# Patient Record
Sex: FEMALE | Race: WHITE | NOT HISPANIC OR LATINO | Employment: OTHER | URBAN - METROPOLITAN AREA
[De-identification: names, ages, dates, MRNs, and addresses within clinical notes are randomized per-mention and may not be internally consistent; named-entity substitution may affect disease eponyms.]

---

## 2017-01-03 RX ORDER — DICLOFENAC SODIUM 75 MG/1
TABLET, DELAYED RELEASE ORAL
Qty: 60 TABLET | Refills: 2 | Status: SHIPPED | OUTPATIENT
Start: 2017-01-03 | End: 2017-04-01 | Stop reason: SDUPTHER

## 2017-01-23 ENCOUNTER — LAB (OUTPATIENT)
Dept: LAB | Facility: HOSPITAL | Age: 77
End: 2017-01-23

## 2017-01-23 ENCOUNTER — OFFICE VISIT (OUTPATIENT)
Dept: ONCOLOGY | Facility: CLINIC | Age: 77
End: 2017-01-23

## 2017-01-23 VITALS
HEART RATE: 81 BPM | HEIGHT: 62 IN | RESPIRATION RATE: 16 BRPM | BODY MASS INDEX: 27.05 KG/M2 | SYSTOLIC BLOOD PRESSURE: 120 MMHG | OXYGEN SATURATION: 94 % | WEIGHT: 147 LBS | TEMPERATURE: 98.2 F | DIASTOLIC BLOOD PRESSURE: 68 MMHG

## 2017-01-23 DIAGNOSIS — D64.9 ANEMIA, UNSPECIFIED TYPE: Primary | ICD-10-CM

## 2017-01-23 DIAGNOSIS — D70.9 NEUTROPENIA, UNSPECIFIED TYPE (HCC): ICD-10-CM

## 2017-01-23 DIAGNOSIS — D69.6 THROMBOCYTOPENIA (HCC): Primary | ICD-10-CM

## 2017-01-23 LAB
BASOPHILS # BLD AUTO: 0.02 10*3/MM3 (ref 0–0.1)
BASOPHILS NFR BLD AUTO: 0.5 % (ref 0–1.1)
DEPRECATED RDW RBC AUTO: 40.2 FL (ref 37–49)
EOSINOPHIL # BLD AUTO: 0.09 10*3/MM3 (ref 0–0.36)
EOSINOPHIL NFR BLD AUTO: 2.1 % (ref 1–5)
ERYTHROCYTE [DISTWIDTH] IN BLOOD BY AUTOMATED COUNT: 12.4 % (ref 11.7–14.5)
HCT VFR BLD AUTO: 34.5 % (ref 34–45)
HGB BLD-MCNC: 12.1 G/DL (ref 11.5–14.9)
IMM GRANULOCYTES # BLD: 0.06 10*3/MM3 (ref 0–0.03)
IMM GRANULOCYTES NFR BLD: 1.4 % (ref 0–0.5)
LYMPHOCYTES # BLD AUTO: 1.72 10*3/MM3 (ref 1–3.5)
LYMPHOCYTES NFR BLD AUTO: 39.4 % (ref 20–49)
MCH RBC QN AUTO: 31.5 PG (ref 27–33)
MCHC RBC AUTO-ENTMCNC: 35.1 G/DL (ref 32–35)
MCV RBC AUTO: 89.8 FL (ref 83–97)
MONOCYTES # BLD AUTO: 0.9 10*3/MM3 (ref 0.25–0.8)
MONOCYTES NFR BLD AUTO: 20.6 % (ref 4–12)
NEUTROPHILS # BLD AUTO: 1.58 10*3/MM3 (ref 1.5–7)
NEUTROPHILS NFR BLD AUTO: 36 % (ref 39–75)
NRBC BLD MANUAL-RTO: 0 /100 WBC (ref 0–0)
PLATELET # BLD AUTO: 78 10*3/MM3 (ref 150–375)
PLATELETS.RETICULATED NFR BLD AUTO: 13.7 % (ref 1.1–6.1)
PMV BLD AUTO: 12.3 FL (ref 8.9–12.1)
RBC # BLD AUTO: 3.84 10*6/MM3 (ref 3.9–5)
VIT B12 BLD-MCNC: 388 PG/ML (ref 250–999)
WBC NRBC COR # BLD: 4.37 10*3/MM3 (ref 4–10)

## 2017-01-23 PROCEDURE — 82607 VITAMIN B-12: CPT | Performed by: INTERNAL MEDICINE

## 2017-01-23 PROCEDURE — 99213 OFFICE O/P EST LOW 20 MIN: CPT | Performed by: INTERNAL MEDICINE

## 2017-01-23 PROCEDURE — 85025 COMPLETE CBC W/AUTO DIFF WBC: CPT

## 2017-01-23 PROCEDURE — 85055 RETICULATED PLATELET ASSAY: CPT | Performed by: INTERNAL MEDICINE

## 2017-01-23 PROCEDURE — 36415 COLL VENOUS BLD VENIPUNCTURE: CPT

## 2017-01-23 RX ORDER — PROPRANOLOL HYDROCHLORIDE 10 MG/1
TABLET ORAL
Qty: 90 TABLET | Refills: 1 | Status: SHIPPED | OUTPATIENT
Start: 2017-01-23 | End: 2017-03-20 | Stop reason: SDUPTHER

## 2017-01-23 NOTE — PROGRESS NOTES
REASONS FOR FOLLOWUP:   1. Mild anemia.   2. Mild to moderate thrombocytopenia.       HISTORY OF PRESENT ILLNESS: The patient is a 76-year-old  female returning today for 12-month followup. The patient reports feeling well, good energy level, ECOG 0.  She denies easy bleeding bruising. Denies any pain, sweating, fever, etc. No adenopathy.       Laboratory results today reported borderline hemoglobin of 12.1, and worsening platelets 78,000 but WBC of 4370, including mild neutropenia with neutrophils of 1580 and lymphocytes 1720, monocytes 900.  Patient reports no easy bleeding, bruising.       PAST MEDICAL HISTORY:   1. Migraine headache.   2. Gastroesophageal disease/gastric ulcers.   3. Chronic lower extremity edema using hydrochlorothiazide.   4. Hyperlipidemia.   5. Hypertension ?.   6. Leukocytopenia.   7. Thrombocytopenia.   8. 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   9. Small bowel obstruction due to hernia with surgical repair in September 2011.   10. Hysterectomy and bladder repair.   11. Cholecystectomy in 1990.       OB/GYN HISTORY: G3, P2, with 1 miscarriage. Menarche at age 12 and age of menopause 40.       HEMATOLOGIC/ONCOLOGIC HISTORY: History from previous dates can be found in the separate document.       Repeat laboratory studies on 09/09/2015 showed normalization of WBC at 5580, including neutrophils 2800. Hemoglobin is 12 g and platelets 92,000.       Laboratory results 01/11/2016 reported borderline hemoglobin of 12 and WBC of 5030, including neutrophils of 1860 and lymphocytes 2050, monocytes 920. Her platelets improved slightly at 113,000. Patient reports no easy bleeding, bruising.     MEDICATIONS: The current medication list was reviewed with the patient and updated in the EMR this date per the medical assistant. Medication dosages and frequencies were confirmed to be accurate.  "      ALLERGIES: CODEINE PLUS MORPHINE.       SOCIAL HISTORY: The patient is . Finish ed a college education. 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 History of cigarette smoking 1/2 pack a day for about 20 years and quit in . She also previously had heavy alcohol consumption but has been dry for many years. No r isk for HIV and no drug addiction.       FAMILY HISTORY: Father had hypertension and heart disease and  of a heart attack at age 58. Mother had heart disease and kidney disease,  at age 89. A brother has a history of MRSA and passed away at age 62. T he patient has a daughter who had breast cancer at age of 45, currently in her middle 50s.       REVIEW OF SYSTEMS:   PAIN: See VITAL SIGNS below.   GENERAL: No change in appetite or weight, no fevers, chills or sweats.   SKIN: No rashes or nonhealing lesions.   HEME/LYMPH: See Hematology History.   EYES: No vision changes or diplopia.   ENT: No tinnitus, hearing loss, gum bleeding, epistaxis, hoarseness or dysphagia.   RESPIRATORY: No cough, shortness of breath, hemoptysis or wheezing.   CVS: No chest pain, palpitations, orthopnea, dyspnea on exertion or PND.   GI: No abdominal pain, nausea, vomiting, constipation, diarrhea, melena or hematochezia.   : No dysuria or hematuria. No abnormal vaginal discharge or bleeding.   MUSCULOSKELETAL: No bone pain or joint stiffness.   NEUROLOGICAL: No dizziness, global weakness, loss of consciousness or seizures.   PSYCHIATRIC: No increased nervousness, mood changes or depression.       VITAL SIGNS:   Vitals:    17 1147   BP: 120/68   Pulse: 81   Resp: 16   Temp: 98.2 °F (36.8 °C)   TempSrc: Oral   SpO2: 94%   Weight: 147 lb (66.7 kg)   Height: 62\" (157.5 cm)   PainSc: 0-No pain   ECO       PHYSICAL EXAMINATION:   GENERAL: Well-developed, well-nourished  female. Patient is not in acute distress.   SKIN: Warm, dry without rashes, purpura or petechiae.   HEAD: " Normocephalic.   EYES: Pupils equal, round and reactive to light. EOMs intact. Conjunctivae normal.   EARS: Hearing intact.   NOSE: . No excoriations or nasal discharge.   MOUTH: Tongue is well-papillated; no stomatitis or ulcers. Lips normal.   THROAT: Oropharynx without lesions or exudates.   NECK: Supple with good range of motion; no thyromegaly or masses.   LYMPHATICS: No cervical, supraclavicular, axillary or inguinal adenopathy.   CHEST: Lungs clear to auscultation.   CARDIAC: Regular rate and rhythm without murmurs, rubs or gallops.   ABDOMEN: Soft, nontender with no organomegaly or masses. Bowel sounds present.   EXTREMITIES: No clubbing, cyanosis or edema.   NEUROLOGICAL: No focal neurological deficits.           LABORATORY DATA:     Lab Results   Component Value Date    WBC 4.37 01/23/2017    HGB 12.1 01/23/2017    HCT 34.5 01/23/2017    MCV 89.8 01/23/2017    PLT 78 (L) 01/23/2017   IPF 13.7%  Lab Results   Component Value Date    NEUTROABS 1.58 01/23/2017       ASSESSMENT:   1. Thrombocytopenia, moderate but asymptomatic.  Patient has no easy bleeding or bruising.  We'll check her vitamin B12 level.  We will continue followup in 6 months for short interval visit.  She was instructed to call if she started having bleeding problem.  2. Mild neutropenia.  Asymptomatic.  Patient has history of leukocytopenia, to simply fluctuation of her leukocytes/neutrophil.  On 09/23/2016, she had a low neutrophil counts at 1220 and a today's slightly better.  We'll continue to follow up.  3. Borderline normal hemoglobin.       PLAN: Check vitamin B12 level.  She will return in 6 months with CBC and IPF, and  MD visit.           SCARLET UGARTE M.D.           cc: ANGÉLICA AWAD M.D.         ADDENDUM:   Lab Results   Component Value Date    [id number] 01/23/2017     Vitamin B12 level came back at the low normal range, I called patient and left a message for her, advise her to buy over-the-counter vitamin B12 take at  1000 MCG daily.       SCARLET UGARTE MD.   01/24/17     Dragon disclaimer:  Much of this encounter note is an electronic transcription/translation of spoken language to printed text. The electronic translation of spoken language may permit erroneous, or at times, nonsensical words or phrases to be inadvertently transcribed; Although I have reviewed the note for such errors, some may still exist.

## 2017-01-24 LAB
IGA SERPL-MCNC: 196 MG/DL (ref 64–422)
IGG SERPL-MCNC: 643 MG/DL (ref 700–1600)
IGM SERPL-MCNC: 159 MG/DL (ref 26–217)
PROT PATTERN SERPL IFE-IMP: ABNORMAL

## 2017-02-01 RX ORDER — SIMVASTATIN 80 MG
TABLET ORAL
Qty: 90 TABLET | Refills: 0 | Status: SHIPPED | OUTPATIENT
Start: 2017-02-01 | End: 2017-05-05 | Stop reason: SDUPTHER

## 2017-03-01 RX ORDER — PANTOPRAZOLE SODIUM 40 MG/1
TABLET, DELAYED RELEASE ORAL
Qty: 90 TABLET | Refills: 1 | Status: ON HOLD | OUTPATIENT
Start: 2017-03-01 | End: 2017-05-22 | Stop reason: SDUPTHER

## 2017-03-20 RX ORDER — PROPRANOLOL HYDROCHLORIDE 10 MG/1
TABLET ORAL
Qty: 90 TABLET | Refills: 0 | Status: SHIPPED | OUTPATIENT
Start: 2017-03-20 | End: 2017-04-16 | Stop reason: SDUPTHER

## 2017-03-22 DIAGNOSIS — E78.5 HYPERLIPIDEMIA, UNSPECIFIED: Primary | ICD-10-CM

## 2017-03-22 DIAGNOSIS — I10 ESSENTIAL HYPERTENSION: ICD-10-CM

## 2017-03-27 LAB
ALBUMIN SERPL-MCNC: 4.1 G/DL (ref 3.5–5.2)
ALBUMIN/GLOB SERPL: 1.7 G/DL
ALP SERPL-CCNC: 41 U/L (ref 39–117)
ALT SERPL-CCNC: 25 U/L (ref 1–33)
AST SERPL-CCNC: 25 U/L (ref 1–32)
BASOPHILS # BLD AUTO: 0.01 10*3/MM3 (ref 0–0.2)
BASOPHILS NFR BLD AUTO: 0.3 % (ref 0–1.5)
BILIRUB SERPL-MCNC: 0.4 MG/DL (ref 0.1–1.2)
BUN SERPL-MCNC: 13 MG/DL (ref 8–23)
BUN/CREAT SERPL: 15.9 (ref 7–25)
CALCIUM SERPL-MCNC: 9.6 MG/DL (ref 8.6–10.5)
CHLORIDE SERPL-SCNC: 102 MMOL/L (ref 98–107)
CHOLEST SERPL-MCNC: 150 MG/DL (ref 0–200)
CO2 SERPL-SCNC: 28.5 MMOL/L (ref 22–29)
CREAT SERPL-MCNC: 0.82 MG/DL (ref 0.57–1)
DIFFERENTIAL COMMENT: NORMAL
EOSINOPHIL # BLD AUTO: 0.1 10*3/MM3 (ref 0–0.7)
EOSINOPHIL NFR BLD AUTO: 2.5 % (ref 0.3–6.2)
ERYTHROCYTE [DISTWIDTH] IN BLOOD BY AUTOMATED COUNT: 13.6 % (ref 11.7–13)
GLOBULIN SER CALC-MCNC: 2.4 GM/DL
GLUCOSE SERPL-MCNC: 108 MG/DL (ref 65–99)
HCT VFR BLD AUTO: 36.4 % (ref 35.6–45.5)
HDLC SERPL-MCNC: 45 MG/DL (ref 40–60)
HGB BLD-MCNC: 11.6 G/DL (ref 11.9–15.5)
IMM GRANULOCYTES # BLD: 0.03 10*3/MM3 (ref 0–0.03)
IMM GRANULOCYTES NFR BLD: 0.8 % (ref 0–0.5)
LDLC SERPL CALC-MCNC: 84 MG/DL (ref 0–100)
LDLC/HDLC SERPL: 1.86 {RATIO}
LYMPHOCYTES # BLD AUTO: 1.62 10*3/MM3 (ref 0.9–4.8)
LYMPHOCYTES NFR BLD AUTO: 40.9 % (ref 19.6–45.3)
MCH RBC QN AUTO: 31 PG (ref 26.9–32)
MCHC RBC AUTO-ENTMCNC: 31.9 G/DL (ref 32.4–36.3)
MCV RBC AUTO: 97.3 FL (ref 80.5–98.2)
MONOCYTES # BLD AUTO: 0.88 10*3/MM3 (ref 0.2–1.2)
MONOCYTES NFR BLD AUTO: 22.2 % (ref 5–12)
NEUTROPHILS # BLD AUTO: 1.32 10*3/MM3 (ref 1.9–8.1)
NEUTROPHILS NFR BLD AUTO: 33.3 % (ref 42.7–76)
PLATELET # BLD AUTO: 80 10*3/MM3 (ref 140–500)
PLATELET BLD QL SMEAR: NORMAL
POTASSIUM SERPL-SCNC: 4.1 MMOL/L (ref 3.5–5.2)
PROT SERPL-MCNC: 6.5 G/DL (ref 6–8.5)
RBC # BLD AUTO: 3.74 10*6/MM3 (ref 3.9–5.2)
RBC MORPH BLD: NORMAL
SODIUM SERPL-SCNC: 143 MMOL/L (ref 136–145)
T4 FREE SERPL-MCNC: 1.42 NG/DL (ref 0.93–1.7)
TRIGL SERPL-MCNC: 107 MG/DL (ref 0–150)
TSH SERPL DL<=0.005 MIU/L-ACNC: 4.54 MIU/ML (ref 0.27–4.2)
VLDLC SERPL CALC-MCNC: 21.4 MG/DL (ref 5–40)
WBC # BLD AUTO: 3.96 10*3/MM3 (ref 4.5–10.7)

## 2017-03-31 ENCOUNTER — OFFICE VISIT (OUTPATIENT)
Dept: FAMILY MEDICINE CLINIC | Facility: CLINIC | Age: 77
End: 2017-03-31

## 2017-03-31 VITALS
HEART RATE: 68 BPM | WEIGHT: 151 LBS | SYSTOLIC BLOOD PRESSURE: 124 MMHG | BODY MASS INDEX: 27.79 KG/M2 | RESPIRATION RATE: 16 BRPM | DIASTOLIC BLOOD PRESSURE: 74 MMHG | HEIGHT: 62 IN | TEMPERATURE: 98 F | OXYGEN SATURATION: 99 %

## 2017-03-31 DIAGNOSIS — I10 ESSENTIAL HYPERTENSION: Primary | ICD-10-CM

## 2017-03-31 DIAGNOSIS — E78.5 HYPERLIPIDEMIA, UNSPECIFIED HYPERLIPIDEMIA TYPE: ICD-10-CM

## 2017-03-31 DIAGNOSIS — Z85.3 HX OF BREAST CANCER: ICD-10-CM

## 2017-03-31 PROCEDURE — 99214 OFFICE O/P EST MOD 30 MIN: CPT | Performed by: INTERNAL MEDICINE

## 2017-03-31 RX ORDER — LANOLIN ALCOHOL/MO/W.PET/CERES
1000 CREAM (GRAM) TOPICAL DAILY
COMMUNITY

## 2017-03-31 NOTE — PATIENT INSTRUCTIONS
Blood pressure and lipids are excellent.  Fasting blood sugar is mildly elevated at 108.  Thyroid stimulant hormone is slightly elevated at 4.54.  Hemoglobin is 11.6.  White cell count is 3.96.  Platelet count is 80,000.  Reviewed and discussed your last colonoscopy and EGD reports.  We'll schedule a mammogram.  Continue current diet and exercise and medicines and follow-up in 6 months with labs.

## 2017-03-31 NOTE — PROGRESS NOTES
Subjective   Roxana Brizuela is a 76 y.o. female. Patient is here today for   Chief Complaint   Patient presents with   • Follow-up     labs           Vitals:    03/31/17 0946   BP: 124/74   Pulse: 68   Resp: 16   Temp: 98 °F (36.7 °C)   SpO2: 99%       The following portions of the patient's history were reviewed and updated as appropriate: allergies, current medications, past family history, past medical history, past social history, past surgical history and problem list.    Past Medical History:   Diagnosis Date   • Acute bronchitis    • Anemia    • Chronic pain    • Conjunctivitis    • Cough    • Ductal carcinoma in situ (DCIS) of left breast    • Edema     Chronic lower extremity edema   • GERD (gastroesophageal reflux disease)    • H/O Bowel obstruction 2013   • Hernia    • History of infectious mononucleosis    • History of migraine headaches    • Hyperlipidemia    • Hypertension    • Impacted cerumen of left ear    • Leukocytopenia    • Leukopenia    • Meningioma    • Peptic ulceration    • Perioral dermatitis    • SBO (small bowel obstruction)     due to hernia with surgical repair in 09/2011   • SOB (shortness of breath) on exertion    • Thrombocytopenia    • Vertigo       Allergies   Allergen Reactions   • Codeine    • Morphine       Social History     Social History   • Marital status:      Spouse name: Mike   • Number of children: 2   • Years of education: College     Occupational History   • Retired      Investigator [employer]     Social History Main Topics   • Smoking status: Former Smoker   • Smokeless tobacco: Not on file      Comment: caffeine use   • Alcohol use No      Comment: stopped   • Drug use: Not on file   • Sexual activity: Not on file     Other Topics Concern   • Not on file     Social History Narrative        Current Outpatient Prescriptions:   •  vitamin B-12 (CYANOCOBALAMIN) 1000 MCG tablet, Take 1,000 mcg by mouth Daily., Disp: , Rfl:   •  Ascorbic Acid (VITAMIN  C PO), Take 1,000 mg by mouth Daily., Disp: , Rfl:   •  Calcium Carbonate-Vitamin D (CALTRATE 600+D PO), Take 600 mg by mouth 2 (Two) Times a Day., Disp: , Rfl:   •  diclofenac (VOLTAREN) 75 MG EC tablet, TAKE ONE TABLET BY MOUTH TWICE DAILY WITH FOOD, Disp: 60 tablet, Rfl: 2  •  Glucos-Chond-Sterol-Fish Oil (GLUCOSAMINE CHONDROITIN PLUS) capsule, Take  by mouth., Disp: , Rfl:   •  hydrochlorothiazide (HYDRODIURIL) 25 MG tablet, Take 1 tablet by mouth Daily., Disp: 90 tablet, Rfl: 1  •  melatonin 5 MG tablet tablet, Take 5 mg by mouth., Disp: , Rfl:   •  Omega-3 Fatty Acids (FISH OIL) 1200 MG capsule capsule, Take 1,000 mg by mouth., Disp: , Rfl:   •  pantoprazole (PROTONIX) 40 MG EC tablet, TAKE ONE TABLET BY MOUTH ONCE DAILY, Disp: 90 tablet, Rfl: 1  •  propranolol (INDERAL) 10 MG tablet, TAKE ONE TABLET BY MOUTH THREE TIMES DAILY, Disp: 90 tablet, Rfl: 0  •  simvastatin (ZOCOR) 80 MG tablet, TAKE ONE TABLET BY MOUTH AT BEDTIME, Disp: 90 tablet, Rfl: 0  •  SUMAtriptan (IMITREX) 100 MG tablet, USE AS DIRECTED, Disp: 90 tablet, Rfl: 0     Objective   History of Present Illness Roxana is here today for a blood pressure and lab follow-up.  She has hypertension, hyperlipidemia, hiatal hernia and gastroesophageal reflux, and migraine headaches.  She also has pancytopenia and sees hematology.  She continues to eat healthy and exercises on a regular basis.  Walks.  She generally feels well.  She does monitor her blood pressure at home and reports stable readings.    Review of Systems   Constitutional: Negative for activity change and unexpected weight change.   Respiratory: Negative.    Cardiovascular: Negative for chest pain and leg swelling.        Blood pressures are less than 120/80 at home.   Gastrointestinal:        Colonoscopy 2009, EGD 2011   Genitourinary: Negative.    Neurological: Negative for light-headedness.   Psychiatric/Behavioral: Negative.        Physical Exam   Constitutional: She appears  well-developed and well-nourished.   Neck: Neck supple. No thyromegaly present.   Cardiovascular: Normal rate, regular rhythm and normal heart sounds.    142/50   Pulmonary/Chest: Effort normal and breath sounds normal.   Lymphadenopathy:     She has no cervical adenopathy.   Neurological: She is alert.   Psychiatric: She has a normal mood and affect.   Vitals reviewed.      ASSESSMENT     Problem List Items Addressed This Visit        Cardiovascular and Mediastinum    Hyperlipidemia    Hypertension - Primary      Other Visit Diagnoses     Hx of breast cancer        Relevant Orders    Mammo Diagnostic Bilateral With CAD          PLAN  Patient Instructions   Blood pressure and lipids are excellent.  Fasting blood sugar is mildly elevated at 108.  Thyroid stimulant hormone is slightly elevated at 4.54.  Hemoglobin is 11.6.  White cell count is 3.96.  Platelet count is 80,000.  Reviewed and discussed your last colonoscopy and EGD reports.  We'll schedule a mammogram.  Continue current diet and exercise and medicines and follow-up in 6 months with labs.      Return in about 6 months (around 9/30/2017) for labsBMP,A1C.

## 2017-04-03 RX ORDER — DICLOFENAC SODIUM 75 MG/1
TABLET, DELAYED RELEASE ORAL
Qty: 60 TABLET | Refills: 0 | Status: SHIPPED | OUTPATIENT
Start: 2017-04-03 | End: 2017-05-08 | Stop reason: SDUPTHER

## 2017-04-06 DIAGNOSIS — Z12.31 ENCOUNTER FOR SCREENING MAMMOGRAM FOR HIGH-RISK PATIENT: Primary | ICD-10-CM

## 2017-04-06 DIAGNOSIS — Z85.3 HISTORY OF BREAST CANCER: ICD-10-CM

## 2017-04-12 ENCOUNTER — HOSPITAL ENCOUNTER (OUTPATIENT)
Dept: MAMMOGRAPHY | Facility: HOSPITAL | Age: 77
Discharge: HOME | End: 2017-04-12
Admitting: INTERNAL MEDICINE

## 2017-04-12 DIAGNOSIS — Z12.31 ENCOUNTER FOR SCREENING MAMMOGRAM FOR HIGH-RISK PATIENT: ICD-10-CM

## 2017-04-12 DIAGNOSIS — Z85.3 HISTORY OF BREAST CANCER: ICD-10-CM

## 2017-04-12 PROCEDURE — G0202 SCR MAMMO BI INCL CAD: HCPCS

## 2017-04-19 ENCOUNTER — TELEPHONE (OUTPATIENT)
Dept: FAMILY MEDICINE CLINIC | Facility: CLINIC | Age: 77
End: 2017-04-19

## 2017-04-19 DIAGNOSIS — R92.8 ABNORMAL MAMMOGRAM OF RIGHT BREAST: Primary | ICD-10-CM

## 2017-04-19 NOTE — TELEPHONE ENCOUNTER
Called patient, notified her that Dr. Martin will put the order in for the US.  She understood.     ----- Message from Lexie Cuello MA sent at 4/19/2017  9:15 AM EDT -----  Contact: PATIENT  PT HAD MAMMO DONE AT [facility] RECENTLY AND THEY SENT HER A LETTER STATING SHE NEEDS AN ULTRASOUND. SHE IS CALLING TO FIND OUT ABOUT THIS AND SEE IF ONE CAN BE SET UP? PLEASE CALL PATIENT [phone]. THANK YOU.

## 2017-04-20 RX ORDER — PROPRANOLOL HYDROCHLORIDE 10 MG/1
TABLET ORAL
Qty: 90 TABLET | Refills: 7 | Status: ON HOLD | OUTPATIENT
Start: 2017-04-20 | End: 2017-05-22 | Stop reason: SDUPTHER

## 2017-04-26 ENCOUNTER — HOSPITAL ENCOUNTER (OUTPATIENT)
Dept: ULTRASOUND IMAGING | Facility: HOSPITAL | Age: 77
Discharge: HOME | End: 2017-04-26
Admitting: INTERNAL MEDICINE

## 2017-04-26 PROCEDURE — 76642 ULTRASOUND BREAST LIMITED: CPT

## 2017-04-27 DIAGNOSIS — N63.0 BREAST MASS: Primary | ICD-10-CM

## 2017-05-03 ENCOUNTER — HOSPITAL ENCOUNTER (OUTPATIENT)
Dept: ULTRASOUND IMAGING | Facility: HOSPITAL | Age: 77
Discharge: HOME | End: 2017-05-03
Admitting: INTERNAL MEDICINE

## 2017-05-03 VITALS
DIASTOLIC BLOOD PRESSURE: 70 MMHG | HEIGHT: 63 IN | TEMPERATURE: 99.2 F | SYSTOLIC BLOOD PRESSURE: 117 MMHG | WEIGHT: 145 LBS | RESPIRATION RATE: 16 BRPM | BODY MASS INDEX: 25.69 KG/M2 | OXYGEN SATURATION: 99 % | HEART RATE: 73 BPM

## 2017-05-03 PROCEDURE — 88342 IMHCHEM/IMCYTCHM 1ST ANTB: CPT | Performed by: INTERNAL MEDICINE

## 2017-05-03 PROCEDURE — 88305 TISSUE EXAM BY PATHOLOGIST: CPT | Performed by: INTERNAL MEDICINE

## 2017-05-03 RX ORDER — LIDOCAINE HYDROCHLORIDE 10 MG/ML
20 INJECTION, SOLUTION INFILTRATION; PERINEURAL ONCE
Status: COMPLETED | OUTPATIENT
Start: 2017-05-03 | End: 2017-05-03

## 2017-05-03 RX ADMIN — LIDOCAINE HYDROCHLORIDE 18 ML: 10 INJECTION, SOLUTION INFILTRATION; PERINEURAL at 15:05

## 2017-05-08 DIAGNOSIS — C50.911 MALIGNANT NEOPLASM OF RIGHT FEMALE BREAST, UNSPECIFIED SITE OF BREAST: Primary | ICD-10-CM

## 2017-05-08 RX ORDER — SIMVASTATIN 80 MG
TABLET ORAL
Qty: 90 TABLET | Refills: 1 | Status: ON HOLD | OUTPATIENT
Start: 2017-05-08 | End: 2017-05-22 | Stop reason: SDUPTHER

## 2017-05-08 RX ORDER — DICLOFENAC SODIUM 75 MG/1
TABLET, DELAYED RELEASE ORAL
Qty: 60 TABLET | Refills: 1 | Status: ON HOLD | OUTPATIENT
Start: 2017-05-08 | End: 2017-05-22 | Stop reason: SDUPTHER

## 2017-05-11 LAB
CYTO UR: NORMAL
LAB AP CASE REPORT: NORMAL
Lab: NORMAL
Lab: NORMAL
PATH REPORT.ADDENDUM SPEC: NORMAL
PATH REPORT.FINAL DX SPEC: NORMAL
PATH REPORT.GROSS SPEC: NORMAL

## 2017-05-16 ENCOUNTER — LAB (OUTPATIENT)
Dept: LAB | Facility: HOSPITAL | Age: 77
End: 2017-05-16

## 2017-05-16 ENCOUNTER — CLINICAL SUPPORT (OUTPATIENT)
Dept: ONCOLOGY | Facility: CLINIC | Age: 77
End: 2017-05-16

## 2017-05-16 ENCOUNTER — CONSULT (OUTPATIENT)
Dept: ONCOLOGY | Facility: CLINIC | Age: 77
End: 2017-05-16

## 2017-05-16 VITALS
TEMPERATURE: 98.6 F | BODY MASS INDEX: 27.97 KG/M2 | HEIGHT: 62 IN | HEART RATE: 72 BPM | DIASTOLIC BLOOD PRESSURE: 68 MMHG | WEIGHT: 152 LBS | SYSTOLIC BLOOD PRESSURE: 164 MMHG | OXYGEN SATURATION: 99 % | RESPIRATION RATE: 16 BRPM

## 2017-05-16 DIAGNOSIS — C50.919 MALIGNANT NEOPLASM OF FEMALE BREAST, UNSPECIFIED LATERALITY, UNSPECIFIED SITE OF BREAST: Primary | ICD-10-CM

## 2017-05-16 DIAGNOSIS — D69.6 THROMBOCYTOPENIA (HCC): ICD-10-CM

## 2017-05-16 DIAGNOSIS — Z51.11 ENCOUNTER FOR ANTINEOPLASTIC CHEMOTHERAPY: ICD-10-CM

## 2017-05-16 DIAGNOSIS — D70.9 NEUTROPENIA, UNSPECIFIED TYPE (HCC): ICD-10-CM

## 2017-05-16 DIAGNOSIS — C50.911 MALIGNANT NEOPLASM OF RIGHT FEMALE BREAST, UNSPECIFIED SITE OF BREAST: Primary | ICD-10-CM

## 2017-05-16 LAB
BASOPHILS # BLD AUTO: 0.02 10*3/MM3 (ref 0–0.1)
BASOPHILS NFR BLD AUTO: 0.3 % (ref 0–1.1)
DEPRECATED RDW RBC AUTO: 43.1 FL (ref 37–49)
EOSINOPHIL # BLD AUTO: 0.05 10*3/MM3 (ref 0–0.36)
EOSINOPHIL NFR BLD AUTO: 0.9 % (ref 1–5)
ERYTHROCYTE [DISTWIDTH] IN BLOOD BY AUTOMATED COUNT: 12.5 % (ref 11.7–14.5)
HCT VFR BLD AUTO: 35.6 % (ref 34–45)
HGB BLD-MCNC: 12.1 G/DL (ref 11.5–14.9)
IMM GRANULOCYTES # BLD: 0.18 10*3/MM3 (ref 0–0.03)
IMM GRANULOCYTES NFR BLD: 3.1 % (ref 0–0.5)
LYMPHOCYTES # BLD AUTO: 1.8 10*3/MM3 (ref 1–3.5)
LYMPHOCYTES NFR BLD AUTO: 31.3 % (ref 20–49)
MCH RBC QN AUTO: 31.8 PG (ref 27–33)
MCHC RBC AUTO-ENTMCNC: 34 G/DL (ref 32–35)
MCV RBC AUTO: 93.4 FL (ref 83–97)
MONOCYTES # BLD AUTO: 1.19 10*3/MM3 (ref 0.25–0.8)
MONOCYTES NFR BLD AUTO: 20.7 % (ref 4–12)
NEUTROPHILS # BLD AUTO: 2.51 10*3/MM3 (ref 1.5–7)
NEUTROPHILS NFR BLD AUTO: 43.7 % (ref 39–75)
NRBC BLD MANUAL-RTO: 0 /100 WBC (ref 0–0)
PLATELET # BLD AUTO: 98 10*3/MM3 (ref 150–375)
PMV BLD AUTO: 11.4 FL (ref 8.9–12.1)
RBC # BLD AUTO: 3.81 10*6/MM3 (ref 3.9–5)
WBC NRBC COR # BLD: 5.75 10*3/MM3 (ref 4–10)

## 2017-05-16 PROCEDURE — 99215 OFFICE O/P EST HI 40 MIN: CPT | Performed by: INTERNAL MEDICINE

## 2017-05-16 PROCEDURE — 36416 COLLJ CAPILLARY BLOOD SPEC: CPT

## 2017-05-16 PROCEDURE — 85025 COMPLETE CBC W/AUTO DIFF WBC: CPT

## 2017-05-16 RX ORDER — TRAZODONE HYDROCHLORIDE 50 MG/1
50 TABLET ORAL NIGHTLY
Qty: 50 TABLET | Refills: 5 | Status: SHIPPED | OUTPATIENT
Start: 2017-05-16 | End: 2017-05-18

## 2017-05-16 RX ORDER — ONDANSETRON HYDROCHLORIDE 8 MG/1
8 TABLET, FILM COATED ORAL EVERY 8 HOURS PRN
Qty: 30 TABLET | Refills: 5 | Status: SHIPPED | OUTPATIENT
Start: 2017-05-16 | End: 2017-08-09

## 2017-05-16 RX ORDER — PROCHLORPERAZINE MALEATE 10 MG
10 TABLET ORAL EVERY 6 HOURS PRN
Qty: 60 TABLET | Refills: 5 | Status: SHIPPED | OUTPATIENT
Start: 2017-05-16 | End: 2017-08-09

## 2017-05-16 RX ORDER — DEXAMETHASONE 4 MG/1
4 TABLET ORAL 2 TIMES DAILY WITH MEALS
Qty: 40 TABLET | Refills: 1 | Status: SHIPPED | OUTPATIENT
Start: 2017-05-16 | End: 2017-11-02

## 2017-05-18 ENCOUNTER — PREP FOR SURGERY (OUTPATIENT)
Dept: SURGERY | Facility: CLINIC | Age: 77
End: 2017-05-18

## 2017-05-18 ENCOUNTER — HOSPITAL ENCOUNTER (OUTPATIENT)
Dept: CARDIOLOGY | Facility: HOSPITAL | Age: 77
Discharge: HOME | End: 2017-05-18
Attending: INTERNAL MEDICINE | Admitting: INTERNAL MEDICINE

## 2017-05-18 ENCOUNTER — APPOINTMENT (OUTPATIENT)
Dept: LAB | Facility: HOSPITAL | Age: 77
End: 2017-05-18

## 2017-05-18 ENCOUNTER — OFFICE VISIT (OUTPATIENT)
Dept: ONCOLOGY | Facility: CLINIC | Age: 77
End: 2017-05-18

## 2017-05-18 ENCOUNTER — HOSPITAL ENCOUNTER (OUTPATIENT)
Dept: PET IMAGING | Facility: HOSPITAL | Age: 77
Discharge: HOME | End: 2017-05-18
Attending: INTERNAL MEDICINE

## 2017-05-18 VITALS
SYSTOLIC BLOOD PRESSURE: 122 MMHG | HEIGHT: 62 IN | OXYGEN SATURATION: 98 % | DIASTOLIC BLOOD PRESSURE: 82 MMHG | HEART RATE: 68 BPM | BODY MASS INDEX: 27.97 KG/M2 | WEIGHT: 152 LBS

## 2017-05-18 VITALS — BODY MASS INDEX: 27.79 KG/M2 | WEIGHT: 152 LBS

## 2017-05-18 DIAGNOSIS — C50.911 MALIGNANT NEOPLASM OF RIGHT FEMALE BREAST, UNSPECIFIED SITE OF BREAST: Primary | ICD-10-CM

## 2017-05-18 DIAGNOSIS — C50.911 MALIGNANT NEOPLASM OF RIGHT FEMALE BREAST, UNSPECIFIED SITE OF BREAST: ICD-10-CM

## 2017-05-18 DIAGNOSIS — C50.111 MALIGNANT NEOPLASM OF CENTRAL PORTION OF RIGHT FEMALE BREAST (HCC): ICD-10-CM

## 2017-05-18 DIAGNOSIS — Z51.11 ENCOUNTER FOR ANTINEOPLASTIC CHEMOTHERAPY: ICD-10-CM

## 2017-05-18 DIAGNOSIS — G47.00 INSOMNIA, UNSPECIFIED TYPE: ICD-10-CM

## 2017-05-18 LAB
ASCENDING AORTA: 2.3 CM
BH CV ECHO MEAS - ACS: 1.7 CM
BH CV ECHO MEAS - AO MAX PG (FULL): 2.6 MMHG
BH CV ECHO MEAS - AO MAX PG: 6.8 MMHG
BH CV ECHO MEAS - AO MEAN PG (FULL): 1.9 MMHG
BH CV ECHO MEAS - AO MEAN PG: 4.1 MMHG
BH CV ECHO MEAS - AO ROOT AREA (BSA CORRECTED): 1.7
BH CV ECHO MEAS - AO ROOT AREA: 6.4 CM^2
BH CV ECHO MEAS - AO ROOT DIAM: 2.9 CM
BH CV ECHO MEAS - AO V2 MAX: 129.9 CM/SEC
BH CV ECHO MEAS - AO V2 MEAN: 98 CM/SEC
BH CV ECHO MEAS - AO V2 VTI: 28.3 CM
BH CV ECHO MEAS - AVA(I,A): 1.5 CM^2
BH CV ECHO MEAS - AVA(I,D): 1.5 CM^2
BH CV ECHO MEAS - AVA(V,A): 1.6 CM^2
BH CV ECHO MEAS - AVA(V,D): 1.6 CM^2
BH CV ECHO MEAS - BSA(HAYCOCK): 1.8 M^2
BH CV ECHO MEAS - BSA: 1.7 M^2
BH CV ECHO MEAS - BZI_BMI: 27.8 KILOGRAMS/M^2
BH CV ECHO MEAS - BZI_METRIC_HEIGHT: 157.5 CM
BH CV ECHO MEAS - BZI_METRIC_WEIGHT: 68.9 KG
BH CV ECHO MEAS - CONTRAST EF (2CH): 66.7 ML/M^2
BH CV ECHO MEAS - CONTRAST EF 4CH: 68.5 ML/M^2
BH CV ECHO MEAS - EDV(MOD-SP2): 72 ML
BH CV ECHO MEAS - EDV(MOD-SP4): 73 ML
BH CV ECHO MEAS - EDV(TEICH): 80.4 ML
BH CV ECHO MEAS - EF(CUBED): 77.4 %
BH CV ECHO MEAS - EF(MOD-SP2): 66.7 %
BH CV ECHO MEAS - EF(MOD-SP4): 68.5 %
BH CV ECHO MEAS - EF(TEICH): 69.9 %
BH CV ECHO MEAS - ESV(MOD-SP2): 24 ML
BH CV ECHO MEAS - ESV(MOD-SP4): 23 ML
BH CV ECHO MEAS - ESV(TEICH): 24.2 ML
BH CV ECHO MEAS - FS: 39.1 %
BH CV ECHO MEAS - IVS/LVPW: 0.89
BH CV ECHO MEAS - IVSD: 0.74 CM
BH CV ECHO MEAS - LAT PEAK E' VEL: 8 CM/SEC
BH CV ECHO MEAS - LV DIASTOLIC VOL/BSA (35-75): 42.9 ML/M^2
BH CV ECHO MEAS - LV MASS(C)D: 100.1 GRAMS
BH CV ECHO MEAS - LV MASS(C)DI: 58.9 GRAMS/M^2
BH CV ECHO MEAS - LV MAX PG: 4.1 MMHG
BH CV ECHO MEAS - LV MEAN PG: 2.2 MMHG
BH CV ECHO MEAS - LV SYSTOLIC VOL/BSA (12-30): 13.5 ML/M^2
BH CV ECHO MEAS - LV V1 MAX: 101.4 CM/SEC
BH CV ECHO MEAS - LV V1 MEAN: 69.6 CM/SEC
BH CV ECHO MEAS - LV V1 VTI: 21.1 CM
BH CV ECHO MEAS - LVIDD: 4.2 CM
BH CV ECHO MEAS - LVIDS: 2.6 CM
BH CV ECHO MEAS - LVLD AP2: 6.9 CM
BH CV ECHO MEAS - LVLD AP4: 6.9 CM
BH CV ECHO MEAS - LVLS AP2: 5.4 CM
BH CV ECHO MEAS - LVLS AP4: 5.1 CM
BH CV ECHO MEAS - LVOT AREA (M): 2 CM^2
BH CV ECHO MEAS - LVOT AREA: 2 CM^2
BH CV ECHO MEAS - LVOT DIAM: 1.6 CM
BH CV ECHO MEAS - LVPWD: 0.83 CM
BH CV ECHO MEAS - MED PEAK E' VEL: 8 CM/SEC
BH CV ECHO MEAS - MV A DUR: 0.12 SEC
BH CV ECHO MEAS - MV A MAX VEL: 93.6 CM/SEC
BH CV ECHO MEAS - MV DEC SLOPE: 213.2 CM/SEC^2
BH CV ECHO MEAS - MV DEC TIME: 0.3 SEC
BH CV ECHO MEAS - MV E MAX VEL: 61.6 CM/SEC
BH CV ECHO MEAS - MV E/A: 0.66
BH CV ECHO MEAS - MV MAX PG: 4 MMHG
BH CV ECHO MEAS - MV MEAN PG: 2.1 MMHG
BH CV ECHO MEAS - MV P1/2T MAX VEL: 63.1 CM/SEC
BH CV ECHO MEAS - MV P1/2T: 86.6 MSEC
BH CV ECHO MEAS - MV V2 MAX: 99.9 CM/SEC
BH CV ECHO MEAS - MV V2 MEAN: 69.4 CM/SEC
BH CV ECHO MEAS - MV V2 VTI: 26.4 CM
BH CV ECHO MEAS - MVA P1/2T LCG: 3.5 CM^2
BH CV ECHO MEAS - MVA(P1/2T): 2.5 CM^2
BH CV ECHO MEAS - MVA(VTI): 1.6 CM^2
BH CV ECHO MEAS - PA ACC TIME: 0.13 SEC
BH CV ECHO MEAS - PA MAX PG (FULL): 1.8 MMHG
BH CV ECHO MEAS - PA MAX PG: 4.6 MMHG
BH CV ECHO MEAS - PA PR(ACCEL): 18.8 MMHG
BH CV ECHO MEAS - PA V2 MAX: 106.7 CM/SEC
BH CV ECHO MEAS - PULM A REVS DUR: 0.1 SEC
BH CV ECHO MEAS - PULM A REVS VEL: 28.1 CM/SEC
BH CV ECHO MEAS - PULM DIAS VEL: 39.3 CM/SEC
BH CV ECHO MEAS - PULM S/D: 1.4
BH CV ECHO MEAS - PULM SYS VEL: 53.4 CM/SEC
BH CV ECHO MEAS - PVA(V,A): 2.3 CM^2
BH CV ECHO MEAS - PVA(V,D): 2.3 CM^2
BH CV ECHO MEAS - QP/QS: 1.2
BH CV ECHO MEAS - RAP SYSTOLE: 3 MMHG
BH CV ECHO MEAS - RV MAX PG: 2.8 MMHG
BH CV ECHO MEAS - RV MEAN PG: 1.5 MMHG
BH CV ECHO MEAS - RV V1 MAX: 82.9 CM/SEC
BH CV ECHO MEAS - RV V1 MEAN: 57.7 CM/SEC
BH CV ECHO MEAS - RV V1 VTI: 17.9 CM
BH CV ECHO MEAS - RVOT AREA: 2.9 CM^2
BH CV ECHO MEAS - RVOT DIAM: 1.9 CM
BH CV ECHO MEAS - SI(AO): 107.1 ML/M^2
BH CV ECHO MEAS - SI(CUBED): 34.7 ML/M^2
BH CV ECHO MEAS - SI(LVOT): 25.3 ML/M^2
BH CV ECHO MEAS - SI(MOD-SP2): 28.2 ML/M^2
BH CV ECHO MEAS - SI(MOD-SP4): 29.4 ML/M^2
BH CV ECHO MEAS - SI(TEICH): 33 ML/M^2
BH CV ECHO MEAS - SUP REN AO DIAM: 1.8 CM
BH CV ECHO MEAS - SV(AO): 182.2 ML
BH CV ECHO MEAS - SV(CUBED): 59.1 ML
BH CV ECHO MEAS - SV(LVOT): 43.1 ML
BH CV ECHO MEAS - SV(MOD-SP2): 48 ML
BH CV ECHO MEAS - SV(MOD-SP4): 50 ML
BH CV ECHO MEAS - SV(RVOT): 52 ML
BH CV ECHO MEAS - SV(TEICH): 56.2 ML
BH CV XLRA - RV BASE: 2.8 CM
BH CV XLRA - TDI S': 15 CM/SEC
CREAT BLDA-MCNC: 0.8 MG/DL (ref 0.6–1.3)
E/E' RATIO: 7.5
LEFT ATRIUM VOLUME INDEX: 24 ML/M2
LV EF 2D ECHO EST: 69 %
SINUS: 2.8 CM
STJ: 2.1 CM

## 2017-05-18 PROCEDURE — C8929 TTE W OR WO FOL WCON,DOPPLER: HCPCS

## 2017-05-18 PROCEDURE — 25010000002 PERFLUTREN (DEFINITY) 8.476 MG IN SODIUM CHLORIDE 10 ML INJECTION: Performed by: INTERNAL MEDICINE

## 2017-05-18 PROCEDURE — 0 IOPAMIDOL 61 % SOLUTION: Performed by: INTERNAL MEDICINE

## 2017-05-18 PROCEDURE — 0399T HC MYOCARDL STRAIN IMAG QUAN ASSMT PER SESS: CPT

## 2017-05-18 PROCEDURE — 93306 TTE W/DOPPLER COMPLETE: CPT | Performed by: INTERNAL MEDICINE

## 2017-05-18 PROCEDURE — 99215 OFFICE O/P EST HI 40 MIN: CPT | Performed by: NURSE PRACTITIONER

## 2017-05-18 PROCEDURE — 0399T ADULT TRANSTHORACIC ECHO COMPLETE WITH CONTRAST: CPT | Performed by: INTERNAL MEDICINE

## 2017-05-18 PROCEDURE — 82565 ASSAY OF CREATININE: CPT

## 2017-05-18 PROCEDURE — 71260 CT THORAX DX C+: CPT

## 2017-05-18 PROCEDURE — 74177 CT ABD & PELVIS W/CONTRAST: CPT

## 2017-05-18 PROCEDURE — 0 DIATRIZOATE MEGLUMINE & SODIUM PER 1 ML: Performed by: INTERNAL MEDICINE

## 2017-05-18 RX ORDER — CEFAZOLIN SODIUM 2 G/100ML
2 INJECTION, SOLUTION INTRAVENOUS ONCE
Status: CANCELLED | OUTPATIENT
Start: 2017-05-18 | End: 2017-05-18

## 2017-05-18 RX ORDER — ZOLPIDEM TARTRATE 5 MG/1
5 TABLET ORAL NIGHTLY PRN
Qty: 30 TABLET | Refills: 0 | Status: SHIPPED | OUTPATIENT
Start: 2017-05-18 | End: 2017-06-14 | Stop reason: SDUPTHER

## 2017-05-18 RX ADMIN — DIATRIZOATE MEGLUMINE AND DIATRIZOATE SODIUM 30 ML: 660; 100 LIQUID ORAL; RECTAL at 09:55

## 2017-05-18 RX ADMIN — PERFLUTREN 1.5 ML: 6.52 INJECTION, SUSPENSION INTRAVENOUS at 12:14

## 2017-05-18 RX ADMIN — IOPAMIDOL 85 ML: 612 INJECTION, SOLUTION INTRAVENOUS at 11:20

## 2017-05-21 ENCOUNTER — HOSPITAL ENCOUNTER (OUTPATIENT)
Dept: MRI IMAGING | Facility: HOSPITAL | Age: 77
Discharge: HOME | End: 2017-05-21
Attending: INTERNAL MEDICINE | Admitting: INTERNAL MEDICINE

## 2017-05-21 DIAGNOSIS — C50.911 MALIGNANT NEOPLASM OF RIGHT FEMALE BREAST, UNSPECIFIED SITE OF BREAST: ICD-10-CM

## 2017-05-21 PROCEDURE — C8908 MRI W/O FOL W/CONT, BREAST,: HCPCS

## 2017-05-21 PROCEDURE — A9577 INJ MULTIHANCE: HCPCS | Performed by: INTERNAL MEDICINE

## 2017-05-21 PROCEDURE — 0159T HC MRI BREAST COMPUTER ANALYSIS: CPT

## 2017-05-21 PROCEDURE — 0 GADOBENATE DIMEGLUMINE 529 MG/ML SOLUTION: Performed by: INTERNAL MEDICINE

## 2017-05-21 RX ADMIN — GADOBENATE DIMEGLUMINE 15 ML: 529 INJECTION, SOLUTION INTRAVENOUS at 13:03

## 2017-05-22 ENCOUNTER — ANESTHESIA (OUTPATIENT)
Dept: PERIOP | Facility: HOSPITAL | Age: 77
End: 2017-05-22

## 2017-05-22 ENCOUNTER — APPOINTMENT (OUTPATIENT)
Dept: GENERAL RADIOLOGY | Facility: HOSPITAL | Age: 77
End: 2017-05-22

## 2017-05-22 ENCOUNTER — HOSPITAL ENCOUNTER (OUTPATIENT)
Facility: HOSPITAL | Age: 77
Setting detail: HOSPITAL OUTPATIENT SURGERY
Discharge: HOME | End: 2017-05-22
Attending: SURGERY | Admitting: SURGERY

## 2017-05-22 ENCOUNTER — ANESTHESIA EVENT (OUTPATIENT)
Dept: PERIOP | Facility: HOSPITAL | Age: 77
End: 2017-05-22

## 2017-05-22 ENCOUNTER — DOCUMENTATION (OUTPATIENT)
Dept: ONCOLOGY | Facility: CLINIC | Age: 77
End: 2017-05-22

## 2017-05-22 VITALS
OXYGEN SATURATION: 100 % | WEIGHT: 146.5 LBS | DIASTOLIC BLOOD PRESSURE: 54 MMHG | SYSTOLIC BLOOD PRESSURE: 124 MMHG | HEIGHT: 62 IN | HEART RATE: 65 BPM | TEMPERATURE: 97.6 F | RESPIRATION RATE: 16 BRPM | BODY MASS INDEX: 26.96 KG/M2

## 2017-05-22 DIAGNOSIS — C50.911 MALIGNANT NEOPLASM OF RIGHT FEMALE BREAST, UNSPECIFIED SITE OF BREAST: ICD-10-CM

## 2017-05-22 PROCEDURE — 77001 FLUOROGUIDE FOR VEIN DEVICE: CPT | Performed by: SURGERY

## 2017-05-22 PROCEDURE — 36561 INSERT TUNNELED CV CATH: CPT | Performed by: SURGERY

## 2017-05-22 PROCEDURE — 25010000002 FENTANYL CITRATE (PF) 100 MCG/2ML SOLUTION: Performed by: NURSE ANESTHETIST, CERTIFIED REGISTERED

## 2017-05-22 PROCEDURE — 25010000002 MIDAZOLAM PER 1 MG: Performed by: NURSE ANESTHETIST, CERTIFIED REGISTERED

## 2017-05-22 PROCEDURE — 25010000003 CEFAZOLIN IN DEXTROSE 2-4 GM/100ML-% SOLUTION: Performed by: SURGERY

## 2017-05-22 PROCEDURE — 25010000002 MIDAZOLAM PER 1 MG

## 2017-05-22 PROCEDURE — C1788 PORT, INDWELLING, IMP: HCPCS | Performed by: SURGERY

## 2017-05-22 PROCEDURE — 25010000002 PROPOFOL 10 MG/ML EMULSION: Performed by: NURSE ANESTHETIST, CERTIFIED REGISTERED

## 2017-05-22 PROCEDURE — 25010000002 HEPARIN (PORCINE) PER 1000 UNITS: Performed by: SURGERY

## 2017-05-22 PROCEDURE — 77001 FLUOROGUIDE FOR VEIN DEVICE: CPT

## 2017-05-22 DEVICE — POWERPORT M.R.I. IMPLANTABLE PORT WITH ATTACHABLE 8F CHRONOFLEX OPEN-ENDED SINGLE-LUMEN VENOUS CATHETER INTERMEDIATE KIT (WITH SUTURE PLUGS)
Type: IMPLANTABLE DEVICE | Status: FUNCTIONAL
Brand: POWERPORT M.R.I., CHRONOFLEX

## 2017-05-22 RX ORDER — SUMATRIPTAN 50 MG/1
50 TABLET, FILM COATED ORAL
COMMUNITY
End: 2019-07-05 | Stop reason: SDUPTHER

## 2017-05-22 RX ORDER — MIDAZOLAM HYDROCHLORIDE 1 MG/ML
INJECTION INTRAMUSCULAR; INTRAVENOUS
Status: COMPLETED
Start: 2017-05-22 | End: 2017-05-22

## 2017-05-22 RX ORDER — FAMOTIDINE 10 MG/ML
INJECTION, SOLUTION INTRAVENOUS
Status: COMPLETED
Start: 2017-05-22 | End: 2017-05-22

## 2017-05-22 RX ORDER — PROPRANOLOL HYDROCHLORIDE 10 MG/1
10 TABLET ORAL ONCE
Status: COMPLETED | OUTPATIENT
Start: 2017-05-22 | End: 2017-05-22

## 2017-05-22 RX ORDER — SODIUM CHLORIDE 0.9 % (FLUSH) 0.9 %
1-10 SYRINGE (ML) INJECTION AS NEEDED
Status: DISCONTINUED | OUTPATIENT
Start: 2017-05-22 | End: 2017-05-22 | Stop reason: HOSPADM

## 2017-05-22 RX ORDER — FAMOTIDINE 10 MG/ML
20 INJECTION, SOLUTION INTRAVENOUS ONCE
Status: COMPLETED | OUTPATIENT
Start: 2017-05-22 | End: 2017-05-22

## 2017-05-22 RX ORDER — SIMVASTATIN 80 MG
80 TABLET ORAL NIGHTLY
COMMUNITY
End: 2017-11-20 | Stop reason: SDUPTHER

## 2017-05-22 RX ORDER — FENTANYL CITRATE 50 UG/ML
50 INJECTION, SOLUTION INTRAMUSCULAR; INTRAVENOUS
Status: DISCONTINUED | OUTPATIENT
Start: 2017-05-22 | End: 2017-05-22 | Stop reason: HOSPADM

## 2017-05-22 RX ORDER — FENTANYL CITRATE 50 UG/ML
25 INJECTION, SOLUTION INTRAMUSCULAR; INTRAVENOUS
Status: DISCONTINUED | OUTPATIENT
Start: 2017-05-22 | End: 2017-05-22 | Stop reason: HOSPADM

## 2017-05-22 RX ORDER — MIDAZOLAM HYDROCHLORIDE 1 MG/ML
1 INJECTION INTRAMUSCULAR; INTRAVENOUS
Status: DISCONTINUED | OUTPATIENT
Start: 2017-05-22 | End: 2017-05-22 | Stop reason: HOSPADM

## 2017-05-22 RX ORDER — MIDAZOLAM HYDROCHLORIDE 1 MG/ML
2 INJECTION INTRAMUSCULAR; INTRAVENOUS
Status: DISCONTINUED | OUTPATIENT
Start: 2017-05-22 | End: 2017-05-22 | Stop reason: HOSPADM

## 2017-05-22 RX ORDER — PANTOPRAZOLE SODIUM 40 MG/1
40 TABLET, DELAYED RELEASE ORAL DAILY
COMMUNITY
End: 2017-08-31 | Stop reason: SDUPTHER

## 2017-05-22 RX ORDER — CEFAZOLIN SODIUM 2 G/100ML
2 INJECTION, SOLUTION INTRAVENOUS ONCE
Status: COMPLETED | OUTPATIENT
Start: 2017-05-22 | End: 2017-05-22

## 2017-05-22 RX ORDER — IPRATROPIUM BROMIDE AND ALBUTEROL SULFATE 2.5; .5 MG/3ML; MG/3ML
3 SOLUTION RESPIRATORY (INHALATION) ONCE AS NEEDED
Status: DISCONTINUED | OUTPATIENT
Start: 2017-05-22 | End: 2017-05-22 | Stop reason: HOSPADM

## 2017-05-22 RX ORDER — HYDROCODONE BITARTRATE AND ACETAMINOPHEN 5; 325 MG/1; MG/1
TABLET ORAL
Qty: 40 TABLET | Refills: 0 | Status: SHIPPED | OUTPATIENT
Start: 2017-05-22 | End: 2017-11-02

## 2017-05-22 RX ORDER — PROPOFOL 10 MG/ML
VIAL (ML) INTRAVENOUS CONTINUOUS PRN
Status: DISCONTINUED | OUTPATIENT
Start: 2017-05-22 | End: 2017-05-22 | Stop reason: SURG

## 2017-05-22 RX ORDER — SODIUM CHLORIDE, SODIUM LACTATE, POTASSIUM CHLORIDE, CALCIUM CHLORIDE 600; 310; 30; 20 MG/100ML; MG/100ML; MG/100ML; MG/100ML
9 INJECTION, SOLUTION INTRAVENOUS CONTINUOUS
Status: DISCONTINUED | OUTPATIENT
Start: 2017-05-22 | End: 2017-05-22 | Stop reason: HOSPADM

## 2017-05-22 RX ORDER — BUPIVACAINE HYDROCHLORIDE AND EPINEPHRINE 5; 5 MG/ML; UG/ML
INJECTION, SOLUTION PERINEURAL AS NEEDED
Status: DISCONTINUED | OUTPATIENT
Start: 2017-05-22 | End: 2017-05-22 | Stop reason: HOSPADM

## 2017-05-22 RX ORDER — MIDAZOLAM HYDROCHLORIDE 1 MG/ML
INJECTION INTRAMUSCULAR; INTRAVENOUS AS NEEDED
Status: DISCONTINUED | OUTPATIENT
Start: 2017-05-22 | End: 2017-05-22 | Stop reason: SURG

## 2017-05-22 RX ORDER — PROPRANOLOL HYDROCHLORIDE 10 MG/1
10 TABLET ORAL 3 TIMES DAILY
COMMUNITY
End: 2017-12-19 | Stop reason: SDUPTHER

## 2017-05-22 RX ORDER — FENTANYL CITRATE 50 UG/ML
INJECTION, SOLUTION INTRAMUSCULAR; INTRAVENOUS AS NEEDED
Status: DISCONTINUED | OUTPATIENT
Start: 2017-05-22 | End: 2017-05-22 | Stop reason: SURG

## 2017-05-22 RX ADMIN — FENTANYL CITRATE 50 MCG: 50 INJECTION INTRAMUSCULAR; INTRAVENOUS at 12:17

## 2017-05-22 RX ADMIN — MIDAZOLAM HYDROCHLORIDE 1 MG: 1 INJECTION, SOLUTION INTRAMUSCULAR; INTRAVENOUS at 12:03

## 2017-05-22 RX ADMIN — PROPOFOL 100 MCG/KG/MIN: 10 INJECTION, EMULSION INTRAVENOUS at 12:03

## 2017-05-22 RX ADMIN — CEFAZOLIN SODIUM 2 G: 2 INJECTION, SOLUTION INTRAVENOUS at 12:05

## 2017-05-22 RX ADMIN — FAMOTIDINE 20 MG: 10 INJECTION, SOLUTION INTRAVENOUS at 11:52

## 2017-05-22 RX ADMIN — MIDAZOLAM HYDROCHLORIDE 1 MG: 1 INJECTION, SOLUTION INTRAMUSCULAR; INTRAVENOUS at 12:17

## 2017-05-22 RX ADMIN — FENTANYL CITRATE 50 MCG: 50 INJECTION INTRAMUSCULAR; INTRAVENOUS at 12:03

## 2017-05-22 RX ADMIN — SODIUM CHLORIDE, POTASSIUM CHLORIDE, SODIUM LACTATE AND CALCIUM CHLORIDE 9 ML/HR: 600; 310; 30; 20 INJECTION, SOLUTION INTRAVENOUS at 11:52

## 2017-05-22 RX ADMIN — PROPRANOLOL HYDROCHLORIDE 10 MG: 10 TABLET ORAL at 14:17

## 2017-05-22 RX ADMIN — MIDAZOLAM HYDROCHLORIDE 2 MG: 1 INJECTION INTRAMUSCULAR; INTRAVENOUS at 11:52

## 2017-05-22 RX ADMIN — MIDAZOLAM 2 MG: 1 INJECTION INTRAMUSCULAR; INTRAVENOUS at 11:52

## 2017-05-23 ENCOUNTER — INFUSION (OUTPATIENT)
Dept: ONCOLOGY | Facility: HOSPITAL | Age: 77
End: 2017-05-23

## 2017-05-23 ENCOUNTER — OFFICE VISIT (OUTPATIENT)
Dept: ONCOLOGY | Facility: CLINIC | Age: 77
End: 2017-05-23

## 2017-05-23 VITALS
DIASTOLIC BLOOD PRESSURE: 70 MMHG | HEIGHT: 62 IN | SYSTOLIC BLOOD PRESSURE: 122 MMHG | WEIGHT: 149.4 LBS | RESPIRATION RATE: 16 BRPM | TEMPERATURE: 98.8 F | BODY MASS INDEX: 27.49 KG/M2 | HEART RATE: 72 BPM

## 2017-05-23 VITALS — SYSTOLIC BLOOD PRESSURE: 113 MMHG | HEART RATE: 96 BPM | DIASTOLIC BLOOD PRESSURE: 71 MMHG

## 2017-05-23 DIAGNOSIS — G47.01 INSOMNIA DUE TO MEDICAL CONDITION: ICD-10-CM

## 2017-05-23 DIAGNOSIS — C50.811 MALIGNANT NEOPLASM OF OVERLAPPING SITES OF RIGHT FEMALE BREAST (HCC): ICD-10-CM

## 2017-05-23 DIAGNOSIS — D69.6 THROMBOCYTOPENIA (HCC): ICD-10-CM

## 2017-05-23 DIAGNOSIS — D72.818 OTHER DECREASED WHITE BLOOD CELL (WBC) COUNT: ICD-10-CM

## 2017-05-23 DIAGNOSIS — C50.111 MALIGNANT NEOPLASM OF CENTRAL PORTION OF RIGHT FEMALE BREAST (HCC): ICD-10-CM

## 2017-05-23 DIAGNOSIS — D64.9 ANEMIA, UNSPECIFIED TYPE: Primary | ICD-10-CM

## 2017-05-23 DIAGNOSIS — C50.111 MALIGNANT NEOPLASM OF CENTRAL PORTION OF RIGHT FEMALE BREAST (HCC): Primary | ICD-10-CM

## 2017-05-23 DIAGNOSIS — D50.8 OTHER IRON DEFICIENCY ANEMIA: Primary | ICD-10-CM

## 2017-05-23 PROBLEM — C50.919 MALIGNANT NEOPLASM OF FEMALE BREAST: Status: RESOLVED | Noted: 2017-05-16 | Resolved: 2017-05-23

## 2017-05-23 LAB
ALBUMIN SERPL-MCNC: 4.1 G/DL (ref 3.5–5.2)
ALBUMIN/GLOB SERPL: 1.6 G/DL (ref 1.1–2.4)
ALP SERPL-CCNC: 49 U/L (ref 38–116)
ALT SERPL W P-5'-P-CCNC: 23 U/L (ref 0–33)
ANION GAP SERPL CALCULATED.3IONS-SCNC: 13.8 MMOL/L
AST SERPL-CCNC: 23 U/L (ref 0–32)
BASOPHILS # BLD AUTO: 0 10*3/MM3 (ref 0–0.1)
BASOPHILS NFR BLD AUTO: 0 % (ref 0–1.1)
BILIRUB SERPL-MCNC: 0.6 MG/DL (ref 0.1–1.2)
BUN BLD-MCNC: 15 MG/DL (ref 6–20)
BUN/CREAT SERPL: 16.9 (ref 7.3–30)
CALCIUM SPEC-SCNC: 9 MG/DL (ref 8.5–10.2)
CHLORIDE SERPL-SCNC: 103 MMOL/L (ref 98–107)
CO2 SERPL-SCNC: 25.2 MMOL/L (ref 22–29)
CREAT BLD-MCNC: 0.89 MG/DL (ref 0.6–1.1)
DEPRECATED RDW RBC AUTO: 43.9 FL (ref 37–49)
EOSINOPHIL # BLD AUTO: 0.08 10*3/MM3 (ref 0–0.36)
EOSINOPHIL NFR BLD AUTO: 1.5 % (ref 1–5)
ERYTHROCYTE [DISTWIDTH] IN BLOOD BY AUTOMATED COUNT: 12.5 % (ref 11.7–14.5)
FERRITIN SERPL-MCNC: 100.2 NG/ML
FOLATE SERPL-MCNC: 15.05 NG/ML (ref 4.78–24.2)
GFR SERPL CREATININE-BSD FRML MDRD: 62 ML/MIN/1.73
GLOBULIN UR ELPH-MCNC: 2.5 GM/DL (ref 1.8–3.5)
GLUCOSE BLD-MCNC: 94 MG/DL (ref 74–124)
HCT VFR BLD AUTO: 34.8 % (ref 34–45)
HGB BLD-MCNC: 11.4 G/DL (ref 11.5–14.9)
HOLD SPECIMEN: NORMAL
IMM GRANULOCYTES # BLD: 0.07 10*3/MM3 (ref 0–0.03)
IMM GRANULOCYTES NFR BLD: 1.3 % (ref 0–0.5)
IRON 24H UR-MRATE: 75 MCG/DL (ref 37–145)
IRON SATN MFR SERPL: 22 % (ref 14–48)
LYMPHOCYTES # BLD AUTO: 1.19 10*3/MM3 (ref 1–3.5)
LYMPHOCYTES NFR BLD AUTO: 22.1 % (ref 20–49)
MCH RBC QN AUTO: 31.5 PG (ref 27–33)
MCHC RBC AUTO-ENTMCNC: 32.8 G/DL (ref 32–35)
MCV RBC AUTO: 96.1 FL (ref 83–97)
MONOCYTES # BLD AUTO: 1.23 10*3/MM3 (ref 0.25–0.8)
MONOCYTES NFR BLD AUTO: 22.8 % (ref 4–12)
NEUTROPHILS # BLD AUTO: 2.82 10*3/MM3 (ref 1.5–7)
NEUTROPHILS NFR BLD AUTO: 52.3 % (ref 39–75)
NRBC BLD MANUAL-RTO: 0 /100 WBC (ref 0–0)
PLATELET # BLD AUTO: 92 10*3/MM3 (ref 150–375)
PMV BLD AUTO: 11.1 FL (ref 8.9–12.1)
POTASSIUM BLD-SCNC: 4.4 MMOL/L (ref 3.5–4.7)
PROT SERPL-MCNC: 6.6 G/DL (ref 6.3–8)
RBC # BLD AUTO: 3.62 10*6/MM3 (ref 3.9–5)
SODIUM BLD-SCNC: 142 MMOL/L (ref 134–145)
TIBC SERPL-MCNC: 349 MCG/DL (ref 249–505)
TRANSFERRIN SERPL-MCNC: 249 MG/DL (ref 200–360)
VIT B12 BLD-MCNC: 1687 PG/ML (ref 250–999)
WBC NRBC COR # BLD: 5.39 10*3/MM3 (ref 4–10)

## 2017-05-23 PROCEDURE — 85025 COMPLETE CBC W/AUTO DIFF WBC: CPT

## 2017-05-23 PROCEDURE — 25010000002 HYDROCORTISONE SODIUM SUCCINATE 100 MG RECONSTITUTED SOLUTION: Performed by: INTERNAL MEDICINE

## 2017-05-23 PROCEDURE — 25010000002 PROMETHAZINE PER 50 MG: Performed by: NURSE PRACTITIONER

## 2017-05-23 PROCEDURE — 25010000002 PERTUZUMAB 420 MG/14ML SOLUTION 420 MG VIAL: Performed by: INTERNAL MEDICINE

## 2017-05-23 PROCEDURE — 96375 TX/PRO/DX INJ NEW DRUG ADDON: CPT | Performed by: INTERNAL MEDICINE

## 2017-05-23 PROCEDURE — 25010000002 TRASTUZUMAB PER 10 MG: Performed by: INTERNAL MEDICINE

## 2017-05-23 PROCEDURE — 83540 ASSAY OF IRON: CPT | Performed by: INTERNAL MEDICINE

## 2017-05-23 PROCEDURE — 25010000002 PALONOSETRON PER 25 MCG: Performed by: INTERNAL MEDICINE

## 2017-05-23 PROCEDURE — 82728 ASSAY OF FERRITIN: CPT | Performed by: INTERNAL MEDICINE

## 2017-05-23 PROCEDURE — 96413 CHEMO IV INFUSION 1 HR: CPT | Performed by: INTERNAL MEDICINE

## 2017-05-23 PROCEDURE — 25010000002 DEXAMETHASONE PER 1 MG: Performed by: INTERNAL MEDICINE

## 2017-05-23 PROCEDURE — 25010000002 DIPHENHYDRAMINE PER 50 MG: Performed by: INTERNAL MEDICINE

## 2017-05-23 PROCEDURE — 99215 OFFICE O/P EST HI 40 MIN: CPT | Performed by: INTERNAL MEDICINE

## 2017-05-23 PROCEDURE — 36415 COLL VENOUS BLD VENIPUNCTURE: CPT

## 2017-05-23 PROCEDURE — 82607 VITAMIN B-12: CPT | Performed by: INTERNAL MEDICINE

## 2017-05-23 PROCEDURE — 80053 COMPREHEN METABOLIC PANEL: CPT

## 2017-05-23 PROCEDURE — 25010000002 MEPERIDINE PER 100 MG: Performed by: INTERNAL MEDICINE

## 2017-05-23 PROCEDURE — 25010000002 PACLITAXEL PER 30 MG: Performed by: INTERNAL MEDICINE

## 2017-05-23 PROCEDURE — 25010000002 HYDROCORTISONE SODIUM SUCCINATE 100 MG RECONSTITUTED SOLUTION: Performed by: NURSE PRACTITIONER

## 2017-05-23 PROCEDURE — 82746 ASSAY OF FOLIC ACID SERUM: CPT | Performed by: INTERNAL MEDICINE

## 2017-05-23 PROCEDURE — 96417 CHEMO IV INFUS EACH ADDL SEQ: CPT | Performed by: INTERNAL MEDICINE

## 2017-05-23 PROCEDURE — 25010000002 MEPERIDINE PER 100 MG: Performed by: NURSE PRACTITIONER

## 2017-05-23 PROCEDURE — 84466 ASSAY OF TRANSFERRIN: CPT | Performed by: INTERNAL MEDICINE

## 2017-05-23 PROCEDURE — 25010000002 LORAZEPAM PER 2 MG: Performed by: INTERNAL MEDICINE

## 2017-05-23 RX ORDER — SODIUM CHLORIDE 9 MG/ML
250 INJECTION, SOLUTION INTRAVENOUS ONCE
Status: CANCELLED | OUTPATIENT
Start: 2017-05-31

## 2017-05-23 RX ORDER — FAMOTIDINE 10 MG/ML
20 INJECTION, SOLUTION INTRAVENOUS ONCE
Status: CANCELLED | OUTPATIENT
Start: 2017-06-07

## 2017-05-23 RX ORDER — FAMOTIDINE 10 MG/ML
20 INJECTION, SOLUTION INTRAVENOUS ONCE
Status: CANCELLED | OUTPATIENT
Start: 2017-05-23

## 2017-05-23 RX ORDER — SODIUM CHLORIDE 9 MG/ML
250 INJECTION, SOLUTION INTRAVENOUS ONCE
Status: CANCELLED | OUTPATIENT
Start: 2017-05-23

## 2017-05-23 RX ORDER — LORAZEPAM 2 MG/ML
0.5 INJECTION INTRAMUSCULAR ONCE
Status: COMPLETED | OUTPATIENT
Start: 2017-05-23 | End: 2017-05-23

## 2017-05-23 RX ORDER — MEPERIDINE HYDROCHLORIDE 25 MG/ML
25 INJECTION INTRAMUSCULAR; INTRAVENOUS; SUBCUTANEOUS ONCE
Status: COMPLETED | OUTPATIENT
Start: 2017-05-23 | End: 2017-05-23

## 2017-05-23 RX ORDER — PALONOSETRON 0.05 MG/ML
0.25 INJECTION, SOLUTION INTRAVENOUS ONCE
Status: CANCELLED | OUTPATIENT
Start: 2017-05-23

## 2017-05-23 RX ORDER — SODIUM CHLORIDE 9 MG/ML
250 INJECTION, SOLUTION INTRAVENOUS ONCE
Status: COMPLETED | OUTPATIENT
Start: 2017-05-23 | End: 2017-05-23

## 2017-05-23 RX ORDER — FAMOTIDINE 10 MG/ML
20 INJECTION, SOLUTION INTRAVENOUS ONCE
Status: CANCELLED | OUTPATIENT
Start: 2017-05-31

## 2017-05-23 RX ORDER — PALONOSETRON 0.05 MG/ML
0.25 INJECTION, SOLUTION INTRAVENOUS ONCE
Status: COMPLETED | OUTPATIENT
Start: 2017-05-23 | End: 2017-05-23

## 2017-05-23 RX ORDER — FAMOTIDINE 10 MG/ML
20 INJECTION, SOLUTION INTRAVENOUS ONCE
Status: COMPLETED | OUTPATIENT
Start: 2017-05-23 | End: 2017-05-23

## 2017-05-23 RX ORDER — GRANISETRON HYDROCHLORIDE 1 MG/ML
1 INJECTION INTRAVENOUS ONCE
Status: DISCONTINUED | OUTPATIENT
Start: 2017-05-23 | End: 2017-11-02

## 2017-05-23 RX ORDER — MEPERIDINE HYDROCHLORIDE 50 MG/ML
25 INJECTION INTRAMUSCULAR; INTRAVENOUS; SUBCUTANEOUS ONCE
Status: COMPLETED | OUTPATIENT
Start: 2017-05-23 | End: 2017-05-23

## 2017-05-23 RX ORDER — PALONOSETRON 0.05 MG/ML
0.25 INJECTION, SOLUTION INTRAVENOUS ONCE
Status: CANCELLED | OUTPATIENT
Start: 2017-06-07

## 2017-05-23 RX ORDER — SODIUM CHLORIDE 9 MG/ML
250 INJECTION, SOLUTION INTRAVENOUS ONCE
Status: CANCELLED | OUTPATIENT
Start: 2017-06-07

## 2017-05-23 RX ORDER — PALONOSETRON 0.05 MG/ML
0.25 INJECTION, SOLUTION INTRAVENOUS ONCE
Status: CANCELLED | OUTPATIENT
Start: 2017-05-31

## 2017-05-23 RX ADMIN — FAMOTIDINE 20 MG: 10 INJECTION, SOLUTION INTRAVENOUS at 10:04

## 2017-05-23 RX ADMIN — MEPERIDINE HYDROCHLORIDE 25 MG: 50 INJECTION, SOLUTION INTRAMUSCULAR; INTRAVENOUS; SUBCUTANEOUS at 13:45

## 2017-05-23 RX ADMIN — HYDROCORTISONE SODIUM SUCCINATE 50 MG: 100 INJECTION, POWDER, FOR SOLUTION INTRAMUSCULAR; INTRAVENOUS at 13:44

## 2017-05-23 RX ADMIN — SODIUM CHLORIDE 250 ML: 900 INJECTION, SOLUTION INTRAVENOUS at 10:00

## 2017-05-23 RX ADMIN — PACLITAXEL 135 MG: 6 INJECTION, SOLUTION, CONCENTRATE INTRAVENOUS at 15:58

## 2017-05-23 RX ADMIN — DIPHENHYDRAMINE HYDROCHLORIDE 50 MG: 50 INJECTION, SOLUTION INTRAMUSCULAR; INTRAVENOUS at 10:07

## 2017-05-23 RX ADMIN — DEXAMETHASONE SODIUM PHOSPHATE 12 MG: 10 INJECTION INTRAMUSCULAR; INTRAVENOUS at 15:07

## 2017-05-23 RX ADMIN — MEPERIDINE HYDROCHLORIDE 25 MG: 25 INJECTION, SOLUTION INTRAMUSCULAR; INTRAVENOUS; SUBCUTANEOUS at 13:56

## 2017-05-23 RX ADMIN — LORAZEPAM 0.5 MG: 2 INJECTION INTRAMUSCULAR; INTRAVENOUS at 11:04

## 2017-05-23 RX ADMIN — Medication 280 MG: at 12:52

## 2017-05-23 RX ADMIN — PROMETHAZINE HYDROCHLORIDE 12.5 MG: 25 INJECTION INTRAMUSCULAR; INTRAVENOUS at 13:53

## 2017-05-23 RX ADMIN — PALONOSETRON HYDROCHLORIDE 0.25 MG: 0.25 INJECTION INTRAVENOUS at 10:06

## 2017-05-23 RX ADMIN — PERTUZUMAB 840 MG: 30 INJECTION, SOLUTION, CONCENTRATE INTRAVENOUS at 10:49

## 2017-05-23 RX ADMIN — HYDROCORTISONE SODIUM SUCCINATE 50 MG: 100 INJECTION, POWDER, FOR SOLUTION INTRAMUSCULAR; INTRAVENOUS at 13:48

## 2017-05-25 ENCOUNTER — OFFICE VISIT (OUTPATIENT)
Dept: ONCOLOGY | Facility: CLINIC | Age: 77
End: 2017-05-25

## 2017-05-25 ENCOUNTER — TELEPHONE (OUTPATIENT)
Dept: ONCOLOGY | Facility: HOSPITAL | Age: 77
End: 2017-05-25

## 2017-05-25 ENCOUNTER — TELEPHONE (OUTPATIENT)
Dept: ONCOLOGY | Facility: CLINIC | Age: 77
End: 2017-05-25

## 2017-05-25 ENCOUNTER — INFUSION (OUTPATIENT)
Dept: ONCOLOGY | Facility: HOSPITAL | Age: 77
End: 2017-05-25

## 2017-05-25 VITALS
TEMPERATURE: 98 F | HEART RATE: 73 BPM | SYSTOLIC BLOOD PRESSURE: 132 MMHG | WEIGHT: 151.4 LBS | DIASTOLIC BLOOD PRESSURE: 80 MMHG | BODY MASS INDEX: 27.68 KG/M2

## 2017-05-25 DIAGNOSIS — Z86.79 HISTORY OF CARDIAC ARRHYTHMIA: ICD-10-CM

## 2017-05-25 DIAGNOSIS — E86.0 DEHYDRATION: ICD-10-CM

## 2017-05-25 DIAGNOSIS — E87.6 HYPOKALEMIA: ICD-10-CM

## 2017-05-25 DIAGNOSIS — C50.919 MALIGNANT NEOPLASM OF FEMALE BREAST, UNSPECIFIED LATERALITY, UNSPECIFIED SITE OF BREAST: Primary | ICD-10-CM

## 2017-05-25 DIAGNOSIS — C50.111 MALIGNANT NEOPLASM OF CENTRAL PORTION OF RIGHT FEMALE BREAST (HCC): ICD-10-CM

## 2017-05-25 DIAGNOSIS — T78.40XD HYPERSENSITIVITY REACTION, SUBSEQUENT ENCOUNTER: ICD-10-CM

## 2017-05-25 DIAGNOSIS — D69.6 THROMBOCYTOPENIA (HCC): Primary | ICD-10-CM

## 2017-05-25 DIAGNOSIS — K59.03 DRUG-INDUCED CONSTIPATION: ICD-10-CM

## 2017-05-25 PROBLEM — T78.40XA HYPERSENSITIVITY REACTION: Status: ACTIVE | Noted: 2017-05-25

## 2017-05-25 LAB
ALBUMIN SERPL-MCNC: 4.1 G/DL (ref 3.5–5.2)
ALBUMIN/GLOB SERPL: 1.6 G/DL (ref 1.1–2.4)
ALP SERPL-CCNC: 43 U/L (ref 38–116)
ALT SERPL W P-5'-P-CCNC: 48 U/L (ref 0–33)
ANION GAP SERPL CALCULATED.3IONS-SCNC: 15.4 MMOL/L
AST SERPL-CCNC: 35 U/L (ref 0–32)
BASOPHILS # BLD AUTO: 0 10*3/MM3 (ref 0–0.1)
BASOPHILS NFR BLD AUTO: 0 % (ref 0–1.1)
BILIRUB SERPL-MCNC: 0.8 MG/DL (ref 0.1–1.2)
BILIRUB UR QL STRIP: NEGATIVE
BUN BLD-MCNC: 21 MG/DL (ref 6–20)
BUN/CREAT SERPL: 29.6 (ref 7.3–30)
CALCIUM SPEC-SCNC: 8.7 MG/DL (ref 8.5–10.2)
CHLORIDE SERPL-SCNC: 101 MMOL/L (ref 98–107)
CLARITY UR: CLEAR
CO2 SERPL-SCNC: 24.6 MMOL/L (ref 22–29)
COLOR UR: YELLOW
CREAT BLD-MCNC: 0.71 MG/DL (ref 0.6–1.1)
DEPRECATED RDW RBC AUTO: 43.6 FL (ref 37–49)
EOSINOPHIL # BLD AUTO: 0.01 10*3/MM3 (ref 0–0.36)
EOSINOPHIL NFR BLD AUTO: 0.1 % (ref 1–5)
ERYTHROCYTE [DISTWIDTH] IN BLOOD BY AUTOMATED COUNT: 12.6 % (ref 11.7–14.5)
GFR SERPL CREATININE-BSD FRML MDRD: 80 ML/MIN/1.73
GLOBULIN UR ELPH-MCNC: 2.5 GM/DL (ref 1.8–3.5)
GLUCOSE BLD-MCNC: 100 MG/DL (ref 74–124)
GLUCOSE UR STRIP-MCNC: NEGATIVE MG/DL
HCT VFR BLD AUTO: 33.3 % (ref 34–45)
HGB BLD-MCNC: 11 G/DL (ref 11.5–14.9)
HGB UR QL STRIP.AUTO: NEGATIVE
IMM GRANULOCYTES # BLD: 0.14 10*3/MM3 (ref 0–0.03)
IMM GRANULOCYTES NFR BLD: 1.3 % (ref 0–0.5)
KETONES UR QL STRIP: NEGATIVE
LEUKOCYTE ESTERASE UR QL STRIP.AUTO: NEGATIVE
LYMPHOCYTES # BLD AUTO: 0.37 10*3/MM3 (ref 1–3.5)
LYMPHOCYTES NFR BLD AUTO: 3.4 % (ref 20–49)
MCH RBC QN AUTO: 31.1 PG (ref 27–33)
MCHC RBC AUTO-ENTMCNC: 33 G/DL (ref 32–35)
MCV RBC AUTO: 94.1 FL (ref 83–97)
MONOCYTES # BLD AUTO: 1.33 10*3/MM3 (ref 0.25–0.8)
MONOCYTES NFR BLD AUTO: 12.1 % (ref 4–12)
NEUTROPHILS # BLD AUTO: 9.15 10*3/MM3 (ref 1.5–7)
NEUTROPHILS NFR BLD AUTO: 83.1 % (ref 39–75)
NITRITE UR QL STRIP: NEGATIVE
NRBC BLD MANUAL-RTO: 0 /100 WBC (ref 0–0)
PH UR STRIP.AUTO: 5.5 [PH] (ref 4.5–8)
PLATELET # BLD AUTO: 62 10*3/MM3 (ref 150–375)
PMV BLD AUTO: 11.1 FL (ref 8.9–12.1)
POTASSIUM BLD-SCNC: 3.4 MMOL/L (ref 3.5–4.7)
PROT SERPL-MCNC: 6.6 G/DL (ref 6.3–8)
PROT UR QL STRIP: NEGATIVE
RBC # BLD AUTO: 3.54 10*6/MM3 (ref 3.9–5)
SODIUM BLD-SCNC: 141 MMOL/L (ref 134–145)
SP GR UR STRIP: <=1.005 (ref 1–1.03)
UROBILINOGEN UR QL STRIP: NORMAL
WBC NRBC COR # BLD: 11 10*3/MM3 (ref 4–10)

## 2017-05-25 PROCEDURE — 36415 COLL VENOUS BLD VENIPUNCTURE: CPT

## 2017-05-25 PROCEDURE — 25010000002 DEXAMETHASONE: Performed by: NURSE PRACTITIONER

## 2017-05-25 PROCEDURE — 96375 TX/PRO/DX INJ NEW DRUG ADDON: CPT | Performed by: NURSE PRACTITIONER

## 2017-05-25 PROCEDURE — 81003 URINALYSIS AUTO W/O SCOPE: CPT

## 2017-05-25 PROCEDURE — 96361 HYDRATE IV INFUSION ADD-ON: CPT | Performed by: NURSE PRACTITIONER

## 2017-05-25 PROCEDURE — 96365 THER/PROPH/DIAG IV INF INIT: CPT | Performed by: NURSE PRACTITIONER

## 2017-05-25 PROCEDURE — 80053 COMPREHEN METABOLIC PANEL: CPT

## 2017-05-25 PROCEDURE — 25010000003 POTASSIUM CHLORIDE 10 MEQ/100ML SOLUTION: Performed by: NURSE PRACTITIONER

## 2017-05-25 PROCEDURE — 85025 COMPLETE CBC W/AUTO DIFF WBC: CPT

## 2017-05-25 PROCEDURE — 99215 OFFICE O/P EST HI 40 MIN: CPT | Performed by: NURSE PRACTITIONER

## 2017-05-25 RX ORDER — PROPRANOLOL HYDROCHLORIDE 10 MG/1
10 TABLET ORAL ONCE
Status: CANCELLED
Start: 2017-05-25 | End: 2017-05-25

## 2017-05-25 RX ORDER — POTASSIUM CHLORIDE 7.45 MG/ML
10 INJECTION INTRAVENOUS ONCE
Status: COMPLETED | OUTPATIENT
Start: 2017-05-25 | End: 2017-05-25

## 2017-05-25 RX ORDER — LACTULOSE 10 G/15ML
20 SOLUTION ORAL 2 TIMES DAILY PRN
Qty: 236 ML | Refills: 1 | Status: SHIPPED | OUTPATIENT
Start: 2017-05-25 | End: 2017-11-02

## 2017-05-25 RX ORDER — PROPRANOLOL HYDROCHLORIDE 10 MG/1
10 TABLET ORAL ONCE
Status: COMPLETED | OUTPATIENT
Start: 2017-05-25 | End: 2017-05-25

## 2017-05-25 RX ORDER — SODIUM CHLORIDE 9 MG/ML
500 INJECTION, SOLUTION INTRAVENOUS CONTINUOUS
Status: DISCONTINUED | OUTPATIENT
Start: 2017-05-25 | End: 2017-05-25 | Stop reason: HOSPADM

## 2017-05-25 RX ORDER — LACTULOSE 10 G/15ML
20 SOLUTION ORAL 3 TIMES DAILY
Status: DISCONTINUED | OUTPATIENT
Start: 2017-05-25 | End: 2017-05-25

## 2017-05-25 RX ORDER — SODIUM CHLORIDE 9 MG/ML
500 INJECTION, SOLUTION INTRAVENOUS CONTINUOUS
Status: CANCELLED
Start: 2017-05-25

## 2017-05-25 RX ADMIN — PROPRANOLOL HYDROCHLORIDE 10 MG: 10 TABLET ORAL at 13:34

## 2017-05-25 RX ADMIN — DEXAMETHASONE SODIUM PHOSPHATE 12 MG: 4 INJECTION, SOLUTION INTRAMUSCULAR; INTRAVENOUS at 12:00

## 2017-05-25 RX ADMIN — SODIUM CHLORIDE 500 ML: 900 INJECTION, SOLUTION INTRAVENOUS at 13:02

## 2017-05-25 RX ADMIN — POTASSIUM CHLORIDE 10 MEQ: 7.46 INJECTION, SOLUTION INTRAVENOUS at 13:04

## 2017-05-25 RX ADMIN — SODIUM CHLORIDE 500 ML: 900 INJECTION, SOLUTION INTRAVENOUS at 11:15

## 2017-05-31 ENCOUNTER — INFUSION (OUTPATIENT)
Dept: ONCOLOGY | Facility: HOSPITAL | Age: 77
End: 2017-05-31

## 2017-05-31 ENCOUNTER — OFFICE VISIT (OUTPATIENT)
Dept: ONCOLOGY | Facility: CLINIC | Age: 77
End: 2017-05-31

## 2017-05-31 VITALS — HEART RATE: 80 BPM | SYSTOLIC BLOOD PRESSURE: 168 MMHG | DIASTOLIC BLOOD PRESSURE: 79 MMHG

## 2017-05-31 VITALS
HEART RATE: 72 BPM | BODY MASS INDEX: 27.49 KG/M2 | RESPIRATION RATE: 14 BRPM | OXYGEN SATURATION: 99 % | HEIGHT: 62 IN | SYSTOLIC BLOOD PRESSURE: 132 MMHG | WEIGHT: 149.4 LBS | DIASTOLIC BLOOD PRESSURE: 78 MMHG | TEMPERATURE: 98.2 F

## 2017-05-31 DIAGNOSIS — E86.0 DEHYDRATION: ICD-10-CM

## 2017-05-31 DIAGNOSIS — C50.111 MALIGNANT NEOPLASM OF CENTRAL PORTION OF RIGHT FEMALE BREAST (HCC): ICD-10-CM

## 2017-05-31 DIAGNOSIS — Z86.79 HISTORY OF CARDIAC ARRHYTHMIA: ICD-10-CM

## 2017-05-31 DIAGNOSIS — C50.811 MALIGNANT NEOPLASM OF OVERLAPPING SITES OF RIGHT FEMALE BREAST (HCC): Primary | ICD-10-CM

## 2017-05-31 DIAGNOSIS — C50.111 MALIGNANT NEOPLASM OF CENTRAL PORTION OF RIGHT FEMALE BREAST (HCC): Primary | ICD-10-CM

## 2017-05-31 DIAGNOSIS — T78.40XD HYPERSENSITIVITY REACTION, SUBSEQUENT ENCOUNTER: ICD-10-CM

## 2017-05-31 DIAGNOSIS — K59.03 DRUG-INDUCED CONSTIPATION: ICD-10-CM

## 2017-05-31 DIAGNOSIS — E87.6 HYPOKALEMIA: ICD-10-CM

## 2017-05-31 DIAGNOSIS — T78.40XD HYPERSENSITIVITY REACTION, SUBSEQUENT ENCOUNTER: Primary | ICD-10-CM

## 2017-05-31 DIAGNOSIS — D69.6 THROMBOCYTOPENIA (HCC): ICD-10-CM

## 2017-05-31 LAB
ALBUMIN SERPL-MCNC: 3.9 G/DL (ref 3.5–5.2)
ALBUMIN/GLOB SERPL: 1.6 G/DL (ref 1.1–2.4)
ALP SERPL-CCNC: 44 U/L (ref 38–116)
ALT SERPL W P-5'-P-CCNC: 26 U/L (ref 0–33)
ANION GAP SERPL CALCULATED.3IONS-SCNC: 11.1 MMOL/L
AST SERPL-CCNC: 16 U/L (ref 0–32)
BASOPHILS # BLD AUTO: 0.01 10*3/MM3 (ref 0–0.1)
BASOPHILS NFR BLD AUTO: 0.3 % (ref 0–1.1)
BILIRUB SERPL-MCNC: 0.5 MG/DL (ref 0.1–1.2)
BUN BLD-MCNC: 18 MG/DL (ref 6–20)
BUN/CREAT SERPL: 20.7 (ref 7.3–30)
CALCIUM SPEC-SCNC: 9 MG/DL (ref 8.5–10.2)
CHLORIDE SERPL-SCNC: 101 MMOL/L (ref 98–107)
CO2 SERPL-SCNC: 25.9 MMOL/L (ref 22–29)
CREAT BLD-MCNC: 0.87 MG/DL (ref 0.6–1.1)
DEPRECATED RDW RBC AUTO: 43.5 FL (ref 37–49)
EOSINOPHIL # BLD AUTO: 0.02 10*3/MM3 (ref 0–0.36)
EOSINOPHIL NFR BLD AUTO: 0.5 % (ref 1–5)
ERYTHROCYTE [DISTWIDTH] IN BLOOD BY AUTOMATED COUNT: 12.4 % (ref 11.7–14.5)
GFR SERPL CREATININE-BSD FRML MDRD: 63 ML/MIN/1.73
GLOBULIN UR ELPH-MCNC: 2.4 GM/DL (ref 1.8–3.5)
GLUCOSE BLD-MCNC: 97 MG/DL (ref 74–124)
HCT VFR BLD AUTO: 32.4 % (ref 34–45)
HGB BLD-MCNC: 10.7 G/DL (ref 11.5–14.9)
HOLD SPECIMEN: NORMAL
IMM GRANULOCYTES # BLD: 0.08 10*3/MM3 (ref 0–0.03)
IMM GRANULOCYTES NFR BLD: 2.2 % (ref 0–0.5)
LYMPHOCYTES # BLD AUTO: 1.07 10*3/MM3 (ref 1–3.5)
LYMPHOCYTES NFR BLD AUTO: 29.4 % (ref 20–49)
MCH RBC QN AUTO: 31.8 PG (ref 27–33)
MCHC RBC AUTO-ENTMCNC: 33 G/DL (ref 32–35)
MCV RBC AUTO: 96.1 FL (ref 83–97)
MONOCYTES # BLD AUTO: 0.25 10*3/MM3 (ref 0.25–0.8)
MONOCYTES NFR BLD AUTO: 6.9 % (ref 4–12)
NEUTROPHILS # BLD AUTO: 2.21 10*3/MM3 (ref 1.5–7)
NEUTROPHILS NFR BLD AUTO: 60.7 % (ref 39–75)
NRBC BLD MANUAL-RTO: 0 /100 WBC (ref 0–0)
PLATELET # BLD AUTO: 103 10*3/MM3 (ref 150–375)
PMV BLD AUTO: 10.5 FL (ref 8.9–12.1)
POTASSIUM BLD-SCNC: 4.6 MMOL/L (ref 3.5–4.7)
PROT SERPL-MCNC: 6.3 G/DL (ref 6.3–8)
RBC # BLD AUTO: 3.37 10*6/MM3 (ref 3.9–5)
SODIUM BLD-SCNC: 138 MMOL/L (ref 134–145)
WBC NRBC COR # BLD: 3.64 10*3/MM3 (ref 4–10)

## 2017-05-31 PROCEDURE — 25010000002 TRASTUZUMAB PER 10 MG: Performed by: NURSE PRACTITIONER

## 2017-05-31 PROCEDURE — 96375 TX/PRO/DX INJ NEW DRUG ADDON: CPT | Performed by: NURSE PRACTITIONER

## 2017-05-31 PROCEDURE — 99212-NC PR NO CHARGE CBC OFFICE OUTPATIENT VISIT 10 MINUTES: Performed by: NURSE PRACTITIONER

## 2017-05-31 PROCEDURE — 96413 CHEMO IV INFUSION 1 HR: CPT | Performed by: NURSE PRACTITIONER

## 2017-05-31 PROCEDURE — 25010000002 HYDROCORTISONE SODIUM SUCCINATE 100 MG RECONSTITUTED SOLUTION: Performed by: NURSE PRACTITIONER

## 2017-05-31 PROCEDURE — 85025 COMPLETE CBC W/AUTO DIFF WBC: CPT | Performed by: INTERNAL MEDICINE

## 2017-05-31 PROCEDURE — 25010000002 PALONOSETRON PER 25 MCG: Performed by: NURSE PRACTITIONER

## 2017-05-31 PROCEDURE — 80053 COMPREHEN METABOLIC PANEL: CPT | Performed by: INTERNAL MEDICINE

## 2017-05-31 PROCEDURE — 25010000002 DEXAMETHASONE PER 1 MG: Performed by: NURSE PRACTITIONER

## 2017-05-31 PROCEDURE — 25010000002 PACLITAXEL PER 30 MG: Performed by: NURSE PRACTITIONER

## 2017-05-31 PROCEDURE — 96417 CHEMO IV INFUS EACH ADDL SEQ: CPT | Performed by: NURSE PRACTITIONER

## 2017-05-31 RX ORDER — PALONOSETRON 0.05 MG/ML
0.25 INJECTION, SOLUTION INTRAVENOUS ONCE
Status: COMPLETED | OUTPATIENT
Start: 2017-05-31 | End: 2017-05-31

## 2017-05-31 RX ORDER — HYDROXYZINE PAMOATE 25 MG/1
50 CAPSULE ORAL ONCE
Status: COMPLETED | OUTPATIENT
Start: 2017-05-31 | End: 2017-05-31

## 2017-05-31 RX ORDER — SODIUM CHLORIDE 9 MG/ML
250 INJECTION, SOLUTION INTRAVENOUS ONCE
Status: COMPLETED | OUTPATIENT
Start: 2017-05-31 | End: 2017-05-31

## 2017-05-31 RX ORDER — HYDROXYZINE PAMOATE 25 MG/1
50 CAPSULE ORAL ONCE
Status: CANCELLED | OUTPATIENT
Start: 2017-05-31

## 2017-05-31 RX ORDER — FAMOTIDINE 10 MG/ML
20 INJECTION, SOLUTION INTRAVENOUS ONCE
Status: COMPLETED | OUTPATIENT
Start: 2017-05-31 | End: 2017-05-31

## 2017-05-31 RX ADMIN — PALONOSETRON HYDROCHLORIDE 0.25 MG: 0.25 INJECTION INTRAVENOUS at 10:14

## 2017-05-31 RX ADMIN — SODIUM CHLORIDE 250 ML: 900 INJECTION, SOLUTION INTRAVENOUS at 10:14

## 2017-05-31 RX ADMIN — DEXAMETHASONE SODIUM PHOSPHATE 12 MG: 10 INJECTION INTRAMUSCULAR; INTRAVENOUS at 10:14

## 2017-05-31 RX ADMIN — FAMOTIDINE 20 MG: 10 INJECTION, SOLUTION INTRAVENOUS at 10:15

## 2017-05-31 RX ADMIN — HYDROCORTISONE SODIUM SUCCINATE 100 MG: 100 INJECTION, POWDER, FOR SOLUTION INTRAMUSCULAR; INTRAVENOUS at 10:14

## 2017-05-31 RX ADMIN — HYDROXYZINE PAMOATE 50 MG: 25 CAPSULE ORAL at 10:14

## 2017-05-31 RX ADMIN — PACLITAXEL 135 MG: 6 INJECTION, SOLUTION INTRAVENOUS at 12:38

## 2017-05-31 RX ADMIN — Medication 140 MG: at 11:04

## 2017-06-07 ENCOUNTER — OFFICE VISIT (OUTPATIENT)
Dept: ONCOLOGY | Facility: CLINIC | Age: 77
End: 2017-06-07

## 2017-06-07 ENCOUNTER — INFUSION (OUTPATIENT)
Dept: ONCOLOGY | Facility: HOSPITAL | Age: 77
End: 2017-06-07

## 2017-06-07 VITALS
TEMPERATURE: 97.9 F | HEIGHT: 62 IN | DIASTOLIC BLOOD PRESSURE: 62 MMHG | HEART RATE: 74 BPM | WEIGHT: 147.4 LBS | OXYGEN SATURATION: 98 % | SYSTOLIC BLOOD PRESSURE: 116 MMHG | BODY MASS INDEX: 27.12 KG/M2 | RESPIRATION RATE: 16 BRPM

## 2017-06-07 VITALS — SYSTOLIC BLOOD PRESSURE: 127 MMHG | DIASTOLIC BLOOD PRESSURE: 68 MMHG | HEART RATE: 76 BPM

## 2017-06-07 DIAGNOSIS — D64.9 ANEMIA, UNSPECIFIED TYPE: Primary | ICD-10-CM

## 2017-06-07 DIAGNOSIS — T78.40XD HYPERSENSITIVITY REACTION, SUBSEQUENT ENCOUNTER: Primary | ICD-10-CM

## 2017-06-07 DIAGNOSIS — T78.40XD HYPERSENSITIVITY REACTION, SUBSEQUENT ENCOUNTER: ICD-10-CM

## 2017-06-07 DIAGNOSIS — Z86.79 HISTORY OF CARDIAC ARRHYTHMIA: ICD-10-CM

## 2017-06-07 DIAGNOSIS — E86.0 DEHYDRATION: ICD-10-CM

## 2017-06-07 DIAGNOSIS — C50.111 MALIGNANT NEOPLASM OF CENTRAL PORTION OF RIGHT FEMALE BREAST (HCC): ICD-10-CM

## 2017-06-07 DIAGNOSIS — C50.811 MALIGNANT NEOPLASM OF OVERLAPPING SITES OF RIGHT FEMALE BREAST (HCC): ICD-10-CM

## 2017-06-07 DIAGNOSIS — D69.6 THROMBOCYTOPENIA (HCC): ICD-10-CM

## 2017-06-07 DIAGNOSIS — E87.6 HYPOKALEMIA: ICD-10-CM

## 2017-06-07 DIAGNOSIS — D72.818 OTHER DECREASED WHITE BLOOD CELL (WBC) COUNT: ICD-10-CM

## 2017-06-07 LAB
ALBUMIN SERPL-MCNC: 3.8 G/DL (ref 3.5–5.2)
ALBUMIN/GLOB SERPL: 1.6 G/DL (ref 1.1–2.4)
ALP SERPL-CCNC: 47 U/L (ref 38–116)
ALT SERPL W P-5'-P-CCNC: 26 U/L (ref 0–33)
ANION GAP SERPL CALCULATED.3IONS-SCNC: 13.7 MMOL/L
AST SERPL-CCNC: 17 U/L (ref 0–32)
BASOPHILS # BLD AUTO: 0.01 10*3/MM3 (ref 0–0.1)
BASOPHILS NFR BLD AUTO: 0.3 % (ref 0–1.1)
BILIRUB SERPL-MCNC: 0.5 MG/DL (ref 0.1–1.2)
BUN BLD-MCNC: 16 MG/DL (ref 6–20)
BUN/CREAT SERPL: 18.6 (ref 7.3–30)
CALCIUM SPEC-SCNC: 9.3 MG/DL (ref 8.5–10.2)
CHLORIDE SERPL-SCNC: 99 MMOL/L (ref 98–107)
CO2 SERPL-SCNC: 24.3 MMOL/L (ref 22–29)
CREAT BLD-MCNC: 0.86 MG/DL (ref 0.6–1.1)
DEPRECATED RDW RBC AUTO: 43.1 FL (ref 37–49)
EOSINOPHIL # BLD AUTO: 0.02 10*3/MM3 (ref 0–0.36)
EOSINOPHIL NFR BLD AUTO: 0.5 % (ref 1–5)
ERYTHROCYTE [DISTWIDTH] IN BLOOD BY AUTOMATED COUNT: 12.5 % (ref 11.7–14.5)
GFR SERPL CREATININE-BSD FRML MDRD: 64 ML/MIN/1.73
GLOBULIN UR ELPH-MCNC: 2.4 GM/DL (ref 1.8–3.5)
GLUCOSE BLD-MCNC: 99 MG/DL (ref 74–124)
HCT VFR BLD AUTO: 32.5 % (ref 34–45)
HGB BLD-MCNC: 10.6 G/DL (ref 11.5–14.9)
HOLD SPECIMEN: NORMAL
IMM GRANULOCYTES # BLD: 0.15 10*3/MM3 (ref 0–0.03)
IMM GRANULOCYTES NFR BLD: 3.9 % (ref 0–0.5)
LYMPHOCYTES # BLD AUTO: 1.17 10*3/MM3 (ref 1–3.5)
LYMPHOCYTES NFR BLD AUTO: 30.5 % (ref 20–49)
MCH RBC QN AUTO: 30.7 PG (ref 27–33)
MCHC RBC AUTO-ENTMCNC: 32.6 G/DL (ref 32–35)
MCV RBC AUTO: 94.2 FL (ref 83–97)
MONOCYTES # BLD AUTO: 0.56 10*3/MM3 (ref 0.25–0.8)
MONOCYTES NFR BLD AUTO: 14.6 % (ref 4–12)
NEUTROPHILS # BLD AUTO: 1.93 10*3/MM3 (ref 1.5–7)
NEUTROPHILS NFR BLD AUTO: 50.2 % (ref 39–75)
NRBC BLD MANUAL-RTO: 0 /100 WBC (ref 0–0)
PLATELET # BLD AUTO: 129 10*3/MM3 (ref 150–375)
PMV BLD AUTO: 10.3 FL (ref 8.9–12.1)
POTASSIUM BLD-SCNC: 4.3 MMOL/L (ref 3.5–4.7)
PROT SERPL-MCNC: 6.2 G/DL (ref 6.3–8)
RBC # BLD AUTO: 3.45 10*6/MM3 (ref 3.9–5)
SODIUM BLD-SCNC: 137 MMOL/L (ref 134–145)
WBC NRBC COR # BLD: 3.84 10*3/MM3 (ref 4–10)

## 2017-06-07 PROCEDURE — 25010000002 PALONOSETRON PER 25 MCG: Performed by: INTERNAL MEDICINE

## 2017-06-07 PROCEDURE — 25010000002 TRASTUZUMAB PER 10 MG: Performed by: INTERNAL MEDICINE

## 2017-06-07 PROCEDURE — 85025 COMPLETE CBC W/AUTO DIFF WBC: CPT | Performed by: NURSE PRACTITIONER

## 2017-06-07 PROCEDURE — 96417 CHEMO IV INFUS EACH ADDL SEQ: CPT | Performed by: NURSE PRACTITIONER

## 2017-06-07 PROCEDURE — 25010000002 PACLITAXEL PER 30 MG: Performed by: INTERNAL MEDICINE

## 2017-06-07 PROCEDURE — 96413 CHEMO IV INFUSION 1 HR: CPT | Performed by: NURSE PRACTITIONER

## 2017-06-07 PROCEDURE — 80053 COMPREHEN METABOLIC PANEL: CPT | Performed by: NURSE PRACTITIONER

## 2017-06-07 PROCEDURE — 96375 TX/PRO/DX INJ NEW DRUG ADDON: CPT | Performed by: NURSE PRACTITIONER

## 2017-06-07 PROCEDURE — 25010000002 HYDROCORTISONE SODIUM SUCCINATE 100 MG RECONSTITUTED SOLUTION: Performed by: NURSE PRACTITIONER

## 2017-06-07 PROCEDURE — 25010000002 DEXAMETHASONE SODIUM PHOSPHATE 10 MG/ML SOLUTION 1 ML VIAL: Performed by: INTERNAL MEDICINE

## 2017-06-07 PROCEDURE — 99213 OFFICE O/P EST LOW 20 MIN: CPT | Performed by: NURSE PRACTITIONER

## 2017-06-07 RX ORDER — SODIUM CHLORIDE 9 MG/ML
250 INJECTION, SOLUTION INTRAVENOUS ONCE
Status: COMPLETED | OUTPATIENT
Start: 2017-06-07 | End: 2017-06-07

## 2017-06-07 RX ORDER — FAMOTIDINE 10 MG/ML
20 INJECTION, SOLUTION INTRAVENOUS ONCE
Status: COMPLETED | OUTPATIENT
Start: 2017-06-07 | End: 2017-06-07

## 2017-06-07 RX ORDER — PALONOSETRON 0.05 MG/ML
0.25 INJECTION, SOLUTION INTRAVENOUS ONCE
Status: COMPLETED | OUTPATIENT
Start: 2017-06-07 | End: 2017-06-07

## 2017-06-07 RX ORDER — HYDROXYZINE PAMOATE 25 MG/1
50 CAPSULE ORAL ONCE
Status: CANCELLED
Start: 2017-06-07 | End: 2017-06-07

## 2017-06-07 RX ORDER — HYDROXYZINE PAMOATE 25 MG/1
50 CAPSULE ORAL ONCE
Status: COMPLETED | OUTPATIENT
Start: 2017-06-07 | End: 2017-06-07

## 2017-06-07 RX ADMIN — DEXAMETHASONE SODIUM PHOSPHATE 12 MG: 10 INJECTION, SOLUTION INTRAMUSCULAR; INTRAVENOUS at 09:59

## 2017-06-07 RX ADMIN — HYDROCORTISONE SODIUM SUCCINATE 100 MG: 100 INJECTION, POWDER, FOR SOLUTION INTRAMUSCULAR; INTRAVENOUS at 09:50

## 2017-06-07 RX ADMIN — PALONOSETRON HYDROCHLORIDE 0.25 MG: 0.25 INJECTION INTRAVENOUS at 09:40

## 2017-06-07 RX ADMIN — Medication 140 MG: at 10:37

## 2017-06-07 RX ADMIN — HYDROXYZINE PAMOATE 50 MG: 25 CAPSULE ORAL at 09:57

## 2017-06-07 RX ADMIN — FAMOTIDINE 20 MG: 10 INJECTION, SOLUTION INTRAVENOUS at 09:45

## 2017-06-07 RX ADMIN — SODIUM CHLORIDE 250 ML: 900 INJECTION, SOLUTION INTRAVENOUS at 09:35

## 2017-06-07 RX ADMIN — PACLITAXEL 135 MG: 6 INJECTION, SOLUTION, CONCENTRATE INTRAVENOUS at 11:49

## 2017-06-07 NOTE — PROGRESS NOTES
REASON FOR FOLLOWUP:   1. Newly diagnosed large right breast cancer.  Core needle biopsy reported invasive mammary carcinoma with focal lobular feature, grade 2.  ER negative, less than 1%; MI positive, moderate in staining, 5% to 10%. HER2 IHC 2+, FISH study positive 2.1.   2.  CT scan for chest abdomen and pelvis and breast MRI examination reported right axillary lymph node suspicious for metastatic disease.  No remote metastatic lesion.  Patient has stage IIIa (T3 N2 M0) disease.   3.  Patient will start neoadjuvant chemotherapy with Taxol weekly plus Herceptin weekly for total 12 weeks, and Perjata every 3 weeks during chemotherapy.  Herceptin will be converted to every 3 weeks after chemotherapy finished.    4.  Chronic moderate thrombocytopenia, mild anemia, and intermittent mild neutropenia etiologies are very not clear.  5.  Reaction to Herceptin C1D1. Here on day 3 feeling poorly with shakes, nausea, and constipation.        HISTORY OF PRESENT ILLNESS:    The patient is a pleasant 76 y.o. with the above-mentioned history, who presents today in anticipation of cycle 1 day 15 of Taxol and Herceptin.  She did have an infusion reaction to Herceptin with cycle 1 day 1.  She tolerated her second infusion much better with the addition of hydrocortisone in Vistaril.  Unfortunately, the patient does continue to struggle with abdominal cramping and loose bowel movements.  She denies diarrhea, stating she has soft bowel movements 3-4 times per day.  She denies abdominal pain, stating cramping is intermittent and typically associated with bowel movements.  Patient does have a history of bowel obstruction, and is quite pleased her bowels are continuing to move.     Thankfully, she is maintaining her weight, despite poor appetite and taste changes.  She is maintaining hydration adequately.  She denies any issues with urination.  She denies any fevers or chills.  She reports occasional nausea, though no vomiting.  She has  not been utilizing her antiemetics in fear they won't result in constipation.  She is noted to be more weak and fatigued following chemotherapy.  Typically, she walks approximately 5 miles per day.  However, since the initiation of chemotherapy, she has not been able to maintain her usual exercise regimen due to fatigue.    Past Medical History:   Diagnosis Date   • Acute bronchitis    • Anemia    • Breast cancer     Left   • Breast cancer 2017    Right   • Chronic pain    • Conjunctivitis    • Cough    • Ductal carcinoma in situ (DCIS) of left breast    • Edema     Chronic lower extremity edema   • GERD (gastroesophageal reflux disease)    • H/O Bowel obstruction    • H/O jaundice    • Hernia    • History of infectious mononucleosis    • History of migraine headaches    • Hyperlipidemia    • Hypertension    • Impacted cerumen of left ear    • Leukocytopenia    • Leukopenia    • Meningioma    • Peptic ulceration    • Perioral dermatitis    • SBO (small bowel obstruction)     due to hernia with surgical repair in 2011   • SOB (shortness of breath) on exertion    • Thrombocytopenia    • Vertigo    Normal echocardiogram on 2017, LVEF 68%.    Past Surgical History:   Procedure Laterality Date   • APPENDECTOMY     • BLADDER SURGERY      Bladder repair   • BREAST BIOPSY Left    • BREAST BIOPSY  2017   • BREAST SURGERY     • CHOLECYSTECTOMY     • GALLBLADDER SURGERY     • HERNIA REPAIR     • HYSTERECTOMY     • MASTECTOMY Left     and sentinel lymph node biospy at [facility]   • IL INSJ TUNNELED CVC W/O SUBQ PORT/ AGE 5 YR/> Left 2017    Procedure: INSERTION VENOUS ACCESS DEVICE;  Surgeon: Christian Melo MD;  Location: [facility];  Service: General   • REDUCTION MAMMAPLASTY Right     to match Lt. TRAM flap   • TONSILLECTOMY     Lico catheter placement on 2017 by Dr. Melo.     OB/GYN history: Menarche age 16, menopause at 1985. , 1 miscarriage. No  birth control pill use. She did have post menopause hormonal supplementation.      HEMATOLOGIC/ONCOLOGIC HISTORY: The patient is a 76 y.o. year old female whom we are consulted for a newly self discovered right breast mass, in April 2017. Patient had ultrasound-guided biopsy on 5/3/2017 and confirmed to be invasive mammary carcinoma with focal lobular features, grade 2 Elen score 6/9. She is here for initial evaluation for management.      Patient is a 76-year-old  female who was seen here previously for chronic mild-to-moderate thrombocytopenia and mild anemia. Recently the patient reports she started having firmness of the right breast that started sometime in April or maybe even in March. She noticed firmness spread from about around the 12 o'clock position, gradually towards the upper lateral side and lower part of the right breast associated mild pain. The patient thought it was related to fibrocystic changes. However, the symptom was getting worse. Patient also reported dented nipple after she started having pain in the right breast. She denies discharge from the nipple. She called her primary care physician, Dr. Martin, who ordered a mammogram study. This was done on 04/12/2017 with architectural distortion of the right breast centrally at 12 o'clock position and had scattered fibroglandular densities throughout the right breast.      The patient subsequently had right breast ultrasound examination on 04/26/2017. Discovered a large right breast mass measuring 5.8 x 3.5 x 3.9 cm. Patient subsequently had ultrasound-guided right breast biopsy on 05/03/2017. Pathology evaluation reported invasive mammary carcinoma with focal lobular feature. [place] score 6/9, overall grade 2. Sample was further sent to the [facility] for test. ER negative, less than 1%; SD positive, moderate in staining, 5% to 10%. HER2 IHC 2+, but FISH study positive 2.1.     She denies weight loss, she  actually eats very well. No nausea vomiting. Patient does complain of insomnia, unable to sleep.      This patient has history of left breast DCIS back in 2007, had mastectomy at the [facility]. No hormonal therapy afterwards according to patient. This patient also had a small meningioma, followed by Dr. Smith in [facility], with most recent MRI of the brain in March 2017, with 18 mm meningioma, and followed on an annual basis.     Her neutrophil count on 5/16/17 is normal at 2500, however, records showed starting from 05/2015 and in 09/2016, 01/2017 and 03/2017, all 4 laboratory studies showed mild neutropenia with ANC fluctuating between 1200 and 1600. Etiology is not clear.    Normal echocardiogram on 5/18/2017, LVEF 68%.      CT scan for chest abdomen and pelvis on 5/22/2017 and breast MRI examination on 5/22/2017 reported small right axillary lymph node suspicious for metastatic disease.  No remote metastatic lesion.  Patient has stage IIIa (T3 N2 M0) disease.      Patient will start neoadjuvant chemotherapy with Taxol weekly plus Herceptin weekly for total 12 weeks, and Perjeta every 3 weeks (3-week cycle) during chemotherapy.  Herceptin will be converted to every 3 weeks after chemotherapy finished.  Cycle 1 day 1 5/23/2017.     MEDICATIONS: The current medication list was reviewed with the patient and updated in the EMR this date per the Medical Assistant. Medication dosages and frequencies were confirmed to be accurate.       ALLERGIES:    Allergies   Allergen Reactions   • Codeine Nausea And Vomiting   • Morphine Nausea And Vomiting     SOCIAL HISTORY:   Social History   Substance Use Topics   • Smoking status: Former Smoker     Packs/day: 2.00     Years: 30.00     Types: Cigarettes   • Smokeless tobacco: None      Comment: caffeine use   • Alcohol use No      Comment: stopped, heavy in past     FAMILY HISTORY:  Family History   Problem Relation Age of Onset   • Heart  "disease Mother    • Aortic aneurysm Mother      abdominal   • Coronary artery disease Mother    • Hypertension Mother    • Heart disease Father    • Heart attack Father    • Hypertension Father    • Coronary artery disease Brother    • Hypertension Brother    • Heart disease Brother    • Breast cancer Daughter 45     I have reviewed the patient's medical history in detail and updated the computerized patient record.    REVIEW OF SYSTEMS:  GENERAL: See history of present illness. No change in appetite or weight;   No fevers, chills, sweats.   SKIN: No nonhealing lesions. No rashes.   HEME/LYMPH: No easy bruising, bleeding. No swollen nodes.   EYES: No vision changes or diplopia.   ENT: No tinnitus, hearing loss, gum bleeding, epistaxis, hoarseness or dysphagia.   RESPIRATORY: No cough, Has exertional dyspnea, No hemoptysis or wheezing.   CVS: No chest pain, palpitations, orthopnea, dyspnea on exertion or PND.   GI: See HPI.   : No lower tract obstructive symptoms, dysuria or hematuria.   MUSCULOSKELETAL: Chronic or joint pain, arthritis.  Has mild intermittent ankle swelling.   NEUROLOGICAL:No global weakness, loss of consciousness or seizures.   PSYCHIATRIC: Somewhat anxious today; no mood changes or depression.      Objective:   Vitals:    06/07/17 0854   BP: 116/62   Pulse: 74   Resp: 16   Temp: 97.9 °F (36.6 °C)   SpO2: 98%   Weight: 147 lb 6.4 oz (66.9 kg)   Height: 62.01\" (157.5 cm)   PainSc: 5  Comment: Lower gastral intestinal pain   ECOG 0      PHYSICAL EXAM:   GENERAL: Well-developed, well-nourished female, in no acute distress.   SKIN: Warm, dry without rashes, purpura or petechiae.  Left upper chest Lico catheter in place, no evidence of infection.   EYES: Pupils equal, round and reactive to light. EOMs intact. Conjunctivae normal.  EARS: Hearing intact.  NOSE:  No excoriations or nasal discharge.  MOUTH: Tongue is well-papillated; no stomatitis or ulcers. Lips normal.  THROAT: Oropharynx without " lesions or exudates.  LYMPHATICS: No cervical, supraclavicular, axillary adenopathy.  CHEST: Lungs clear to auscultation. Good airflow.   Breast: (not examined today); previously noted: right breast has a large mass, measures about 7 cm ×7 cm by physical examination.  She does note resolution of right nipple retraction   CARDIAC: Regular rate and rhythm without murmurs. Normal S1,S2.  ABDOMEN: Slightly distended, normal active bowel sounds,  no hepatosplenomegaly or masses.  EXTREMITIES: No clubbing, cyanosis or edema.  NEUROLOGICAL: Cranial Nerves II-XII grossly intact. No focal neurological deficits.  PSYCHIATRIC: Normal affect and mood.      RECENT LABS:  Lab Results   Component Value Date    WBC 3.84 (L) 06/07/2017    HGB 10.6 (L) 06/07/2017    HCT 32.5 (L) 06/07/2017    MCV 94.2 06/07/2017     (L) 06/07/2017     Lab Results   Component Value Date    NEUTROABS 1.93 06/07/2017     Lab Results   Component Value Date    GLUCOSE 99 06/07/2017    BUN 16 06/07/2017    CREATININE 0.86 06/07/2017    EGFRIFNONA 64 06/07/2017    EGFRIFAFRI 82 03/27/2017    BCR 18.6 06/07/2017    K 4.3 06/07/2017    CO2 24.3 06/07/2017    CALCIUM 9.3 06/07/2017    PROTENTOTREF 6.5 03/27/2017    ALBUMIN 3.80 06/07/2017    LABIL2 1.6 06/07/2017    AST 17 06/07/2017    ALT 26 06/07/2017       Assessment/Plan   1. Newly diagnosed large right breast cancer, locally advanced, stage IIIa (T3 N1/ 2 M0).  ER negative, less than 1%; MI positive, moderate in staining, 5% to 10%. HER2 IHC 2+, FISH study positive 2.1.  Physical examination showed a large mass, 7 cm x 7 cm;  This mass feels very firm and occupies the majority of the right breast. There was no palpable lymphadenopathy in the right axilla or the supraclavicular area despite CT scan and breast MRI reported a right axillary lymph node;  there is also possible internal mammary chain lymph node.     Decision made to proceed with Taxol, Herceptin, and Perjeta on 5/23/17.  Planning to  monitor counts closely. If her platelets cannot sustain above 60,000, we would decrease or drop the Taxol and the continue monoclonal antibodies.       We will proceed today with cycle 1 day 15 Taxol, Herceptin alone.     2. Chronic mild-to-moderate thrombocytopenia. Her platelets today are 129,000. Continue to monitor counts closely.     3.  Mild anemia with a hemoglobin 10.6.  Anemia labs checked on May 23, 2017 show no evidence of iron, folate, B12 deficiency.      4.  History of mild leukocytopenia/neutropenia.  She continues with a modest degree of leukopenia with a total white blood cell count of 3.84, and a normal ANC of 1.93. No evidence of infectious symptoms. We will continue to monitor closely.      5. Insomnia.  Improved with Ambien 5 mg nightly.    6. Infusion reaction to Herceptin 5/23/17;  with residual side effects (?).  The patient had fairly significant reaction to Herceptin requiring multiple hypersensitivity medications.  2 days later, she developed visible shakiness, nausea, and constipation with cramping.  She was treated with supportive medications and thankfully all symptoms have resolved.   Improved tolerance with premedication of Solu-Cortef and Vistaril. (Benadryl changed to Vistaril due to restless leg syndrome)    7.  Previous constipation.  It was recommended for the patient utilize lactulose and Senokot.  She is having several bowel movements per day, with occasional abdominal cramping.  She does fear obstruction, as she has required colectomy in the past for bowel obstruction.  We have discussed Imodium if loose stools persists, however, at this time, the patient feels this is manageable with diet.       PLAN:   1. Proceed with C1 D15  Taxol and Herceptin. (continuing premedications from C1D8)  2.  Continue supportive care including bowel regimen, and antiemetics as needed.  3.  MD visit with Dr. Elliott in 1 weeks for cycle #2 day 1 treatment.    4.  Continue Ambien 5 mg daily at  bedtime for insomnia.     The patient was instructed to call if she begins to experience abdominal pain or change in her bowel habits.    Kallie Sotelo, RUDY  06/07/2017

## 2017-06-07 NOTE — PROGRESS NOTES
1117  Pt c/o feeling cold/shaking/chills.  Herceptin stopped  139/73-76  1118  Denies SOA  Chest tightness  Kallie NP at chairside  1121  Chills stopped  Pt stating her legs still shaking  138/75-76  1125  Pt states she fels better no shaking/chills noted  1130 herceptin restarted at 350cc/hr  No c/o noted  1135  herceptin absorbed  132/74-74  No c/o noted

## 2017-06-13 DIAGNOSIS — E87.6 HYPOKALEMIA: ICD-10-CM

## 2017-06-13 DIAGNOSIS — Z86.79 HISTORY OF CARDIAC ARRHYTHMIA: ICD-10-CM

## 2017-06-13 DIAGNOSIS — T78.40XD HYPERSENSITIVITY REACTION, SUBSEQUENT ENCOUNTER: ICD-10-CM

## 2017-06-13 DIAGNOSIS — C50.111 MALIGNANT NEOPLASM OF CENTRAL PORTION OF RIGHT FEMALE BREAST (HCC): ICD-10-CM

## 2017-06-13 DIAGNOSIS — E86.0 DEHYDRATION: ICD-10-CM

## 2017-06-14 ENCOUNTER — OFFICE VISIT (OUTPATIENT)
Dept: ONCOLOGY | Facility: CLINIC | Age: 77
End: 2017-06-14

## 2017-06-14 ENCOUNTER — INFUSION (OUTPATIENT)
Dept: ONCOLOGY | Facility: HOSPITAL | Age: 77
End: 2017-06-14

## 2017-06-14 VITALS — DIASTOLIC BLOOD PRESSURE: 80 MMHG | HEART RATE: 80 BPM | SYSTOLIC BLOOD PRESSURE: 143 MMHG

## 2017-06-14 VITALS
TEMPERATURE: 98.1 F | WEIGHT: 147 LBS | HEART RATE: 70 BPM | BODY MASS INDEX: 27.05 KG/M2 | OXYGEN SATURATION: 94 % | RESPIRATION RATE: 18 BRPM | SYSTOLIC BLOOD PRESSURE: 128 MMHG | DIASTOLIC BLOOD PRESSURE: 56 MMHG | HEIGHT: 62 IN

## 2017-06-14 DIAGNOSIS — E86.0 DEHYDRATION: ICD-10-CM

## 2017-06-14 DIAGNOSIS — I10 ESSENTIAL HYPERTENSION: ICD-10-CM

## 2017-06-14 DIAGNOSIS — D72.818 OTHER DECREASED WHITE BLOOD CELL (WBC) COUNT: ICD-10-CM

## 2017-06-14 DIAGNOSIS — E87.6 HYPOKALEMIA: ICD-10-CM

## 2017-06-14 DIAGNOSIS — T78.40XD HYPERSENSITIVITY REACTION, SUBSEQUENT ENCOUNTER: ICD-10-CM

## 2017-06-14 DIAGNOSIS — C50.111 MALIGNANT NEOPLASM OF CENTRAL PORTION OF RIGHT FEMALE BREAST (HCC): ICD-10-CM

## 2017-06-14 DIAGNOSIS — Z86.79 HISTORY OF CARDIAC ARRHYTHMIA: ICD-10-CM

## 2017-06-14 DIAGNOSIS — D64.9 ANEMIA, UNSPECIFIED TYPE: Primary | ICD-10-CM

## 2017-06-14 DIAGNOSIS — D69.6 THROMBOCYTOPENIA (HCC): ICD-10-CM

## 2017-06-14 DIAGNOSIS — T78.40XD HYPERSENSITIVITY REACTION, SUBSEQUENT ENCOUNTER: Primary | ICD-10-CM

## 2017-06-14 LAB
ALBUMIN SERPL-MCNC: 3.7 G/DL (ref 3.5–5.2)
ALBUMIN/GLOB SERPL: 1.6 G/DL (ref 1.1–2.4)
ALP SERPL-CCNC: 48 U/L (ref 38–116)
ALT SERPL W P-5'-P-CCNC: 22 U/L (ref 0–33)
ANION GAP SERPL CALCULATED.3IONS-SCNC: 13.8 MMOL/L
AST SERPL-CCNC: 17 U/L (ref 0–32)
BASOPHILS # BLD AUTO: 0.02 10*3/MM3 (ref 0–0.1)
BASOPHILS NFR BLD AUTO: 0.3 % (ref 0–1.1)
BILIRUB SERPL-MCNC: 0.3 MG/DL (ref 0.1–1.2)
BILIRUB UR QL STRIP: NEGATIVE
BUN BLD-MCNC: 15 MG/DL (ref 6–20)
BUN/CREAT SERPL: 18.8 (ref 7.3–30)
CALCIUM SPEC-SCNC: 8.6 MG/DL (ref 8.5–10.2)
CHLORIDE SERPL-SCNC: 103 MMOL/L (ref 98–107)
CLARITY UR: CLEAR
CO2 SERPL-SCNC: 23.2 MMOL/L (ref 22–29)
COLOR UR: YELLOW
CREAT BLD-MCNC: 0.8 MG/DL (ref 0.6–1.1)
DEPRECATED RDW RBC AUTO: 42.3 FL (ref 37–49)
EOSINOPHIL # BLD AUTO: 0.03 10*3/MM3 (ref 0–0.36)
EOSINOPHIL NFR BLD AUTO: 0.5 % (ref 1–5)
ERYTHROCYTE [DISTWIDTH] IN BLOOD BY AUTOMATED COUNT: 12.7 % (ref 11.7–14.5)
GFR SERPL CREATININE-BSD FRML MDRD: 70 ML/MIN/1.73
GLOBULIN UR ELPH-MCNC: 2.3 GM/DL (ref 1.8–3.5)
GLUCOSE BLD-MCNC: 96 MG/DL (ref 74–124)
GLUCOSE UR STRIP-MCNC: NEGATIVE MG/DL
HCT VFR BLD AUTO: 30.4 % (ref 34–45)
HGB BLD-MCNC: 10.1 G/DL (ref 11.5–14.9)
HGB UR QL STRIP.AUTO: NEGATIVE
HOLD SPECIMEN: NORMAL
IMM GRANULOCYTES # BLD: 0.37 10*3/MM3 (ref 0–0.03)
IMM GRANULOCYTES NFR BLD: 5.9 % (ref 0–0.5)
KETONES UR QL STRIP: NEGATIVE
LEUKOCYTE ESTERASE UR QL STRIP.AUTO: ABNORMAL
LYMPHOCYTES # BLD AUTO: 1.23 10*3/MM3 (ref 1–3.5)
LYMPHOCYTES NFR BLD AUTO: 19.5 % (ref 20–49)
MCH RBC QN AUTO: 31 PG (ref 27–33)
MCHC RBC AUTO-ENTMCNC: 33.2 G/DL (ref 32–35)
MCV RBC AUTO: 93.3 FL (ref 83–97)
MONOCYTES # BLD AUTO: 0.56 10*3/MM3 (ref 0.25–0.8)
MONOCYTES NFR BLD AUTO: 8.9 % (ref 4–12)
NEUTROPHILS # BLD AUTO: 4.1 10*3/MM3 (ref 1.5–7)
NEUTROPHILS NFR BLD AUTO: 64.9 % (ref 39–75)
NITRITE UR QL STRIP: NEGATIVE
NRBC BLD MANUAL-RTO: 0 /100 WBC (ref 0–0)
PH UR STRIP.AUTO: <=5 [PH] (ref 4.5–8)
PLATELET # BLD AUTO: 127 10*3/MM3 (ref 150–375)
PMV BLD AUTO: 10 FL (ref 8.9–12.1)
POTASSIUM BLD-SCNC: 3.7 MMOL/L (ref 3.5–4.7)
PROT SERPL-MCNC: 6 G/DL (ref 6.3–8)
PROT UR QL STRIP: NEGATIVE
RBC # BLD AUTO: 3.26 10*6/MM3 (ref 3.9–5)
SODIUM BLD-SCNC: 140 MMOL/L (ref 134–145)
SP GR UR STRIP: 1.02 (ref 1–1.03)
UROBILINOGEN UR QL STRIP: ABNORMAL
WBC NRBC COR # BLD: 6.31 10*3/MM3 (ref 4–10)

## 2017-06-14 PROCEDURE — 25010000002 PERTUZUMAB 420 MG/14ML SOLUTION 420 MG VIAL: Performed by: INTERNAL MEDICINE

## 2017-06-14 PROCEDURE — 96413 CHEMO IV INFUSION 1 HR: CPT | Performed by: INTERNAL MEDICINE

## 2017-06-14 PROCEDURE — 96375 TX/PRO/DX INJ NEW DRUG ADDON: CPT | Performed by: INTERNAL MEDICINE

## 2017-06-14 PROCEDURE — 81003 URINALYSIS AUTO W/O SCOPE: CPT | Performed by: INTERNAL MEDICINE

## 2017-06-14 PROCEDURE — 25010000002 PACLITAXEL PER 30 MG: Performed by: INTERNAL MEDICINE

## 2017-06-14 PROCEDURE — 25010000002 DEXAMETHASONE PER 1 MG: Performed by: INTERNAL MEDICINE

## 2017-06-14 PROCEDURE — 85025 COMPLETE CBC W/AUTO DIFF WBC: CPT

## 2017-06-14 PROCEDURE — 25010000002 PALONOSETRON PER 25 MCG: Performed by: INTERNAL MEDICINE

## 2017-06-14 PROCEDURE — 99215 OFFICE O/P EST HI 40 MIN: CPT | Performed by: INTERNAL MEDICINE

## 2017-06-14 PROCEDURE — 25010000002 HYDROCORTISONE SODIUM SUCCINATE 100 MG RECONSTITUTED SOLUTION: Performed by: INTERNAL MEDICINE

## 2017-06-14 PROCEDURE — 80053 COMPREHEN METABOLIC PANEL: CPT

## 2017-06-14 PROCEDURE — 96417 CHEMO IV INFUS EACH ADDL SEQ: CPT | Performed by: INTERNAL MEDICINE

## 2017-06-14 PROCEDURE — 25010000002 TRASTUZUMAB PER 10 MG: Performed by: INTERNAL MEDICINE

## 2017-06-14 RX ORDER — ZOLPIDEM TARTRATE 5 MG/1
10 TABLET ORAL NIGHTLY PRN
Qty: 30 TABLET | Refills: 0 | Status: SHIPPED | OUTPATIENT
Start: 2017-06-14 | End: 2017-06-28 | Stop reason: SDUPTHER

## 2017-06-14 RX ORDER — SODIUM CHLORIDE 9 MG/ML
250 INJECTION, SOLUTION INTRAVENOUS ONCE
Status: CANCELLED | OUTPATIENT
Start: 2017-06-14

## 2017-06-14 RX ORDER — FAMOTIDINE 10 MG/ML
20 INJECTION, SOLUTION INTRAVENOUS ONCE
Status: CANCELLED | OUTPATIENT
Start: 2017-06-21

## 2017-06-14 RX ORDER — PALONOSETRON 0.05 MG/ML
0.25 INJECTION, SOLUTION INTRAVENOUS ONCE
Status: CANCELLED | OUTPATIENT
Start: 2017-06-14

## 2017-06-14 RX ORDER — HYDROXYZINE PAMOATE 25 MG/1
50 CAPSULE ORAL ONCE
Status: CANCELLED
Start: 2017-06-21 | End: 2017-06-21

## 2017-06-14 RX ORDER — PALONOSETRON 0.05 MG/ML
0.25 INJECTION, SOLUTION INTRAVENOUS ONCE
Status: CANCELLED | OUTPATIENT
Start: 2017-06-28

## 2017-06-14 RX ORDER — LANOLIN ALCOHOL/MO/W.PET/CERES
50 CREAM (GRAM) TOPICAL DAILY
Qty: 30 TABLET | Refills: 2 | Status: SHIPPED | OUTPATIENT
Start: 2017-06-14 | End: 2017-09-17 | Stop reason: SDUPTHER

## 2017-06-14 RX ORDER — SODIUM CHLORIDE 9 MG/ML
250 INJECTION, SOLUTION INTRAVENOUS ONCE
Status: COMPLETED | OUTPATIENT
Start: 2017-06-14 | End: 2017-06-14

## 2017-06-14 RX ORDER — SODIUM CHLORIDE 9 MG/ML
250 INJECTION, SOLUTION INTRAVENOUS ONCE
Status: CANCELLED | OUTPATIENT
Start: 2017-06-28

## 2017-06-14 RX ORDER — HYDROXYZINE PAMOATE 25 MG/1
50 CAPSULE ORAL ONCE
Status: CANCELLED
Start: 2017-06-28 | End: 2017-06-28

## 2017-06-14 RX ORDER — HYDROXYZINE PAMOATE 25 MG/1
50 CAPSULE ORAL ONCE
Status: COMPLETED | OUTPATIENT
Start: 2017-06-14 | End: 2017-06-14

## 2017-06-14 RX ORDER — HYDROXYZINE PAMOATE 25 MG/1
50 CAPSULE ORAL ONCE
Status: CANCELLED
Start: 2017-06-14 | End: 2017-06-14

## 2017-06-14 RX ORDER — PALONOSETRON 0.05 MG/ML
0.25 INJECTION, SOLUTION INTRAVENOUS ONCE
Status: COMPLETED | OUTPATIENT
Start: 2017-06-14 | End: 2017-06-14

## 2017-06-14 RX ORDER — FAMOTIDINE 10 MG/ML
20 INJECTION, SOLUTION INTRAVENOUS ONCE
Status: COMPLETED | OUTPATIENT
Start: 2017-06-14 | End: 2017-06-14

## 2017-06-14 RX ORDER — FAMOTIDINE 10 MG/ML
20 INJECTION, SOLUTION INTRAVENOUS ONCE
Status: CANCELLED | OUTPATIENT
Start: 2017-06-28

## 2017-06-14 RX ORDER — FAMOTIDINE 10 MG/ML
20 INJECTION, SOLUTION INTRAVENOUS ONCE
Status: CANCELLED | OUTPATIENT
Start: 2017-06-14

## 2017-06-14 RX ORDER — SODIUM CHLORIDE 9 MG/ML
250 INJECTION, SOLUTION INTRAVENOUS ONCE
Status: CANCELLED | OUTPATIENT
Start: 2017-06-21

## 2017-06-14 RX ORDER — PALONOSETRON 0.05 MG/ML
0.25 INJECTION, SOLUTION INTRAVENOUS ONCE
Status: CANCELLED | OUTPATIENT
Start: 2017-06-21

## 2017-06-14 RX ORDER — HYDROXYZINE HYDROCHLORIDE 25 MG/1
25 TABLET, FILM COATED ORAL EVERY 8 HOURS PRN
Qty: 15 TABLET | Refills: 1 | Status: SHIPPED | OUTPATIENT
Start: 2017-06-14 | End: 2017-09-08

## 2017-06-14 RX ADMIN — PACLITAXEL 135 MG: 6 INJECTION, SOLUTION INTRAVENOUS at 14:07

## 2017-06-14 RX ADMIN — FAMOTIDINE 20 MG: 10 INJECTION, SOLUTION INTRAVENOUS at 11:03

## 2017-06-14 RX ADMIN — Medication 140 MG: at 13:04

## 2017-06-14 RX ADMIN — PERTUZUMAB 420 MG: 30 INJECTION, SOLUTION, CONCENTRATE INTRAVENOUS at 11:29

## 2017-06-14 RX ADMIN — PALONOSETRON HYDROCHLORIDE 0.25 MG: 0.25 INJECTION INTRAVENOUS at 11:02

## 2017-06-14 RX ADMIN — DEXAMETHASONE SODIUM PHOSPHATE 12 MG: 10 INJECTION INTRAMUSCULAR; INTRAVENOUS at 11:08

## 2017-06-14 RX ADMIN — HYDROXYZINE PAMOATE 50 MG: 25 CAPSULE ORAL at 11:02

## 2017-06-14 RX ADMIN — SODIUM CHLORIDE 250 ML: 900 INJECTION, SOLUTION INTRAVENOUS at 10:30

## 2017-06-14 RX ADMIN — HYDROCORTISONE SODIUM SUCCINATE 100 MG: 100 INJECTION, POWDER, FOR SOLUTION INTRAMUSCULAR; INTRAVENOUS at 11:05

## 2017-06-14 NOTE — PROGRESS NOTES
REASON FOR FOLLOWUP:   1. Newly diagnosed large right breast cancer.  Core needle biopsy reported invasive mammary carcinoma with focal lobular feature, grade 2.  ER negative, less than 1%; MA positive, moderate in staining, 5% to 10%. HER2 IHC 2+, FISH study positive 2.1.   2.  CT scan for chest abdomen and pelvis and breast MRI examination reported right axillary lymph node suspicious for metastatic disease.  No remote metastatic lesion.  Patient has stage IIIa (T3 N2 M0) disease.   3.  Patient will start neoadjuvant chemotherapy with Taxol weekly plus Herceptin weekly for total 12 weeks, and Perjata every 3 weeks during chemotherapy.  Herceptin will be converted to every 3 weeks after chemotherapy finished.    4.  Chronic moderate thrombocytopenia, mild anemia, and intermittent mild neutropenia etiologies are very not clear.  5.  Reaction to Herceptin C1D1. Here on day 3 feeling poorly with shakes, nausea, and constipation.        HISTORY OF PRESENT ILLNESS:    The patient is a pleasant 76 y.o. with the above-mentioned history, who presents today in anticipation of cycle 1 day 15 of Taxol and Herceptin.  She did have an infusion reaction to Herceptin with cycle 1 day 1.  She tolerated her second infusion much better with the addition of hydrocortisone in Vistaril.  She had constipation but with use of stool softeners that resolved.  She said that she had one episode of restless leg on the right side today which was they're just for a minute.  She doesn't have she has some mild tingling and numbness in her lower feet but not in her hands.  She has not had falls.  She can button her shirts.  She is taking B12 by mouth.  She is receiving neoadjuvant chemotherapy with weekly Taxol Herceptin and every 3 week Perjeta.  She is tolerating it reasonably well.  She does have difficulty with sleep and Ambien 5 mg has not been helping.  She does not have any fever, no nausea or vomiting.    Past Medical History:   Diagnosis  Date   • Acute bronchitis    • Anemia    • Breast cancer 2007    Left   • Breast cancer 2017    Right   • Chronic pain    • Conjunctivitis    • Cough    • Ductal carcinoma in situ (DCIS) of left breast    • Edema     Chronic lower extremity edema   • GERD (gastroesophageal reflux disease)    • H/O Bowel obstruction    • H/O jaundice    • Hernia    • History of infectious mononucleosis    • History of migraine headaches    • Hyperlipidemia    • Hypertension    • Impacted cerumen of left ear    • Leukocytopenia    • Leukopenia    • Meningioma    • Peptic ulceration    • Perioral dermatitis    • SBO (small bowel obstruction)     due to hernia with surgical repair in 2011   • SOB (shortness of breath) on exertion    • Thrombocytopenia    • Vertigo    Normal echocardiogram on 2017, LVEF 68%.    Past Surgical History:   Procedure Laterality Date   • APPENDECTOMY     • BLADDER SURGERY      Bladder repair   • BREAST BIOPSY Left    • BREAST BIOPSY  2017   • BREAST SURGERY     • CHOLECYSTECTOMY     • GALLBLADDER SURGERY     • HERNIA REPAIR     • HYSTERECTOMY     • MASTECTOMY Left     and sentinel lymph node biospy at [facility]   • [place] INSJ TUNNELED CVC W/O SUBQ PORT/ AGE 5 YR/> Left 2017    Procedure: INSERTION VENOUS ACCESS DEVICE;  Surgeon: Christian Melo MD;  Location: [facility];  Service: General   • REDUCTION MAMMAPLASTY Right     to match Lt. TRAM flap   • TONSILLECTOMY     Lico catheter placement on 2017 by Dr. Melo.     OB/GYN history: Menarche age 16, menopause at 1985. , 1 miscarriage. No birth control pill use. She did have post menopause hormonal supplementation.      HEMATOLOGIC/ONCOLOGIC HISTORY: The patient is a 76 y.o. year old female whom we are consulted for a newly self discovered right breast mass, in 2017. Patient had ultrasound-guided biopsy on 5/3/2017 and confirmed to be invasive mammary carcinoma with focal lobular  features, grade 2 Gunnison score 6/9. She is here for initial evaluation for management.      Patient is a 76-year-old  female who was seen here previously for chronic mild-to-moderate thrombocytopenia and mild anemia. Recently the patient reports she started having firmness of the right breast that started sometime in April or maybe even in March. She noticed firmness spread from about around the 12 o'clock position, gradually towards the upper lateral side and lower part of the right breast associated mild pain. The patient thought it was related to fibrocystic changes. However, the symptom was getting worse. Patient also reported dented nipple after she started having pain in the right breast. She denies discharge from the nipple. She called her primary care physician, Dr. Martin, who ordered a mammogram study. This was done on 04/12/2017 with architectural distortion of the right breast centrally at 12 o'clock position and had scattered fibroglandular densities throughout the right breast.      The patient subsequently had right breast ultrasound examination on 04/26/2017. Discovered a large right breast mass measuring 5.8 x 3.5 x 3.9 cm. Patient subsequently had ultrasound-guided right breast biopsy on 05/03/2017. Pathology evaluation reported invasive mammary carcinoma with focal lobular feature. Elen score 6/9, overall grade 2. Sample was further sent to the [facility] for test. ER negative, less than 1%; ID positive, moderate in staining, 5% to 10%. HER2 IHC 2+, but FISH study positive 2.1.     She denies weight loss, she actually eats very well. No nausea vomiting. Patient does complain of insomnia, unable to sleep.      This patient has history of left breast DCIS back in 2007, had mastectomy at the [facility]. No hormonal therapy afterwards according to patient. This patient also had a small meningioma, followed by Dr. Smith in [facility]  [place], with most recent MRI of the brain in March 2017, with 18 mm meningioma, and followed on an annual basis.     Her neutrophil count on 5/16/17 is normal at 2500, however, records showed starting from 05/2015 and in 09/2016, 01/2017 and 03/2017, all 4 laboratory studies showed mild neutropenia with ANC fluctuating between 1200 and 1600. Etiology is not clear.    Normal echocardiogram on 5/18/2017, LVEF 68%.      CT scan for chest abdomen and pelvis on 5/22/2017 and breast MRI examination on 5/22/2017 reported small right axillary lymph node suspicious for metastatic disease.  No remote metastatic lesion.  Patient has stage IIIa (T3 N2 M0) disease.      Patient will start neoadjuvant chemotherapy with Taxol weekly plus Herceptin weekly for total 12 weeks, and Perjeta every 3 weeks (3-week cycle) during chemotherapy.  Herceptin will be converted to every 3 weeks after chemotherapy finished.  Cycle 1 day 1 5/23/2017.     MEDICATIONS: The current medication list was reviewed with the patient and updated in the EMR this date per the Medical Assistant. Medication dosages and frequencies were confirmed to be accurate.       ALLERGIES:    Allergies   Allergen Reactions   • Codeine Nausea And Vomiting   • Morphine Nausea And Vomiting     SOCIAL HISTORY:   Social History   Substance Use Topics   • Smoking status: Former Smoker     Packs/day: 2.00     Years: 30.00     Types: Cigarettes   • Smokeless tobacco: None      Comment: caffeine use   • Alcohol use No      Comment: stopped, heavy in past     FAMILY HISTORY:  Family History   Problem Relation Age of Onset   • Heart disease Mother    • Aortic aneurysm Mother      abdominal   • Coronary artery disease Mother    • Hypertension Mother    • Heart disease Father    • Heart attack Father    • Hypertension Father    • Coronary artery disease Brother    • Hypertension Brother    • Heart disease Brother    • Breast cancer Daughter 45     I have reviewed the patient's  "medical history in detail and updated the computerized patient record.    REVIEW OF SYSTEMS:  GENERAL: See history of present illness. No change in appetite or weight;   No fevers, chills, sweats.   SKIN: No nonhealing lesions. No rashes.   HEME/LYMPH: No easy bruising, bleeding. No swollen nodes.   EYES: No vision changes or diplopia.   ENT: No tinnitus, hearing loss, gum bleeding, epistaxis, hoarseness or dysphagia.   RESPIRATORY: No cough, Has exertional dyspnea, No hemoptysis or wheezing.   CVS: No chest pain, palpitations, orthopnea, dyspnea on exertion or PND.   GI: See HPI.   : No lower tract obstructive symptoms, dysuria or hematuria.   MUSCULOSKELETAL: Chronic or joint pain, arthritis.  Has mild intermittent ankle swelling.   NEUROLOGICAL:No global weakness, loss of consciousness or seizures.   PSYCHIATRIC: Somewhat anxious today; no mood changes or depression.      Objective:   Vitals:    06/14/17 0902   BP: 128/56   Pulse: 70   Resp: 18   Temp: 98.1 °F (36.7 °C)   TempSrc: Oral   SpO2: 94%   Weight: 147 lb (66.7 kg)   Height: 62.01\" (157.5 cm)   PainSc: 0-No pain  Comment: Possible UTI   ECOG 0      PHYSICAL EXAM:   GENERAL: Well-developed, well-nourished female, in no acute distress.   SKIN: Warm, dry without rashes, purpura or petechiae.  Left upper chest Lico catheter in place, no evidence of infection.   EYES: Pupils equal, round and reactive to light. EOMs intact. Conjunctivae normal.  EARS: Hearing intact.  NOSE:  No excoriations or nasal discharge.  MOUTH: Tongue is well-papillated; no stomatitis or ulcers. Lips normal.  THROAT: Oropharynx without lesions or exudates.  LYMPHATICS: No cervical, supraclavicular, axillary adenopathy.  CHEST: Lungs clear to auscultation. Good airflow.   Breast: Right breast: There is significant decrease in the size of the right breast mass.  Previously it was quoted to be 7×7cm.  Today from the superior to inferior dimension it measures 5 cm.  Difficult to " appreciate from medial to lateral position any mass.  The nipple retraction is resolved.  There is no right axillary adenopathy and there is no right supraclavicular adenopathy.  Left breast is status post mastectomy with breast reconstruction on the left.      CARDIAC: Regular rate and rhythm without murmurs. Normal S1,S2.  ABDOMEN: Slightly distended, normal active bowel sounds,  no hepatosplenomegaly or masses.  EXTREMITIES: No clubbing, cyanosis or edema.  NEUROLOGICAL: Cranial Nerves II-XII grossly intact. No focal neurological deficits.  PSYCHIATRIC: Normal affect and mood.      RECENT LABS:  Lab Results   Component Value Date    WBC 6.31 06/14/2017    HGB 10.1 (L) 06/14/2017    HCT 30.4 (L) 06/14/2017    MCV 93.3 06/14/2017     (L) 06/14/2017     Lab Results   Component Value Date    NEUTROABS 4.10 06/14/2017     Lab Results   Component Value Date    GLUCOSE 96 06/14/2017    BUN 15 06/14/2017    CREATININE 0.80 06/14/2017    EGFRIFNONA 70 06/14/2017    EGFRIFAFRI 82 03/27/2017    BCR 18.8 06/14/2017    K 3.7 06/14/2017    CO2 23.2 06/14/2017    CALCIUM 8.6 06/14/2017    PROTENTOTREF 6.5 03/27/2017    ALBUMIN 3.70 06/14/2017    LABIL2 1.6 06/14/2017    AST 17 06/14/2017    ALT 22 06/14/2017       Assessment/Plan   1. Newly diagnosed large right breast cancer, locally advanced, stage IIIa (T3 N1/ 2 M0).  ER negative, less than 1%; UT positive, moderate in staining, 5% to 10%. HER2 IHC 2+, FISH study positive 2.1.  Physical examination showed a large mass, 7 cm x 7 cm initially which is decreased to 5 cm today.  Difficult  to appreciate right axillary lymph node. Patient is here for her fourth treatment of Taxol/Herceptin and second dose of Perjeta.  Clinically patient has had significant response to treatment.  2. Chronic mild-to-moderate thrombocytopenia. Her platelets today are 129,000. Continue to monitor counts closely.     3.  Mild anemia with a hemoglobin 10.6.  Anemia labs checked on May 23, 2017  show no evidence of iron, folate, B12 deficiency.      4.  History of mild leukocytopenia/neutropenia.  She continues with a modest degree of leukopenia with a total white blood cell count of 3.84, and a normal ANC of 1.93. No evidence of infectious symptoms. We will continue to monitor closely.      5. Insomnia.  5 mg of Ambien is not helping.    6.  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    7. Infusion reaction to Herceptin 5/23/17;  with residual side effects (?).  The patient had fairly significant reaction to Herceptin requiring multiple hypersensitivity medications.  2 days later, she developed visible shakiness, nausea, and constipation with cramping.  She was treated with supportive medications and thankfully all symptoms have resolved.   Improved tolerance with premedication of Solu-Cortef and Vistaril. (Benadryl changed to Vistaril due to restless leg syndrome)    8.  Previous constipation.  It was recommended for the patient utilize lactulose and Senokot.  Currently she takes when necessary Senokot.    9.  Strong family history of breast cancer with daughter having breast cancer at age 40 and patient having had DCIS 10 years ago in the left rest requiring mastectomy.  We will refer to genetics counseling.     PLAN:   1. Proceed with C2 D1  Taxol and Herceptin.  Treat with Perjeta today  2.  Continue supportive care including bowel regimen, and antiemetics as needed and soluCortef and Vistaril prior to Herceptin  .    3.  Change Ambien to 10 mg daily at bedtime for insomnia.     4.  Start pyridoxine  and 50 mg daily for neuropathy    5.  Refer to genetics counseling as soon as possible    Constance Elliott MD  06/07/2017      CC: Dr Melo

## 2017-06-21 ENCOUNTER — INFUSION (OUTPATIENT)
Dept: ONCOLOGY | Facility: HOSPITAL | Age: 77
End: 2017-06-21

## 2017-06-21 VITALS
HEART RATE: 92 BPM | TEMPERATURE: 98.7 F | DIASTOLIC BLOOD PRESSURE: 61 MMHG | BODY MASS INDEX: 27.13 KG/M2 | WEIGHT: 148.4 LBS | SYSTOLIC BLOOD PRESSURE: 127 MMHG

## 2017-06-21 DIAGNOSIS — C50.111 MALIGNANT NEOPLASM OF CENTRAL PORTION OF RIGHT FEMALE BREAST (HCC): ICD-10-CM

## 2017-06-21 DIAGNOSIS — T78.40XD HYPERSENSITIVITY REACTION, SUBSEQUENT ENCOUNTER: ICD-10-CM

## 2017-06-21 DIAGNOSIS — E87.6 HYPOKALEMIA: ICD-10-CM

## 2017-06-21 DIAGNOSIS — T78.40XD HYPERSENSITIVITY REACTION, SUBSEQUENT ENCOUNTER: Primary | ICD-10-CM

## 2017-06-21 DIAGNOSIS — Z86.79 HISTORY OF CARDIAC ARRHYTHMIA: ICD-10-CM

## 2017-06-21 DIAGNOSIS — D64.9 ANEMIA, UNSPECIFIED TYPE: Primary | ICD-10-CM

## 2017-06-21 DIAGNOSIS — E86.0 DEHYDRATION: ICD-10-CM

## 2017-06-21 DIAGNOSIS — D72.818 OTHER DECREASED WHITE BLOOD CELL (WBC) COUNT: ICD-10-CM

## 2017-06-21 DIAGNOSIS — D69.6 THROMBOCYTOPENIA (HCC): ICD-10-CM

## 2017-06-21 DIAGNOSIS — E78.5 HYPERLIPIDEMIA, UNSPECIFIED HYPERLIPIDEMIA TYPE: ICD-10-CM

## 2017-06-21 DIAGNOSIS — C50.811 MALIGNANT NEOPLASM OF OVERLAPPING SITES OF RIGHT FEMALE BREAST (HCC): ICD-10-CM

## 2017-06-21 LAB
ALBUMIN SERPL-MCNC: 3.5 G/DL (ref 3.5–5.2)
ALBUMIN/GLOB SERPL: 1.3 G/DL (ref 1.1–2.4)
ALP SERPL-CCNC: 46 U/L (ref 38–116)
ALT SERPL W P-5'-P-CCNC: 22 U/L (ref 0–33)
ANION GAP SERPL CALCULATED.3IONS-SCNC: 12.6 MMOL/L
AST SERPL-CCNC: 20 U/L (ref 0–32)
BASOPHILS # BLD AUTO: 0.01 10*3/MM3 (ref 0–0.1)
BASOPHILS NFR BLD AUTO: 0.2 % (ref 0–1.1)
BILIRUB SERPL-MCNC: 0.5 MG/DL (ref 0.1–1.2)
BUN BLD-MCNC: 12 MG/DL (ref 6–20)
BUN/CREAT SERPL: 14.8 (ref 7.3–30)
CALCIUM SPEC-SCNC: 9 MG/DL (ref 8.5–10.2)
CHLORIDE SERPL-SCNC: 98 MMOL/L (ref 98–107)
CO2 SERPL-SCNC: 26.4 MMOL/L (ref 22–29)
CREAT BLD-MCNC: 0.81 MG/DL (ref 0.6–1.1)
DEPRECATED RDW RBC AUTO: 44.4 FL (ref 37–49)
EOSINOPHIL # BLD AUTO: 0.02 10*3/MM3 (ref 0–0.36)
EOSINOPHIL NFR BLD AUTO: 0.5 % (ref 1–5)
ERYTHROCYTE [DISTWIDTH] IN BLOOD BY AUTOMATED COUNT: 13.2 % (ref 11.7–14.5)
GFR SERPL CREATININE-BSD FRML MDRD: 69 ML/MIN/1.73
GLOBULIN UR ELPH-MCNC: 2.6 GM/DL (ref 1.8–3.5)
GLUCOSE BLD-MCNC: 108 MG/DL (ref 74–124)
HCT VFR BLD AUTO: 29.2 % (ref 34–45)
HGB BLD-MCNC: 9.3 G/DL (ref 11.5–14.9)
HOLD SPECIMEN: NORMAL
IMM GRANULOCYTES # BLD: 0.18 10*3/MM3 (ref 0–0.03)
IMM GRANULOCYTES NFR BLD: 4.2 % (ref 0–0.5)
LYMPHOCYTES # BLD AUTO: 0.95 10*3/MM3 (ref 1–3.5)
LYMPHOCYTES NFR BLD AUTO: 22.2 % (ref 20–49)
MCH RBC QN AUTO: 30.4 PG (ref 27–33)
MCHC RBC AUTO-ENTMCNC: 31.8 G/DL (ref 32–35)
MCV RBC AUTO: 95.4 FL (ref 83–97)
MONOCYTES # BLD AUTO: 0.65 10*3/MM3 (ref 0.25–0.8)
MONOCYTES NFR BLD AUTO: 15.2 % (ref 4–12)
NEUTROPHILS # BLD AUTO: 2.47 10*3/MM3 (ref 1.5–7)
NEUTROPHILS NFR BLD AUTO: 57.7 % (ref 39–75)
NRBC BLD MANUAL-RTO: 0 /100 WBC (ref 0–0)
PLATELET # BLD AUTO: 115 10*3/MM3 (ref 150–375)
PMV BLD AUTO: 10.2 FL (ref 8.9–12.1)
POTASSIUM BLD-SCNC: 4.1 MMOL/L (ref 3.5–4.7)
PROT SERPL-MCNC: 6.1 G/DL (ref 6.3–8)
RBC # BLD AUTO: 3.06 10*6/MM3 (ref 3.9–5)
SODIUM BLD-SCNC: 137 MMOL/L (ref 134–145)
WBC NRBC COR # BLD: 4.28 10*3/MM3 (ref 4–10)

## 2017-06-21 PROCEDURE — 85025 COMPLETE CBC W/AUTO DIFF WBC: CPT

## 2017-06-21 PROCEDURE — 25010000002 TRASTUZUMAB PER 10 MG: Performed by: INTERNAL MEDICINE

## 2017-06-21 PROCEDURE — 96417 CHEMO IV INFUS EACH ADDL SEQ: CPT | Performed by: INTERNAL MEDICINE

## 2017-06-21 PROCEDURE — 25010000002 PALONOSETRON PER 25 MCG: Performed by: INTERNAL MEDICINE

## 2017-06-21 PROCEDURE — 25010000002 DEXAMETHASONE PER 1 MG: Performed by: INTERNAL MEDICINE

## 2017-06-21 PROCEDURE — 96413 CHEMO IV INFUSION 1 HR: CPT | Performed by: INTERNAL MEDICINE

## 2017-06-21 PROCEDURE — 80053 COMPREHEN METABOLIC PANEL: CPT

## 2017-06-21 PROCEDURE — 25010000002 PACLITAXEL PER 30 MG: Performed by: INTERNAL MEDICINE

## 2017-06-21 PROCEDURE — 25010000002 HYDROCORTISONE SODIUM SUCCINATE 100 MG RECONSTITUTED SOLUTION: Performed by: INTERNAL MEDICINE

## 2017-06-21 PROCEDURE — 96375 TX/PRO/DX INJ NEW DRUG ADDON: CPT | Performed by: INTERNAL MEDICINE

## 2017-06-21 RX ORDER — PALONOSETRON 0.05 MG/ML
0.25 INJECTION, SOLUTION INTRAVENOUS ONCE
Status: COMPLETED | OUTPATIENT
Start: 2017-06-21 | End: 2017-06-21

## 2017-06-21 RX ORDER — HYDROXYZINE PAMOATE 25 MG/1
50 CAPSULE ORAL ONCE
Status: COMPLETED | OUTPATIENT
Start: 2017-06-21 | End: 2017-06-21

## 2017-06-21 RX ORDER — SODIUM CHLORIDE 9 MG/ML
250 INJECTION, SOLUTION INTRAVENOUS ONCE
Status: COMPLETED | OUTPATIENT
Start: 2017-06-21 | End: 2017-06-21

## 2017-06-21 RX ORDER — FAMOTIDINE 10 MG/ML
20 INJECTION, SOLUTION INTRAVENOUS ONCE
Status: COMPLETED | OUTPATIENT
Start: 2017-06-21 | End: 2017-06-21

## 2017-06-21 RX ADMIN — DEXAMETHASONE SODIUM PHOSPHATE 12 MG: 10 INJECTION INTRAMUSCULAR; INTRAVENOUS at 09:47

## 2017-06-21 RX ADMIN — PALONOSETRON HYDROCHLORIDE 0.25 MG: 0.25 INJECTION INTRAVENOUS at 09:40

## 2017-06-21 RX ADMIN — HYDROXYZINE PAMOATE 50 MG: 25 CAPSULE ORAL at 09:43

## 2017-06-21 RX ADMIN — SODIUM CHLORIDE 250 ML: 900 INJECTION, SOLUTION INTRAVENOUS at 09:30

## 2017-06-21 RX ADMIN — Medication 140 MG: at 10:56

## 2017-06-21 RX ADMIN — FAMOTIDINE 20 MG: 10 INJECTION, SOLUTION INTRAVENOUS at 09:36

## 2017-06-21 RX ADMIN — HYDROCORTISONE SODIUM SUCCINATE 100 MG: 100 INJECTION, POWDER, FOR SOLUTION INTRAMUSCULAR; INTRAVENOUS at 09:45

## 2017-06-21 RX ADMIN — PACLITAXEL 135 MG: 6 INJECTION, SOLUTION INTRAVENOUS at 12:00

## 2017-06-26 ENCOUNTER — OFFICE VISIT (OUTPATIENT)
Dept: FAMILY MEDICINE CLINIC | Facility: CLINIC | Age: 77
End: 2017-06-26

## 2017-06-26 VITALS
HEART RATE: 83 BPM | WEIGHT: 147 LBS | SYSTOLIC BLOOD PRESSURE: 135 MMHG | RESPIRATION RATE: 16 BRPM | TEMPERATURE: 97.1 F | OXYGEN SATURATION: 94 % | DIASTOLIC BLOOD PRESSURE: 78 MMHG | BODY MASS INDEX: 27.05 KG/M2 | HEIGHT: 62 IN

## 2017-06-26 DIAGNOSIS — N30.90 CYSTITIS: ICD-10-CM

## 2017-06-26 DIAGNOSIS — R30.0 DYSURIA: Primary | ICD-10-CM

## 2017-06-26 LAB
BILIRUB BLD-MCNC: NEGATIVE MG/DL
CLARITY, POC: CLEAR
COLOR UR: YELLOW
GLUCOSE UR STRIP-MCNC: NEGATIVE MG/DL
KETONES UR QL: NEGATIVE
LEUKOCYTE EST, POC: NEGATIVE
NITRITE UR-MCNC: NEGATIVE MG/ML
PH UR: 5.5 [PH] (ref 5–8)
PROT UR STRIP-MCNC: NEGATIVE MG/DL
RBC # UR STRIP: NEGATIVE /UL
SP GR UR: 1.01 (ref 1–1.03)
UROBILINOGEN UR QL: NORMAL

## 2017-06-26 PROCEDURE — 81003 URINALYSIS AUTO W/O SCOPE: CPT | Performed by: NURSE PRACTITIONER

## 2017-06-26 PROCEDURE — 99213 OFFICE O/P EST LOW 20 MIN: CPT | Performed by: NURSE PRACTITIONER

## 2017-06-26 RX ORDER — PHENAZOPYRIDINE HYDROCHLORIDE 200 MG/1
200 TABLET, FILM COATED ORAL 3 TIMES DAILY PRN
Qty: 15 TABLET | Refills: 0 | Status: SHIPPED | OUTPATIENT
Start: 2017-06-26 | End: 2017-07-07 | Stop reason: SINTOL

## 2017-06-26 RX ORDER — SULFAMETHOXAZOLE AND TRIMETHOPRIM 800; 160 MG/1; MG/1
1 TABLET ORAL 2 TIMES DAILY
Qty: 14 TABLET | Refills: 0 | Status: SHIPPED | OUTPATIENT
Start: 2017-06-26 | End: 2017-06-28 | Stop reason: SINTOL

## 2017-06-27 ENCOUNTER — CLINICAL SUPPORT (OUTPATIENT)
Dept: OTHER | Facility: HOSPITAL | Age: 77
End: 2017-06-27

## 2017-06-27 DIAGNOSIS — Z85.3 PERSONAL HISTORY OF MALIGNANT NEOPLASM OF BREAST: ICD-10-CM

## 2017-06-27 DIAGNOSIS — C50.919 MALIGNANT NEOPLASM OF OTHER SPECIFIED SITES OF FEMALE BREAST: Primary | ICD-10-CM

## 2017-06-27 PROCEDURE — G0463 HOSPITAL OUTPT CLINIC VISIT: HCPCS

## 2017-06-27 NOTE — PROGRESS NOTES
Pt. Here for genetic testing. She will be seen at [facility] tomorrow and will have labs drawn. We will send 2 lavender tubes to be drawn at [facility] and then send to [facility] lab.

## 2017-06-28 ENCOUNTER — INFUSION (OUTPATIENT)
Dept: ONCOLOGY | Facility: HOSPITAL | Age: 77
End: 2017-06-28

## 2017-06-28 ENCOUNTER — OFFICE VISIT (OUTPATIENT)
Dept: ONCOLOGY | Facility: CLINIC | Age: 77
End: 2017-06-28

## 2017-06-28 VITALS
BODY MASS INDEX: 26.61 KG/M2 | SYSTOLIC BLOOD PRESSURE: 130 MMHG | HEIGHT: 62 IN | TEMPERATURE: 98.2 F | WEIGHT: 144.6 LBS | HEART RATE: 80 BPM | DIASTOLIC BLOOD PRESSURE: 70 MMHG | RESPIRATION RATE: 16 BRPM

## 2017-06-28 DIAGNOSIS — R30.0 DYSURIA: Primary | ICD-10-CM

## 2017-06-28 DIAGNOSIS — T45.1X5A PERIPHERAL NEUROPATHY DUE TO CHEMOTHERAPY (HCC): ICD-10-CM

## 2017-06-28 DIAGNOSIS — I10 ESSENTIAL HYPERTENSION: ICD-10-CM

## 2017-06-28 DIAGNOSIS — Z86.79 HISTORY OF CARDIAC ARRHYTHMIA: ICD-10-CM

## 2017-06-28 DIAGNOSIS — C50.111 MALIGNANT NEOPLASM OF CENTRAL PORTION OF RIGHT FEMALE BREAST (HCC): ICD-10-CM

## 2017-06-28 DIAGNOSIS — E87.6 HYPOKALEMIA: ICD-10-CM

## 2017-06-28 DIAGNOSIS — D69.6 THROMBOCYTOPENIA (HCC): ICD-10-CM

## 2017-06-28 DIAGNOSIS — T78.40XD HYPERSENSITIVITY REACTION, SUBSEQUENT ENCOUNTER: ICD-10-CM

## 2017-06-28 DIAGNOSIS — D72.818 OTHER DECREASED WHITE BLOOD CELL (WBC) COUNT: ICD-10-CM

## 2017-06-28 DIAGNOSIS — T78.40XD HYPERSENSITIVITY REACTION, SUBSEQUENT ENCOUNTER: Primary | ICD-10-CM

## 2017-06-28 DIAGNOSIS — E86.0 DEHYDRATION: ICD-10-CM

## 2017-06-28 DIAGNOSIS — F19.982 DRUG INDUCED INSOMNIA (HCC): ICD-10-CM

## 2017-06-28 DIAGNOSIS — C50.811 MALIGNANT NEOPLASM OF OVERLAPPING SITES OF RIGHT FEMALE BREAST (HCC): ICD-10-CM

## 2017-06-28 DIAGNOSIS — D64.9 ANEMIA, UNSPECIFIED TYPE: Primary | ICD-10-CM

## 2017-06-28 DIAGNOSIS — R11.0 CHEMOTHERAPY-INDUCED NAUSEA: ICD-10-CM

## 2017-06-28 DIAGNOSIS — G62.0 PERIPHERAL NEUROPATHY DUE TO CHEMOTHERAPY (HCC): ICD-10-CM

## 2017-06-28 DIAGNOSIS — T45.1X5A CHEMOTHERAPY-INDUCED NAUSEA: ICD-10-CM

## 2017-06-28 LAB
ALBUMIN SERPL-MCNC: 4 G/DL (ref 3.5–5.2)
ALBUMIN/GLOB SERPL: 1.5 G/DL (ref 1.1–2.4)
ALP SERPL-CCNC: 50 U/L (ref 38–116)
ALT SERPL W P-5'-P-CCNC: 23 U/L (ref 0–33)
ANION GAP SERPL CALCULATED.3IONS-SCNC: 15.7 MMOL/L
AST SERPL-CCNC: 23 U/L (ref 0–32)
BASOPHILS # BLD AUTO: 0.02 10*3/MM3 (ref 0–0.1)
BASOPHILS NFR BLD AUTO: 0.4 % (ref 0–1.1)
BILIRUB SERPL-MCNC: 0.5 MG/DL (ref 0.1–1.2)
BUN BLD-MCNC: 12 MG/DL (ref 6–20)
BUN/CREAT SERPL: 11.5 (ref 7.3–30)
CALCIUM SPEC-SCNC: 9.2 MG/DL (ref 8.5–10.2)
CHLORIDE SERPL-SCNC: 98 MMOL/L (ref 98–107)
CO2 SERPL-SCNC: 23.3 MMOL/L (ref 22–29)
CREAT BLD-MCNC: 1.04 MG/DL (ref 0.6–1.1)
DEPRECATED RDW RBC AUTO: 46.2 FL (ref 37–49)
EOSINOPHIL # BLD AUTO: 0.01 10*3/MM3 (ref 0–0.36)
EOSINOPHIL NFR BLD AUTO: 0.2 % (ref 1–5)
ERYTHROCYTE [DISTWIDTH] IN BLOOD BY AUTOMATED COUNT: 14.2 % (ref 11.7–14.5)
GFR SERPL CREATININE-BSD FRML MDRD: 52 ML/MIN/1.73
GLOBULIN UR ELPH-MCNC: 2.7 GM/DL (ref 1.8–3.5)
GLUCOSE BLD-MCNC: 103 MG/DL (ref 74–124)
HCT VFR BLD AUTO: 29.3 % (ref 34–45)
HGB BLD-MCNC: 9.5 G/DL (ref 11.5–14.9)
HOLD SPECIMEN: NORMAL
IMM GRANULOCYTES # BLD: 0.33 10*3/MM3 (ref 0–0.03)
IMM GRANULOCYTES NFR BLD: 7.2 % (ref 0–0.5)
LYMPHOCYTES # BLD AUTO: 0.99 10*3/MM3 (ref 1–3.5)
LYMPHOCYTES NFR BLD AUTO: 21.5 % (ref 20–49)
MCH RBC QN AUTO: 30.8 PG (ref 27–33)
MCHC RBC AUTO-ENTMCNC: 32.4 G/DL (ref 32–35)
MCV RBC AUTO: 95.1 FL (ref 83–97)
MONOCYTES # BLD AUTO: 0.48 10*3/MM3 (ref 0.25–0.8)
MONOCYTES NFR BLD AUTO: 10.4 % (ref 4–12)
NEUTROPHILS # BLD AUTO: 2.77 10*3/MM3 (ref 1.5–7)
NEUTROPHILS NFR BLD AUTO: 60.3 % (ref 39–75)
NRBC BLD MANUAL-RTO: 0.4 /100 WBC (ref 0–0)
PLATELET # BLD AUTO: 138 10*3/MM3 (ref 150–375)
PMV BLD AUTO: 10.5 FL (ref 8.9–12.1)
POTASSIUM BLD-SCNC: 3.6 MMOL/L (ref 3.5–4.7)
PROT SERPL-MCNC: 6.7 G/DL (ref 6.3–8)
RBC # BLD AUTO: 3.08 10*6/MM3 (ref 3.9–5)
SODIUM BLD-SCNC: 137 MMOL/L (ref 134–145)
WBC NRBC COR # BLD: 4.6 10*3/MM3 (ref 4–10)

## 2017-06-28 PROCEDURE — 25010000002 DEXAMETHASONE PER 1 MG: Performed by: INTERNAL MEDICINE

## 2017-06-28 PROCEDURE — 25010000002 HYDROCORTISONE SODIUM SUCCINATE 100 MG RECONSTITUTED SOLUTION: Performed by: INTERNAL MEDICINE

## 2017-06-28 PROCEDURE — 99215 OFFICE O/P EST HI 40 MIN: CPT | Performed by: INTERNAL MEDICINE

## 2017-06-28 PROCEDURE — 25010000002 TRASTUZUMAB PER 10 MG: Performed by: INTERNAL MEDICINE

## 2017-06-28 PROCEDURE — 25010000002 PACLITAXEL PER 30 MG: Performed by: INTERNAL MEDICINE

## 2017-06-28 PROCEDURE — 80053 COMPREHEN METABOLIC PANEL: CPT

## 2017-06-28 PROCEDURE — 85025 COMPLETE CBC W/AUTO DIFF WBC: CPT

## 2017-06-28 PROCEDURE — 25010000002 PALONOSETRON PER 25 MCG: Performed by: INTERNAL MEDICINE

## 2017-06-28 PROCEDURE — 96375 TX/PRO/DX INJ NEW DRUG ADDON: CPT | Performed by: INTERNAL MEDICINE

## 2017-06-28 PROCEDURE — 96413 CHEMO IV INFUSION 1 HR: CPT | Performed by: INTERNAL MEDICINE

## 2017-06-28 PROCEDURE — 96417 CHEMO IV INFUS EACH ADDL SEQ: CPT | Performed by: INTERNAL MEDICINE

## 2017-06-28 RX ORDER — FAMOTIDINE 10 MG/ML
20 INJECTION, SOLUTION INTRAVENOUS ONCE
Status: CANCELLED | OUTPATIENT
Start: 2017-07-12

## 2017-06-28 RX ORDER — FAMOTIDINE 10 MG/ML
20 INJECTION, SOLUTION INTRAVENOUS ONCE
Status: CANCELLED | OUTPATIENT
Start: 2017-07-05

## 2017-06-28 RX ORDER — AMOXICILLIN AND CLAVULANATE POTASSIUM 500; 125 MG/1; MG/1
1 TABLET, FILM COATED ORAL 2 TIMES DAILY
Qty: 10 TABLET | Refills: 0 | Status: SHIPPED | OUTPATIENT
Start: 2017-06-28 | End: 2017-07-07

## 2017-06-28 RX ORDER — PALONOSETRON 0.05 MG/ML
0.25 INJECTION, SOLUTION INTRAVENOUS ONCE
Status: COMPLETED | OUTPATIENT
Start: 2017-06-28 | End: 2017-06-28

## 2017-06-28 RX ORDER — HYDROXYZINE PAMOATE 25 MG/1
50 CAPSULE ORAL ONCE
Status: CANCELLED
Start: 2017-07-05 | End: 2017-07-05

## 2017-06-28 RX ORDER — PALONOSETRON 0.05 MG/ML
0.25 INJECTION, SOLUTION INTRAVENOUS ONCE
Status: CANCELLED | OUTPATIENT
Start: 2017-07-05

## 2017-06-28 RX ORDER — HYDROXYZINE PAMOATE 25 MG/1
50 CAPSULE ORAL ONCE
Status: CANCELLED
Start: 2017-07-19 | End: 2017-07-19

## 2017-06-28 RX ORDER — SODIUM CHLORIDE 9 MG/ML
250 INJECTION, SOLUTION INTRAVENOUS ONCE
Status: CANCELLED | OUTPATIENT
Start: 2017-07-19

## 2017-06-28 RX ORDER — PALONOSETRON 0.05 MG/ML
0.25 INJECTION, SOLUTION INTRAVENOUS ONCE
Status: CANCELLED | OUTPATIENT
Start: 2017-07-19

## 2017-06-28 RX ORDER — FAMOTIDINE 10 MG/ML
20 INJECTION, SOLUTION INTRAVENOUS ONCE
Status: COMPLETED | OUTPATIENT
Start: 2017-06-28 | End: 2017-06-28

## 2017-06-28 RX ORDER — HYDROXYZINE PAMOATE 25 MG/1
50 CAPSULE ORAL ONCE
Status: COMPLETED | OUTPATIENT
Start: 2017-06-28 | End: 2017-06-28

## 2017-06-28 RX ORDER — SODIUM CHLORIDE 9 MG/ML
250 INJECTION, SOLUTION INTRAVENOUS ONCE
Status: CANCELLED | OUTPATIENT
Start: 2017-07-12

## 2017-06-28 RX ORDER — PALONOSETRON 0.05 MG/ML
0.25 INJECTION, SOLUTION INTRAVENOUS ONCE
Status: CANCELLED | OUTPATIENT
Start: 2017-07-12

## 2017-06-28 RX ORDER — FAMOTIDINE 10 MG/ML
20 INJECTION, SOLUTION INTRAVENOUS ONCE
Status: CANCELLED | OUTPATIENT
Start: 2017-07-19

## 2017-06-28 RX ORDER — SODIUM CHLORIDE 9 MG/ML
250 INJECTION, SOLUTION INTRAVENOUS ONCE
Status: CANCELLED | OUTPATIENT
Start: 2017-07-05

## 2017-06-28 RX ORDER — SODIUM CHLORIDE 9 MG/ML
250 INJECTION, SOLUTION INTRAVENOUS ONCE
Status: COMPLETED | OUTPATIENT
Start: 2017-06-28 | End: 2017-06-28

## 2017-06-28 RX ORDER — ZOLPIDEM TARTRATE 10 MG/1
10 TABLET ORAL NIGHTLY PRN
Qty: 30 TABLET | Refills: 3 | Status: SHIPPED | OUTPATIENT
Start: 2017-06-28 | End: 2017-10-24 | Stop reason: SDUPTHER

## 2017-06-28 RX ORDER — HYDROXYZINE PAMOATE 25 MG/1
50 CAPSULE ORAL ONCE
Status: CANCELLED
Start: 2017-07-12 | End: 2017-07-12

## 2017-06-28 RX ADMIN — HYDROCORTISONE SODIUM SUCCINATE 100 MG: 100 INJECTION, POWDER, FOR SOLUTION INTRAMUSCULAR; INTRAVENOUS at 11:29

## 2017-06-28 RX ADMIN — SODIUM CHLORIDE 250 ML: 900 INJECTION, SOLUTION INTRAVENOUS at 11:10

## 2017-06-28 RX ADMIN — FAMOTIDINE 20 MG: 10 INJECTION, SOLUTION INTRAVENOUS at 11:31

## 2017-06-28 RX ADMIN — PACLITAXEL 135 MG: 6 INJECTION, SOLUTION INTRAVENOUS at 14:03

## 2017-06-28 RX ADMIN — TRASTUZUMAB 140 MG: 150 INJECTION, POWDER, LYOPHILIZED, FOR SOLUTION INTRAVENOUS at 12:52

## 2017-06-28 RX ADMIN — HYDROXYZINE PAMOATE 50 MG: 25 CAPSULE ORAL at 11:28

## 2017-06-28 RX ADMIN — PALONOSETRON HYDROCHLORIDE 0.25 MG: 0.25 INJECTION INTRAVENOUS at 11:29

## 2017-06-28 RX ADMIN — DEXAMETHASONE SODIUM PHOSPHATE 12 MG: 10 INJECTION INTRAMUSCULAR; INTRAVENOUS at 11:33

## 2017-06-28 NOTE — PROGRESS NOTES
1205  Pt stated that she was shivering and shaking but did not feel cold.  Pt was physically shaking mildly.  Pt had completed her premeds and was during a 30 minute wait with normal saline infusing of which she had 10 minutes remaining.  Pt had not received any chemo and explained this to pt and pt stated that she thought the chemo was going.  Temp 98.3, /84  HR 91.  Reviewed information with Kallie GRANT and orders received to wait an additional 15 -30 minutes and reassess pt.     Placed warm blanket on pt and pt also had friend with her.  This did help pt a little.    1252  Pt was not shivering or shaking and pt stated that she felt better.  Pt does have history of anxiety and thought it may be anxiety.  Herceptin started and pt tolerating medication with no problems at this time.      Pt completed treatment and tolerated well with no further problems noted.

## 2017-06-28 NOTE — PROGRESS NOTES
REASON FOR FOLLOWUP:   1. Newly diagnosed large right breast cancer.  Core needle biopsy reported invasive mammary carcinoma with focal lobular feature, grade 2.  ER negative, less than 1%; UT positive, moderate in staining, 5% to 10%. HER2 IHC 2+, FISH study positive 2.1.   2.  CT scan for chest abdomen and pelvis and breast MRI examination reported right axillary lymph node suspicious for metastatic disease.  No remote metastatic lesion.  Patient has stage IIIa (T3 N2 M0) disease.   3.  Patient was started neoadjuvant chemotherapy on 5/23/2017 with Taxol weekly plus Herceptin weekly for total 12 weeks, and Perjata every 3 weeks during chemotherapy.  Herceptin will be converted to every 3 weeks after chemotherapy finished.    4.  Chronic moderate thrombocytopenia, mild anemia, and intermittent mild neutropenia etiologies are not clear.  5.  Reaction to Herceptin C1D1.  Subsequently tolerated therapy with hydrocortisone as premedication.     HISTORY OF PRESENT ILLNESS:    The patient is a pleasant 76 y.o. with the above-mentioned history, who presents today in anticipation of cycle 2 day 15 of Taxol and Herceptin.  Patient reports that she has nausea, but no vomiting after chemotherapy.  She's been using Compazine and Zofran with efficacy.  Patient complains of fatigue after chemotherapy, usually starting from day #3 through the next several days.  He denies oral mucositis.  No diarrhea no constipation no chest pain no dyspnea.  No lower extremity edema.    Patient reported she started having dysuria started a couple of days ago.  She went to primary care provider 2 days ago, and was started on Bactrim.  She reported worsening nausea after starting Bactrim.  She continues to have some dysuria today.  She denies fever sweating chills.     She continues to have some mild tingling and numbness in her lower feet but not in her hands.  She has not had falls.  She can button her shirts.  She is taking B12 by mouth.   Otherwise, she is tolerating chemotherapy reasonably well.  She does have difficulty with sleep and Ambien 10 mg daily at bedtime seems help much better compared to 5 mg dose.  She request refill of her Ambien.        Past Medical History:   Diagnosis Date   • Acute bronchitis    • Anemia    • Breast cancer     Left   • Breast cancer 2017    Right   • Chronic pain    • Conjunctivitis    • Cough    • Ductal carcinoma in situ (DCIS) of left breast    • Edema     Chronic lower extremity edema   • GERD (gastroesophageal reflux disease)    • H/O Bowel obstruction    • H/O jaundice    • Hernia    • History of infectious mononucleosis    • History of migraine headaches    • Hyperlipidemia    • Hypertension    • Impacted cerumen of left ear    • Leukocytopenia    • Leukopenia    • Meningioma    • Peptic ulceration    • Perioral dermatitis    • SBO (small bowel obstruction)     due to hernia with surgical repair in 2011   • SOB (shortness of breath) on exertion    • Thrombocytopenia    • Vertigo    Normal echocardiogram on 2017, LVEF 68%.    Past Surgical History:   Procedure Laterality Date   • APPENDECTOMY     • BLADDER SURGERY      Bladder repair   • BREAST BIOPSY Left    • BREAST BIOPSY  2017   • BREAST SURGERY     • CHOLECYSTECTOMY     • GALLBLADDER SURGERY     • HERNIA REPAIR     • HYSTERECTOMY     • MASTECTOMY Left     and sentinel lymph node biospy at [facility]   • [place] INSJ TUNNELED CVC W/O SUBQ PORT/ AGE 5 YR/> Left 2017    Procedure: INSERTION VENOUS ACCESS DEVICE;  Surgeon: Christian Melo MD;  Location: [facility];  Service: General   • REDUCTION MAMMAPLASTY Right     to match Lt. TRAM flap   • TONSILLECTOMY     Lico catheter placement on 2017 by Dr. Melo.     OB/GYN history: Menarche age 16, menopause at . , 1 miscarriage. No birth control pill use. She did have post menopause hormonal supplementation.      HEMATOLOGIC/ONCOLOGIC  HISTORY: The patient is a 76 y.o. year old female whom we are consulted for a newly self discovered right breast mass, in April 2017. Patient had ultrasound-guided biopsy on 5/3/2017 and confirmed to be invasive mammary carcinoma with focal lobular features, grade 2 Elen score 6/9. She is here for initial evaluation for management.      Patient is a 76-year-old  female who was seen here previously for chronic mild-to-moderate thrombocytopenia and mild anemia. Recently the patient reports she started having firmness of the right breast that started sometime in April or maybe even in March. She noticed firmness spread from about around the 12 o'clock position, gradually towards the upper lateral side and lower part of the right breast associated mild pain. The patient thought it was related to fibrocystic changes. However, the symptom was getting worse. Patient also reported dented nipple after she started having pain in the right breast. She denies discharge from the nipple. She called her primary care physician, Dr. Martin, who ordered a mammogram study. This was done on 04/12/2017 with architectural distortion of the right breast centrally at 12 o'clock position and had scattered fibroglandular densities throughout the right breast.      The patient subsequently had right breast ultrasound examination on 04/26/2017. Discovered a large right breast mass measuring 5.8 x 3.5 x 3.9 cm. Patient subsequently had ultrasound-guided right breast biopsy on 05/03/2017. Pathology evaluation reported invasive mammary carcinoma with focal lobular feature. Elen score 6/9, overall grade 2. Sample was further sent to the [facility] for test. ER negative, less than 1%; VT positive, moderate in staining, 5% to 10%. HER2 IHC 2+, but FISH study positive 2.1.     She denies weight loss, she actually eats very well. No nausea vomiting. Patient does complain of insomnia, unable to sleep.      This  patient has history of left breast DCIS back in 2007, had mastectomy at the [facility]. No hormonal therapy afterwards according to patient. This patient also had a small meningioma, followed by Dr. Smith in [facility], with most recent MRI of the brain in March 2017, with 18 mm meningioma, and followed on an annual basis.     Her neutrophil count on 5/16/17 is normal at 2500, however, records showed starting from 05/2015 and in 09/2016, 01/2017 and 03/2017, all 4 laboratory studies showed mild neutropenia with ANC fluctuating between 1200 and 1600. Etiology is not clear.    Normal echocardiogram on 5/18/2017, LVEF 68%.      CT scan for chest abdomen and pelvis on 5/22/2017 and breast MRI examination on 5/22/2017 reported small right axillary lymph node suspicious for metastatic disease.  No remote metastatic lesion.  Patient has stage IIIa (T3 N2 M0) disease.      Patient will start neoadjuvant chemotherapy with Taxol weekly plus Herceptin weekly for total 12 weeks, and Perjeta every 3 weeks (3-week cycle) during chemotherapy.  Herceptin will be converted to every 3 weeks after chemotherapy finished.  Cycle 1 day 1 5/23/2017.     MEDICATIONS: The current medication list was reviewed with the patient and updated in the EMR this date per the Medical Assistant. Medication dosages and frequencies were confirmed to be accurate.       ALLERGIES:    Allergies   Allergen Reactions   • Codeine Nausea And Vomiting   • Morphine Nausea And Vomiting     SOCIAL HISTORY:   Social History   Substance Use Topics   • Smoking status: Former Smoker     Packs/day: 2.00     Years: 30.00     Types: Cigarettes   • Smokeless tobacco: Former User      Comment: caffeine use   • Alcohol use No      Comment: stopped, heavy in past     FAMILY HISTORY:  Family History   Problem Relation Age of Onset   • Heart disease Mother    • Aortic aneurysm Mother      abdominal   • Coronary artery disease Mother    •  "Hypertension Mother    • Heart disease Father    • Heart attack Father    • Hypertension Father    • Coronary artery disease Brother    • Hypertension Brother    • Heart disease Brother    • Breast cancer Daughter 45     I have reviewed the patient's medical history in detail and updated the computerized patient record.    REVIEW OF SYSTEMS:  GENERAL: See history of present illness. No change in appetite or weight;   No fevers, chills, sweats.   SKIN: No nonhealing lesions. No rashes.   HEME/LYMPH: No easy bruising, bleeding. No swollen nodes.   EYES: No vision changes or diplopia.   ENT: No tinnitus, hearing loss, gum bleeding, epistaxis, hoarseness or dysphagia.   RESPIRATORY: No cough, Has exertional dyspnea, No hemoptysis or wheezing.   CVS: No chest pain, palpitations, orthopnea, dyspnea on exertion or PND.   GI: See HPI.   : No lower tract obstructive symptoms, dysuria or hematuria.   MUSCULOSKELETAL: Chronic or joint pain, arthritis.  Has mild intermittent ankle swelling.   NEUROLOGICAL:No global weakness, loss of consciousness or seizures.   PSYCHIATRIC: Somewhat anxious today; no mood changes or depression.      Objective:   Vitals:    06/28/17 1005   BP: 130/70   Pulse: 80   Resp: 16   Temp: 98.2 °F (36.8 °C)   Weight: 144 lb 9.6 oz (65.6 kg)   Height: 62.01\" (157.5 cm)   PainSc: 7  Comment: pain all over   ECOG 0      PHYSICAL EXAM:   GENERAL: Well-developed, well-nourished female, in no acute distress.   SKIN: Warm, dry without rashes, purpura or petechiae.  Left upper chest Lico catheter in place, no evidence of infection.   EYES: Pupils equal, round and reactive to light. EOMs intact. Conjunctivae normal.  EARS: Hearing intact.  NOSE:  No excoriations or nasal discharge.  MOUTH: Tongue is well-papillated; no stomatitis or ulcers. Lips normal.  THROAT: Oropharynx without lesions or exudates.  LYMPHATICS: No cervical, supraclavicular, axillary adenopathy.  CHEST: Lungs clear to auscultation. Good " airflow.   Breast: Right breast: There is significant decrease in the size of the right breast mass.  Previously it was quoted to be 7×7cm.  Today from the superior to inferior dimension it measures less than 5 cm.  Difficult to appreciate from medial to lateral position  The nipple retraction is resolved.  There is no right axillary and supraclavicular adenopathy.  Left breast is status post mastectomy with reconstruction.  CARDIAC: Regular rate and rhythm without murmurs. Normal S1,S2.  ABDOMEN: Slightly distended, normal active bowel sounds,  no hepatosplenomegaly or masses.  EXTREMITIES: No clubbing, cyanosis or edema.  NEUROLOGICAL: Cranial Nerves II-XII grossly intact. No focal neurological deficits.  PSYCHIATRIC: Normal affect and mood.      RECENT LABS:  Lab Results   Component Value Date    WBC 4.60 06/28/2017    HGB 9.5 (L) 06/28/2017    HCT 29.3 (L) 06/28/2017    MCV 95.1 06/28/2017     (L) 06/28/2017     Lab Results   Component Value Date    NEUTROABS 2.77 06/28/2017     Lab Results   Component Value Date    GLUCOSE 103 06/28/2017    BUN 12 06/28/2017    CREATININE 1.04 06/28/2017    EGFRIFNONA 52 (L) 06/28/2017    EGFRIFAFRI 82 03/27/2017    BCR 11.5 06/28/2017    K 3.6 06/28/2017    CO2 23.3 06/28/2017    CALCIUM 9.2 06/28/2017    PROTENTOTREF 6.5 03/27/2017    ALBUMIN 4.00 06/28/2017    LABIL2 1.5 06/28/2017    AST 23 06/28/2017    ALT 23 06/28/2017       Assessment/Plan   1. Large right breast cancer, locally advanced, stage IIIa (T3 N1/ 2 M0).  ER negative, less than 1%; VA positive, moderate in staining, 5% to 10%. HER2 IHC 2+, FISH study positive 2.1.  Physical examination showed a large mass, 7 cm x 7 cm initially which has decreased to less than 5 cm today.  Patient is here for her cycle 2 day 15 treatment of Taxol/Herceptin.  She tolerated therapy relatively well.  Clinically patient has had significant response to treatment.      2. Chronic mild-to-moderate thrombocytopenia. Her  platelets has been relatively stable with some fluctuation.  Continue to monitor counts closely.     3.  Mild anemia, pre-existing, however worsened secondary to chemotherapy.  She is asymptomatic.  We'll continue for monitoring.   Anemia labs checked on May 23, 2017 show no evidence of iron, folate, B12 deficiency.      4.  History of mild leukocytopenia/neutropenia.  She has no neutropenia now.  Her counts seems to improved.  We'll continue to monitor.       5. Insomnia.  Her sleeping is better on 10 mg of Ambien.  She requested refilliing medication.  I did give her a new prescription.      6.  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    7.  Strong family history of breast cancer with daughter having breast cancer at age 40 and patient having had DCIS 10 years ago in the left rest requiring mastectomy.  She was referred to genetics counseling.     8.  Dysuria.  Her urinalysis 2 days ago was negative for bacteria or WBC.  She reports started Bactrim 2 days ago, however she has worsening nausea associated with that.  Discussed with patient, I will switch her to Augmentin 500 mg twice a day for 5 days.    PLAN:   1. Proceed with C2 D15  Taxol and Herceptin.    2.  Return in 1 week, with M.D./NP evaluation and lab works, prior to cycle #3 day 1 chemotherapy with Taxol Herceptin and Perjeta.   3.  Start Augmentin twice a day for 5 days, for emperic treatment of urinary tract infection.  I e- scribed to her pharmacy.  Discontinue Bactrim.  4.  Continue supportive care including bowel regimen, and antiemetics as needed and soluCortef and Vistaril prior to Herceptin  5.  Continue Ambien 10 mg daily at bedtime for insomnia.  I provided her new prescription today.   6.  Continue pyridoxine  and 50 mg daily for neuropathy      Jamal Patel MD PhD  06/28/2017      CC: Dr Christian Melo M.D.

## 2017-06-29 PROBLEM — T45.1X5A PERIPHERAL NEUROPATHY DUE TO CHEMOTHERAPY: Status: ACTIVE | Noted: 2017-06-29

## 2017-06-29 PROBLEM — R11.0 CHEMOTHERAPY-INDUCED NAUSEA: Status: ACTIVE | Noted: 2017-06-29

## 2017-06-29 PROBLEM — G62.0 PERIPHERAL NEUROPATHY DUE TO CHEMOTHERAPY: Status: ACTIVE | Noted: 2017-06-29

## 2017-06-29 PROBLEM — T45.1X5A CHEMOTHERAPY-INDUCED NAUSEA: Status: ACTIVE | Noted: 2017-06-29

## 2017-06-29 PROBLEM — F19.982 DRUG INDUCED INSOMNIA: Status: ACTIVE | Noted: 2017-05-23

## 2017-06-29 LAB
BACTERIA UR CULT: NORMAL
BACTERIA UR CULT: NORMAL

## 2017-06-30 ENCOUNTER — TELEPHONE (OUTPATIENT)
Dept: FAMILY MEDICINE CLINIC | Facility: CLINIC | Age: 77
End: 2017-06-30

## 2017-06-30 NOTE — TELEPHONE ENCOUNTER
SPOKE TO PT AND PASSED THE MESSAGE ONTO PT PER JOSE, PT STATES WENT TO CHEMO , BECAUSE COULDN'T TOLERATE ANTIBIOTIC WE GAVE HER AMOXICILLIN. PT UNDERSTAND.

## 2017-06-30 NOTE — TELEPHONE ENCOUNTER
Please call her and let her know that there was no significant amount of growth in her urine  She can finish the antibiotic and take azo  If she is worse or doesn't feel better please have her follow up with her PCP

## 2017-07-05 ENCOUNTER — INFUSION (OUTPATIENT)
Dept: ONCOLOGY | Facility: HOSPITAL | Age: 77
End: 2017-07-05

## 2017-07-05 ENCOUNTER — OFFICE VISIT (OUTPATIENT)
Dept: ONCOLOGY | Facility: CLINIC | Age: 77
End: 2017-07-05

## 2017-07-05 VITALS
TEMPERATURE: 98.4 F | SYSTOLIC BLOOD PRESSURE: 120 MMHG | BODY MASS INDEX: 26.68 KG/M2 | WEIGHT: 145 LBS | RESPIRATION RATE: 12 BRPM | HEIGHT: 62 IN | DIASTOLIC BLOOD PRESSURE: 69 MMHG | HEART RATE: 81 BPM | OXYGEN SATURATION: 97 %

## 2017-07-05 VITALS — SYSTOLIC BLOOD PRESSURE: 130 MMHG | DIASTOLIC BLOOD PRESSURE: 75 MMHG

## 2017-07-05 DIAGNOSIS — T45.1X5A ANEMIA DUE TO CHEMOTHERAPY: Primary | ICD-10-CM

## 2017-07-05 DIAGNOSIS — T78.40XD HYPERSENSITIVITY REACTION, SUBSEQUENT ENCOUNTER: Primary | ICD-10-CM

## 2017-07-05 DIAGNOSIS — Z86.79 HISTORY OF CARDIAC ARRHYTHMIA: ICD-10-CM

## 2017-07-05 DIAGNOSIS — C50.111 MALIGNANT NEOPLASM OF CENTRAL PORTION OF RIGHT FEMALE BREAST (HCC): ICD-10-CM

## 2017-07-05 DIAGNOSIS — E86.0 DEHYDRATION: ICD-10-CM

## 2017-07-05 DIAGNOSIS — E87.6 HYPOKALEMIA: ICD-10-CM

## 2017-07-05 DIAGNOSIS — D64.81 ANEMIA DUE TO CHEMOTHERAPY: Primary | ICD-10-CM

## 2017-07-05 DIAGNOSIS — T78.40XD HYPERSENSITIVITY REACTION, SUBSEQUENT ENCOUNTER: ICD-10-CM

## 2017-07-05 LAB
ALBUMIN SERPL-MCNC: 3.8 G/DL (ref 3.5–5.2)
ALBUMIN/GLOB SERPL: 1.5 G/DL
ALP SERPL-CCNC: 49 U/L (ref 39–117)
ALT SERPL W P-5'-P-CCNC: 22 U/L (ref 1–33)
ANION GAP SERPL CALCULATED.3IONS-SCNC: 11.4 MMOL/L
AST SERPL-CCNC: 20 U/L (ref 1–32)
BILIRUB SERPL-MCNC: 0.4 MG/DL (ref 0.1–1.2)
BUN BLD-MCNC: 15 MG/DL (ref 8–23)
BUN/CREAT SERPL: 16.9 (ref 7–25)
CALCIUM SPEC-SCNC: 9.3 MG/DL (ref 8.6–10.5)
CHLORIDE SERPL-SCNC: 99 MMOL/L (ref 98–107)
CO2 SERPL-SCNC: 25.6 MMOL/L (ref 22–29)
CREAT BLD-MCNC: 0.89 MG/DL (ref 0.57–1)
DEPRECATED RDW RBC AUTO: 48 FL (ref 37–54)
EOSINOPHIL # BLD MANUAL: 0.12 10*3/MM3 (ref 0–0.7)
EOSINOPHIL NFR BLD MANUAL: 2 % (ref 0–7)
ERYTHROCYTE [DISTWIDTH] IN BLOOD BY AUTOMATED COUNT: 15 % (ref 11.7–13)
GFR SERPL CREATININE-BSD FRML MDRD: 62 ML/MIN/1.73
GLOBULIN UR ELPH-MCNC: 2.6 GM/DL
GLUCOSE BLD-MCNC: 108 MG/DL (ref 65–99)
HCT VFR BLD AUTO: 24.1 % (ref 35.6–45.5)
HGB BLD-MCNC: 8 G/DL (ref 11.9–15.5)
LYMPHOCYTES # BLD MANUAL: 1.64 10*3/MM3 (ref 0.8–7)
LYMPHOCYTES NFR BLD MANUAL: 11 % (ref 0–12)
LYMPHOCYTES NFR BLD MANUAL: 27 % (ref 24–44)
MCH RBC QN AUTO: 31 PG (ref 26.9–32)
MCHC RBC AUTO-ENTMCNC: 33.2 G/DL (ref 32.4–36.3)
MCV RBC AUTO: 93.4 FL (ref 80.5–98.2)
MONOCYTES # BLD AUTO: 0.67 10*3/MM3 (ref 0–1)
NEUTROPHILS # BLD AUTO: 3.64 10*3/MM3 (ref 1.9–8.1)
NEUTROPHILS NFR BLD MANUAL: 60 % (ref 42.7–76)
PLAT MORPH BLD: NORMAL
PLATELET # BLD AUTO: 118 10*3/MM3 (ref 140–500)
PMV BLD AUTO: 10.6 FL (ref 6–12)
POTASSIUM BLD-SCNC: 3.4 MMOL/L (ref 3.5–5.2)
PROT SERPL-MCNC: 6.4 G/DL (ref 6–8.5)
RBC # BLD AUTO: 2.58 10*6/MM3 (ref 3.9–5.2)
RBC MORPH BLD: NORMAL
SCAN SLIDE: NORMAL
SODIUM BLD-SCNC: 136 MMOL/L (ref 136–145)
WBC MORPH BLD: NORMAL
WBC NRBC COR # BLD: 6.07 10*3/MM3 (ref 4.5–10.7)

## 2017-07-05 PROCEDURE — 25010000002 DEXAMETHASONE PER 1 MG: Performed by: INTERNAL MEDICINE

## 2017-07-05 PROCEDURE — 85025 COMPLETE CBC W/AUTO DIFF WBC: CPT | Performed by: INTERNAL MEDICINE

## 2017-07-05 PROCEDURE — 25010000002 PALONOSETRON PER 25 MCG: Performed by: INTERNAL MEDICINE

## 2017-07-05 PROCEDURE — 25010000002 HYDROCORTISONE SODIUM SUCCINATE 100 MG RECONSTITUTED SOLUTION: Performed by: INTERNAL MEDICINE

## 2017-07-05 PROCEDURE — 25010000002 TRASTUZUMAB PER 10 MG: Performed by: INTERNAL MEDICINE

## 2017-07-05 PROCEDURE — 36415 COLL VENOUS BLD VENIPUNCTURE: CPT | Performed by: INTERNAL MEDICINE

## 2017-07-05 PROCEDURE — 99214 OFFICE O/P EST MOD 30 MIN: CPT | Performed by: NURSE PRACTITIONER

## 2017-07-05 PROCEDURE — 80053 COMPREHEN METABOLIC PANEL: CPT | Performed by: INTERNAL MEDICINE

## 2017-07-05 PROCEDURE — 96375 TX/PRO/DX INJ NEW DRUG ADDON: CPT | Performed by: NURSE PRACTITIONER

## 2017-07-05 PROCEDURE — 96413 CHEMO IV INFUSION 1 HR: CPT | Performed by: NURSE PRACTITIONER

## 2017-07-05 PROCEDURE — 85007 BL SMEAR W/DIFF WBC COUNT: CPT | Performed by: INTERNAL MEDICINE

## 2017-07-05 PROCEDURE — 25010000002 PERTUZUMAB 420 MG/14ML SOLUTION 420 MG VIAL: Performed by: INTERNAL MEDICINE

## 2017-07-05 PROCEDURE — 25010000002 PACLITAXEL PER 30 MG: Performed by: INTERNAL MEDICINE

## 2017-07-05 PROCEDURE — 96417 CHEMO IV INFUS EACH ADDL SEQ: CPT | Performed by: NURSE PRACTITIONER

## 2017-07-05 RX ORDER — ACETAMINOPHEN 325 MG/1
650 TABLET ORAL ONCE
Status: CANCELLED | OUTPATIENT
Start: 2017-07-05 | End: 2017-07-05

## 2017-07-05 RX ORDER — HYDROXYZINE PAMOATE 25 MG/1
50 CAPSULE ORAL ONCE
Status: COMPLETED | OUTPATIENT
Start: 2017-07-05 | End: 2017-07-05

## 2017-07-05 RX ORDER — SODIUM CHLORIDE 9 MG/ML
250 INJECTION, SOLUTION INTRAVENOUS AS NEEDED
Status: CANCELLED | OUTPATIENT
Start: 2017-07-05

## 2017-07-05 RX ORDER — PALONOSETRON 0.05 MG/ML
0.25 INJECTION, SOLUTION INTRAVENOUS ONCE
Status: COMPLETED | OUTPATIENT
Start: 2017-07-05 | End: 2017-07-05

## 2017-07-05 RX ORDER — SODIUM CHLORIDE 9 MG/ML
250 INJECTION, SOLUTION INTRAVENOUS ONCE
Status: DISCONTINUED | OUTPATIENT
Start: 2017-07-05 | End: 2017-07-05 | Stop reason: HOSPADM

## 2017-07-05 RX ORDER — DIPHENHYDRAMINE HCL 25 MG
25 CAPSULE ORAL ONCE
Status: CANCELLED | OUTPATIENT
Start: 2017-07-05 | End: 2017-07-05

## 2017-07-05 RX ORDER — FAMOTIDINE 10 MG/ML
20 INJECTION, SOLUTION INTRAVENOUS ONCE
Status: COMPLETED | OUTPATIENT
Start: 2017-07-05 | End: 2017-07-05

## 2017-07-05 RX ORDER — LOPERAMIDE HYDROCHLORIDE 2 MG/1
4 CAPSULE ORAL ONCE
Status: COMPLETED | OUTPATIENT
Start: 2017-07-05 | End: 2017-07-05

## 2017-07-05 RX ADMIN — PACLITAXEL 135 MG: 6 INJECTION, SOLUTION INTRAVENOUS at 13:13

## 2017-07-05 RX ADMIN — PALONOSETRON HYDROCHLORIDE 0.25 MG: 0.25 INJECTION INTRAVENOUS at 10:50

## 2017-07-05 RX ADMIN — PERTUZUMAB 420 MG: 30 INJECTION, SOLUTION, CONCENTRATE INTRAVENOUS at 11:38

## 2017-07-05 RX ADMIN — HYDROXYZINE PAMOATE 50 MG: 25 CAPSULE ORAL at 10:49

## 2017-07-05 RX ADMIN — DEXAMETHASONE SODIUM PHOSPHATE 12 MG: 10 INJECTION INTRAMUSCULAR; INTRAVENOUS at 10:55

## 2017-07-05 RX ADMIN — HYDROCORTISONE SODIUM SUCCINATE 100 MG: 100 INJECTION, POWDER, FOR SOLUTION INTRAMUSCULAR; INTRAVENOUS at 10:54

## 2017-07-05 RX ADMIN — TRASTUZUMAB 140 MG: 150 INJECTION, POWDER, LYOPHILIZED, FOR SOLUTION INTRAVENOUS at 12:41

## 2017-07-05 RX ADMIN — FAMOTIDINE 20 MG: 10 INJECTION, SOLUTION INTRAVENOUS at 10:51

## 2017-07-05 RX ADMIN — LOPERAMIDE HYDROCHLORIDE 4 MG: 2 CAPSULE ORAL at 13:28

## 2017-07-05 NOTE — PROGRESS NOTES
REASON FOR FOLLOWUP:   1. Newly diagnosed large right breast cancer.  Core needle biopsy reported invasive mammary carcinoma with focal lobular feature, grade 2.  ER negative, less than 1%; NE positive, moderate in staining, 5% to 10%. HER2 IHC 2+, FISH study positive 2.1.   2.  CT scan for chest abdomen and pelvis and breast MRI examination reported right axillary lymph node suspicious for metastatic disease.  No remote metastatic lesion.  Patient has stage IIIa (T3 N2 M0) disease.   3.  Patient was started neoadjuvant chemotherapy on 5/23/2017 with Taxol weekly plus Herceptin weekly for total 12 weeks, and Perjata every 3 weeks during chemotherapy.  Herceptin will be converted to every 3 weeks after chemotherapy finished.    4.  Chronic moderate thrombocytopenia, mild anemia, and intermittent mild neutropenia etiologies are not clear.  5.  Reaction to Herceptin C1D1.  Subsequently tolerated therapy with hydrocortisone as premedication.     HISTORY OF PRESENT ILLNESS:    The patient is a pleasant 76 y.o. with the above-mentioned history, who presents today in anticipation of cycle 3 day 1  Of Taxol, Perjeta, Herceptin. She reports that she has nausea, but no vomiting after chemotherapy.  She's been using Compazine which provides her with relief but she notices no benefit with Zofran.  She continues to experience fatigue after chemotherapy, usually starting from day #3 through the next several days.  She denies oral mucositis.  No diarrhea no constipation no chest pain no dyspnea.  No lower extremity edema.    Her hgb is just 8 today. She denies heart palpitations or chest pain. She does note dyspnea with exertion. She denies bleeding.      Mild neuropathy of hands and feet is stable today.She is not dropping items.      She does have difficulty with sleep and Ambien 10 mg daily at bedtime seems help much better compared to 5 mg dose.  She does not need refills today.    Past Medical History:   Diagnosis Date   •  Acute bronchitis    • Anemia    • Breast cancer 2007    Left   • Breast cancer 2017    Right   • Chronic pain    • Conjunctivitis    • Cough    • Ductal carcinoma in situ (DCIS) of left breast    • Edema     Chronic lower extremity edema   • GERD (gastroesophageal reflux disease)    • H/O Bowel obstruction    • H/O jaundice    • Hernia    • History of infectious mononucleosis    • History of migraine headaches    • Hyperlipidemia    • Hypertension    • Impacted cerumen of left ear    • Leukocytopenia    • Leukopenia    • Meningioma    • Peptic ulceration    • Perioral dermatitis    • SBO (small bowel obstruction)     due to hernia with surgical repair in 2011   • SOB (shortness of breath) on exertion    • Thrombocytopenia    • Vertigo    Normal echocardiogram on 2017, LVEF 68%.    Past Surgical History:   Procedure Laterality Date   • APPENDECTOMY     • BLADDER SURGERY      Bladder repair   • BREAST BIOPSY Left    • BREAST BIOPSY  2017   • BREAST SURGERY     • CHOLECYSTECTOMY     • GALLBLADDER SURGERY     • HERNIA REPAIR     • HYSTERECTOMY     • MASTECTOMY Left     and sentinel lymph node biospy at [facility]   • MI INSJ TUNNELED CVC W/O SUBQ PORT/ AGE 5 YR/> Left 2017    Procedure: INSERTION VENOUS ACCESS DEVICE;  Surgeon: Christian Melo MD;  Location: [facility];  Service: General   • REDUCTION MAMMAPLASTY Right     to match Lt. TRAM flap   • TONSILLECTOMY     Lico catheter placement on 2017 by Dr. Melo.     OB/GYN history: Menarche age 16, menopause at 1985. , 1 miscarriage. No birth control pill use. She did have post menopause hormonal supplementation.      HEMATOLOGIC/ONCOLOGIC HISTORY: The patient is a 76 y.o. year old female whom we are consulted for a newly self discovered right breast mass, in 2017. Patient had ultrasound-guided biopsy on 5/3/2017 and confirmed to be invasive mammary carcinoma with focal lobular features, grade  2 North score 6/9. She is here for initial evaluation for management.      Patient is a 76-year-old  female who was seen here previously for chronic mild-to-moderate thrombocytopenia and mild anemia. Recently the patient reports she started having firmness of the right breast that started sometime in April or maybe even in March. She noticed firmness spread from about around the 12 o'clock position, gradually towards the upper lateral side and lower part of the right breast associated mild pain. The patient thought it was related to fibrocystic changes. However, the symptom was getting worse. Patient also reported dented nipple after she started having pain in the right breast. She denies discharge from the nipple. She called her primary care physician, Dr. Martin, who ordered a mammogram study. This was done on 04/12/2017 with architectural distortion of the right breast centrally at 12 o'clock position and had scattered fibroglandular densities throughout the right breast.      The patient subsequently had right breast ultrasound examination on 04/26/2017. Discovered a large right breast mass measuring 5.8 x 3.5 x 3.9 cm. Patient subsequently had ultrasound-guided right breast biopsy on 05/03/2017. Pathology evaluation reported invasive mammary carcinoma with focal lobular feature. North score 6/9, overall grade 2. Sample was further sent to the [facility] for test. ER negative, less than 1%; VA positive, moderate in staining, 5% to 10%. HER2 IHC 2+, but FISH study positive 2.1.     She denies weight loss, she actually eats very well. No nausea vomiting. Patient does complain of insomnia, unable to sleep.      This patient has history of left breast DCIS back in 2007, had mastectomy at the [facility]. No hormonal therapy afterwards according to patient. This patient also had a small meningioma, followed by Dr. Smith in [facility], with  most recent MRI of the brain in March 2017, with 18 mm meningioma, and followed on an annual basis.     Her neutrophil count on 5/16/17 is normal at 2500, however, records showed starting from 05/2015 and in 09/2016, 01/2017 and 03/2017, all 4 laboratory studies showed mild neutropenia with ANC fluctuating between 1200 and 1600. Etiology is not clear.    Normal echocardiogram on 5/18/2017, LVEF 68%.      CT scan for chest abdomen and pelvis on 5/22/2017 and breast MRI examination on 5/22/2017 reported small right axillary lymph node suspicious for metastatic disease.  No remote metastatic lesion.  Patient has stage IIIa (T3 N2 M0) disease.      Patient will start neoadjuvant chemotherapy with Taxol weekly plus Herceptin weekly for total 12 weeks, and Perjeta every 3 weeks (3-week cycle) during chemotherapy.  Herceptin will be converted to every 3 weeks after chemotherapy finished.  Cycle 1 day 1 5/23/2017.     MEDICATIONS: The current medication list was reviewed with the patient and updated in the EMR this date per the Medical Assistant. Medication dosages and frequencies were confirmed to be accurate.       ALLERGIES:    Allergies   Allergen Reactions   • Codeine Nausea And Vomiting   • Morphine Nausea And Vomiting     SOCIAL HISTORY:   Social History   Substance Use Topics   • Smoking status: Former Smoker     Packs/day: 2.00     Years: 30.00     Types: Cigarettes   • Smokeless tobacco: Former User      Comment: caffeine use   • Alcohol use No      Comment: stopped, heavy in past     FAMILY HISTORY:  Family History   Problem Relation Age of Onset   • Heart disease Mother    • Aortic aneurysm Mother      abdominal   • Coronary artery disease Mother    • Hypertension Mother    • Heart disease Father    • Heart attack Father    • Hypertension Father    • Coronary artery disease Brother    • Hypertension Brother    • Heart disease Brother    • Breast cancer Daughter 45     I have reviewed the patient's medical  "history in detail and updated the computerized patient record.    REVIEW OF SYSTEMS:  GENERAL: See history of present illness. No change in appetite or weight;   No fevers, chills, sweats.   SKIN: No nonhealing lesions. No rashes.   HEME/LYMPH: See HPI.   EYES: No vision changes or diplopia.   ENT: No tinnitus, hearing loss, gum bleeding, epistaxis, hoarseness or dysphagia.   RESPIRATORY: No cough, Has exertional dyspnea, No hemoptysis or wheezing.   CVS: No chest pain, palpitations, orthopnea, dyspnea on exertion or PND.   GI: See HPI.   : No lower tract obstructive symptoms, dysuria or hematuria.   MUSCULOSKELETAL: Chronic or joint pain, arthritis.  Has mild intermittent ankle swelling.   NEUROLOGICAL: See HPI  PSYCHIATRIC: no mood changes or depression.      Objective:   Vitals:    07/05/17 0940   BP: 120/69   Pulse: 81   Resp: 12   Temp: 98.4 °F (36.9 °C)   TempSrc: Oral   SpO2: 97%   Weight: 145 lb (65.8 kg)   Height: 62.01\" (157.5 cm)   PainSc:   3   PainLoc: Generalized   ECOG 0      PHYSICAL EXAM:   GENERAL: Well-developed, well-nourished female, in no acute distress.  She is accompanied by a friend today.  SKIN: Warm, dry without rashes, purpura or petechiae.  Left upper chest Lico catheter in place, no evidence of infection.   EYES: Pupils equal, round and reactive to light. EOMs intact. Conjunctivae normal.  EARS: Hearing intact.  NOSE:  No excoriations or nasal discharge.  MOUTH: Tongue is well-papillated; no stomatitis or ulcers. Lips normal.  THROAT: Oropharynx without lesions or exudates.  LYMPHATICS: No cervical, supraclavicular, axillary adenopathy.  CHEST: Lungs clear to auscultation. Good airflow.   BREAT: Tumor approximately 4cm superior to inferior. Borders difficult to appreciate medial to lateral.   CARDIAC: Regular rate and rhythm without murmurs. Normal S1,S2.  ABDOMEN: Slightly distended, normal active bowel sounds,  no hepatosplenomegaly or masses.  EXTREMITIES: No clubbing, cyanosis or " edema.  NEUROLOGICAL: Cranial Nerves II-XII grossly intact. No focal neurological deficits.  PSYCHIATRIC: Normal affect and mood.      RECENT LABS:  Lab Results   Component Value Date    WBC 6.07 07/05/2017    HGB 8.0 (L) 07/05/2017    HCT 24.1 (L) 07/05/2017    MCV 93.4 07/05/2017     (L) 07/05/2017     Lab Results   Component Value Date    NEUTROABS 3.64 07/05/2017     Lab Results   Component Value Date    GLUCOSE 108 (H) 07/05/2017    BUN 15 07/05/2017    CREATININE 0.89 07/05/2017    EGFRIFNONA 62 07/05/2017    EGFRIFAFRI 82 03/27/2017    BCR 16.9 07/05/2017    K 3.4 (L) 07/05/2017    CO2 25.6 07/05/2017    CALCIUM 9.3 07/05/2017    PROTENTOTREF 6.5 03/27/2017    ALBUMIN 3.80 07/05/2017    LABIL2 1.5 07/05/2017    AST 20 07/05/2017    ALT 22 07/05/2017       Assessment/Plan   1. Large right breast cancer, locally advanced, stage IIIa (T3 N1/ 2 M0).  ER negative, less than 1%; WA positive, moderate in staining, 5% to 10%. HER2 IHC 2+, FISH study positive 2.1.  Physical examination showed a large mass, 7 cm x 7 cm initially which has decreased to approximately less than 4 cm today.  Patient is here for her cycle 3 day 1 treatment of Taxol/Herceptin/Perjeta.  She tolerated therapy relatively well.  Clinically patient has had significant response to treatment.      2. Chronic mild-to-moderate thrombocytopenia. Her platelets has been relatively stable with some fluctuation.  Continue to monitor counts closely. Count today is [id number].    3.  Anemia related to therapy. Hgb today is 8.     4.  History of mild leukocytopenia/neutropenia.  She has no neutropenia now.  Her counts seems to improved.  We'll continue to monitor.       5. Insomnia.  Her sleeping is better on 10 mg of Ambien.      6.  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 Stable    7.  Strong family history of breast cancer with daughter having breast cancer at age 40 and patient having had DCIS 10 years ago in the left rest  requiring mastectomy.  She was referred to genetics counseling.    8. Chemotherapy induced nausea controlled with Compazine.      PLAN:   1. Proceed with C3 D1  Taxol Perjeta Herceptin today    2.  Return in 1 week to see me in anticipation of her Taxol Herceptin.  3.  She has been scheduled for a blood transfusion of 2 units PRBC's on 7/7/2017.   4.  Continue supportive care including bowel regimen, and antiemetics as needed and soluCortef and Vistaril prior to Herceptin  5.  Continue Ambien 10 mg daily at bedtime for insomnia.  6.  Continue pyridoxine  and 50 mg daily for neuropathy  7. She will call the office with new or worsening symptoms.     We addressed more than expected chemotherapy related side effects as outlined above.       Claudia Maldonado, APRN  06/28/2017      CC: Dr Christian Melo M.D.

## 2017-07-07 ENCOUNTER — INFUSION (OUTPATIENT)
Dept: ONCOLOGY | Facility: HOSPITAL | Age: 77
End: 2017-07-07

## 2017-07-07 ENCOUNTER — TELEPHONE (OUTPATIENT)
Dept: ONCOLOGY | Facility: CLINIC | Age: 77
End: 2017-07-07

## 2017-07-07 VITALS
DIASTOLIC BLOOD PRESSURE: 77 MMHG | RESPIRATION RATE: 18 BRPM | OXYGEN SATURATION: 95 % | HEART RATE: 79 BPM | TEMPERATURE: 98 F | SYSTOLIC BLOOD PRESSURE: 152 MMHG

## 2017-07-07 DIAGNOSIS — D64.81 ANEMIA DUE TO CHEMOTHERAPY: ICD-10-CM

## 2017-07-07 DIAGNOSIS — T45.1X5A ANEMIA DUE TO CHEMOTHERAPY: ICD-10-CM

## 2017-07-07 LAB
ABO GROUP BLD: NORMAL
BLD GP AB SCN SERPL QL: NEGATIVE
RH BLD: POSITIVE

## 2017-07-07 PROCEDURE — 25010000002 HEPARIN FLUSH (PORCINE) 100 UNIT/ML SOLUTION: Performed by: INTERNAL MEDICINE

## 2017-07-07 PROCEDURE — 86850 RBC ANTIBODY SCREEN: CPT

## 2017-07-07 PROCEDURE — 96523 IRRIG DRUG DELIVERY DEVICE: CPT

## 2017-07-07 PROCEDURE — P9016 RBC LEUKOCYTES REDUCED: HCPCS

## 2017-07-07 PROCEDURE — 86900 BLOOD TYPING SEROLOGIC ABO: CPT

## 2017-07-07 PROCEDURE — 63710000001 DIPHENHYDRAMINE PER 50 MG: Performed by: NURSE PRACTITIONER

## 2017-07-07 PROCEDURE — 86901 BLOOD TYPING SEROLOGIC RH(D): CPT

## 2017-07-07 PROCEDURE — 36430 TRANSFUSION BLD/BLD COMPNT: CPT

## 2017-07-07 PROCEDURE — 86920 COMPATIBILITY TEST SPIN: CPT

## 2017-07-07 PROCEDURE — 36415 COLL VENOUS BLD VENIPUNCTURE: CPT

## 2017-07-07 RX ORDER — DIPHENHYDRAMINE HCL 25 MG
25 CAPSULE ORAL ONCE
Status: COMPLETED | OUTPATIENT
Start: 2017-07-07 | End: 2017-07-07

## 2017-07-07 RX ORDER — ACETAMINOPHEN 325 MG/1
650 TABLET ORAL ONCE
Status: COMPLETED | OUTPATIENT
Start: 2017-07-07 | End: 2017-07-07

## 2017-07-07 RX ORDER — SODIUM CHLORIDE 9 MG/ML
250 INJECTION, SOLUTION INTRAVENOUS AS NEEDED
Status: DISCONTINUED | OUTPATIENT
Start: 2017-07-07 | End: 2017-07-07 | Stop reason: HOSPADM

## 2017-07-07 RX ORDER — LOPERAMIDE HYDROCHLORIDE 2 MG/1
2 CAPSULE ORAL ONCE
Status: COMPLETED | OUTPATIENT
Start: 2017-07-07 | End: 2017-07-07

## 2017-07-07 RX ADMIN — DIPHENHYDRAMINE HYDROCHLORIDE 25 MG: 25 CAPSULE ORAL at 11:17

## 2017-07-07 RX ADMIN — Medication 500 UNITS: at 16:48

## 2017-07-07 RX ADMIN — LOPERAMIDE HYDROCHLORIDE 2 MG: 2 CAPSULE ORAL at 17:14

## 2017-07-07 RX ADMIN — ACETAMINOPHEN 650 MG: 325 TABLET ORAL at 11:17

## 2017-07-08 LAB
ABO + RH BLD: NORMAL
ABO + RH BLD: NORMAL
BH BB BLOOD EXPIRATION DATE: NORMAL
BH BB BLOOD EXPIRATION DATE: NORMAL
BH BB BLOOD TYPE BARCODE: 5100
BH BB BLOOD TYPE BARCODE: 5100
BH BB DISPENSE STATUS: NORMAL
BH BB DISPENSE STATUS: NORMAL
BH BB PRODUCT CODE: NORMAL
BH BB PRODUCT CODE: NORMAL
BH BB UNIT NUMBER: NORMAL
BH BB UNIT NUMBER: NORMAL
CROSSMATCH INTERPRETATION: NORMAL
CROSSMATCH INTERPRETATION: NORMAL
UNIT  ABO: NORMAL
UNIT  ABO: NORMAL
UNIT  RH: NORMAL
UNIT  RH: NORMAL

## 2017-07-12 ENCOUNTER — INFUSION (OUTPATIENT)
Dept: ONCOLOGY | Facility: HOSPITAL | Age: 77
End: 2017-07-12

## 2017-07-12 ENCOUNTER — OFFICE VISIT (OUTPATIENT)
Dept: ONCOLOGY | Facility: CLINIC | Age: 77
End: 2017-07-12

## 2017-07-12 VITALS
HEART RATE: 82 BPM | HEIGHT: 62 IN | WEIGHT: 141.2 LBS | BODY MASS INDEX: 25.98 KG/M2 | SYSTOLIC BLOOD PRESSURE: 146 MMHG | DIASTOLIC BLOOD PRESSURE: 74 MMHG | RESPIRATION RATE: 16 BRPM | OXYGEN SATURATION: 98 % | TEMPERATURE: 98.4 F

## 2017-07-12 DIAGNOSIS — Z86.79 HISTORY OF CARDIAC ARRHYTHMIA: ICD-10-CM

## 2017-07-12 DIAGNOSIS — C50.111 MALIGNANT NEOPLASM OF CENTRAL PORTION OF RIGHT FEMALE BREAST (HCC): ICD-10-CM

## 2017-07-12 DIAGNOSIS — D69.6 THROMBOCYTOPENIA (HCC): ICD-10-CM

## 2017-07-12 DIAGNOSIS — D64.9 ANEMIA, UNSPECIFIED TYPE: ICD-10-CM

## 2017-07-12 DIAGNOSIS — E87.6 HYPOKALEMIA: ICD-10-CM

## 2017-07-12 DIAGNOSIS — E86.0 DEHYDRATION: ICD-10-CM

## 2017-07-12 DIAGNOSIS — R11.0 CHEMOTHERAPY-INDUCED NAUSEA: ICD-10-CM

## 2017-07-12 DIAGNOSIS — T78.40XD HYPERSENSITIVITY REACTION, SUBSEQUENT ENCOUNTER: ICD-10-CM

## 2017-07-12 DIAGNOSIS — T45.1X5A CHEMOTHERAPY-INDUCED NAUSEA: ICD-10-CM

## 2017-07-12 DIAGNOSIS — C50.811 MALIGNANT NEOPLASM OF OVERLAPPING SITES OF RIGHT FEMALE BREAST (HCC): Primary | ICD-10-CM

## 2017-07-12 DIAGNOSIS — T78.40XD HYPERSENSITIVITY REACTION, SUBSEQUENT ENCOUNTER: Primary | ICD-10-CM

## 2017-07-12 LAB
ALBUMIN SERPL-MCNC: 4.2 G/DL (ref 3.5–5.2)
ALBUMIN/GLOB SERPL: 1.6 G/DL
ALP SERPL-CCNC: 50 U/L (ref 39–117)
ALT SERPL W P-5'-P-CCNC: 28 U/L (ref 1–33)
ANION GAP SERPL CALCULATED.3IONS-SCNC: 12.4 MMOL/L
AST SERPL-CCNC: 25 U/L (ref 1–32)
BASOPHILS # BLD AUTO: 0.01 10*3/MM3 (ref 0–0.2)
BASOPHILS NFR BLD AUTO: 0.2 % (ref 0–1.5)
BILIRUB SERPL-MCNC: 0.6 MG/DL (ref 0.1–1.2)
BUN BLD-MCNC: 17 MG/DL (ref 8–23)
BUN/CREAT SERPL: 21.8 (ref 7–25)
CALCIUM SPEC-SCNC: 9.5 MG/DL (ref 8.6–10.5)
CHLORIDE SERPL-SCNC: 100 MMOL/L (ref 98–107)
CO2 SERPL-SCNC: 26.6 MMOL/L (ref 22–29)
CREAT BLD-MCNC: 0.78 MG/DL (ref 0.57–1)
DEPRECATED RDW RBC AUTO: 46.5 FL (ref 37–54)
EOSINOPHIL # BLD AUTO: 0 10*3/MM3 (ref 0–0.7)
EOSINOPHIL NFR BLD AUTO: 0 % (ref 0.3–6.2)
ERYTHROCYTE [DISTWIDTH] IN BLOOD BY AUTOMATED COUNT: 14.6 % (ref 11.7–13)
GFR SERPL CREATININE-BSD FRML MDRD: 72 ML/MIN/1.73
GLOBULIN UR ELPH-MCNC: 2.6 GM/DL
GLUCOSE BLD-MCNC: 113 MG/DL (ref 65–99)
HCT VFR BLD AUTO: 36.4 % (ref 35.6–45.5)
HGB BLD-MCNC: 12.4 G/DL (ref 11.9–15.5)
IMM GRANULOCYTES # BLD: 0.16 10*3/MM3 (ref 0–0.03)
IMM GRANULOCYTES NFR BLD: 2.6 % (ref 0–0.5)
LYMPHOCYTES # BLD AUTO: 1.71 10*3/MM3 (ref 0.9–4.8)
LYMPHOCYTES NFR BLD AUTO: 27.5 % (ref 19.6–45.3)
MCH RBC QN AUTO: 31.2 PG (ref 26.9–32)
MCHC RBC AUTO-ENTMCNC: 34.1 G/DL (ref 32.4–36.3)
MCV RBC AUTO: 91.7 FL (ref 80.5–98.2)
MONOCYTES # BLD AUTO: 0.86 10*3/MM3 (ref 0.2–1.2)
MONOCYTES NFR BLD AUTO: 13.8 % (ref 5–12)
NEUTROPHILS # BLD AUTO: 3.47 10*3/MM3 (ref 1.9–8.1)
NEUTROPHILS NFR BLD AUTO: 55.9 % (ref 42.7–76)
NRBC BLD MANUAL-RTO: 0 /100 WBC (ref 0–0)
PLATELET # BLD AUTO: 138 10*3/MM3 (ref 140–500)
PMV BLD AUTO: 10.4 FL (ref 6–12)
POTASSIUM BLD-SCNC: 3.1 MMOL/L (ref 3.5–5.2)
PROT SERPL-MCNC: 6.8 G/DL (ref 6–8.5)
RBC # BLD AUTO: 3.97 10*6/MM3 (ref 3.9–5.2)
SODIUM BLD-SCNC: 139 MMOL/L (ref 136–145)
WBC NRBC COR # BLD: 6.21 10*3/MM3 (ref 4.5–10.7)

## 2017-07-12 PROCEDURE — 96413 CHEMO IV INFUSION 1 HR: CPT | Performed by: NURSE PRACTITIONER

## 2017-07-12 PROCEDURE — 25010000002 PALONOSETRON PER 25 MCG: Performed by: INTERNAL MEDICINE

## 2017-07-12 PROCEDURE — 36415 COLL VENOUS BLD VENIPUNCTURE: CPT

## 2017-07-12 PROCEDURE — 99214 OFFICE O/P EST MOD 30 MIN: CPT | Performed by: NURSE PRACTITIONER

## 2017-07-12 PROCEDURE — 25010000002 HYDROCORTISONE SODIUM SUCCINATE 100 MG RECONSTITUTED SOLUTION: Performed by: INTERNAL MEDICINE

## 2017-07-12 PROCEDURE — 25010000002 DEXAMETHASONE SODIUM PHOSPHATE 10 MG/ML SOLUTION 1 ML VIAL: Performed by: INTERNAL MEDICINE

## 2017-07-12 PROCEDURE — 96415 CHEMO IV INFUSION ADDL HR: CPT | Performed by: NURSE PRACTITIONER

## 2017-07-12 PROCEDURE — 96375 TX/PRO/DX INJ NEW DRUG ADDON: CPT | Performed by: NURSE PRACTITIONER

## 2017-07-12 PROCEDURE — 85025 COMPLETE CBC W/AUTO DIFF WBC: CPT | Performed by: INTERNAL MEDICINE

## 2017-07-12 PROCEDURE — 80053 COMPREHEN METABOLIC PANEL: CPT | Performed by: INTERNAL MEDICINE

## 2017-07-12 PROCEDURE — 96417 CHEMO IV INFUS EACH ADDL SEQ: CPT | Performed by: NURSE PRACTITIONER

## 2017-07-12 PROCEDURE — 25010000002 TRASTUZUMAB PER 10 MG: Performed by: INTERNAL MEDICINE

## 2017-07-12 PROCEDURE — 25010000002 PACLITAXEL PER 30 MG: Performed by: INTERNAL MEDICINE

## 2017-07-12 RX ORDER — SODIUM CHLORIDE 9 MG/ML
250 INJECTION, SOLUTION INTRAVENOUS ONCE
Status: COMPLETED | OUTPATIENT
Start: 2017-07-12 | End: 2017-07-12

## 2017-07-12 RX ORDER — PALONOSETRON 0.05 MG/ML
0.25 INJECTION, SOLUTION INTRAVENOUS ONCE
Status: COMPLETED | OUTPATIENT
Start: 2017-07-12 | End: 2017-07-12

## 2017-07-12 RX ORDER — FAMOTIDINE 10 MG/ML
20 INJECTION, SOLUTION INTRAVENOUS ONCE
Status: COMPLETED | OUTPATIENT
Start: 2017-07-12 | End: 2017-07-12

## 2017-07-12 RX ORDER — HYDROXYZINE PAMOATE 25 MG/1
50 CAPSULE ORAL ONCE
Status: COMPLETED | OUTPATIENT
Start: 2017-07-12 | End: 2017-07-12

## 2017-07-12 RX ADMIN — DEXAMETHASONE SODIUM PHOSPHATE 12 MG: 10 INJECTION, SOLUTION INTRAMUSCULAR; INTRAVENOUS at 09:44

## 2017-07-12 RX ADMIN — PACLITAXEL 135 MG: 6 INJECTION, SOLUTION INTRAVENOUS at 11:35

## 2017-07-12 RX ADMIN — HYDROCORTISONE SODIUM SUCCINATE 100 MG: 100 INJECTION, POWDER, FOR SOLUTION INTRAMUSCULAR; INTRAVENOUS at 09:40

## 2017-07-12 RX ADMIN — FAMOTIDINE 20 MG: 10 INJECTION, SOLUTION INTRAVENOUS at 09:43

## 2017-07-12 RX ADMIN — SODIUM CHLORIDE 250 ML: 900 INJECTION, SOLUTION INTRAVENOUS at 09:35

## 2017-07-12 RX ADMIN — TRASTUZUMAB 140 MG: 150 INJECTION, POWDER, LYOPHILIZED, FOR SOLUTION INTRAVENOUS at 10:04

## 2017-07-12 RX ADMIN — PALONOSETRON HYDROCHLORIDE 0.25 MG: 0.25 INJECTION INTRAVENOUS at 09:41

## 2017-07-12 RX ADMIN — HYDROXYZINE PAMOATE 50 MG: 25 CAPSULE ORAL at 09:39

## 2017-07-12 NOTE — PROGRESS NOTES
REASON FOR FOLLOWUP:   1. Newly diagnosed large right breast cancer.  Core needle biopsy reported invasive mammary carcinoma with focal lobular feature, grade 2.  ER negative, less than 1%; AZ positive, moderate in staining, 5% to 10%. HER2 IHC 2+, FISH study positive 2.1.   2.  CT scan for chest abdomen and pelvis and breast MRI examination reported right axillary lymph node suspicious for metastatic disease.  No remote metastatic lesion.  Patient has stage IIIa (T3 N2 M0) disease.   3.  Patient was started neoadjuvant chemotherapy on 2017 with Taxol weekly plus Herceptin weekly for total 12 weeks, and Perjata every 3 weeks during chemotherapy.  Herceptin will be converted to every 3 weeks after chemotherapy finished.    4.  Chronic moderate thrombocytopenia, mild anemia, and intermittent mild neutropenia etiologies are not clear.  5.  Reaction to Herceptin C1D1.  Subsequently tolerated therapy with hydrocortisone as premedication.     HISTORY OF PRESENT ILLNESS:    The patient is a pleasant 76 y.o. with the above-mentioned history, who presents today in anticipation of cycle 3 day 8 Taxol  Herceptin. She reports that she has nausea, but no vomiting after chemotherapy.  She reports that her  passed away this morning. She feels that she is still in a bit of shock but very much wants to proceed with treatment. Her daughter is with her and a lot of the planning has been made for his . She states that she has a strong support group of family and [employer].    She continues to experience nausea that is well relieved with Compazine. She denies oral mucositis.  No diarrhea no constipation no chest pain no dyspnea.  No lower extremity edema.    She did received 2 units of PRBC's on   2017 which is reflected by a hg today of 12.4.     Mild neuropathy of hands and feet is stable today.     She does have difficulty with sleep and Ambien 10 mg daily at bedtime seems help much better compared to 5 mg dose.   She does not need refills today.        Past Medical History:   Diagnosis Date   • Acute bronchitis    • Anemia    • Breast cancer     Left   • Breast cancer 2017    Right   • Chronic pain    • Conjunctivitis    • Cough    • Ductal carcinoma in situ (DCIS) of left breast    • Edema     Chronic lower extremity edema   • GERD (gastroesophageal reflux disease)    • H/O Bowel obstruction    • H/O jaundice    • Hernia    • History of infectious mononucleosis    • History of migraine headaches    • Hyperlipidemia    • Hypertension    • Impacted cerumen of left ear    • Leukocytopenia    • Leukopenia    • Meningioma    • Peptic ulceration    • Perioral dermatitis    • SBO (small bowel obstruction)     due to hernia with surgical repair in 2011   • SOB (shortness of breath) on exertion    • Thrombocytopenia    • Vertigo    Normal echocardiogram on 2017, LVEF 68%.    Past Surgical History:   Procedure Laterality Date   • APPENDECTOMY     • BLADDER SURGERY      Bladder repair   • BREAST BIOPSY Left    • BREAST BIOPSY  2017   • BREAST SURGERY     • CHOLECYSTECTOMY     • GALLBLADDER SURGERY     • HERNIA REPAIR     • HYSTERECTOMY     • MASTECTOMY Left     and sentinel [facility]osp[facility]   • UT INSJ TUNNELED CVC W/O SUBQ PORT/ AGE 5 YR/> Left 2017    Procedure: INSERTION VENOUS ACCESS DEVICE;  Surgeon: Christian Melo MD;  Location: [facility];  Service: General   • REDUCTION MAMMAPLASTY Right     to match Lt. TRAM flap   • TONSILLECTOMY     Lico catheter placement on 2017 by Dr. Melo.     OB/GYN history: Menarche age 16, menopause at . , 1 miscarriage. No birth control pill use. She did have post menopause hormonal supplementation.      HEMATOLOGIC/ONCOLOGIC HISTORY: The patient is a 76 y.o. year old female whom we are consulted for a newly self discovered right breast mass, in 2017. Patient had ultrasound-guided biopsy on  5/3/2017 and confirmed to be invasive mammary carcinoma with focal lobular features, grade 2 Elen score 6/9. She is here for initial evaluation for management.      Patient is a 76-year-old  female who was seen here previously for chronic mild-to-moderate thrombocytopenia and mild anemia. Recently the patient reports she started having firmness of the right breast that started sometime in April or maybe even in March. She noticed firmness spread from about around the 12 o'clock position, gradually towards the upper lateral side and lower part of the right breast associated mild pain. The patient thought it was related to fibrocystic changes. However, the symptom was getting worse. Patient also reported dented nipple after she started having pain in the right breast. She denies discharge from the nipple. She called her primary care physician, Dr. Martin, who ordered a mammogram study. This was done on 04/12/2017 with architectural distortion of the right breast centrally at 12 o'clock position and had scattered fibroglandular densities throughout the right breast.      The patient subsequently had right breast ultrasound examination on 04/26/2017. Discovered a large right breast mass measuring 5.8 x 3.5 x 3.9 cm. Patient subsequently had ultrasound-guided right breast biopsy on 05/03/2017. Pathology evaluation reported invasive mammary carcinoma with focal lobular feature. [place] score 6/9, overall grade 2. Sample was further sent to the [facility] for test. ER negative, less than 1%; CO positive, moderate in staining, 5% to 10%. HER2 IHC 2+, but FISH study positive 2.1.     She denies weight loss, she actually eats very well. No nausea vomiting. Patient does complain of insomnia, unable to sleep.      This patient has history of left breast DCIS back in 2007, had mastectomy at the [facility]. No hormonal therapy afterwards according to patient. This  patient also had a small meningioma, followed by Dr. Smith in [facility], with most recent MRI of the brain in March 2017, with 18 mm meningioma, and followed on an annual basis.     Her neutrophil count on 5/16/17 is normal at 2500, however, records showed starting from 05/2015 and in 09/2016, 01/2017 and 03/2017, all 4 laboratory studies showed mild neutropenia with ANC fluctuating between 1200 and 1600. Etiology is not clear.    Normal echocardiogram on 5/18/2017, LVEF 68%.      CT scan for chest abdomen and pelvis on 5/22/2017 and breast MRI examination on 5/22/2017 reported small right axillary lymph node suspicious for metastatic disease.  No remote metastatic lesion.  Patient has stage IIIa (T3 N2 M0) disease.      Patient will start neoadjuvant chemotherapy with Taxol weekly plus Herceptin weekly for total 12 weeks, and Perjeta every 3 weeks (3-week cycle) during chemotherapy.  Herceptin will be converted to every 3 weeks after chemotherapy finished.  Cycle 1 day 1 5/23/2017.     MEDICATIONS: The current medication list was reviewed with the patient and updated in the EMR this date per the Medical Assistant. Medication dosages and frequencies were confirmed to be accurate.       ALLERGIES:    Allergies   Allergen Reactions   • Codeine Nausea And Vomiting   • Morphine Nausea And Vomiting     SOCIAL HISTORY:   Social History   Substance Use Topics   • Smoking status: Former Smoker     Packs/day: 2.00     Years: 30.00     Types: Cigarettes   • Smokeless tobacco: Former User      Comment: caffeine use   • Alcohol use No      Comment: stopped, heavy in past     FAMILY HISTORY:  Family History   Problem Relation Age of Onset   • Heart disease Mother    • Aortic aneurysm Mother      abdominal   • Coronary artery disease Mother    • Hypertension Mother    • Heart disease Father    • Heart attack Father    • Hypertension Father    • Coronary artery disease Brother    • Hypertension Brother    • Heart  "disease Brother    • Breast cancer Daughter 45     I have reviewed the patient's medical history in detail and updated the computerized patient record.    REVIEW OF SYSTEMS:  GENERAL: See history of present illness. No change in appetite or weight;   No fevers, chills, sweats.   SKIN: No nonhealing lesions. No rashes.   HEME/LYMPH: See HPI.   EYES: No vision changes or diplopia.   ENT: No tinnitus, hearing loss, gum bleeding, epistaxis, hoarseness or dysphagia.   RESPIRATORY: No cough, Has exertional dyspnea, No hemoptysis or wheezing.   CVS: No chest pain, palpitations, orthopnea, dyspnea on exertion or PND.   GI: See HPI.   : No lower tract obstructive symptoms, dysuria or hematuria.   MUSCULOSKELETAL: Chronic or joint pain, arthritis.  Has mild intermittent ankle swelling.   NEUROLOGICAL: See HPI  PSYCHIATRIC: See HPI     Objective:   Vitals:    07/12/17 0914   BP: 146/74   Pulse: 82   Resp: 16   Temp: 98.4 °F (36.9 °C)   TempSrc: Oral   SpO2: 98%   Weight: 141 lb 3.2 oz (64 kg)   Height: 62.01\" (157.5 cm)   PainSc: 0-No pain   ECOG 0      PHYSICAL EXAM:   GENERAL: Well-developed, well-nourished female, in no acute distress.   SKIN: Warm, dry without rashes, purpura or petechiae.  Left upper chest Lico catheter in place, no evidence of infection.   EYES: Pupils equal, round and reactive to light. EOMs intact. Conjunctivae normal.  EARS: Hearing intact.  NOSE:  No excoriations or nasal discharge.  MOUTH: Tongue is well-papillated; no stomatitis or ulcers. Lips normal.  THROAT: Oropharynx without lesions or exudates.  LYMPHATICS: No cervical, supraclavicular, axillary adenopathy.  CHEST: Lungs clear to auscultation. Good airflow.   BREAT: Tumor approximately 4cm superior to inferior. Borders difficult to appreciate medial to lateral.   CARDIAC: Regular rate and rhythm without murmurs. Normal S1,S2.  ABDOMEN: Slightly distended, normal active bowel sounds,  no hepatosplenomegaly or masses.  EXTREMITIES: No " clubbing, cyanosis or edema.  NEUROLOGICAL: Cranial Nerves II-XII grossly intact. No focal neurological deficits.  PSYCHIATRIC: She is slightly tearful.      RECENT LABS:  Lab Results   Component Value Date    WBC 6.21 07/12/2017    HGB 12.4 07/12/2017    HCT 36.4 07/12/2017    MCV 91.7 07/12/2017     (L) 07/12/2017     Lab Results   Component Value Date    NEUTROABS 3.47 07/12/2017     Lab Results   Component Value Date    GLUCOSE 113 (H) 07/12/2017    BUN 17 07/12/2017    CREATININE 0.78 07/12/2017    EGFRIFNONA 72 07/12/2017    EGFRIFAFRI 82 03/27/2017    BCR 21.8 07/12/2017    K 3.1 (L) 07/12/2017    CO2 26.6 07/12/2017    CALCIUM 9.5 07/12/2017    PROTENTOTREF 6.5 03/27/2017    ALBUMIN 4.20 07/12/2017    LABIL2 1.6 07/12/2017    AST 25 07/12/2017    ALT 28 07/12/2017       Assessment/Plan   1. Large right breast cancer, locally advanced, stage IIIa (T3 N1/ 2 M0).  ER negative, less than 1%; IL positive, moderate in staining, 5% to 10%. HER2 IHC 2+, FISH study positive 2.1.  Physical examination showed a large mass, 7 cm x 7 cm initially which has decreased to approximately less than 4 cm today.  Patient is here for her cycle 3 day 8 treatment of Taxol/Herceptin.   She tolerates therapy relatively well.  Clinically patient has had significant response to treatment.      2. Chronic mild-to-moderate thrombocytopenia. Her platelets has been relatively stable with some fluctuation.  Continue to monitor counts closely. Count today is [id number].    3.  Anemia related to therapy requiring 2 unit PRBC's on 7/8/2017. Hgb much improved today at 12.4.     4.  History of mild leukocytopenia/neutropenia.  She has no neutropenia now.  Her counts seems to improved.  We'll continue to monitor.       5. Insomnia.  Her sleeping is better on 10 mg of Ambien.      6.  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 Stable    7.  Strong family history of breast cancer with daughter having breast cancer at age  40 and patient having had DCIS 10 years ago in the left rest requiring mastectomy.  She was referred to genetics counseling.    8. Chemotherapy induced nausea controlled with Compazine.    9. Anxiety/distress over 's passing this morning. She very much wants treatment today.       PLAN:   1. Proceed with C3 D8  Taxol  Herceptin today .   2. Return as already scheduled next week for Taxol Herceptin, the on 7/26/2017 to see Dr. Coburn in anticpation of cycle #4,   3.  Continue supportive care including bowel regimen, and antiemetics as needed and soluCortef and Vistaril prior to Herceptin  4.  Continue Ambien 10 mg daily at bedtime for insomnia.  5.  Continue pyridoxine  and 50 mg daily for neuropathy  6. I have of course offered my condolences and asked that she call with any new or worsening symptoms.     We addressed more than expected chemotherapy related side effects as outlined above.       Claudia Maldonado, APRN  06/28/2017      CC: Dr Christian Melo M.D.

## 2017-07-13 RX ORDER — DICLOFENAC SODIUM 75 MG/1
TABLET, DELAYED RELEASE ORAL
Qty: 60 TABLET | Refills: 2 | Status: SHIPPED | OUTPATIENT
Start: 2017-07-13 | End: 2017-10-19 | Stop reason: SDUPTHER

## 2017-07-19 ENCOUNTER — INFUSION (OUTPATIENT)
Dept: ONCOLOGY | Facility: HOSPITAL | Age: 77
End: 2017-07-19

## 2017-07-19 VITALS
TEMPERATURE: 98.4 F | SYSTOLIC BLOOD PRESSURE: 121 MMHG | HEART RATE: 77 BPM | WEIGHT: 142 LBS | DIASTOLIC BLOOD PRESSURE: 67 MMHG | OXYGEN SATURATION: 98 % | BODY MASS INDEX: 25.96 KG/M2

## 2017-07-19 DIAGNOSIS — C50.911 MALIGNANT NEOPLASM OF RIGHT FEMALE BREAST, UNSPECIFIED SITE OF BREAST: Primary | ICD-10-CM

## 2017-07-19 DIAGNOSIS — E87.6 HYPOKALEMIA: ICD-10-CM

## 2017-07-19 DIAGNOSIS — C50.111 MALIGNANT NEOPLASM OF CENTRAL PORTION OF RIGHT FEMALE BREAST (HCC): ICD-10-CM

## 2017-07-19 DIAGNOSIS — T78.40XD HYPERSENSITIVITY REACTION, SUBSEQUENT ENCOUNTER: Primary | ICD-10-CM

## 2017-07-19 DIAGNOSIS — E86.0 DEHYDRATION: ICD-10-CM

## 2017-07-19 DIAGNOSIS — I10 ESSENTIAL HYPERTENSION: ICD-10-CM

## 2017-07-19 DIAGNOSIS — Z86.79 HISTORY OF CARDIAC ARRHYTHMIA: ICD-10-CM

## 2017-07-19 DIAGNOSIS — T78.40XD HYPERSENSITIVITY REACTION, SUBSEQUENT ENCOUNTER: ICD-10-CM

## 2017-07-19 DIAGNOSIS — I13.0 HYPERTENSIVE HEART AND CHRONIC KIDNEY DISEASE WITH HEART FAILURE AND STAGE 1 THROUGH STAGE 4 CHRONIC KIDNEY DISEASE, OR UNSPECIFIED CHRONIC KIDNEY DISEASE (CODE): ICD-10-CM

## 2017-07-19 LAB
ALBUMIN SERPL-MCNC: 3.7 G/DL (ref 3.5–5.2)
ALBUMIN/GLOB SERPL: 1.6 G/DL
ALP SERPL-CCNC: 47 U/L (ref 39–117)
ALT SERPL W P-5'-P-CCNC: 22 U/L (ref 1–33)
ANION GAP SERPL CALCULATED.3IONS-SCNC: 11.3 MMOL/L
AST SERPL-CCNC: 20 U/L (ref 1–32)
BASOPHILS # BLD AUTO: 0.01 10*3/MM3 (ref 0–0.2)
BASOPHILS NFR BLD AUTO: 0.2 % (ref 0–1.5)
BILIRUB SERPL-MCNC: 0.4 MG/DL (ref 0.1–1.2)
BUN BLD-MCNC: 14 MG/DL (ref 8–23)
BUN/CREAT SERPL: 19.2 (ref 7–25)
CALCIUM SPEC-SCNC: 8.6 MG/DL (ref 8.6–10.5)
CHLORIDE SERPL-SCNC: 103 MMOL/L (ref 98–107)
CO2 SERPL-SCNC: 25.7 MMOL/L (ref 22–29)
CREAT BLD-MCNC: 0.73 MG/DL (ref 0.57–1)
DEPRECATED RDW RBC AUTO: 48.1 FL (ref 37–54)
EOSINOPHIL # BLD AUTO: 0 10*3/MM3 (ref 0–0.7)
EOSINOPHIL NFR BLD AUTO: 0 % (ref 0.3–6.2)
ERYTHROCYTE [DISTWIDTH] IN BLOOD BY AUTOMATED COUNT: 14.6 % (ref 11.7–13)
GFR SERPL CREATININE-BSD FRML MDRD: 78 ML/MIN/1.73
GLOBULIN UR ELPH-MCNC: 2.3 GM/DL
GLUCOSE BLD-MCNC: 96 MG/DL (ref 65–99)
HCT VFR BLD AUTO: 31.4 % (ref 35.6–45.5)
HGB BLD-MCNC: 10.7 G/DL (ref 11.9–15.5)
IMM GRANULOCYTES # BLD: 0.07 10*3/MM3 (ref 0–0.03)
IMM GRANULOCYTES NFR BLD: 1.6 % (ref 0–0.5)
LYMPHOCYTES # BLD AUTO: 1.22 10*3/MM3 (ref 0.9–4.8)
LYMPHOCYTES NFR BLD AUTO: 27.7 % (ref 19.6–45.3)
MCH RBC QN AUTO: 31.3 PG (ref 26.9–32)
MCHC RBC AUTO-ENTMCNC: 34.1 G/DL (ref 32.4–36.3)
MCV RBC AUTO: 91.8 FL (ref 80.5–98.2)
MONOCYTES # BLD AUTO: 0.6 10*3/MM3 (ref 0.2–1.2)
MONOCYTES NFR BLD AUTO: 13.6 % (ref 5–12)
NEUTROPHILS # BLD AUTO: 2.5 10*3/MM3 (ref 1.9–8.1)
NEUTROPHILS NFR BLD AUTO: 56.9 % (ref 42.7–76)
NRBC BLD MANUAL-RTO: 0 /100 WBC (ref 0–0)
PLATELET # BLD AUTO: 97 10*3/MM3 (ref 140–500)
PMV BLD AUTO: 11.4 FL (ref 6–12)
POTASSIUM BLD-SCNC: 3 MMOL/L (ref 3.5–5.2)
PROT SERPL-MCNC: 6 G/DL (ref 6–8.5)
RBC # BLD AUTO: 3.42 10*6/MM3 (ref 3.9–5.2)
SODIUM BLD-SCNC: 140 MMOL/L (ref 136–145)
WBC NRBC COR # BLD: 4.4 10*3/MM3 (ref 4.5–10.7)

## 2017-07-19 PROCEDURE — 96413 CHEMO IV INFUSION 1 HR: CPT | Performed by: INTERNAL MEDICINE

## 2017-07-19 PROCEDURE — 25010000002 HYDROCORTISONE SODIUM SUCCINATE 100 MG RECONSTITUTED SOLUTION: Performed by: INTERNAL MEDICINE

## 2017-07-19 PROCEDURE — 25010000002 DEXAMETHASONE SODIUM PHOSPHATE 10 MG/ML SOLUTION 1 ML VIAL: Performed by: INTERNAL MEDICINE

## 2017-07-19 PROCEDURE — 96417 CHEMO IV INFUS EACH ADDL SEQ: CPT | Performed by: INTERNAL MEDICINE

## 2017-07-19 PROCEDURE — 36415 COLL VENOUS BLD VENIPUNCTURE: CPT | Performed by: INTERNAL MEDICINE

## 2017-07-19 PROCEDURE — 25010000002 TRASTUZUMAB PER 10 MG: Performed by: INTERNAL MEDICINE

## 2017-07-19 PROCEDURE — 96415 CHEMO IV INFUSION ADDL HR: CPT | Performed by: INTERNAL MEDICINE

## 2017-07-19 PROCEDURE — 25010000002 PALONOSETRON PER 25 MCG: Performed by: INTERNAL MEDICINE

## 2017-07-19 PROCEDURE — 96375 TX/PRO/DX INJ NEW DRUG ADDON: CPT | Performed by: INTERNAL MEDICINE

## 2017-07-19 PROCEDURE — 80053 COMPREHEN METABOLIC PANEL: CPT | Performed by: INTERNAL MEDICINE

## 2017-07-19 PROCEDURE — 25010000002 PACLITAXEL PER 30 MG: Performed by: INTERNAL MEDICINE

## 2017-07-19 PROCEDURE — 85025 COMPLETE CBC W/AUTO DIFF WBC: CPT | Performed by: INTERNAL MEDICINE

## 2017-07-19 RX ORDER — FAMOTIDINE 10 MG/ML
20 INJECTION, SOLUTION INTRAVENOUS ONCE
Status: COMPLETED | OUTPATIENT
Start: 2017-07-19 | End: 2017-07-19

## 2017-07-19 RX ORDER — HYDROXYZINE PAMOATE 25 MG/1
50 CAPSULE ORAL ONCE
Status: COMPLETED | OUTPATIENT
Start: 2017-07-19 | End: 2017-07-19

## 2017-07-19 RX ORDER — PALONOSETRON 0.05 MG/ML
0.25 INJECTION, SOLUTION INTRAVENOUS ONCE
Status: COMPLETED | OUTPATIENT
Start: 2017-07-19 | End: 2017-07-19

## 2017-07-19 RX ORDER — SODIUM CHLORIDE 9 MG/ML
250 INJECTION, SOLUTION INTRAVENOUS ONCE
Status: COMPLETED | OUTPATIENT
Start: 2017-07-19 | End: 2017-07-19

## 2017-07-19 RX ADMIN — FAMOTIDINE 20 MG: 10 INJECTION, SOLUTION INTRAVENOUS at 10:19

## 2017-07-19 RX ADMIN — PACLITAXEL 135 MG: 6 INJECTION, SOLUTION INTRAVENOUS at 12:10

## 2017-07-19 RX ADMIN — TRASTUZUMAB 140 MG: 150 INJECTION, POWDER, LYOPHILIZED, FOR SOLUTION INTRAVENOUS at 10:38

## 2017-07-19 RX ADMIN — DEXAMETHASONE SODIUM PHOSPHATE 12 MG: 10 INJECTION, SOLUTION INTRAMUSCULAR; INTRAVENOUS at 10:20

## 2017-07-19 RX ADMIN — SODIUM CHLORIDE 250 ML: 900 INJECTION, SOLUTION INTRAVENOUS at 10:17

## 2017-07-19 RX ADMIN — HYDROCORTISONE SODIUM SUCCINATE 100 MG: 100 INJECTION, POWDER, FOR SOLUTION INTRAMUSCULAR; INTRAVENOUS at 10:36

## 2017-07-19 RX ADMIN — PALONOSETRON HYDROCHLORIDE 0.25 MG: 0.25 INJECTION INTRAVENOUS at 10:17

## 2017-07-19 RX ADMIN — HYDROXYZINE PAMOATE 50 MG: 25 CAPSULE ORAL at 10:18

## 2017-07-21 ENCOUNTER — TELEPHONE (OUTPATIENT)
Dept: ONCOLOGY | Facility: CLINIC | Age: 77
End: 2017-07-21

## 2017-07-21 NOTE — TELEPHONE ENCOUNTER
Patient calling with concerns of seeing blood on the tissue after having a BM.  She was worried about having a GI bleed.  The blood was bright red and she did notice some in the toilet bowl.  She is not having dizziness or lightheadedness.  Only had one episode.  She states she does have hemorrhoids.  She is going to continue to keep an eye on it.  If she continues to have issues or becomes symptomatic she is going to call or go to ER.      Platelet count on Wednesday was 97k. Discussed with patient that she should avoid straining.  Understands when to call.

## 2017-07-21 NOTE — TELEPHONE ENCOUNTER
Patient called again about bleeding with her BM's.  Patient feels like she has lost at least a half cup of blood.  Instructed patient to proceed to ER.  Patient v/u.

## 2017-07-24 ENCOUNTER — HOSPITAL ENCOUNTER (OUTPATIENT)
Dept: CARDIOLOGY | Facility: HOSPITAL | Age: 77
Discharge: HOME | End: 2017-07-24
Attending: INTERNAL MEDICINE | Admitting: INTERNAL MEDICINE

## 2017-07-24 ENCOUNTER — TELEPHONE (OUTPATIENT)
Dept: CARDIOLOGY | Facility: CLINIC | Age: 77
End: 2017-07-24

## 2017-07-24 VITALS — OXYGEN SATURATION: 98 % | HEIGHT: 62 IN | BODY MASS INDEX: 26.13 KG/M2 | WEIGHT: 142 LBS | HEART RATE: 78 BPM

## 2017-07-24 DIAGNOSIS — I10 ESSENTIAL HYPERTENSION: ICD-10-CM

## 2017-07-24 DIAGNOSIS — I13.0 HYPERTENSIVE HEART AND CHRONIC KIDNEY DISEASE WITH HEART FAILURE AND STAGE 1 THROUGH STAGE 4 CHRONIC KIDNEY DISEASE, OR UNSPECIFIED CHRONIC KIDNEY DISEASE (CODE): ICD-10-CM

## 2017-07-24 DIAGNOSIS — C50.911 MALIGNANT NEOPLASM OF RIGHT FEMALE BREAST, UNSPECIFIED SITE OF BREAST: ICD-10-CM

## 2017-07-24 LAB
ASCENDING AORTA: 3.3 CM
BH CV ECHO MEAS - ACS: 1.7 CM
BH CV ECHO MEAS - AO MAX PG (FULL): 2.7 MMHG
BH CV ECHO MEAS - AO MAX PG: 5.6 MMHG
BH CV ECHO MEAS - AO MEAN PG (FULL): 0.71 MMHG
BH CV ECHO MEAS - AO MEAN PG: 2.4 MMHG
BH CV ECHO MEAS - AO ROOT AREA (BSA CORRECTED): 1.8
BH CV ECHO MEAS - AO ROOT AREA: 6.7 CM^2
BH CV ECHO MEAS - AO ROOT DIAM: 2.9 CM
BH CV ECHO MEAS - AO V2 MAX: 118.1 CM/SEC
BH CV ECHO MEAS - AO V2 MEAN: 68.3 CM/SEC
BH CV ECHO MEAS - AO V2 VTI: 21.4 CM
BH CV ECHO MEAS - ASC AORTA: 3.3 CM
BH CV ECHO MEAS - AVA(I,A): 2.1 CM^2
BH CV ECHO MEAS - AVA(I,D): 2.1 CM^2
BH CV ECHO MEAS - AVA(V,A): 2 CM^2
BH CV ECHO MEAS - AVA(V,D): 2 CM^2
BH CV ECHO MEAS - BSA(HAYCOCK): 1.7 M^2
BH CV ECHO MEAS - BSA: 1.7 M^2
BH CV ECHO MEAS - BZI_BMI: 26 KILOGRAMS/M^2
BH CV ECHO MEAS - BZI_METRIC_HEIGHT: 157.5 CM
BH CV ECHO MEAS - BZI_METRIC_WEIGHT: 64.4 KG
BH CV ECHO MEAS - CONTRAST EF (2CH): 65.3 ML/M^2
BH CV ECHO MEAS - CONTRAST EF 4CH: 66.7 ML/M^2
BH CV ECHO MEAS - EDV(MOD-SP2): 49 ML
BH CV ECHO MEAS - EDV(MOD-SP4): 54 ML
BH CV ECHO MEAS - EDV(TEICH): 69.7 ML
BH CV ECHO MEAS - EF(CUBED): 79.3 %
BH CV ECHO MEAS - EF(MOD-SP2): 65.3 %
BH CV ECHO MEAS - EF(MOD-SP4): 66.7 %
BH CV ECHO MEAS - EF(TEICH): 72.2 %
BH CV ECHO MEAS - ESV(MOD-SP2): 17 ML
BH CV ECHO MEAS - ESV(MOD-SP4): 18 ML
BH CV ECHO MEAS - ESV(TEICH): 19.4 ML
BH CV ECHO MEAS - FS: 40.8 %
BH CV ECHO MEAS - IVS/LVPW: 1.3
BH CV ECHO MEAS - IVSD: 1 CM
BH CV ECHO MEAS - LAT PEAK E' VEL: 10 CM/SEC
BH CV ECHO MEAS - LV DIASTOLIC VOL/BSA (35-75): 32.7 ML/M^2
BH CV ECHO MEAS - LV MASS(C)D: 106.3 GRAMS
BH CV ECHO MEAS - LV MASS(C)DI: 64.3 GRAMS/M^2
BH CV ECHO MEAS - LV MAX PG: 2.8 MMHG
BH CV ECHO MEAS - LV MEAN PG: 1.7 MMHG
BH CV ECHO MEAS - LV SYSTOLIC VOL/BSA (12-30): 10.9 ML/M^2
BH CV ECHO MEAS - LV V1 MAX: 84.4 CM/SEC
BH CV ECHO MEAS - LV V1 MEAN: 60.9 CM/SEC
BH CV ECHO MEAS - LV V1 VTI: 16.1 CM
BH CV ECHO MEAS - LVIDD: 4 CM
BH CV ECHO MEAS - LVIDS: 2.4 CM
BH CV ECHO MEAS - LVLD AP2: 6.1 CM
BH CV ECHO MEAS - LVLD AP4: 6.5 CM
BH CV ECHO MEAS - LVLS AP2: 6 CM
BH CV ECHO MEAS - LVLS AP4: 6 CM
BH CV ECHO MEAS - LVOT AREA (M): 2.8 CM^2
BH CV ECHO MEAS - LVOT AREA: 2.8 CM^2
BH CV ECHO MEAS - LVOT DIAM: 1.9 CM
BH CV ECHO MEAS - LVPWD: 0.77 CM
BH CV ECHO MEAS - MED PEAK E' VEL: 9 CM/SEC
BH CV ECHO MEAS - MV A DUR: 0.11 SEC
BH CV ECHO MEAS - MV A MAX VEL: 95.1 CM/SEC
BH CV ECHO MEAS - MV DEC SLOPE: 302.2 CM/SEC^2
BH CV ECHO MEAS - MV DEC TIME: 0.18 SEC
BH CV ECHO MEAS - MV E MAX VEL: 58.7 CM/SEC
BH CV ECHO MEAS - MV E/A: 0.62
BH CV ECHO MEAS - MV MAX PG: 4 MMHG
BH CV ECHO MEAS - MV MEAN PG: 1.6 MMHG
BH CV ECHO MEAS - MV P1/2T MAX VEL: 66.5 CM/SEC
BH CV ECHO MEAS - MV P1/2T: 64.4 MSEC
BH CV ECHO MEAS - MV V2 MAX: 100.4 CM/SEC
BH CV ECHO MEAS - MV V2 MEAN: 58.4 CM/SEC
BH CV ECHO MEAS - MV V2 VTI: 20.4 CM
BH CV ECHO MEAS - MVA P1/2T LCG: 3.3 CM^2
BH CV ECHO MEAS - MVA(P1/2T): 3.4 CM^2
BH CV ECHO MEAS - MVA(VTI): 2.2 CM^2
BH CV ECHO MEAS - PA MAX PG (FULL): 1.9 MMHG
BH CV ECHO MEAS - PA MAX PG: 3.4 MMHG
BH CV ECHO MEAS - PA V2 MAX: 92.3 CM/SEC
BH CV ECHO MEAS - PULM A REVS DUR: 0.11 SEC
BH CV ECHO MEAS - PULM A REVS VEL: 53.4 CM/SEC
BH CV ECHO MEAS - PULM DIAS VEL: 38.3 CM/SEC
BH CV ECHO MEAS - PULM S/D: 1.3
BH CV ECHO MEAS - PULM SYS VEL: 48 CM/SEC
BH CV ECHO MEAS - PVA(V,A): 1.4 CM^2
BH CV ECHO MEAS - PVA(V,D): 1.4 CM^2
BH CV ECHO MEAS - QP/QS: 0.59
BH CV ECHO MEAS - RAP SYSTOLE: 3 MMHG
BH CV ECHO MEAS - RV MAX PG: 1.5 MMHG
BH CV ECHO MEAS - RV MEAN PG: 1 MMHG
BH CV ECHO MEAS - RV V1 MAX: 61.6 CM/SEC
BH CV ECHO MEAS - RV V1 MEAN: 48.2 CM/SEC
BH CV ECHO MEAS - RV V1 VTI: 12.2 CM
BH CV ECHO MEAS - RVOT AREA: 2.2 CM^2
BH CV ECHO MEAS - RVOT DIAM: 1.7 CM
BH CV ECHO MEAS - SI(AO): 86.5 ML/M^2
BH CV ECHO MEAS - SI(CUBED): 30.5 ML/M^2
BH CV ECHO MEAS - SI(LVOT): 27.1 ML/M^2
BH CV ECHO MEAS - SI(MOD-SP2): 19.4 ML/M^2
BH CV ECHO MEAS - SI(MOD-SP4): 21.8 ML/M^2
BH CV ECHO MEAS - SI(TEICH): 30.5 ML/M^2
BH CV ECHO MEAS - SUP REN AO DIAM: 1.2 CM
BH CV ECHO MEAS - SV(AO): 142.9 ML
BH CV ECHO MEAS - SV(CUBED): 50.5 ML
BH CV ECHO MEAS - SV(LVOT): 44.7 ML
BH CV ECHO MEAS - SV(MOD-SP2): 32 ML
BH CV ECHO MEAS - SV(MOD-SP4): 36 ML
BH CV ECHO MEAS - SV(RVOT): 26.4 ML
BH CV ECHO MEAS - SV(TEICH): 50.3 ML
BH CV ECHO MEAS - TAPSE (>1.6): 2.3 CM2
BH CV XLRA - RV BASE: 2.1 CM
BH CV XLRA - TDI S': 10 CM/SEC
E/E' RATIO: 6
LEFT ATRIUM VOLUME INDEX: 7 ML/M2
LV EF 2D ECHO EST: 67 %
SINUS: 3.3 CM
STJ: 2.8 CM

## 2017-07-24 PROCEDURE — 0399T ADULT TRANSTHORACIC ECHO COMPLETE WITH CONTRAST: CPT | Performed by: INTERNAL MEDICINE

## 2017-07-24 PROCEDURE — C8929 TTE W OR WO FOL WCON,DOPPLER: HCPCS

## 2017-07-24 PROCEDURE — 25010000002 PERFLUTREN (DEFINITY) 8.476 MG IN SODIUM CHLORIDE 10 ML INJECTION: Performed by: INTERNAL MEDICINE

## 2017-07-24 PROCEDURE — 93306 TTE W/DOPPLER COMPLETE: CPT | Performed by: INTERNAL MEDICINE

## 2017-07-24 PROCEDURE — 0399T HC MYOCARDL STRAIN IMAG QUAN ASSMT PER SESS: CPT

## 2017-07-24 RX ADMIN — PERFLUTREN 1.5 ML: 6.52 INJECTION, SUSPENSION INTRAVENOUS at 13:26

## 2017-07-25 DIAGNOSIS — C50.111 MALIGNANT NEOPLASM OF CENTRAL PORTION OF RIGHT FEMALE BREAST (HCC): ICD-10-CM

## 2017-07-25 DIAGNOSIS — T78.40XD HYPERSENSITIVITY REACTION, SUBSEQUENT ENCOUNTER: ICD-10-CM

## 2017-07-25 DIAGNOSIS — Z86.79 HISTORY OF CARDIAC ARRHYTHMIA: ICD-10-CM

## 2017-07-26 ENCOUNTER — INFUSION (OUTPATIENT)
Dept: ONCOLOGY | Facility: HOSPITAL | Age: 77
End: 2017-07-26

## 2017-07-26 ENCOUNTER — OFFICE VISIT (OUTPATIENT)
Dept: ONCOLOGY | Facility: CLINIC | Age: 77
End: 2017-07-26

## 2017-07-26 VITALS
BODY MASS INDEX: 25.58 KG/M2 | TEMPERATURE: 99.2 F | HEART RATE: 79 BPM | OXYGEN SATURATION: 100 % | SYSTOLIC BLOOD PRESSURE: 138 MMHG | HEIGHT: 62 IN | DIASTOLIC BLOOD PRESSURE: 77 MMHG | RESPIRATION RATE: 16 BRPM | WEIGHT: 139 LBS

## 2017-07-26 DIAGNOSIS — Z86.79 HISTORY OF CARDIAC ARRHYTHMIA: ICD-10-CM

## 2017-07-26 DIAGNOSIS — C50.111 MALIGNANT NEOPLASM OF CENTRAL PORTION OF RIGHT FEMALE BREAST (HCC): ICD-10-CM

## 2017-07-26 DIAGNOSIS — T78.40XD HYPERSENSITIVITY REACTION, SUBSEQUENT ENCOUNTER: Primary | ICD-10-CM

## 2017-07-26 DIAGNOSIS — E86.0 DEHYDRATION: ICD-10-CM

## 2017-07-26 DIAGNOSIS — T78.40XD HYPERSENSITIVITY REACTION, SUBSEQUENT ENCOUNTER: ICD-10-CM

## 2017-07-26 DIAGNOSIS — E87.6 HYPOKALEMIA: ICD-10-CM

## 2017-07-26 LAB
ALBUMIN SERPL-MCNC: 4.1 G/DL (ref 3.5–5.2)
ALBUMIN/GLOB SERPL: 1.9 G/DL
ALP SERPL-CCNC: 47 U/L (ref 39–117)
ALT SERPL W P-5'-P-CCNC: 29 U/L (ref 1–33)
ANION GAP SERPL CALCULATED.3IONS-SCNC: 13.8 MMOL/L
AST SERPL-CCNC: 24 U/L (ref 1–32)
BASOPHILS # BLD AUTO: 0.02 10*3/MM3 (ref 0–0.2)
BASOPHILS NFR BLD AUTO: 0.5 % (ref 0–1.5)
BILIRUB SERPL-MCNC: 0.6 MG/DL (ref 0.1–1.2)
BUN BLD-MCNC: 14 MG/DL (ref 8–23)
BUN/CREAT SERPL: 15.2 (ref 7–25)
CALCIUM SPEC-SCNC: 9.5 MG/DL (ref 8.6–10.5)
CHLORIDE SERPL-SCNC: 102 MMOL/L (ref 98–107)
CO2 SERPL-SCNC: 26.2 MMOL/L (ref 22–29)
CREAT BLD-MCNC: 0.92 MG/DL (ref 0.57–1)
DEPRECATED RDW RBC AUTO: 48.5 FL (ref 37–54)
EOSINOPHIL # BLD AUTO: 0.01 10*3/MM3 (ref 0–0.7)
EOSINOPHIL NFR BLD AUTO: 0.3 % (ref 0.3–6.2)
ERYTHROCYTE [DISTWIDTH] IN BLOOD BY AUTOMATED COUNT: 14.8 % (ref 11.7–13)
GFR SERPL CREATININE-BSD FRML MDRD: 59 ML/MIN/1.73
GLOBULIN UR ELPH-MCNC: 2.2 GM/DL
GLUCOSE BLD-MCNC: 104 MG/DL (ref 65–99)
HCT VFR BLD AUTO: 31.7 % (ref 35.6–45.5)
HGB BLD-MCNC: 10.8 G/DL (ref 11.9–15.5)
IMM GRANULOCYTES # BLD: 0.1 10*3/MM3 (ref 0–0.03)
IMM GRANULOCYTES NFR BLD: 2.5 % (ref 0–0.5)
LYMPHOCYTES # BLD AUTO: 1.19 10*3/MM3 (ref 0.9–4.8)
LYMPHOCYTES NFR BLD AUTO: 29.8 % (ref 19.6–45.3)
MCH RBC QN AUTO: 31.4 PG (ref 26.9–32)
MCHC RBC AUTO-ENTMCNC: 34.1 G/DL (ref 32.4–36.3)
MCV RBC AUTO: 92.2 FL (ref 80.5–98.2)
MONOCYTES # BLD AUTO: 0.48 10*3/MM3 (ref 0.2–1.2)
MONOCYTES NFR BLD AUTO: 12 % (ref 5–12)
NEUTROPHILS # BLD AUTO: 2.2 10*3/MM3 (ref 1.9–8.1)
NEUTROPHILS NFR BLD AUTO: 54.9 % (ref 42.7–76)
NRBC BLD MANUAL-RTO: 0 /100 WBC (ref 0–0)
PLATELET # BLD AUTO: 123 10*3/MM3 (ref 140–500)
PMV BLD AUTO: 11.3 FL (ref 6–12)
POTASSIUM BLD-SCNC: 3.4 MMOL/L (ref 3.5–5.2)
PROT SERPL-MCNC: 6.3 G/DL (ref 6–8.5)
RBC # BLD AUTO: 3.44 10*6/MM3 (ref 3.9–5.2)
SODIUM BLD-SCNC: 142 MMOL/L (ref 136–145)
WBC NRBC COR # BLD: 4 10*3/MM3 (ref 4.5–10.7)

## 2017-07-26 PROCEDURE — 96375 TX/PRO/DX INJ NEW DRUG ADDON: CPT | Performed by: INTERNAL MEDICINE

## 2017-07-26 PROCEDURE — 85025 COMPLETE CBC W/AUTO DIFF WBC: CPT | Performed by: INTERNAL MEDICINE

## 2017-07-26 PROCEDURE — 80053 COMPREHEN METABOLIC PANEL: CPT | Performed by: INTERNAL MEDICINE

## 2017-07-26 PROCEDURE — 96413 CHEMO IV INFUSION 1 HR: CPT | Performed by: INTERNAL MEDICINE

## 2017-07-26 PROCEDURE — 99215 OFFICE O/P EST HI 40 MIN: CPT | Performed by: INTERNAL MEDICINE

## 2017-07-26 PROCEDURE — 25010000002 PALONOSETRON PER 25 MCG: Performed by: INTERNAL MEDICINE

## 2017-07-26 PROCEDURE — 25010000002 DEXAMETHASONE SODIUM PHOSPHATE 10 MG/ML SOLUTION 1 ML VIAL: Performed by: INTERNAL MEDICINE

## 2017-07-26 PROCEDURE — 96417 CHEMO IV INFUS EACH ADDL SEQ: CPT | Performed by: INTERNAL MEDICINE

## 2017-07-26 PROCEDURE — 25010000002 HYDROCORTISONE SODIUM SUCCINATE 100 MG RECONSTITUTED SOLUTION: Performed by: INTERNAL MEDICINE

## 2017-07-26 PROCEDURE — 25010000002 TRASTUZUMAB PER 10 MG: Performed by: INTERNAL MEDICINE

## 2017-07-26 PROCEDURE — 36415 COLL VENOUS BLD VENIPUNCTURE: CPT | Performed by: INTERNAL MEDICINE

## 2017-07-26 PROCEDURE — 25010000002 PERTUZUMAB 420 MG/14ML SOLUTION 420 MG VIAL: Performed by: INTERNAL MEDICINE

## 2017-07-26 PROCEDURE — 25010000002 PACLITAXEL PER 30 MG: Performed by: INTERNAL MEDICINE

## 2017-07-26 RX ORDER — PALONOSETRON 0.05 MG/ML
0.25 INJECTION, SOLUTION INTRAVENOUS ONCE
Status: CANCELLED | OUTPATIENT
Start: 2017-07-26

## 2017-07-26 RX ORDER — SODIUM CHLORIDE 9 MG/ML
250 INJECTION, SOLUTION INTRAVENOUS ONCE
Status: COMPLETED | OUTPATIENT
Start: 2017-07-26 | End: 2017-07-26

## 2017-07-26 RX ORDER — HYDROXYZINE PAMOATE 25 MG/1
50 CAPSULE ORAL ONCE
Status: COMPLETED | OUTPATIENT
Start: 2017-07-26 | End: 2017-07-26

## 2017-07-26 RX ORDER — SODIUM CHLORIDE 9 MG/ML
250 INJECTION, SOLUTION INTRAVENOUS ONCE
Status: CANCELLED | OUTPATIENT
Start: 2017-08-02

## 2017-07-26 RX ORDER — FAMOTIDINE 10 MG/ML
20 INJECTION, SOLUTION INTRAVENOUS ONCE
Status: COMPLETED | OUTPATIENT
Start: 2017-07-26 | End: 2017-07-26

## 2017-07-26 RX ORDER — HYDROXYZINE PAMOATE 25 MG/1
50 CAPSULE ORAL ONCE
Status: CANCELLED
Start: 2017-08-02 | End: 2017-08-02

## 2017-07-26 RX ORDER — FAMOTIDINE 10 MG/ML
20 INJECTION, SOLUTION INTRAVENOUS ONCE
Status: CANCELLED | OUTPATIENT
Start: 2017-08-09

## 2017-07-26 RX ORDER — PALONOSETRON 0.05 MG/ML
0.25 INJECTION, SOLUTION INTRAVENOUS ONCE
Status: COMPLETED | OUTPATIENT
Start: 2017-07-26 | End: 2017-07-26

## 2017-07-26 RX ORDER — SODIUM CHLORIDE 9 MG/ML
250 INJECTION, SOLUTION INTRAVENOUS ONCE
Status: CANCELLED | OUTPATIENT
Start: 2017-08-09

## 2017-07-26 RX ORDER — PALONOSETRON 0.05 MG/ML
0.25 INJECTION, SOLUTION INTRAVENOUS ONCE
Status: CANCELLED | OUTPATIENT
Start: 2017-08-09

## 2017-07-26 RX ORDER — PALONOSETRON 0.05 MG/ML
0.25 INJECTION, SOLUTION INTRAVENOUS ONCE
Status: CANCELLED | OUTPATIENT
Start: 2017-08-02

## 2017-07-26 RX ORDER — SODIUM CHLORIDE 9 MG/ML
250 INJECTION, SOLUTION INTRAVENOUS ONCE
Status: CANCELLED | OUTPATIENT
Start: 2017-07-26

## 2017-07-26 RX ORDER — FAMOTIDINE 10 MG/ML
20 INJECTION, SOLUTION INTRAVENOUS ONCE
Status: CANCELLED | OUTPATIENT
Start: 2017-08-02

## 2017-07-26 RX ORDER — HYDROXYZINE PAMOATE 25 MG/1
50 CAPSULE ORAL ONCE
Status: CANCELLED
Start: 2017-08-09 | End: 2017-08-09

## 2017-07-26 RX ORDER — FAMOTIDINE 10 MG/ML
20 INJECTION, SOLUTION INTRAVENOUS ONCE
Status: CANCELLED | OUTPATIENT
Start: 2017-07-26

## 2017-07-26 RX ORDER — HYDROXYZINE PAMOATE 25 MG/1
50 CAPSULE ORAL ONCE
Status: CANCELLED
Start: 2017-07-26 | End: 2017-07-26

## 2017-07-26 RX ADMIN — TRASTUZUMAB 140 MG: 150 INJECTION, POWDER, LYOPHILIZED, FOR SOLUTION INTRAVENOUS at 11:23

## 2017-07-26 RX ADMIN — HYDROXYZINE PAMOATE 50 MG: 25 CAPSULE ORAL at 09:58

## 2017-07-26 RX ADMIN — PALONOSETRON HYDROCHLORIDE 0.25 MG: 0.25 INJECTION INTRAVENOUS at 10:00

## 2017-07-26 RX ADMIN — DEXAMETHASONE SODIUM PHOSPHATE 12 MG: 10 INJECTION, SOLUTION INTRAMUSCULAR; INTRAVENOUS at 10:02

## 2017-07-26 RX ADMIN — HYDROCORTISONE SODIUM SUCCINATE 100 MG: 100 INJECTION, POWDER, FOR SOLUTION INTRAMUSCULAR; INTRAVENOUS at 10:00

## 2017-07-26 RX ADMIN — PACLITAXEL 135 MG: 6 INJECTION, SOLUTION INTRAVENOUS at 11:56

## 2017-07-26 RX ADMIN — SODIUM CHLORIDE 250 ML: 900 INJECTION, SOLUTION INTRAVENOUS at 09:50

## 2017-07-26 RX ADMIN — FAMOTIDINE 20 MG: 10 INJECTION, SOLUTION INTRAVENOUS at 09:58

## 2017-07-26 RX ADMIN — PERTUZUMAB 420 MG: 30 INJECTION, SOLUTION, CONCENTRATE INTRAVENOUS at 10:19

## 2017-07-26 NOTE — PROGRESS NOTES
REASON FOR FOLLOWUP:   1. Newly diagnosed large right breast cancer.  Core needle biopsy reported invasive mammary carcinoma with focal lobular feature, grade 2.  ER negative, less than 1%; KS positive, moderate in staining, 5% to 10%. HER2 IHC 2+, FISH study positive 2.1.   2.  CT scan for chest abdomen and pelvis and breast MRI examination reported right axillary lymph node suspicious for metastatic disease.  No remote metastatic lesion.  Patient has stage IIIa (T3 N2 M0) disease.   3.  Patient was started neoadjuvant chemotherapy on 5/23/2017 with Taxol weekly plus Herceptin weekly for total 12 weeks, and Perjata every 3 weeks during chemotherapy.  Herceptin will be converted to every 3 weeks after chemotherapy finished.    4.  Chronic moderate thrombocytopenia, mild anemia, and intermittent mild neutropenia etiologies are not clear.  5.  Reaction to Herceptin C1D1.  Subsequently tolerated therapy with hydrocortisone as premedication.  6.  Potential cardiac strain developing, neuropathic symptoms persist, follow-up MRI and surgical assessment will be needed post initial chemotherapeutic phase     HISTORY OF PRESENT ILLNESS:    The patient is a pleasant 76 y.o. with the above-mentioned history, who presents today in anticipation of cycle 4 of Herceptin, Perjeta and Taxol.  This is the first visit with this physician involving this patient's case.  We have reviewed her status today with her slowly developing neuropathic symptoms in her lower extremities but also involving her fingers recognized significantly and she plays the piano and keyboard.  This is becoming harder to do but she is still able to do so.  She also notices neuropathy in her feet but is able to walk and function in daily activities.  Additional to this is her continued grieving at the death of her  though she, fortunately, has an excellent support system.  She continues to experience nausea that is well relieved with Compazine. She denies  oral mucositis.  No diarrhea no constipation no chest pain no dyspnea.  No lower extremity edema.    She did received 2 units of PRBC's on   7/8/2017 and remains hematologically stable.   She does have difficulty with sleep and Ambien 10 mg daily at bedtime seems help much better compared to 5 mg dose.  She does not need refills today.  In reviewing her case July 26 she is entering the fourth cycle of her treatment and recent echocardiogram is reviewed with she and her close friend revealing EF of 66.7% though with global strain of -16%?  Suspicious for myopathic process.  Normal ventricular cavity size and wall thickness as well as contractility.  We have also discussed that she is responding to therapy and will need surgical assessment which may not as yet have been performed.  She has seen Dr. Melo for port placement and will ask him to review her likely just after she has completed his fourth cycle of therapy.  It is also apparent that she'll need a cardiac assessment as we continue subsequent Herceptin therapy perioperatively.    Past Medical History:   Diagnosis Date   • Acute bronchitis    • Anemia    • Breast cancer 2007    Left   • Breast cancer 2017    Right   • Chronic pain    • Conjunctivitis    • Cough    • Ductal carcinoma in situ (DCIS) of left breast    • Edema     Chronic lower extremity edema   • GERD (gastroesophageal reflux disease)    • H/O Bowel obstruction 2013   • H/O jaundice    • Hernia    • History of infectious mononucleosis    • History of migraine headaches    • Hyperlipidemia    • Hypertension    • Impacted cerumen of left ear    • Leukocytopenia    • Leukopenia    • Meningioma    • Peptic ulceration    • Perioral dermatitis    • SBO (small bowel obstruction)     due to hernia with surgical repair in 09/2011   • SOB (shortness of breath) on exertion    • Thrombocytopenia    • Vertigo    Normal echocardiogram on 5/18/2017, LVEF 68%.    Past Surgical History:   Procedure Laterality  Date   • APPENDECTOMY     • BLADDER SURGERY      Bladder repair   • BREAST BIOPSY Left 2007   • BREAST BIOPSY  2017   • BREAST SURGERY     • CHOLECYSTECTOMY     • GALLBLADDER SURGERY     • HERNIA REPAIR     • HYSTERECTOMY     • MASTECTOMY Left     and sentinel lymph node biospy at [facility]   • RI INSJ TUNNELED CVC W/O SUBQ PORT/ AGE 5 YR/> Left 2017    Procedure: INSERTION VENOUS ACCESS DEVICE;  Surgeon: Christian Melo MD;  Location: [facility];  Service: General   • REDUCTION MAMMAPLASTY Right     to match Lt. TRAM flap   • TONSILLECTOMY     Lico catheter placement on 2017 by Dr. Melo.     OB/GYN history: Menarche age 16, menopause at . , 1 miscarriage. No birth control pill use. She did have post menopause hormonal supplementation.      HEMATOLOGIC/ONCOLOGIC HISTORY: The patient is a 76 y.o. year old female whom we are consulted for a newly self discovered right breast mass, in 2017. Patient had ultrasound-guided biopsy on 5/3/2017 and confirmed to be invasive mammary carcinoma with focal lobular features, grade 2 Elen score 6/9. She is here for initial evaluation for management.      Patient is a 76-year-old  female who was seen here previously for chronic mild-to-moderate thrombocytopenia and mild anemia. Recently the patient reports she started having firmness of the right breast that started sometime in April or maybe even in March. She noticed firmness spread from about around the 12 o'clock position, gradually towards the upper lateral side and lower part of the right breast associated mild pain. The patient thought it was related to fibrocystic changes. However, the symptom was getting worse. Patient also reported dented nipple after she started having pain in the right breast. She denies discharge from the nipple. She called her primary care physician, Dr. Martin, who ordered a mammogram study. This was done on 2017  with architectural distortion of the right breast centrally at 12 o'clock position and had scattered fibroglandular densities throughout the right breast.      The patient subsequently had right breast ultrasound examination on 04/26/2017. Discovered a large right breast mass measuring 5.8 x 3.5 x 3.9 cm. Patient subsequently had ultrasound-guided right breast biopsy on 05/03/2017. Pathology evaluation reported invasive mammary carcinoma with focal lobular feature. Palm score 6/9, overall grade 2. Sample was further sent to the [facility] for test. ER negative, less than 1%; LA positive, moderate in staining, 5% to 10%. HER2 IHC 2+, but FISH study positive 2.1.     She denies weight loss, she actually eats very well. No nausea vomiting. Patient does complain of insomnia, unable to sleep.      This patient has history of left breast DCIS back in 2007, had mastectomy at the [facility]. No hormonal therapy afterwards according to patient. This patient also had a small meningioma, followed by Dr. Smith in [facility], with most recent MRI of the brain in March 2017, with 18 mm meningioma, and followed on an annual basis.     Her neutrophil count on 5/16/17 is normal at 2500, however, records showed starting from 05/2015 and in 09/2016, 01/2017 and 03/2017, all 4 laboratory studies showed mild neutropenia with ANC fluctuating between 1200 and 1600. Etiology is not clear.    Normal echocardiogram on 5/18/2017, LVEF 68%.      CT scan for chest abdomen and pelvis on 5/22/2017 and breast MRI examination on 5/22/2017 reported small right axillary lymph node suspicious for metastatic disease.  No remote metastatic lesion.  Patient has stage IIIa (T3 N2 M0) disease.      Patient will start neoadjuvant chemotherapy with Taxol weekly plus Herceptin weekly for total 12 weeks, and Perjeta every 3 weeks (3-week cycle) during chemotherapy.  Herceptin will be converted to  every 3 weeks after chemotherapy finished.  Cycle 1 day 1 5/23/2017.     MEDICATIONS: The current medication list was reviewed with the patient and updated in the EMR this date per the Medical Assistant. Medication dosages and frequencies were confirmed to be accurate.       ALLERGIES:    Allergies   Allergen Reactions   • Codeine Nausea And Vomiting   • Morphine Nausea And Vomiting     SOCIAL HISTORY:   Social History   Substance Use Topics   • Smoking status: Former Smoker     Packs/day: 2.00     Years: 30.00     Types: Cigarettes   • Smokeless tobacco: Former User      Comment: caffeine use   • Alcohol use No      Comment: stopped, heavy in past     FAMILY HISTORY:  Family History   Problem Relation Age of Onset   • Heart disease Mother    • Aortic aneurysm Mother      abdominal   • Coronary artery disease Mother    • Hypertension Mother    • Heart disease Father    • Heart attack Father    • Hypertension Father    • Coronary artery disease Brother    • Hypertension Brother    • Heart disease Brother    • Breast cancer Daughter 45     I have reviewed the patient's medical history in detail and updated the computerized patient record.    REVIEW OF SYSTEMS:  GENERAL: See history of present illness. No change in appetite or weight;   No fevers, chills, sweats.   SKIN: No nonhealing lesions. No rashes.   HEME/LYMPH: See HPI.   EYES: No vision changes or diplopia.   ENT: No tinnitus, hearing loss, gum bleeding, epistaxis, hoarseness or dysphagia.   RESPIRATORY: No cough, Has exertional dyspnea, No hemoptysis or wheezing.   CVS: No chest pain, palpitations, orthopnea, dyspnea on exertion or PND.   GI: See HPI.   : No lower tract obstructive symptoms, dysuria or hematuria.   MUSCULOSKELETAL: Chronic or joint pain, arthritis.  Has mild intermittent ankle swelling.   NEUROLOGICAL: See HPI  PSYCHIATRIC: See HPI     Objective:   There were no vitals filed for this visit.ECOG 0      PHYSICAL EXAM:   GENERAL:  Well-developed, well-nourished female, in no acute distress.   SKIN: Warm, dry without rashes, purpura or petechiae.  Left upper chest Lico catheter in place, no evidence of infection.   EYES: Pupils equal, round and reactive to light. EOMs intact. Conjunctivae normal.  EARS: Hearing intact.  NOSE:  No excoriations or nasal discharge.  MOUTH: Tongue is well-papillated; no stomatitis or ulcers. Lips normal.  THROAT: Oropharynx without lesions or exudates.  LYMPHATICS: No cervical, supraclavicular, axillary adenopathy.  CHEST: Lungs clear to auscultation. Good airflow.   BREAST: Tumor approximately 4cm superior to inferior. Borders difficult to appreciate medial to lateral.   CARDIAC: Regular rate and rhythm without murmurs. Normal S1,S2.  ABDOMEN: Slightly distended, normal active bowel sounds,  no hepatosplenomegaly or masses.  EXTREMITIES: No clubbing, cyanosis or edema.  NEUROLOGICAL: Cranial Nerves II-XII grossly intact. No focal neurological deficits.  PSYCHIATRIC: She is slightly tearful.      RECENT LABS:  Lab Results   Component Value Date    WBC 4.40 (L) 07/19/2017    HGB 10.7 (L) 07/19/2017    HCT 31.4 (L) 07/19/2017    MCV 91.8 07/19/2017    PLT 97 (L) 07/19/2017     Lab Results   Component Value Date    NEUTROABS 2.50 07/19/2017     Lab Results   Component Value Date    GLUCOSE 96 07/19/2017    BUN 14 07/19/2017    CREATININE 0.73 07/19/2017    EGFRIFNONA 78 07/19/2017    EGFRIFAFRI 82 03/27/2017    BCR 19.2 07/19/2017    K 3.0 (L) 07/19/2017    CO2 25.7 07/19/2017    CALCIUM 8.6 07/19/2017    PROTENTOTREF 6.5 03/27/2017    ALBUMIN 3.70 07/19/2017    LABIL2 1.6 07/19/2017    AST 20 07/19/2017    ALT 22 07/19/2017     Acquired echocardiogram 2D complete with contrast   July 24, 2017     Interpretation Summary   · Left ventricular systolic function is normal. Calculated EF = 66.7%. Estimated EF was in agreement with the calculated EF. Estimated EF = 67%. Global strain = -16%. Strain data was reviewed by  physician. The global longitudinal RV strain is slightly diminished and decreased from prior study where it was -21. This is suspicious for early myopathic process, possibly related to chemotherapy.  · Normal left ventricular cavity size and wall thickness noted. All left ventricular wall segments contract normally. Left ventricular diastolic dysfunction is noted (grade I) consistent with impaired relaxation.       Assessment/Plan   1. Large right breast cancer, locally advanced, stage IIIa (T3 N1/ 2 M0).  ER negative, less than 1%; GA positive, moderate in staining, 5% to 10%. HER2 IHC 2+, FISH study positive 2.1.  Physical examination showed a large mass, 7 cm x 7 cm initially which has decreased to approximately less than 4 cm ..  Patient is here for her cycle 4 of Herceptin,Perjeta and Taxol.   She tolerates therapy relatively well though one is concerned about her degree of neuropathy is slowly worsening and, additionally, the cardiac findings on recent echocardiogram.  As result we discussed trying to complete therapy but assessing her each week particularly with these last 3 Taxol treatments and holding Taxol if her neuropathy worsens in any significant way.  As concerns her echocardiogram results she'll need preoperative clearance as well as a decision about the continued use of anti-HER-2 therapy.  A request to her cardiologist for consultation will be made for approximately 3 weeks.    2. Chronic mild-to-moderate thrombocytopenia. Her platelets has been relatively stable with some fluctuation.  Continue to monitor counts closely. Count today is 123,000    3.  Anemia related to therapy requiring 2 unit PRBC's on 7/8/2017. Hgb is stable today 10.8 g percent    Plan:  1.  Continue with chemotherapy-cycle #4 today of Herceptin,Perjeta and Taxol  2.  Patient be seen next week and the following week by JUANY or M.D. as she completes this for cycle 2 review for progressive neuropathic symptoms and possibly  discontinue Taxol if necessary.  3.  Consultation to cardiology will be requested in 3 weeks  4.  As she is seen next week or the following week pending her status surgical consultation will be requested per Dr. Melo.  Prior to that request an MRI of the breast will need to be scheduled.     4.  History of mild leukocytopenia/neutropenia.  She has no neutropenia now.  Her counts seems to improved.  We'll continue to monitor.       5. Insomnia.  Her sleeping is better on 10 mg of Ambien.      6.  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  Strong family history of breast cancer with daughter having breast cancer at age 40 and patient having had DCIS 10 years ago in the left rest requiring mastectomy.  She was referred to genetics counseling. at present it appears that she would not qualify for insurance coverage?  She is scheduled seizures and creatinine is 1    8. Chemotherapy induced nausea controlled with Compazine.    9. Anxiety/distress over 's passing this morning. She very much wants treatment today.

## 2017-08-02 ENCOUNTER — INFUSION (OUTPATIENT)
Dept: ONCOLOGY | Facility: HOSPITAL | Age: 77
End: 2017-08-02

## 2017-08-02 ENCOUNTER — OFFICE VISIT (OUTPATIENT)
Dept: ONCOLOGY | Facility: CLINIC | Age: 77
End: 2017-08-02

## 2017-08-02 VITALS
DIASTOLIC BLOOD PRESSURE: 80 MMHG | RESPIRATION RATE: 18 BRPM | HEIGHT: 62 IN | OXYGEN SATURATION: 100 % | WEIGHT: 139.6 LBS | SYSTOLIC BLOOD PRESSURE: 160 MMHG | HEART RATE: 76 BPM | TEMPERATURE: 98 F | BODY MASS INDEX: 25.69 KG/M2

## 2017-08-02 DIAGNOSIS — E87.6 HYPOKALEMIA: ICD-10-CM

## 2017-08-02 DIAGNOSIS — D72.818 OTHER DECREASED WHITE BLOOD CELL (WBC) COUNT: ICD-10-CM

## 2017-08-02 DIAGNOSIS — T78.40XD HYPERSENSITIVITY REACTION, SUBSEQUENT ENCOUNTER: ICD-10-CM

## 2017-08-02 DIAGNOSIS — G62.0 PERIPHERAL NEUROPATHY DUE TO CHEMOTHERAPY (HCC): Primary | ICD-10-CM

## 2017-08-02 DIAGNOSIS — Z86.79 HISTORY OF CARDIAC ARRHYTHMIA: ICD-10-CM

## 2017-08-02 DIAGNOSIS — C50.111 MALIGNANT NEOPLASM OF CENTRAL PORTION OF RIGHT FEMALE BREAST (HCC): ICD-10-CM

## 2017-08-02 DIAGNOSIS — E86.0 DEHYDRATION: ICD-10-CM

## 2017-08-02 DIAGNOSIS — T45.1X5A CHEMOTHERAPY-INDUCED NAUSEA: ICD-10-CM

## 2017-08-02 DIAGNOSIS — T78.40XD HYPERSENSITIVITY REACTION, SUBSEQUENT ENCOUNTER: Primary | ICD-10-CM

## 2017-08-02 DIAGNOSIS — D64.9 ANEMIA, UNSPECIFIED TYPE: Primary | ICD-10-CM

## 2017-08-02 DIAGNOSIS — T45.1X5A PERIPHERAL NEUROPATHY DUE TO CHEMOTHERAPY (HCC): Primary | ICD-10-CM

## 2017-08-02 DIAGNOSIS — R11.0 CHEMOTHERAPY-INDUCED NAUSEA: ICD-10-CM

## 2017-08-02 LAB
ALBUMIN SERPL-MCNC: 4 G/DL (ref 3.5–5.2)
ALBUMIN/GLOB SERPL: 1.5 G/DL (ref 1.1–2.4)
ALP SERPL-CCNC: 52 U/L (ref 38–116)
ALT SERPL W P-5'-P-CCNC: 23 U/L (ref 0–33)
ANION GAP SERPL CALCULATED.3IONS-SCNC: 15.4 MMOL/L
AST SERPL-CCNC: 24 U/L (ref 0–32)
BASOPHILS # BLD AUTO: 0.02 10*3/MM3 (ref 0–0.1)
BASOPHILS NFR BLD AUTO: 0.4 % (ref 0–1.1)
BILIRUB SERPL-MCNC: 0.5 MG/DL (ref 0.1–1.2)
BUN BLD-MCNC: 13 MG/DL (ref 6–20)
BUN/CREAT SERPL: 14.9 (ref 7.3–30)
CALCIUM SPEC-SCNC: 9.8 MG/DL (ref 8.5–10.2)
CHLORIDE SERPL-SCNC: 94 MMOL/L (ref 98–107)
CO2 SERPL-SCNC: 27.6 MMOL/L (ref 22–29)
CREAT BLD-MCNC: 0.87 MG/DL (ref 0.6–1.1)
DEPRECATED RDW RBC AUTO: 49.6 FL (ref 37–49)
EOSINOPHIL # BLD AUTO: 0.02 10*3/MM3 (ref 0–0.36)
EOSINOPHIL NFR BLD AUTO: 0.4 % (ref 1–5)
ERYTHROCYTE [DISTWIDTH] IN BLOOD BY AUTOMATED COUNT: 14.8 % (ref 11.7–14.5)
GFR SERPL CREATININE-BSD FRML MDRD: 63 ML/MIN/1.73
GLOBULIN UR ELPH-MCNC: 2.7 GM/DL (ref 1.8–3.5)
GLUCOSE BLD-MCNC: 134 MG/DL (ref 74–124)
HCT VFR BLD AUTO: 32.8 % (ref 34–45)
HGB BLD-MCNC: 11 G/DL (ref 11.5–14.9)
HOLD SPECIMEN: NORMAL
IMM GRANULOCYTES # BLD: 0.4 10*3/MM3 (ref 0–0.03)
IMM GRANULOCYTES NFR BLD: 8.5 % (ref 0–0.5)
LYMPHOCYTES # BLD AUTO: 1.44 10*3/MM3 (ref 1–3.5)
LYMPHOCYTES NFR BLD AUTO: 30.8 % (ref 20–49)
MCH RBC QN AUTO: 31.1 PG (ref 27–33)
MCHC RBC AUTO-ENTMCNC: 33.5 G/DL (ref 32–35)
MCV RBC AUTO: 92.7 FL (ref 83–97)
MONOCYTES # BLD AUTO: 0.51 10*3/MM3 (ref 0.25–0.8)
MONOCYTES NFR BLD AUTO: 10.9 % (ref 4–12)
NEUTROPHILS # BLD AUTO: 2.29 10*3/MM3 (ref 1.5–7)
NEUTROPHILS NFR BLD AUTO: 49 % (ref 39–75)
NRBC BLD MANUAL-RTO: 0 /100 WBC (ref 0–0)
PLATELET # BLD AUTO: 130 10*3/MM3 (ref 150–375)
PMV BLD AUTO: 10.4 FL (ref 8.9–12.1)
POTASSIUM BLD-SCNC: 3.2 MMOL/L (ref 3.5–4.7)
PROT SERPL-MCNC: 6.7 G/DL (ref 6.3–8)
RBC # BLD AUTO: 3.54 10*6/MM3 (ref 3.9–5)
SODIUM BLD-SCNC: 137 MMOL/L (ref 134–145)
WBC NRBC COR # BLD: 4.68 10*3/MM3 (ref 4–10)

## 2017-08-02 PROCEDURE — 25010000002 DEXAMETHASONE PER 1 MG: Performed by: INTERNAL MEDICINE

## 2017-08-02 PROCEDURE — 99215 OFFICE O/P EST HI 40 MIN: CPT | Performed by: NURSE PRACTITIONER

## 2017-08-02 PROCEDURE — 96413 CHEMO IV INFUSION 1 HR: CPT | Performed by: NURSE PRACTITIONER

## 2017-08-02 PROCEDURE — 25010000002 TRASTUZUMAB PER 10 MG: Performed by: INTERNAL MEDICINE

## 2017-08-02 PROCEDURE — 25010000002 PALONOSETRON PER 25 MCG: Performed by: INTERNAL MEDICINE

## 2017-08-02 PROCEDURE — 85025 COMPLETE CBC W/AUTO DIFF WBC: CPT

## 2017-08-02 PROCEDURE — 25010000002 HYDROCORTISONE SODIUM SUCCINATE 100 MG RECONSTITUTED SOLUTION: Performed by: INTERNAL MEDICINE

## 2017-08-02 PROCEDURE — 80053 COMPREHEN METABOLIC PANEL: CPT

## 2017-08-02 PROCEDURE — 96375 TX/PRO/DX INJ NEW DRUG ADDON: CPT | Performed by: NURSE PRACTITIONER

## 2017-08-02 PROCEDURE — 96361 HYDRATE IV INFUSION ADD-ON: CPT | Performed by: NURSE PRACTITIONER

## 2017-08-02 RX ORDER — HYDROXYZINE PAMOATE 25 MG/1
50 CAPSULE ORAL ONCE
Status: COMPLETED | OUTPATIENT
Start: 2017-08-02 | End: 2017-08-02

## 2017-08-02 RX ORDER — PALONOSETRON 0.05 MG/ML
0.25 INJECTION, SOLUTION INTRAVENOUS ONCE
Status: COMPLETED | OUTPATIENT
Start: 2017-08-02 | End: 2017-08-02

## 2017-08-02 RX ORDER — FAMOTIDINE 10 MG/ML
20 INJECTION, SOLUTION INTRAVENOUS ONCE
Status: COMPLETED | OUTPATIENT
Start: 2017-08-02 | End: 2017-08-02

## 2017-08-02 RX ORDER — SODIUM CHLORIDE 9 MG/ML
1000 INJECTION, SOLUTION INTRAVENOUS ONCE
Status: COMPLETED | OUTPATIENT
Start: 2017-08-02 | End: 2017-08-02

## 2017-08-02 RX ORDER — SODIUM CHLORIDE 9 MG/ML
1000 INJECTION, SOLUTION INTRAVENOUS ONCE
Status: CANCELLED
Start: 2017-08-02 | End: 2017-08-02

## 2017-08-02 RX ORDER — SODIUM CHLORIDE 9 MG/ML
250 INJECTION, SOLUTION INTRAVENOUS ONCE
Status: COMPLETED | OUTPATIENT
Start: 2017-08-02 | End: 2017-08-02

## 2017-08-02 RX ORDER — HYDROXYZINE PAMOATE 25 MG/1
50 CAPSULE ORAL ONCE
Status: DISCONTINUED | OUTPATIENT
Start: 2017-08-02 | End: 2017-08-02 | Stop reason: HOSPADM

## 2017-08-02 RX ADMIN — FAMOTIDINE 20 MG: 10 INJECTION, SOLUTION INTRAVENOUS at 16:05

## 2017-08-02 RX ADMIN — SODIUM CHLORIDE 1500 ML: 900 INJECTION, SOLUTION INTRAVENOUS at 14:23

## 2017-08-02 RX ADMIN — PALONOSETRON HYDROCHLORIDE 0.25 MG: 0.25 INJECTION INTRAVENOUS at 14:55

## 2017-08-02 RX ADMIN — DEXAMETHASONE SODIUM PHOSPHATE 12 MG: 4 INJECTION, SOLUTION INTRA-ARTICULAR; INTRALESIONAL; INTRAMUSCULAR; INTRAVENOUS; SOFT TISSUE at 16:05

## 2017-08-02 RX ADMIN — HYDROCORTISONE SODIUM SUCCINATE 100 MG: 100 INJECTION, POWDER, FOR SOLUTION INTRAMUSCULAR; INTRAVENOUS at 16:04

## 2017-08-02 RX ADMIN — HYDROXYZINE PAMOATE 50 MG: 25 CAPSULE ORAL at 14:58

## 2017-08-02 RX ADMIN — SODIUM CHLORIDE 250 ML: 900 INJECTION, SOLUTION INTRAVENOUS at 14:23

## 2017-08-02 RX ADMIN — TRASTUZUMAB 140 MG: 150 INJECTION, POWDER, LYOPHILIZED, FOR SOLUTION INTRAVENOUS at 16:23

## 2017-08-02 NOTE — PROGRESS NOTES
REASON FOR FOLLOWUP:   1. Newly diagnosed large right breast cancer.  Core needle biopsy reported invasive mammary carcinoma with focal lobular feature, grade 2.  ER negative, less than 1%; HI positive, moderate in staining, 5% to 10%. HER2 IHC 2+, FISH study positive 2.1.   2.  CT scan for chest abdomen and pelvis and breast MRI examination reported right axillary lymph node suspicious for metastatic disease.  No remote metastatic lesion.  Patient has stage IIIa (T3 N2 M0) disease.   3.  Patient was started neoadjuvant chemotherapy on 5/23/2017 with Taxol weekly plus Herceptin weekly for total 12 weeks, and Perjata every 3 weeks during chemotherapy.  Herceptin will be converted to every 3 weeks after chemotherapy finished.    4.  Chronic moderate thrombocytopenia, mild anemia, and intermittent mild neutropenia etiologies are not clear.  5.  Reaction to Herceptin C1D1.  Subsequently tolerated therapy with hydrocortisone as premedication.  6.  Potential cardiac strain developing, neuropathic symptoms persist, follow-up MRI and surgical assessment will be needed post initial chemotherapeutic phase     HISTORY OF PRESENT ILLNESS:    The patient is a pleasant 76 y.o. with the above-mentioned history, who presents today in anticipation of cycle 4 of Herceptin, Perjeta and Taxol.  This is the first visit with this physician involving this patient's case.  We have reviewed her status today with her slowly developing neuropathic symptoms in her lower extremities but also involving her fingers recognized significantly and she plays the piano and keyboard.  This is becoming harder to do but she is still able to do so.  She also notices neuropathy in her feet but is able to walk and function in daily activities.  Additional to this is her continued grieving at the death of her  though she, fortunately, has an excellent support system.  She continues to experience nausea that is well relieved with Compazine. She denies  "oral mucositis.  No diarrhea no constipation no chest pain no dyspnea.  No lower extremity edema.    She did received 2 units of PRBC's on   7/8/2017 and remains hematologically stable.   She does have difficulty with sleep and Ambien 10 mg daily at bedtime seems help much better compared to 5 mg dose.  She does not need refills today.  In reviewing her case July 26 she is entering the fourth cycle of her treatment and recent echocardiogram is reviewed with she and her close friend revealing EF of 66.7% though with global strain of -16%?  Suspicious for myopathic process.  Normal ventricular cavity size and wall thickness as well as contractility.  We have also discussed that she is responding to therapy and will need surgical assessment which may not as yet have been performed.  She has seen Dr. Melo for port placement and will ask him to review her likely just after she has completed his fourth cycle of therapy.  It is also apparent that she'll need a cardiac assessment as we continue subsequent Herceptin therapy perioperatively.    The patient returns today in anticipation of cycle #4, day 8 of Herceptin and Taxol.  She comes to the office extremely weak with progressive neuropathy of her upper and lower bilateral extremities.  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  She  now reports difficulty with ambulation and is concerned that she may fall due to lower extremity weakness and numbness.  She denies any headaches or visual disturbances.  Of note, patient does report precipitation of \"shaking\" since this morning.  She is extremely anxious at the worsening neuropathy and I feel that this is likely related to that, as she does not exhibit any other neurological deficits  Aside from this, she denies any shortness of breath or chest pain.  She denies any infectious symptoms including fevers or chills.  She does note \"extreme nausea\" this morning without vomiting.  She has been " alternating Compazine and ondansetron with little effect.  She has been attempting to maintain adequate hydration and nutrition, though admits that recently her appetite has been substantially diminished.  She does have intermittent constipation and diarrhea, though this morning she had 2 episodes of diarrhea with resolution now.  She was able to eat a cheese sandwich with a mocha frappachino for lunch and that did not induce nausea.     Most recent echocardiogram revealed a 16% decline in ejection fraction.  Patient is essentially asymptomatic of this.  She will follow-up with Dr. Ojeda of cardiology on August 14, 2017 for further evaluation.    She has no other concerns today.    Past Medical History:   Diagnosis Date   • Acute bronchitis    • Anemia    • Breast cancer 2007    Left   • Breast cancer 2017    Right   • Chronic pain    • Conjunctivitis    • Cough    • Ductal carcinoma in situ (DCIS) of left breast    • Edema     Chronic lower extremity edema   • GERD (gastroesophageal reflux disease)    • H/O Bowel obstruction 2013   • H/O jaundice    • Hernia    • History of infectious mononucleosis 1960   • History of migraine headaches    • Hyperlipidemia    • Hypertension    • Impacted cerumen of left ear    • Leukocytopenia    • Leukopenia    • Meningioma    • Peptic ulceration    • Perioral dermatitis    • SBO (small bowel obstruction)     due to hernia with surgical repair in 09/2011   • SOB (shortness of breath) on exertion    • Thrombocytopenia    • Vertigo    Normal echocardiogram on 5/18/2017, LVEF 68%.    Past Surgical History:   Procedure Laterality Date   • APPENDECTOMY  1960   • BLADDER SURGERY  1980    Bladder repair   • BREAST BIOPSY Left 2007   • BREAST BIOPSY  05/03/2017   • BREAST SURGERY     • CHOLECYSTECTOMY  1990   • GALLBLADDER SURGERY     • HERNIA REPAIR  2011    H/O bowel obstruction   • HYSTERECTOMY  1980   • MASTECTOMY Left 2007    and sentinel lymph node biospy at [facility]   • ND  INSJ TUNNELED CVC W/O SUBQ PORT/ AGE 5 YR/> Left 2017    Procedure: INSERTION VENOUS ACCESS DEVICE;  Surgeon: Christian Melo MD;  Location: [facility] OR;  Service: General   • REDUCTION MAMMAPLASTY Right     to match Lt. TRAM flap   • TONSILLECTOMY     Lico catheter placement on 2017 by Dr. Melo.     OB/GYN history: Menarche age 16, menopause at 1985. , 1 miscarriage. No birth control pill use. She did have post menopause hormonal supplementation.      HEMATOLOGIC/ONCOLOGIC HISTORY: The patient is a 76 y.o. year old female whom we are consulted for a newly self discovered right breast mass, in 2017. Patient had ultrasound-guided biopsy on 5/3/2017 and confirmed to be invasive mammary carcinoma with focal lobular features, grade 2 Elen score 6/9. She is here for initial evaluation for management.      Patient is a 76-year-old  female who was seen here previously for chronic mild-to-moderate thrombocytopenia and mild anemia. Recently the patient reports she started having firmness of the right breast that started sometime in April or maybe even in March. She noticed firmness spread from about around the 12 o'clock position, gradually towards the upper lateral side and lower part of the right breast associated mild pain. The patient thought it was related to fibrocystic changes. However, the symptom was getting worse. Patient also reported dented nipple after she started having pain in the right breast. She denies discharge from the nipple. She called her primary care physician, Dr. Martin, who ordered a mammogram study. This was done on 2017 with architectural distortion of the right breast centrally at 12 o'clock position and had scattered fibroglandular densities throughout the right breast.      The patient subsequently had right breast ultrasound examination on 2017. Discovered a large right breast mass measuring 5.8 x 3.5 x 3.9 cm. Patient subsequently  had ultrasound-guided right breast biopsy on 05/03/2017. Pathology evaluation reported invasive mammary carcinoma with focal lobular feature. Elen score 6/9, overall grade 2. Sample was further sent to the [facility] for test. ER negative, less than 1%; MA positive, moderate in staining, 5% to 10%. HER2 IHC 2+, but FISH study positive 2.1.     She denies weight loss, she actually eats very well. No nausea vomiting. Patient does complain of insomnia, unable to sleep.      This patient has history of left breast DCIS back in 2007, had mastectomy at the [facility]. No hormonal therapy afterwards according to patient. This patient also had a small meningioma, followed by Dr. Smith in [facility], with most recent MRI of the brain in March 2017, with 18 mm meningioma, and followed on an annual basis.     Her neutrophil count on 5/16/17 is normal at 2500, however, records showed starting from 05/2015 and in 09/2016, 01/2017 and 03/2017, all 4 laboratory studies showed mild neutropenia with ANC fluctuating between 1200 and 1600. Etiology is not clear.    Normal echocardiogram on 5/18/2017, LVEF 68%.      CT scan for chest abdomen and pelvis on 5/22/2017 and breast MRI examination on 5/22/2017 reported small right axillary lymph node suspicious for metastatic disease.  No remote metastatic lesion.  Patient has stage IIIa (T3 N2 M0) disease.      Patient will start neoadjuvant chemotherapy with Taxol weekly plus Herceptin weekly for total 12 weeks, and Perjeta every 3 weeks (3-week cycle) during chemotherapy.  Herceptin will be converted to every 3 weeks after chemotherapy finished.  Cycle 1 day 1 5/23/2017.     MEDICATIONS: The current medication list was reviewed with the patient and updated in the EMR this date per the Medical Assistant. Medication dosages and frequencies were confirmed to be accurate.       ALLERGIES:    Allergies   Allergen Reactions   •  "Codeine Nausea And Vomiting   • Morphine Nausea And Vomiting     SOCIAL HISTORY:   Social History   Substance Use Topics   • Smoking status: Former Smoker     Packs/day: 2.00     Years: 30.00     Types: Cigarettes   • Smokeless tobacco: Former User      Comment: caffeine use   • Alcohol use No      Comment: stopped, heavy in past     FAMILY HISTORY:  Family History   Problem Relation Age of Onset   • Heart disease Mother    • Aortic aneurysm Mother      abdominal   • Coronary artery disease Mother    • Hypertension Mother    • Heart disease Father    • Heart attack Father    • Hypertension Father    • Coronary artery disease Brother    • Hypertension Brother    • Heart disease Brother    • Breast cancer Daughter 45     I have reviewed the patient's medical history in detail and updated the computerized patient record.    REVIEW OF SYSTEMS:  GENERAL: See history of present illness. Decrease in appetite, generalized weakness   No fevers, chills, sweats.   SKIN: No nonhealing lesions. No rashes.   HEME/LYMPH: See HPI.   EYES: No vision changes or diplopia.   ENT: No tinnitus, hearing loss, gum bleeding, epistaxis, hoarseness or dysphagia.   RESPIRATORY: No cough, Has exertional dyspnea, No hemoptysis or wheezing.   CVS: No chest pain, palpitations, orthopnea, dyspnea on exertion or PND, See HPI   GI: See HPI.   : No lower tract obstructive symptoms, dysuria or hematuria.   MUSCULOSKELETAL: Chronic or joint pain, arthritis.  Has mild intermittent ankle swelling.   NEUROLOGICAL: See HPI  PSYCHIATRIC: Anxious      Objective:   Vitals:    08/02/17 1306   BP: 160/80   Pulse: 76   Resp: 18   Temp: 98 °F (36.7 °C)   SpO2: 100%   Weight: 139 lb 9.6 oz (63.3 kg)   Height: 62.01\" (157.5 cm)   PainSc: 0-No pain   ECOG 0      PHYSICAL EXAM:   GENERAL: Well-developed, well-nourished female, in no acute distress.   SKIN: Warm, dry without rashes, purpura or petechiae.  Left upper chest Lico catheter in place, no evidence of " infection.   EYES: Pupils equal, round and reactive to light. EOMs intact. Conjunctivae normal.  EARS: Hearing intact.  NOSE:  No excoriations or nasal discharge.  MOUTH: Tongue is well-papillated; no stomatitis or ulcers. Lips normal.  THROAT: Oropharynx without lesions or exudates.  LYMPHATICS: No cervical, supraclavicular, axillary adenopathy.  CHEST: Lungs clear to auscultation. Good airflow.   BREAST: Not examined today    CARDIAC: Regular rate and rhythm without murmurs. Normal S1,S2.  ABDOMEN: Slightly distended, normal active bowel sounds,  no hepatosplenomegaly or masses. Generalized, mild tenderness with palpation   EXTREMITIES: No clubbing, cyanosis or edema.  Chronic bruising noted on bilateral lower extremities  NEUROLOGICAL: Cranial Nerves II-XII grossly intact. No focal neurological deficits.  PSYCHIATRIC: She seems anxious       RECENT LABS:  Lab Results   Component Value Date    WBC 4.68 08/02/2017    HGB 11.0 (L) 08/02/2017    HCT 32.8 (L) 08/02/2017    MCV 92.7 08/02/2017     (L) 08/02/2017     Lab Results   Component Value Date    NEUTROABS 2.29 08/02/2017     Lab Results   Component Value Date    GLUCOSE 134 (H) 08/02/2017    BUN 13 08/02/2017    CREATININE 0.87 08/02/2017    EGFRIFNONA 63 08/02/2017    EGFRIFAFRI 82 03/27/2017    BCR 14.9 08/02/2017    K 3.2 (L) 08/02/2017    CO2 27.6 08/02/2017    CALCIUM 9.8 08/02/2017    PROTENTOTREF 6.5 03/27/2017    ALBUMIN 4.00 08/02/2017    LABIL2 1.5 08/02/2017    AST 24 08/02/2017    ALT 23 08/02/2017     Acquired echocardiogram 2D complete with contrast   July 24, 2017     Interpretation Summary   · Left ventricular systolic function is normal. Calculated EF = 66.7%. Estimated EF was in agreement with the calculated EF. Estimated EF = 67%. Global strain = -16%. Strain data was reviewed by physician. The global longitudinal RV strain is slightly diminished and decreased from prior study where it was -21. This is suspicious for early myopathic  process, possibly related to chemotherapy.  · Normal left ventricular cavity size and wall thickness noted. All left ventricular wall segments contract normally. Left ventricular diastolic dysfunction is noted (grade I) consistent with impaired relaxation.       Assessment/Plan   1. Large right breast cancer, locally advanced, stage IIIa (T3 N1/ 2 M0).  ER negative, less than 1%; CO positive, moderate in staining, 5% to 10%. HER2 IHC 2+, FISH study positive 2.1.  Physical examination showed a large mass, 7 cm x 7 cm initially which has decreased to approximately less than 4 cm.  Patient is here for her cycle 4/day 8  of Taxol and Herceptin alone.  She reports substantial weakness and progressive neuropathy impairing her ability to perform daily activities.  She has also struggled with several days of nausea, despite alternation of Compazine and Zofran.  Upon discussion with Dr. Coburn, we will discontinue all further Taxol treatments and patient will receive Herceptin alone today with the addition of anti-emetics and IV hydration support.  We'll bring her back on Friday for lab and RN review with possible addition of IV hydration and antiemetics if she requires it.  Patient will return in one week for Herceptin alone (with discontinuation of Taxol)  day 15 with M.D. visit with prior Brandon.     In regards to her worsening cardiac function, patient will be evaluated by Dr. Ojeda of cardiology and August 14, 2017. Once she has been evaluated by cardiology, we will be able to make a decision regarding the continued use of anti-HER-2 therapy.  I instructed her to call our office with shortness of breath, chest pain, or any other concerning symptoms prior to her next office visit.      2. Chronic mild-to-moderate thrombocytopenia. Her platelets has been relatively stable with some fluctuation.  Continue to monitor counts closely. Count today is 130,000    3.  Anemia related to therapy requiring 2 unit PRBC's on  7/8/2017.  Hemoglobin has improved to 11.0 today.    4.  Progressive neuropathy and weakness.  Per Dr. Coburn, due to worsening neuropathy we will discontinue all subsequent Taxol treatments.  We will continue to monitor patient closely and provide her with IV hydration in the office today and Friday if she is still feeling weak.  I've encouraged the patient to call our office or seek emergency attention if bilateral lower extremity weakness worsens.    Plan:  1.  Proceed with cycle #4, day 8 of Herceptin alone.  We will discontinue all subsequent Taxol treatment secondary to neuropathy.    2.  She'll be seen in one week by , anticipation of cycle #4, day 15 Herceptin alone    3.  Cardiology consult with  on August 14, 2017    4.  As she is seen next week or the following week pending her status surgical consultation will be requested per Dr. Melo.  Prior to that request an MRI of the breast will need to be scheduled.     4.  History of mild leukocytopenia/neutropenia.  No evidence of neutropenia today.  We will continue to monitor this closely     5. Insomnia.  She has been sleeping adequately with 10 mg of Ambien nightly.    6.  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    7.  Strong family history of breast cancer with daughter having breast cancer at age 40 and patient having had DCIS 10 years ago in the left rest requiring mastectomy.  She was referred to genetics counseling. at present it appears that she would not qualify for insurance coverage?    8. Chemotherapy induced nausea: Of last several days, patient has struggled with nausea despite alternation of Compazine and ondansetron.  We will provide her with IV Aloxi today in addition to her Herceptin treatment for relief    9. Anxiety/distress over 's passing: Patient does appear to be extremely anxious today as she feels that the she has  declining and has little help, since the passing of her .  She does have assistance with transportation to and from her chemotherapy appointments.  We may consider a referral to a  for additional help in the future.

## 2017-08-02 NOTE — PROGRESS NOTES
Per verbal order Melyssa Romero NP patient will no longer receive Taxol starting with Cycle 4 Day 8. Patient will continue to receive Herceptin and Perjecta

## 2017-08-03 ENCOUNTER — DOCUMENTATION (OUTPATIENT)
Dept: ONCOLOGY | Facility: CLINIC | Age: 77
End: 2017-08-03

## 2017-08-03 RX ORDER — SODIUM CHLORIDE 9 MG/ML
1000 INJECTION, SOLUTION INTRAVENOUS CONTINUOUS
Status: CANCELLED
Start: 2017-08-04

## 2017-08-03 NOTE — PROGRESS NOTES
Patient was added to Friday's schedule for Normal Saline 1 liter and antinausea medication by PRANAY Ragsdale. Added the Normal Saline to treatment plan unfortunately NP did not state in her dictation which antinausea medication patient was to receive.

## 2017-08-04 ENCOUNTER — OFFICE VISIT (OUTPATIENT)
Dept: ONCOLOGY | Facility: CLINIC | Age: 77
End: 2017-08-04

## 2017-08-04 ENCOUNTER — INFUSION (OUTPATIENT)
Dept: ONCOLOGY | Facility: HOSPITAL | Age: 77
End: 2017-08-04

## 2017-08-04 VITALS
WEIGHT: 139.2 LBS | DIASTOLIC BLOOD PRESSURE: 80 MMHG | TEMPERATURE: 98.5 F | BODY MASS INDEX: 25.45 KG/M2 | HEART RATE: 93 BPM | SYSTOLIC BLOOD PRESSURE: 174 MMHG

## 2017-08-04 DIAGNOSIS — C50.111 MALIGNANT NEOPLASM OF CENTRAL PORTION OF RIGHT FEMALE BREAST (HCC): ICD-10-CM

## 2017-08-04 DIAGNOSIS — E86.0 DEHYDRATION: Primary | ICD-10-CM

## 2017-08-04 DIAGNOSIS — G25.9 EXTRAPYRAMIDAL SYNDROME: Primary | ICD-10-CM

## 2017-08-04 PROCEDURE — 96361 HYDRATE IV INFUSION ADD-ON: CPT | Performed by: NURSE PRACTITIONER

## 2017-08-04 PROCEDURE — 99214 OFFICE O/P EST MOD 30 MIN: CPT | Performed by: NURSE PRACTITIONER

## 2017-08-04 PROCEDURE — 25010000002 GRANISETRON PER 100 MCG: Performed by: NURSE PRACTITIONER

## 2017-08-04 PROCEDURE — 96374 THER/PROPH/DIAG INJ IV PUSH: CPT | Performed by: NURSE PRACTITIONER

## 2017-08-04 RX ORDER — HYDROXYZINE PAMOATE 25 MG/1
25 CAPSULE ORAL 2 TIMES DAILY
Qty: 60 CAPSULE | Refills: 0 | Status: SHIPPED | OUTPATIENT
Start: 2017-08-04 | End: 2017-08-31 | Stop reason: SDUPTHER

## 2017-08-04 RX ORDER — DIPHENHYDRAMINE HYDROCHLORIDE 50 MG/ML
12.5 INJECTION INTRAMUSCULAR; INTRAVENOUS EVERY 6 HOURS PRN
Status: DISCONTINUED | OUTPATIENT
Start: 2017-08-04 | End: 2017-08-04

## 2017-08-04 RX ORDER — HYDROXYZINE PAMOATE 25 MG/1
50 CAPSULE ORAL ONCE
Status: COMPLETED | OUTPATIENT
Start: 2017-08-04 | End: 2017-08-04

## 2017-08-04 RX ORDER — SODIUM CHLORIDE 9 MG/ML
1000 INJECTION, SOLUTION INTRAVENOUS CONTINUOUS
Status: DISCONTINUED | OUTPATIENT
Start: 2017-08-04 | End: 2017-08-07 | Stop reason: HOSPADM

## 2017-08-04 RX ORDER — GRANISETRON HYDROCHLORIDE 1 MG/ML
1 INJECTION INTRAVENOUS ONCE
Status: COMPLETED | OUTPATIENT
Start: 2017-08-04 | End: 2017-08-04

## 2017-08-04 RX ADMIN — HYDROXYZINE PAMOATE 50 MG: 25 CAPSULE ORAL at 16:29

## 2017-08-04 RX ADMIN — GRANISETRON HYDROCHLORIDE 1 MG: 1 INJECTION INTRAVENOUS at 14:23

## 2017-08-04 RX ADMIN — SODIUM CHLORIDE 1000 ML: 900 INJECTION, SOLUTION INTRAVENOUS at 14:23

## 2017-08-04 NOTE — PROGRESS NOTES
Pt here for IVF. Per Melyssa's note, pt will need anti-emetics. Nothing was ordered. Spoke with Kallie PIRES. Kytril 1mg IVP ordered.  Pt states she is feeling a little better but still has the shakes.     IVF completed. /80. Pt is visibly shaking at this time. Only c/o is the shakes and dizziness. Claudia NP at chairside.    Claudia and Dr. Coburn reviewed pt. Order for vistaril 50mg once PO. Order placed.

## 2017-08-04 NOTE — PROGRESS NOTES
"1645 pt reports \"feeling better, symptoms improved\"  Claudia at chairside talking with pt.  Claudia escribed hydroxyzine to pt's pharmacy  "

## 2017-08-06 NOTE — PROGRESS NOTES
REASON FOR FOLLOWUP:   1. Newly diagnosed large right breast cancer.  Core needle biopsy reported invasive mammary carcinoma with focal lobular feature, grade 2.  ER negative, less than 1%; WI positive, moderate in staining, 5% to 10%. HER2 IHC 2+, FISH study positive 2.1.   2.  CT scan for chest abdomen and pelvis and breast MRI examination reported right axillary lymph node suspicious for metastatic disease.  No remote metastatic lesion.  Patient has stage IIIa (T3 N2 M0) disease.   3.  Patient was started neoadjuvant chemotherapy on 5/23/2017 with Taxol weekly plus Herceptin weekly for total 12 weeks, and Perjata every 3 weeks during chemotherapy.  Herceptin will be converted to every 3 weeks after chemotherapy finished.    4.  Chronic moderate thrombocytopenia, mild anemia, and intermittent mild neutropenia etiologies are not clear.  5.  Reaction to Herceptin C1D1.  Subsequently tolerated therapy with hydrocortisone as premedication.  6.  Potential cardiac strain developing, neuropathic symptoms persist, follow-up MRI and surgical assessment will be needed post initial chemotherapeutic phase     HISTORY OF PRESENT ILLNESS:    The patient is a pleasant 76 y.o. with the above-mentioned history,here today for scheduled antiemetics and fluids. She has struggled with recent treatments particularly with  nausea and neuropathy. Therefore, Dr. Coburn omitted Taxol with her treatment on 8/2/201. SHe received just Herceptin this day with Aloxi and Decadron as premeds. She has been taking Zofran and Compazine on a schedule of every 8 and 6 hours respectively. She cites increased agitation and tremors starting Tuesday and progressing. She received Kytril today and feels her tremors have increased.  She denies numbness or tingling, headaches, weakness. She denies speech disturbance, swelling. She does note intermittent jerking of the legs.    Her  passed away approximately 2 weeks ago and she reports significant  anxiety.    Past Medical History:   Diagnosis Date   • Acute bronchitis    • Anemia    • Breast cancer     Left   • Breast cancer 2017    Right   • Chronic pain    • Conjunctivitis    • Cough    • Ductal carcinoma in situ (DCIS) of left breast    • Edema     Chronic lower extremity edema   • GERD (gastroesophageal reflux disease)    • H/O Bowel obstruction    • H/O jaundice    • Hernia    • History of infectious mononucleosis    • History of migraine headaches    • Hyperlipidemia    • Hypertension    • Impacted cerumen of left ear    • Leukocytopenia    • Leukopenia    • Meningioma    • Peptic ulceration    • Perioral dermatitis    • SBO (small bowel obstruction)     due to hernia with surgical repair in 2011   • SOB (shortness of breath) on exertion    • Thrombocytopenia    • Vertigo    Normal echocardiogram on 2017, LVEF 68%.    Past Surgical History:   Procedure Laterality Date   • APPENDECTOMY     • BLADDER SURGERY      Bladder repair   • BREAST BIOPSY Left    • BREAST BIOPSY  2017   • BREAST SURGERY     • CHOLECYSTECTOMY     • GALLBLADDER SURGERY     • HERNIA REPAIR      H/O bowel obstruction   • HYSTERECTOMY     • MASTECTOMY Left     and sentinel lymph node biospy at [facility]   • AL INSJ TUNNELED CVC W/O SUBQ PORT/ AGE 5 YR/> Left 2017    Procedure: INSERTION VENOUS ACCESS DEVICE;  Surgeon: Christian Melo MD;  Location: [facility];  Service: General   • REDUCTION MAMMAPLASTY Right     to match Lt. TRAM flap   • TONSILLECTOMY     Lico catheter placement on 2017 by Dr. Melo.     OB/GYN history: Menarche age 16, menopause at . , 1 miscarriage. No birth control pill use. She did have post menopause hormonal supplementation.      HEMATOLOGIC/ONCOLOGIC HISTORY: The patient is a 76 y.o. year old female whom we are consulted for a newly self discovered right breast mass, in 2017. Patient had ultrasound-guided biopsy on  5/3/2017 and confirmed to be invasive mammary carcinoma with focal lobular features, grade 2 Elen score 6/9. She is here for initial evaluation for management.      Patient is a 76-year-old  female who was seen here previously for chronic mild-to-moderate thrombocytopenia and mild anemia. Recently the patient reports she started having firmness of the right breast that started sometime in April or maybe even in March. She noticed firmness spread from about around the 12 o'clock position, gradually towards the upper lateral side and lower part of the right breast associated mild pain. The patient thought it was related to fibrocystic changes. However, the symptom was getting worse. Patient also reported dented nipple after she started having pain in the right breast. She denies discharge from the nipple. She called her primary care physician, Dr. Martin, who ordered a mammogram study. This was done on 04/12/2017 with architectural distortion of the right breast centrally at 12 o'clock position and had scattered fibroglandular densities throughout the right breast.      The patient subsequently had right breast ultrasound examination on 04/26/2017. Discovered a large right breast mass measuring 5.8 x 3.5 x 3.9 cm. Patient subsequently had ultrasound-guided right breast biopsy on 05/03/2017. Pathology evaluation reported invasive mammary carcinoma with focal lobular feature. Hull score 6/9, overall grade 2. Sample was further sent to the [facility] for test. ER negative, less than 1%; MS positive, moderate in staining, 5% to 10%. HER2 IHC 2+, but FISH study positive 2.1.     She denies weight loss, she actually eats very well. No nausea vomiting. Patient does complain of insomnia, unable to sleep.      This patient has history of left breast DCIS back in 2007, had mastectomy at the [facility]. No hormonal therapy afterwards according to patient. This  patient also had a small meningioma, followed by Dr. Smith in [facility], with most recent MRI of the brain in March 2017, with 18 mm meningioma, and followed on an annual basis.     Her neutrophil count on 5/16/17 is normal at 2500, however, records showed starting from 05/2015 and in 09/2016, 01/2017 and 03/2017, all 4 laboratory studies showed mild neutropenia with ANC fluctuating between 1200 and 1600. Etiology is not clear.    Normal echocardiogram on 5/18/2017, LVEF 68%.      CT scan for chest abdomen and pelvis on 5/22/2017 and breast MRI examination on 5/22/2017 reported small right axillary lymph node suspicious for metastatic disease.  No remote metastatic lesion.  Patient has stage IIIa (T3 N2 M0) disease.      Patient will start neoadjuvant chemotherapy with Taxol weekly plus Herceptin weekly for total 12 weeks, and Perjeta every 3 weeks (3-week cycle) during chemotherapy.  Herceptin will be converted to every 3 weeks after chemotherapy finished.  Cycle 1 day 1 5/23/2017.     MEDICATIONS: The current medication list was reviewed with the patient and updated in the EMR this date per the Medical Assistant. Medication dosages and frequencies were confirmed to be accurate.       ALLERGIES:    Allergies   Allergen Reactions   • Codeine Nausea And Vomiting   • Morphine Nausea And Vomiting     SOCIAL HISTORY:   Social History   Substance Use Topics   • Smoking status: Former Smoker     Packs/day: 2.00     Years: 30.00     Types: Cigarettes   • Smokeless tobacco: Former User      Comment: caffeine use   • Alcohol use No      Comment: stopped, heavy in past     FAMILY HISTORY:  Family History   Problem Relation Age of Onset   • Heart disease Mother    • Aortic aneurysm Mother      abdominal   • Coronary artery disease Mother    • Hypertension Mother    • Heart disease Father    • Heart attack Father    • Hypertension Father    • Coronary artery disease Brother    • Hypertension Brother    • Heart  disease Brother    • Breast cancer Daughter 45     I have reviewed the patient's medical history in detail and updated the computerized patient record.    REVIEW OF SYSTEMS:  GENERAL: See history of present illness. No change in appetite or weight;   No fevers, chills, sweats.   SKIN: No nonhealing lesions. No rashes.   HEME/LYMPH: See HPI.   EYES: No vision changes or diplopia.   ENT: No tinnitus, hearing loss, gum bleeding, epistaxis, hoarseness or dysphagia.   RESPIRATORY: No cough, Has exertional dyspnea, No hemoptysis or wheezing.   CVS: No chest pain, palpitations, orthopnea, dyspnea on exertion or PND.   GI: See HPI.   : No lower tract obstructive symptoms, dysuria or hematuria.   MUSCULOSKELETAL: Chronic or joint pain, arthritis.  Has mild intermittent ankle swelling.   NEUROLOGICAL: See HPI  PSYCHIATRIC: See HPI     Objective:   There were no vitals filed for this visit.ECOG 0      PHYSICAL EXAM:   GENERAL: Well-developed, well-nourished female, seen in the infusion area accompanied by her friend.  SKIN: Warm, dry without rashes, purpura or petechiae.  Left upper chest Lico catheter in place, no evidence of infection.   EYES: Pupils equal, round and reactive to light. EOMs intact. Conjunctivae normal.  EARS: Hearing intact.  NOSE:  No excoriations or nasal discharge.  MOUTH: Tongue is well-papillated; no stomatitis or ulcers. Lips normal.  THROAT: Oropharynx without lesions or exudates.  LYMPHATICS: No cervical, supraclavicular, axillary adenopathy.  CHEST: Lungs clear to auscultation. Good airflow.   CARDIAC: Regular rate and rhythm without murmurs. Normal S1,S2.  ABDOMEN: Slightly distended, normal active bowel sounds,  no hepatosplenomegaly or masses.  EXTREMITIES: No clubbing, cyanosis or edema.  NEUROLOGICAL Tremor noted with movement and rest of the upper and lower extremities.   PSYCHIATRIC: She is slightly tearful and anxious.       RECENT LABS:  Lab Results   Component Value Date    WBC 4.68  08/02/2017    HGB 11.0 (L) 08/02/2017    HCT 32.8 (L) 08/02/2017    MCV 92.7 08/02/2017     (L) 08/02/2017     Lab Results   Component Value Date    NEUTROABS 2.29 08/02/2017     Lab Results   Component Value Date    GLUCOSE 134 (H) 08/02/2017    BUN 13 08/02/2017    CREATININE 0.87 08/02/2017    EGFRIFNONA 63 08/02/2017    EGFRIFAFRI 82 03/27/2017    BCR 14.9 08/02/2017    K 3.2 (L) 08/02/2017    CO2 27.6 08/02/2017    CALCIUM 9.8 08/02/2017    PROTENTOTREF 6.5 03/27/2017    ALBUMIN 4.00 08/02/2017    LABIL2 1.5 08/02/2017    AST 24 08/02/2017    ALT 23 08/02/2017     Acquired echocardiogram 2D complete with contrast   July 24, 2017     Interpretation Summary   · Left ventricular systolic function is normal. Calculated EF = 66.7%. Estimated EF was in agreement with the calculated EF. Estimated EF = 67%. Global strain = -16%. Strain data was reviewed by physician. The global longitudinal RV strain is slightly diminished and decreased from prior study where it was -21. This is suspicious for early myopathic process, possibly related to chemotherapy.  · Normal left ventricular cavity size and wall thickness noted. All left ventricular wall segments contract normally. Left ventricular diastolic dysfunction is noted (grade I) consistent with impaired relaxation.       Assessment/Plan   1. Large right breast cancer, locally advanced, stage IIIa (T3 N1/ 2 M0).  ER negative, less than 1%; MO positive, moderate in staining, 5% to 10%. HER2 IHC 2+, FISH study positive 2.1.  Physical examination showed a large mass, 7 cm x 7 cm initially which has decreased to approximately less than 4 cm ..  Patient received Herceptin on 8/2/2017/ Taxol held due to worsening neuropathy.    2. Nausea related to therapy. She received Aloxi on 8/2/2017 and Kytril today. She has been taking Kytril and Zofran on a schedule at home.    3. Extrapyramidal symptoms related to antiemetics.     4. Anxiety related to 's passing and side  effect from therapy.    Plan:  1.  I have reviewed her medication regimen and symptoms with Dr Coburn.We attribute her symptoms to her antiemetic medication. We have provided Mrs. Brizuela with 50mg of Vistaril today. Her symptoms quickly resolved and I have prescribed 25mg for her to take at home twice a day. I have also called in a prescriptions of Ativan 0.5mg to be taken TID as needed. I am hopeful that the omission of Taxol will require less need for antiemetics. I have instructed her to discontinue Compazine, and Zofran     2.  I have instructed her to call the office if her symptoms return or worsen. She is currently scheduled to return on 8/9/2017 to see Dr. Patel in anticipation of her next treatment. We will certainly see her in the interim should she require evaluation.

## 2017-08-07 ENCOUNTER — TELEPHONE (OUTPATIENT)
Dept: ONCOLOGY | Facility: HOSPITAL | Age: 77
End: 2017-08-07

## 2017-08-07 NOTE — TELEPHONE ENCOUNTER
Patient calling because she still has some shaking/anxiety occasionally. She said the vistaril and Ativan have helped some. She is taking the ativan twice a day. Per Claudia's note ok for patient to take 3 times a day. Reviewed with patient and she will try that. Informed patient if she feels to drowsy then she should cut back. Patient says so far she has not felt that way. Pt will see MD in 2 days and we can reevaluate then. Pt v/u

## 2017-08-09 ENCOUNTER — OFFICE VISIT (OUTPATIENT)
Dept: ONCOLOGY | Facility: CLINIC | Age: 77
End: 2017-08-09

## 2017-08-09 ENCOUNTER — INFUSION (OUTPATIENT)
Dept: ONCOLOGY | Facility: HOSPITAL | Age: 77
End: 2017-08-09

## 2017-08-09 VITALS
DIASTOLIC BLOOD PRESSURE: 78 MMHG | WEIGHT: 138.4 LBS | TEMPERATURE: 97.8 F | OXYGEN SATURATION: 99 % | BODY MASS INDEX: 25.47 KG/M2 | RESPIRATION RATE: 16 BRPM | HEART RATE: 81 BPM | SYSTOLIC BLOOD PRESSURE: 150 MMHG | HEIGHT: 62 IN

## 2017-08-09 DIAGNOSIS — E87.6 HYPOKALEMIA: ICD-10-CM

## 2017-08-09 DIAGNOSIS — T45.1X5A CHEMOTHERAPY-INDUCED NAUSEA: ICD-10-CM

## 2017-08-09 DIAGNOSIS — E86.0 DEHYDRATION: ICD-10-CM

## 2017-08-09 DIAGNOSIS — Z86.79 HISTORY OF CARDIAC ARRHYTHMIA: ICD-10-CM

## 2017-08-09 DIAGNOSIS — N39.0 URINARY TRACT INFECTION, SITE UNSPECIFIED: ICD-10-CM

## 2017-08-09 DIAGNOSIS — R11.0 CHEMOTHERAPY-INDUCED NAUSEA: ICD-10-CM

## 2017-08-09 DIAGNOSIS — R30.0 DYSURIA: ICD-10-CM

## 2017-08-09 DIAGNOSIS — N30.01 ACUTE CYSTITIS WITH HEMATURIA: ICD-10-CM

## 2017-08-09 DIAGNOSIS — D64.81 ANEMIA ASSOCIATED WITH CHEMOTHERAPY: ICD-10-CM

## 2017-08-09 DIAGNOSIS — T45.1X5A PERIPHERAL NEUROPATHY DUE TO CHEMOTHERAPY (HCC): ICD-10-CM

## 2017-08-09 DIAGNOSIS — T78.40XD HYPERSENSITIVITY REACTION, SUBSEQUENT ENCOUNTER: ICD-10-CM

## 2017-08-09 DIAGNOSIS — D64.9 ANEMIA, UNSPECIFIED TYPE: Primary | ICD-10-CM

## 2017-08-09 DIAGNOSIS — C50.111 MALIGNANT NEOPLASM OF CENTRAL PORTION OF RIGHT FEMALE BREAST (HCC): ICD-10-CM

## 2017-08-09 DIAGNOSIS — T45.1X5A ANEMIA ASSOCIATED WITH CHEMOTHERAPY: ICD-10-CM

## 2017-08-09 DIAGNOSIS — C50.611 MALIGNANT NEOPLASM OF AXILLARY TAIL OF RIGHT FEMALE BREAST (HCC): Primary | ICD-10-CM

## 2017-08-09 DIAGNOSIS — T78.40XD HYPERSENSITIVITY REACTION, SUBSEQUENT ENCOUNTER: Primary | ICD-10-CM

## 2017-08-09 DIAGNOSIS — G62.0 PERIPHERAL NEUROPATHY DUE TO CHEMOTHERAPY (HCC): ICD-10-CM

## 2017-08-09 LAB
ALBUMIN SERPL-MCNC: 4 G/DL (ref 3.5–5.2)
ALBUMIN/GLOB SERPL: 1.5 G/DL (ref 1.1–2.4)
ALP SERPL-CCNC: 51 U/L (ref 38–116)
ALT SERPL W P-5'-P-CCNC: 19 U/L (ref 0–33)
ANION GAP SERPL CALCULATED.3IONS-SCNC: 13.6 MMOL/L
AST SERPL-CCNC: 20 U/L (ref 0–32)
BACTERIA UR QL AUTO: ABNORMAL /HPF
BASOPHILS # BLD AUTO: 0.01 10*3/MM3 (ref 0–0.1)
BASOPHILS NFR BLD AUTO: 0.1 % (ref 0–1.1)
BILIRUB SERPL-MCNC: 0.7 MG/DL (ref 0.1–1.2)
BILIRUB UR QL STRIP: NEGATIVE
BUN BLD-MCNC: 15 MG/DL (ref 6–20)
BUN/CREAT SERPL: 14.7 (ref 7.3–30)
CALCIUM SPEC-SCNC: 9.5 MG/DL (ref 8.5–10.2)
CHLORIDE SERPL-SCNC: 94 MMOL/L (ref 98–107)
CLARITY UR: ABNORMAL
CO2 SERPL-SCNC: 28.4 MMOL/L (ref 22–29)
COLOR UR: YELLOW
CREAT BLD-MCNC: 1.02 MG/DL (ref 0.6–1.1)
DEPRECATED RDW RBC AUTO: 53.2 FL (ref 37–49)
EOSINOPHIL # BLD AUTO: 0.02 10*3/MM3 (ref 0–0.36)
EOSINOPHIL NFR BLD AUTO: 0.2 % (ref 1–5)
ERYTHROCYTE [DISTWIDTH] IN BLOOD BY AUTOMATED COUNT: 15.8 % (ref 11.7–14.5)
GFR SERPL CREATININE-BSD FRML MDRD: 53 ML/MIN/1.73
GLOBULIN UR ELPH-MCNC: 2.6 GM/DL (ref 1.8–3.5)
GLUCOSE BLD-MCNC: 126 MG/DL (ref 74–124)
GLUCOSE UR STRIP-MCNC: NEGATIVE MG/DL
HCT VFR BLD AUTO: 32.5 % (ref 34–45)
HGB BLD-MCNC: 10.8 G/DL (ref 11.5–14.9)
HGB UR QL STRIP.AUTO: ABNORMAL
HOLD SPECIMEN: NORMAL
IMM GRANULOCYTES # BLD: 0.18 10*3/MM3 (ref 0–0.03)
IMM GRANULOCYTES NFR BLD: 2 % (ref 0–0.5)
KETONES UR QL STRIP: NEGATIVE
LEUKOCYTE ESTERASE UR QL STRIP.AUTO: ABNORMAL
LYMPHOCYTES # BLD AUTO: 1.79 10*3/MM3 (ref 1–3.5)
LYMPHOCYTES NFR BLD AUTO: 20 % (ref 20–49)
MCH RBC QN AUTO: 31.1 PG (ref 27–33)
MCHC RBC AUTO-ENTMCNC: 33.2 G/DL (ref 32–35)
MCV RBC AUTO: 93.7 FL (ref 83–97)
MONOCYTES # BLD AUTO: 2.6 10*3/MM3 (ref 0.25–0.8)
MONOCYTES NFR BLD AUTO: 29.1 % (ref 4–12)
NEUTROPHILS # BLD AUTO: 4.33 10*3/MM3 (ref 1.5–7)
NEUTROPHILS NFR BLD AUTO: 48.6 % (ref 39–75)
NITRITE UR QL STRIP: NEGATIVE
NRBC BLD MANUAL-RTO: 0 /100 WBC (ref 0–0)
PH UR STRIP.AUTO: 7 [PH] (ref 4.5–8)
PLATELET # BLD AUTO: 111 10*3/MM3 (ref 150–375)
PMV BLD AUTO: 9.8 FL (ref 8.9–12.1)
POTASSIUM BLD-SCNC: 3.3 MMOL/L (ref 3.5–4.7)
PROT SERPL-MCNC: 6.6 G/DL (ref 6.3–8)
PROT UR QL STRIP: ABNORMAL
RBC # BLD AUTO: 3.47 10*6/MM3 (ref 3.9–5)
RBC # UR: ABNORMAL /HPF
REF LAB TEST METHOD: ABNORMAL
SODIUM BLD-SCNC: 136 MMOL/L (ref 134–145)
SP GR UR STRIP: 1.01 (ref 1–1.03)
SQUAMOUS #/AREA URNS HPF: ABNORMAL /HPF
UROBILINOGEN UR QL STRIP: ABNORMAL
WBC NRBC COR # BLD: 8.93 10*3/MM3 (ref 4–10)
WBC UR QL AUTO: ABNORMAL /HPF

## 2017-08-09 PROCEDURE — 96361 HYDRATE IV INFUSION ADD-ON: CPT | Performed by: INTERNAL MEDICINE

## 2017-08-09 PROCEDURE — 81003 URINALYSIS AUTO W/O SCOPE: CPT | Performed by: INTERNAL MEDICINE

## 2017-08-09 PROCEDURE — 99215 OFFICE O/P EST HI 40 MIN: CPT | Performed by: INTERNAL MEDICINE

## 2017-08-09 PROCEDURE — 81001 URINALYSIS AUTO W/SCOPE: CPT | Performed by: INTERNAL MEDICINE

## 2017-08-09 PROCEDURE — 25010000002 PALONOSETRON PER 25 MCG: Performed by: INTERNAL MEDICINE

## 2017-08-09 PROCEDURE — 25010000002 DEXAMETHASONE SODIUM PHOSPHATE 10 MG/ML SOLUTION 1 ML VIAL: Performed by: INTERNAL MEDICINE

## 2017-08-09 PROCEDURE — 96375 TX/PRO/DX INJ NEW DRUG ADDON: CPT | Performed by: INTERNAL MEDICINE

## 2017-08-09 PROCEDURE — 85025 COMPLETE CBC W/AUTO DIFF WBC: CPT

## 2017-08-09 PROCEDURE — 87186 SC STD MICRODIL/AGAR DIL: CPT | Performed by: INTERNAL MEDICINE

## 2017-08-09 PROCEDURE — 96413 CHEMO IV INFUSION 1 HR: CPT | Performed by: INTERNAL MEDICINE

## 2017-08-09 PROCEDURE — 87086 URINE CULTURE/COLONY COUNT: CPT | Performed by: INTERNAL MEDICINE

## 2017-08-09 PROCEDURE — 80053 COMPREHEN METABOLIC PANEL: CPT

## 2017-08-09 PROCEDURE — 25010000002 TRASTUZUMAB PER 10 MG: Performed by: INTERNAL MEDICINE

## 2017-08-09 RX ORDER — FAMOTIDINE 10 MG/ML
20 INJECTION, SOLUTION INTRAVENOUS ONCE
Status: COMPLETED | OUTPATIENT
Start: 2017-08-09 | End: 2017-08-09

## 2017-08-09 RX ORDER — SODIUM CHLORIDE 9 MG/ML
500 INJECTION, SOLUTION INTRAVENOUS ONCE
Status: COMPLETED | OUTPATIENT
Start: 2017-08-09 | End: 2017-08-09

## 2017-08-09 RX ORDER — SODIUM CHLORIDE 9 MG/ML
250 INJECTION, SOLUTION INTRAVENOUS CONTINUOUS
Status: CANCELLED
Start: 2017-08-11

## 2017-08-09 RX ORDER — SODIUM CHLORIDE 9 MG/ML
250 INJECTION, SOLUTION INTRAVENOUS ONCE
Status: DISCONTINUED | OUTPATIENT
Start: 2017-08-09 | End: 2017-08-09 | Stop reason: HOSPADM

## 2017-08-09 RX ORDER — AMOXICILLIN AND CLAVULANATE POTASSIUM 500; 125 MG/1; MG/1
1 TABLET, FILM COATED ORAL 2 TIMES DAILY
Qty: 10 TABLET | Refills: 0 | Status: SHIPPED | OUTPATIENT
Start: 2017-08-09 | End: 2017-09-08

## 2017-08-09 RX ORDER — SODIUM CHLORIDE 9 MG/ML
1000 INJECTION, SOLUTION INTRAVENOUS CONTINUOUS
Status: CANCELLED
Start: 2017-08-11

## 2017-08-09 RX ORDER — PALONOSETRON 0.05 MG/ML
0.25 INJECTION, SOLUTION INTRAVENOUS ONCE
Status: CANCELLED
Start: 2017-08-11 | End: 2017-08-11

## 2017-08-09 RX ORDER — GABAPENTIN 300 MG/1
300 CAPSULE ORAL 3 TIMES DAILY
Qty: 30 CAPSULE | Refills: 11 | Status: SHIPPED | OUTPATIENT
Start: 2017-08-09 | End: 2017-08-21 | Stop reason: SDUPTHER

## 2017-08-09 RX ORDER — PALONOSETRON 0.05 MG/ML
0.25 INJECTION, SOLUTION INTRAVENOUS ONCE
Status: COMPLETED | OUTPATIENT
Start: 2017-08-09 | End: 2017-08-09

## 2017-08-09 RX ORDER — HYDROXYZINE PAMOATE 25 MG/1
50 CAPSULE ORAL ONCE
Status: COMPLETED | OUTPATIENT
Start: 2017-08-09 | End: 2017-08-09

## 2017-08-09 RX ADMIN — PALONOSETRON HYDROCHLORIDE 0.25 MG: 0.25 INJECTION INTRAVENOUS at 14:34

## 2017-08-09 RX ADMIN — DEXAMETHASONE SODIUM PHOSPHATE 12 MG: 10 INJECTION, SOLUTION INTRAMUSCULAR; INTRAVENOUS at 14:35

## 2017-08-09 RX ADMIN — SODIUM CHLORIDE 500 ML: 900 INJECTION, SOLUTION INTRAVENOUS at 14:00

## 2017-08-09 RX ADMIN — FAMOTIDINE 20 MG: 10 INJECTION, SOLUTION INTRAVENOUS at 14:30

## 2017-08-09 RX ADMIN — TRASTUZUMAB 140 MG: 150 INJECTION, POWDER, LYOPHILIZED, FOR SOLUTION INTRAVENOUS at 15:07

## 2017-08-09 RX ADMIN — HYDROXYZINE PAMOATE 50 MG: 25 CAPSULE ORAL at 14:28

## 2017-08-09 NOTE — PROGRESS NOTES
REASON FOR FOLLOWUP:   1. Newly diagnosed large right breast cancer.  Core needle biopsy reported invasive mammary carcinoma with focal lobular feature, grade 2.  ER negative, less than 1%; ND positive, moderate in staining, 5% to 10%. HER2 IHC 2+, FISH study positive 2.1.   2.  CT scan for chest abdomen and pelvis and breast MRI examination reported right axillary lymph node suspicious for metastatic disease.  No remote metastatic lesion.  Patient has stage IIIa (T3 N2 M0) disease.   3.  Patient was started neoadjuvant chemotherapy on 5/23/2017 with Taxol weekly plus Herceptin weekly for total 12 weeks, and Perjata every 3 weeks during chemotherapy.  Herceptin will be converted to every 3 weeks after chemotherapy finished.    4.  Chronic moderate thrombocytopenia, mild anemia, and intermittent mild neutropenia etiologies are not clear.  5.  Reaction to Herceptin C1D1.  Subsequently tolerated therapy with hydrocortisone as premedication.  6.  Potential cardiac strain developing, neuropathic symptoms persist, follow-up MRI and surgical assessment will be needed post initial chemotherapeutic phase.   7.  Peripheral neuropathy secondary to chemotherapy Taxol was discontinued after week #10 dose.      HISTORY OF PRESENT ILLNESS:    The patient is a pleasant 76 y.o. with the above-mentioned history,here today for reevaluation.  This patient developed very significant peripheral neuropathy, her Taxol was discontinued after cycle #4 day 1 treatment.  She did receive 4 cycles of Perjata.  She also continued on Herceptin weekly, due for cycle 4 day 15 treatment today.  She reports no dyspnea no palpitations no chest pain.  Echocardiogram study on 7/24/2017 reported LVEF 67%.      Patient reports dysuria with urgency of urination, denies hematuria.  She denies fever sweating chills.    Her nausea also has subsided.  Patient lives by herself.  She is newly ,  her  passed away recently due to plasma cell leukemia  and she reports significant anxiety.    Past Medical History:   Diagnosis Date   • Acute bronchitis    • Anemia    • Breast cancer     Left   • Breast cancer 2017    Right   • Chronic pain    • Conjunctivitis    • Cough    • Ductal carcinoma in situ (DCIS) of left breast    • Edema     Chronic lower extremity edema   • GERD (gastroesophageal reflux disease)    • H/O Bowel obstruction    • H/O jaundice    • Hernia    • History of infectious mononucleosis    • History of migraine headaches    • Hyperlipidemia    • Hypertension    • Impacted cerumen of left ear    • Leukocytopenia    • Leukopenia    • Meningioma    • Peptic ulceration    • Perioral dermatitis    • SBO (small bowel obstruction)     due to hernia with surgical repair in 2011   • SOB (shortness of breath) on exertion    • Thrombocytopenia    • Vertigo    Normal echocardiogram on 2017, LVEF 68%.    Past Surgical History:   Procedure Laterality Date   • APPENDECTOMY     • BLADDER SURGERY      Bladder repair   • BREAST BIOPSY Left    • BREAST BIOPSY  2017   • BREAST SURGERY     • CHOLECYSTECTOMY     • GALLBLADDER SURGERY     • HERNIA REPAIR      H/O bowel obstruction   • HYSTERECTOMY     • MASTECTOMY Left     and sentinel lymph node biospy at [place]   • MA INSJ TUNNELED CVC W/O SUBQ PORT/ AGE 5 YR/> Left 2017    Procedure: INSERTION VENOUS ACCESS DEVICE;  Surgeon: Christian Melo MD;  Location: [facility];  Service: General   • REDUCTION MAMMAPLASTY Right     to match Lt. TRAM flap   • TONSILLECTOMY     Lico catheter placement on 2017 by Dr. Melo.     OB/GYN history: Menarche age 16, menopause at . , 1 miscarriage. No birth control pill use. She did have post menopause hormonal supplementation.      HEMATOLOGIC/ONCOLOGIC HISTORY: The patient is a 76 y.o. year old female whom we are consulted for a newly self discovered right breast mass, in 2017. Patient had  ultrasound-guided biopsy on 5/3/2017 and confirmed to be invasive mammary carcinoma with focal lobular features, grade 2 Elen score 6/9. She is here for initial evaluation for management.      Patient is a 76-year-old  female who was seen here previously for chronic mild-to-moderate thrombocytopenia and mild anemia. Recently the patient reports she started having firmness of the right breast that started sometime in April or maybe even in March. She noticed firmness spread from about around the 12 o'clock position, gradually towards the upper lateral side and lower part of the right breast associated mild pain. The patient thought it was related to fibrocystic changes. However, the symptom was getting worse. Patient also reported dented nipple after she started having pain in the right breast. She denies discharge from the nipple. She called her primary care physician, Dr. Martin, who ordered a mammogram study. This was done on 04/12/2017 with architectural distortion of the right breast centrally at 12 o'clock position and had scattered fibroglandular densities throughout the right breast.      The patient subsequently had right breast ultrasound examination on 04/26/2017. Discovered a large right breast mass measuring 5.8 x 3.5 x 3.9 cm. Patient subsequently had ultrasound-guided right breast biopsy on 05/03/2017. Pathology evaluation reported invasive mammary carcinoma with focal lobular feature. Pollok score 6/9, overall grade 2. Sample was further sent to the [facility] for test. ER negative, less than 1%; NJ positive, moderate in staining, 5% to 10%. HER2 IHC 2+, but FISH study positive 2.1.     She denies weight loss, she actually eats very well. No nausea vomiting. Patient does complain of insomnia, unable to sleep.      This patient has history of left breast DCIS back in 2007, had mastectomy at the [facility]. No hormonal therapy afterwards  according to patient. This patient also had a small meningioma, followed by Dr. Smith in [facility], with most recent MRI of the brain in March 2017, with 18 mm meningioma, and followed on an annual basis.     Her neutrophil count on 5/16/17 is normal at 2500, however, records showed starting from 05/2015 and in 09/2016, 01/2017 and 03/2017, all 4 laboratory studies showed mild neutropenia with ANC fluctuating between 1200 and 1600. Etiology is not clear.    Normal echocardiogram on 5/18/2017, LVEF 68%.      CT scan for chest abdomen and pelvis on 5/22/2017 and breast MRI examination on 5/22/2017 reported small right axillary lymph node suspicious for metastatic disease.  No remote metastatic lesion.  Patient has stage IIIa (T3 N2 M0) disease.      Patient will start neoadjuvant chemotherapy with Taxol weekly plus Herceptin weekly for total 12 weeks, and Perjeta every 3 weeks (3-week cycle) during chemotherapy.  Herceptin will be converted to every 3 weeks after chemotherapy finished.  Cycle 1 day 1 on 5/23/2017.      Patient received a total 10 weekly Taxol treatment, the remaining 2 was discontinued due to severe peripheral neuropathy from chemotherapy.  Perjata was given 4 cycles.  Herceptin will be continued.      MEDICATIONS: The current medication list was reviewed with the patient and updated in the EMR this date per the Medical Assistant. Medication dosages and frequencies were confirmed to be accurate.       ALLERGIES:    Allergies   Allergen Reactions   • Codeine Nausea And Vomiting   • Morphine Nausea And Vomiting     SOCIAL HISTORY:   Social History   Substance Use Topics   • Smoking status: Former Smoker     Packs/day: 2.00     Years: 30.00     Types: Cigarettes   • Smokeless tobacco: Former User      Comment: caffeine use   • Alcohol use No      Comment: stopped, heavy in past     FAMILY HISTORY:  Family History   Problem Relation Age of Onset   • Heart disease Mother    • Aortic aneurysm  "Mother      abdominal   • Coronary artery disease Mother    • Hypertension Mother    • Heart disease Father    • Heart attack Father      acute   • Hypertension Father    • Coronary artery disease Brother    • Hypertension Brother    • Heart disease Brother    • Breast cancer Daughter 45     I have reviewed the patient's medical history in detail and updated the computerized patient record.    REVIEW OF SYSTEMS:  GENERAL: See history of present illness. No change in appetite or weight;   No fevers, chills, sweats.   SKIN: No nonhealing lesions. No rashes.   HEME/LYMPH: See HPI.   EYES: No vision changes or diplopia.   ENT: No tinnitus, hearing loss, gum bleeding, epistaxis, hoarseness or dysphagia.   RESPIRATORY: No cough, Has exertional dyspnea, No hemoptysis or wheezing.   CVS: No chest pain, palpitations, orthopnea, dyspnea on exertion or PND.   GI: Nausea has much improved.  No vomiting.  No diarrhea no constipation.  No melena hematochezia.  : No lower tract obstructive symptoms, dysuria or hematuria.   MUSCULOSKELETAL: Chronic or joint pain, arthritis.  Has mild intermittent ankle swelling.   NEUROLOGICAL: See HPI  PSYCHIATRIC: See HPI     Objective:   Vitals:    08/09/17 1302   BP: 150/78   Pulse: 81   Resp: 16   Temp: 97.8 °F (36.6 °C)   TempSrc: Oral   SpO2: 99%   Weight: 138 lb 6.4 oz (62.8 kg)   Height: 62.01\" (157.5 cm)   PainSc: 0-No pain   ECOG 0      PHYSICAL EXAM:   GENERAL: Well-developed, well-nourished female, Not in acute distress.  SKIN: Warm, dry without rashes, purpura or petechiae.  Left upper chest Lico catheter in place, no evidence of infection.   EYES: Pupils equal, round and reactive to light. EOMs intact. Conjunctivae normal.  EARS: Hearing intact.  NOSE:  No nasal discharge.  MOUTH: Tongue is well-papillated; no stomatitis or ulcers. Lips normal.  THROAT: Oropharynx without lesions or exudates.  LYMPHATICS: No cervical, supraclavicular, axillary adenopathy.  CHEST: Lungs clear to " auscultation. Good airflow.   BREAST: The right breast diffuse residual firmness at the 12 o'clock position, edge of the mass is not clear.  CARDIAC: Regular rate and rhythm without murmurs. Normal S1,S2.  ABDOMEN: Slightly distended, normal active bowel sounds,  no hepatosplenomegaly or masses.  EXTREMITIES: No clubbing, cyanosis or edema.  NEUROLOGICAL Tremor noted with movement and rest of the upper and lower extremities.   PSYCHIATRIC: She is slightly tearful and anxious.       RECENT LABS:  Lab Results   Component Value Date    WBC 4.68 08/02/2017    HGB 11.0 (L) 08/02/2017    HCT 32.8 (L) 08/02/2017    MCV 92.7 08/02/2017     (L) 08/02/2017     Lab Results   Component Value Date    NEUTROABS 2.29 08/02/2017     Lab Results   Component Value Date    GLUCOSE 134 (H) 08/02/2017    BUN 13 08/02/2017    CREATININE 0.87 08/02/2017    EGFRIFNONA 63 08/02/2017    EGFRIFAFRI 82 03/27/2017    BCR 14.9 08/02/2017    K 3.2 (L) 08/02/2017    CO2 27.6 08/02/2017    CALCIUM 9.8 08/02/2017    PROTENTOTREF 6.5 03/27/2017    ALBUMIN 4.00 08/02/2017    LABIL2 1.5 08/02/2017    AST 24 08/02/2017    ALT 23 08/02/2017     Acquired echocardiogram 2D complete with contrast   July 24, 2017     Interpretation Summary   · Left ventricular systolic function is normal. Calculated EF = 66.7%. Estimated EF was in agreement with the calculated EF. Estimated EF = 67%. Global strain = -16%. Strain data was reviewed by physician. The global longitudinal RV strain is slightly diminished and decreased from prior study where it was -21. This is suspicious for early myopathic process, possibly related to chemotherapy.  · Normal left ventricular cavity size and wall thickness noted. All left ventricular wall segments contract normally. Left ventricular diastolic dysfunction is noted (grade I) consistent with impaired relaxation.       Assessment/Plan   1. Large right breast cancer, locally advanced, stage IIIa (T3 N1/ 2 M0).  ER negative,  less than 1%; ME positive, moderate in staining, 5% to 10%. HER2 IHC 2+, FISH study positive 2.1.  Physical examination showed a large mass, 7 cm x 7 cm initially which has decreased to approximately less than 4 cm ..  Patient received Herceptin on 8/2/2017/ Taxol was discontinued (10 weekly doses) due to worsening neuropathy.  She finished her scheduled Perjata 4 cycles.  She is due for week #12 Herceptin.  Study from next week, Herceptin will be completed to every 3 week dosing to finish total 1 year treatment.     I recommended to have a repeated breast MRI examination.  By physical examination this right breast lesion seems much improved.  We'll have a reevaluation to see if she needs more on to have surgery.  Due to week positive for ME, she might benefit from endocrine therapy.  This will be discussed when she returns, depending on the time of her surgery.      2. Nausea related to therapy. She received Aloxi and Kytril before. Now has improved nausea. She has been taking Kytril and Zofran on a schedule at home.    3. Anxiety related to 's passing and side effect from therapy.     4.  Dysuria without fever.  We'll obtain urinalysis for evaluation and the reported positive for numerous WBC.  I started patient on Augmentin twice a day for 5 days.  I just per urine culture and drug sensitivity results.    Plan:  1.  Proceed ahead with cycle #4 day 15 Herceptin treatment today.  We'll give her extra 500 mg IV saline.  We'll repeat this coming Friday with 1 L normal saline and iv. antiemetics.  2.  Breast MRI examination in one to 2 weeks.    3.  Patient returning one week, to start large dose Herceptin repeat every 3 weeks for 39 weeks.   4.  I e-scribed prescription of Augmentin 500 mg BID.    5.  add one liter NS this Friday, add Aloxi.   [facility] is fine.    6.  Start next week, with Q 3 wk Herceptin.  see MD 4 wks     SCARLET UGARTE M.D., Ph.D.    8/9/2017

## 2017-08-10 ENCOUNTER — TELEPHONE (OUTPATIENT)
Dept: ONCOLOGY | Facility: HOSPITAL | Age: 77
End: 2017-08-10

## 2017-08-10 NOTE — TELEPHONE ENCOUNTER
Pt calling bc Dr Patel escribed two rx yestday and states the gabapentin was not rec'd by pharmacy.  I called this in for patient to her pharmacy as it can no longer be escribed. I notified pt that this was called into her pharmacy.

## 2017-08-11 ENCOUNTER — TELEPHONE (OUTPATIENT)
Dept: ONCOLOGY | Facility: HOSPITAL | Age: 77
End: 2017-08-11

## 2017-08-11 ENCOUNTER — INFUSION (OUTPATIENT)
Dept: ONCOLOGY | Facility: HOSPITAL | Age: 77
End: 2017-08-11

## 2017-08-11 ENCOUNTER — APPOINTMENT (OUTPATIENT)
Dept: ONCOLOGY | Facility: HOSPITAL | Age: 77
End: 2017-08-11

## 2017-08-11 VITALS
SYSTOLIC BLOOD PRESSURE: 148 MMHG | TEMPERATURE: 99.5 F | BODY MASS INDEX: 25.67 KG/M2 | WEIGHT: 140.4 LBS | HEART RATE: 80 BPM | DIASTOLIC BLOOD PRESSURE: 79 MMHG

## 2017-08-11 DIAGNOSIS — R11.0 NAUSEA: Primary | ICD-10-CM

## 2017-08-11 DIAGNOSIS — E86.0 DEHYDRATION: ICD-10-CM

## 2017-08-11 DIAGNOSIS — E87.6 HYPOKALEMIA: ICD-10-CM

## 2017-08-11 DIAGNOSIS — C50.111 MALIGNANT NEOPLASM OF CENTRAL PORTION OF RIGHT FEMALE BREAST (HCC): ICD-10-CM

## 2017-08-11 LAB
ALBUMIN SERPL-MCNC: 4 G/DL (ref 3.5–5.2)
ALBUMIN/GLOB SERPL: 1.5 G/DL
ALP SERPL-CCNC: 50 U/L (ref 39–117)
ALT SERPL W P-5'-P-CCNC: 23 U/L (ref 1–33)
ANION GAP SERPL CALCULATED.3IONS-SCNC: 14.4 MMOL/L
AST SERPL-CCNC: 25 U/L (ref 1–32)
BACTERIA SPEC AEROBE CULT: ABNORMAL
BILIRUB SERPL-MCNC: 0.5 MG/DL (ref 0.1–1.2)
BUN BLD-MCNC: 16 MG/DL (ref 8–23)
BUN/CREAT SERPL: 21.1 (ref 7–25)
CALCIUM SPEC-SCNC: 9.2 MG/DL (ref 8.6–10.5)
CHLORIDE SERPL-SCNC: 101 MMOL/L (ref 98–107)
CO2 SERPL-SCNC: 25.6 MMOL/L (ref 22–29)
CREAT BLD-MCNC: 0.76 MG/DL (ref 0.57–1)
DEPRECATED RDW RBC AUTO: 55.4 FL (ref 37–54)
ERYTHROCYTE [DISTWIDTH] IN BLOOD BY AUTOMATED COUNT: 16.3 % (ref 11.7–13)
GFR SERPL CREATININE-BSD FRML MDRD: 74 ML/MIN/1.73
GLOBULIN UR ELPH-MCNC: 2.7 GM/DL
GLUCOSE BLD-MCNC: 95 MG/DL (ref 65–99)
HCT VFR BLD AUTO: 31.1 % (ref 35.6–45.5)
HGB BLD-MCNC: 10.5 G/DL (ref 11.9–15.5)
LYMPHOCYTES # BLD MANUAL: 3.41 10*3/MM3 (ref 0.8–7)
LYMPHOCYTES NFR BLD MANUAL: 36 % (ref 24–44)
LYMPHOCYTES NFR BLD MANUAL: 9 % (ref 0–12)
MCH RBC QN AUTO: 31.4 PG (ref 26.9–32)
MCHC RBC AUTO-ENTMCNC: 33.8 G/DL (ref 32.4–36.3)
MCV RBC AUTO: 93.1 FL (ref 80.5–98.2)
MONOCYTES # BLD AUTO: 0.85 10*3/MM3 (ref 0–1)
NEUTROPHILS # BLD AUTO: 5.2 10*3/MM3 (ref 1.9–8.1)
NEUTROPHILS NFR BLD MANUAL: 55 % (ref 42.7–76)
PLATELET # BLD AUTO: 102 10*3/MM3 (ref 140–500)
PMV BLD AUTO: 11.4 FL (ref 6–12)
POTASSIUM BLD-SCNC: 3.2 MMOL/L (ref 3.5–5.2)
PROT SERPL-MCNC: 6.7 G/DL (ref 6–8.5)
RBC # BLD AUTO: 3.34 10*6/MM3 (ref 3.9–5.2)
SCAN SLIDE: NORMAL
SODIUM BLD-SCNC: 141 MMOL/L (ref 136–145)
WBC NRBC COR # BLD: 9.46 10*3/MM3 (ref 4.5–10.7)

## 2017-08-11 PROCEDURE — 96374 THER/PROPH/DIAG INJ IV PUSH: CPT | Performed by: INTERNAL MEDICINE

## 2017-08-11 PROCEDURE — 96361 HYDRATE IV INFUSION ADD-ON: CPT | Performed by: INTERNAL MEDICINE

## 2017-08-11 PROCEDURE — 80053 COMPREHEN METABOLIC PANEL: CPT | Performed by: INTERNAL MEDICINE

## 2017-08-11 PROCEDURE — 25010000002 PALONOSETRON PER 25 MCG: Performed by: INTERNAL MEDICINE

## 2017-08-11 PROCEDURE — 85025 COMPLETE CBC W/AUTO DIFF WBC: CPT | Performed by: INTERNAL MEDICINE

## 2017-08-11 PROCEDURE — 85007 BL SMEAR W/DIFF WBC COUNT: CPT | Performed by: INTERNAL MEDICINE

## 2017-08-11 RX ORDER — SODIUM CHLORIDE 9 MG/ML
1000 INJECTION, SOLUTION INTRAVENOUS CONTINUOUS
Status: DISCONTINUED | OUTPATIENT
Start: 2017-08-11 | End: 2017-08-11 | Stop reason: HOSPADM

## 2017-08-11 RX ORDER — PALONOSETRON 0.05 MG/ML
0.25 INJECTION, SOLUTION INTRAVENOUS ONCE
Status: COMPLETED | OUTPATIENT
Start: 2017-08-11 | End: 2017-08-11

## 2017-08-11 RX ORDER — SODIUM CHLORIDE 9 MG/ML
250 INJECTION, SOLUTION INTRAVENOUS CONTINUOUS
Status: DISCONTINUED | OUTPATIENT
Start: 2017-08-11 | End: 2017-08-11 | Stop reason: HOSPADM

## 2017-08-11 RX ADMIN — PALONOSETRON HYDROCHLORIDE 0.25 MG: 0.25 INJECTION INTRAVENOUS at 13:56

## 2017-08-11 RX ADMIN — SODIUM CHLORIDE 1000 ML: 900 INJECTION, SOLUTION INTRAVENOUS at 13:55

## 2017-08-11 NOTE — TELEPHONE ENCOUNTER
Received pt's urine culture results. See below    ----- Message from Jamal Patel MD PhD sent at 8/11/2017  9:25 AM EDT -----  Thanks!  I started her on Augmentin when I saw her.    Dr. Patel    ----- Message -----     From: Harini Arenas RN     Sent: 8/11/2017   9:21 AM       To: Jamal Patel MD PhD    Hey Dr. Patel, received pt's urine culture results. I didn't know if you already prescribed her antibiotics. Can you let me know if she is already being treated or can you review the results and let me know what to put her on?     Thanks  Harini

## 2017-08-14 ENCOUNTER — OFFICE VISIT (OUTPATIENT)
Dept: CARDIOLOGY | Facility: CLINIC | Age: 77
End: 2017-08-14

## 2017-08-14 VITALS
HEART RATE: 73 BPM | SYSTOLIC BLOOD PRESSURE: 150 MMHG | WEIGHT: 141 LBS | BODY MASS INDEX: 24.98 KG/M2 | HEIGHT: 63 IN | DIASTOLIC BLOOD PRESSURE: 80 MMHG

## 2017-08-14 DIAGNOSIS — C50.511 MALIGNANT NEOPLASM OF LOWER-OUTER QUADRANT OF RIGHT FEMALE BREAST (HCC): Primary | ICD-10-CM

## 2017-08-14 DIAGNOSIS — I10 ESSENTIAL HYPERTENSION: Primary | ICD-10-CM

## 2017-08-14 PROBLEM — T45.1X5A ANEMIA ASSOCIATED WITH CHEMOTHERAPY: Status: ACTIVE | Noted: 2017-08-14

## 2017-08-14 PROBLEM — D64.81 ANEMIA ASSOCIATED WITH CHEMOTHERAPY: Status: ACTIVE | Noted: 2017-08-14

## 2017-08-14 PROCEDURE — 93000 ELECTROCARDIOGRAM COMPLETE: CPT | Performed by: INTERNAL MEDICINE

## 2017-08-14 PROCEDURE — 99213 OFFICE O/P EST LOW 20 MIN: CPT | Performed by: INTERNAL MEDICINE

## 2017-08-14 RX ORDER — LORAZEPAM 0.5 MG/1
0.5 TABLET ORAL EVERY 8 HOURS PRN
COMMUNITY
End: 2017-08-22 | Stop reason: SDUPTHER

## 2017-08-14 RX ORDER — SODIUM CHLORIDE 9 MG/ML
250 INJECTION, SOLUTION INTRAVENOUS ONCE
Status: CANCELLED | OUTPATIENT
Start: 2017-08-18

## 2017-08-15 ENCOUNTER — DOCUMENTATION (OUTPATIENT)
Dept: ONCOLOGY | Facility: CLINIC | Age: 77
End: 2017-08-15

## 2017-08-15 NOTE — PROGRESS NOTES
Per verbal order Dr Patel patient has been on Hercpetin since 5/23/17 and does not need premedications.

## 2017-08-17 ENCOUNTER — HOSPITAL ENCOUNTER (OUTPATIENT)
Dept: MRI IMAGING | Facility: HOSPITAL | Age: 77
Discharge: HOME | End: 2017-08-17
Attending: INTERNAL MEDICINE | Admitting: INTERNAL MEDICINE

## 2017-08-17 DIAGNOSIS — C50.611 MALIGNANT NEOPLASM OF AXILLARY TAIL OF RIGHT FEMALE BREAST (HCC): ICD-10-CM

## 2017-08-17 LAB — CREAT BLDA-MCNC: 0.7 MG/DL (ref 0.6–1.3)

## 2017-08-17 PROCEDURE — 0159T HC MRI BREAST COMPUTER ANALYSIS: CPT

## 2017-08-17 PROCEDURE — C8908 MRI W/O FOL W/CONT, BREAST,: HCPCS

## 2017-08-17 PROCEDURE — A9577 INJ MULTIHANCE: HCPCS | Performed by: INTERNAL MEDICINE

## 2017-08-17 PROCEDURE — 82565 ASSAY OF CREATININE: CPT

## 2017-08-17 PROCEDURE — 0 GADOBENATE DIMEGLUMINE 529 MG/ML SOLUTION: Performed by: INTERNAL MEDICINE

## 2017-08-17 RX ADMIN — GADOBENATE DIMEGLUMINE 10 ML: 529 INJECTION, SOLUTION INTRAVENOUS at 13:00

## 2017-08-17 NOTE — PROGRESS NOTES
Subjective:     Encounter Date:08/14/2017      Patient ID: Roxana Brizuela is a 76 y.o. female.    Chief Complaint:  Hypertension   This is a chronic problem. The current episode started more than 1 year ago. The problem is controlled. There is no history of angina.       76-year-old female who presents today for reevaluation.  Patient recently started chemotherapy for breast cancer.  With that she's been fatigued.  Occasional lightheaded.  All in all she is doing well from a cardiovascular standpoint she is going to initiate Herceptin.    Review of Systems   Gastrointestinal: Positive for diarrhea.   Psychiatric/Behavioral: The patient is nervous/anxious.    All other systems reviewed and are negative.        ECG 12 Lead  Date/Time: 8/14/2017 12:49 PM  Performed by: CHASE MEHTA  Authorized by: CHASE MEHTA   Comparison: compared with previous ECG from 11/28/2016  Similar to previous ECG  Rhythm: sinus rhythm  Other findings: PRWP  Clinical impression: non-specific ECG               Objective:     Physical Exam   Constitutional: She is oriented to person, place, and time. She appears well-developed.   HENT:   Head: Normocephalic.   Eyes: Conjunctivae are normal.   Neck: Normal range of motion.   Cardiovascular: Normal rate, regular rhythm and normal heart sounds.    Pulmonary/Chest: Breath sounds normal.   Abdominal: Soft. Bowel sounds are normal.   Musculoskeletal: Normal range of motion. She exhibits no edema.   Neurological: She is alert and oriented to person, place, and time.   Skin: Skin is warm and dry.   Psychiatric: She has a normal mood and affect. Her behavior is normal.   Vitals reviewed.      Lab Review:       Assessment:         No diagnosis found.       Plan:       1.  Hypertension.  Blood pressure is doing relatively good.  She is not feeling good secondary to her chemotherapy.  2.  Breast cancer my understanding is she really receive Herceptin.  If she does I told her to get her  echocardiograms done through my office per protocols so that we can follow her ejection fraction.  3.  We'll see back in 3-6 months sooner if issues.

## 2017-08-18 ENCOUNTER — INFUSION (OUTPATIENT)
Dept: ONCOLOGY | Facility: HOSPITAL | Age: 77
End: 2017-08-18

## 2017-08-18 ENCOUNTER — APPOINTMENT (OUTPATIENT)
Dept: ONCOLOGY | Facility: HOSPITAL | Age: 77
End: 2017-08-18

## 2017-08-18 VITALS
OXYGEN SATURATION: 98 % | WEIGHT: 139 LBS | DIASTOLIC BLOOD PRESSURE: 72 MMHG | BODY MASS INDEX: 24.62 KG/M2 | TEMPERATURE: 98.4 F | HEART RATE: 92 BPM | SYSTOLIC BLOOD PRESSURE: 148 MMHG

## 2017-08-18 DIAGNOSIS — C50.511 MALIGNANT NEOPLASM OF LOWER-OUTER QUADRANT OF RIGHT FEMALE BREAST (HCC): Primary | ICD-10-CM

## 2017-08-18 LAB
ALBUMIN SERPL-MCNC: 4 G/DL (ref 3.5–5.2)
ALBUMIN/GLOB SERPL: 1.7 G/DL
ALP SERPL-CCNC: 48 U/L (ref 39–117)
ALT SERPL W P-5'-P-CCNC: 28 U/L (ref 1–33)
ANION GAP SERPL CALCULATED.3IONS-SCNC: 13.2 MMOL/L
AST SERPL-CCNC: 25 U/L (ref 1–32)
BASOPHILS # BLD AUTO: 0.01 10*3/MM3 (ref 0–0.2)
BASOPHILS NFR BLD AUTO: 0.1 % (ref 0–1.5)
BILIRUB SERPL-MCNC: 0.6 MG/DL (ref 0.1–1.2)
BUN BLD-MCNC: 14 MG/DL (ref 8–23)
BUN/CREAT SERPL: 16.5 (ref 7–25)
CALCIUM SPEC-SCNC: 9.1 MG/DL (ref 8.6–10.5)
CHLORIDE SERPL-SCNC: 102 MMOL/L (ref 98–107)
CO2 SERPL-SCNC: 24.8 MMOL/L (ref 22–29)
CREAT BLD-MCNC: 0.85 MG/DL (ref 0.57–1)
DEPRECATED RDW RBC AUTO: 58.9 FL (ref 37–54)
EOSINOPHIL # BLD AUTO: 0.02 10*3/MM3 (ref 0–0.7)
EOSINOPHIL NFR BLD AUTO: 0.3 % (ref 0.3–6.2)
ERYTHROCYTE [DISTWIDTH] IN BLOOD BY AUTOMATED COUNT: 16.9 % (ref 11.7–13)
GFR SERPL CREATININE-BSD FRML MDRD: 65 ML/MIN/1.73
GLOBULIN UR ELPH-MCNC: 2.4 GM/DL
GLUCOSE BLD-MCNC: 126 MG/DL (ref 65–99)
HCT VFR BLD AUTO: 29.3 % (ref 35.6–45.5)
HGB BLD-MCNC: 9.8 G/DL (ref 11.9–15.5)
IMM GRANULOCYTES # BLD: 0.21 10*3/MM3 (ref 0–0.03)
IMM GRANULOCYTES NFR BLD: 2.9 % (ref 0–0.5)
LYMPHOCYTES # BLD AUTO: 1.41 10*3/MM3 (ref 0.9–4.8)
LYMPHOCYTES NFR BLD AUTO: 19.6 % (ref 19.6–45.3)
MCH RBC QN AUTO: 32 PG (ref 26.9–32)
MCHC RBC AUTO-ENTMCNC: 33.4 G/DL (ref 32.4–36.3)
MCV RBC AUTO: 95.8 FL (ref 80.5–98.2)
MONOCYTES # BLD AUTO: 1.6 10*3/MM3 (ref 0.2–1.2)
MONOCYTES NFR BLD AUTO: 22.2 % (ref 5–12)
NEUTROPHILS # BLD AUTO: 3.96 10*3/MM3 (ref 1.9–8.1)
NEUTROPHILS NFR BLD AUTO: 54.9 % (ref 42.7–76)
NRBC BLD MANUAL-RTO: 0 /100 WBC (ref 0–0)
PLAT MORPH BLD: NORMAL
PLATELET # BLD AUTO: 81 10*3/MM3 (ref 140–500)
PLATELET # BLD AUTO: 85 10*3/MM3 (ref 140–500)
PLATELETS.RETICULATED NFR BLD AUTO: 10.3 % (ref 0.9–6.5)
PMV BLD AUTO: 11 FL (ref 6–12)
POTASSIUM BLD-SCNC: 3.7 MMOL/L (ref 3.5–5.2)
PROT SERPL-MCNC: 6.4 G/DL (ref 6–8.5)
RBC # BLD AUTO: 3.06 10*6/MM3 (ref 3.9–5.2)
RBC MORPH BLD: NORMAL
SODIUM BLD-SCNC: 140 MMOL/L (ref 136–145)
WBC MORPH BLD: NORMAL
WBC NRBC COR # BLD: 7.21 10*3/MM3 (ref 4.5–10.7)

## 2017-08-18 PROCEDURE — 96413 CHEMO IV INFUSION 1 HR: CPT | Performed by: NURSE PRACTITIONER

## 2017-08-18 PROCEDURE — 80053 COMPREHEN METABOLIC PANEL: CPT | Performed by: INTERNAL MEDICINE

## 2017-08-18 PROCEDURE — 25010000002 TRASTUZUMAB PER 10 MG: Performed by: NURSE PRACTITIONER

## 2017-08-18 PROCEDURE — 96375 TX/PRO/DX INJ NEW DRUG ADDON: CPT | Performed by: NURSE PRACTITIONER

## 2017-08-18 PROCEDURE — 85055 RETICULATED PLATELET ASSAY: CPT | Performed by: INTERNAL MEDICINE

## 2017-08-18 PROCEDURE — 25010000002 DEXAMETHASONE SODIUM PHOSPHATE 10 MG/ML SOLUTION 1 ML VIAL: Performed by: NURSE PRACTITIONER

## 2017-08-18 PROCEDURE — 36415 COLL VENOUS BLD VENIPUNCTURE: CPT | Performed by: NURSE PRACTITIONER

## 2017-08-18 PROCEDURE — 85007 BL SMEAR W/DIFF WBC COUNT: CPT | Performed by: INTERNAL MEDICINE

## 2017-08-18 PROCEDURE — 85025 COMPLETE CBC W/AUTO DIFF WBC: CPT | Performed by: INTERNAL MEDICINE

## 2017-08-18 RX ORDER — SODIUM CHLORIDE 9 MG/ML
250 INJECTION, SOLUTION INTRAVENOUS ONCE
Status: COMPLETED | OUTPATIENT
Start: 2017-08-18 | End: 2017-08-18

## 2017-08-18 RX ORDER — FAMOTIDINE 10 MG/ML
20 INJECTION, SOLUTION INTRAVENOUS ONCE
Status: COMPLETED | OUTPATIENT
Start: 2017-08-18 | End: 2017-08-18

## 2017-08-18 RX ORDER — HYDROXYZINE PAMOATE 25 MG/1
50 CAPSULE ORAL ONCE
Status: COMPLETED | OUTPATIENT
Start: 2017-08-18 | End: 2017-08-18

## 2017-08-18 RX ORDER — GABAPENTIN 300 MG/1
CAPSULE ORAL
Qty: 30 CAPSULE | Refills: 0 | OUTPATIENT
Start: 2017-08-18

## 2017-08-18 RX ADMIN — DEXAMETHASONE SODIUM PHOSPHATE 12 MG: 10 INJECTION, SOLUTION INTRAMUSCULAR; INTRAVENOUS at 13:02

## 2017-08-18 RX ADMIN — TRASTUZUMAB 380 MG: 150 INJECTION, POWDER, LYOPHILIZED, FOR SOLUTION INTRAVENOUS at 13:23

## 2017-08-18 RX ADMIN — HYDROXYZINE PAMOATE 50 MG: 25 CAPSULE ORAL at 13:02

## 2017-08-18 RX ADMIN — FAMOTIDINE 20 MG: 10 INJECTION, SOLUTION INTRAVENOUS at 13:02

## 2017-08-18 RX ADMIN — SODIUM CHLORIDE 250 ML: 900 INJECTION, SOLUTION INTRAVENOUS at 12:50

## 2017-08-18 NOTE — PROGRESS NOTES
I called and spoke with Dr. Patel today regarding the patient's platelet count of 81,000.  While this certainly could be related to previous Taxol, it has been 3 weeks since the last dose of Taxol, making this a delayed platelet eulalio.  We will proceed with Herceptin today, beginning every 3 week dosing.  Patient will return in 1 week for CBC with nurse review to evaluate platelet count.   Additionally, no premedications are a part of the patient's care plan.  She has had several reactions to Herceptin in the past.  Therefore, we will proceed with Pepcid, Vistaril and dexamethasone today.  The patient is scheduled to follow-up with Dr. Patel in 3 weeks for next Herceptin.  At that time, he can determine future premedications.

## 2017-08-21 RX ORDER — GABAPENTIN 300 MG/1
CAPSULE ORAL
Qty: 30 CAPSULE | Refills: 0 | OUTPATIENT
Start: 2017-08-21

## 2017-08-21 RX ORDER — GABAPENTIN 300 MG/1
300 CAPSULE ORAL 3 TIMES DAILY
Qty: 90 CAPSULE | Refills: 1 | Status: SHIPPED | OUTPATIENT
Start: 2017-08-21 | End: 2017-09-08 | Stop reason: SDUPTHER

## 2017-08-22 ENCOUNTER — APPOINTMENT (OUTPATIENT)
Dept: OTHER | Facility: HOSPITAL | Age: 77
End: 2017-08-22

## 2017-08-22 ENCOUNTER — INFUSION (OUTPATIENT)
Dept: ONCOLOGY | Facility: HOSPITAL | Age: 77
End: 2017-08-22

## 2017-08-22 ENCOUNTER — TELEPHONE (OUTPATIENT)
Dept: GENERAL RADIOLOGY | Facility: HOSPITAL | Age: 77
End: 2017-08-22

## 2017-08-22 ENCOUNTER — TELEPHONE (OUTPATIENT)
Dept: ONCOLOGY | Facility: HOSPITAL | Age: 77
End: 2017-08-22

## 2017-08-22 VITALS
HEART RATE: 96 BPM | DIASTOLIC BLOOD PRESSURE: 80 MMHG | SYSTOLIC BLOOD PRESSURE: 153 MMHG | BODY MASS INDEX: 24.02 KG/M2 | TEMPERATURE: 98.6 F | WEIGHT: 135.6 LBS

## 2017-08-22 DIAGNOSIS — C50.511 MALIGNANT NEOPLASM OF LOWER-OUTER QUADRANT OF RIGHT FEMALE BREAST (HCC): Primary | ICD-10-CM

## 2017-08-22 DIAGNOSIS — R11.0 CHEMOTHERAPY-INDUCED NAUSEA: ICD-10-CM

## 2017-08-22 DIAGNOSIS — T45.1X5A CHEMOTHERAPY-INDUCED NAUSEA: ICD-10-CM

## 2017-08-22 DIAGNOSIS — C50.111 MALIGNANT NEOPLASM OF CENTRAL PORTION OF RIGHT FEMALE BREAST (HCC): Primary | ICD-10-CM

## 2017-08-22 DIAGNOSIS — E86.0 DEHYDRATION: ICD-10-CM

## 2017-08-22 DIAGNOSIS — E87.6 HYPOKALEMIA: ICD-10-CM

## 2017-08-22 LAB
ALBUMIN SERPL-MCNC: 4.1 G/DL (ref 3.5–5.2)
ALBUMIN/GLOB SERPL: 1.7 G/DL
ALP SERPL-CCNC: 47 U/L (ref 39–117)
ALT SERPL W P-5'-P-CCNC: 31 U/L (ref 1–33)
ANION GAP SERPL CALCULATED.3IONS-SCNC: 13.9 MMOL/L
AST SERPL-CCNC: 26 U/L (ref 1–32)
BASOPHILS # BLD AUTO: 0.01 10*3/MM3 (ref 0–0.2)
BASOPHILS NFR BLD AUTO: 0.2 % (ref 0–1.5)
BILIRUB SERPL-MCNC: 0.7 MG/DL (ref 0.1–1.2)
BUN BLD-MCNC: 9 MG/DL (ref 8–23)
BUN/CREAT SERPL: 12.9 (ref 7–25)
CALCIUM SPEC-SCNC: 9.2 MG/DL (ref 8.6–10.5)
CHLORIDE SERPL-SCNC: 100 MMOL/L (ref 98–107)
CO2 SERPL-SCNC: 26.1 MMOL/L (ref 22–29)
CREAT BLD-MCNC: 0.7 MG/DL (ref 0.57–1)
DEPRECATED RDW RBC AUTO: 57.9 FL (ref 37–54)
EOSINOPHIL # BLD AUTO: 0.03 10*3/MM3 (ref 0–0.7)
EOSINOPHIL NFR BLD AUTO: 0.5 % (ref 0.3–6.2)
ERYTHROCYTE [DISTWIDTH] IN BLOOD BY AUTOMATED COUNT: 16.7 % (ref 11.7–13)
GFR SERPL CREATININE-BSD FRML MDRD: 81 ML/MIN/1.73
GLOBULIN UR ELPH-MCNC: 2.4 GM/DL
GLUCOSE BLD-MCNC: 117 MG/DL (ref 65–99)
HCT VFR BLD AUTO: 30.8 % (ref 35.6–45.5)
HGB BLD-MCNC: 10.3 G/DL (ref 11.9–15.5)
IMM GRANULOCYTES # BLD: 0.11 10*3/MM3 (ref 0–0.03)
IMM GRANULOCYTES NFR BLD: 2 % (ref 0–0.5)
LYMPHOCYTES # BLD AUTO: 1.6 10*3/MM3 (ref 0.9–4.8)
LYMPHOCYTES NFR BLD AUTO: 29.3 % (ref 19.6–45.3)
MAGNESIUM SERPL-MCNC: 1.8 MG/DL (ref 1.6–2.4)
MCH RBC QN AUTO: 31.9 PG (ref 26.9–32)
MCHC RBC AUTO-ENTMCNC: 33.4 G/DL (ref 32.4–36.3)
MCV RBC AUTO: 95.4 FL (ref 80.5–98.2)
MONOCYTES # BLD AUTO: 1 10*3/MM3 (ref 0.2–1.2)
MONOCYTES NFR BLD AUTO: 18.3 % (ref 5–12)
NEUTROPHILS # BLD AUTO: 2.71 10*3/MM3 (ref 1.9–8.1)
NEUTROPHILS NFR BLD AUTO: 49.7 % (ref 42.7–76)
NRBC BLD MANUAL-RTO: 0 /100 WBC (ref 0–0)
PLATELET # BLD AUTO: 93 10*3/MM3 (ref 140–500)
PMV BLD AUTO: 11 FL (ref 6–12)
POTASSIUM BLD-SCNC: 3.1 MMOL/L (ref 3.5–5.2)
PROT SERPL-MCNC: 6.5 G/DL (ref 6–8.5)
RBC # BLD AUTO: 3.23 10*6/MM3 (ref 3.9–5.2)
SODIUM BLD-SCNC: 140 MMOL/L (ref 136–145)
WBC NRBC COR # BLD: 5.46 10*3/MM3 (ref 4.5–10.7)

## 2017-08-22 PROCEDURE — 25010000002 PALONOSETRON PER 25 MCG: Performed by: NURSE PRACTITIONER

## 2017-08-22 PROCEDURE — 36415 COLL VENOUS BLD VENIPUNCTURE: CPT | Performed by: INTERNAL MEDICINE

## 2017-08-22 PROCEDURE — 85025 COMPLETE CBC W/AUTO DIFF WBC: CPT | Performed by: INTERNAL MEDICINE

## 2017-08-22 PROCEDURE — 83735 ASSAY OF MAGNESIUM: CPT | Performed by: INTERNAL MEDICINE

## 2017-08-22 PROCEDURE — 96374 THER/PROPH/DIAG INJ IV PUSH: CPT | Performed by: NURSE PRACTITIONER

## 2017-08-22 PROCEDURE — 96361 HYDRATE IV INFUSION ADD-ON: CPT | Performed by: NURSE PRACTITIONER

## 2017-08-22 PROCEDURE — 80053 COMPREHEN METABOLIC PANEL: CPT | Performed by: INTERNAL MEDICINE

## 2017-08-22 RX ORDER — SODIUM CHLORIDE 9 MG/ML
500 INJECTION, SOLUTION INTRAVENOUS ONCE
Status: COMPLETED | OUTPATIENT
Start: 2017-08-22 | End: 2017-08-22

## 2017-08-22 RX ORDER — POTASSIUM CHLORIDE 20 MEQ/1
20 TABLET, EXTENDED RELEASE ORAL ONCE
Status: COMPLETED | OUTPATIENT
Start: 2017-08-22 | End: 2017-08-22

## 2017-08-22 RX ORDER — PROMETHAZINE HYDROCHLORIDE 25 MG/1
25 TABLET ORAL EVERY 6 HOURS PRN
Qty: 30 TABLET | Refills: 2 | Status: SHIPPED | OUTPATIENT
Start: 2017-08-22 | End: 2017-11-02 | Stop reason: SDUPTHER

## 2017-08-22 RX ORDER — PALONOSETRON 0.05 MG/ML
0.25 INJECTION, SOLUTION INTRAVENOUS ONCE
Status: CANCELLED | OUTPATIENT
Start: 2017-08-22

## 2017-08-22 RX ORDER — PALONOSETRON 0.05 MG/ML
0.25 INJECTION, SOLUTION INTRAVENOUS ONCE
Status: COMPLETED | OUTPATIENT
Start: 2017-08-22 | End: 2017-08-22

## 2017-08-22 RX ORDER — POTASSIUM CHLORIDE 20 MEQ/1
20 TABLET, EXTENDED RELEASE ORAL DAILY
Qty: 30 TABLET | Refills: 0 | Status: SHIPPED | OUTPATIENT
Start: 2017-08-22 | End: 2017-09-17 | Stop reason: SDUPTHER

## 2017-08-22 RX ADMIN — PALONOSETRON HYDROCHLORIDE 0.25 MG: 0.25 INJECTION INTRAVENOUS at 14:31

## 2017-08-22 RX ADMIN — SODIUM CHLORIDE 500 ML: 900 INJECTION, SOLUTION INTRAVENOUS at 14:37

## 2017-08-22 RX ADMIN — SODIUM CHLORIDE 500 ML: 900 INJECTION, SOLUTION INTRAVENOUS at 13:27

## 2017-08-22 RX ADMIN — POTASSIUM CHLORIDE 20 MEQ: 1500 TABLET, EXTENDED RELEASE ORAL at 14:48

## 2017-08-22 NOTE — PROGRESS NOTES
PT C/O NAUSEA UPON ARRIVAL. PER JL NP OK TO GIVE ALOXI W/ INFUSION TODAY.    PT'S STAT CMP RESULTS R/W JENNIFER NP. NEW ORDERS REC'D FOR KCL 20MEQ PO NOW, LATER TODAY AGAIN AT HOME AND THEN 20MEQ PO DAILY. ALSO OK TO CALL IN RX FOR PHENERGAN (PT REQUESTED). BOTH RX'S SENT TO PT'S PHARMACY.     ALSO REC'D ORDERS PER JENNIFER NP TO ADD MAG LEVEL TO EXISTING LABS. CALLED DOWN TO LAB AND S/W PRASHANT AND HAD THAT ADDED ON.     ALSO PER JL NP OK TO CANCEL PT'S APPT FOR THIS FRI AND MOVE TO NEXT FRI. APPT DESK MADE AWARE.

## 2017-08-22 NOTE — TELEPHONE ENCOUNTER
Pt calling and states that she had diarrhea for about 2 days and it seems better now that she has taken anti-diarrhea medications. She states that now she is very nauseous and really weak. She states she is drinking some water but not enough. Spoke with RUDY Rondon and per her order we can bring her in do a CBC,CMP and 1L of NS. She could possibly see pt in the back if needed. Attempted to call pt and inform her of this. No answer. L/M regarding this. Message sent to scheduling desk to set up appt.

## 2017-08-22 NOTE — TELEPHONE ENCOUNTER
Pt calling back. Informed her that we could give her fluids this afternoon. Also, told her that the scheduling desk should be calling to set those appts up. She V/U.

## 2017-08-22 NOTE — TELEPHONE ENCOUNTER
----- Message from Cindy Villavicencio RN sent at 8/22/2017  8:43 AM EDT -----  Please add pt on this afternoon to chemo for 1L NS and CBC, CMP.

## 2017-08-23 RX ORDER — LORAZEPAM 0.5 MG/1
TABLET ORAL
Qty: 60 TABLET | Refills: 0 | OUTPATIENT
Start: 2017-08-23 | End: 2017-08-25 | Stop reason: SDUPTHER

## 2017-08-25 ENCOUNTER — APPOINTMENT (OUTPATIENT)
Dept: OTHER | Facility: HOSPITAL | Age: 77
End: 2017-08-25

## 2017-08-25 ENCOUNTER — TELEPHONE (OUTPATIENT)
Dept: ONCOLOGY | Facility: HOSPITAL | Age: 77
End: 2017-08-25

## 2017-08-25 ENCOUNTER — APPOINTMENT (OUTPATIENT)
Dept: ONCOLOGY | Facility: CLINIC | Age: 77
End: 2017-08-25

## 2017-08-25 RX ORDER — LORAZEPAM 0.5 MG/1
0.5 TABLET ORAL EVERY 8 HOURS PRN
Qty: 90 TABLET | Refills: 0 | OUTPATIENT
Start: 2017-08-25 | End: 2017-09-22 | Stop reason: SDUPTHER

## 2017-08-25 NOTE — TELEPHONE ENCOUNTER
Pt calling for new refill of ativan. Previous refill was for twice a day but per last  NP note it can be for TID. Pt is taking TID. Called to patient's pharmacy.

## 2017-08-31 ENCOUNTER — OFFICE VISIT (OUTPATIENT)
Dept: SURGERY | Facility: CLINIC | Age: 77
End: 2017-08-31

## 2017-08-31 VITALS — WEIGHT: 135 LBS | HEART RATE: 69 BPM | BODY MASS INDEX: 23.92 KG/M2 | OXYGEN SATURATION: 99 % | HEIGHT: 63 IN

## 2017-08-31 DIAGNOSIS — C50.111 MALIGNANT NEOPLASM OF CENTRAL PORTION OF RIGHT FEMALE BREAST (HCC): Primary | ICD-10-CM

## 2017-08-31 PROCEDURE — 99215 OFFICE O/P EST HI 40 MIN: CPT | Performed by: SURGERY

## 2017-08-31 RX ORDER — HYDROXYZINE PAMOATE 25 MG/1
CAPSULE ORAL
Qty: 60 CAPSULE | Refills: 0 | Status: SHIPPED | OUTPATIENT
Start: 2017-08-31 | End: 2017-09-29 | Stop reason: SDUPTHER

## 2017-08-31 RX ORDER — PANTOPRAZOLE SODIUM 40 MG/1
TABLET, DELAYED RELEASE ORAL
Qty: 90 TABLET | Refills: 0 | Status: SHIPPED | OUTPATIENT
Start: 2017-08-31 | End: 2017-11-02 | Stop reason: SDUPTHER

## 2017-09-01 ENCOUNTER — APPOINTMENT (OUTPATIENT)
Dept: ONCOLOGY | Facility: CLINIC | Age: 77
End: 2017-09-01

## 2017-09-01 ENCOUNTER — APPOINTMENT (OUTPATIENT)
Dept: OTHER | Facility: HOSPITAL | Age: 77
End: 2017-09-01

## 2017-09-02 NOTE — PROGRESS NOTES
SUMMARY (A/P):    76-year-old lady who has completed neoadjuvant on treatment for locally advanced right breast cancer with complete clinical response by MRI findings.  She understands the rationale for mastectomy and has scheduled accordingly.  I will discuss with Dr. Patel whether there is any perceived benefit to node dissection as she was initially staged with node positive disease by radiographic criteria.  I will also discussed with him timing of surgery relative to her recently completed neoadjuvant on treatment.  I did discuss with her immediate versus delayed reconstruction and given the clinical scenario here I think delayed reconstruction would be a far wiser option in terms of minimizing morbility and allowing ongoing treatment based on final pathologic staging.  She understands and wishes to proceed accordingly.  She does understand that there is a slightly higher risk of complications with surgery based on her age and immunosuppression related to chemotherapy.    Addendum: spoke with Dr. Patel and agrees that appropriate lymph node approach would be level I and II dissection.      CC:  Breast cancer    HPI:  76-year-old lady who was diagnosed with large right breast invasive carcinoma grade 2 ER negative, less than 1% NJ positive, HER-2 positive, in May of this year.  She was clinically staged with stage IIIa disease (T3, N2, M0).  The initial workup was prompted by findings of large right breast mass on self exam.  This was associated with skin retraction in the periareolar region.  No nipple discharge, no palpable adenopathy on self-exam.  Metastatic workup was only significant for a small right axillary lymph nodes suspicious for metastatic disease.  Neoadjuvant treatment was started in May.    PHYSICAL EXAM:   Constitutional: Well-developed well-nourished, no acute distress   Heart rate 69   Weight (pounds) 135   BMI 23.9   Height (inches) 63  Eyes: Conjunctiva normal, sclera nonicteric  ENMT:  Hearing grossly normal, oral mucosa moist  Neck: Supple, no palpable mass, normal thyroid, trachea midline  Respiratory: Clear to auscultation, normal inspiratory effort  Cardiovascular: Regular rate, no murmur, no carotid bruit, no peripheral edema, no jugular venous distention  Gastrointestinal: Soft, nontender, no palpable mass, no hepatosplenomegaly, negative for hernia, bowel sounds normal  Breast: There is palpable induration throughout the right breast centrally but no discrete mass, no skin retraction, no nipple discharge.  Left chest wall with well-healed TRAM flap with no suspicious palpable or visible findings.  Lymphatics (palpable nodes):  cervical-negative, axillary-negative  Skin:  Warm, dry, no rash on visualized skin surfaces  Musculoskeletal: Symmetric strength, normal gait  Psychiatric: Alert and oriented ×3, normal affect     ALLERGIES: reviewed, in Epic    MEDICATIONS: reviewed, in Epic    PMH:    DCIS 2007  Hypertension  Hyperlipidemia  Meningioma  Gastroesophageal reflux disease    PSH:    Left mastectomy with TRAM reconstruction 2007  Left subclavian 8 [employer] 5/22/2017  Appendectomy  Cholecystectomy  Hysterectomy  Right inguinal hernia    FAMILY HISTORY:    Daughter with breast cancer    SOCIAL HISTORY:   Quit tobacco use 30 years ago  Occasional alcohol use    ROS:  No chest pain or shortness of air.  There was no unanticipated weight loss, hemoptysis, nausea/vomiting.  All other systems reviewed and negative other than presenting complaints.    RADIOLOGY/ENDOSCOPY:    -Bilateral breast MRI 8/17/2017 interval resolution of all abnormal enhancement from the right breast.  Consistent with complete response to neoadjuvant chemotherapy.  No suspicious findings for malignancy in the left TRAM flap.  Reviewed images, concur  -Bilateral breast MRI 5/21/2017 marked diffuse abnormal enhancement throughout the right breast measuring up to 7 cm in greatest dimension.  Findings consistent with  right axillary adenopathy.  Reviewed and images, concur  -CT chest abdomen pelvis 5/18/2017 single enlarged right axillary lymph node and tiny node along the right internal mammary chain which are suspicious for alley metastasis.  No other convincing evidence of metastatic disease.     LABS:    -Right breast core biopsy 5/3/2017 invasive mammary carcinoma with focal lobular features, overall grade 2.  HER-2 positive, ER negative, NV basically negative  -CMP 8/22/2017 normal except glucose 117, potassium 3.1  -CBC 8/22/2017 WBC 5.46, hemoglobin 10.3, platelets 93    CHING FRANCOIS M.D.

## 2017-09-06 DIAGNOSIS — C50.511 MALIGNANT NEOPLASM OF LOWER-OUTER QUADRANT OF RIGHT FEMALE BREAST (HCC): ICD-10-CM

## 2017-09-06 DIAGNOSIS — C50.111 MALIGNANT NEOPLASM OF CENTRAL PORTION OF RIGHT FEMALE BREAST (HCC): ICD-10-CM

## 2017-09-08 ENCOUNTER — INFUSION (OUTPATIENT)
Dept: ONCOLOGY | Facility: HOSPITAL | Age: 77
End: 2017-09-08

## 2017-09-08 ENCOUNTER — APPOINTMENT (OUTPATIENT)
Dept: ONCOLOGY | Facility: HOSPITAL | Age: 77
End: 2017-09-08

## 2017-09-08 ENCOUNTER — OFFICE VISIT (OUTPATIENT)
Dept: ONCOLOGY | Facility: CLINIC | Age: 77
End: 2017-09-08

## 2017-09-08 VITALS
DIASTOLIC BLOOD PRESSURE: 85 MMHG | WEIGHT: 139.7 LBS | RESPIRATION RATE: 14 BRPM | BODY MASS INDEX: 25.71 KG/M2 | OXYGEN SATURATION: 96 % | HEIGHT: 62 IN | TEMPERATURE: 98 F | HEART RATE: 80 BPM | SYSTOLIC BLOOD PRESSURE: 144 MMHG

## 2017-09-08 DIAGNOSIS — G62.0 PERIPHERAL NEUROPATHY DUE TO CHEMOTHERAPY (HCC): ICD-10-CM

## 2017-09-08 DIAGNOSIS — C50.111 MALIGNANT NEOPLASM OF CENTRAL PORTION OF RIGHT FEMALE BREAST (HCC): ICD-10-CM

## 2017-09-08 DIAGNOSIS — T45.1X5A ANEMIA DUE TO ANTINEOPLASTIC CHEMOTHERAPY: ICD-10-CM

## 2017-09-08 DIAGNOSIS — C79.89 SECONDARY MALIGNANT NEOPLASM OF OTHER SPECIFIED SITES (HCC): ICD-10-CM

## 2017-09-08 DIAGNOSIS — T45.1X5A PERIPHERAL NEUROPATHY DUE TO CHEMOTHERAPY (HCC): ICD-10-CM

## 2017-09-08 DIAGNOSIS — I10 ESSENTIAL HYPERTENSION: ICD-10-CM

## 2017-09-08 DIAGNOSIS — D64.81 ANEMIA DUE TO ANTINEOPLASTIC CHEMOTHERAPY: ICD-10-CM

## 2017-09-08 DIAGNOSIS — C50.511 MALIGNANT NEOPLASM OF LOWER-OUTER QUADRANT OF RIGHT FEMALE BREAST (HCC): Primary | ICD-10-CM

## 2017-09-08 DIAGNOSIS — D69.6 THROMBOCYTOPENIA (HCC): ICD-10-CM

## 2017-09-08 DIAGNOSIS — C50.211 MALIGNANT NEOPLASM OF UPPER-INNER QUADRANT OF RIGHT FEMALE BREAST (HCC): Primary | ICD-10-CM

## 2017-09-08 DIAGNOSIS — R11.0 NAUSEA: ICD-10-CM

## 2017-09-08 LAB
BASOPHILS # BLD AUTO: 0.01 10*3/MM3 (ref 0–0.2)
BASOPHILS NFR BLD AUTO: 0.2 % (ref 0–1.5)
DEPRECATED RDW RBC AUTO: 52.9 FL (ref 37–54)
EOSINOPHIL # BLD AUTO: 0.09 10*3/MM3 (ref 0–0.7)
EOSINOPHIL NFR BLD AUTO: 1.9 % (ref 0.3–6.2)
ERYTHROCYTE [DISTWIDTH] IN BLOOD BY AUTOMATED COUNT: 14.8 % (ref 11.7–13)
HCT VFR BLD AUTO: 30.2 % (ref 35.6–45.5)
HGB BLD-MCNC: 10 G/DL (ref 11.9–15.5)
IMM GRANULOCYTES # BLD: 0.05 10*3/MM3 (ref 0–0.03)
IMM GRANULOCYTES NFR BLD: 1 % (ref 0–0.5)
LYMPHOCYTES # BLD AUTO: 1.64 10*3/MM3 (ref 0.9–4.8)
LYMPHOCYTES NFR BLD AUTO: 34 % (ref 19.6–45.3)
MCH RBC QN AUTO: 32.2 PG (ref 26.9–32)
MCHC RBC AUTO-ENTMCNC: 33.1 G/DL (ref 32.4–36.3)
MCV RBC AUTO: 97.1 FL (ref 80.5–98.2)
MONOCYTES # BLD AUTO: 0.83 10*3/MM3 (ref 0.2–1.2)
MONOCYTES NFR BLD AUTO: 17.2 % (ref 5–12)
NEUTROPHILS # BLD AUTO: 2.2 10*3/MM3 (ref 1.9–8.1)
NEUTROPHILS NFR BLD AUTO: 45.7 % (ref 42.7–76)
NRBC BLD MANUAL-RTO: 0 /100 WBC (ref 0–0)
PLATELET # BLD AUTO: 89 10*3/MM3 (ref 140–500)
PMV BLD AUTO: 11 FL (ref 6–12)
RBC # BLD AUTO: 3.11 10*6/MM3 (ref 3.9–5.2)
WBC NRBC COR # BLD: 4.82 10*3/MM3 (ref 4.5–10.7)

## 2017-09-08 PROCEDURE — 96413 CHEMO IV INFUSION 1 HR: CPT | Performed by: INTERNAL MEDICINE

## 2017-09-08 PROCEDURE — 96375 TX/PRO/DX INJ NEW DRUG ADDON: CPT | Performed by: INTERNAL MEDICINE

## 2017-09-08 PROCEDURE — 99215 OFFICE O/P EST HI 40 MIN: CPT | Performed by: INTERNAL MEDICINE

## 2017-09-08 PROCEDURE — 25010000002 DEXAMETHASONE SODIUM PHOSPHATE 10 MG/ML SOLUTION 1 ML VIAL: Performed by: INTERNAL MEDICINE

## 2017-09-08 PROCEDURE — 25010000002 TRASTUZUMAB PER 10 MG: Performed by: INTERNAL MEDICINE

## 2017-09-08 PROCEDURE — 85025 COMPLETE CBC W/AUTO DIFF WBC: CPT | Performed by: INTERNAL MEDICINE

## 2017-09-08 RX ORDER — FAMOTIDINE 10 MG/ML
20 INJECTION, SOLUTION INTRAVENOUS ONCE
Status: COMPLETED | OUTPATIENT
Start: 2017-09-08 | End: 2017-09-08

## 2017-09-08 RX ORDER — GABAPENTIN 300 MG/1
600 CAPSULE ORAL 3 TIMES DAILY
Qty: 180 CAPSULE | Refills: 5 | Status: SHIPPED | OUTPATIENT
Start: 2017-09-08 | End: 2017-11-02 | Stop reason: SDUPTHER

## 2017-09-08 RX ORDER — SODIUM CHLORIDE 9 MG/ML
250 INJECTION, SOLUTION INTRAVENOUS ONCE
Status: COMPLETED | OUTPATIENT
Start: 2017-09-08 | End: 2017-09-08

## 2017-09-08 RX ORDER — SODIUM CHLORIDE 9 MG/ML
250 INJECTION, SOLUTION INTRAVENOUS ONCE
Status: CANCELLED | OUTPATIENT
Start: 2017-09-08

## 2017-09-08 RX ORDER — ONDANSETRON 8 MG/1
8 TABLET, ORALLY DISINTEGRATING ORAL EVERY 8 HOURS PRN
Qty: 30 TABLET | Refills: 5 | Status: SHIPPED | OUTPATIENT
Start: 2017-09-08 | End: 2017-11-02

## 2017-09-08 RX ORDER — HYDROXYZINE PAMOATE 25 MG/1
50 CAPSULE ORAL ONCE
Status: COMPLETED | OUTPATIENT
Start: 2017-09-08 | End: 2017-09-08

## 2017-09-08 RX ADMIN — HYDROXYZINE PAMOATE 50 MG: 25 CAPSULE ORAL at 14:36

## 2017-09-08 RX ADMIN — SODIUM CHLORIDE 250 ML: 900 INJECTION, SOLUTION INTRAVENOUS at 14:36

## 2017-09-08 RX ADMIN — TRASTUZUMAB 380 MG: 150 INJECTION, POWDER, LYOPHILIZED, FOR SOLUTION INTRAVENOUS at 15:08

## 2017-09-08 RX ADMIN — DEXAMETHASONE SODIUM PHOSPHATE 12 MG: 10 INJECTION, SOLUTION INTRAMUSCULAR; INTRAVENOUS at 14:36

## 2017-09-08 RX ADMIN — FAMOTIDINE 20 MG: 10 INJECTION, SOLUTION INTRAVENOUS at 14:36

## 2017-09-08 NOTE — PROGRESS NOTES
REASON FOR FOLLOWUP:   1. Newly diagnosed large right breast cancer.  Core needle biopsy reported invasive mammary carcinoma with focal lobular feature, grade 2.  ER negative, less than 1%; WI positive, moderate in staining, 5% to 10%. HER2 IHC 2+, FISH study positive 2.1.   2.  CT scan for chest abdomen and pelvis and breast MRI examination reported right axillary lymph nodes suspicious for metastatic disease.  No remote metastatic lesion.  Patient has stage IIIa (T3 N2 M0) disease.   3.  Patient was started neoadjuvant chemotherapy on 5/23/2017 with Taxol weekly plus Herceptin weekly for total 12 weeks, and Perjata every 3 weeks during chemotherapy.  Herceptin will be converted to every 3 weeks after chemotherapy finished.    4.  Chronic moderate thrombocytopenia, mild anemia, and intermittent mild neutropenia etiologies are not clear.  5.  Reaction to Herceptin C1D1.  Subsequently tolerated therapy with hydrocortisone as premedication.  6.  Peripheral neuropathy secondary to chemotherapy Taxol was discontinued after week #10 dose.  Patient was started on Neurontin 300 mg 3 times a day for peripheral neuropathy.   7.  Post neoadjuvant chemotherapy MRI examination on 8/17/2017 reported excellent response with resolution of right breast malignancy and right axillary lymphadenopathy.   8.  Herceptin was converted to every 3 weeks starting in mid August 2017.      HISTORY OF PRESENT ILLNESS:    The patient is a pleasant 76 y.o. with the above-mentioned history, here today for reevaluation.  This patient reports her peripheral neuropathy is still fairly significant, however it did improve after starting Neurontin.  She reports no dyspnea no palpitations no chest pain.  Echocardiogram study on 7/24/2017 reported LVEF 67%.      She continues to have some nausea, especially in the morning for which she takes Compazine or Zofran with relief.  She usually does not have more symptoms afterwards.        Patient was recently  seen by Dr. Melo for discussion of surgical intervention.  I also spoke to Dr. Melo earlier this week for mastectomy and right axillary lymph node dissection because positive lymph nodes prior to initiation of her neoadjuvant chemotherapy.  Patient has not a questions, she is concerned about the uncertainty of the surgery.  Explained to patient that its necessary to decrease the risk for local disease recurrence.  This patient likely will need some rehabilitation after the surgery, in terms of her mobility involving the left shoulder.  She lives by herself.  She will also need a referral to lymphedema clinic afterwards.        Past Medical History:   Diagnosis Date   • Acute bronchitis    • Anemia    • Breast cancer 2007, 2017    LEFT BREAST CA 2007, RIGHT BREAST CA 2017   • Breast cancer 2017    Right   • Chronic pain    • Colitis, acute 09/10/2011    Hx of Acute colitis. ER records   • Conjunctivitis    • Cough    • Ductal carcinoma in situ (DCIS) of left breast    • Edema     Chronic lower extremity edema   • GERD (gastroesophageal reflux disease) 09/13/2011    Dr. Paul Negron   • H/O Bowel obstruction 2013   • H/O jaundice    • Hernia    • History of infectious mononucleosis 1960   • History of migraine headaches    • Hx of colonic polyps 06/11/2009    Dr. Giles   • Hx of dehydration 09/13/2011    Related to Refractory nausea and vomiting   • Hx of hypercholesterolemia 09/13/2011    Dr. Paul Saba   • Hyperlipidemia    • Hypertension    • Impacted cerumen of left ear    • Leukocytopenia    • Leukopenia    • Meningioma    • Peptic ulceration    • Perioral dermatitis    • SBO (small bowel obstruction)     due to hernia with surgical repair in 09/2011   • SOB (shortness of breath) on exertion    • Thrombocytopenia    • Vertigo    Normal echocardiogram on 5/18/2017, LVEF 68%.    Past Surgical History:   Procedure Laterality Date   • APPENDECTOMY  1960   • BLADDER REPAIR  1980   • BREAST BIOPSY Left     • BREAST BIOPSY Right 2017    PATH: INVASIVE MAMMARY CARCINOMA   • CHOLECYSTECTOMY     • COLONOSCOPY N/A 2009    Hemorrhoids, otherwise normal, repeat in 5 years-Dr. Noemí Purcell   • HERNIA REPAIR Right 2011    Repair of incarcerated femoral hernia-Dr. Alfred Mcdowell   • HYSTERECTOMY     • MASTECTOMY Left     and sentinel lymph node biospy at [facility]   • [place] INSJ TUNNELED CVC W/O SUBQ PORT/ AGE 5 YR/> Left 2017    Procedure: INSERTION VENOUS ACCESS DEVICE;  Surgeon: Christian Melo MD;  Location: [facility] OR;  Service: General   • REDUCTION MAMMAPLASTY Right     to match Lt. TRAM flap   • TONSILLECTOMY     • UPPER GASTROINTESTINAL ENDOSCOPY N/A 2011    LA grade A esophagitis with no bleeding, large hiatus hernia (50cm), gastric ulcer-Dr.Laszlo Lezama   Lico catheter placement on 2017 by Dr. Melo.     OB/GYN history: Menarche age 16, menopause at 1985. , 1 miscarriage. No birth control pill use. She did have post menopause hormonal supplementation.      HEMATOLOGIC/ONCOLOGIC HISTORY: The patient is a 76 y.o. year old female whom we are consulted for a newly self discovered right breast mass, in 2017. Patient had ultrasound-guided biopsy on 5/3/2017 and confirmed to be invasive mammary carcinoma with focal lobular features, grade 2 Elen score 6/9. She is here for initial evaluation for management.      Patient is a 76-year-old  female who was seen here previously for chronic mild-to-moderate thrombocytopenia and mild anemia. Recently the patient reports she started having firmness of the right breast that started sometime in April or maybe even in March. She noticed firmness spread from about around the 12 o'clock position, gradually towards the upper lateral side and lower part of the right breast associated mild pain. The patient thought it was related to fibrocystic changes. However, the symptom was getting worse.  Patient also reported dented nipple after she started having pain in the right breast. She denies discharge from the nipple. She called her primary care physician, Dr. Martin, who ordered a mammogram study. This was done on 04/12/2017 with architectural distortion of the right breast centrally at 12 o'clock position and had scattered fibroglandular densities throughout the right breast.      The patient subsequently had right breast ultrasound examination on 04/26/2017. Discovered a large right breast mass measuring 5.8 x 3.5 x 3.9 cm. Patient subsequently had ultrasound-guided right breast biopsy on 05/03/2017. Pathology evaluation reported invasive mammary carcinoma with focal lobular feature. Elen score 6/9, overall grade 2. Sample was further sent to the [facility] for test. ER negative, less than 1%; NV positive, moderate in staining, 5% to 10%. HER2 IHC 2+, but FISH study positive 2.1.     She denies weight loss, she actually eats very well. No nausea vomiting. Patient does complain of insomnia, unable to sleep.      This patient has history of left breast DCIS back in 2007, had mastectomy at the [facility]. No hormonal therapy afterwards according to patient. This patient also had a small meningioma, followed by Dr. Smith in [facility], with most recent MRI of the brain in March 2017, with 18 mm meningioma, and followed on an annual basis.     Her neutrophil count on 5/16/17 is normal at 2500, however, records showed starting from 05/2015 and in 09/2016, 01/2017 and 03/2017, all 4 laboratory studies showed mild neutropenia with ANC fluctuating between 1200 and 1600. Etiology is not clear.    Normal echocardiogram on 5/18/2017, LVEF 68%.      CT scan for chest abdomen and pelvis on 5/22/2017 and breast MRI examination on 5/22/2017 reported small right axillary lymph node suspicious for metastatic disease.  No remote metastatic lesion.  Patient  has stage IIIa (T3 N2 M0) disease.      Patient will start neoadjuvant chemotherapy with Taxol weekly plus Herceptin weekly for total 12 weeks, and Perjeta every 3 weeks (3-week cycle) during chemotherapy.  Herceptin will be converted to every 3 weeks after chemotherapy finished.  Cycle 1 day 1 on 5/23/2017.      Patient received a total 10 weekly Taxol treatment, the remaining 2 was discontinued due to severe peripheral neuropathy from chemotherapy.  Perjata was given 4 cycles.  Herceptin will be continued.      MEDICATIONS: The current medication list was reviewed with the patient and updated in the EMR this date per the Medical Assistant. Medication dosages and frequencies were confirmed to be accurate.       ALLERGIES:    Allergies   Allergen Reactions   • Codeine Nausea And Vomiting   • Morphine Nausea And Vomiting     SOCIAL HISTORY:   Social History   Substance Use Topics   • Smoking status: Former Smoker     Packs/day: 2.00     Years: 30.00     Types: Cigarettes   • Smokeless tobacco: Former User      Comment: caffeine use   • Alcohol use No      Comment: stopped, heavy in past     FAMILY HISTORY:  Family History   Problem Relation Age of Onset   • Heart disease Mother    • Aortic aneurysm Mother      abdominal   • Coronary artery disease Mother    • Hypertension Mother    • Heart disease Father    • Heart attack Father      acute   • Hypertension Father    • Coronary artery disease Brother    • Hypertension Brother    • Heart disease Brother    • Breast cancer Daughter 45     I have reviewed the patient's medical history in detail and updated the computerized patient record.    REVIEW OF SYSTEMS:  GENERAL: See history of present illness. No change in appetite or weight;   No fevers, chills, sweats.   SKIN: No nonhealing lesions. No rashes.   HEME/LYMPH: See HPI.   EYES: No vision changes or diplopia.   ENT: No tinnitus, hearing loss, gum bleeding, epistaxis, hoarseness or dysphagia.   RESPIRATORY: No cough,  "Has exertional dyspnea, No hemoptysis or wheezing.   CVS: No chest pain, palpitations, orthopnea, dyspnea on exertion or PND.   GI: Persistent nausea,  No vomiting.  No diarrhea no constipation.  No melena hematochezia.  : No lower tract obstructive symptoms, dysuria or hematuria.   MUSCULOSKELETAL: Chronic or joint pain, arthritis.  Has mild intermittent ankle swelling.   NEUROLOGICAL: See HPI  PSYCHIATRIC: Patient has anxiety.  No depression.  No abnormal behavior.     Objective:   Vitals:    09/08/17 1333   BP: 144/85   Pulse: 80   Resp: 14   Temp: 98 °F (36.7 °C)   TempSrc: Oral   SpO2: 96%   Weight: 139 lb 11.2 oz (63.4 kg)   Height: 62.01\" (157.5 cm)   PainSc: 0-No pain   ECOG 0      PHYSICAL EXAM:   GENERAL: Well-developed, well-nourished female, Not in acute distress.    SKIN: Warm, dry without rashes, purpura or petechiae.  Left upper chest Lico catheter in place, no evidence of infection.   EYES:  EOMs intact. Conjunctivae normal.  EARS: Hearing intact.  NOSE:  No nasal discharge.  MOUTH: Tongue is well-papillated; no stomatitis or ulcers. Lips normal.  THROAT: Oropharynx without lesions or exudates.  LYMPHATICS: No cervical, supraclavicular, axillary adenopathy.  CHEST: Lungs clear to auscultation. Good airflow.   BREAST: not examined today.   CARDIAC: Regular rate and rhythm without murmurs. Normal S1,S2.  ABDOMEN: Slightly distended, normal active bowel sounds,  no hepatosplenomegaly or masses.  EXTREMITIES: No clubbing, cyanosis or edema.  NEUROLOGICAL : AAOx3.   PSYCHIATRIC: She is slightly tearful and anxious.       RECENT LABS:  Lab Results   Component Value Date    WBC 4.82 09/08/2017    HGB 10.0 (L) 09/08/2017    HCT 30.2 (L) 09/08/2017    MCV 97.1 09/08/2017    PLT 89 (L) 09/08/2017     Lab Results   Component Value Date    NEUTROABS 2.20 09/08/2017     Lab Results   Component Value Date    GLUCOSE 117 (H) 08/22/2017    BUN 9 08/22/2017    CREATININE 0.70 08/22/2017    EGFRIFNONA 81 08/22/2017 "    EGFRIFAFRI 82 03/27/2017    BCR 12.9 08/22/2017    K 3.1 (L) 08/22/2017    CO2 26.1 08/22/2017    CALCIUM 9.2 08/22/2017    PROTENTOTREF 6.5 03/27/2017    ALBUMIN 4.10 08/22/2017    LABIL2 1.7 08/22/2017    AST 26 08/22/2017    ALT 31 08/22/2017     Acquired echocardiogram 2D complete with contrast   July 24, 2017     Interpretation Summary   · Left ventricular systolic function is normal. Calculated EF = 66.7%. Estimated EF was in agreement with the calculated EF. Estimated EF = 67%. Global strain = -16%. Strain data was reviewed by physician. The global longitudinal RV strain is slightly diminished and decreased from prior study where it was -21. This is suspicious for early myopathic process, possibly related to chemotherapy.  · Normal left ventricular cavity size and wall thickness noted. All left ventricular wall segments contract normally. Left ventricular diastolic dysfunction is noted (grade I) consistent with impaired relaxation.       Assessment/Plan   1. Large right breast cancer, locally advanced, stage IIIa (T3 N1/ 2 M0).  ER negative, less than 1%; AR positive, moderate in staining, 5% to 10%. HER2 IHC 2+, FISH study positive 2.1.     Patient finished neoadjuvant chemotherapy, with weekly Taxol plus per junk Plus Herceptin, Taxol was discontinued after week #10 due to significant peripheral neuropathy.  Nevertheless postchemotherapy MRI examination showed completed response by imaging standards, including the right axillary lymph nodes.     At this point we'll continue Herceptin every 3 weeks.    Patient was seen by Dr. Melo recently for evaluation of surgical resection.  I also spoke to Dr. Melo earlier this week, I recommended mastectomy plus right axillary lymph node dissection, to decrease risk for disease recurrence.  This patient likely will also need radiation therapy to the axillary area.  However patient is very anxious because of the uncertainties, and possible side effects, and  immobility from the surgery impacting her life quality.  Discussed that with the patient today and I explained to her the necessity of the surgical procedure.  She voiced understanding.     Patient reports she is not physically ready for mastectomy because of the weakness from her neoadjuvant chemotherapy and a peripheral neuropathy.  She prefers to wait until a longer period.      Due to week positive for MS, she might benefit from endocrine therapy.  This will be discussed after her surgery.      2. Nausea related to therapy.  She continues to have some nausea especially the morning time.  He has been taking Zofran and Compazine.  I renewed her Zofran prescription today.  She may also need Compazine and Zofran is not effective enough.    3.  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  She was started on gabapentin 300 mg 3 times a day, with some relief, however still complains significant problem.  Discussed with patient, I recommended to double her Neurontin to 600 mg 3 times a day.  I provided new protrusion today.  If she cannot tolerate, we can back off the Neurontin dose.  I explained to her and she voiced understanding.     4. 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     5.  Mild anemia and moderate thrombocytopenia.  This is chronic.  Partially related to her chemotherapy, however this patient has history of chronic low anemia in the chemotherapy knee.  Laboratory study in May 2017 reported no evidence of iron deficiency, nor folic acid or vitamin B12.     6.  Abnormal echocardiogram study in July 2017.  Patient is not symptomatic, has no lower extremity edema or dyspnea.  I will request repeated echocardiogram study to be done in late October for reassessment, which would be 3 months from prior examination.      Plan:  1.  Proceed ahead with Herceptin treatment today and repeat every 3 weeks.   2.  Arrange echocardiogram study to be done in late October 2017.  3.  Patient will follow-up with Dr. Melo  to arrange mastectomy and right axillary node dissection.   4.  Increase Neurontin to 600 mg 3 times a day.  I provided new perception today.  5.  Continue Zofran as needed for nausea.  I therefore new perception today with 5 more refills.   6.  Patient will return in 3 weeks for M.D. Visit.       I spent more than 50 minutes today, including over 35 minutes in direct face-to-face contact and counseling to patient and her friend..    SCARLET UGARTE M.D., Ph.D.    9/8/2017        CC: Christian Melo M.D.

## 2017-09-11 ENCOUNTER — PREP FOR SURGERY (OUTPATIENT)
Dept: OTHER | Facility: HOSPITAL | Age: 77
End: 2017-09-11

## 2017-09-11 DIAGNOSIS — C50.911 RIGHT BREAST CANCER WITH T3 TUMOR, >5 CM IN GREATEST DIMENSION (HCC): Primary | ICD-10-CM

## 2017-09-11 RX ORDER — CEFAZOLIN SODIUM 2 G/100ML
2 INJECTION, SOLUTION INTRAVENOUS ONCE
Status: CANCELLED | OUTPATIENT
Start: 2017-09-11 | End: 2017-09-11

## 2017-09-12 ENCOUNTER — TRANSCRIBE ORDERS (OUTPATIENT)
Dept: SURGERY | Facility: CLINIC | Age: 77
End: 2017-09-12

## 2017-09-12 ENCOUNTER — CLINICAL SUPPORT (OUTPATIENT)
Dept: OTHER | Facility: HOSPITAL | Age: 77
End: 2017-09-12

## 2017-09-12 ENCOUNTER — TELEPHONE (OUTPATIENT)
Dept: SURGERY | Facility: CLINIC | Age: 77
End: 2017-09-12

## 2017-09-12 DIAGNOSIS — Z85.3 PERSONAL HISTORY OF MALIGNANT NEOPLASM OF BREAST: ICD-10-CM

## 2017-09-12 DIAGNOSIS — C50.919 MALIGNANT NEOPLASM OF OTHER SPECIFIED SITES OF FEMALE BREAST: Primary | ICD-10-CM

## 2017-09-12 DIAGNOSIS — C50.011 MALIGNANT NEOPLASM INVOLVING BOTH NIPPLE AND AREOLA OF RIGHT BREAST IN FEMALE (HCC): Primary | ICD-10-CM

## 2017-09-12 PROCEDURE — G0463 HOSPITAL OUTPT CLINIC VISIT: HCPCS

## 2017-09-12 NOTE — TELEPHONE ENCOUNTER
Patient would like you to call her regarding surgery. Very concerned about what is to be scheduled now and what you all discussed.    [phone]

## 2017-09-12 NOTE — TELEPHONE ENCOUNTER
Spoke with her at length. Please make her an appointment with [facility] for locally advanced breast cancer

## 2017-09-13 ENCOUNTER — TELEPHONE (OUTPATIENT)
Dept: ONCOLOGY | Facility: CLINIC | Age: 77
End: 2017-09-13

## 2017-09-13 NOTE — TELEPHONE ENCOUNTER
Phone calls from both pt and daughter Irene De Anda.  They are both worried about the pt's up-coming surgery and concerns about medical opinions about the surgery. The patient lives alone. Irene is in [place].  They are worried that the patient is certain to develop lymphedema and then not be able to continue to live independently, especially if all the lymph nodes are removed,  which they understand we are recommending. I called Roxana back and told her I had discussed her concerns with our Practice Administrator, Krystal Berry RN.  We will call her tomorrow or Friday.  She should feel free to call us.

## 2017-09-17 DIAGNOSIS — E87.6 HYPOKALEMIA: ICD-10-CM

## 2017-09-18 RX ORDER — POTASSIUM CHLORIDE 1500 MG/1
TABLET, EXTENDED RELEASE ORAL
Qty: 30 TABLET | Refills: 0 | Status: SHIPPED | OUTPATIENT
Start: 2017-09-18 | End: 2017-10-15 | Stop reason: SDUPTHER

## 2017-09-18 RX ORDER — LANOLIN ALCOHOL/MO/W.PET/CERES
CREAM (GRAM) TOPICAL
Qty: 30 TABLET | Refills: 2 | Status: SHIPPED | OUTPATIENT
Start: 2017-09-18 | End: 2017-11-02 | Stop reason: SDUPTHER

## 2017-09-22 ENCOUNTER — CONSULT (OUTPATIENT)
Dept: RADIATION ONCOLOGY | Facility: HOSPITAL | Age: 77
End: 2017-09-22

## 2017-09-22 ENCOUNTER — APPOINTMENT (OUTPATIENT)
Dept: RADIATION ONCOLOGY | Facility: HOSPITAL | Age: 77
End: 2017-09-22

## 2017-09-22 VITALS
WEIGHT: 139 LBS | HEART RATE: 76 BPM | OXYGEN SATURATION: 98 % | RESPIRATION RATE: 16 BRPM | DIASTOLIC BLOOD PRESSURE: 69 MMHG | TEMPERATURE: 98 F | BODY MASS INDEX: 25.42 KG/M2 | SYSTOLIC BLOOD PRESSURE: 142 MMHG

## 2017-09-22 DIAGNOSIS — C50.211 MALIGNANT NEOPLASM OF UPPER-INNER QUADRANT OF RIGHT FEMALE BREAST (HCC): Primary | ICD-10-CM

## 2017-09-22 PROCEDURE — G0463 HOSPITAL OUTPT CLINIC VISIT: HCPCS | Performed by: RADIOLOGY

## 2017-09-22 PROCEDURE — 99205 OFFICE O/P NEW HI 60 MIN: CPT | Performed by: RADIOLOGY

## 2017-09-22 RX ORDER — LORAZEPAM 0.5 MG/1
TABLET ORAL
Qty: 90 TABLET | Refills: 0 | OUTPATIENT
Start: 2017-09-22 | End: 2017-10-21 | Stop reason: SDUPTHER

## 2017-09-22 NOTE — PROGRESS NOTES
Subjective     Christian Melo MD     Diagnosis Plan   1. Malignant neoplasm of upper-inner quadrant of right female breast       CC: [id number] right breast cancer, ER negative KS 5-10% and Her 2 positive                           Dear Dr. Melo:    I had the pleasure of seeing Roxana Brizuela  [facility].   The patient is a 76 y.o. year old female with She has a remote history of DCIS of the left breast in 2004 and underwent mastectomy with tram flap reconstruction.  She had her yearly mammogram on 4/12/17 which showed an area of architectural distortion in the right breast 12 oclock position.  She then had a right breast ultrasound on 4/26/17 which showed a 5.8 x 3.5 x 3.9cm irregular hypoechoic masslike region at the 12 oclock position.  She had an ultrasound guided biopsy on 5/3/17 which was positive for invasive mammary carcinoma with focal lobular features, grade 2, ER negative, KS 5-10% and Her 2 positive.  She met with Dr. Patel on 5/16/17 and on physical exam was noted to have a 7cm mass in the right breast. She had a breast MRI on 5/21/17 which showed a 4.6cm x 6.5 x 7.2cm area of masslike enhancement in the right breast 12 o'clock position with nipple retraction and right axillary adenopathy.  She had a CT chest abdomen and pelvis on 5/18/17 which showed a 3mm node along the right internal mammary chain and one enlarged right axillary node but no evidence of distant metastatic disease.  She received neoadjuvant chemotherapy consisting of Herceptin, Perjeta and Taxol which she completed in late August 2017.  She did not receive the last 2 weekly Taxol doses due to worsening neuropathy.    She is now on neurontin 600mg tid.  She had a repeat breast MRI on 8/17/17 which showed resolution of all abnormal enhancement in the right breast and no evidence of axillary adenopathy.  She met with Dr. Melo on 8/31/17 and her case was discussed at the multidisciplinary breast conference  this morning.  She has not had surgery yet and is referred today to discuss possible adjuvant radiation therapy.  She is fairly adamant about not wanting an axillary dissection but is agreeable to a mastectomy with sentinel node biopsy.      with menarche age 16 and menopause age.  She has never taken hormone replacement therapy.          Review of Systems   Constitutional: Positive for fatigue.   HENT: Negative.    Eyes: Negative.    Respiratory: Negative.    Cardiovascular: Negative.    Gastrointestinal: Positive for nausea.   Endocrine: Negative.    Genitourinary: Negative.    Musculoskeletal: Negative.    Skin: Negative.    Neurological: Negative.    Hematological: Negative.    Psychiatric/Behavioral: Negative.          Past Medical History:   Diagnosis Date   • Acute bronchitis    • Anemia    • Breast cancer , 2017    LEFT BREAST CA , RIGHT BREAST CA 2017   • Breast cancer 2017    Right   • Chronic pain    • Colitis, acute 09/10/2011    Hx of Acute colitis. ER records   • Conjunctivitis    • Cough    • Ductal carcinoma in situ (DCIS) of left breast    • Edema     Chronic lower extremity edema   • GERD (gastroesophageal reflux disease) 2011    Dr. Paul Negron   • H/O Bowel obstruction    • H/O jaundice    • Hernia    • History of infectious mononucleosis    • History of migraine headaches    • Hx of colonic polyps 2009    Dr. Giles   • Hx of dehydration 2011    Related to Refractory nausea and vomiting   • Hx of hypercholesterolemia 2011    Dr. Paul Saba   • Hyperlipidemia    • Hypertension    • Impacted cerumen of left ear    • Leukocytopenia    • Leukopenia    • Meningioma    • Peptic ulceration    • Perioral dermatitis    • SBO (small bowel obstruction)     due to hernia with surgical repair in 2011   • SOB (shortness of breath) on exertion    • Thrombocytopenia    • Vertigo          Past Surgical History:   Procedure Laterality Date   • APPENDECTOMY   1960   • BLADDER REPAIR  1980   • BREAST BIOPSY Left 2007   • BREAST BIOPSY Right 05/03/2017    PATH: INVASIVE MAMMARY CARCINOMA   • CHOLECYSTECTOMY  1990   • COLONOSCOPY N/A 06/11/2009    Hemorrhoids, otherwise normal, repeat in 5 years-Dr. Noemí Purcell   • HERNIA REPAIR Right 09/13/2011    Repair of incarcerated femoral hernia-Dr. Alfred Mcdowell   • HYSTERECTOMY  1980   • MASTECTOMY Left 2007    and sentinel lymph node biospy at [facility]   • [place] INSJ TUNNELED CVC W/O SUBQ PORT/ AGE 5 YR/> Left 5/22/2017    Procedure: INSERTION VENOUS ACCESS DEVICE;  Surgeon: Christian Mleo MD;  Location: [facility] OR;  Service: General   • REDUCTION MAMMAPLASTY Right     to match Lt. TRAM flap   • TONSILLECTOMY     • UPPER GASTROINTESTINAL ENDOSCOPY N/A 09/12/2011    LA grade A esophagitis with no bleeding, large hiatus hernia (50cm), gastric ulcer-Dr.Laszlo Lezama         Social History     Social History   • Marital status:      Spouse name: Mike   • Number of children: 2   • Years of education: College     Occupational History   •  Retired     Investigator [employer]     Social History Main Topics   • Smoking status: Former Smoker     Packs/day: 2.00     Years: 30.00     Types: Cigarettes   • Smokeless tobacco: Former User      Comment: caffeine use   • Alcohol use No      Comment: stopped, heavy in past   • Drug use: No   • Sexual activity: Defer     Other Topics Concern   • Not on file     Social History Narrative         Family History   Problem Relation Age of Onset   • Heart disease Mother    • Aortic aneurysm Mother      abdominal   • Coronary artery disease Mother    • Hypertension Mother    • Heart disease Father    • Heart attack Father      acute   • Hypertension Father    • Coronary artery disease Brother    • Hypertension Brother    • Heart disease Brother    • Breast cancer Daughter 45          Objective    Physical Exam   Constitutional: She is oriented to person, place, and time.  She appears well-developed and well-nourished.   HENT:   Head: Atraumatic.   Eyes: EOM are normal.   Neck: Neck supple.   Pulmonary/Chest:       Abdominal: Soft.   Musculoskeletal: Normal range of motion.   Neurological: She is alert and oriented to person, place, and time.   Skin: Skin is warm and dry.   Psychiatric: She has a normal mood and affect. Her behavior is normal.         Current Outpatient Prescriptions on File Prior to Visit   Medication Sig Dispense Refill   • Ascorbic Acid (VITAMIN C PO) Take 1,000 mg by mouth Daily.     • Calcium Carbonate-Vitamin D (CALTRATE 600+D PO) Take 600 mg by mouth 2 (Two) Times a Day.     • dexamethasone (DECADRON) 4 MG tablet Take 1 tablet by mouth 2 (Two) Times a Day With Meals. Twice a day as needed after chemotherapy for nausea vomiting 40 tablet 1   • diclofenac (VOLTAREN) 75 MG EC tablet TAKE ONE TABLET BY MOUTH TWICE DAILY WITH FOOD 60 tablet 2   • gabapentin (NEURONTIN) 300 MG capsule Take 2 capsules by mouth 3 (Three) Times a Day. 180 capsule 5   • Glucos-Chond-Sterol-Fish Oil (GLUCOSAMINE CHONDROITIN PLUS) capsule Take 1 tablet by mouth 2 (Two) Times a Day.     • hydrochlorothiazide (HYDRODIURIL) 25 MG tablet Take 1 tablet by mouth Daily. 90 tablet 1   • HYDROcodone-acetaminophen (NORCO) 5-325 MG per tablet 1-2 by mouth every four hours as needed for pain 40 tablet 0   • hydrOXYzine (VISTARIL) 25 MG capsule TAKE ONE CAPSULE BY MOUTH TWICE DAILY 60 capsule 0   • KLOR-CON 20 MEQ CR tablet TAKE ONE TABLET BY MOUTH ONCE DAILY 30 tablet 0   • Lactobacillus (PROBIOTIC ACIDOPHILUS PO) Take 1 capsule by mouth Daily.     • lactulose (CHRONULAC) 10 GM/15ML solution Take 30 mL by mouth 2 (Two) Times a Day As Needed (constipation). 236 mL 1   • ondansetron ODT (ZOFRAN-ODT) 8 MG disintegrating tablet Take 1 tablet by mouth Every 8 (Eight) Hours As Needed for Nausea or Vomiting for up to 60 doses. 30 tablet 5   • pantoprazole (PROTONIX) 40 MG EC tablet TAKE ONE TABLET BY MOUTH  ONCE DAILY 90 tablet 0   • promethazine (PHENERGAN) 25 MG tablet Take 1 tablet by mouth Every 6 (Six) Hours As Needed for Nausea or Vomiting. 30 tablet 2   • propranolol (INDERAL) 10 MG tablet Take 10 mg by mouth 3 (Three) Times a Day.     • simvastatin (ZOCOR) 80 MG tablet Take 80 mg by mouth Every Night.     • SUMAtriptan (IMITREX) 50 MG tablet Take 50 mg by mouth Every 2 (Two) Hours As Needed for migraine. Take one tablet at onset of headache. May repeat dose one time in 2 hours if headache not relieved.     • vitamin B-12 (CYANOCOBALAMIN) 1000 MCG tablet Take 1,000 mcg by mouth Daily.     • vitamin B-6 (PYRIDOXINE) 50 MG tablet TAKE ONE TABLET BY MOUTH ONCE DAILY 30 tablet 2   • zolpidem (AMBIEN) 10 MG tablet Take 1 tablet by mouth At Night As Needed for Sleep. 30 tablet 3   • [DISCONTINUED] LORazepam (ATIVAN) 0.5 MG tablet Take 1 tablet by mouth Every 8 (Eight) Hours As Needed for Anxiety. 90 tablet 0     Current Facility-Administered Medications on File Prior to Visit   Medication Dose Route Frequency Provider Last Rate Last Dose   • granisetron (KYTRIL) injection 1 mg  1 mg Intravenous Once RUDY Sanders           ALLERGIES:    Allergies   Allergen Reactions   • Codeine Nausea And Vomiting   • Morphine Nausea And Vomiting       LMP  (Approximate) Comment: 38     Current Status 9/8/2017   ECOG score 0         Assessment/Plan   76 year old female with [id number] right breast cancer, ERneg, LA pos Her 2 pos.  She has now completed her neoadjuvant chemotherapy with a radiologic complete response on her most recent breast MRI.   I explained to her that regardless of the pathology results and response to chemo, she will still need post mastectomy radiation to the right chest wall and regional nodes to reduce the risk of local regional recurrence.  She is requesting to proceed with mastectomy and sentinel node biopsy rather than full axillary dissection.  I explained that current recommendations and NCCN  guidelines recommend full axillary dissection in her case but I think it is reasonable to do a sentinel node biopsy given her many concerns about lymphedema and what appears to be a complete response to chemo.  However, I did explain that if the sentinel node is positive on frozen section I would recommend proceeding with axillary dissection and she voiced understanding.  I discussed the most common side effects of post mastectomy radiation including skin erythema and fatigue.  I explained that she would likely have 33 treatments to the right chest wall and regional nodes.  She voiced understanding and was given an opportunity to ask many questions which were all answered to her satisfaction.  I gave her my card and asked her to call for an appointment approximately 3 weeks after surgery.     I spent greater than 60 minutes in face-to-face time with the patient and 45 minutes of that time was spent in counseling and coordination of care, including review of imaging and pathology; indications, goals, logistics, alternatives and risks - both common and rare - for my recommendations as well as surveillance and potential outcomes.         Thank you very much for allowing me to participate in the care of this very pleasant patient.    Sincerely,      Maria Del Carmen Rayo MD

## 2017-09-25 DIAGNOSIS — C50.511 MALIGNANT NEOPLASM OF LOWER-OUTER QUADRANT OF RIGHT FEMALE BREAST (HCC): ICD-10-CM

## 2017-09-25 DIAGNOSIS — E78.5 HYPERLIPIDEMIA, UNSPECIFIED HYPERLIPIDEMIA TYPE: ICD-10-CM

## 2017-09-25 DIAGNOSIS — R73.01 ELEVATED FASTING GLUCOSE: Primary | ICD-10-CM

## 2017-09-25 DIAGNOSIS — C50.111 MALIGNANT NEOPLASM OF CENTRAL PORTION OF RIGHT FEMALE BREAST (HCC): ICD-10-CM

## 2017-09-25 PROBLEM — Z45.2 ENCOUNTER FOR FITTING AND ADJUSTMENT OF VASCULAR CATHETER: Status: ACTIVE | Noted: 2017-09-25

## 2017-09-25 RX ORDER — SODIUM CHLORIDE 0.9 % (FLUSH) 0.9 %
10 SYRINGE (ML) INJECTION AS NEEDED
Status: CANCELLED | OUTPATIENT
Start: 2017-10-06

## 2017-09-25 RX ORDER — LORAZEPAM 0.5 MG/1
TABLET ORAL
Qty: 90 TABLET | Refills: 0 | OUTPATIENT
Start: 2017-09-25

## 2017-09-27 LAB
BUN SERPL-MCNC: 12 MG/DL (ref 8–23)
BUN/CREAT SERPL: 16.7 (ref 7–25)
CALCIUM SERPL-MCNC: 9.6 MG/DL (ref 8.6–10.5)
CHLORIDE SERPL-SCNC: 103 MMOL/L (ref 98–107)
CO2 SERPL-SCNC: 28.1 MMOL/L (ref 22–29)
CREAT SERPL-MCNC: 0.72 MG/DL (ref 0.57–1)
GLUCOSE SERPL-MCNC: 86 MG/DL (ref 65–99)
HBA1C MFR BLD: 5 % (ref 4.8–5.6)
POTASSIUM SERPL-SCNC: 4.5 MMOL/L (ref 3.5–5.2)
SODIUM SERPL-SCNC: 142 MMOL/L (ref 136–145)

## 2017-09-28 ENCOUNTER — PREP FOR SURGERY (OUTPATIENT)
Dept: OTHER | Facility: HOSPITAL | Age: 77
End: 2017-09-28

## 2017-09-28 DIAGNOSIS — C50.911: Primary | ICD-10-CM

## 2017-09-28 RX ORDER — LIDOCAINE AND PRILOCAINE 25; 25 MG/G; MG/G
CREAM TOPICAL ONCE
Status: CANCELLED | OUTPATIENT
Start: 2017-09-28 | End: 2017-09-28

## 2017-09-28 RX ORDER — ACETAMINOPHEN 325 MG/1
650 TABLET ORAL ONCE
Status: CANCELLED | OUTPATIENT
Start: 2017-09-28 | End: 2017-09-28

## 2017-09-28 RX ORDER — CELECOXIB 200 MG/1
200 CAPSULE ORAL ONCE
Status: CANCELLED | OUTPATIENT
Start: 2017-09-28 | End: 2017-09-28

## 2017-09-28 RX ORDER — CEFAZOLIN SODIUM 2 G/100ML
2 INJECTION, SOLUTION INTRAVENOUS ONCE
Status: CANCELLED | OUTPATIENT
Start: 2017-09-28 | End: 2017-09-28

## 2017-09-28 RX ORDER — CEFAZOLIN SODIUM IN 0.9 % NACL 3 G/100 ML
3 INTRAVENOUS SOLUTION, PIGGYBACK (ML) INTRAVENOUS ONCE
Status: CANCELLED | OUTPATIENT
Start: 2017-09-28 | End: 2017-09-28

## 2017-09-28 RX ORDER — SODIUM CHLORIDE 0.9 % (FLUSH) 0.9 %
1-10 SYRINGE (ML) INJECTION AS NEEDED
Status: CANCELLED | OUTPATIENT
Start: 2017-09-28

## 2017-09-29 ENCOUNTER — TELEPHONE (OUTPATIENT)
Dept: SURGERY | Facility: CLINIC | Age: 77
End: 2017-09-29

## 2017-09-29 ENCOUNTER — OFFICE VISIT (OUTPATIENT)
Dept: ONCOLOGY | Facility: CLINIC | Age: 77
End: 2017-09-29

## 2017-09-29 ENCOUNTER — INFUSION (OUTPATIENT)
Dept: ONCOLOGY | Facility: HOSPITAL | Age: 77
End: 2017-09-29

## 2017-09-29 ENCOUNTER — APPOINTMENT (OUTPATIENT)
Dept: ONCOLOGY | Facility: CLINIC | Age: 77
End: 2017-09-29

## 2017-09-29 VITALS
OXYGEN SATURATION: 99 % | SYSTOLIC BLOOD PRESSURE: 158 MMHG | HEART RATE: 73 BPM | TEMPERATURE: 97.4 F | BODY MASS INDEX: 26.19 KG/M2 | DIASTOLIC BLOOD PRESSURE: 82 MMHG | WEIGHT: 142.3 LBS | HEIGHT: 62 IN | RESPIRATION RATE: 16 BRPM

## 2017-09-29 DIAGNOSIS — G62.0 PERIPHERAL NEUROPATHY DUE TO CHEMOTHERAPY (HCC): Primary | ICD-10-CM

## 2017-09-29 DIAGNOSIS — C50.111 MALIGNANT NEOPLASM OF CENTRAL PORTION OF RIGHT FEMALE BREAST (HCC): ICD-10-CM

## 2017-09-29 DIAGNOSIS — C50.211 MALIGNANT NEOPLASM OF UPPER-INNER QUADRANT OF RIGHT FEMALE BREAST (HCC): ICD-10-CM

## 2017-09-29 DIAGNOSIS — T45.1X5A PERIPHERAL NEUROPATHY DUE TO CHEMOTHERAPY (HCC): Primary | ICD-10-CM

## 2017-09-29 DIAGNOSIS — C50.511 MALIGNANT NEOPLASM OF LOWER-OUTER QUADRANT OF RIGHT FEMALE BREAST (HCC): Primary | ICD-10-CM

## 2017-09-29 LAB
BASOPHILS # BLD AUTO: 0.01 10*3/MM3 (ref 0–0.2)
BASOPHILS NFR BLD AUTO: 0.3 % (ref 0–1.5)
DEPRECATED RDW RBC AUTO: 48.5 FL (ref 37–54)
EOSINOPHIL # BLD AUTO: 0.01 10*3/MM3 (ref 0–0.7)
EOSINOPHIL NFR BLD AUTO: 0.3 % (ref 0.3–6.2)
ERYTHROCYTE [DISTWIDTH] IN BLOOD BY AUTOMATED COUNT: 13.5 % (ref 11.7–13)
HCT VFR BLD AUTO: 31.8 % (ref 35.6–45.5)
HGB BLD-MCNC: 10.4 G/DL (ref 11.9–15.5)
IMM GRANULOCYTES # BLD: 0.07 10*3/MM3 (ref 0–0.03)
IMM GRANULOCYTES NFR BLD: 1.9 % (ref 0–0.5)
LYMPHOCYTES # BLD AUTO: 1.28 10*3/MM3 (ref 0.9–4.8)
LYMPHOCYTES NFR BLD AUTO: 34 % (ref 19.6–45.3)
MCH RBC QN AUTO: 32 PG (ref 26.9–32)
MCHC RBC AUTO-ENTMCNC: 32.7 G/DL (ref 32.4–36.3)
MCV RBC AUTO: 97.8 FL (ref 80.5–98.2)
MONOCYTES # BLD AUTO: 0.9 10*3/MM3 (ref 0.2–1.2)
MONOCYTES NFR BLD AUTO: 23.9 % (ref 5–12)
NEUTROPHILS # BLD AUTO: 1.5 10*3/MM3 (ref 1.9–8.1)
NEUTROPHILS NFR BLD AUTO: 39.6 % (ref 42.7–76)
NRBC BLD MANUAL-RTO: 0 /100 WBC (ref 0–0)
PLAT MORPH BLD: NORMAL
PLATELET # BLD AUTO: 90 10*3/MM3 (ref 140–500)
PMV BLD AUTO: 10.6 FL (ref 6–12)
RBC # BLD AUTO: 3.25 10*6/MM3 (ref 3.9–5.2)
RBC MORPH BLD: NORMAL
WBC MORPH BLD: NORMAL
WBC NRBC COR # BLD: 3.77 10*3/MM3 (ref 4.5–10.7)

## 2017-09-29 PROCEDURE — 25010000002 TRASTUZUMAB PER 10 MG: Performed by: NURSE PRACTITIONER

## 2017-09-29 PROCEDURE — 85007 BL SMEAR W/DIFF WBC COUNT: CPT | Performed by: INTERNAL MEDICINE

## 2017-09-29 PROCEDURE — 85025 COMPLETE CBC W/AUTO DIFF WBC: CPT | Performed by: INTERNAL MEDICINE

## 2017-09-29 PROCEDURE — 25010000002 DEXAMETHASONE SODIUM PHOSPHATE 10 MG/ML SOLUTION 1 ML VIAL: Performed by: NURSE PRACTITIONER

## 2017-09-29 PROCEDURE — 99214 OFFICE O/P EST MOD 30 MIN: CPT | Performed by: NURSE PRACTITIONER

## 2017-09-29 PROCEDURE — 96375 TX/PRO/DX INJ NEW DRUG ADDON: CPT | Performed by: NURSE PRACTITIONER

## 2017-09-29 PROCEDURE — 96413 CHEMO IV INFUSION 1 HR: CPT | Performed by: NURSE PRACTITIONER

## 2017-09-29 RX ORDER — HYDROXYZINE PAMOATE 25 MG/1
CAPSULE ORAL
Qty: 60 CAPSULE | Refills: 0 | Status: SHIPPED | OUTPATIENT
Start: 2017-09-29 | End: 2017-10-22 | Stop reason: SDUPTHER

## 2017-09-29 RX ORDER — SODIUM CHLORIDE 9 MG/ML
250 INJECTION, SOLUTION INTRAVENOUS ONCE
Status: CANCELLED | OUTPATIENT
Start: 2017-09-29

## 2017-09-29 RX ORDER — SODIUM CHLORIDE 9 MG/ML
250 INJECTION, SOLUTION INTRAVENOUS ONCE
Status: COMPLETED | OUTPATIENT
Start: 2017-09-29 | End: 2017-09-29

## 2017-09-29 RX ORDER — HYDROXYZINE PAMOATE 25 MG/1
50 CAPSULE ORAL ONCE
Status: COMPLETED | OUTPATIENT
Start: 2017-09-29 | End: 2017-09-29

## 2017-09-29 RX ORDER — HYDROXYZINE PAMOATE 25 MG/1
50 CAPSULE ORAL ONCE
Status: CANCELLED | OUTPATIENT
Start: 2017-09-29

## 2017-09-29 RX ORDER — FAMOTIDINE 10 MG/ML
20 INJECTION, SOLUTION INTRAVENOUS ONCE
Status: CANCELLED | OUTPATIENT
Start: 2017-09-29

## 2017-09-29 RX ORDER — FAMOTIDINE 10 MG/ML
20 INJECTION, SOLUTION INTRAVENOUS ONCE
Status: COMPLETED | OUTPATIENT
Start: 2017-09-29 | End: 2017-09-29

## 2017-09-29 RX ADMIN — FAMOTIDINE 20 MG: 10 INJECTION, SOLUTION INTRAVENOUS at 12:17

## 2017-09-29 RX ADMIN — HYDROXYZINE PAMOATE 50 MG: 25 CAPSULE ORAL at 12:16

## 2017-09-29 RX ADMIN — DEXAMETHASONE SODIUM PHOSPHATE 12 MG: 10 INJECTION, SOLUTION INTRAMUSCULAR; INTRAVENOUS at 12:19

## 2017-09-29 RX ADMIN — TRASTUZUMAB 380 MG: 150 INJECTION, POWDER, LYOPHILIZED, FOR SOLUTION INTRAVENOUS at 13:00

## 2017-09-29 RX ADMIN — SODIUM CHLORIDE 250 ML: 900 INJECTION, SOLUTION INTRAVENOUS at 12:10

## 2017-09-29 NOTE — PROGRESS NOTES
REASON FOR FOLLOWUP:   1. Newly diagnosed large right breast cancer.  Core needle biopsy reported invasive mammary carcinoma with focal lobular feature, grade 2.  ER negative, less than 1%; CT positive, moderate in staining, 5% to 10%. HER2 IHC 2+, FISH study positive 2.1.   2.  CT scan for chest abdomen and pelvis and breast MRI examination reported right axillary lymph nodes suspicious for metastatic disease.  No remote metastatic lesion.  Patient has stage IIIa (T3 N2 M0) disease.   3.  Patient was started neoadjuvant chemotherapy on 5/23/2017 with Taxol weekly plus Herceptin weekly for total 12 weeks, and Perjata every 3 weeks during chemotherapy.  Herceptin will be converted to every 3 weeks after chemotherapy finished.    4.  Chronic moderate thrombocytopenia, mild anemia, and intermittent mild neutropenia etiologies are not clear.  5.  Reaction to Herceptin C1D1.  Subsequently tolerated therapy with hydrocortisone as premedication.  6.  Peripheral neuropathy secondary to chemotherapy Taxol was discontinued after week #10 dose.  Patient was started on Neurontin 300 mg 3 times a day for peripheral neuropathy.   7.  Post neoadjuvant chemotherapy MRI examination on 8/17/2017 reported excellent response with resolution of right breast malignancy and right axillary lymphadenopathy.   8.  Herceptin was converted to every 3 weeks starting in mid August 2017.      HISTORY OF PRESENT ILLNESS:    The patient is a pleasant 76 y.o.  with the above-mentioned history here today in anticipation of her maintenance Herceptin.  She expresses her anxiety about her upcoming surgery.  She states that she is particularly concerned about the removal of the lymph nodes, and the possibility of lymphedema.  She has scheduled an appointment to meet with Dr. Melo prior to surgery to further discuss.  She is wanting to wait until after Halloween to schedule the surgery, as she will have someone from out of town to be able to help her  at home.      She is also complaining of issues with peripheral neuropathy.  She states that she has neuropathy that is quite painful in her fingertips at times, and keeps her awake at night occasionally.  The neuropathy in her feet makes her feet feel numb, and in fact she has had some falls as a result of this.  Currently the patient is on Neurontin 100 mg 3 times daily.  When her dose was increased about a month ago which did provide her with a slight improvement, however it is still a concern for her.      She denies issues with shortness of breath or cough.  She denies any swelling.  She does have issues with constipation and diarrhea, related to other medications that she takes.  She is not having any chest pain.  She denies fevers or chills.        Echocardiogram study on 7/24/2017 reported LVEF 67%.        Past Medical History:   Diagnosis Date   • Acute bronchitis    • Anemia    • Breast cancer 2007, 2017    LEFT BREAST CA 2007, RIGHT BREAST CA 2017   • Breast cancer 2017    Right   • Chronic pain    • Colitis, acute 09/10/2011    Hx of Acute colitis. ER records   • Conjunctivitis    • Cough    • Ductal carcinoma in situ (DCIS) of left breast    • Edema     Chronic lower extremity edema   • GERD (gastroesophageal reflux disease) 09/13/2011    Dr. Paul Negron   • H/O Bowel obstruction 2013   • H/O jaundice    • Hernia    • History of infectious mononucleosis 1960   • History of migraine headaches    • Hx of colonic polyps 06/11/2009    Dr. Giles   • Hx of dehydration 09/13/2011    Related to Refractory nausea and vomiting   • Hx of hypercholesterolemia 09/13/2011    Dr. Paul Saba   • Hyperlipidemia    • Hypertension    • Impacted cerumen of left ear    • Leukocytopenia    • Leukopenia    • Meningioma    • Peptic ulceration    • Perioral dermatitis    • SBO (small bowel obstruction)     due to hernia with surgical repair in 09/2011   • SOB (shortness of breath) on exertion    • Thrombocytopenia    •  Vertigo    Normal echocardiogram on 2017, LVEF 68%.    Past Surgical History:   Procedure Laterality Date   • APPENDECTOMY     • BLADDER REPAIR     • BREAST BIOPSY Left    • BREAST BIOPSY Right 2017    PATH: INVASIVE MAMMARY CARCINOMA   • CHOLECYSTECTOMY     • COLONOSCOPY N/A 2009    Hemorrhoids, otherwise normal, repeat in 5 years-Dr. Noemí Purcell   • HERNIA REPAIR Right 2011    Repair of incarcerated femoral hernia-Dr. Alfred Mcdowell   • HYSTERECTOMY     • MASTECTOMY Left     and sentinel lymph node biospy at [facility]   • LA INSJ TUNNELED CVC W/O SUBQ PORT/ AGE 5 YR/> Left 2017    Procedure: INSERTION VENOUS ACCESS DEVICE;  Surgeon: Christian Melo MD;  Location: [facility];  Service: General   • REDUCTION MAMMAPLASTY Right     to match Lt. TRAM flap   • TONSILLECTOMY     • UPPER GASTROINTESTINAL ENDOSCOPY N/A 2011    LA grade A esophagitis with no bleeding, large hiatus hernia (50cm), gastric ulcer-Dr.Laszlo Lezama   Lico catheter placement on 2017 by Dr. Melo.     OB/GYN history: Menarche age 16, menopause at . , 1 miscarriage. No birth control pill use. She did have post menopause hormonal supplementation.      HEMATOLOGIC/ONCOLOGIC HISTORY: The patient is a 76 y.o. year old female whom we are consulted for a newly self discovered right breast mass, in 2017. Patient had ultrasound-guided biopsy on 5/3/2017 and confirmed to be invasive mammary carcinoma with focal lobular features, grade 2 Magnolia score 6/9. She is here for initial evaluation for management.      Patient is a 76-year-old  female who was seen here previously for chronic mild-to-moderate thrombocytopenia and mild anemia. Recently the patient reports she started having firmness of the right breast that started sometime in April or maybe even in March. She noticed firmness spread from about around the 12 o'clock position, gradually towards  the upper lateral side and lower part of the right breast associated mild pain. The patient thought it was related to fibrocystic changes. However, the symptom was getting worse. Patient also reported dented nipple after she started having pain in the right breast. She denies discharge from the nipple. She called her primary care physician, Dr. Martin, who ordered a mammogram study. This was done on 04/12/2017 with architectural distortion of the right breast centrally at 12 o'clock position and had scattered fibroglandular densities throughout the right breast.      The patient subsequently had right breast ultrasound examination on 04/26/2017. Discovered a large right breast mass measuring 5.8 x 3.5 x 3.9 cm. Patient subsequently had ultrasound-guided right breast biopsy on 05/03/2017. Pathology evaluation reported invasive mammary carcinoma with focal lobular feature. [place] score 6/9, overall grade 2. Sample was further sent to the [facility] for test. ER negative, less than 1%; IN positive, moderate in staining, 5% to 10%. HER2 IHC 2+, but FISH study positive 2.1.     She denies weight loss, she actually eats very well. No nausea vomiting. Patient does complain of insomnia, unable to sleep.      This patient has history of left breast DCIS back in 2007, had mastectomy at the [facility]. No hormonal therapy afterwards according to patient. This patient also had a small meningioma, followed by Dr. Smith in [place], with most recent MRI of the brain in March 2017, with 18 mm meningioma, and followed on an annual basis.     Her neutrophil count on 5/16/17 is normal at 2500, however, records showed starting from 05/2015 and in 09/2016, 01/2017 and 03/2017, all 4 laboratory studies showed mild neutropenia with ANC fluctuating between 1200 and 1600. Etiology is not clear.    Normal echocardiogram on 5/18/2017, LVEF 68%.      CT scan for chest abdomen  and pelvis on 5/22/2017 and breast MRI examination on 5/22/2017 reported small right axillary lymph node suspicious for metastatic disease.  No remote metastatic lesion.  Patient has stage IIIa (T3 N2 M0) disease.      Patient will start neoadjuvant chemotherapy with Taxol weekly plus Herceptin weekly for total 12 weeks, and Perjeta every 3 weeks (3-week cycle) during chemotherapy.  Herceptin will be converted to every 3 weeks after chemotherapy finished.  Cycle 1 day 1 on 5/23/2017.      Patient received a total 10 weekly Taxol treatment, the remaining 2 was discontinued due to severe peripheral neuropathy from chemotherapy.  Perjata was given 4 cycles.  Herceptin will be continued.      MEDICATIONS: The current medication list was reviewed with the patient and updated in the EMR this date per the Medical Assistant. Medication dosages and frequencies were confirmed to be accurate.       ALLERGIES:    Allergies   Allergen Reactions   • Codeine Nausea And Vomiting   • Morphine Nausea And Vomiting     SOCIAL HISTORY:   Social History   Substance Use Topics   • Smoking status: Former Smoker     Packs/day: 2.00     Years: 30.00     Types: Cigarettes   • Smokeless tobacco: Former User      Comment: caffeine use   • Alcohol use No      Comment: stopped, heavy in past     FAMILY HISTORY:  Family History   Problem Relation Age of Onset   • Heart disease Mother    • Aortic aneurysm Mother      abdominal   • Coronary artery disease Mother    • Hypertension Mother    • Heart disease Father    • Heart attack Father      acute   • Hypertension Father    • Coronary artery disease Brother    • Hypertension Brother    • Heart disease Brother    • Breast cancer Daughter 45     I have reviewed the patient's medical history in detail and updated the computerized patient record.    REVIEW OF SYSTEMS:  GENERAL: See history of present illness. No change in appetite or weight;   No fevers, chills, sweats.   SKIN: No nonhealing lesions. No  "rashes.   HEME/LYMPH: See HPI.   EYES: No vision changes or diplopia.   ENT: No tinnitus, hearing loss, gum bleeding, epistaxis, hoarseness or dysphagia.   RESPIRATORY: No cough, Has exertional dyspnea, No hemoptysis or wheezing.   CVS: No chest pain, palpitations, orthopnea, dyspnea on exertion or PND.   GI: See HPI   : No lower tract obstructive symptoms, dysuria or hematuria.   MUSCULOSKELETAL: Chronic or joint pain, arthritis.  Has mild intermittent ankle swelling.   NEUROLOGICAL: See HPI  PSYCHIATRIC: Patient has anxiety.  No depression.  No abnormal behavior.     Objective:   Vitals:    09/29/17 1116   BP: 158/82   Pulse: 73   Resp: 16   Temp: 97.4 °F (36.3 °C)   TempSrc: Oral   SpO2: 99%   Weight: 142 lb 4.8 oz (64.5 kg)   Height: 62.01\" (157.5 cm)   PainSc:   3   ECOG 0      PHYSICAL EXAM:   GENERAL: Well-developed, well-nourished female, Not in acute distress.    SKIN: Warm, dry without rashes, purpura or petechiae.  Alopecia EYES:  EOMs intact. Conjunctivae normal.  EARS: Hearing intact.  NOSE:  No nasal discharge.  MOUTH: Tongue is well-papillated; no stomatitis or ulcers. Lips normal.  THROAT: Oropharynx without lesions or exudates.  LYMPHATICS: No cervical, supraclavicular, axillary adenopathy.  CHEST: Lungs clear to auscultation. Good airflow. Benign appearing left chest Mediport  BREAST: not examined today.   CARDIAC: Regular rate and rhythm without murmurs. Normal S1,S2.  ABDOMEN: Slightly distended, normal active bowel sounds,  no hepatosplenomegaly or masses.  EXTREMITIES: No clubbing, cyanosis or edema.  NEUROLOGICAL : AAOx3.   PSYCHIATRIC: She is slightly tearful and anxious.       RECENT LABS:  Lab Results   Component Value Date    WBC 3.77 (L) 09/29/2017    HGB 10.4 (L) 09/29/2017    HCT 31.8 (L) 09/29/2017    MCV 97.8 09/29/2017    PLT 90 (L) 09/29/2017     Lab Results   Component Value Date    NEUTROABS 1.50 (L) 09/29/2017     Lab Results   Component Value Date    GLUCOSE 117 (H) 08/22/2017 "    BUN 12 09/27/2017    CREATININE 0.72 09/27/2017    EGFRIFNONA 79 09/27/2017    EGFRIFAFRI 95 09/27/2017    BCR 16.7 09/27/2017    K 4.5 09/27/2017    CO2 28.1 09/27/2017    CALCIUM 9.6 09/27/2017    PROTENTOTREF 6.5 03/27/2017    ALBUMIN 4.10 08/22/2017    LABIL2 1.7 08/22/2017    AST 26 08/22/2017    ALT 31 08/22/2017     Acquired echocardiogram 2D complete with contrast   July 24, 2017     Interpretation Summary   · Left ventricular systolic function is normal. Calculated EF = 66.7%. Estimated EF was in agreement with the calculated EF. Estimated EF = 67%. Global strain = -16%. Strain data was reviewed by physician. The global longitudinal RV strain is slightly diminished and decreased from prior study where it was -21. This is suspicious for early myopathic process, possibly related to chemotherapy.  · Normal left ventricular cavity size and wall thickness noted. All left ventricular wall segments contract normally. Left ventricular diastolic dysfunction is noted (grade I) consistent with impaired relaxation.       Assessment/Plan   1. Large right breast cancer, locally advanced, stage IIIa (T3 N1/ 2 M0).  ER negative, less than 1%; FL positive, moderate in staining, 5% to 10%. HER2 IHC 2+, FISH study positive 2.1.     Patient finished neoadjuvant chemotherapy, with weekly Taxol plus per junk Plus Herceptin, Taxol was discontinued after week #10 due to significant peripheral neuropathy.  Nevertheless postchemotherapy MRI examination showed completed response by imaging standards, including the right axillary lymph nodes.     At this point we'll continue Herceptin every 3 weeks.    Patient was seen by Dr. Melo recently for evaluation of surgical resection.  I also spoke to Dr. Melo earlier this week, I recommended mastectomy plus right axillary lymph node dissection, to decrease risk for disease recurrence.  This patient likely will also need radiation therapy to the axillary area.  However patient is  very anxious because of the uncertainties, and possible side effects, and immobility from the surgery impacting her life quality.  Discussed that with the patient today and I explained to her the necessity of the surgical procedure.  She voiced understanding.     Patient reports she is not physically ready for mastectomy because of the weakness from her neoadjuvant chemotherapy and a peripheral neuropathy.  She prefers to wait until a longer period.      Due to week positive for NV, she might benefit from endocrine therapy.  This will be discussed after her surgery.      2. Nausea related to therapy.  She continues to have some nausea especially the morning time.  He has been taking Zofran and Compazine.     3.  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  She was started on gabapentin 300 mg 3 times a day, with some relief, however still complains significant problem.  At the beginning of the month her gabapentin was increased to 600 mg 3 times a day.  This did provide her with a slight improvement, however she continues to complain of significant issues with her neuropathy.  She states it is painful for her, particularly at night, and we'll keep her awake sometimes.  The neuropathy in her feet has caused difficulty with her in regulating, and she is in fact had some falls at times.  We discussed increasing her Neurontin to 900 mg 3 times a day.  We discussed that she could initially increased the bedtime dose to 900 mg, to see if that provides her with more relief while she is sleeping.  She could certainly increase all 3 doses though to 900 mg 3 times daily.  Patient verbalized understanding.     4. Anxiety related to side effect from therapy and due to her upcoming surgery.  She is scheduled to meet with Dr. Melo so that she can get all of her questions answered regarding the planned surgery, and recovery..     5.  Mild anemia and moderate thrombocytopenia.  This is chronic.  Partially related to her  chemotherapy, however this patient has history of chronic low anemia in the chemotherapy knee.  Laboratory study in May 2017 reported no evidence of iron deficiency, nor folic acid or vitamin B12.     6.  Abnormal echocardiogram study in July 2017.  Patient is not symptomatic, has no lower extremity edema or dyspnea.  I will request repeated echocardiogram study to be done in late October for reassessment, which would be 3 months from prior examination.      Plan:  1.  Proceed ahead with Herceptin treatment today and repeat every 3 weeks.   2.  Arrange echocardiogram study to be done in late October 2017.  3.  Patient will follow-up with Dr. Melo to arrange mastectomy and right axillary node dissection.   4.  Increase Neurontin to 900 mg 3 times a day. Her neuropathy is worse at bedtime, so we discussed that she could initially increase the bedtime dose to 900 mg.   5.  We'll refer patient to physical therapy to see if they could provide any assistance, particularly for the neuropathy in her feet.  6.  Patient return in 3 weeks for follow-up visit with Dr. Coburn, in anticipation of her next Herceptin.

## 2017-09-29 NOTE — TELEPHONE ENCOUNTER
Spoke with patient. She wishes to keep her appt with Dr Melo on 10/5. She wants to address additional questions and concerns and she will then schedule sx when she is in the office.

## 2017-09-29 NOTE — TELEPHONE ENCOUNTER
----- Message from Christian Melo MD sent at 9/28/2017  4:14 PM EDT -----  Please let her know that I spoke with Dr. Patel and the consensus opinion is that she should have right mastectomy with sentinel lymph node biopsy.  If she is agreeable with that then go ahead and schedule (orders are in).  If she wants to speak with me or has any questions just let me know.

## 2017-10-04 ENCOUNTER — OFFICE VISIT (OUTPATIENT)
Dept: FAMILY MEDICINE CLINIC | Facility: CLINIC | Age: 77
End: 2017-10-04

## 2017-10-04 VITALS
WEIGHT: 142 LBS | HEART RATE: 68 BPM | BODY MASS INDEX: 26.13 KG/M2 | DIASTOLIC BLOOD PRESSURE: 64 MMHG | HEIGHT: 62 IN | SYSTOLIC BLOOD PRESSURE: 122 MMHG | RESPIRATION RATE: 18 BRPM

## 2017-10-04 DIAGNOSIS — T45.1X5A PERIPHERAL NEUROPATHY DUE TO CHEMOTHERAPY (HCC): ICD-10-CM

## 2017-10-04 DIAGNOSIS — G62.0 PERIPHERAL NEUROPATHY DUE TO CHEMOTHERAPY (HCC): ICD-10-CM

## 2017-10-04 DIAGNOSIS — T45.1X5A ANEMIA DUE TO ANTINEOPLASTIC CHEMOTHERAPY: ICD-10-CM

## 2017-10-04 DIAGNOSIS — I10 ESSENTIAL HYPERTENSION: ICD-10-CM

## 2017-10-04 DIAGNOSIS — E78.5 HYPERLIPIDEMIA, UNSPECIFIED HYPERLIPIDEMIA TYPE: Primary | ICD-10-CM

## 2017-10-04 DIAGNOSIS — D64.81 ANEMIA DUE TO ANTINEOPLASTIC CHEMOTHERAPY: ICD-10-CM

## 2017-10-04 PROCEDURE — 99213 OFFICE O/P EST LOW 20 MIN: CPT | Performed by: INTERNAL MEDICINE

## 2017-10-04 NOTE — PROGRESS NOTES
Subjective   Roxana Brizuela is a 76 y.o. female. Patient is here today for   Chief Complaint   Patient presents with   • Hyperlipidemia   • Elevated Fasting Glucose          Vitals:    10/04/17 0947   BP: 122/64   Pulse: 68   Resp: 18       The following portions of the patient's history were reviewed and updated as appropriate: allergies, current medications, past family history, past medical history, past social history, past surgical history and problem list.    Past Medical History:   Diagnosis Date   • Acute bronchitis    • Anemia    • Breast cancer 2007, 2017    LEFT BREAST CA 2007, RIGHT BREAST CA 2017   • Breast cancer 2017    Right   • Chronic pain    • Colitis, acute 09/10/2011    Hx of Acute colitis. ER records   • Conjunctivitis    • Cough    • Ductal carcinoma in situ (DCIS) of left breast    • Edema     Chronic lower extremity edema   • GERD (gastroesophageal reflux disease) 09/13/2011    Dr. Paul Negron   • H/O Bowel obstruction 2013   • H/O jaundice    • Hernia    • History of infectious mononucleosis 1960   • History of migraine headaches    • Hx of colonic polyps 06/11/2009    Dr. Giles   • Hx of dehydration 09/13/2011    Related to Refractory nausea and vomiting   • Hx of hypercholesterolemia 09/13/2011    Dr. Paul Saba   • Hyperlipidemia    • Hypertension    • Impacted cerumen of left ear    • Leukocytopenia    • Leukopenia    • Meningioma    • Osteoarthritis 09/13/2011    Dr. Jamil Miller   • Peptic ulceration    • Perioral dermatitis    • SBO (small bowel obstruction)     due to hernia with surgical repair in 09/2011   • SOB (shortness of breath) on exertion    • Thrombocytopenia    • Vertigo       Allergies   Allergen Reactions   • Codeine Nausea And Vomiting   • Morphine Nausea And Vomiting      Social History     Social History   • Marital status:      Spouse name: Mike   • Number of children: 2   • Years of education: College     Occupational History   •  Retired      [employer]igator [employer]     Social History Main Topics   • Smoking status: Former Smoker     Packs/day: 2.00     Years: 30.00     Types: Cigarettes   • Smokeless tobacco: Former User      Comment: caffeine use   • Alcohol use No      Comment: stopped, heavy in past   • Drug use: No   • Sexual activity: Defer     Other Topics Concern   • Not on file     Social History Narrative        Current Outpatient Prescriptions:   •  Ascorbic Acid (VITAMIN C PO), Take 1,000 mg by mouth Daily., Disp: , Rfl:   •  Calcium Carbonate-Vitamin D (CALTRATE 600+D PO), Take 600 mg by mouth 2 (Two) Times a Day., Disp: , Rfl:   •  dexamethasone (DECADRON) 4 MG tablet, Take 1 tablet by mouth 2 (Two) Times a Day With Meals. Twice a day as needed after chemotherapy for nausea vomiting, Disp: 40 tablet, Rfl: 1  •  diclofenac (VOLTAREN) 75 MG EC tablet, TAKE ONE TABLET BY MOUTH TWICE DAILY WITH FOOD, Disp: 60 tablet, Rfl: 2  •  gabapentin (NEURONTIN) 300 MG capsule, Take 2 capsules by mouth 3 (Three) Times a Day., Disp: 180 capsule, Rfl: 5  •  Glucos-Chond-Sterol-Fish Oil (GLUCOSAMINE CHONDROITIN PLUS) capsule, Take 1 tablet by mouth 2 (Two) Times a Day., Disp: , Rfl:   •  hydrochlorothiazide (HYDRODIURIL) 25 MG tablet, Take 1 tablet by mouth Daily., Disp: 90 tablet, Rfl: 1  •  HYDROcodone-acetaminophen (NORCO) 5-325 MG per tablet, 1-2 by mouth every four hours as needed for pain, Disp: 40 tablet, Rfl: 0  •  hydrOXYzine (VISTARIL) 25 MG capsule, TAKE ONE CAPSULE BY MOUTH TWICE DAILY, Disp: 60 capsule, Rfl: 0  •  KLOR-CON 20 MEQ CR tablet, TAKE ONE TABLET BY MOUTH ONCE DAILY, Disp: 30 tablet, Rfl: 0  •  Lactobacillus (PROBIOTIC ACIDOPHILUS PO), Take 1 capsule by mouth Daily., Disp: , Rfl:   •  lactulose (CHRONULAC) 10 GM/15ML solution, Take 30 mL by mouth 2 (Two) Times a Day As Needed (constipation)., Disp: 236 mL, Rfl: 1  •  LORazepam (ATIVAN) 0.5 MG tablet, TAKE ONE TABLET BY MOUTH EVERY 8 HOURS AS NEEDED FOR ANXIETY, Disp: 90 tablet,  Rfl: 0  •  ondansetron ODT (ZOFRAN-ODT) 8 MG disintegrating tablet, Take 1 tablet by mouth Every 8 (Eight) Hours As Needed for Nausea or Vomiting for up to 60 doses., Disp: 30 tablet, Rfl: 5  •  pantoprazole (PROTONIX) 40 MG EC tablet, TAKE ONE TABLET BY MOUTH ONCE DAILY, Disp: 90 tablet, Rfl: 0  •  promethazine (PHENERGAN) 25 MG tablet, Take 1 tablet by mouth Every 6 (Six) Hours As Needed for Nausea or Vomiting., Disp: 30 tablet, Rfl: 2  •  propranolol (INDERAL) 10 MG tablet, Take 10 mg by mouth 3 (Three) Times a Day., Disp: , Rfl:   •  simvastatin (ZOCOR) 80 MG tablet, Take 80 mg by mouth Every Night., Disp: , Rfl:   •  SUMAtriptan (IMITREX) 50 MG tablet, Take 50 mg by mouth Every 2 (Two) Hours As Needed for migraine. Take one tablet at onset of headache. May repeat dose one time in 2 hours if headache not relieved., Disp: , Rfl:   •  vitamin B-12 (CYANOCOBALAMIN) 1000 MCG tablet, Take 1,000 mcg by mouth Daily., Disp: , Rfl:   •  vitamin B-6 (PYRIDOXINE) 50 MG tablet, TAKE ONE TABLET BY MOUTH ONCE DAILY, Disp: 30 tablet, Rfl: 2  •  zolpidem (AMBIEN) 10 MG tablet, Take 1 tablet by mouth At Night As Needed for Sleep., Disp: 30 tablet, Rfl: 3    Current Facility-Administered Medications:   •  granisetron (KYTRIL) injection 1 mg, 1 mg, Intravenous, Once, RUDY Sanders     Objective   History of Present Illness Roxana is here today for a blood pressure and lab follow-up.  She has hypertension and hyperlipidemia.  Her blood pressure is monitored frequently and has been stable.  She eats healthy and walks.  She's had a rough year this year.  Her   in July after being diagnosed with multiple myeloma.  She was also diagnosed with breast cancer and is undergoing chemotherapy.  She is planning surgery and radiation therapy.  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  She is on gabapentin which helps her pins and needle sensation of her feet at night.    Review of Systems   Constitutional:  Negative for unexpected weight change.   Respiratory: Negative.    Cardiovascular: Negative.    Neurological: Positive for numbness.       Physical Exam   Constitutional: She appears well-developed and well-nourished.   Neck: No thyromegaly present.   Cardiovascular: Normal rate, regular rhythm and normal heart sounds.    Pulmonary/Chest: Effort normal and breath sounds normal.   Lymphadenopathy:     She has no cervical adenopathy.   Neurological:   Vibratory sensation in hands and feet is diminished   Psychiatric: She has a normal mood and affect.   Vitals reviewed.      ASSESSMENT     Problem List Items Addressed This Visit        Cardiovascular and Mediastinum    Hyperlipidemia - Primary    Hypertension       Nervous and Auditory    Peripheral neuropathy due to chemotherapy       Hematopoietic and Hemostatic    Anemia          PLAN  Patient Instructions   Your blood pressure is excellent.  Reviewed and discussed your extensive recent medical history.      Return in about 1 year (around 10/4/2018).

## 2017-10-05 ENCOUNTER — OFFICE VISIT (OUTPATIENT)
Dept: SURGERY | Facility: CLINIC | Age: 77
End: 2017-10-05

## 2017-10-05 VITALS — HEART RATE: 76 BPM | WEIGHT: 141.4 LBS | HEIGHT: 62 IN | BODY MASS INDEX: 26.02 KG/M2 | OXYGEN SATURATION: 95 %

## 2017-10-05 DIAGNOSIS — C50.911 RIGHT BREAST CANCER WITH T3 TUMOR, >5 CM IN GREATEST DIMENSION (HCC): Primary | ICD-10-CM

## 2017-10-05 PROCEDURE — 99213 OFFICE O/P EST LOW 20 MIN: CPT | Performed by: SURGERY

## 2017-10-05 RX ORDER — LIDOCAINE AND PRILOCAINE 25; 25 MG/G; MG/G
CREAM TOPICAL ONCE
Status: CANCELLED | OUTPATIENT
Start: 2017-11-07 | End: 2017-10-05

## 2017-10-05 RX ORDER — CELECOXIB 100 MG/1
200 CAPSULE ORAL ONCE
Status: CANCELLED | OUTPATIENT
Start: 2017-11-07 | End: 2017-10-05

## 2017-10-05 RX ORDER — ACETAMINOPHEN 325 MG/1
650 TABLET ORAL ONCE
Status: CANCELLED | OUTPATIENT
Start: 2017-11-07 | End: 2017-10-05

## 2017-10-05 NOTE — PROGRESS NOTES
SUMMARY (A/P):    76-year-old lady who has completed neoadjuvant on treatment for locally advanced right breast cancer with complete clinical response by MRI findings.  She understands the rationale for mastectomy and has scheduled accordingly.  I will discuss with Dr. Patel whether there is any perceived benefit to node dissection as she was initially staged with node positive disease by radiographic criteria.  I will also discussed with him timing of surgery relative to her recently completed neoadjuvant on treatment.  I did discuss with her immediate versus delayed reconstruction and given the clinical scenario here I think delayed reconstruction would be a far wiser option in terms of minimizing morbility and allowing ongoing treatment based on final pathologic staging.  She understands and wishes to proceed accordingly.  She does understand that there is a slightly higher risk of complications with surgery based on her age and immunosuppression related to chemotherapy.    After her initial visit she was very reluctant to undergo lymph node dissection and has since seen Dr. Patel again and after presenting her at the breast cancer conference for consensus opinion is that she could safely undergo sentinel lymph node biopsy as well and this would be her preference.  Our plan therefore will be to do sentinel lymph node biopsy with frozen section.  She understands we may proceed with level I and 2 node dissection if the lymph node is positive.     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  She was clinically staged with stage IIIa disease (T3, N2, M0).  The initial workup was prompted by findings of large right breast mass on self exam.  This was associated with skin retraction in the periareolar region.  No nipple discharge, no palpable adenopathy on self-exam.   Metastatic workup was only significant for a small right axillary lymph nodes suspicious for metastatic disease.  Neoadjuvant treatment was started in May.     PHYSICAL EXAM:   Constitutional: Well-developed well-nourished, no acute distress  Respiratory: Clear to auscultation, normal inspiratory effort  Cardiovascular: Regular rate, no murmur  Gastrointestinal: Soft  Breast: There is palpable induration throughout the right breast centrally but no discrete mass, no skin retraction, no nipple discharge.  Left chest wall with well-healed TRAM flap with no suspicious palpable or visible findings.  Lymphatics (palpable nodes):  cervical-negative, axillary-negative  Skin:  Warm, dry, no rash on visualized skin surfaces  Musculoskeletal: Symmetric strength, normal gait  Psychiatric: Alert and oriented ×3, normal affect      ALLERGIES: reviewed, in Epic     MEDICATIONS: reviewed, in Epic     PMH:    DCIS 2007  Hypertension  Hyperlipidemia  Meningioma  Gastroesophageal reflux disease     PSH:    Left mastectomy with TRAM reconstruction 2007  Left subclavian 8 [employer] 5/22/2017  Appendectomy  Cholecystectomy  Hysterectomy  Right inguinal hernia     FAMILY HISTORY:    Daughter with breast cancer     SOCIAL HISTORY:   Quit tobacco use 30 years ago  Occasional alcohol use     ROS:  No chest pain or shortness of air.  There was no unanticipated weight loss, hemoptysis, nausea/vomiting.  All other systems reviewed and negative other than presenting complaints.     RADIOLOGY/ENDOSCOPY:    -Bilateral breast MRI 8/17/2017 interval resolution of all abnormal enhancement from the right breast.  Consistent with complete response to neoadjuvant chemotherapy.  No suspicious findings for malignancy in the left TRAM flap.  Reviewed images, concur  -Bilateral breast MRI 5/21/2017 marked diffuse abnormal enhancement throughout the right breast measuring up to 7 cm in greatest dimension.  Findings consistent with right axillary  adenopathy.  Reviewed and images, concur  -CT chest abdomen pelvis 5/18/2017 single enlarged right axillary lymph node and tiny node along the right internal mammary chain which are suspicious for alley metastasis.  No other convincing evidence of metastatic disease.      LABS:    -Right breast core biopsy 5/3/2017 invasive mammary carcinoma with focal lobular features, overall grade 2.  HER-2 positive, ER negative, AR basically negative  -CMP 8/22/2017 normal except glucose 117, potassium 3.1  -CBC 8/22/2017 WBC 5.46, hemoglobin 10.3, platelets 93     CHING FRANCOIS M.D.

## 2017-10-09 ENCOUNTER — HOSPITAL ENCOUNTER (OUTPATIENT)
Dept: PHYSICAL THERAPY | Facility: HOSPITAL | Age: 77
Setting detail: THERAPIES SERIES
Discharge: HOME | End: 2017-10-09

## 2017-10-09 DIAGNOSIS — T45.1X5A PERIPHERAL NEUROPATHY DUE TO CHEMOTHERAPY (HCC): Primary | ICD-10-CM

## 2017-10-09 DIAGNOSIS — R26.89 BALANCE PROBLEM: ICD-10-CM

## 2017-10-09 DIAGNOSIS — M62.81 MUSCLE WEAKNESS (GENERALIZED): ICD-10-CM

## 2017-10-09 DIAGNOSIS — G62.0 PERIPHERAL NEUROPATHY DUE TO CHEMOTHERAPY (HCC): Primary | ICD-10-CM

## 2017-10-09 PROCEDURE — 97110 THERAPEUTIC EXERCISES: CPT | Performed by: PHYSICAL THERAPIST

## 2017-10-09 PROCEDURE — 97162 PT EVAL MOD COMPLEX 30 MIN: CPT | Performed by: PHYSICAL THERAPIST

## 2017-10-10 PROCEDURE — G8978 MOBILITY CURRENT STATUS: HCPCS | Performed by: PHYSICAL THERAPIST

## 2017-10-10 PROCEDURE — G8979 MOBILITY GOAL STATUS: HCPCS | Performed by: PHYSICAL THERAPIST

## 2017-10-10 NOTE — THERAPY EVALUATION
Outpatient Physical Therapy Ortho Initial Evaluation  [facility]     Patient Name: Roxana Brizuela  : 1940  MRN: [id number]  Today's Date: 10/10/2017      Visit Date: 10/09/2017    Patient Active Problem List   Diagnosis   • Anemia   • Hyperlipidemia   • Hypertension   • Leukopenia   • Dyspnea on exertion   • Laceration   • Meningioma   • Thrombocytopenia   • Malignant neoplasm of right female breast   • Drug induced insomnia   • Hypersensitivity reaction   • Hypokalemia   • Dehydration   • History of cardiac arrhythmia   • Dysuria   • Peripheral neuropathy due to chemotherapy   • Chemotherapy-induced nausea   • Acute cystitis with hematuria   • Nausea   • Anemia associated with chemotherapy   • Malignant neoplasm of lower-outer quadrant of right female breast   • Encounter for fitting and adjustment of vascular catheter   • Right breast cancer with T3 tumor, >5 cm in greatest dimension        Past Medical History:   Diagnosis Date   • Acute bronchitis    • Alcoholism 1978    I haven't had a drink since then   • Anemia    • Breast cancer 2017    Right breast invasive mammary carcinoma with focal lobular features, grade 2, ER negative, less than 1% SC positive, HER-2 positive, stage IIIa disease (T3, N2, M0   • Breast cancer 10/20/2004    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 ER records   • Colon polyp    • Conjunctivitis    • Cough    • Edema     Chronic lower extremity edema   • GERD (gastroesophageal reflux disease) 2011    Dr. Paul Negron   • GI (gastrointestinal bleed)    • H/O Bowel obstruction    • H/O jaundice    • Hernia    • History of infectious mononucleosis    • History of migraine headaches    • History of  transfusion 1997   • Hx of colonic polyps 06/11/2009    Dr. Giles   • Hx of dehydration 09/13/2011    Related to Refractory nausea and vomiting   • Hx of hypercholesterolemia 09/13/2011    Dr. Paul Saba   • Hyperlipidemia    • Hypertension    • Impacted cerumen of left ear    • Leukocytopenia    • Leukopenia    • Meningioma    • Osteoarthritis 09/13/2011    Dr. Jamil Miller   • Peptic ulceration    • Perioral dermatitis    • SBO (small bowel obstruction)     due to hernia with surgical repair in 09/2011   • SOB (shortness of breath) on exertion    • Thrombocytopenia    • Vertigo         Past Surgical History:   Procedure Laterality Date   • APPENDECTOMY N/A 1960   • BLADDER REPAIR N/A 1980   • BREAST BIOPSY Left 10/20/2004    Mammotome incisional biopsy left breast, surgical specimen, left breast, localization clip placement, left breast, confirmatory diagnostic unilateral mammogram, left breast-Dr. Tyrese Alcala, [facility]   • BREAST BIOPSY Right 05/03/2017    PATH: INVASIVE MAMMARY CARCINOMA   • CHOLECYSTECTOMY N/A 1990   • COLONOSCOPY N/A 06/11/2009    Hemorrhoids, otherwise normal, repeat in 5 years-Dr. Noemí Purcell   • EYE SURGERY  2017    lazer surgery for blood clot   • FEMORAL HERNIA REPAIR Right 09/13/2011    Repair of incarcerated femoral hernia-Dr. Alfred Mcdowell   • HYSTERECTOMY Bilateral 1980   • MASTECTOMY Left 2004    Left breast mastecotmy with sentinel lymph node biospy-[facility]   • IN INSJ TUNNELED CVC W/O SUBQ PORT/ AGE 5 YR/> Left 5/22/2017    Procedure: INSERTION VENOUS ACCESS DEVICE;  Surgeon: Christian Melo MD;  Location: [facility];  Service: General   • REDUCTION MAMMAPLASTY Right     to match Lt. TRAM flap   • TONSILLECTOMY Bilateral    • UPPER GASTROINTESTINAL ENDOSCOPY N/A 09/12/2011    LA grade A esophagitis with no bleeding, large hiatus hernia (50cm), gastric ulcer-Dr.Laszlo Lezama       Visit Dx:     ICD-10-CM ICD-9-CM   1. Peripheral neuropathy due to chemotherapy  G62.0 357.7    T45.1X5A E933.1   2. Muscle weakness (generalized) M62.81 728.87   3. Balance problem R26.89 781.99             Patient History       10/09/17 1400          History    Chief Complaint Balance Problems;Difficulty with daily activities;Falls/history of falls;Impaired sensation;Muscle weakness  -KT      Date Current Problem(s) Began 07/09/17  -KT      Brief Description of Current Complaint Pt has been undergoing chemotherapy to shrink breast malignancy and has developed bilateral neuropathy in LE below knees and in both hands.  Has fallen 3 times in last 3 months. Not using a cane.  Likes to walk her dog several times a day and enjoys playing the piano but this has become difficult.  Having mastectomy on 11/7 and will have radiation after.  Wants a home program.  Lives in [place].    -KT      Patient/Caregiver Goals Return to prior level of function;Improve mobility;Improve strength  -KT      Occupation/sports/leisure activities walking the dog, piano  -KT      Pain     Pain at Present 0  -KT      Pain at Best 0  -KT      Fall Risk Assessment    Any falls in the past year: Yes  -KT      Number of falls reported in the last 12 months 3  -KT      Factors that contributed to the fall: Tripped;Lost balance  -KT      Does patient have a fear of falling Yes (comment)  -KT      Daily Activities    Primary Language English  -KT      Are you able to read Yes  -KT      Are you able to write Yes  -KT      How does patient learn best? Listening  -KT      Teaching needs identified Home Exercise Program;Falls Prevention;Home Safety  -KT      Patient is concerned about/has problems with Climbing Stairs;Performing home management (household chores, shopping, care of dependents);Performing job responsibilities/community activities (work, school,  -KT      Does patient have problems with the following? None  -KT      Barriers to learning None  -KT      Pt Participated in POC and Goals Yes  -KT      Safety    Are you  being hurt, hit, or frightened by anyone at home or in your life? No  -KT      Are you being neglected by a caregiver No  -KT        User Key  (r) = Recorded By, (t) = Taken By, (c) = Cosigned By    Initials Name Provider Type    TERRANCE Jasso PT Physical Therapist                PT Ortho       10/10/17 1600    DTR- Upper Quarter Clearing    Biceps (C5/6) Bilateral:;2- Normal response  -KT    Triceps (C7) Bilateral:;2- Normal response  -KT    Sensory Screen for Light Touch- Upper Quarter Clearing    C4 (posterior shoulder) Bilateral:;Intact  -KT    C5 (lateral upper arm) Bilateral:;Intact  -KT    C6 (tip of thumb) Bilateral:;Intact  -KT    C7 (tip of 3rd finger) Bilateral:;Diminished  -KT    C8 (tip of 5th finger) Bilateral:;Diminished  -KT    T1 (medial lower arm) Bilateral:;Intact  -KT    Myotomal Screen- Upper Quarter Clearing    Shoulder flexion (C5) Bilateral:;5 (Normal)  -KT    Elbow flexion/wrist extension (C6) Bilateral:;5 (Normal)  -KT    Elbow extension/wrist flexion (C7) Bilateral:;5 (Normal)  -KT    Finger flexion/ (C8) Bilateral:;3+ (Fair +)  -KT    Finger abduction (T1) Bilateral:;3+ (Fair +)  -KT    DTR- Lower Quarter Clearing    Patellar tendon (L2-4) Bilateral:;2- Normal response  -KT    Sensory Screen for Light Touch- Lower Quarter Clearing    L1 (inguinal area) Bilateral:;Intact  -KT    L2 (anterior mid thigh) Bilateral:;Intact  -KT    L3 (distal anterior thigh) Bilateral:;Intact  -KT    L4 (medial lower leg/foot) Intact;Diminished  -KT    L5 (lateral lower leg/great toe) Bilateral:;Absent  -KT    S1 (bottom of foot) Bilateral:;Absent  -KT    Myotomal Screen- Lower Quarter Clearing    Hip flexion (L2) Right:;4- (Good -);Left:;3 (Fair)  -KT    Knee extension (L3) Right:;4 (Good);Left:;3+ (Fair +)  -KT    Ankle DF (L4) Right:;4+ (Good +);Left:;3+ (Fair +)  -KT    Ankle PF (S1) Bilateral:;4 (Good)  -KT    Knee flexion (S2) Right:;4- (Good -);Left:;3+ (Fair +)  -KT    ROM (Range of Motion)     General ROM Detail UE/LE WNL  -KT    Left Hip    Hip ABduction Gross Movement (3+/5) fair plus  -KT    Right Hip    Hip ABduction Gross Movement (4-/5) good minus  -KT    Balance Skills Training    SLS Right 5 sec; Left less than 5 seconds  -KT    Rhomberg unable to hold position  -KT    Gait Assessment/Treatment    Gait, Gait Deviations stride width increased;kacey decreased  -KT    Gait, Impairments strength decreased;impaired balance;sensory feedback impaired  -KT    Stairs Assessment/Treatment    Stairs, Technique Used step to step (descending);step to step (ascending)  -KT      User Key  (r) = Recorded By, (t) = Taken By, (c) = Cosigned By    Initials Name Provider Type    KT Ese Jasso PT Physical Therapist            10/09/17 1400   Therapy Education   Education Details Discussed possible use of cane on unlevel surfaces such as in yard.   Also use of nightlights and removal of rugs for safety.  Pt purchased a squeeze egg from [employer] for .  Continue to practice piano to strengthen hands.   Given HEP;Fall prevention and home safety   Program New   How Provided Verbal;Demonstration;Written   Provided to Patient   Level of Understanding Teach back education performed;Verbalized;Demonstrated         10/09/17 1400   Exercise 1   Exercise Name 1 SLR   Cueing 1 Verbal   Sets 1 1   Reps 1 10   Additional Comments also with slight ER of hip/foot   Exercise 2   Exercise Name 2 SL hip abd   Cueing 2 Tactile   Sets 2 1   Reps 2 5   Exercise 3   Exercise Name 3 quad sets   Cueing 3 Tactile   Reps 3 10   Exercise 4   Exercise Name 4 calf raises/DF   Cueing 4 Demo   Reps 4 10   Exercise 5   Exercise Name 5 hip abd with RTB in HL   Reps 5 10   Exercise 6   Exercise Name 6 bridges   Cueing 6 Verbal   Reps 6 10   Exercise 7   Exercise Name 7 hip adduction isometrics w pillow   Cueing 7 Verbal   Reps 7 10           10/09/17 1400   PT Short Term Goals   STG Date to Achieve 10/23/17   STG 1 Independent with initial  home program for strengthening/balance.   STG 2 Pt to report no falls or LOB in last 2 weeks.   Long Term Goals   LTG Date to Achieve 11/10/17   LTG 1 Pt to be able to hold in tandem standing x 15 sec.   LTG 2 Pt to improve CROWDER score by 15%.     LTG 3 Pt to go up and down stairs reciprocally without handrail.     Time Calculation   PT Goal Re-Cert Due Date 01/09/18           10/09/17 1400   PT Assessment   Functional Limitations Decreased safety during functional activities;Limitations in community activities;Limitations in functional capacity and performance;Limitation in home management   Impairments Balance;Muscle strength;Gait;Peripheral nerve integrity;Impaired muscle endurance;Impaired muscle power;Sensation   Assessment Comments Pt is a 76 year old female with evolving clinical presentation including decreased sensation and strength of extremities secondary to peripheral neuropathy due to chemotherapy.  She is a falls risk due to loss of sensation and proprioception in lower legs and feet.   She is unable to hold in SLS or tandem standing.  CROWDER balance score 43/56 indicating low to medium falls risk.  She is has difficulty with stair climbing and long distance walking due to muscle weakness.     Please refer to paper survey for additional self-reported information Yes   Rehab Potential Good   Patient/caregiver participated in establishment of treatment plan and goals Yes   Patient would benefit from skilled therapy intervention Yes   PT Plan   PT Frequency 1x/week   Predicted Duration of Therapy Intervention (days/wks) QOW x 4 weeks   Planned CPT's? PT EVAL MOD COMPLELITY: 69671;PT THER PROC EA 15 MIN: 29877;PT NEUROMUSC RE-EDUCATION EA 15 MIN: 90102   Physical Therapy Interventions (Optional Details) balance training   PT Plan Comments Pt lives in [place] and wants to do ex on her own and come occasionally for progression.  Mastectomy scheduled for 11/7.                                                Outcome Measures       10/10/17 1800 10/10/17 1600       Stanley Balance Scale    Sitting to Standing 4  -KT      Standing Unsupported 4  -KT      Sitting with Back Unsupported but Feet Supported on Floor or on Stool 4  -KT      Standing to Sitting 4  -KT      Transfers 4  -KT      Standing Unsupported with Eyes Closed 1  -KT      Standing Unsupported with Feet Together 1  -KT      Reaching Forward with Outstretched Arm While Standing 2  -KT       Object From the Floor From a Standing Position 4  -KT      Turning to Look Behind Over Left and Right Shoulders While Standing 4  -KT      Turn 360 Degrees 4  -KT      Place Alternate Foot on Step or Stool While Standing Unsupported 4  -KT      Standing Unsupported with One Foot in Front 1  -KT      Standing on One Leg 2  -KT      Stanley Total Score 43  -KT      Functional Assessment    Outcome Measure Options  Stanley Balance  -KT       User Key  (r) = Recorded By, (t) = Taken By, (c) = Cosigned By    Initials Name Provider Type    KT Ese Jasso, PT Physical Therapist            Time Calculation:   Start Time: 1400  Stop Time: 1450  Time Calculation (min): 50 min     Therapy Charges for Today     Code Description Service Date Service Provider Modifiers Qty    01464281114 HC PT EVAL MOD COMPLEXITY 2 10/9/2017 Ese Jasso, PT GP 1    08195905178 HC PT THER PROC EA 15 MIN 10/9/2017 Ese Jasso, PT GP 1    94021222154 HC PT MOBILITY CURRENT 10/10/2017 Ese Jasso, PT GP, CK 1    1940416 HC PT MOBILITY PROJECTED 10/10/2017 Ese Jasso, PT GP, CI 1          PT G-Codes  PT Professional Judgement Used?: Yes  Outcome Measure Options: Stanley Balance  Score: 43/56  Functional Limitation: Mobility: Walking and moving around  Mobility: Walking and Moving Around Current Status (): At least 40 percent but less than 60 percent impaired, limited or restricted  Mobility: Walking and Moving Around Goal Status (): At least 1 percent but less than 20 percent  impaired, limited or restricted         Ese Jasso, PT  10/10/2017

## 2017-10-11 ENCOUNTER — APPOINTMENT (OUTPATIENT)
Dept: PREADMISSION TESTING | Facility: HOSPITAL | Age: 77
End: 2017-10-11

## 2017-10-15 DIAGNOSIS — E87.6 HYPOKALEMIA: ICD-10-CM

## 2017-10-16 RX ORDER — POTASSIUM CHLORIDE 1500 MG/1
TABLET, EXTENDED RELEASE ORAL
Qty: 30 TABLET | Refills: 0 | Status: SHIPPED | OUTPATIENT
Start: 2017-10-16 | End: 2017-11-02 | Stop reason: SDUPTHER

## 2017-10-16 RX ORDER — HYDROCHLOROTHIAZIDE 25 MG/1
TABLET ORAL
Qty: 90 TABLET | Refills: 1 | Status: SHIPPED | OUTPATIENT
Start: 2017-10-16 | End: 2017-11-02 | Stop reason: SDUPTHER

## 2017-10-19 DIAGNOSIS — C50.511 MALIGNANT NEOPLASM OF LOWER-OUTER QUADRANT OF RIGHT FEMALE BREAST, UNSPECIFIED ESTROGEN RECEPTOR STATUS (HCC): ICD-10-CM

## 2017-10-19 DIAGNOSIS — C50.111 MALIGNANT NEOPLASM OF CENTRAL PORTION OF RIGHT FEMALE BREAST, UNSPECIFIED ESTROGEN RECEPTOR STATUS (HCC): ICD-10-CM

## 2017-10-19 RX ORDER — DICLOFENAC SODIUM 75 MG/1
TABLET, DELAYED RELEASE ORAL
Qty: 60 TABLET | Refills: 5 | Status: SHIPPED | OUTPATIENT
Start: 2017-10-19 | End: 2017-11-02 | Stop reason: SDUPTHER

## 2017-10-20 ENCOUNTER — OFFICE VISIT (OUTPATIENT)
Dept: ONCOLOGY | Facility: CLINIC | Age: 77
End: 2017-10-20

## 2017-10-20 ENCOUNTER — INFUSION (OUTPATIENT)
Dept: ONCOLOGY | Facility: HOSPITAL | Age: 77
End: 2017-10-20

## 2017-10-20 VITALS
TEMPERATURE: 97.9 F | SYSTOLIC BLOOD PRESSURE: 130 MMHG | HEART RATE: 77 BPM | RESPIRATION RATE: 14 BRPM | WEIGHT: 143.8 LBS | BODY MASS INDEX: 26.46 KG/M2 | HEIGHT: 62 IN | DIASTOLIC BLOOD PRESSURE: 60 MMHG | OXYGEN SATURATION: 98 %

## 2017-10-20 DIAGNOSIS — C50.211 MALIGNANT NEOPLASM OF UPPER-INNER QUADRANT OF RIGHT BREAST IN FEMALE, ESTROGEN RECEPTOR NEGATIVE (HCC): Primary | ICD-10-CM

## 2017-10-20 DIAGNOSIS — R11.0 CHEMOTHERAPY-INDUCED NAUSEA: ICD-10-CM

## 2017-10-20 DIAGNOSIS — C50.111 MALIGNANT NEOPLASM OF CENTRAL PORTION OF RIGHT FEMALE BREAST, UNSPECIFIED ESTROGEN RECEPTOR STATUS (HCC): ICD-10-CM

## 2017-10-20 DIAGNOSIS — D64.81 ANEMIA ASSOCIATED WITH CHEMOTHERAPY: ICD-10-CM

## 2017-10-20 DIAGNOSIS — G62.0 PERIPHERAL NEUROPATHY DUE TO CHEMOTHERAPY (HCC): ICD-10-CM

## 2017-10-20 DIAGNOSIS — C50.511 MALIGNANT NEOPLASM OF LOWER-OUTER QUADRANT OF RIGHT FEMALE BREAST, UNSPECIFIED ESTROGEN RECEPTOR STATUS (HCC): ICD-10-CM

## 2017-10-20 DIAGNOSIS — Z17.1 MALIGNANT NEOPLASM OF UPPER-INNER QUADRANT OF RIGHT BREAST IN FEMALE, ESTROGEN RECEPTOR NEGATIVE (HCC): Primary | ICD-10-CM

## 2017-10-20 DIAGNOSIS — T45.1X5A ANEMIA ASSOCIATED WITH CHEMOTHERAPY: ICD-10-CM

## 2017-10-20 DIAGNOSIS — T45.1X5A PERIPHERAL NEUROPATHY DUE TO CHEMOTHERAPY (HCC): ICD-10-CM

## 2017-10-20 DIAGNOSIS — T45.1X5A CHEMOTHERAPY-INDUCED NAUSEA: ICD-10-CM

## 2017-10-20 DIAGNOSIS — I10 ESSENTIAL HYPERTENSION: Primary | ICD-10-CM

## 2017-10-20 DIAGNOSIS — C50.511 MALIGNANT NEOPLASM OF LOWER-OUTER QUADRANT OF RIGHT FEMALE BREAST, UNSPECIFIED ESTROGEN RECEPTOR STATUS (HCC): Primary | ICD-10-CM

## 2017-10-20 LAB
BASOPHILS # BLD AUTO: 0.01 10*3/MM3 (ref 0–0.1)
BASOPHILS NFR BLD AUTO: 0.3 % (ref 0–1.1)
DEPRECATED RDW RBC AUTO: 45.1 FL (ref 37–49)
EOSINOPHIL # BLD AUTO: 0.08 10*3/MM3 (ref 0–0.36)
EOSINOPHIL NFR BLD AUTO: 2.1 % (ref 1–5)
ERYTHROCYTE [DISTWIDTH] IN BLOOD BY AUTOMATED COUNT: 12.6 % (ref 11.7–14.5)
HCT VFR BLD AUTO: 33.4 % (ref 34–45)
HGB BLD-MCNC: 11 G/DL (ref 11.5–14.9)
HOLD SPECIMEN: NORMAL
IMM GRANULOCYTES # BLD: 0.02 10*3/MM3 (ref 0–0.03)
IMM GRANULOCYTES NFR BLD: 0.5 % (ref 0–0.5)
LYMPHOCYTES # BLD AUTO: 1.56 10*3/MM3 (ref 1–3.5)
LYMPHOCYTES NFR BLD AUTO: 40.7 % (ref 20–49)
MCH RBC QN AUTO: 32.4 PG (ref 27–33)
MCHC RBC AUTO-ENTMCNC: 32.9 G/DL (ref 32–35)
MCV RBC AUTO: 98.2 FL (ref 83–97)
MONOCYTES # BLD AUTO: 0.71 10*3/MM3 (ref 0.25–0.8)
MONOCYTES NFR BLD AUTO: 18.5 % (ref 4–12)
NEUTROPHILS # BLD AUTO: 1.45 10*3/MM3 (ref 1.5–7)
NEUTROPHILS NFR BLD AUTO: 37.9 % (ref 39–75)
NRBC BLD MANUAL-RTO: 0 /100 WBC (ref 0–0)
PLATELET # BLD AUTO: 99 10*3/MM3 (ref 150–375)
PMV BLD AUTO: 10.1 FL (ref 8.9–12.1)
RBC # BLD AUTO: 3.4 10*6/MM3 (ref 3.9–5)
WBC NRBC COR # BLD: 3.83 10*3/MM3 (ref 4–10)

## 2017-10-20 PROCEDURE — 85025 COMPLETE CBC W/AUTO DIFF WBC: CPT | Performed by: INTERNAL MEDICINE

## 2017-10-20 PROCEDURE — 25010000002 DEXAMETHASONE SODIUM PHOSPHATE 10 MG/ML SOLUTION 1 ML VIAL: Performed by: INTERNAL MEDICINE

## 2017-10-20 PROCEDURE — 25010000002 TRASTUZUMAB PER 10 MG: Performed by: INTERNAL MEDICINE

## 2017-10-20 PROCEDURE — 99214 OFFICE O/P EST MOD 30 MIN: CPT | Performed by: INTERNAL MEDICINE

## 2017-10-20 PROCEDURE — 96413 CHEMO IV INFUSION 1 HR: CPT | Performed by: INTERNAL MEDICINE

## 2017-10-20 PROCEDURE — 36415 COLL VENOUS BLD VENIPUNCTURE: CPT | Performed by: INTERNAL MEDICINE

## 2017-10-20 PROCEDURE — 96375 TX/PRO/DX INJ NEW DRUG ADDON: CPT | Performed by: INTERNAL MEDICINE

## 2017-10-20 RX ORDER — SODIUM CHLORIDE 9 MG/ML
250 INJECTION, SOLUTION INTRAVENOUS ONCE
Status: COMPLETED | OUTPATIENT
Start: 2017-10-20 | End: 2017-10-20

## 2017-10-20 RX ORDER — FAMOTIDINE 10 MG/ML
20 INJECTION, SOLUTION INTRAVENOUS ONCE
Status: CANCELLED | OUTPATIENT
Start: 2017-10-20

## 2017-10-20 RX ORDER — SODIUM CHLORIDE 9 MG/ML
250 INJECTION, SOLUTION INTRAVENOUS ONCE
Status: CANCELLED | OUTPATIENT
Start: 2017-10-20

## 2017-10-20 RX ORDER — HYDROXYZINE PAMOATE 25 MG/1
50 CAPSULE ORAL ONCE
Status: CANCELLED | OUTPATIENT
Start: 2017-10-20

## 2017-10-20 RX ORDER — FAMOTIDINE 10 MG/ML
20 INJECTION, SOLUTION INTRAVENOUS ONCE
Status: COMPLETED | OUTPATIENT
Start: 2017-10-20 | End: 2017-10-20

## 2017-10-20 RX ORDER — HYDROXYZINE PAMOATE 25 MG/1
50 CAPSULE ORAL ONCE
Status: COMPLETED | OUTPATIENT
Start: 2017-10-20 | End: 2017-10-20

## 2017-10-20 RX ADMIN — FAMOTIDINE 20 MG: 10 INJECTION, SOLUTION INTRAVENOUS at 14:17

## 2017-10-20 RX ADMIN — SODIUM CHLORIDE 250 ML: 900 INJECTION, SOLUTION INTRAVENOUS at 14:01

## 2017-10-20 RX ADMIN — HYDROXYZINE PAMOATE 50 MG: 25 CAPSULE ORAL at 14:16

## 2017-10-20 RX ADMIN — TRASTUZUMAB 380 MG: 150 INJECTION, POWDER, LYOPHILIZED, FOR SOLUTION INTRAVENOUS at 15:02

## 2017-10-20 RX ADMIN — DEXAMETHASONE SODIUM PHOSPHATE 12 MG: 10 INJECTION, SOLUTION INTRAMUSCULAR; INTRAVENOUS at 14:19

## 2017-10-20 NOTE — PROGRESS NOTES
REASON FOR FOLLOWUP:   1. Newly diagnosed large right breast cancer.  Core needle biopsy reported invasive mammary carcinoma with focal lobular feature, grade 2.  ER negative, less than 1%; NM positive, moderate in staining, 5% to 10%. HER2 IHC 2+, FISH study positive 2.1.   2.  CT scan for chest abdomen and pelvis and breast MRI examination reported right axillary lymph node suspicious for metastatic disease.  No remote metastatic lesion.  Patient has stage IIIa (T3 N2 M0) disease.   3.  Patient was started neoadjuvant chemotherapy on 5/23/2017 with Taxol weekly plus Herceptin weekly for total 12 weeks, and Perjata every 3 weeks during chemotherapy.  Herceptin will be converted to every 3 weeks after chemotherapy finished.    4.  Chronic moderate thrombocytopenia, mild anemia, and intermittent mild neutropenia etiologies are not clear.  5.  Reaction to Herceptin C1D1.  Subsequently tolerated therapy with hydrocortisone as premedication.  6.  Potential cardiac strain developing, neuropathic symptoms persist, follow-up MRI and surgical assessment will be needed post initial chemotherapeutic phase  7.  MRI August 17 with interval resolution of abnormal enhancement within breast and right axillary adenopathy, surgery scheduled November 7, Herceptin ongoing every 3 weeks     HISTORY OF PRESENT ILLNESS:    The patient is a pleasant 76 y.o. with the above-mentioned history, who presents today in anticipation of cycle 4 of Herceptin, Perjeta and Taxol.  This is the first visit with this physician involving this patient's case.  We have reviewed her status today with her slowly developing neuropathic symptoms in her lower extremities but also involving her fingers recognized significantly and she plays the piano and keyboard.  This is becoming harder to do but she is still able to do so.  She also notices neuropathy in her feet but is able to walk and function in daily activities.  Additional to this is her continued  grieving at the death of her  though she, fortunately, has an excellent support system.  She continues to experience nausea that is well relieved with Compazine. She denies oral mucositis.  No diarrhea no constipation no chest pain no dyspnea.  No lower extremity edema.    She did received 2 units of PRBC's on   7/8/2017 and remains hematologically stable.   She does have difficulty with sleep and Ambien 10 mg daily at bedtime seems help much better compared to 5 mg dose.  She does not need refills today.  In reviewing her case July 26 she is entering the fourth cycle of her treatment and recent echocardiogram is reviewed with she and her close friend revealing EF of 66.7% though with global strain of -16%?  Suspicious for myopathic process.  Normal ventricular cavity size and wall thickness as well as contractility.  We have also discussed that she is responding to therapy and will need surgical assessment which may not as yet have been performed.  She has seen Dr. Melo for port placement and will ask him to review her likely just after she has completed his fourth cycle of therapy.  It is also apparent that she'll need a cardiac assessment as we continue subsequent Herceptin therapy perioperatively.      The patient underwent MRI of the breasts August 17 demonstrating interval resolution of abnormal enhancement within the right breast, resolution of right axillary adenopathy had also resolved.  The findings were consistent with a complete response to neoadjuvant chemotherapy.  The patient was seen in subsequent follow-up with Dr. Melo and was determined to proceed with surgery and ultimately sentinel node evaluation.  This is now scheduled November 7 and there are additional plans to consider radiation therapy postoperatively and we have also discussed the use of anti-hormonal therapy considering her mildly positive IA status.    Past Medical History:   Diagnosis Date   • Acute bronchitis    •  Alcoholism 1978    I haven't had a drink since then   • Anemia    • Breast cancer 05/03/2017    Right breast invasive mammary carcinoma with focal lobular features, grade 2, ER negative, less than 1% FL positive, HER-2 positive, stage IIIa disease (T3, N2, M0   • Breast cancer 10/20/2004    Left breast ductal carcinoma in-situ, predominately intermediate grade with focal comedonecrosis (high grade), solid and bribridform patterns involving approximately five core biopsy fragments   • Breast mass 2005 & 2017   • Chronic pain    • Colitis, acute 09/10/2011    Hx of Acute colitis. ER records   • Colon polyp    • Conjunctivitis    • Cough    • Edema     Chronic lower extremity edema   • GERD (gastroesophageal reflux disease) 09/13/2011    Dr. Paul Negron   • GI (gastrointestinal bleed) 1997   • H/O Bowel obstruction 2013   • H/O jaundice    • Hernia    • History of infectious mononucleosis 1960   • History of migraine headaches    • History of transfusion 1997   • Hx of colonic polyps 06/11/2009    Dr. Giles   • Hx of dehydration 09/13/2011    Related to Refractory nausea and vomiting   • Hx of hypercholesterolemia 09/13/2011    Dr. Paul Saba   • Hyperlipidemia    • Hypertension    • Impacted cerumen of left ear    • Leukocytopenia    • Leukopenia    • Meningioma    • Osteoarthritis 09/13/2011    Dr. Jamil Miller   • Peptic ulceration    • Perioral dermatitis    • SBO (small bowel obstruction)     due to hernia with surgical repair in 09/2011   • SOB (shortness of breath) on exertion    • Thrombocytopenia    • Vertigo    Normal echocardiogram on 5/18/2017, LVEF 68%.    Past Surgical History:   Procedure Laterality Date   • APPENDECTOMY N/A 1960   • BLADDER REPAIR N/A 1980   • BREAST BIOPSY Left 10/20/2004    Mammotome incisional biopsy left breast, surgical specimen, left breast, localization clip placement, left breast, confirmatory diagnostic unilateral mammogram, left breast-Dr. Tyrese Alcala, [facility]   •  BREAST BIOPSY Right 2017    PATH: INVASIVE MAMMARY CARCINOMA   • CHOLECYSTECTOMY N/A    • COLONOSCOPY N/A 2009    Hemorrhoids, otherwise normal, repeat in 5 years-Dr. Noemí Purcell   • EYE SURGERY  2017    lazer surgery for blood clot   • FEMORAL HERNIA REPAIR Right 2011    Repair of incarcerated femoral hernia-Dr. Alfred Mcdowell   • HYSTERECTOMY Bilateral    • MASTECTOMY Left     Left breast mastecotmy with sentinel lymph node biospy-[facility]   • DE INSJ TUNNELED CVC W/O SUBQ PORT/ AGE 5 YR/> Left 2017    Procedure: INSERTION VENOUS ACCESS DEVICE;  Surgeon: Christian Melo MD;  Location: [facility] OR;  Service: General   • REDUCTION MAMMAPLASTY Right     to match Lt. TRAM flap   • TONSILLECTOMY Bilateral    • UPPER GASTROINTESTINAL ENDOSCOPY N/A 2011    LA grade A esophagitis with no bleeding, large hiatus hernia (50cm), gastric ulcer-Dr.Laszlo Lezama   Lico catheter placement on 2017 by Dr. Melo.     OB/GYN history: Menarche age 16, menopause at 1985. , 1 miscarriage. No birth control pill use. She did have post menopause hormonal supplementation.      HEMATOLOGIC/ONCOLOGIC HISTORY: The patient is a 76 y.o. year old female whom we are consulted for a newly self discovered right breast mass, in 2017. Patient had ultrasound-guided biopsy on 5/3/2017 and confirmed to be invasive mammary carcinoma with focal lobular features, grade 2 [place] score 6/9. She is here for initial evaluation for management.      Patient is a 76-year-old  female who was seen here previously for chronic mild-to-moderate thrombocytopenia and mild anemia. Recently the patient reports she started having firmness of the right breast that started sometime in April or maybe even in March. She noticed firmness spread from about around the 12 o'clock position, gradually towards the upper lateral side and lower part of the right breast associated mild pain. The  patient thought it was related to fibrocystic changes. However, the symptom was getting worse. Patient also reported dented nipple after she started having pain in the right breast. She denies discharge from the nipple. She called her primary care physician, Dr. Martin, who ordered a mammogram study. This was done on 04/12/2017 with architectural distortion of the right breast centrally at 12 o'clock position and had scattered fibroglandular densities throughout the right breast.      The patient subsequently had right breast ultrasound examination on 04/26/2017. Discovered a large right breast mass measuring 5.8 x 3.5 x 3.9 cm. Patient subsequently had ultrasound-guided right breast biopsy on 05/03/2017. Pathology evaluation reported invasive mammary carcinoma with focal lobular feature. Elen score 6/9, overall grade 2. Sample was further sent to the [facility] for test. ER negative, less than 1%; NJ positive, moderate in staining, 5% to 10%. HER2 IHC 2+, but FISH study positive 2.1.     She denies weight loss, she actually eats very well. No nausea vomiting. Patient does complain of insomnia, unable to sleep.      This patient has history of left breast DCIS back in 2007, had mastectomy at the [facility]. No hormonal therapy afterwards according to patient. This patient also had a small meningioma, followed by Dr. Smith in [facility], with most recent MRI of the brain in March 2017, with 18 mm meningioma, and followed on an annual basis.     Her neutrophil count on 5/16/17 is normal at 2500, however, records showed starting from 05/2015 and in 09/2016, 01/2017 and 03/2017, all 4 laboratory studies showed mild neutropenia with ANC fluctuating between 1200 and 1600. Etiology is not clear.    Normal echocardiogram on 5/18/2017, LVEF 68%.      CT scan for chest abdomen and pelvis on 5/22/2017 and breast MRI examination on 5/22/2017 reported small right  axillary lymph node suspicious for metastatic disease.  No remote metastatic lesion.  Patient has stage IIIa (T3 N2 M0) disease.      Patient will start neoadjuvant chemotherapy with Taxol weekly plus Herceptin weekly for total 12 weeks, and Perjeta every 3 weeks (3-week cycle) during chemotherapy.  Herceptin will be converted to every 3 weeks after chemotherapy finished.  Cycle 1 day 1 5/23/2017.   Status post neoadjuvant chemotherapy the patient was assessed with MRI showing a complete neoadjuvant response.  The patient was reviewed for surgery and questions concerning lymph node dissection-sentinel node evaluation-and need for radiation therapy postop were considered as well as use of additional Herceptin for the balance of the year as well as additional use of anti-hormonal therapy.  Surgery was scheduled November 07, 2017.      MEDICATIONS: The current medication list was reviewed with the patient and updated in the EMR this date per the Medical Assistant. Medication dosages and frequencies were confirmed to be accurate.       ALLERGIES:    Allergies   Allergen Reactions   • Codeine Nausea And Vomiting   • Morphine Nausea And Vomiting     SOCIAL HISTORY:   Social History   Substance Use Topics   • Smoking status: Former Smoker     Packs/day: 2.00     Years: 30.00     Types: Cigarettes     Start date: 1/30/1957     Quit date: 4/10/1987   • Smokeless tobacco: Former User      Comment: I haven't had a cigarette in over 30 years   • Alcohol use No      Comment: stopped, heavy in past     FAMILY HISTORY:  Family History   Problem Relation Age of Onset   • Heart disease Mother    • Aortic aneurysm Mother      abdominal   • Coronary artery disease Mother    • Hypertension Mother    • Miscarriages / Stillbirths Mother    • Heart disease Father    • Heart attack Father      acute   • Hypertension Father    • Early death Father    • Hearing loss Father    • Kidney disease Father    • Breast cancer Daughter 45   • Heart  "disease Brother    • Hypertension Brother      I have reviewed the patient's medical history in detail and updated the computerized patient record.    REVIEW OF SYSTEMS:  GENERAL: See history of present illness. No change in appetite or weight;   No fevers, chills, sweats.   SKIN: No nonhealing lesions. No rashes.   HEME/LYMPH: See HPI.   EYES: No vision changes or diplopia.   ENT: No tinnitus, hearing loss, gum bleeding, epistaxis, hoarseness or dysphagia.   RESPIRATORY: No cough, Has exertional dyspnea, No hemoptysis or wheezing.   CVS: No chest pain, palpitations, orthopnea, dyspnea on exertion or PND.   GI: See HPI.   : No lower tract obstructive symptoms, dysuria or hematuria.   MUSCULOSKELETAL: Chronic or joint pain, arthritis.  Has mild intermittent ankle swelling.   NEUROLOGICAL: See HPI  PSYCHIATRIC: See HPI     Objective:   Vitals:    10/20/17 1314   BP: 130/60   Pulse: 77   Resp: 14   Temp: 97.9 °F (36.6 °C)   TempSrc: Oral   SpO2: 98%   Weight: 143 lb 12.8 oz (65.2 kg)   Height: 62.01\" (157.5 cm)   PainSc: 0-No pain   ECOG 0      PHYSICAL EXAM:   GENERAL: Well-developed, well-nourished female, in no acute distress.   SKIN: Warm, dry without rashes, purpura or petechiae.  Left upper chest Lico catheter in place, no evidence of infection.   EYES: Pupils equal, round and reactive to light. EOMs intact. Conjunctivae normal.  EARS: Hearing intact.  NOSE:  No excoriations or nasal discharge.  MOUTH: Tongue is well-papillated; no stomatitis or ulcers. Lips normal.  THROAT: Oropharynx without lesions or exudates.  LYMPHATICS: No cervical, supraclavicular, axillary adenopathy.  CHEST: Lungs clear to auscultation. Good airflow.   BREAST: Tumor approximately 4cm superior to inferior. Borders difficult to appreciate medial to lateral.   CARDIAC: Regular rate and rhythm without murmurs. Normal S1,S2.  ABDOMEN: Slightly distended, normal active bowel sounds,  no hepatosplenomegaly or masses.  EXTREMITIES: No " clubbing, cyanosis or edema.  NEUROLOGICAL: Cranial Nerves II-XII grossly intact. No focal neurological deficits.  PSYCHIATRIC: She is slightly tearful.      RECENT LABS:  Lab Results   Component Value Date    WBC 3.83 (L) 10/20/2017    HGB 11.0 (L) 10/20/2017    HCT 33.4 (L) 10/20/2017    MCV 98.2 (H) 10/20/2017    PLT 99 (L) 10/20/2017     Lab Results   Component Value Date    NEUTROABS 1.45 (L) 10/20/2017     Lab Results   Component Value Date    GLUCOSE 117 (H) 08/22/2017    BUN 12 09/27/2017    CREATININE 0.72 09/27/2017    EGFRIFNONA 79 09/27/2017    EGFRIFAFRI 95 09/27/2017    BCR 16.7 09/27/2017    K 4.5 09/27/2017    CO2 28.1 09/27/2017    CALCIUM 9.6 09/27/2017    PROTENTOTREF 6.5 03/27/2017    ALBUMIN 4.10 08/22/2017    LABIL2 1.7 08/22/2017    AST 26 08/22/2017    ALT 31 08/22/2017     Acquired echocardiogram 2D complete with contrast   July 24, 2017     Interpretation Summary   · Left ventricular systolic function is normal. Calculated EF = 66.7%. Estimated EF was in agreement with the calculated EF. Estimated EF = 67%. Global strain = -16%. Strain data was reviewed by physician. The global longitudinal RV strain is slightly diminished and decreased from prior study where it was -21. This is suspicious for early myopathic process, possibly related to chemotherapy.  · Normal left ventricular cavity size and wall thickness noted. All left ventricular wall segments contract normally. Left ventricular diastolic dysfunction is noted (grade I) consistent with impaired relaxation.       Assessment/Plan   1. Large right breast cancer, locally advanced, stage IIIa (T3 N1/ 2 M0).  ER negative, less than 1%; WA positive, moderate in staining, 5% to 10%. HER2 IHC 2+, FISH study positive 2.1.  Physical examination showed a large mass, 7 cm x 7 cm initially which has decreased to approximately less than 4 cm ..  Patient  proceeded through 4 cycles ceptin,Perjeta and Taxol.   Her subsequent MRI examination showed  complete response by imaging including right axillary lymphadenopathy.  We have continued Herceptin and that includes today October 20, 2017.  The case was discussed with Dr. Melo and plans were to proceed with mastectomy plus right axillary lymph node assessment via sentinel node evaluation. it was felt she likely require radiation therapy post procedure.  The patient is now scheduled for surgery November 7 we hope to see her during her surgery to help schedule her subsequent Herceptin and visits back for anti-hormonal therapy initiation.  At that point may also schedule radiation therapy to perhaps after the beginning of the year with the patient indicating that she hopes to visit her daughter around the holidays in [place].        2. Nausea related to therapy.  This has been slowly resolving treated on a when necessary basis.     3.  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  She was started on gabapentin 300 mg 3 times a day, with some relief, however still complains significant problem.  At the beginning of the month her gabapentin was increased to 600 mg 3 times a day.  This did provide her with a slight improvement, however she continues to complain of significant issues with her neuropathy.  She states it is painful for her, particularly at night, and we'll keep her awake sometimes.  The neuropathy in her feet has caused difficulty with her in regulating, and she is in fact had some falls at times.  We discussed increasing her Neurontin to 900 mg 3 times a day.  We discussed that she could initially increased the bedtime dose to 900 mg, to see if that provides her with more relief while she is sleeping.  She could certainly increase all 3 doses though to 900 mg 3 times daily.  Patient verbalized understanding.         5.  Mild anemia and moderate thrombocytopenia.  This is chronic and currently stable.  Partially related to her chemotherapy, however this patient has history of chronic low anemia in the  chemotherapy knee.  Laboratory study in May 2017 reported no evidence of iron deficiency, nor folic acid or vitamin B12.      6.  Abnormal echocardiogram study in July 2017.  The patient is now scheduled for follow-up echocardiogram in approximately week.

## 2017-10-23 ENCOUNTER — HOSPITAL ENCOUNTER (OUTPATIENT)
Dept: PHYSICAL THERAPY | Facility: HOSPITAL | Age: 77
Setting detail: THERAPIES SERIES
Discharge: HOME | End: 2017-10-23

## 2017-10-23 DIAGNOSIS — R26.89 BALANCE PROBLEM: ICD-10-CM

## 2017-10-23 DIAGNOSIS — T45.1X5A PERIPHERAL NEUROPATHY DUE TO CHEMOTHERAPY (HCC): Primary | ICD-10-CM

## 2017-10-23 DIAGNOSIS — M62.81 MUSCLE WEAKNESS (GENERALIZED): ICD-10-CM

## 2017-10-23 DIAGNOSIS — G62.0 PERIPHERAL NEUROPATHY DUE TO CHEMOTHERAPY (HCC): Primary | ICD-10-CM

## 2017-10-23 PROCEDURE — 97112 NEUROMUSCULAR REEDUCATION: CPT

## 2017-10-23 PROCEDURE — 97110 THERAPEUTIC EXERCISES: CPT

## 2017-10-23 RX ORDER — LORAZEPAM 0.5 MG/1
TABLET ORAL
Qty: 90 TABLET | Refills: 0 | OUTPATIENT
Start: 2017-10-23 | End: 2017-11-02 | Stop reason: SDUPTHER

## 2017-10-23 RX ORDER — HYDROXYZINE PAMOATE 25 MG/1
CAPSULE ORAL
Qty: 60 CAPSULE | Refills: 0 | Status: SHIPPED | OUTPATIENT
Start: 2017-10-23 | End: 2017-11-02 | Stop reason: SDUPTHER

## 2017-10-24 ENCOUNTER — HOSPITAL ENCOUNTER (OUTPATIENT)
Dept: CARDIOLOGY | Facility: HOSPITAL | Age: 77
Discharge: HOME | End: 2017-10-24
Attending: INTERNAL MEDICINE | Admitting: INTERNAL MEDICINE

## 2017-10-24 VITALS
SYSTOLIC BLOOD PRESSURE: 114 MMHG | OXYGEN SATURATION: 98 % | BODY MASS INDEX: 27.05 KG/M2 | WEIGHT: 147 LBS | HEART RATE: 93 BPM | DIASTOLIC BLOOD PRESSURE: 62 MMHG | HEIGHT: 62 IN

## 2017-10-24 DIAGNOSIS — C50.211 MALIGNANT NEOPLASM OF UPPER-INNER QUADRANT OF RIGHT FEMALE BREAST (HCC): ICD-10-CM

## 2017-10-24 DIAGNOSIS — I10 ESSENTIAL HYPERTENSION: ICD-10-CM

## 2017-10-24 DIAGNOSIS — C79.89 MALIGNANT NEOPLASM METASTATIC TO OTHER SITE (HCC): ICD-10-CM

## 2017-10-24 LAB
ASCENDING AORTA: 2.8 CM
BH CV ECHO MEAS - ACS: 2 CM
BH CV ECHO MEAS - AO MAX PG (FULL): 2.9 MMHG
BH CV ECHO MEAS - AO MAX PG: 6.9 MMHG
BH CV ECHO MEAS - AO MEAN PG (FULL): 1.6 MMHG
BH CV ECHO MEAS - AO MEAN PG: 3.6 MMHG
BH CV ECHO MEAS - AO ROOT AREA (BSA CORRECTED): 1.8
BH CV ECHO MEAS - AO ROOT AREA: 6.9 CM^2
BH CV ECHO MEAS - AO ROOT DIAM: 3 CM
BH CV ECHO MEAS - AO V2 MAX: 131.7 CM/SEC
BH CV ECHO MEAS - AO V2 MEAN: 88.3 CM/SEC
BH CV ECHO MEAS - AO V2 VTI: 29.2 CM
BH CV ECHO MEAS - AVA(I,A): 2 CM^2
BH CV ECHO MEAS - AVA(I,D): 2 CM^2
BH CV ECHO MEAS - AVA(V,A): 2 CM^2
BH CV ECHO MEAS - AVA(V,D): 2 CM^2
BH CV ECHO MEAS - BSA(HAYCOCK): 1.7 M^2
BH CV ECHO MEAS - BSA: 1.7 M^2
BH CV ECHO MEAS - BZI_BMI: 26.9 KILOGRAMS/M^2
BH CV ECHO MEAS - BZI_METRIC_HEIGHT: 157.5 CM
BH CV ECHO MEAS - BZI_METRIC_WEIGHT: 66.7 KG
BH CV ECHO MEAS - CONTRAST EF (2CH): 56.1 ML/M^2
BH CV ECHO MEAS - CONTRAST EF 4CH: 59.6 ML/M^2
BH CV ECHO MEAS - EDV(MOD-SP2): 66 ML
BH CV ECHO MEAS - EDV(MOD-SP4): 57 ML
BH CV ECHO MEAS - EDV(TEICH): 103.6 ML
BH CV ECHO MEAS - EF(CUBED): 85.8 %
BH CV ECHO MEAS - EF(MOD-SP2): 56.1 %
BH CV ECHO MEAS - EF(MOD-SP4): 59.6 %
BH CV ECHO MEAS - EF(TEICH): 79.3 %
BH CV ECHO MEAS - ESV(MOD-SP2): 29 ML
BH CV ECHO MEAS - ESV(MOD-SP4): 23 ML
BH CV ECHO MEAS - ESV(TEICH): 21.5 ML
BH CV ECHO MEAS - FS: 47.9 %
BH CV ECHO MEAS - IVS/LVPW: 1
BH CV ECHO MEAS - IVSD: 0.96 CM
BH CV ECHO MEAS - LAT PEAK E' VEL: 9 CM/SEC
BH CV ECHO MEAS - LV DIASTOLIC VOL/BSA (35-75): 34 ML/M^2
BH CV ECHO MEAS - LV MASS(C)D: 158 GRAMS
BH CV ECHO MEAS - LV MASS(C)DI: 94.2 GRAMS/M^2
BH CV ECHO MEAS - LV MAX PG: 4 MMHG
BH CV ECHO MEAS - LV MEAN PG: 2 MMHG
BH CV ECHO MEAS - LV SYSTOLIC VOL/BSA (12-30): 13.7 ML/M^2
BH CV ECHO MEAS - LV V1 MAX: 99.9 CM/SEC
BH CV ECHO MEAS - LV V1 MEAN: 65.2 CM/SEC
BH CV ECHO MEAS - LV V1 VTI: 21.8 CM
BH CV ECHO MEAS - LVIDD: 4.7 CM
BH CV ECHO MEAS - LVIDS: 2.5 CM
BH CV ECHO MEAS - LVLD AP2: 6.4 CM
BH CV ECHO MEAS - LVLD AP4: 6.7 CM
BH CV ECHO MEAS - LVLS AP2: 5.6 CM
BH CV ECHO MEAS - LVLS AP4: 5.5 CM
BH CV ECHO MEAS - LVOT AREA (M): 2.5 CM^2
BH CV ECHO MEAS - LVOT AREA: 2.7 CM^2
BH CV ECHO MEAS - LVOT DIAM: 1.8 CM
BH CV ECHO MEAS - LVPWD: 0.96 CM
BH CV ECHO MEAS - MED PEAK E' VEL: 7 CM/SEC
BH CV ECHO MEAS - MV A DUR: 0.15 SEC
BH CV ECHO MEAS - MV A MAX VEL: 92.2 CM/SEC
BH CV ECHO MEAS - MV DEC SLOPE: 220.4 CM/SEC^2
BH CV ECHO MEAS - MV DEC TIME: 0.29 SEC
BH CV ECHO MEAS - MV E MAX VEL: 66.5 CM/SEC
BH CV ECHO MEAS - MV E/A: 0.72
BH CV ECHO MEAS - MV MAX PG: 4.4 MMHG
BH CV ECHO MEAS - MV MEAN PG: 1.8 MMHG
BH CV ECHO MEAS - MV P1/2T MAX VEL: 66.5 CM/SEC
BH CV ECHO MEAS - MV P1/2T: 88.3 MSEC
BH CV ECHO MEAS - MV V2 MAX: 104.5 CM/SEC
BH CV ECHO MEAS - MV V2 MEAN: 62.7 CM/SEC
BH CV ECHO MEAS - MV V2 VTI: 30 CM
BH CV ECHO MEAS - MVA P1/2T LCG: 3.3 CM^2
BH CV ECHO MEAS - MVA(P1/2T): 2.5 CM^2
BH CV ECHO MEAS - MVA(VTI): 1.9 CM^2
BH CV ECHO MEAS - PA MAX PG (FULL): 2 MMHG
BH CV ECHO MEAS - PA MAX PG: 4.8 MMHG
BH CV ECHO MEAS - PA V2 MAX: 109.9 CM/SEC
BH CV ECHO MEAS - PULM A REVS DUR: 0.15 SEC
BH CV ECHO MEAS - PULM A REVS VEL: 39.8 CM/SEC
BH CV ECHO MEAS - PULM DIAS VEL: 28.6 CM/SEC
BH CV ECHO MEAS - PULM S/D: 1.9
BH CV ECHO MEAS - PULM SYS VEL: 53.4 CM/SEC
BH CV ECHO MEAS - PVA(V,A): 1.8 CM^2
BH CV ECHO MEAS - PVA(V,D): 1.8 CM^2
BH CV ECHO MEAS - QP/QS: 0.7
BH CV ECHO MEAS - RAP SYSTOLE: 3 MMHG
BH CV ECHO MEAS - RV MAX PG: 2.9 MMHG
BH CV ECHO MEAS - RV MEAN PG: 1.5 MMHG
BH CV ECHO MEAS - RV V1 MAX: 84.9 CM/SEC
BH CV ECHO MEAS - RV V1 MEAN: 57.7 CM/SEC
BH CV ECHO MEAS - RV V1 VTI: 17.1 CM
BH CV ECHO MEAS - RVOT AREA: 2.4 CM^2
BH CV ECHO MEAS - RVOT DIAM: 1.7 CM
BH CV ECHO MEAS - RVSP: 23 MMHG
BH CV ECHO MEAS - SI(AO): 120.1 ML/M^2
BH CV ECHO MEAS - SI(CUBED): 54 ML/M^2
BH CV ECHO MEAS - SI(LVOT): 34.8 ML/M^2
BH CV ECHO MEAS - SI(MOD-SP2): 22.1 ML/M^2
BH CV ECHO MEAS - SI(MOD-SP4): 20.3 ML/M^2
BH CV ECHO MEAS - SI(TEICH): 49 ML/M^2
BH CV ECHO MEAS - SUP REN AO DIAM: 1.6 CM
BH CV ECHO MEAS - SV(AO): 201.4 ML
BH CV ECHO MEAS - SV(CUBED): 90.5 ML
BH CV ECHO MEAS - SV(LVOT): 58.3 ML
BH CV ECHO MEAS - SV(MOD-SP2): 37 ML
BH CV ECHO MEAS - SV(MOD-SP4): 34 ML
BH CV ECHO MEAS - SV(RVOT): 40.7 ML
BH CV ECHO MEAS - SV(TEICH): 82.1 ML
BH CV ECHO MEAS - TAPSE (>1.6): 2.3 CM2
BH CV ECHO MEAS - TR MAX VEL: 221.3 CM/SEC
BH CV XLRA - RV BASE: 2.6 CM
BH CV XLRA - TDI S': 12 CM/SEC
E/E' RATIO: 9
LEFT ATRIUM VOLUME INDEX: 21 ML/M2
SINUS: 2.7 CM
STJ: 2.2 CM

## 2017-10-24 PROCEDURE — 93306 TTE W/DOPPLER COMPLETE: CPT

## 2017-10-24 PROCEDURE — 0399T ADULT TRANSTHORACIC ECHO COMPLETE: CPT | Performed by: INTERNAL MEDICINE

## 2017-10-24 PROCEDURE — 0399T HC MYOCARDL STRAIN IMAG QUAN ASSMT PER SESS: CPT

## 2017-10-24 PROCEDURE — 93306 TTE W/DOPPLER COMPLETE: CPT | Performed by: INTERNAL MEDICINE

## 2017-10-24 PROCEDURE — 25010000002 PERFLUTRIN IN 0.9% NORMAL SALINE 10 ML SYRINGE

## 2017-10-24 RX ORDER — ZOLPIDEM TARTRATE 10 MG/1
TABLET ORAL
Qty: 30 TABLET | Refills: 3 | OUTPATIENT
Start: 2017-10-24 | End: 2017-11-02 | Stop reason: SDUPTHER

## 2017-10-24 NOTE — THERAPY TREATMENT NOTE
Outpatient Physical Therapy Ortho Treatment Note  [facility]     Patient Name: Roxana Brizuela  : 1940  MRN: [id number]  Today's Date: 10/24/2017      Visit Date: 10/23/2017    Visit Dx:    ICD-10-CM ICD-9-CM   1. Peripheral neuropathy due to chemotherapy G62.0 357.7    T45.1X5A E933.1   2. Muscle weakness (generalized) M62.81 728.87   3. Balance problem R26.89 781.99       Patient Active Problem List   Diagnosis   • Anemia   • Hyperlipidemia   • Hypertension   • Leukopenia   • Dyspnea on exertion   • Laceration   • Meningioma   • Thrombocytopenia   • Malignant neoplasm of right female breast   • Drug induced insomnia   • Hypersensitivity reaction   • Hypokalemia   • Dehydration   • History of cardiac arrhythmia   • Dysuria   • Peripheral neuropathy due to chemotherapy   • Chemotherapy-induced nausea   • Acute cystitis with hematuria   • Nausea   • Anemia associated with chemotherapy   • Malignant neoplasm of lower-outer quadrant of right female breast   • Encounter for fitting and adjustment of vascular catheter   • Right breast cancer with T3 tumor, >5 cm in greatest dimension        Past Medical History:   Diagnosis Date   • Acute bronchitis    • Alcoholism 1978    I haven't had a drink since then   • Anemia    • Breast cancer 2017    Right breast invasive mammary carcinoma with focal lobular features, grade 2, ER negative, less than 1% NV positive, HER-2 positive, stage IIIa disease (T3, N2, M0   • Breast cancer 10/20/2004    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 ER records   • Colon polyp    • Conjunctivitis    • Cough    • Edema     Chronic lower extremity edema   • GERD (gastroesophageal reflux disease) 2011    Dr. Paul Negron    • GI (gastrointestinal bleed) 1997   • H/O Bowel obstruction 2013   • H/O jaundice    • Hernia    • History of infectious mononucleosis 1960   • History of migraine headaches    • History of transfusion 1997   • Hx of colonic polyps 06/11/2009    Dr. Giles   • Hx of dehydration 09/13/2011    Related to Refractory nausea and vomiting   • Hx of hypercholesterolemia 09/13/2011    Dr. Paul Saba   • Hyperlipidemia    • Hypertension    • Impacted cerumen of left ear    • Leukocytopenia    • Leukopenia    • Meningioma    • Osteoarthritis 09/13/2011    Dr. Jamil Miller   • Peptic ulceration    • Perioral dermatitis    • SBO (small bowel obstruction)     due to hernia with surgical repair in 09/2011   • SOB (shortness of breath) on exertion    • Thrombocytopenia    • Vertigo         Past Surgical History:   Procedure Laterality Date   • APPENDECTOMY N/A 1960   • BLADDER REPAIR N/A 1980   • BREAST BIOPSY Left 10/20/2004    Mammotome incisional biopsy left breast, surgical specimen, left breast, localization clip placement, left breast, confirmatory diagnostic unilateral mammogram, left breast-Dr. Tyrese Alcala, [facility]   • BREAST BIOPSY Right 05/03/2017    PATH: INVASIVE MAMMARY CARCINOMA   • CHOLECYSTECTOMY N/A 1990   • COLONOSCOPY N/A 06/11/2009    Hemorrhoids, otherwise normal, repeat in 5 years-Dr. Noemí Purcell   • EYE SURGERY  2017    lazer surgery for blood clot   • FEMORAL HERNIA REPAIR Right 09/13/2011    Repair of incarcerated femoral hernia-Dr. Alfred Mcdowell   • HYSTERECTOMY Bilateral 1980   • MASTECTOMY Left 2004    Left breast mastecotmy with sentinel lymph node biospy-[facility]   • GA INSJ TUNNELED CVC W/O SUBQ PORT/ AGE 5 YR/> Left 5/22/2017    Procedure: INSERTION VENOUS ACCESS DEVICE;  Surgeon: Christian Melo MD;  Location: [facility] OR;  Service: General   • REDUCTION MAMMAPLASTY Right     to match Lt. TRAM flap   • TONSILLECTOMY Bilateral    • UPPER GASTROINTESTINAL ENDOSCOPY N/A  09/12/2011    LA grade A esophagitis with no bleeding, large hiatus hernia (50cm), gastric ulcer-Dr.Laszlo Lezama                             PT Assessment/Plan       10/24/17 1043       PT Assessment    Assessment Comments Pt  reports she feels some improvement with just doing the exercises for a couple weeks.  She demonstrates balance deficits; HEP was progressed to include standing balance with double-leg stance and partial tandem.  -TONI     PT Plan    PT Plan Comments pt has one more visit then has mastectomy 11/7; Retest VEL  -TONI       User Key  (r) = Recorded By, (t) = Taken By, (c) = Cosigned By    Initials Name Provider Type    TONI Dooley, PT Physical Therapist               10/23/17 1000   Subjective Comments   Subjective Comments The exercises have been helping.   Subjective Pain   Able to rate subjective pain? yes   Pre-Treatment Pain Level 0   Post-Treatment Pain Level 0   Exercise 1   Exercise Name 1 SLR   Cueing 1 Verbal   Sets 1 1   Reps 1 10   Exercise 2   Exercise Name 2 SL hip abd   Cueing 2 Tactile   Sets 2 1   Reps 2 5   Exercise 3   Exercise Name 3 quad sets   Cueing 3 Tactile   Reps 3 10   Exercise 4   Exercise Name 4 calf raises/DF   Cueing 4 Demo   Reps 4 10   Exercise 5   Exercise Name 5 hip abd with RTB in HL   Cueing 5 Demo   Sets 5 2  (one set claudia, one set alternating)   Reps 5 10   Additional Comments cued t.a.   Exercise 6   Exercise Name 6 bridges   Cueing 6 Verbal   Reps 6 10   Additional Comments cued t.a.    Exercise 7   Exercise Name 7 hip adduction isometrics w pillow   Cueing 7 Verbal   Reps 7 10   Exercise 8   Exercise Name 8 DLS balance in // bars; head turns R?L   Cueing 8 Tactile   Sets 8 2   Reps 8 10   Additional Comments on floor, on foam   Exercise 9   Exercise Name 9 partial tandem stance in // bars   Cueing 9 Tactile   Sets 9 2   Reps 9 10   Additional Comments on floor                              PT OP Goals       10/23/17 1200       PT Short Term Goals     STG Date to Achieve 10/23/17  -TONI     STG 1 Independent with initial home program for strengthening/balance.  -TONI     STG 1 Progress Ongoing  -TONI     STG 1 Progress Comments required cuing for correct form  -TONI     STG 2 Pt to report no falls or LOB in last 2 weeks.  -TONI     STG 2 Progress Ongoing  -TONI     Long Term Goals    LTG Date to Achieve 11/10/17  -TONI     LTG 1 Pt to be able to hold in tandem standing x 15 sec.  -TONI     LTG 1 Progress Ongoing  -TONI     LTG 1 Progress Comments 5 sec  -TONI     LTG 2 Pt to improve CROWDER score by 15%.    -TONI     LTG 2 Progress Ongoing  -TONI     LTG 3 Pt to go up and down stairs reciprocally without handrail.    -TONI     LTG 3 Progress Ongoing  -TONI       User Key  (r) = Recorded By, (t) = Taken By, (c) = Cosigned By    Initials Name Provider Type    TONI Dooley PT Physical Therapist                Therapy Education       10/23/17 1100          Therapy Education    Education Details Discussed practicing balance at home and issued updated HEP.  Also issued ACS education for post-mastectomy (surgery will be in Nov).  -TONI      Given HEP;Fall prevention and home safety  -TONI      Program Progressed  -TONI      How Provided Verbal;Demonstration;Written  -TONI      Provided to Patient  -TONI      Level of Understanding Teach back education performed  -TONI        User Key  (r) = Recorded By, (t) = Taken By, (c) = Cosigned By    Initials Name Provider Type    TONI Dooley PT Physical Therapist                Time Calculation:   Start Time: 1000  Stop Time: 1045  Time Calculation (min): 45 min    Therapy Charges for Today     Code Description Service Date Service Provider Modifiers Qty    [id number]  PT THER PROC EA 15 MIN 10/23/2017 Zenaida Dooley PT GP 2    [id number] HC PT NEUROMUSC RE EDUCATION EA 15 MIN 10/23/2017 Zenaida Dooley PT GP 1                    Zenaida Dooley PT  10/24/2017

## 2017-10-30 ENCOUNTER — HOSPITAL ENCOUNTER (OUTPATIENT)
Dept: PHYSICAL THERAPY | Facility: HOSPITAL | Age: 77
Setting detail: THERAPIES SERIES
Discharge: HOME | End: 2017-10-30

## 2017-10-30 DIAGNOSIS — T45.1X5A PERIPHERAL NEUROPATHY DUE TO CHEMOTHERAPY (HCC): Primary | ICD-10-CM

## 2017-10-30 DIAGNOSIS — G62.0 PERIPHERAL NEUROPATHY DUE TO CHEMOTHERAPY (HCC): Primary | ICD-10-CM

## 2017-10-30 DIAGNOSIS — M62.81 MUSCLE WEAKNESS (GENERALIZED): ICD-10-CM

## 2017-10-30 DIAGNOSIS — R26.89 BALANCE PROBLEM: ICD-10-CM

## 2017-10-30 PROCEDURE — 97116 GAIT TRAINING THERAPY: CPT

## 2017-10-30 PROCEDURE — 97110 THERAPEUTIC EXERCISES: CPT

## 2017-10-30 PROCEDURE — G8980 MOBILITY D/C STATUS: HCPCS

## 2017-10-30 PROCEDURE — 97112 NEUROMUSCULAR REEDUCATION: CPT

## 2017-10-30 PROCEDURE — G8979 MOBILITY GOAL STATUS: HCPCS

## 2017-11-02 ENCOUNTER — APPOINTMENT (OUTPATIENT)
Dept: PREADMISSION TESTING | Facility: HOSPITAL | Age: 77
End: 2017-11-02

## 2017-11-02 VITALS
TEMPERATURE: 98.6 F | SYSTOLIC BLOOD PRESSURE: 124 MMHG | WEIGHT: 144.8 LBS | HEART RATE: 66 BPM | BODY MASS INDEX: 26.65 KG/M2 | RESPIRATION RATE: 16 BRPM | OXYGEN SATURATION: 99 % | DIASTOLIC BLOOD PRESSURE: 64 MMHG | HEIGHT: 62 IN

## 2017-11-02 DIAGNOSIS — C50.911 RIGHT BREAST CANCER WITH T3 TUMOR, >5 CM IN GREATEST DIMENSION (HCC): ICD-10-CM

## 2017-11-02 LAB
ALBUMIN SERPL-MCNC: 4.1 G/DL (ref 3.5–5.2)
ALBUMIN/GLOB SERPL: 1.6 G/DL
ALP SERPL-CCNC: 36 U/L (ref 39–117)
ALT SERPL W P-5'-P-CCNC: 29 U/L (ref 1–33)
ANION GAP SERPL CALCULATED.3IONS-SCNC: 11 MMOL/L
AST SERPL-CCNC: 29 U/L (ref 1–32)
BASOPHILS # BLD AUTO: 0.01 10*3/MM3 (ref 0–0.2)
BASOPHILS NFR BLD AUTO: 0.2 % (ref 0–1.5)
BILIRUB SERPL-MCNC: 0.4 MG/DL (ref 0.1–1.2)
BUN BLD-MCNC: 15 MG/DL (ref 8–23)
BUN/CREAT SERPL: 19 (ref 7–25)
CALCIUM SPEC-SCNC: 9.6 MG/DL (ref 8.6–10.5)
CHLORIDE SERPL-SCNC: 102 MMOL/L (ref 98–107)
CO2 SERPL-SCNC: 27 MMOL/L (ref 22–29)
CREAT BLD-MCNC: 0.79 MG/DL (ref 0.57–1)
DEPRECATED RDW RBC AUTO: 46.6 FL (ref 37–54)
EOSINOPHIL # BLD AUTO: 0.08 10*3/MM3 (ref 0–0.7)
EOSINOPHIL NFR BLD AUTO: 1.8 % (ref 0.3–6.2)
ERYTHROCYTE [DISTWIDTH] IN BLOOD BY AUTOMATED COUNT: 13.1 % (ref 11.7–13)
GFR SERPL CREATININE-BSD FRML MDRD: 71 ML/MIN/1.73
GLOBULIN UR ELPH-MCNC: 2.5 GM/DL
GLUCOSE BLD-MCNC: 99 MG/DL (ref 65–99)
HCT VFR BLD AUTO: 34.4 % (ref 35.6–45.5)
HGB BLD-MCNC: 11.1 G/DL (ref 11.9–15.5)
IMM GRANULOCYTES # BLD: 0.02 10*3/MM3 (ref 0–0.03)
IMM GRANULOCYTES NFR BLD: 0.4 % (ref 0–0.5)
LYMPHOCYTES # BLD AUTO: 1.84 10*3/MM3 (ref 0.9–4.8)
LYMPHOCYTES NFR BLD AUTO: 40.4 % (ref 19.6–45.3)
MCH RBC QN AUTO: 31.6 PG (ref 26.9–32)
MCHC RBC AUTO-ENTMCNC: 32.3 G/DL (ref 32.4–36.3)
MCV RBC AUTO: 98 FL (ref 80.5–98.2)
MONOCYTES # BLD AUTO: 0.74 10*3/MM3 (ref 0.2–1.2)
MONOCYTES NFR BLD AUTO: 16.2 % (ref 5–12)
NEUTROPHILS # BLD AUTO: 1.87 10*3/MM3 (ref 1.9–8.1)
NEUTROPHILS NFR BLD AUTO: 41 % (ref 42.7–76)
PLATELET # BLD AUTO: 100 10*3/MM3 (ref 140–500)
PMV BLD AUTO: 10.3 FL (ref 6–12)
POTASSIUM BLD-SCNC: 4.3 MMOL/L (ref 3.5–5.2)
PROT SERPL-MCNC: 6.6 G/DL (ref 6–8.5)
RBC # BLD AUTO: 3.51 10*6/MM3 (ref 3.9–5.2)
SODIUM BLD-SCNC: 140 MMOL/L (ref 136–145)
WBC NRBC COR # BLD: 4.56 10*3/MM3 (ref 4.5–10.7)

## 2017-11-02 PROCEDURE — 36415 COLL VENOUS BLD VENIPUNCTURE: CPT

## 2017-11-02 PROCEDURE — 80053 COMPREHEN METABOLIC PANEL: CPT | Performed by: SURGERY

## 2017-11-02 PROCEDURE — 85025 COMPLETE CBC W/AUTO DIFF WBC: CPT | Performed by: SURGERY

## 2017-11-02 RX ORDER — PANTOPRAZOLE SODIUM 40 MG/1
40 TABLET, DELAYED RELEASE ORAL DAILY
COMMUNITY
End: 2017-12-08 | Stop reason: SDUPTHER

## 2017-11-02 RX ORDER — GABAPENTIN 300 MG/1
600 CAPSULE ORAL 3 TIMES DAILY
COMMUNITY
End: 2018-06-08 | Stop reason: SDUPTHER

## 2017-11-02 RX ORDER — ZOLPIDEM TARTRATE 10 MG/1
10 TABLET ORAL NIGHTLY PRN
COMMUNITY
End: 2017-12-11

## 2017-11-02 RX ORDER — LORAZEPAM 0.5 MG/1
0.5 TABLET ORAL EVERY 8 HOURS PRN
COMMUNITY
End: 2017-11-21 | Stop reason: SDUPTHER

## 2017-11-02 RX ORDER — LANOLIN ALCOHOL/MO/W.PET/CERES
50 CREAM (GRAM) TOPICAL DAILY
COMMUNITY

## 2017-11-02 RX ORDER — DICLOFENAC SODIUM 75 MG/1
75 TABLET, DELAYED RELEASE ORAL 2 TIMES DAILY
COMMUNITY
End: 2018-06-08 | Stop reason: SDUPTHER

## 2017-11-02 RX ORDER — PROMETHAZINE HYDROCHLORIDE 25 MG/1
25 TABLET ORAL EVERY 6 HOURS PRN
COMMUNITY
End: 2017-11-16

## 2017-11-02 RX ORDER — HYDROCHLOROTHIAZIDE 25 MG/1
25 TABLET ORAL DAILY
COMMUNITY
End: 2018-05-14 | Stop reason: SDUPTHER

## 2017-11-02 RX ORDER — HYDROXYZINE PAMOATE 25 MG/1
25 CAPSULE ORAL 2 TIMES DAILY
COMMUNITY
End: 2017-12-19 | Stop reason: SDUPTHER

## 2017-11-02 NOTE — DISCHARGE INSTRUCTIONS
Take the following medications the morning of surgery with a small sip of water:  PROTONIX  PROPRANOLOL  ATIVAN  GABAPENTIN  VISTARIL    ARRIVE AT 0930.        General Instructions:  • Do not eat solid food after midnight the night before surgery.  • You may drink clear liquids day of surgery but must stop at least one hour before your hospital arrival time.  • It is beneficial for you to have a clear drink that contains carbohydrates the day of surgery.  We suggest a 12 to 20 ounce bottle of Gatorade or Powerade for non-diabetic patients or a 12 to 20 ounce bottle of G2 or Powerade Zero for diabetic patients. (Pediatric patients, are not advised to drink a 12 to 20 ounce carbohydrate drink)    Clear liquids are liquids you can see through.  Nothing red in color.     Plain water                               Sports drinks  Sodas                                   Gelatin (Jell-O)  Fruit juices without pulp such as white grape juice and apple juice  Popsicles that contain no fruit or yogurt  Tea or coffee (no cream or milk added)  Gatorade / Powerade  G2 / Powerade Zero    • Infants may have breast milk up to four hours before surgery.  • Infants drinking formula may drink formula up to six hours before surgery.   • Patients who avoid smoking, chewing tobacco and alcohol for 4 weeks prior to surgery have a reduced risk of post-operative complications.  Quit smoking as many days before surgery as you can.  • Do not smoke, use chewing tobacco or drink alcohol the day of surgery.   • If applicable bring your C-PAP/ BI-PAP machine.  • Bring any papers given to you in the doctor’s office.  • Wear clean comfortable clothes and socks.  • Do not wear contact lenses or make-up.  Bring a case for your glasses.   • Bring crutches or walker if applicable.  • Remove all piercings.  Leave jewelry and any other valuables at home.  • The Pre-Admission Testing nurse will instruct you to bring medications if unable to obtain an  accurate list in Pre-Admission Testing.        If you were given a blood bank ID arm band remember to bring it with you the day of surgery.    Preventing a Surgical Site Infection:  • For 2 to 3 days before surgery, avoid shaving with a razor because the razor can irritate skin and make it easier to develop an infection.  • The night prior to surgery sleep in a clean bed with clean clothing.  Do not allow pets to sleep with you.  • Shower on the morning of surgery using a fresh bar of anti-bacterial soap (such as Dial) and clean washcloth.  Dry with a clean towel and dress in clean clothing.  • Ask your surgeon if you will be receiving antibiotics prior to surgery.  • Make sure you, your family, and all healthcare providers clean their hands with soap and water or an alcohol based hand  before caring for you or your wound.    Day of surgery:  Upon arrival, a Pre-op nurse and Anesthesiologist will review your health history, obtain vital signs, and answer questions you may have.  The only belongings needed at this time will be your home medications and if applicable your C-PAP/BI-PAP machine.  If you are staying overnight your family can leave the rest of your belongings in the car and bring them to your room later.  A Pre-op nurse will start an IV and you may receive medication in preparation for surgery, including something to help you relax.  Your family will be able to see you in the Pre-op area.  While you are in surgery your family should notify the waiting room  if they leave the waiting room area and provide a contact phone number.    Please be aware that surgery does come with discomfort.  We want to make every effort to control your discomfort so please discuss any uncontrolled symptoms with your nurse.   Your doctor will most likely have prescribed pain medications.      If you are going home after surgery you will receive individualized written care instructions before being  discharged.  A responsible adult must drive you to and from the hospital on the day of your surgery and stay with you for 24 hours.    If you are staying overnight following surgery, you will be transported to your hospital room following the recovery period.  [facility] has all private rooms.    If you have any questions please call Pre-Admission Testing at [phone].  Deductibles and co-payments are collected on the day of service. Please be prepared to pay the required co-pay, deductible or deposit on the day of service as defined by your plan.

## 2017-11-06 ENCOUNTER — APPOINTMENT (OUTPATIENT)
Dept: PHYSICAL THERAPY | Facility: HOSPITAL | Age: 77
End: 2017-11-06

## 2017-11-07 ENCOUNTER — ANESTHESIA EVENT (OUTPATIENT)
Dept: PERIOP | Facility: HOSPITAL | Age: 77
End: 2017-11-07

## 2017-11-07 ENCOUNTER — ANESTHESIA (OUTPATIENT)
Dept: PERIOP | Facility: HOSPITAL | Age: 77
End: 2017-11-07

## 2017-11-07 ENCOUNTER — HOSPITAL ENCOUNTER (OUTPATIENT)
Facility: HOSPITAL | Age: 77
Setting detail: OBSERVATION
Discharge: HOME | End: 2017-11-09
Attending: SURGERY | Admitting: SURGERY

## 2017-11-07 ENCOUNTER — HOSPITAL ENCOUNTER (OUTPATIENT)
Dept: NUCLEAR MEDICINE | Facility: HOSPITAL | Age: 77
Discharge: HOME | End: 2017-11-07
Attending: SURGERY

## 2017-11-07 DIAGNOSIS — C50.911 RIGHT BREAST CANCER WITH T3 TUMOR, >5 CM IN GREATEST DIMENSION (HCC): ICD-10-CM

## 2017-11-07 DIAGNOSIS — C50.911: ICD-10-CM

## 2017-11-07 LAB
HCT VFR BLD AUTO: 32.7 % (ref 35.6–45.5)
HGB BLD-MCNC: 10.8 G/DL (ref 11.9–15.5)

## 2017-11-07 PROCEDURE — 25010000002 DEXAMETHASONE PER 1 MG: Performed by: NURSE ANESTHETIST, CERTIFIED REGISTERED

## 2017-11-07 PROCEDURE — 63710000001 PROMETHAZINE PER 25 MG: Performed by: NURSE ANESTHETIST, CERTIFIED REGISTERED

## 2017-11-07 PROCEDURE — 85018 HEMOGLOBIN: CPT | Performed by: SURGERY

## 2017-11-07 PROCEDURE — 25010000002 ONDANSETRON PER 1 MG: Performed by: NURSE ANESTHETIST, CERTIFIED REGISTERED

## 2017-11-07 PROCEDURE — 25010000003 CEFAZOLIN PER 500 MG: Performed by: SURGERY

## 2017-11-07 PROCEDURE — G0378 HOSPITAL OBSERVATION PER HR: HCPCS

## 2017-11-07 PROCEDURE — 88332 PATH CONSLTJ SURG EA ADD BLK: CPT | Performed by: SURGERY

## 2017-11-07 PROCEDURE — 25010000002 SUCCINYLCHOLINE PER 20 MG: Performed by: NURSE ANESTHETIST, CERTIFIED REGISTERED

## 2017-11-07 PROCEDURE — 19303 MAST SIMPLE COMPLETE: CPT | Performed by: SURGERY

## 2017-11-07 PROCEDURE — 25010000002 PROPOFOL 10 MG/ML EMULSION: Performed by: NURSE ANESTHETIST, CERTIFIED REGISTERED

## 2017-11-07 PROCEDURE — 99024 POSTOP FOLLOW-UP VISIT: CPT | Performed by: SURGERY

## 2017-11-07 PROCEDURE — 38900 IO MAP OF SENT LYMPH NODE: CPT | Performed by: SURGERY

## 2017-11-07 PROCEDURE — 85014 HEMATOCRIT: CPT | Performed by: SURGERY

## 2017-11-07 PROCEDURE — 25010000002 FENTANYL CITRATE (PF) 100 MCG/2ML SOLUTION: Performed by: ANESTHESIOLOGY

## 2017-11-07 PROCEDURE — 88307 TISSUE EXAM BY PATHOLOGIST: CPT | Performed by: SURGERY

## 2017-11-07 PROCEDURE — 25010000002 MIDAZOLAM PER 1 MG: Performed by: ANESTHESIOLOGY

## 2017-11-07 PROCEDURE — 25010000002 PALONOSETRON PER 25 MCG: Performed by: SURGERY

## 2017-11-07 PROCEDURE — 38525 BIOPSY/REMOVAL LYMPH NODES: CPT | Performed by: SURGERY

## 2017-11-07 PROCEDURE — 88331 PATH CONSLTJ SURG 1 BLK 1SPC: CPT | Performed by: SURGERY

## 2017-11-07 PROCEDURE — 25010000003 CEFAZOLIN IN DEXTROSE 2-4 GM/100ML-% SOLUTION: Performed by: SURGERY

## 2017-11-07 RX ORDER — PROMETHAZINE HYDROCHLORIDE 25 MG/1
12.5 TABLET ORAL ONCE AS NEEDED
Status: DISCONTINUED | OUTPATIENT
Start: 2017-11-07 | End: 2017-11-07

## 2017-11-07 RX ORDER — HYDROMORPHONE HYDROCHLORIDE 1 MG/ML
0.5 INJECTION, SOLUTION INTRAMUSCULAR; INTRAVENOUS; SUBCUTANEOUS
Status: DISCONTINUED | OUTPATIENT
Start: 2017-11-07 | End: 2017-11-09 | Stop reason: HOSPADM

## 2017-11-07 RX ORDER — MIDAZOLAM HYDROCHLORIDE 1 MG/ML
2 INJECTION INTRAMUSCULAR; INTRAVENOUS
Status: DISCONTINUED | OUTPATIENT
Start: 2017-11-07 | End: 2017-11-07 | Stop reason: HOSPADM

## 2017-11-07 RX ORDER — PROMETHAZINE HYDROCHLORIDE 25 MG/ML
12.5 INJECTION, SOLUTION INTRAMUSCULAR; INTRAVENOUS ONCE AS NEEDED
Status: DISCONTINUED | OUTPATIENT
Start: 2017-11-07 | End: 2017-11-07 | Stop reason: HOSPADM

## 2017-11-07 RX ORDER — PROMETHAZINE HYDROCHLORIDE 25 MG/1
25 TABLET ORAL ONCE AS NEEDED
Status: DISCONTINUED | OUTPATIENT
Start: 2017-11-07 | End: 2017-11-07 | Stop reason: HOSPADM

## 2017-11-07 RX ORDER — CEFAZOLIN SODIUM 2 G/100ML
2 INJECTION, SOLUTION INTRAVENOUS EVERY 8 HOURS
Status: COMPLETED | OUTPATIENT
Start: 2017-11-07 | End: 2017-11-08

## 2017-11-07 RX ORDER — FENTANYL CITRATE 50 UG/ML
50 INJECTION, SOLUTION INTRAMUSCULAR; INTRAVENOUS
Status: DISCONTINUED | OUTPATIENT
Start: 2017-11-07 | End: 2017-11-07

## 2017-11-07 RX ORDER — PROMETHAZINE HYDROCHLORIDE 25 MG/ML
12.5 INJECTION, SOLUTION INTRAMUSCULAR; INTRAVENOUS ONCE AS NEEDED
Status: COMPLETED | OUTPATIENT
Start: 2017-11-07 | End: 2017-11-07

## 2017-11-07 RX ORDER — PROMETHAZINE HYDROCHLORIDE 25 MG/1
12.5 TABLET ORAL ONCE AS NEEDED
Status: DISCONTINUED | OUTPATIENT
Start: 2017-11-07 | End: 2017-11-07 | Stop reason: HOSPADM

## 2017-11-07 RX ORDER — ISOSULFAN BLUE 50 MG/5ML
INJECTION, SOLUTION SUBCUTANEOUS AS NEEDED
Status: DISCONTINUED | OUTPATIENT
Start: 2017-11-07 | End: 2017-11-07 | Stop reason: HOSPADM

## 2017-11-07 RX ORDER — EPHEDRINE SULFATE 50 MG/ML
5 INJECTION, SOLUTION INTRAVENOUS ONCE AS NEEDED
Status: DISCONTINUED | OUTPATIENT
Start: 2017-11-07 | End: 2017-11-07

## 2017-11-07 RX ORDER — ACETAMINOPHEN 325 MG/1
650 TABLET ORAL ONCE
Status: COMPLETED | OUTPATIENT
Start: 2017-11-07 | End: 2017-11-07

## 2017-11-07 RX ORDER — CEFAZOLIN SODIUM 2 G/100ML
2 INJECTION, SOLUTION INTRAVENOUS ONCE
Status: DISCONTINUED | OUTPATIENT
Start: 2017-11-07 | End: 2017-11-07 | Stop reason: HOSPADM

## 2017-11-07 RX ORDER — ZOLPIDEM TARTRATE 5 MG/1
10 TABLET ORAL NIGHTLY PRN
Status: DISCONTINUED | OUTPATIENT
Start: 2017-11-07 | End: 2017-11-09 | Stop reason: HOSPADM

## 2017-11-07 RX ORDER — ONDANSETRON 2 MG/ML
4 INJECTION INTRAMUSCULAR; INTRAVENOUS ONCE AS NEEDED
Status: DISCONTINUED | OUTPATIENT
Start: 2017-11-07 | End: 2017-11-07 | Stop reason: HOSPADM

## 2017-11-07 RX ORDER — HYDROMORPHONE HYDROCHLORIDE 1 MG/ML
0.5 INJECTION, SOLUTION INTRAMUSCULAR; INTRAVENOUS; SUBCUTANEOUS
Status: DISCONTINUED | OUTPATIENT
Start: 2017-11-07 | End: 2017-11-07 | Stop reason: HOSPADM

## 2017-11-07 RX ORDER — DIPHENHYDRAMINE HYDROCHLORIDE 50 MG/ML
12.5 INJECTION INTRAMUSCULAR; INTRAVENOUS
Status: DISCONTINUED | OUTPATIENT
Start: 2017-11-07 | End: 2017-11-07 | Stop reason: HOSPADM

## 2017-11-07 RX ORDER — FLUMAZENIL 0.1 MG/ML
0.2 INJECTION INTRAVENOUS AS NEEDED
Status: DISCONTINUED | OUTPATIENT
Start: 2017-11-07 | End: 2017-11-07

## 2017-11-07 RX ORDER — SUMATRIPTAN 100 MG/1
50 TABLET, FILM COATED ORAL
Status: DISCONTINUED | OUTPATIENT
Start: 2017-11-07 | End: 2017-11-09 | Stop reason: HOSPADM

## 2017-11-07 RX ORDER — LABETALOL HYDROCHLORIDE 5 MG/ML
5 INJECTION, SOLUTION INTRAVENOUS
Status: DISCONTINUED | OUTPATIENT
Start: 2017-11-07 | End: 2017-11-07 | Stop reason: HOSPADM

## 2017-11-07 RX ORDER — CELECOXIB 100 MG/1
200 CAPSULE ORAL ONCE
Status: COMPLETED | OUTPATIENT
Start: 2017-11-07 | End: 2017-11-07

## 2017-11-07 RX ORDER — SUCCINYLCHOLINE CHLORIDE 20 MG/ML
INJECTION INTRAMUSCULAR; INTRAVENOUS AS NEEDED
Status: DISCONTINUED | OUTPATIENT
Start: 2017-11-07 | End: 2017-11-07 | Stop reason: SURG

## 2017-11-07 RX ORDER — CELECOXIB 200 MG/1
200 CAPSULE ORAL ONCE
Status: DISCONTINUED | OUTPATIENT
Start: 2017-11-07 | End: 2017-11-07 | Stop reason: HOSPADM

## 2017-11-07 RX ORDER — PROMETHAZINE HYDROCHLORIDE 25 MG/1
25 SUPPOSITORY RECTAL ONCE AS NEEDED
Status: DISCONTINUED | OUTPATIENT
Start: 2017-11-07 | End: 2017-11-07 | Stop reason: HOSPADM

## 2017-11-07 RX ORDER — FLUMAZENIL 0.1 MG/ML
0.2 INJECTION INTRAVENOUS AS NEEDED
Status: DISCONTINUED | OUTPATIENT
Start: 2017-11-07 | End: 2017-11-07 | Stop reason: HOSPADM

## 2017-11-07 RX ORDER — PALONOSETRON 0.05 MG/ML
0.07 INJECTION, SOLUTION INTRAVENOUS ONCE
Status: COMPLETED | OUTPATIENT
Start: 2017-11-07 | End: 2017-11-07

## 2017-11-07 RX ORDER — HYDRALAZINE HYDROCHLORIDE 20 MG/ML
5 INJECTION INTRAMUSCULAR; INTRAVENOUS
Status: DISCONTINUED | OUTPATIENT
Start: 2017-11-07 | End: 2017-11-07 | Stop reason: HOSPADM

## 2017-11-07 RX ORDER — ACETAMINOPHEN 325 MG/1
650 TABLET ORAL ONCE
Status: DISCONTINUED | OUTPATIENT
Start: 2017-11-07 | End: 2017-11-07 | Stop reason: HOSPADM

## 2017-11-07 RX ORDER — FAMOTIDINE 10 MG/ML
20 INJECTION, SOLUTION INTRAVENOUS ONCE
Status: COMPLETED | OUTPATIENT
Start: 2017-11-07 | End: 2017-11-07

## 2017-11-07 RX ORDER — OXYCODONE AND ACETAMINOPHEN 7.5; 325 MG/1; MG/1
1 TABLET ORAL ONCE AS NEEDED
Status: DISCONTINUED | OUTPATIENT
Start: 2017-11-07 | End: 2017-11-07 | Stop reason: HOSPADM

## 2017-11-07 RX ORDER — PROPOFOL 10 MG/ML
VIAL (ML) INTRAVENOUS AS NEEDED
Status: DISCONTINUED | OUTPATIENT
Start: 2017-11-07 | End: 2017-11-07 | Stop reason: SURG

## 2017-11-07 RX ORDER — HYDROMORPHONE HYDROCHLORIDE 1 MG/ML
0.5 INJECTION, SOLUTION INTRAMUSCULAR; INTRAVENOUS; SUBCUTANEOUS
Status: DISCONTINUED | OUTPATIENT
Start: 2017-11-07 | End: 2017-11-07

## 2017-11-07 RX ORDER — HYDROCODONE BITARTRATE AND ACETAMINOPHEN 7.5; 325 MG/1; MG/1
1 TABLET ORAL ONCE AS NEEDED
Status: DISCONTINUED | OUTPATIENT
Start: 2017-11-07 | End: 2017-11-07 | Stop reason: HOSPADM

## 2017-11-07 RX ORDER — NALOXONE HCL 0.4 MG/ML
0.1 VIAL (ML) INJECTION
Status: DISCONTINUED | OUTPATIENT
Start: 2017-11-07 | End: 2017-11-09 | Stop reason: HOSPADM

## 2017-11-07 RX ORDER — HYDROCHLOROTHIAZIDE 25 MG/1
25 TABLET ORAL DAILY
Status: DISCONTINUED | OUTPATIENT
Start: 2017-11-07 | End: 2017-11-09 | Stop reason: HOSPADM

## 2017-11-07 RX ORDER — LABETALOL HYDROCHLORIDE 5 MG/ML
5 INJECTION, SOLUTION INTRAVENOUS
Status: DISCONTINUED | OUTPATIENT
Start: 2017-11-07 | End: 2017-11-07

## 2017-11-07 RX ORDER — PROMETHAZINE HYDROCHLORIDE 25 MG/1
25 TABLET ORAL ONCE AS NEEDED
Status: COMPLETED | OUTPATIENT
Start: 2017-11-07 | End: 2017-11-07

## 2017-11-07 RX ORDER — FENTANYL CITRATE 50 UG/ML
50 INJECTION, SOLUTION INTRAMUSCULAR; INTRAVENOUS
Status: DISCONTINUED | OUTPATIENT
Start: 2017-11-07 | End: 2017-11-07 | Stop reason: HOSPADM

## 2017-11-07 RX ORDER — NALOXONE HCL 0.4 MG/ML
0.2 VIAL (ML) INJECTION AS NEEDED
Status: DISCONTINUED | OUTPATIENT
Start: 2017-11-07 | End: 2017-11-07 | Stop reason: HOSPADM

## 2017-11-07 RX ORDER — DIPHENHYDRAMINE HYDROCHLORIDE 50 MG/ML
12.5 INJECTION INTRAMUSCULAR; INTRAVENOUS
Status: DISCONTINUED | OUTPATIENT
Start: 2017-11-07 | End: 2017-11-07

## 2017-11-07 RX ORDER — SODIUM CHLORIDE 0.9 % (FLUSH) 0.9 %
1-10 SYRINGE (ML) INJECTION AS NEEDED
Status: DISCONTINUED | OUTPATIENT
Start: 2017-11-07 | End: 2017-11-07 | Stop reason: HOSPADM

## 2017-11-07 RX ORDER — MAGNESIUM HYDROXIDE 1200 MG/15ML
LIQUID ORAL AS NEEDED
Status: DISCONTINUED | OUTPATIENT
Start: 2017-11-07 | End: 2017-11-07 | Stop reason: HOSPADM

## 2017-11-07 RX ORDER — ROCURONIUM BROMIDE 10 MG/ML
INJECTION, SOLUTION INTRAVENOUS AS NEEDED
Status: DISCONTINUED | OUTPATIENT
Start: 2017-11-07 | End: 2017-11-07 | Stop reason: SURG

## 2017-11-07 RX ORDER — PROPRANOLOL HYDROCHLORIDE 10 MG/1
10 TABLET ORAL 3 TIMES DAILY
Status: DISCONTINUED | OUTPATIENT
Start: 2017-11-07 | End: 2017-11-09 | Stop reason: HOSPADM

## 2017-11-07 RX ORDER — DIAZEPAM 5 MG/1
5 TABLET ORAL ONCE
Status: COMPLETED | OUTPATIENT
Start: 2017-11-07 | End: 2017-11-07

## 2017-11-07 RX ORDER — PROMETHAZINE HYDROCHLORIDE 25 MG/1
25 SUPPOSITORY RECTAL ONCE AS NEEDED
Status: COMPLETED | OUTPATIENT
Start: 2017-11-07 | End: 2017-11-07

## 2017-11-07 RX ORDER — MIDAZOLAM HYDROCHLORIDE 1 MG/ML
1 INJECTION INTRAMUSCULAR; INTRAVENOUS
Status: DISCONTINUED | OUTPATIENT
Start: 2017-11-07 | End: 2017-11-07 | Stop reason: HOSPADM

## 2017-11-07 RX ORDER — DEXAMETHASONE SODIUM PHOSPHATE 10 MG/ML
INJECTION INTRAMUSCULAR; INTRAVENOUS AS NEEDED
Status: DISCONTINUED | OUTPATIENT
Start: 2017-11-07 | End: 2017-11-07 | Stop reason: SURG

## 2017-11-07 RX ORDER — HYDRALAZINE HYDROCHLORIDE 20 MG/ML
5 INJECTION INTRAMUSCULAR; INTRAVENOUS
Status: DISCONTINUED | OUTPATIENT
Start: 2017-11-07 | End: 2017-11-07

## 2017-11-07 RX ORDER — HYDROXYZINE PAMOATE 25 MG/1
25 CAPSULE ORAL 2 TIMES DAILY
Status: DISCONTINUED | OUTPATIENT
Start: 2017-11-07 | End: 2017-11-09 | Stop reason: HOSPADM

## 2017-11-07 RX ORDER — ONDANSETRON 2 MG/ML
INJECTION INTRAMUSCULAR; INTRAVENOUS AS NEEDED
Status: DISCONTINUED | OUTPATIENT
Start: 2017-11-07 | End: 2017-11-07 | Stop reason: SURG

## 2017-11-07 RX ORDER — ONDANSETRON 2 MG/ML
4 INJECTION INTRAMUSCULAR; INTRAVENOUS ONCE AS NEEDED
Status: COMPLETED | OUTPATIENT
Start: 2017-11-07 | End: 2017-11-07

## 2017-11-07 RX ORDER — SODIUM CHLORIDE, SODIUM LACTATE, POTASSIUM CHLORIDE, CALCIUM CHLORIDE 600; 310; 30; 20 MG/100ML; MG/100ML; MG/100ML; MG/100ML
9 INJECTION, SOLUTION INTRAVENOUS CONTINUOUS
Status: DISCONTINUED | OUTPATIENT
Start: 2017-11-07 | End: 2017-11-08

## 2017-11-07 RX ORDER — BUPIVACAINE HYDROCHLORIDE AND EPINEPHRINE 5; 5 MG/ML; UG/ML
INJECTION, SOLUTION PERINEURAL AS NEEDED
Status: DISCONTINUED | OUTPATIENT
Start: 2017-11-07 | End: 2017-11-07 | Stop reason: HOSPADM

## 2017-11-07 RX ORDER — NALOXONE HCL 0.4 MG/ML
0.2 VIAL (ML) INJECTION AS NEEDED
Status: DISCONTINUED | OUTPATIENT
Start: 2017-11-07 | End: 2017-11-07

## 2017-11-07 RX ORDER — EPHEDRINE SULFATE 50 MG/ML
INJECTION, SOLUTION INTRAVENOUS AS NEEDED
Status: DISCONTINUED | OUTPATIENT
Start: 2017-11-07 | End: 2017-11-07 | Stop reason: SURG

## 2017-11-07 RX ORDER — HYDROCODONE BITARTRATE AND ACETAMINOPHEN 5; 325 MG/1; MG/1
1 TABLET ORAL EVERY 4 HOURS PRN
Status: DISCONTINUED | OUTPATIENT
Start: 2017-11-07 | End: 2017-11-09 | Stop reason: HOSPADM

## 2017-11-07 RX ORDER — GABAPENTIN 300 MG/1
600 CAPSULE ORAL 3 TIMES DAILY
Status: DISCONTINUED | OUTPATIENT
Start: 2017-11-07 | End: 2017-11-09 | Stop reason: HOSPADM

## 2017-11-07 RX ORDER — LORAZEPAM 0.5 MG/1
0.5 TABLET ORAL EVERY 8 HOURS PRN
Status: DISCONTINUED | OUTPATIENT
Start: 2017-11-07 | End: 2017-11-09 | Stop reason: HOSPADM

## 2017-11-07 RX ORDER — LIDOCAINE AND PRILOCAINE 25; 25 MG/G; MG/G
CREAM TOPICAL ONCE
Status: COMPLETED | OUTPATIENT
Start: 2017-11-07 | End: 2017-11-07

## 2017-11-07 RX ORDER — PANTOPRAZOLE SODIUM 40 MG/1
40 TABLET, DELAYED RELEASE ORAL EVERY MORNING
Status: DISCONTINUED | OUTPATIENT
Start: 2017-11-08 | End: 2017-11-09 | Stop reason: HOSPADM

## 2017-11-07 RX ORDER — ONDANSETRON 2 MG/ML
4 INJECTION INTRAMUSCULAR; INTRAVENOUS EVERY 4 HOURS PRN
Status: DISCONTINUED | OUTPATIENT
Start: 2017-11-07 | End: 2017-11-09 | Stop reason: HOSPADM

## 2017-11-07 RX ORDER — CEFAZOLIN SODIUM IN 0.9 % NACL 3 G/100 ML
3 INTRAVENOUS SOLUTION, PIGGYBACK (ML) INTRAVENOUS ONCE
Status: DISCONTINUED | OUTPATIENT
Start: 2017-11-07 | End: 2017-11-07 | Stop reason: HOSPADM

## 2017-11-07 RX ORDER — SCOLOPAMINE TRANSDERMAL SYSTEM 1 MG/1
1 PATCH, EXTENDED RELEASE TRANSDERMAL
Status: DISCONTINUED | OUTPATIENT
Start: 2017-11-07 | End: 2017-11-07 | Stop reason: HOSPADM

## 2017-11-07 RX ORDER — LIDOCAINE AND PRILOCAINE 25; 25 MG/G; MG/G
CREAM TOPICAL ONCE
Status: DISCONTINUED | OUTPATIENT
Start: 2017-11-07 | End: 2017-11-07 | Stop reason: HOSPADM

## 2017-11-07 RX ORDER — EPHEDRINE SULFATE 50 MG/ML
5 INJECTION, SOLUTION INTRAVENOUS ONCE AS NEEDED
Status: DISCONTINUED | OUTPATIENT
Start: 2017-11-07 | End: 2017-11-07 | Stop reason: HOSPADM

## 2017-11-07 RX ORDER — LIDOCAINE HYDROCHLORIDE 20 MG/ML
INJECTION, SOLUTION INFILTRATION; PERINEURAL AS NEEDED
Status: DISCONTINUED | OUTPATIENT
Start: 2017-11-07 | End: 2017-11-07 | Stop reason: SURG

## 2017-11-07 RX ADMIN — EPHEDRINE SULFATE 10 MG: 50 INJECTION INTRAMUSCULAR; INTRAVENOUS; SUBCUTANEOUS at 12:09

## 2017-11-07 RX ADMIN — ROCURONIUM BROMIDE 5 MG: 10 INJECTION INTRAVENOUS at 11:44

## 2017-11-07 RX ADMIN — GABAPENTIN 600 MG: 300 CAPSULE ORAL at 21:14

## 2017-11-07 RX ADMIN — LIDOCAINE HYDROCHLORIDE 40 MG: 20 INJECTION, SOLUTION INFILTRATION; PERINEURAL at 11:44

## 2017-11-07 RX ADMIN — CEFAZOLIN SODIUM 2 G: 2 INJECTION, SOLUTION INTRAVENOUS at 18:37

## 2017-11-07 RX ADMIN — PROPRANOLOL HYDROCHLORIDE 10 MG: 10 TABLET ORAL at 15:13

## 2017-11-07 RX ADMIN — HYDROXYZINE PAMOATE 25 MG: 25 CAPSULE ORAL at 17:25

## 2017-11-07 RX ADMIN — MIDAZOLAM 1 MG: 1 INJECTION INTRAMUSCULAR; INTRAVENOUS at 11:23

## 2017-11-07 RX ADMIN — ONDANSETRON 4 MG: 2 INJECTION INTRAMUSCULAR; INTRAVENOUS at 12:30

## 2017-11-07 RX ADMIN — DIAZEPAM 5 MG: 5 TABLET ORAL at 10:25

## 2017-11-07 RX ADMIN — LIDOCAINE AND PRILOCAINE: 25; 25 CREAM TOPICAL at 10:25

## 2017-11-07 RX ADMIN — SUCCINYLCHOLINE CHLORIDE 100 MG: 20 INJECTION, SOLUTION INTRAMUSCULAR; INTRAVENOUS; PARENTERAL at 11:44

## 2017-11-07 RX ADMIN — PALONOSETRON HYDROCHLORIDE 0.08 MG: 0.25 INJECTION INTRAVENOUS at 16:27

## 2017-11-07 RX ADMIN — FENTANYL CITRATE 100 MCG: 50 INJECTION INTRAMUSCULAR; INTRAVENOUS at 12:01

## 2017-11-07 RX ADMIN — ZOLPIDEM TARTRATE 10 MG: 5 TABLET ORAL at 21:14

## 2017-11-07 RX ADMIN — ONDANSETRON 4 MG: 2 INJECTION INTRAMUSCULAR; INTRAVENOUS at 13:18

## 2017-11-07 RX ADMIN — PROPRANOLOL HYDROCHLORIDE 10 MG: 10 TABLET ORAL at 21:14

## 2017-11-07 RX ADMIN — CEFAZOLIN 2 G: 1 INJECTION, POWDER, FOR SOLUTION INTRAMUSCULAR; INTRAVENOUS at 11:46

## 2017-11-07 RX ADMIN — CELECOXIB 200 MG: 200 CAPSULE ORAL at 10:25

## 2017-11-07 RX ADMIN — FENTANYL CITRATE 50 MCG: 50 INJECTION INTRAMUSCULAR; INTRAVENOUS at 12:13

## 2017-11-07 RX ADMIN — ACETAMINOPHEN 650 MG: 325 TABLET ORAL at 10:25

## 2017-11-07 RX ADMIN — TECHNETIUM TC 99M SULFUR COLLOID 1 DOSE: KIT at 11:10

## 2017-11-07 RX ADMIN — DEXAMETHASONE SODIUM PHOSPHATE 8 MG: 10 INJECTION INTRAMUSCULAR; INTRAVENOUS at 12:07

## 2017-11-07 RX ADMIN — GABAPENTIN 600 MG: 300 CAPSULE ORAL at 16:37

## 2017-11-07 RX ADMIN — SODIUM CHLORIDE, POTASSIUM CHLORIDE, SODIUM LACTATE AND CALCIUM CHLORIDE 9 ML/HR: 600; 310; 30; 20 INJECTION, SOLUTION INTRAVENOUS at 11:23

## 2017-11-07 RX ADMIN — FAMOTIDINE 20 MG: 10 INJECTION INTRAVENOUS at 11:22

## 2017-11-07 RX ADMIN — SODIUM CHLORIDE, POTASSIUM CHLORIDE, SODIUM LACTATE AND CALCIUM CHLORIDE 9 ML/HR: 600; 310; 30; 20 INJECTION, SOLUTION INTRAVENOUS at 21:15

## 2017-11-07 RX ADMIN — PROPOFOL 150 MG: 10 INJECTION, EMULSION INTRAVENOUS at 11:44

## 2017-11-07 RX ADMIN — PROMETHAZINE HYDROCHLORIDE 25 MG: 25 TABLET ORAL at 13:30

## 2017-11-07 RX ADMIN — FENTANYL CITRATE 50 MCG: 50 INJECTION INTRAMUSCULAR; INTRAVENOUS at 11:44

## 2017-11-07 RX ADMIN — HYDROCHLOROTHIAZIDE 25 MG: 25 TABLET ORAL at 15:13

## 2017-11-07 RX ADMIN — SCOPALAMINE 1 PATCH: 1 PATCH, EXTENDED RELEASE TRANSDERMAL at 11:28

## 2017-11-07 NOTE — OP NOTE
PREOPERATIVE DIAGNOSIS:  Locally advanced right breast cancer status post neoadjuvant on chemotherapy    POSTOPERATIVE DIAGNOSIS (FINDINGS):  Same    PROCEDURE:  1.  Right breast mastectomy  2.  Right axillary sentinel lymph node biopsy  3.  Right axillary sentinel lymph node mapping    SURGEON:  Christian Melo MD    ASSISTANT:  Beatriz Law    ANESTHESIA:  General    EBL:  Minimal    SPECIMEN(S):  -Right breast  -[place] lymph nodes    DESCRIPTION:  In supine position under general anesthetic prepped and draped usual sterile manner.  Half percent Marcaine with epinephrine infiltrated locally.  4 cc of Lymphazurin injected subareolar.  Previously injected with technetium sulfur colloid in the department of radiology.  Although there was good activity in the periareolar distribution there were no counts in the axillary distribution.  Elliptical incision was made including the nipple areola complex.  Superior and inferior flaps were raised with electrocautery to the clavicle and rectus insertion, medially to the sternum, laterally to the latissimus dorsi.  Breast was removed from the underlying pectoralis muscle with electrocautery.  There were 2 blue lymphatics from the breast to the axilla which were followed to a cluster of sentinel lymph nodes which were removed taking care to clip all possible afferent and efferent lymphatics.  Lymph nodes were sent for frozen section and preliminarily negative.  Good hemostasis was ensured after irrigating with water.  219 Danish Marcos drains were placed.  Skin was closed with 3-0 Vicryl deep dermal and 5-0 Vicryl subcuticular.  Sterile dressing applied.  Tolerated well, stable to recovery area.    Christian Melo M.D.

## 2017-11-07 NOTE — PLAN OF CARE
Problem: Patient Care Overview (Adult)  Goal: Plan of Care Review  Outcome: Ongoing (interventions implemented as appropriate)    11/07/17 1024   Coping/Psychosocial Response Interventions   Plan Of Care Reviewed With patient   Patient Care Overview   Progress no change       Goal: Adult Individualization and Mutuality  Outcome: Ongoing (interventions implemented as appropriate)    11/07/17 1024   Individualization   Patient Specific Preferences Roxana       Goal: Discharge Needs Assessment  Outcome: Ongoing (interventions implemented as appropriate)    Problem: Perioperative Period (Adult)  Goal: Signs and Symptoms of Listed Potential Problems Will be Absent or Manageable (Perioperative Period)  Outcome: Ongoing (interventions implemented as appropriate)    11/07/17 1024   Perioperative Period   Problems Assessed (Perioperative Period) all   Problems Present (Perioperative Period) none

## 2017-11-07 NOTE — ANESTHESIA PROCEDURE NOTES
Airway  Urgency: elective    Date/Time: 11/7/2017 11:45 AM  Airway not difficult    General Information and Staff    Patient location during procedure: OR  Anesthesiologist: ROBINA HICKMAN  CRNA: LAMBERT AQUINO    Indications and Patient Condition  Indications for airway management: airway protection    Preoxygenated: yes  Mask difficulty assessment: 1 - vent by mask    Final Airway Details  Final airway type: endotracheal airway      Successful airway: ETT  Cuffed: yes   Successful intubation technique: direct laryngoscopy  Facilitating devices/methods: intubating stylet and anterior pressure/BURP  Endotracheal tube insertion site: oral  Blade: Villavicencio  Blade size: #2  ETT size: 7.0 mm  Cormack-Lehane Classification: grade IIa - partial view of glottis  Placement verified by: chest auscultation and capnometry   Cuff volume (mL): 6  Measured from: teeth  ETT to teeth (cm): 21  Number of attempts at approach: 1

## 2017-11-07 NOTE — H&P
SUMMARY (A/P):    76-year-old lady who has completed neoadjuvant on treatment for locally advanced right breast cancer with complete clinical response by MRI findings.  She understands the rationale for mastectomy and has scheduled accordingly.  I will discuss with Dr. Patel whether there is any perceived benefit to node dissection as she was initially staged with node positive disease by radiographic criteria.  I will also discussed with him timing of surgery relative to her recently completed neoadjuvant on treatment.  I did discuss with her immediate versus delayed reconstruction and given the clinical scenario here I think delayed reconstruction would be a far wiser option in terms of minimizing morbility and allowing ongoing treatment based on final pathologic staging.  She understands and wishes to proceed accordingly.  She does understand that there is a slightly higher risk of complications with surgery based on her age and immunosuppression related to chemotherapy.     After her initial visit she was very reluctant to undergo lymph node dissection and has since seen Dr. Patel again and after presenting her at the breast cancer conference for consensus opinion is that she could safely undergo sentinel lymph node biopsy as well and this would be her preference.  Our plan therefore will be to do sentinel lymph node biopsy with frozen section.  She understands we may proceed with level I and 2 node dissection if the lymph node is positive.      CC:  Breast cancer      HPI:  76-year-old lady who was diagnosed with large right breast invasive carcinoma grade 2 ER negative, less than 1% ND positive, HER-2 positive, in May of this year.  She was clinically staged with stage IIIa disease (T3, N2, M0).  The initial workup was prompted by findings of large right breast mass on self exam.  This was associated with skin retraction in the periareolar region.  No nipple discharge, no palpable adenopathy on self-exam.   Metastatic workup was only significant for a small right axillary lymph nodes suspicious for metastatic disease.  Neoadjuvant treatment was started in May.      PHYSICAL EXAM:   Constitutional: Well-developed well-nourished, no acute distress  Respiratory: Clear to auscultation, normal inspiratory effort  Cardiovascular: Regular rate, no murmur  Gastrointestinal: Soft  Breast: There is palpable induration throughout the right breast centrally but no discrete mass, no skin retraction, no nipple discharge.  Left chest wall with well-healed TRAM flap with no suspicious palpable or visible findings.  Lymphatics (palpable nodes):  cervical-negative, axillary-negative  Skin:  Warm, dry, no rash on visualized skin surfaces  Musculoskeletal: Symmetric strength, normal gait  Psychiatric: Alert and oriented ×3, normal affect       ALLERGIES: reviewed, in Epic      MEDICATIONS: reviewed, in Epic      PMH:    DCIS 2007  Hypertension  Hyperlipidemia  Meningioma  Gastroesophageal reflux disease      PSH:    Left mastectomy with TRAM reconstruction 2007  Left subclavian 8 Arabic PowerPort 5/22/2017  Appendectomy  Cholecystectomy  Hysterectomy  Right inguinal hernia      FAMILY HISTORY:    Daughter with breast cancer      SOCIAL HISTORY:   Quit tobacco use 30 years ago  Occasional alcohol use      ROS:  No chest pain or shortness of air.  There was no unanticipated weight loss, hemoptysis, nausea/vomiting.  All other systems reviewed and negative other than presenting complaints.      RADIOLOGY/ENDOSCOPY:    -Bilateral breast MRI 8/17/2017 interval resolution of all abnormal enhancement from the right breast.  Consistent with complete response to neoadjuvant chemotherapy.  No suspicious findings for malignancy in the left TRAM flap.  Reviewed images, concur  -Bilateral breast MRI 5/21/2017 marked diffuse abnormal enhancement throughout the right breast measuring up to 7 cm in greatest dimension.  Findings consistent with right  axillary adenopathy.  Reviewed and images, concur  -CT chest abdomen pelvis 5/18/2017 single enlarged right axillary lymph node and tiny node along the right internal mammary chain which are suspicious for alley metastasis.  No other convincing evidence of metastatic disease.       LABS:    -Right breast core biopsy 5/3/2017 invasive mammary carcinoma with focal lobular features, overall grade 2.  HER-2 positive, ER negative, NV basically negative  -CMP 8/22/2017 normal except glucose 117, potassium 3.1  -CBC 8/22/2017 WBC 5.46, hemoglobin 10.3, platelets 93      CHING FRANCOIS M.D.

## 2017-11-07 NOTE — ANESTHESIA POSTPROCEDURE EVALUATION
"Patient: Roxana Brizuela    Procedure Summary     Date Anesthesia Start Anesthesia Stop Room / Location    11/07/17 1141 1301  [facility] / [facility]       Procedure Diagnosis Surgeon Provider    RIGHT BREAST MASTECTOMY WITH SENTINEL NODE BIOPSY (Right Breast) Right breast cancer with T3 tumor, >5 cm in greatest dimension  (Right breast cancer with T3 tumor, >5 cm in greatest dimension [C50.911]) MD Gregory Muñoz MD          Anesthesia Type: general  Last vitals  BP   177/93 (11/07/17 1333)   Temp   36.7 °C (98 °F) (11/07/17 1257)   Pulse   66 (11/07/17 1333)   Resp   20 (11/07/17 1333)     SpO2   99 % (11/07/17 1333)     Post Anesthesia Care and Evaluation    Patient location during evaluation: bedside  Patient participation: complete - patient participated  Level of consciousness: awake  Pain score: 2  Pain management: adequate  Airway patency: patent  Anesthetic complications: No anesthetic complications  PONV Status: none  Cardiovascular status: acceptable  Respiratory status: acceptable  Hydration status: acceptable    Comments: /93  Pulse 66  Temp 36.7 °C (98 °F) (Oral)   Resp 20  Ht 62\" (157.5 cm)  Wt 147 lb 4 oz (66.8 kg)  SpO2 99%  BMI 26.93 kg/m2        "

## 2017-11-07 NOTE — ANESTHESIA PREPROCEDURE EVALUATION
Anesthesia Evaluation     Patient summary reviewed and Nursing notes reviewed   history of anesthetic complications: PONV  NPO Solid Status: > 8 hours  NPO Liquid Status: > 2 hours     Airway   Mallampati: II  TM distance: >3 FB  Neck ROM: full  Dental - normal exam     Pulmonary - normal exam   (+) shortness of breath,   Cardiovascular - normal exam    (+) hypertension, hyperlipidemia      Neuro/Psych  (+) dizziness/light headedness, numbness, psychiatric history,    GI/Hepatic/Renal/Endo    (+)  GERD, PUD,     Musculoskeletal     Abdominal    Substance History      OB/GYN          Other   (+) arthritis   history of cancer                                  Anesthesia Plan    ASA 3     general     Anesthetic plan and risks discussed with patient.

## 2017-11-08 LAB
CYTO UR: NORMAL
LAB AP CASE REPORT: NORMAL
LAB AP CLINICAL INFORMATION: NORMAL
LAB AP INTRADEPARTMENTAL CONSULT: NORMAL
LAB AP SYNOPTIC CHECKLIST: NORMAL
Lab: NORMAL
Lab: NORMAL
PATH REPORT.FINAL DX SPEC: NORMAL
PATH REPORT.GROSS SPEC: NORMAL

## 2017-11-08 PROCEDURE — G0378 HOSPITAL OBSERVATION PER HR: HCPCS

## 2017-11-08 PROCEDURE — 25010000002 ENOXAPARIN PER 10 MG: Performed by: SURGERY

## 2017-11-08 PROCEDURE — 25010000003 CEFAZOLIN IN DEXTROSE 2-4 GM/100ML-% SOLUTION: Performed by: SURGERY

## 2017-11-08 PROCEDURE — 25010000002 ONDANSETRON PER 1 MG: Performed by: SURGERY

## 2017-11-08 RX ADMIN — PROPRANOLOL HYDROCHLORIDE 10 MG: 10 TABLET ORAL at 09:12

## 2017-11-08 RX ADMIN — PROPRANOLOL HYDROCHLORIDE 10 MG: 10 TABLET ORAL at 21:04

## 2017-11-08 RX ADMIN — LORAZEPAM 0.5 MG: 0.5 TABLET ORAL at 17:02

## 2017-11-08 RX ADMIN — ZOLPIDEM TARTRATE 10 MG: 5 TABLET ORAL at 21:05

## 2017-11-08 RX ADMIN — LORAZEPAM 0.5 MG: 0.5 TABLET ORAL at 09:26

## 2017-11-08 RX ADMIN — CEFAZOLIN SODIUM 2 G: 2 INJECTION, SOLUTION INTRAVENOUS at 03:23

## 2017-11-08 RX ADMIN — PANTOPRAZOLE SODIUM 40 MG: 40 TABLET, DELAYED RELEASE ORAL at 06:17

## 2017-11-08 RX ADMIN — GABAPENTIN 600 MG: 300 CAPSULE ORAL at 15:25

## 2017-11-08 RX ADMIN — ENOXAPARIN SODIUM 30 MG: 30 INJECTION SUBCUTANEOUS at 09:16

## 2017-11-08 RX ADMIN — PROPRANOLOL HYDROCHLORIDE 10 MG: 10 TABLET ORAL at 15:26

## 2017-11-08 RX ADMIN — HYDROCHLOROTHIAZIDE 25 MG: 25 TABLET ORAL at 09:12

## 2017-11-08 RX ADMIN — ONDANSETRON 4 MG: 2 INJECTION INTRAMUSCULAR; INTRAVENOUS at 09:21

## 2017-11-08 RX ADMIN — HYDROXYZINE PAMOATE 25 MG: 25 CAPSULE ORAL at 09:12

## 2017-11-08 RX ADMIN — GABAPENTIN 600 MG: 300 CAPSULE ORAL at 21:04

## 2017-11-08 RX ADMIN — GABAPENTIN 600 MG: 300 CAPSULE ORAL at 09:16

## 2017-11-08 RX ADMIN — HYDROXYZINE PAMOATE 25 MG: 25 CAPSULE ORAL at 17:01

## 2017-11-08 NOTE — PLAN OF CARE
Problem: Patient Care Overview (Adult)  Goal: Plan of Care Review  Outcome: Ongoing (interventions implemented as appropriate)    11/08/17 0327   Coping/Psychosocial Response Interventions   Plan Of Care Reviewed With patient   Patient Care Overview   Progress improving   Outcome Evaluation   Outcome Summary/Follow up Plan vss, ivf infusing, antbx therapy maintained and tolerated, celestine x2 with small serosang output, dressing cdi, voiding freely, no c/o pain       Goal: Adult Individualization and Mutuality  Outcome: Ongoing (interventions implemented as appropriate)  Goal: Discharge Needs Assessment  Outcome: Ongoing (interventions implemented as appropriate)    Problem: Perioperative Period (Adult)  Goal: Signs and Symptoms of Listed Potential Problems Will be Absent or Manageable (Perioperative Period)  Outcome: Ongoing (interventions implemented as appropriate)

## 2017-11-08 NOTE — PLAN OF CARE
Problem: Patient Care Overview (Adult)  Goal: Plan of Care Review  Outcome: Ongoing (interventions implemented as appropriate)    11/08/17 1720   Outcome Evaluation   Outcome Summary/Follow up Plan VSS, no acute distress noted, celestine x2 intact, no drainage on dressing, voding freely, face flushed ,        Goal: Adult Individualization and Mutuality  Outcome: Ongoing (interventions implemented as appropriate)  Goal: Discharge Needs Assessment  Outcome: Ongoing (interventions implemented as appropriate)    Problem: Perioperative Period (Adult)  Goal: Signs and Symptoms of Listed Potential Problems Will be Absent or Manageable (Perioperative Period)  Outcome: Ongoing (interventions implemented as appropriate)

## 2017-11-08 NOTE — PROGRESS NOTES
Discharge Planning Assessment  [facility]     Patient Name: Roxana Brizuela  MRN: [id number]  Today's Date: 11/8/2017    Admit Date: 11/7/2017          Discharge Needs Assessment       11/08/17 0815    Living Environment    Lives With alone    Living Arrangements house    Home Accessibility no concerns    Transportation Available car;family or friend will provide    Living Environment    Provides Primary Care For no one    Quality Of Family Relationships supportive    Able to Return to Prior Living Arrangements yes    Discharge Needs Assessment    Concerns To Be Addressed no discharge needs identified;denies needs/concerns at this time    Readmission Within The Last 30 Days no previous admission in last 30 days    Anticipated Changes Related to Illness none    Equipment Currently Used at Home none    Equipment Needed After Discharge none            Discharge Plan       11/08/17 0813    Case Management/Social Work Plan    Plan Home w/o needs    Patient/Family In Agreement With Plan yes    Additional Comments Yesterday I spoke with pt's two daughters Irene and Lillian, today I spoke with pt, she confirmed the address, PCP and pharmacy are correct. Pt said she lives alone but Irene will be staying with her for a week. Pt said seh is IADL, uses no DME can afford her Rx, has never had home health or been to SNF and plans to return home w/o needs at discharge.         Discharge Placement     No information found                Demographic Summary       11/08/17 0815    Referral Information    Admission Type observation    Arrived From home or self-care    Referral Source admission list    Record Reviewed medical record    Contact Information    Permission Granted to Share Information With family/designee            Functional Status       11/08/17 0815    Functional Status Current    Ambulation 2-->assistive person    Transferring 2-->assistive person    Toileting 2-->assistive person    Bathing 2-->assistive  person    Dressing 2-->assistive person    Eating 0-->independent    Communication 0-->understands/communicates without difficulty    Swallowing (if score 2 or more for any item, consult Rehab Services) 0-->swallows foods/liquids without difficulty    Change in Functional Status Since Onset of Current Illness/Injury yes    Functional Status Prior    Ambulation 0-->independent    Transferring 0-->independent    Toileting 0-->independent    Bathing 0-->independent    Dressing 0-->independent    Eating 0-->independent    Communication 0-->understands/communicates without difficulty    Swallowing 0-->swallows foods/liquids without difficulty            Patient Forms       11/08/17 0816    Patient Forms    Provider Choice List Delivered    Delivered to Patient    Method of delivery In person    Patient Observation Letter Delivered   [place] Observation Letter signed by pt and copy of letter given to pt    Delivered to Patient    Method of delivery In person          Gracie Miller RN

## 2017-11-08 NOTE — PROGRESS NOTES
Postoperative day 1 right mastectomy with sentinel lymph node biopsy    Incision is clean  There is some bleeding around the CHRISTINA sites last night but this has stopped  JPs are serosanguineous    -Ambulate, oral pain medication, possibly home tomorrow

## 2017-11-09 VITALS
RESPIRATION RATE: 16 BRPM | OXYGEN SATURATION: 95 % | DIASTOLIC BLOOD PRESSURE: 56 MMHG | HEART RATE: 62 BPM | SYSTOLIC BLOOD PRESSURE: 115 MMHG | WEIGHT: 147.25 LBS | TEMPERATURE: 97.2 F | HEIGHT: 62 IN | BODY MASS INDEX: 27.1 KG/M2

## 2017-11-09 PROCEDURE — 25010000002 ENOXAPARIN PER 10 MG: Performed by: SURGERY

## 2017-11-09 PROCEDURE — G0378 HOSPITAL OBSERVATION PER HR: HCPCS

## 2017-11-09 PROCEDURE — 25010000002 PALONOSETRON PER 25 MCG: Performed by: SURGERY

## 2017-11-09 RX ORDER — CALCIUM CARBONATE 200(500)MG
2 TABLET,CHEWABLE ORAL 3 TIMES DAILY PRN
Status: DISCONTINUED | OUTPATIENT
Start: 2017-11-09 | End: 2017-11-09 | Stop reason: HOSPADM

## 2017-11-09 RX ORDER — PALONOSETRON 0.05 MG/ML
0.07 INJECTION, SOLUTION INTRAVENOUS ONCE
Status: COMPLETED | OUTPATIENT
Start: 2017-11-09 | End: 2017-11-09

## 2017-11-09 RX ADMIN — GABAPENTIN 600 MG: 300 CAPSULE ORAL at 08:12

## 2017-11-09 RX ADMIN — HYDROXYZINE PAMOATE 25 MG: 25 CAPSULE ORAL at 08:12

## 2017-11-09 RX ADMIN — HYDROCHLOROTHIAZIDE 25 MG: 25 TABLET ORAL at 08:12

## 2017-11-09 RX ADMIN — PROPRANOLOL HYDROCHLORIDE 10 MG: 10 TABLET ORAL at 08:12

## 2017-11-09 RX ADMIN — ENOXAPARIN SODIUM 30 MG: 30 INJECTION SUBCUTANEOUS at 08:12

## 2017-11-09 RX ADMIN — PALONOSETRON HYDROCHLORIDE 0.08 MG: 0.25 INJECTION INTRAVENOUS at 09:53

## 2017-11-09 RX ADMIN — PANTOPRAZOLE SODIUM 40 MG: 40 TABLET, DELAYED RELEASE ORAL at 06:19

## 2017-11-09 NOTE — PROGRESS NOTES
Continued Stay Note  [facility]     Patient Name: Roxana Brizuela  MRN: [id number]  Today's Date: 11/9/2017    Admit Date: 11/7/2017          Discharge Plan     None              Discharge Codes       11/09/17 0755    Discharge Codes    Discharge Codes 01  Discharge to home        Expected Discharge Date and Time     Expected Discharge Date Expected Discharge Time    Nov 9, 2017             Gracie Miller RN

## 2017-11-09 NOTE — DISCHARGE SUMMARY
DATE OF ADMIT:  11/7/2017    DATE OF DISCHARGE:  11/9/2017    DIAGNOSIS:  Breast cancer    FINAL PATHOLOGY:  No residual cancer in the breast and 3 sentinel lymph nodes negative    PROCEDURES:  Right breast mastectomy and sentinel lymph node biopsy 11/7/2017    SUMMARY OF HOSPITAL COURSE:   Uneventful postop course. Tolerating diet, incisions in good order and afebrile with stable vital signs at discharge.  CHRISTINA drains serosanguineous.  Prescribed pain medicine and follow up appointment made in 1 week.    Christian Melo M.D.

## 2017-11-09 NOTE — PLAN OF CARE
Problem: Patient Care Overview (Adult)  Goal: Plan of Care Review  Outcome: Ongoing (interventions implemented as appropriate)    11/09/17 0521   Coping/Psychosocial Response Interventions   Plan Of Care Reviewed With patient   Patient Care Overview   Progress improving   Outcome Evaluation   Outcome Summary/Follow up Plan Vital sign stable. incision dry & intact. trolerating regular diet. prn analgesic given. Encouraged to ambulate       Goal: Adult Individualization and Mutuality  Outcome: Ongoing (interventions implemented as appropriate)  Goal: Discharge Needs Assessment  Outcome: Ongoing (interventions implemented as appropriate)    Problem: Perioperative Period (Adult)  Goal: Signs and Symptoms of Listed Potential Problems Will be Absent or Manageable (Perioperative Period)  Outcome: Ongoing (interventions implemented as appropriate)

## 2017-11-16 ENCOUNTER — OFFICE VISIT (OUTPATIENT)
Dept: SURGERY | Facility: CLINIC | Age: 77
End: 2017-11-16

## 2017-11-16 DIAGNOSIS — Z09 FOLLOW UP: Primary | ICD-10-CM

## 2017-11-16 PROCEDURE — 99024 POSTOP FOLLOW-UP VISIT: CPT | Performed by: SURGERY

## 2017-11-16 NOTE — PROGRESS NOTES
Postoperative visit    Right mastectomy and sentinel lymph node biopsy 11/9/2017  Pathology: No residual cancer in the breast and 3 negative sentinel lymph nodes    Office visit: Incision has healed well.  One of the 2 drains is putting out fairly small amount of fluid and was removed.  We'll get the other drain out probably in another week.

## 2017-11-18 DIAGNOSIS — E87.6 HYPOKALEMIA: ICD-10-CM

## 2017-11-20 ENCOUNTER — TELEPHONE (OUTPATIENT)
Dept: ONCOLOGY | Facility: HOSPITAL | Age: 77
End: 2017-11-20

## 2017-11-20 DIAGNOSIS — C50.911 MALIGNANT NEOPLASM OF RIGHT FEMALE BREAST, UNSPECIFIED ESTROGEN RECEPTOR STATUS, UNSPECIFIED SITE OF BREAST (HCC): Primary | ICD-10-CM

## 2017-11-20 RX ORDER — SIMVASTATIN 80 MG
TABLET ORAL
Qty: 90 TABLET | Refills: 1 | Status: SHIPPED | OUTPATIENT
Start: 2017-11-20 | End: 2018-07-19 | Stop reason: SDUPTHER

## 2017-11-20 RX ORDER — HYDROXYZINE PAMOATE 25 MG/1
CAPSULE ORAL
Qty: 60 CAPSULE | Refills: 0 | Status: SHIPPED | OUTPATIENT
Start: 2017-11-20 | End: 2017-12-11 | Stop reason: SDUPTHER

## 2017-11-20 RX ORDER — POTASSIUM CHLORIDE 1500 MG/1
TABLET, EXTENDED RELEASE ORAL
Qty: 30 TABLET | Refills: 0 | Status: SHIPPED | OUTPATIENT
Start: 2017-11-20 | End: 2018-09-26

## 2017-11-20 NOTE — TELEPHONE ENCOUNTER
Pt left pt advise request asking when she needed to see Dr. Coburn and start her herceptin. It appears her surgery was November 7th. Reviewed with Melyssa Romero NP. Per Melyssa, we need to set pt up for MD visit with Dr. Coburn and Herceptin treatment, his first available. Message sent to pt. Message sent to scheduling.

## 2017-11-21 RX ORDER — LORAZEPAM 0.5 MG/1
TABLET ORAL
Qty: 90 TABLET | Refills: 0 | OUTPATIENT
Start: 2017-11-21 | End: 2017-12-18 | Stop reason: SDUPTHER

## 2017-11-22 ENCOUNTER — TELEPHONE (OUTPATIENT)
Dept: SURGERY | Facility: CLINIC | Age: 77
End: 2017-11-22

## 2017-11-22 DIAGNOSIS — M79.89 LEFT LEG SWELLING: Primary | ICD-10-CM

## 2017-11-22 NOTE — TELEPHONE ENCOUNTER
Pt called with drain totals for the last 3 days:    11/19   40 cc  11/20   40 cc  11/21   60 cc     The bulb on the drain leaked this morning so pt was unable to measure output today. Pt also states the output is still dark in color. I advised that Dr. Melo is out today but that her totals still seem too high to pull the drain. Advised pt to call back on Monday 11/27 with totals.

## 2017-11-28 ENCOUNTER — OFFICE VISIT (OUTPATIENT)
Dept: ONCOLOGY | Facility: CLINIC | Age: 77
End: 2017-11-28

## 2017-11-28 ENCOUNTER — INFUSION (OUTPATIENT)
Dept: ONCOLOGY | Facility: HOSPITAL | Age: 77
End: 2017-11-28

## 2017-11-28 ENCOUNTER — APPOINTMENT (OUTPATIENT)
Dept: ONCOLOGY | Facility: HOSPITAL | Age: 77
End: 2017-11-28

## 2017-11-28 VITALS
SYSTOLIC BLOOD PRESSURE: 149 MMHG | HEART RATE: 97 BPM | BODY MASS INDEX: 26.52 KG/M2 | DIASTOLIC BLOOD PRESSURE: 68 MMHG | WEIGHT: 145 LBS | TEMPERATURE: 98 F

## 2017-11-28 DIAGNOSIS — C50.511 MALIGNANT NEOPLASM OF LOWER-OUTER QUADRANT OF RIGHT FEMALE BREAST, UNSPECIFIED ESTROGEN RECEPTOR STATUS (HCC): Primary | ICD-10-CM

## 2017-11-28 DIAGNOSIS — D64.81 ANEMIA DUE TO ANTINEOPLASTIC CHEMOTHERAPY: ICD-10-CM

## 2017-11-28 DIAGNOSIS — C50.911 RIGHT BREAST CANCER WITH T3 TUMOR, >5 CM IN GREATEST DIMENSION (HCC): Primary | ICD-10-CM

## 2017-11-28 DIAGNOSIS — T45.1X5A ANEMIA DUE TO ANTINEOPLASTIC CHEMOTHERAPY: ICD-10-CM

## 2017-11-28 DIAGNOSIS — D69.6 THROMBOCYTOPENIA (HCC): ICD-10-CM

## 2017-11-28 DIAGNOSIS — T45.1X5A PERIPHERAL NEUROPATHY DUE TO CHEMOTHERAPY (HCC): ICD-10-CM

## 2017-11-28 DIAGNOSIS — C50.911 MALIGNANT NEOPLASM OF RIGHT FEMALE BREAST, UNSPECIFIED ESTROGEN RECEPTOR STATUS, UNSPECIFIED SITE OF BREAST (HCC): ICD-10-CM

## 2017-11-28 DIAGNOSIS — G62.0 PERIPHERAL NEUROPATHY DUE TO CHEMOTHERAPY (HCC): ICD-10-CM

## 2017-11-28 LAB
BASOPHILS # BLD AUTO: 0.02 10*3/MM3 (ref 0–0.2)
BASOPHILS NFR BLD AUTO: 0.2 % (ref 0–1.5)
DEPRECATED RDW RBC AUTO: 45.9 FL (ref 37–54)
EOSINOPHIL # BLD AUTO: 0.03 10*3/MM3 (ref 0–0.7)
EOSINOPHIL NFR BLD AUTO: 0.3 % (ref 0.3–6.2)
ERYTHROCYTE [DISTWIDTH] IN BLOOD BY AUTOMATED COUNT: 13.2 % (ref 11.7–13)
HCT VFR BLD AUTO: 30.8 % (ref 35.6–45.5)
HGB BLD-MCNC: 10.1 G/DL (ref 11.9–15.5)
IMM GRANULOCYTES # BLD: 0.23 10*3/MM3 (ref 0–0.03)
IMM GRANULOCYTES NFR BLD: 2.4 % (ref 0–0.5)
LYMPHOCYTES # BLD AUTO: 1.76 10*3/MM3 (ref 0.9–4.8)
LYMPHOCYTES NFR BLD AUTO: 18.6 % (ref 19.6–45.3)
MCH RBC QN AUTO: 31.2 PG (ref 26.9–32)
MCHC RBC AUTO-ENTMCNC: 32.8 G/DL (ref 32.4–36.3)
MCV RBC AUTO: 95.1 FL (ref 80.5–98.2)
MONOCYTES # BLD AUTO: 2.73 10*3/MM3 (ref 0.2–1.2)
MONOCYTES NFR BLD AUTO: 28.9 % (ref 5–12)
NEUTROPHILS # BLD AUTO: 4.69 10*3/MM3 (ref 1.9–8.1)
NEUTROPHILS NFR BLD AUTO: 49.6 % (ref 42.7–76)
NRBC BLD MANUAL-RTO: 0 /100 WBC (ref 0–0)
PLAT MORPH BLD: NORMAL
PLATELET # BLD AUTO: 119 10*3/MM3 (ref 140–500)
PMV BLD AUTO: 10.1 FL (ref 6–12)
RBC # BLD AUTO: 3.24 10*6/MM3 (ref 3.9–5.2)
RBC MORPH BLD: NORMAL
WBC MORPH BLD: NORMAL
WBC NRBC COR # BLD: 9.46 10*3/MM3 (ref 4.5–10.7)

## 2017-11-28 PROCEDURE — 25010000002 DEXAMETHASONE SODIUM PHOSPHATE 10 MG/ML SOLUTION 1 ML VIAL: Performed by: INTERNAL MEDICINE

## 2017-11-28 PROCEDURE — 85025 COMPLETE CBC W/AUTO DIFF WBC: CPT | Performed by: INTERNAL MEDICINE

## 2017-11-28 PROCEDURE — 96375 TX/PRO/DX INJ NEW DRUG ADDON: CPT | Performed by: INTERNAL MEDICINE

## 2017-11-28 PROCEDURE — 25010000002 TRASTUZUMAB PER 10 MG: Performed by: INTERNAL MEDICINE

## 2017-11-28 PROCEDURE — 99214 OFFICE O/P EST MOD 30 MIN: CPT | Performed by: INTERNAL MEDICINE

## 2017-11-28 PROCEDURE — 96413 CHEMO IV INFUSION 1 HR: CPT | Performed by: INTERNAL MEDICINE

## 2017-11-28 PROCEDURE — 85007 BL SMEAR W/DIFF WBC COUNT: CPT | Performed by: INTERNAL MEDICINE

## 2017-11-28 RX ORDER — SODIUM CHLORIDE 9 MG/ML
250 INJECTION, SOLUTION INTRAVENOUS ONCE
Status: COMPLETED | OUTPATIENT
Start: 2017-11-28 | End: 2017-11-28

## 2017-11-28 RX ORDER — HYDROXYZINE PAMOATE 25 MG/1
50 CAPSULE ORAL ONCE
Status: COMPLETED | OUTPATIENT
Start: 2017-11-28 | End: 2017-11-28

## 2017-11-28 RX ORDER — FAMOTIDINE 10 MG/ML
20 INJECTION, SOLUTION INTRAVENOUS ONCE
Status: COMPLETED | OUTPATIENT
Start: 2017-11-28 | End: 2017-11-28

## 2017-11-28 RX ADMIN — FAMOTIDINE 20 MG: 10 INJECTION, SOLUTION INTRAVENOUS at 16:10

## 2017-11-28 RX ADMIN — TRASTUZUMAB 380 MG: 150 INJECTION, POWDER, LYOPHILIZED, FOR SOLUTION INTRAVENOUS at 16:38

## 2017-11-28 RX ADMIN — DEXAMETHASONE SODIUM PHOSPHATE 12 MG: 10 INJECTION, SOLUTION INTRAMUSCULAR; INTRAVENOUS at 16:05

## 2017-11-28 RX ADMIN — HYDROXYZINE PAMOATE 50 MG: 25 CAPSULE ORAL at 16:10

## 2017-11-28 RX ADMIN — SODIUM CHLORIDE 250 ML: 900 INJECTION, SOLUTION INTRAVENOUS at 16:00

## 2017-11-28 NOTE — PROGRESS NOTES
REASON FOR FOLLOWUP:   1. Newly diagnosed large right breast cancer.  Core needle biopsy reported invasive mammary carcinoma with focal lobular feature, grade 2.  ER negative, less than 1%; IN positive, moderate in staining, 5% to 10%. HER2 IHC 2+, FISH study positive 2.1.   2.  CT scan for chest abdomen and pelvis and breast MRI examination reported right axillary lymph node suspicious for metastatic disease.  No remote metastatic lesion.  Patient has stage IIIa (T3 N2 M0) disease.   3.  Patient was started neoadjuvant chemotherapy on 5/23/2017 with Taxol weekly plus Herceptin weekly for total 12 weeks, and Perjata every 3 weeks during chemotherapy.  Herceptin will be converted to every 3 weeks after chemotherapy finished.    4.  Chronic moderate thrombocytopenia, mild anemia, and intermittent mild neutropenia etiologies are not clear.  5.  Reaction to Herceptin C1D1.  Subsequently tolerated therapy with hydrocortisone as premedication.  6.  Potential cardiac strain developing, neuropathic symptoms persist, follow-up MRI and surgical assessment will be needed post initial chemotherapeutic phase  7.  MRI August 17 with interval resolution of abnormal enhancement within breast and right axillary adenopathy, surgery scheduled November 7, Herceptin ongoing every 3 weeks  8.  Patient seen in office November 28, status post surgery with apparent complete response, Herceptin continued     HISTORY OF PRESENT ILLNESS:    The patient is a pleasant 76 y.o. with the above-mentioned history, who presents today in anticipation of cycle 4 of Herceptin, Perjeta and Taxol.  This is the first visit with this physician involving this patient's case.  We have reviewed her status today with her slowly developing neuropathic symptoms in her lower extremities but also involving her fingers recognized significantly and she plays the piano and keyboard.  This is becoming harder to do but she is still able to do so.  She also notices  neuropathy in her feet but is able to walk and function in daily activities.  Additional to this is her continued grieving at the death of her  though she, fortunately, has an excellent support system.  She continues to experience nausea that is well relieved with Compazine. She denies oral mucositis.  No diarrhea no constipation no chest pain no dyspnea.  No lower extremity edema.    She did received 2 units of PRBC's on   7/8/2017 and remains hematologically stable.   She does have difficulty with sleep and Ambien 10 mg daily at bedtime seems help much better compared to 5 mg dose.  She does not need refills today.  In reviewing her case July 26 she is entering the fourth cycle of her treatment and recent echocardiogram is reviewed with she and her close friend revealing EF of 66.7% though with global strain of -16%?  Suspicious for myopathic process.  Normal ventricular cavity size and wall thickness as well as contractility.  We have also discussed that she is responding to therapy and will need surgical assessment which may not as yet have been performed.  She has seen Dr. Melo for port placement and will ask him to review her likely just after she has completed his fourth cycle of therapy.  It is also apparent that she'll need a cardiac assessment as we continue subsequent Herceptin therapy perioperatively.      The patient underwent MRI of the breasts August 17 demonstrating interval resolution of abnormal enhancement within the right breast, resolution of right axillary adenopathy had also resolved.  The findings were consistent with a complete response to neoadjuvant chemotherapy.  The patient was seen in subsequent follow-up with Dr. Melo and was determined to proceed with surgery and ultimately sentinel node evaluation.  This is now scheduled November 7 and there are additional plans to consider radiation therapy postoperatively and we have also discussed the use of anti-hormonal therapy  considering her mildly positive MT status.    The patient was able to proceed to surgery undergoing right breast mastectomy and sentinel lymph node biopsy November 07, 2017.  She did well postoperatively seeing Dr. Melo November 16.  Pathology revealed no residual malignancy in the breast and 3 negative sentinel lymph nodes.  A formal review of her report reveals treatment effect particularly in her largest lymph node with focal fibrosis and scar, right breast mastectomy fibrosis associated with a cavitary biopsy site and no invasive or in situ carcinoma.  The patient is now seen in our office though she went to the wrong location and the seen late in the day.  We discussed her findings and agree that she would continue Herceptin at this point but be reviewed formally again in 3 weeks.  She is also be seen by radiation therapy for their input.    Past Medical History:   Diagnosis Date   • Acute bronchitis    • Alcoholism 1978    I haven't had a drink since then   • Anemia    • Breast cancer 05/03/2017    Right breast invasive mammary carcinoma with focal lobular features, grade 2, ER negative, less than 1% MT positive, HER-2 positive, stage IIIa disease (T3, N2, M0   • Breast cancer 10/20/2004    Left breast ductal carcinoma in-situ, predominately intermediate grade with focal comedonecrosis (high grade), solid and bribridform patterns involving approximately five core biopsy fragments   • Breast mass 2005 & 2017   • Chronic pain    • Colitis, acute 09/10/2011    Hx of Acute colitis. ER records   • Colon polyp    • Conjunctivitis    • Cough    • Edema     Chronic lower extremity edema   • GERD (gastroesophageal reflux disease) 09/13/2011    Dr. Paul Negron   • GI (gastrointestinal bleed) 1997   • H/O Bowel obstruction 2013   • H/O jaundice    • Hernia    • History of infectious mononucleosis 1960   • History of migraine headaches    • History of transfusion 1997   • Hx of colonic polyps 06/11/2009      [place]   • Hx of dehydration 09/13/2011    Related to Refractory nausea and vomiting   • Hx of hypercholesterolemia 09/13/2011    Dr. Paul Saba   • Hyperlipidemia    • Hypertension    • Impacted cerumen of left ear    • Leukocytopenia    • Leukopenia    • Meningioma    • Osteoarthritis 09/13/2011    Dr. Jamil Miller   • Peptic ulceration    • Perioral dermatitis    • SBO (small bowel obstruction)     due to hernia with surgical repair in 09/2011   • SOB (shortness of breath) on exertion    • Thrombocytopenia    • Vertigo    Normal echocardiogram on 5/18/2017, LVEF 68%.    Past Surgical History:   Procedure Laterality Date   • APPENDECTOMY N/A 1960   • BLADDER REPAIR N/A 1980   • BREAST BIOPSY Left 10/20/2004    Mammotome incisional biopsy left breast, surgical specimen, left breast, localization clip placement, left breast, confirmatory diagnostic unilateral mammogram, left breast-Dr. Tyrese Alcala, [facility]   • BREAST BIOPSY Right 05/03/2017    PATH: INVASIVE MAMMARY CARCINOMA   • CHOLECYSTECTOMY N/A 1990   • COLONOSCOPY N/A 06/11/2009    Hemorrhoids, otherwise normal, repeat in 5 years-Dr. Noemí Purcell   • EYE SURGERY  2017    lazer surgery for blood clot   • FEMORAL HERNIA REPAIR Right 09/13/2011    Repair of incarcerated femoral hernia-Dr. Alfred Mcdowell   • HYSTERECTOMY Bilateral 1980   • MASTECTOMY Left 2004    Left breast mastecotmy with sentinel lymph node biospy-[facility]   • MASTECTOMY W/ SENTINEL NODE BIOPSY Right 11/7/2017    Procedure: RIGHT BREAST MASTECTOMY WITH SENTINEL NODE BIOPSY;  Surgeon: Christian Melo MD;  Location: [facility] OR;  Service:    • AZ INSJ TUNNELED CVC W/O SUBQ PORT/ AGE 5 YR/> Left 5/22/2017    Procedure: INSERTION VENOUS ACCESS DEVICE;  Surgeon: Christian Melo MD;  Location: [facility] OR;  Service: General   • REDUCTION MAMMAPLASTY Right     to match Lt. TRAM flap   • TONSILLECTOMY Bilateral    • UPPER GASTROINTESTINAL ENDOSCOPY N/A 09/12/2011    LA grade A  esophagitis with no bleeding, large hiatus hernia (50cm), gastric ulcer-Dr.Laszlo Lezama   Lico catheter placement on 2017 by Dr. Melo.     OB/GYN history: Menarche age 16, menopause at . , 1 miscarriage. No birth control pill use. She did have post menopause hormonal supplementation.      HEMATOLOGIC/ONCOLOGIC HISTORY: The patient is a 76 y.o. year old female whom we are consulted for a newly self discovered right breast mass, in 2017. Patient had ultrasound-guided biopsy on 5/3/2017 and confirmed to be invasive mammary carcinoma with focal lobular features, grade 2 Elen score 6/9. She is here for initial evaluation for management.      Patient is a 76-year-old  female who was seen here previously for chronic mild-to-moderate thrombocytopenia and mild anemia. Recently the patient reports she started having firmness of the right breast that started sometime in April or maybe even in March. She noticed firmness spread from about around the 12 o'clock position, gradually towards the upper lateral side and lower part of the right breast associated mild pain. The patient thought it was related to fibrocystic changes. However, the symptom was getting worse. Patient also reported dented nipple after she started having pain in the right breast. She denies discharge from the nipple. She called her primary care physician, Dr. Martin, who ordered a mammogram study. This was done on 2017 with architectural distortion of the right breast centrally at 12 o'clock position and had scattered fibroglandular densities throughout the right breast.      The patient subsequently had right breast ultrasound examination on 2017. Discovered a large right breast mass measuring 5.8 x 3.5 x 3.9 cm. Patient subsequently had ultrasound-guided right breast biopsy on 2017. Pathology evaluation reported invasive mammary carcinoma with focal lobular feature. Elen score 6/9, overall grade  2. Sample was further sent to the [facility] for test. ER negative, less than 1%; AL positive, moderate in staining, 5% to 10%. HER2 IHC 2+, but FISH study positive 2.1.     She denies weight loss, she actually eats very well. No nausea vomiting. Patient does complain of insomnia, unable to sleep.      This patient has history of left breast DCIS back in 2007, had mastectomy at the [facility]. No hormonal therapy afterwards according to patient. This patient also had a small meningioma, followed by Dr. Smith in [facility], with most recent MRI of the brain in March 2017, with 18 mm meningioma, and followed on an annual basis.     Her neutrophil count on 5/16/17 is normal at 2500, however, records showed starting from 05/2015 and in 09/2016, 01/2017 and 03/2017, all 4 laboratory studies showed mild neutropenia with ANC fluctuating between 1200 and 1600. Etiology is not clear.    Normal echocardiogram on 5/18/2017, LVEF 68%.      CT scan for chest abdomen and pelvis on 5/22/2017 and breast MRI examination on 5/22/2017 reported small right axillary lymph node suspicious for metastatic disease.  No remote metastatic lesion.  Patient has stage IIIa (T3 N2 M0) disease.      Patient will start neoadjuvant chemotherapy with Taxol weekly plus Herceptin weekly for total 12 weeks, and Perjeta every 3 weeks (3-week cycle) during chemotherapy.  Herceptin will be converted to every 3 weeks after chemotherapy finished.  Cycle 1 day 1 5/23/2017.   Status post neoadjuvant chemotherapy the patient was assessed with MRI showing a complete neoadjuvant response.  The patient was reviewed for surgery and questions concerning lymph node dissection-sentinel node evaluation-and need for radiation therapy postop were considered as well as use of additional Herceptin for the balance of the year as well as additional use of anti-hormonal therapy.  Surgery was scheduled November 07,  2017.  Patient next seen in office November 28 having undergone surgery on November 7 with results revealing no residual malignancy in either breast or associated sentinel lymph nodes.  Was elected to continue Herceptin when seen back in office November 20 having initiated Herceptin May 23, 2017.      MEDICATIONS: The current medication list was reviewed with the patient and updated in the EMR this date per the Medical Assistant. Medication dosages and frequencies were confirmed to be accurate.       ALLERGIES:    Allergies   Allergen Reactions   • Codeine Nausea And Vomiting   • Morphine Nausea And Vomiting     SOCIAL HISTORY:   Social History   Substance Use Topics   • Smoking status: Former Smoker     Packs/day: 2.00     Years: 30.00     Types: Cigarettes     Start date: 1/30/1957     Quit date: 4/10/1987   • Smokeless tobacco: Former User      Comment: I haven't had a cigarette in over 30 years   • Alcohol use No      Comment: stopped, heavy in past     FAMILY HISTORY:  Family History   Problem Relation Age of Onset   • Heart disease Mother    • Aortic aneurysm Mother      abdominal   • Coronary artery disease Mother    • Hypertension Mother    • Miscarriages / Stillbirths Mother    • Heart disease Father    • Heart attack Father      acute   • Hypertension Father    • Early death Father    • Hearing loss Father    • Kidney disease Father    • Breast cancer Daughter 45   • Heart disease Brother    • Hypertension Brother    • Malig Hyperthermia Neg Hx      I have reviewed the patient's medical history in detail and updated the computerized patient record.    REVIEW OF SYSTEMS:  GENERAL: See history of present illness. No change in appetite or weight;   No fevers, chills, sweats.   SKIN: No nonhealing lesions. No rashes.   HEME/LYMPH: See HPI.   EYES: No vision changes or diplopia.   ENT: No tinnitus, hearing loss, gum bleeding, epistaxis, hoarseness or dysphagia.   RESPIRATORY: No cough, Has exertional dyspnea,  No hemoptysis or wheezing.   CVS: No chest pain, palpitations, orthopnea, dyspnea on exertion or PND.   GI: See HPI.   : No lower tract obstructive symptoms, dysuria or hematuria.   MUSCULOSKELETAL: Chronic or joint pain, arthritis.  Has mild intermittent ankle swelling.   NEUROLOGICAL: See HPI  PSYCHIATRIC: See HPI     Objective:   Vitals:    11/28/17 1545   BP: 149/68   Pulse: 97   Temp: 98 °F (36.7 °C)   Weight: 145 lb (65.8 kg)   ECOG 0      PHYSICAL EXAM:   GENERAL: Well-developed, well-nourished female, in no acute distress.   SKIN: Warm, dry without rashes, purpura or petechiae.  Left upper chest Lico catheter in place, no evidence of infection.   EYES: Pupils equal, round and reactive to light. EOMs intact. Conjunctivae normal.  EARS: Hearing intact.  NOSE:  No excoriations or nasal discharge.  MOUTH: Tongue is well-papillated; no stomatitis or ulcers. Lips normal.  THROAT: Oropharynx without lesions or exudates.  LYMPHATICS: No cervical, supraclavicular, axillary adenopathy.  CHEST: Lungs clear to auscultation. Good airflow.   BREAST:Postop, single drain in place  CARDIAC: Regular rate and rhythm without murmurs. Normal S1,S2.  ABDOMEN: Slightly distended, normal active bowel sounds,  no hepatosplenomegaly or masses.  EXTREMITIES: No clubbing, cyanosis or edema.  NEUROLOGICAL: Cranial Nerves II-XII grossly intact. No focal neurological deficits.  PSYCHIATRIC: Alert and oriented, pleased about her results      RECENT LABS:  Lab Results   Component Value Date    WBC 9.46 11/28/2017    HGB 10.1 (L) 11/28/2017    HCT 30.8 (L) 11/28/2017    MCV 95.1 11/28/2017     (L) 11/28/2017     Lab Results   Component Value Date    NEUTROABS 4.69 11/28/2017     Lab Results   Component Value Date    GLUCOSE 99 11/02/2017    BUN 15 11/02/2017    CREATININE 0.79 11/02/2017    EGFRIFNONA 71 11/02/2017    EGFRIFAFRI 95 09/27/2017    BCR 19.0 11/02/2017    K 4.3 11/02/2017    CO2 27.0 11/02/2017    CALCIUM 9.6  11/02/2017    PROTENTOTREF 6.5 03/27/2017    ALBUMIN 4.10 11/02/2017    LABIL2 1.6 11/02/2017    AST 29 11/02/2017    ALT 29 11/02/2017     Acquired echocardiogram 2D complete with contrast   July 24, 2017     Interpretation Summary   · Left ventricular systolic function is normal. Calculated EF = 66.7%. Estimated EF was in agreement with the calculated EF. Estimated EF = 67%. Global strain = -16%. Strain data was reviewed by physician. The global longitudinal RV strain is slightly diminished and decreased from prior study where it was -21. This is suspicious for early myopathic process, possibly related to chemotherapy.  · Normal left ventricular cavity size and wall thickness noted. All left ventricular wall segments contract normally. Left ventricular diastolic dysfunction is noted (grade I) consistent with impaired relaxation.       Assessment/Plan   1. Large right breast cancer, locally advanced, stage IIIa (T3 N1/ 2 M0).  ER negative, less than 1%; IA positive, moderate in staining, 5% to 10%. HER2 IHC 2+, FISH study positive 2.1.  Physical examination showed a large mass, 7 cm x 7 cm initially which has decreased to approximately less than 4 cm ..  Patient  proceeded through 4 cycles ceptin,Perjeta and Taxol.   Her subsequent MRI examination showed complete response by imaging including right axillary lymphadenopathy.  We have continued Herceptin and that includes today October 20, 2017.  The case was discussed with Dr. Melo and plans were to proceed with mastectomy plus right axillary lymph node assessment via sentinel node evaluation. it was felt she likely require radiation therapy post procedure.  The patient was scheduled and proceeded to surgery November 7.  Her results, wonderfully, revealed no evidence of residual disease in the breast or associated sentinel lymph nodes.  She is to be seen by radiation therapy further input we do plan to continue Herceptin at this point.  It is not certain there is  any true significant benefit from anti-hormonal therapy will discuss this again when she seen back in approximately 3 weeks.    2. Nausea related to therapy.  This has been slowly resolving treated on a when necessary basis.     3.  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  She was started on gabapentin 300 mg 3 times a day, with some relief, however still complains significant problem.  At the beginning of the month her gabapentin was increased to 600 mg 3 times a day.  This did provide her with a slight improvement, however she continues to complain of significant issues with her neuropathy.  She states it is painful for her, particularly at night, and we'll keep her awake sometimes.  The neuropathy in her feet has caused difficulty with her in regulating, and she is in fact had some falls at times.  We discussed increasing her Neurontin to 900 mg 3 times a day.   when seen November 28 she continues 600 mg 3 times a day.    5.  Mild anemia and moderate thrombocytopenia.  This is chronic and currently stable.  Partially related to her chemotherapy, however this patient has history of chronic low anemia in the chemotherapy knee.  Laboratory study in May 2017 reported no evidence of iron deficiency, nor folic acid or vitamin B12.      6.  Abnormal echocardiogram study in July 2017.  Patient's most recent study was October 24, 2017 with an EF of 59.6%, global strain of -22%.  She'll need follow-up testing in late January.

## 2017-11-29 NOTE — TELEPHONE ENCOUNTER
Patient called with drain totals:   11/27: 40 cc  11/28: 29 cc  So far 19 cc this morning.   Instructed patient to call me tomorrow with today's totals and then we can likely remove it tomorrow afternoon if her totals stay low.     Patient called back today (11/30/17) with drain totals:  11/29 : 19 cc total   so far 10 cc out this morning.  Per Dr. Melo, the drain is okay to be removed. Informed the patient and instructed her to come into the office this afternoon for drain removal.

## 2017-11-30 ENCOUNTER — DOCUMENTATION (OUTPATIENT)
Dept: SURGERY | Facility: CLINIC | Age: 77
End: 2017-11-30

## 2017-11-30 NOTE — PROGRESS NOTES
Patient came into the office today for drain removal. CHRISTINA drain was removed. Bacitracin with gauze and Tegaderm were applied to her drain site. The patient was instructed to remove her bandage after 2 days. She should wait 24 hours until she showered. Not to take any baths. Patient verbalized understanding and will call with any further questions.

## 2017-12-07 ENCOUNTER — APPOINTMENT (OUTPATIENT)
Dept: RADIATION ONCOLOGY | Facility: HOSPITAL | Age: 77
End: 2017-12-07

## 2017-12-07 ENCOUNTER — TELEPHONE (OUTPATIENT)
Dept: SURGERY | Facility: CLINIC | Age: 77
End: 2017-12-07

## 2017-12-07 ENCOUNTER — OFFICE VISIT (OUTPATIENT)
Dept: RADIATION ONCOLOGY | Facility: HOSPITAL | Age: 77
End: 2017-12-07

## 2017-12-07 VITALS
HEART RATE: 74 BPM | RESPIRATION RATE: 16 BRPM | OXYGEN SATURATION: 99 % | WEIGHT: 145.06 LBS | HEIGHT: 62 IN | BODY MASS INDEX: 26.69 KG/M2 | DIASTOLIC BLOOD PRESSURE: 74 MMHG | SYSTOLIC BLOOD PRESSURE: 127 MMHG

## 2017-12-07 DIAGNOSIS — C50.211 MALIGNANT NEOPLASM OF UPPER-INNER QUADRANT OF RIGHT BREAST IN FEMALE, ESTROGEN RECEPTOR NEGATIVE (HCC): Primary | ICD-10-CM

## 2017-12-07 DIAGNOSIS — Z17.1 MALIGNANT NEOPLASM OF UPPER-INNER QUADRANT OF RIGHT BREAST IN FEMALE, ESTROGEN RECEPTOR NEGATIVE (HCC): Primary | ICD-10-CM

## 2017-12-07 PROCEDURE — 99214 OFFICE O/P EST MOD 30 MIN: CPT | Performed by: RADIOLOGY

## 2017-12-07 NOTE — PROGRESS NOTES
Subjective     No ref. provider found     Diagnosis Plan   1. Malignant neoplasm of upper-inner quadrant of right breast in female, estrogen receptor negative                              CC:T3N1M0 right breast cancer, ER negative LA 5-10% and Her 2 positive                            Dear Dr. Melo:     I had the pleasure of seeing Roxana Brizuela  [facility].   The patient is a 76 y.o. year old female with a remote history of DCIS of the left breast in 2004 and underwent mastectomy with tram flap reconstruction.  She had her yearly mammogram on 4/12/17 which showed an area of architectural distortion in the right breast 12 oclock position.  She then had a right breast ultrasound on 4/26/17 which showed a 5.8 x 3.5 x 3.9cm irregular hypoechoic masslike region at the 12 oclock position.  She had an ultrasound guided biopsy on 5/3/17 which was positive for invasive mammary carcinoma with focal lobular features, grade 2, ER negative, LA 5-10% and Her 2 positive.  She met with Dr. Patel on 5/16/17 and on physical exam was noted to have a 7cm mass in the right breast. She had a breast MRI on 5/21/17 which showed a 4.6cm x 6.5 x 7.2cm area of masslike enhancement in the right breast 12 o'clock position with nipple retraction and right axillary adenopathy.  She had a CT chest abdomen and pelvis on 5/18/17 which showed a 3mm node along the right internal mammary chain and one enlarged right axillary node but no evidence of distant metastatic disease.  She received neoadjuvant chemotherapy consisting of Herceptin, Perjeta and Taxol which she completed in late August 2017.  She did not receive the last 2 weekly Taxol doses due to worsening neuropathy.    She is now on neurontin 600mg tid.  She had a repeat breast MRI on 8/17/17 which showed resolution of all abnormal enhancement in the right breast and no evidence of axillary adenopathy.  Since I last saw her she underwent a right breast mastectomy and  sentinel node biopsy with Dr. Melo on 11/10/17 with pathology revealing a pathological complete response with no residual invasive disease or DCIS in the breast and 3 negative sentinel nodes, one with post treatment changes, ypT0N0.  She is recovering fairly well from her surgery but reports some drainage this morning from her incision.  She denies any fever or chills.      with menarche age 16 and menopause age.  She has never taken hormone replacement therapy      Review of Systems   Constitutional: Positive for fatigue.   HENT: Negative.    Respiratory: Negative.    Gastrointestinal: Negative.    Musculoskeletal: Negative.    Skin: Positive for color change.         Past Medical History:   Diagnosis Date   • Acute bronchitis    • Alcoholism     I haven't had a drink since then   • Anemia    • Breast cancer 2017    Right breast invasive mammary carcinoma with focal lobular features, grade 2, ER negative, less than 1% OK positive, HER-2 positive, stage IIIa disease (T3, N2, M0   • Breast cancer 10/20/2004    Left breast ductal carcinoma in-situ, predominately intermediate grade with focal comedonecrosis (high grade), solid and bribridform patterns involving approximately five core biopsy fragments   • Breast mass  &    • Chronic pain    • Colitis, acute 09/10/2011    Hx of Acute colitis. ER records   • Colon polyp    • Conjunctivitis    • Cough    • Edema     Chronic lower extremity edema   • GERD (gastroesophageal reflux disease) 2011    Dr. Paul Negron   • GI (gastrointestinal bleed)    • H/O Bowel obstruction    • H/O jaundice    • Hernia    • History of infectious mononucleosis    • History of migraine headaches    • History of transfusion    • Hx of colonic polyps 2009    Dr. Giles   • Hx of dehydration 2011    Related to Refractory nausea and vomiting   • Hx of hypercholesterolemia 2011    Dr. Paul Saba   • Hyperlipidemia    •  Hypertension    • Impacted cerumen of left ear    • Leukocytopenia    • Leukopenia    • Meningioma    • Osteoarthritis 09/13/2011    Dr. Jamil Miller   • Peptic ulceration    • Perioral dermatitis    • SBO (small bowel obstruction)     due to hernia with surgical repair in 09/2011   • SOB (shortness of breath) on exertion    • Thrombocytopenia    • Vertigo          Past Surgical History:   Procedure Laterality Date   • APPENDECTOMY N/A 1960   • BLADDER REPAIR N/A 1980   • BREAST BIOPSY Left 10/20/2004    Mammotome incisional biopsy left breast, surgical specimen, left breast, localization clip placement, left breast, confirmatory diagnostic unilateral mammogram, left breast-Dr. Tyrese Alcala, [facility]   • BREAST BIOPSY Right 05/03/2017    PATH: INVASIVE MAMMARY CARCINOMA   • CHOLECYSTECTOMY N/A 1990   • COLONOSCOPY N/A 06/11/2009    Hemorrhoids, otherwise normal, repeat in 5 years-Dr. Noemí Purcell   • [facility]   • FEMORAL HERNIA REPAIR Right 09/13/2011    Repair of incarcerated femoral hernia-Dr. Alfred Mcdowell   • HYSTERECTOMY Bilateral 1980   • MASTECTOMY Left 2004    Left breast mastecotmy with sentinel lymph node biospy-[facility]   • MASTECTOMY W/ SENTINEL NODE BIOPSY Right 11/7/2017    Procedure: RIGHT BREAST MASTECTOMY WITH SENTINEL NODE BIOPSY;  Surgeon: Christian Melo MD;  Location: [facility] MAIN OR;  Service:    • FL INSJ TUNNELED CVC W/O SUBQ PORT/ AGE 5 YR/> Left 5/22/2017    Procedure: INSERTION VENOUS ACCESS DEVICE;  Surgeon: Christian Melo MD;  Location: [facility] MAIN OR;  Service: General   • REDUCTION MAMMAPLASTY Right     to match Lt. TRAM flap   • TONSILLECTOMY Bilateral    • UPPER GASTROINTESTINAL ENDOSCOPY N/A 09/12/2011    LA grade A esophagitis with no bleeding, large hiatus hernia (50cm), gastric ulcer-Dr.Laszlo Lezama         Social History     Social History   • Marital status:      Spouse name: Mike   • Number of children: 2   • Years  of education: College     Occupational History   •  Retired     Investigator [employer]     Social History Main Topics   • Smoking status: Former Smoker     Packs/day: 2.00     Years: 30.00     Types: Cigarettes     Start date: 1/30/1957     Quit date: 4/10/1987   • Smokeless tobacco: Former User      Comment: I haven't had a cigarette in over 30 years   • Alcohol use No      Comment: stopped, heavy in past   • Drug use: No   • Sexual activity: No     Other Topics Concern   • Not on file     Social History Narrative         Family History   Problem Relation Age of Onset   • Heart disease Mother    • Aortic aneurysm Mother      abdominal   • Coronary artery disease Mother    • Hypertension Mother    • Miscarriages / Stillbirths Mother    • Heart disease Father    • Heart attack Father      acute   • Hypertension Father    • Early death Father    • Hearing loss Father    • Kidney disease Father    • Breast cancer Daughter 45   • Heart disease Brother    • Hypertension Brother    • Malig Hyperthermia Neg Hx           Objective    Physical Exam   Constitutional: She appears well-developed and well-nourished.   HENT:   Head: Normocephalic and atraumatic.   Eyes: EOM are normal.   Neck: Neck supple.   Pulmonary/Chest:       Lymphadenopathy:     She has no cervical adenopathy.         Current Outpatient Prescriptions on File Prior to Visit   Medication Sig Dispense Refill   • Ascorbic Acid (VITAMIN C PO) Take 1,000 mg by mouth Daily.     • Calcium Carbonate-Vitamin D (CALTRATE 600+D PO) Take 600 mg by mouth 2 (Two) Times a Day.     • diclofenac (VOLTAREN) 75 MG EC tablet Take 75 mg by mouth 2 (Two) Times a Day.     • gabapentin (NEURONTIN) 300 MG capsule Take 600 mg by mouth 3 (Three) Times a Day.     • Glucos-Chond-Sterol-Fish Oil (GLUCOSAMINE CHONDROITIN PLUS) capsule Take 1 tablet by mouth 2 (Two) Times a Day.     • hydrochlorothiazide (HYDRODIURIL) 25 MG tablet Take 25 mg by mouth Daily.     • hydrOXYzine  (VISTARIL) 25 MG capsule Take 25 mg by mouth 2 (Two) Times a Day.     • hydrOXYzine (VISTARIL) 25 MG capsule TAKE ONE CAPSULE BY MOUTH TWICE DAILY 60 capsule 0   • KLOR-CON 20 MEQ CR tablet TAKE ONE TABLET BY MOUTH ONCE DAILY 30 tablet 0   • Lactobacillus (PROBIOTIC ACIDOPHILUS PO) Take 1 capsule by mouth Daily.     • LORazepam (ATIVAN) 0.5 MG tablet TAKE ONE TABLET BY MOUTH EVERY 8 HOURS AS NEEDED FOR ANXIETY 90 tablet 0   • pantoprazole (PROTONIX) 40 MG EC tablet Take 40 mg by mouth Daily.     • Potassium Chloride Tamy ER (KLOR-CON M20 PO) Take 20 mEq by mouth 2 (Two) Times a Day.     • propranolol (INDERAL) 10 MG tablet Take 10 mg by mouth 3 (Three) Times a Day.     • simvastatin (ZOCOR) 80 MG tablet TAKE ONE TABLET BY MOUTH AT BEDTIME 90 tablet 1   • SUMAtriptan (IMITREX) 50 MG tablet Take 50 mg by mouth Every 2 (Two) Hours As Needed for migraine. Take one tablet at onset of headache. May repeat dose one time in 2 hours if headache not relieved.     • vitamin B-12 (CYANOCOBALAMIN) 1000 MCG tablet Take 1,000 mcg by mouth Daily.     • vitamin B-6 (PYRIDOXINE) 50 MG tablet Take 50 mg by mouth Daily.     • zolpidem (AMBIEN) 10 MG tablet Take 10 mg by mouth At Night As Needed for Sleep.       No current facility-administered medications on file prior to visit.        ALLERGIES:    Allergies   Allergen Reactions   • Codeine Nausea And Vomiting   • Morphine Nausea And Vomiting       There were no vitals taken for this visit.     Current Status 10/20/2017   ECOG score 0         Assessment/Plan     76 year old female with cT3N1/2M0 right breast cancer, ERneg, [place] pos Her 2 pos.  She has now completed her neoadjuvant chemotherapy and mastectomy with sentinel node biopsy with a pathological complete response, ypT0N0.  I explained to her that regardless of the pathology results and response to chemo, I still recommend adjuvant radiation to the right chest wall and regional nodes to reduce the risk of local regional  recurrence, especially in light of the size of the primary tumor at presentation.  I discussed the most common side effects of post mastectomy radiation including skin erythema and fatigue. I recommend treating the left chest wall/reconstructed breast, axilla, supraclav and infraclavicular nodes and possibly the IMC if we can encompass them without too much lung toxicity .  I discussed with her the risks, benefits and rationale of radiation therapy to include, but not limited to, the following:    Acute: skin erythema, breakdown, swelling or discomfort of the reconstructed breast, infection/cellulitis requiring antibiotics or possible removal of expander/implant, fatigue, pneumonitis resulting in shortness of breath, cough or pain, infection, lymphedema of the  arm    Late: Permanent skin changes including hyperpigmentation, telangiectasias, fibrosis of the skin and surrounding tissue/muscle resulting in smaller size, poor cosmetic outcome, and possible removal of expander of implant down the road, late edema or cellulitis, late rib fracture, late pulmonary fibrosis, lymphedema, brachialplexopathy resulting in numbness, tingling, pain or paralysis of the arm and the remote risk of second malignancies.    I explained that she would likely have 33 treatments to the right chest wall and regional nodes.  She voiced understanding and was given an opportunity to ask many questions which were all answered to her satisfaction.  she is travelling out of town for the Holidays and is still not sure if she will proceed with the radiation.  She would like to think about it over the next several weeks.  I gave her an appointment for the first week in January but told her she could cancel the appointment if she decides not to proceed with radiation.     Thank you very much for allowing me to participate in the care of this very pleasant patient.    Sincerely,      Maria Del Carmen Rayo MD

## 2017-12-07 NOTE — TELEPHONE ENCOUNTER
Pt is concerned her incision is infected - pt spoke with [facility]. Called in Keflex 500 mg po TID #21 no refills to [facility] pharmacy in [place] per Dr. Melo.

## 2017-12-08 RX ORDER — PANTOPRAZOLE SODIUM 40 MG/1
TABLET, DELAYED RELEASE ORAL
Qty: 90 TABLET | Refills: 3 | Status: SHIPPED | OUTPATIENT
Start: 2017-12-08 | End: 2018-07-19 | Stop reason: SDUPTHER

## 2017-12-11 ENCOUNTER — OFFICE VISIT (OUTPATIENT)
Dept: SURGERY | Facility: CLINIC | Age: 77
End: 2017-12-11

## 2017-12-11 DIAGNOSIS — Z09 FOLLOW UP: Primary | ICD-10-CM

## 2017-12-11 PROCEDURE — 99024 POSTOP FOLLOW-UP VISIT: CPT | Performed by: SURGERY

## 2017-12-11 RX ORDER — PROPRANOLOL HYDROCHLORIDE 10 MG/1
TABLET ORAL
Qty: 30 TABLET | Refills: 1 | Status: SHIPPED | OUTPATIENT
Start: 2017-12-11 | End: 2017-12-19 | Stop reason: SDUPTHER

## 2017-12-11 RX ORDER — CEPHALEXIN 500 MG/1
500 CAPSULE ORAL 3 TIMES DAILY
COMMUNITY
Start: 2017-12-07 | End: 2018-07-04

## 2017-12-14 DIAGNOSIS — C50.511 MALIGNANT NEOPLASM OF LOWER-OUTER QUADRANT OF RIGHT FEMALE BREAST, UNSPECIFIED ESTROGEN RECEPTOR STATUS (HCC): ICD-10-CM

## 2017-12-14 DIAGNOSIS — C50.111 MALIGNANT NEOPLASM OF CENTRAL PORTION OF RIGHT BREAST IN FEMALE, ESTROGEN RECEPTOR NEGATIVE (HCC): ICD-10-CM

## 2017-12-14 DIAGNOSIS — Z17.1 MALIGNANT NEOPLASM OF CENTRAL PORTION OF RIGHT BREAST IN FEMALE, ESTROGEN RECEPTOR NEGATIVE (HCC): ICD-10-CM

## 2017-12-18 RX ORDER — HYDROXYZINE PAMOATE 25 MG/1
CAPSULE ORAL
Qty: 60 CAPSULE | Refills: 0 | OUTPATIENT
Start: 2017-12-18 | End: 2018-09-26

## 2017-12-18 RX ORDER — LORAZEPAM 0.5 MG/1
TABLET ORAL
Qty: 90 TABLET | Refills: 0 | OUTPATIENT
Start: 2017-12-18 | End: 2018-06-13

## 2017-12-19 ENCOUNTER — INFUSION (OUTPATIENT)
Dept: ONCOLOGY | Facility: HOSPITAL | Age: 77
End: 2017-12-19

## 2017-12-19 ENCOUNTER — OFFICE VISIT (OUTPATIENT)
Dept: ONCOLOGY | Facility: CLINIC | Age: 77
End: 2017-12-19

## 2017-12-19 VITALS
HEART RATE: 72 BPM | HEIGHT: 62 IN | TEMPERATURE: 98 F | DIASTOLIC BLOOD PRESSURE: 75 MMHG | BODY MASS INDEX: 26.87 KG/M2 | RESPIRATION RATE: 18 BRPM | OXYGEN SATURATION: 100 % | WEIGHT: 146 LBS | SYSTOLIC BLOOD PRESSURE: 128 MMHG

## 2017-12-19 DIAGNOSIS — C50.211 MALIGNANT NEOPLASM OF UPPER-INNER QUADRANT OF RIGHT BREAST IN FEMALE, ESTROGEN RECEPTOR NEGATIVE (HCC): ICD-10-CM

## 2017-12-19 DIAGNOSIS — G62.0 PERIPHERAL NEUROPATHY DUE TO CHEMOTHERAPY (HCC): ICD-10-CM

## 2017-12-19 DIAGNOSIS — C50.911 MALIGNANT NEOPLASM OF RIGHT FEMALE BREAST, UNSPECIFIED ESTROGEN RECEPTOR STATUS, UNSPECIFIED SITE OF BREAST (HCC): ICD-10-CM

## 2017-12-19 DIAGNOSIS — Z79.899 HIGH RISK MEDICATION USE: Primary | ICD-10-CM

## 2017-12-19 DIAGNOSIS — C50.111 MALIGNANT NEOPLASM OF CENTRAL PORTION OF RIGHT BREAST IN FEMALE, ESTROGEN RECEPTOR NEGATIVE (HCC): ICD-10-CM

## 2017-12-19 DIAGNOSIS — T45.1X5A PERIPHERAL NEUROPATHY DUE TO CHEMOTHERAPY (HCC): ICD-10-CM

## 2017-12-19 DIAGNOSIS — Z17.1 MALIGNANT NEOPLASM OF CENTRAL PORTION OF RIGHT BREAST IN FEMALE, ESTROGEN RECEPTOR NEGATIVE (HCC): ICD-10-CM

## 2017-12-19 DIAGNOSIS — C50.111 MALIGNANT NEOPLASM OF CENTRAL PORTION OF RIGHT FEMALE BREAST, UNSPECIFIED ESTROGEN RECEPTOR STATUS (HCC): ICD-10-CM

## 2017-12-19 DIAGNOSIS — C50.511 MALIGNANT NEOPLASM OF LOWER-OUTER QUADRANT OF RIGHT FEMALE BREAST, UNSPECIFIED ESTROGEN RECEPTOR STATUS (HCC): ICD-10-CM

## 2017-12-19 DIAGNOSIS — C50.911: Primary | ICD-10-CM

## 2017-12-19 DIAGNOSIS — Z17.1 MALIGNANT NEOPLASM OF UPPER-INNER QUADRANT OF RIGHT BREAST IN FEMALE, ESTROGEN RECEPTOR NEGATIVE (HCC): ICD-10-CM

## 2017-12-19 LAB
ALBUMIN SERPL-MCNC: 4.2 G/DL (ref 3.5–5.2)
ALBUMIN/GLOB SERPL: 1.6 G/DL
ALP SERPL-CCNC: 42 U/L (ref 39–117)
ALT SERPL W P-5'-P-CCNC: 23 U/L (ref 1–33)
ANION GAP SERPL CALCULATED.3IONS-SCNC: 13.2 MMOL/L
AST SERPL-CCNC: 27 U/L (ref 1–32)
BASOPHILS # BLD AUTO: 0.01 10*3/MM3 (ref 0–0.2)
BASOPHILS NFR BLD AUTO: 0.2 % (ref 0–1.5)
BILIRUB SERPL-MCNC: 0.3 MG/DL (ref 0.1–1.2)
BUN BLD-MCNC: 11 MG/DL (ref 8–23)
BUN/CREAT SERPL: 16.2 (ref 7–25)
CALCIUM SPEC-SCNC: 9.3 MG/DL (ref 8.6–10.5)
CHLORIDE SERPL-SCNC: 103 MMOL/L (ref 98–107)
CO2 SERPL-SCNC: 25.8 MMOL/L (ref 22–29)
CREAT BLD-MCNC: 0.68 MG/DL (ref 0.57–1)
DEPRECATED RDW RBC AUTO: 46.5 FL (ref 37–54)
EOSINOPHIL # BLD AUTO: 0.04 10*3/MM3 (ref 0–0.7)
EOSINOPHIL NFR BLD AUTO: 0.7 % (ref 0.3–6.2)
ERYTHROCYTE [DISTWIDTH] IN BLOOD BY AUTOMATED COUNT: 13.4 % (ref 11.7–13)
GFR SERPL CREATININE-BSD FRML MDRD: 84 ML/MIN/1.73
GLOBULIN UR ELPH-MCNC: 2.6 GM/DL
GLUCOSE BLD-MCNC: 93 MG/DL (ref 65–99)
HCT VFR BLD AUTO: 32.4 % (ref 35.6–45.5)
HGB BLD-MCNC: 10.5 G/DL (ref 11.9–15.5)
IMM GRANULOCYTES # BLD: 0.21 10*3/MM3 (ref 0–0.03)
IMM GRANULOCYTES NFR BLD: 3.8 % (ref 0–0.5)
LYMPHOCYTES # BLD AUTO: 1.97 10*3/MM3 (ref 0.9–4.8)
LYMPHOCYTES NFR BLD AUTO: 35.9 % (ref 19.6–45.3)
MCH RBC QN AUTO: 31 PG (ref 26.9–32)
MCHC RBC AUTO-ENTMCNC: 32.4 G/DL (ref 32.4–36.3)
MCV RBC AUTO: 95.6 FL (ref 80.5–98.2)
MONOCYTES # BLD AUTO: 0.94 10*3/MM3 (ref 0.2–1.2)
MONOCYTES NFR BLD AUTO: 17.1 % (ref 5–12)
NEUTROPHILS # BLD AUTO: 2.32 10*3/MM3 (ref 1.9–8.1)
NEUTROPHILS NFR BLD AUTO: 42.3 % (ref 42.7–76)
NRBC BLD MANUAL-RTO: 0 /100 WBC (ref 0–0)
PLATELET # BLD AUTO: 110 10*3/MM3 (ref 140–500)
PMV BLD AUTO: 9.7 FL (ref 6–12)
POTASSIUM BLD-SCNC: 3.7 MMOL/L (ref 3.5–5.2)
PROT SERPL-MCNC: 6.8 G/DL (ref 6–8.5)
RBC # BLD AUTO: 3.39 10*6/MM3 (ref 3.9–5.2)
SODIUM BLD-SCNC: 142 MMOL/L (ref 136–145)
WBC NRBC COR # BLD: 5.49 10*3/MM3 (ref 4.5–10.7)

## 2017-12-19 PROCEDURE — 25010000002 TRASTUZUMAB PER 10 MG: Performed by: NURSE PRACTITIONER

## 2017-12-19 PROCEDURE — 96375 TX/PRO/DX INJ NEW DRUG ADDON: CPT | Performed by: NURSE PRACTITIONER

## 2017-12-19 PROCEDURE — 80053 COMPREHEN METABOLIC PANEL: CPT | Performed by: SURGERY

## 2017-12-19 PROCEDURE — 25010000002 DEXAMETHASONE SODIUM PHOSPHATE 10 MG/ML SOLUTION 1 ML VIAL: Performed by: NURSE PRACTITIONER

## 2017-12-19 PROCEDURE — 99213 OFFICE O/P EST LOW 20 MIN: CPT | Performed by: NURSE PRACTITIONER

## 2017-12-19 PROCEDURE — 96413 CHEMO IV INFUSION 1 HR: CPT | Performed by: NURSE PRACTITIONER

## 2017-12-19 PROCEDURE — 85025 COMPLETE CBC W/AUTO DIFF WBC: CPT | Performed by: INTERNAL MEDICINE

## 2017-12-19 RX ORDER — PROPRANOLOL HYDROCHLORIDE 10 MG/1
10 TABLET ORAL 3 TIMES DAILY
Qty: 90 TABLET | Refills: 2 | Status: SHIPPED | OUTPATIENT
Start: 2017-12-19 | End: 2018-06-15 | Stop reason: SDUPTHER

## 2017-12-19 RX ORDER — FAMOTIDINE 10 MG/ML
20 INJECTION, SOLUTION INTRAVENOUS ONCE
Status: COMPLETED | OUTPATIENT
Start: 2017-12-19 | End: 2017-12-19

## 2017-12-19 RX ORDER — SODIUM CHLORIDE 9 MG/ML
250 INJECTION, SOLUTION INTRAVENOUS ONCE
Status: CANCELLED | OUTPATIENT
Start: 2017-12-19

## 2017-12-19 RX ORDER — HYDROXYZINE PAMOATE 25 MG/1
50 CAPSULE ORAL ONCE
Status: CANCELLED | OUTPATIENT
Start: 2017-12-19

## 2017-12-19 RX ORDER — FAMOTIDINE 10 MG/ML
20 INJECTION, SOLUTION INTRAVENOUS ONCE
Status: CANCELLED | OUTPATIENT
Start: 2017-12-19

## 2017-12-19 RX ORDER — SODIUM CHLORIDE 9 MG/ML
250 INJECTION, SOLUTION INTRAVENOUS ONCE
Status: COMPLETED | OUTPATIENT
Start: 2017-12-19 | End: 2017-12-19

## 2017-12-19 RX ORDER — HYDROXYZINE PAMOATE 25 MG/1
50 CAPSULE ORAL ONCE
Status: COMPLETED | OUTPATIENT
Start: 2017-12-19 | End: 2017-12-19

## 2017-12-19 RX ADMIN — HYDROXYZINE PAMOATE 50 MG: 25 CAPSULE ORAL at 15:18

## 2017-12-19 RX ADMIN — SODIUM CHLORIDE 250 ML: 900 INJECTION, SOLUTION INTRAVENOUS at 15:10

## 2017-12-19 RX ADMIN — DEXAMETHASONE SODIUM PHOSPHATE 12 MG: 10 INJECTION, SOLUTION INTRAMUSCULAR; INTRAVENOUS at 15:21

## 2017-12-19 RX ADMIN — TRASTUZUMAB 380 MG: 150 INJECTION, POWDER, LYOPHILIZED, FOR SOLUTION INTRAVENOUS at 16:09

## 2017-12-19 RX ADMIN — FAMOTIDINE 20 MG: 10 INJECTION, SOLUTION INTRAVENOUS at 15:19

## 2017-12-19 NOTE — PROGRESS NOTES
REASON FOR FOLLOWUP:   1. Newly diagnosed large right breast cancer.  Core needle biopsy reported invasive mammary carcinoma with focal lobular feature, grade 2.  ER negative, less than 1%; WI positive, moderate in staining, 5% to 10%. HER2 IHC 2+, FISH study positive 2.1.   2.  CT scan for chest abdomen and pelvis and breast MRI examination reported right axillary lymph node suspicious for metastatic disease.  No remote metastatic lesion.  Patient has stage IIIa (T3 N2 M0) disease.   3.  Patient was started neoadjuvant chemotherapy on 5/23/2017 with Taxol weekly plus Herceptin weekly for total 12 weeks, and Perjata every 3 weeks during chemotherapy.  Herceptin will be converted to every 3 weeks after chemotherapy finished.    4.  Chronic moderate thrombocytopenia, mild anemia, and intermittent mild neutropenia etiologies are not clear.  5.  Reaction to Herceptin C1D1.  Subsequently tolerated therapy with hydrocortisone as premedication.  6.  Potential cardiac strain developing, neuropathic symptoms persist, follow-up MRI and surgical assessment will be needed post initial chemotherapeutic phase  7.  MRI August 17 with interval resolution of abnormal enhancement within breast and right axillary adenopathy, surgery scheduled November 7, Herceptin ongoing every 3 weeks  8.  Patient seen in office November 28, status post surgery with apparent complete response, Herceptin continued     HISTORY OF PRESENT ILLNESS:    The patient is a pleasant 76 y.o. female with the above-mentioned history here today in anticipation of her maintenance Herceptin.  She is doing fairly well.  She does continue to complain of neuropathy symptoms, but this is fairly well controlled with her gabapentin.  She previously done some physical therapy prior to her surgery, and is asking about resuming this.  She states that it is difficult though to drive in from [place] for this, and is questioning if can be done in [place].  She denies  any issues with shortness of breath or cough.  She denies any chest pain or swelling.  She does have some issues with constipation, but is controlled with over-the-counter medication.  She denies fevers or chills.    Patient did meet with Dr. Rayo, radiation oncology on 12/7/2017 who recommended that she proceed with radiation therapy.  The patient at this time does not seem to be interested in receiving radiation.    Past Medical History:   Diagnosis Date   • Acute bronchitis    • Alcoholism 1978    I haven't had a drink since then   • Anemia    • Breast cancer 05/03/2017    Right breast invasive mammary carcinoma with focal lobular features, grade 2, ER negative, less than 1% PA positive, HER-2 positive, stage IIIa disease (T3, N2, M0   • Breast cancer 10/20/2004    Left breast ductal carcinoma in-situ, predominately intermediate grade with focal comedonecrosis (high grade), solid and bribridform patterns involving approximately five core biopsy fragments   • Breast mass 2005 & 2017   • Chronic pain    • Colitis, acute 09/10/2011    Hx of Acute colitis. ER records   • Colon polyp    • Conjunctivitis    • Cough    • Edema     Chronic lower extremity edema   • GERD (gastroesophageal reflux disease) 09/13/2011    Dr. Paul Negron   • GI (gastrointestinal bleed) 1997   • H/O Bowel obstruction 2013   • H/O jaundice    • Hernia    • History of infectious mononucleosis 1960   • History of migraine headaches    • History of transfusion 1997   • Hx of colonic polyps 06/11/2009    Dr. Giles   • Hx of dehydration 09/13/2011    Related to Refractory nausea and vomiting   • Hx of hypercholesterolemia 09/13/2011    Dr. Paul Saba   • Hyperlipidemia    • Hypertension    • Impacted cerumen of left ear    • Leukocytopenia    • Leukopenia    • Meningioma    • Osteoarthritis 09/13/2011    Dr. Jamil Miller   • Peptic ulceration    • Perioral dermatitis    • SBO (small bowel obstruction)     due to hernia with surgical  repair in 2011   • SOB (shortness of breath) on exertion    • Thrombocytopenia    • Vertigo    Normal echocardiogram on 2017, LVEF 68%.    Past Surgical History:   Procedure Laterality Date   • APPENDECTOMY N/A    • BLADDER REPAIR N/A    • BREAST BIOPSY Left 10/20/2004    Mammotome incisional biopsy left breast, surgical specimen, left breast, localization clip placement, left breast, confirmatory diagnostic unilateral mammogram, left breast-Dr. Tyrese Alcala, [facility]   • BREAST BIOPSY Right 2017    PATH: INVASIVE MAMMARY CARCINOMA   • CHOLECYSTECTOMY N/A    • COLONOSCOPY N/A 2009    Hemorrhoids, otherwise normal, repeat in 5 years-Dr. Noemí Purcell   • EYE SURGERY  2017    lazer surgery for blood clot   • FEMORAL HERNIA REPAIR Right 2011    Repair of incarcerated femoral hernia-Dr. Alfred Mcdowell   • HYSTERECTOMY Bilateral    • MASTECTOMY Left     Left breast mastecotmy with sentinel lymph node biospy-[facility]   • MASTECTOMY W/ SENTINEL NODE BIOPSY Right 2017    Procedure: RIGHT BREAST MASTECTOMY WITH SENTINEL NODE BIOPSY;  Surgeon: Christian Melo MD;  Location: [facility];  Service:    • WV INSJ TUNNELED CVC W/O SUBQ PORT/ AGE 5 YR/> Left 2017    Procedure: INSERTION VENOUS ACCESS DEVICE;  Surgeon: Christian Melo MD;  Location: [facility] OR;  Service: General   • REDUCTION MAMMAPLASTY Right     to match Lt. TRAM flap   • TONSILLECTOMY Bilateral    • UPPER GASTROINTESTINAL ENDOSCOPY N/A 2011    LA grade A esophagitis with no bleeding, large hiatus hernia (50cm), gastric ulcer-Dr.Laszlo Lezama   Lico catheter placement on 2017 by Dr. Melo.     OB/GYN history: Menarche age 16, menopause at 1985. , 1 miscarriage. No birth control pill use. She did have post menopause hormonal supplementation.      HEMATOLOGIC/ONCOLOGIC HISTORY: The patient is a 76 y.o. year old female whom we are consulted for a newly self discovered right  breast mass, in April 2017. Patient had ultrasound-guided biopsy on 5/3/2017 and confirmed to be invasive mammary carcinoma with focal lobular features, grade 2 Friendship score 6/9. She is here for initial evaluation for management.      Patient is a 76-year-old  female who was seen here previously for chronic mild-to-moderate thrombocytopenia and mild anemia. Recently the patient reports she started having firmness of the right breast that started sometime in April or maybe even in March. She noticed firmness spread from about around the 12 o'clock position, gradually towards the upper lateral side and lower part of the right breast associated mild pain. The patient thought it was related to fibrocystic changes. However, the symptom was getting worse. Patient also reported dented nipple after she started having pain in the right breast. She denies discharge from the nipple. She called her primary care physician, Dr. Martin, who ordered a mammogram study. This was done on 04/12/2017 with architectural distortion of the right breast centrally at 12 o'clock position and had scattered fibroglandular densities throughout the right breast.      The patient subsequently had right breast ultrasound examination on 04/26/2017. Discovered a large right breast mass measuring 5.8 x 3.5 x 3.9 cm. Patient subsequently had ultrasound-guided right breast biopsy on 05/03/2017. Pathology evaluation reported invasive mammary carcinoma with focal lobular feature. Elen score 6/9, overall grade 2. Sample was further sent to [facility] for test. ER negative, less than 1%; ME positive, moderate in staining, 5% to 10%. HER2 IHC 2+, but FISH study positive 2.1.     She denies weight loss, she actually eats very well. No nausea vomiting. Patient does complain of insomnia, unable to sleep.      This patient has history of left breast DCIS back in 2007, had mastectomy at the [facility]. No hormonal therapy afterwards according to patient. This patient also had a small meningioma, followed by Dr. Smith in [facility], with most recent MRI of the brain in March 2017, with 18 mm meningioma, and followed on an annual basis.     Her neutrophil count on 5/16/17 is normal at 2500, however, records showed starting from 05/2015 and in 09/2016, 01/2017 and 03/2017, all 4 laboratory studies showed mild neutropenia with ANC fluctuating between 1200 and 1600. Etiology is not clear.    Normal echocardiogram on 5/18/2017, LVEF 68%.      CT scan for chest abdomen and pelvis on 5/22/2017 and breast MRI examination on 5/22/2017 reported small right axillary lymph node suspicious for metastatic disease.  No remote metastatic lesion.  Patient has stage IIIa (T3 N2 M0) disease.      Patient will start neoadjuvant chemotherapy with Taxol weekly plus Herceptin weekly for total 12 weeks, and Perjeta every 3 weeks (3-week cycle) during chemotherapy.  Herceptin will be converted to every 3 weeks after chemotherapy finished.  Cycle 1 day 1 5/23/2017.   Status post neoadjuvant chemotherapy the patient was assessed with MRI showing a complete neoadjuvant response.  The patient was reviewed for surgery and questions concerning lymph node dissection-sentinel node evaluation-and need for radiation therapy postop were considered as well as use of additional Herceptin for the balance of the year as well as additional use of anti-hormonal therapy.  Surgery was scheduled November 07, 2017.  Patient next seen in office November 28 having undergone surgery on November 7 with results revealing no residual malignancy in either breast or associated sentinel lymph nodes.  Was elected to continue Herceptin when seen back in office November 20 having initiated Herceptin May 23, 2017.      She did received 2 units of PRBC's on   7/8/2017 and remains hematologically stable.   She does have difficulty with sleep and  Ambien 10 mg daily at bedtime seems help much better compared to 5 mg dose.  She does not need refills today.  In reviewing her case July 26 she is entering the fourth cycle of her treatment and recent echocardiogram is reviewed with she and her close friend revealing EF of 66.7% though with global strain of -16%?  Suspicious for myopathic process.  Normal ventricular cavity size and wall thickness as well as contractility.  We have also discussed that she is responding to therapy and will need surgical assessment which may not as yet have been performed.  She has seen Dr. Melo for port placement and will ask him to review her likely just after she has completed his fourth cycle of therapy.  It is also apparent that she'll need a cardiac assessment as we continue subsequent Herceptin therapy perioperatively.      The patient underwent MRI of the breasts August 17 demonstrating interval resolution of abnormal enhancement within the right breast, resolution of right axillary adenopathy had also resolved.  The findings were consistent with a complete response to neoadjuvant chemotherapy.  The patient was seen in subsequent follow-up with Dr. Melo and was determined to proceed with surgery and ultimately sentinel node evaluation.  This is now scheduled November 7 and there are additional plans to consider radiation therapy postoperatively and we have also discussed the use of anti-hormonal therapy considering her mildly positive MI status.    The patient was able to proceed to surgery undergoing right breast mastectomy and sentinel lymph node biopsy November 07, 2017.  She did well postoperatively seeing Dr. Melo November 16.  Pathology revealed no residual malignancy in the breast and 3 negative sentinel lymph nodes.  A formal review of her report reveals treatment effect particularly in her largest lymph node with focal fibrosis and scar, right breast mastectomy fibrosis associated with a cavitary biopsy site  and no invasive or in situ carcinoma.  The patient is now seen in our office though she went to the wrong location and the seen late in the day.  We discussed her findings and agree that she would continue Herceptin at this point but be reviewed formally again in 3 weeks.  She is also be seen by radiation therapy for their input.      MEDICATIONS: The current medication list was reviewed with the patient and updated in the EMR this date per the Medical Assistant. Medication dosages and frequencies were confirmed to be accurate.       ALLERGIES:    Allergies   Allergen Reactions   • Codeine Nausea And Vomiting   • Morphine Nausea And Vomiting     SOCIAL HISTORY:   Social History   Substance Use Topics   • Smoking status: Former Smoker     Packs/day: 2.00     Years: 30.00     Types: Cigarettes     Start date: 1/30/1957     Quit date: 4/10/1987   • Smokeless tobacco: Former User      Comment: I haven't had a cigarette in over 30 years   • Alcohol use No      Comment: stopped, heavy in past     FAMILY HISTORY:  Family History   Problem Relation Age of Onset   • Heart disease Mother    • Aortic aneurysm Mother      abdominal   • Coronary artery disease Mother    • Hypertension Mother    • Miscarriages / Stillbirths Mother    • Heart disease Father    • Heart attack Father      acute   • Hypertension Father    • Early death Father    • Hearing loss Father    • Kidney disease Father    • Breast cancer Daughter 45   • Heart disease Brother    • Hypertension Brother    • Malig Hyperthermia Neg Hx      I have reviewed the patient's medical history in detail and updated the computerized patient record.    Review of Systems   Constitutional: Negative for appetite change, chills, fatigue, fever and unexpected weight change.   HENT:   Negative for mouth sores, nosebleeds, sore throat and trouble swallowing.    Respiratory: Negative for cough and shortness of breath.    Cardiovascular: Negative for chest pain and leg swelling.  "  Gastrointestinal: Positive for constipation. Negative for abdominal pain, diarrhea, nausea and vomiting.   Endocrine: Negative for hot flashes.   Genitourinary: Negative for difficulty urinating.    Musculoskeletal: Negative for arthralgias and myalgias.   Skin: Negative for rash.   Neurological: Positive for numbness. Negative for dizziness and extremity weakness.   Hematological: Negative for adenopathy. Does not bruise/bleed easily.   Psychiatric/Behavioral: Negative for sleep disturbance.      Objective:   Vitals:    12/19/17 1436   BP: 128/75   Pulse: 72   Resp: 18   Temp: 98 °F (36.7 °C)   TempSrc: Oral   SpO2: 100%   Weight: 66.2 kg (146 lb)   Height: 157.5 cm (62.01\")   PainSc: 0-No pain   ECOG 0      Physical Exam   Constitutional: She is oriented to person, place, and time. She appears well-developed and well-nourished.   HENT:   Head: Normocephalic and atraumatic.   Nose: Nose normal.   Mouth/Throat: Oropharynx is clear and moist and mucous membranes are normal. No oropharyngeal exudate.   Eyes: Pupils are equal, round, and reactive to light.   Neck: Normal range of motion. Neck supple.   Cardiovascular: Normal rate, regular rhythm and normal heart sounds.    Pulmonary/Chest: Effort normal and breath sounds normal. No respiratory distress. She has no wheezes. She has no rhonchi. She has no rales.   Abdominal: Soft. Normal appearance and bowel sounds are normal. She exhibits no distension. There is no hepatosplenomegaly. There is no tenderness.   Musculoskeletal: Normal range of motion. She exhibits no edema.   Neurological: She is alert and oriented to person, place, and time.   Skin: Skin is warm and dry.   Psychiatric: She has a normal mood and affect. Her behavior is normal.   Nursing note and vitals reviewed.      RECENT LABS:  Lab Results   Component Value Date    WBC 5.49 12/19/2017    HGB 10.5 (L) 12/19/2017    HCT 32.4 (L) 12/19/2017    MCV 95.6 12/19/2017     (L) 12/19/2017     Lab " Results   Component Value Date    NEUTROABS 2.32 12/19/2017     Lab Results   Component Value Date    GLUCOSE 93 12/19/2017    BUN 11 12/19/2017    CREATININE 0.68 12/19/2017    EGFRIFNONA 84 12/19/2017    EGFRIFAFRI 95 09/27/2017    BCR 16.2 12/19/2017    K 3.7 12/19/2017    CO2 25.8 12/19/2017    CALCIUM 9.3 12/19/2017    PROTENTOTREF 6.5 03/27/2017    ALBUMIN 4.20 12/19/2017    LABIL2 1.6 12/19/2017    AST 27 12/19/2017    ALT 23 12/19/2017       Assessment/Plan   1. Large right breast cancer, locally advanced, stage IIIa (T3 N1/ 2 M0).  ER negative, less than 1%; PA positive, moderate in staining, 5% to 10%. HER2 IHC 2+, FISH study positive 2.1.  Physical examination showed a large mass, 7 cm x 7 cm initially which has decreased to approximately less than 4 cm ..  Patient  proceeded through 4 cycles ceptin,Perjeta and Taxol.   Her subsequent MRI examination showed complete response by imaging including right axillary lymphadenopathy.  We have continued Herceptin and that includes today October 20, 2017.  The case was discussed with Dr. Melo and plans were to proceed with mastectomy plus right axillary lymph node assessment via sentinel node evaluation. it was felt she likely require radiation therapy post procedure.  The patient was scheduled and proceeded to surgery November 7.  Her results, wonderfully, revealed no evidence of residual disease in the breast or associated sentinel lymph nodes.  She is to be seen by radiation therapy further input we do plan to continue Herceptin at this point.  It is not certain there is any true significant benefit from anti-hormonal therapy will discuss this again when she seen back in approximately 3 weeks.    2. Nausea related to therapy.  This has been slowly resolving treated on a when necessary basis.     3.  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  She was started on gabapentin 300 mg 3 times a day, with some relief, however still complains significant problem.  At  the beginning of the month her gabapentin was increased to 600 mg 3 times a day.  This did provide her with a slight improvement, however she continues to complain of significant issues with her neuropathy.  She states it is painful for her, particularly at night, and we'll keep her awake sometimes.  The neuropathy in her feet has caused difficulty with her in regulating, and she is in fact had some falls at times.  We discussed increasing her Neurontin to 900 mg 3 times a day.   when seen November 28 she continues 600 mg 3 times a day.     The patient previously done some physical therapy and is asking about resuming physical therapy (it was discontinued prior to her surgery).  She very much like if it could be done in [place].  We will investigate if that is a possibility and if so, make a referral.    5.  Mild anemia and moderate thrombocytopenia.  This is chronic and currently stable.  Partially related to her chemotherapy, however this patient has history of chronic low anemia in the chemotherapy knee.  Laboratory study in May 2017 reported no evidence of iron deficiency, nor folic acid or vitamin B12.      6.  Abnormal echocardiogram study in July 2017.  Patient's most recent study was October 24, 2017 with an EF of 59.6%, global strain of -22%.  She'll need follow-up testing in late January.  I have gone ahead and ordered her next echocardiogram to be done towards the end of January 2018.

## 2018-01-04 DIAGNOSIS — C50.511 MALIGNANT NEOPLASM OF LOWER-OUTER QUADRANT OF RIGHT FEMALE BREAST, UNSPECIFIED ESTROGEN RECEPTOR STATUS (HCC): ICD-10-CM

## 2018-01-04 DIAGNOSIS — Z17.1 MALIGNANT NEOPLASM OF CENTRAL PORTION OF RIGHT BREAST IN FEMALE, ESTROGEN RECEPTOR NEGATIVE (HCC): ICD-10-CM

## 2018-01-04 DIAGNOSIS — C50.111 MALIGNANT NEOPLASM OF CENTRAL PORTION OF RIGHT BREAST IN FEMALE, ESTROGEN RECEPTOR NEGATIVE (HCC): ICD-10-CM

## 2018-02-08 ENCOUNTER — TELEPHONE (OUTPATIENT)
Dept: FAMILY MEDICINE CLINIC | Facility: CLINIC | Age: 78
End: 2018-02-08

## 2018-02-08 NOTE — TELEPHONE ENCOUNTER
Called patient, she stated that she spoke with someone at [facility] and they have approved her for acute care facility in [place] and she will go there and get care for now.     ----- Message from Shira Parker sent at 2/8/2018  9:06 AM EST -----  PT CALLED AND SAID SHE HAS BEEN STUCK IN [place] SINCE DEC 28TH WITH BEING REALLY SICK ON A RESPIRATOR 3 TIMES. SHE SAYS SHE NEEDS TO COME HOME BACK TO [place] BUT NEEDS A OK TO TRANSFER. HER DAUGHTER IS TRYING TO GET HER IN THE FORUM.    PLEASE CALL THIS PT BACK.    PATIENTS [phone]

## 2018-03-20 ENCOUNTER — TELEPHONE (OUTPATIENT)
Dept: ONCOLOGY | Facility: HOSPITAL | Age: 78
End: 2018-03-20

## 2018-03-20 DIAGNOSIS — C50.911 MALIGNANT NEOPLASM OF RIGHT FEMALE BREAST, UNSPECIFIED ESTROGEN RECEPTOR STATUS, UNSPECIFIED SITE OF BREAST (HCC): Primary | ICD-10-CM

## 2018-03-20 DIAGNOSIS — C50.511 MALIGNANT NEOPLASM OF LOWER-OUTER QUADRANT OF RIGHT FEMALE BREAST, UNSPECIFIED ESTROGEN RECEPTOR STATUS (HCC): ICD-10-CM

## 2018-03-20 NOTE — PROGRESS NOTES
Subjective       PATIENT NAME:  Roxana Brizuela  YOB: 1940  PATIENT'S SEX:  female    PATIENT'S ADDRESS:  [address] [place] [address]  PROVIDER:      Encounter Diagnoses   Name Primary?   • Malignant neoplasm of right female breast, unspecified estrogen receptor status, unspecified site of breast Yes   • Malignant neoplasm of lower-outer quadrant of right female breast, unspecified estrogen receptor status      Allergies   Allergen Reactions   • Codeine Nausea And Vomiting   • Morphine Nausea And Vomiting         [facility] INFUSION ORDERS    DATE      3/20/2018    Administer Trastuzumab (Herceptin) 6 mg/k every 3 weeks     Premedications:    Decadron 12 mg IV    Famotidine 20 mg IV    Hydroxyzine 50 mg PO    Repeat every 3 weeks.      Li Feliz RN     Electronically Signed By: Oneil Coburn MD  NPI: [id number]  Tuesday March 20, 2018  3:57 PM

## 2018-03-20 NOTE — TELEPHONE ENCOUNTER
Patient calling requesting script for herceptin be sent to [facility] in [place]. She became ill while there and has not been stable enough to come home. She expects to be there another month. States that she received a dose while inpatient there.    Spoke to Li Feliz and she will look into this.

## 2018-03-20 NOTE — PROGRESS NOTES
Patient called Triage RN requesting a script for Herceptin be sent to [facility] in [place]. She became ill while there and has not been stable enough to come home. She expects to be there another month. States that she received a dose while inpatient there.    Spoke with Dr Coburn concerning patient's request for Herceptin order. Per Dr Coburn patient will need to contact the Oncologist that saw patient in hospital and ordered the Herceptin she received on 2/16/18 inpatient. Patient called and informed Dr Coburn is unable to order Herceptin in the [facility] and will therefore need to contact Dr Nuno the oncologist in [place]

## 2018-04-03 DIAGNOSIS — I10 ESSENTIAL HYPERTENSION: ICD-10-CM

## 2018-04-03 DIAGNOSIS — E78.5 HYPERLIPIDEMIA, UNSPECIFIED HYPERLIPIDEMIA TYPE: ICD-10-CM

## 2018-04-09 ENCOUNTER — TELEPHONE (OUTPATIENT)
Dept: ONCOLOGY | Facility: HOSPITAL | Age: 78
End: 2018-04-09

## 2018-04-09 DIAGNOSIS — Z17.1 MALIGNANT NEOPLASM OF UPPER-INNER QUADRANT OF RIGHT BREAST IN FEMALE, ESTROGEN RECEPTOR NEGATIVE (HCC): Primary | ICD-10-CM

## 2018-04-09 DIAGNOSIS — C50.211 MALIGNANT NEOPLASM OF UPPER-INNER QUADRANT OF RIGHT BREAST IN FEMALE, ESTROGEN RECEPTOR NEGATIVE (HCC): Primary | ICD-10-CM

## 2018-04-09 NOTE — TELEPHONE ENCOUNTER
Pt calling because she went to [place] during christmas and got very sick and was in hospital for 3 months there. She has now be released to fly home. She is required an appt with Oncology per [employer]. Spoke with Dr. Coburn and per his order 3 unit appt with CBC, CMP, CA 15-3. Message sent to scheduling.

## 2018-04-10 ENCOUNTER — TELEPHONE (OUTPATIENT)
Dept: GENERAL RADIOLOGY | Facility: HOSPITAL | Age: 78
End: 2018-04-10

## 2018-04-10 NOTE — TELEPHONE ENCOUNTER
----- Message from Cindy Villavicencio RN sent at 4/9/2018  5:02 PM EDT -----  Please set pt up for 3 unit appt with Dr. Coburn and labs: CBC,CMP, CA-15-3. Please call pts cell # [phone]. Thanks!

## 2018-05-01 ENCOUNTER — OFFICE VISIT (OUTPATIENT)
Dept: ONCOLOGY | Facility: CLINIC | Age: 78
End: 2018-05-01

## 2018-05-01 ENCOUNTER — LAB (OUTPATIENT)
Dept: ONCOLOGY | Facility: HOSPITAL | Age: 78
End: 2018-05-01

## 2018-05-01 VITALS
SYSTOLIC BLOOD PRESSURE: 128 MMHG | TEMPERATURE: 98 F | HEIGHT: 62 IN | OXYGEN SATURATION: 97 % | RESPIRATION RATE: 16 BRPM | DIASTOLIC BLOOD PRESSURE: 78 MMHG | HEART RATE: 71 BPM

## 2018-05-01 DIAGNOSIS — Z17.1 MALIGNANT NEOPLASM OF UPPER-INNER QUADRANT OF RIGHT BREAST IN FEMALE, ESTROGEN RECEPTOR NEGATIVE (HCC): ICD-10-CM

## 2018-05-01 DIAGNOSIS — IMO0001 OTHER COMPLICATION DUE TO VENOUS ACCESS DEVICE, SUBSEQUENT ENCOUNTER: Primary | ICD-10-CM

## 2018-05-01 DIAGNOSIS — C50.211 MALIGNANT NEOPLASM OF UPPER-INNER QUADRANT OF RIGHT BREAST IN FEMALE, ESTROGEN RECEPTOR NEGATIVE (HCC): ICD-10-CM

## 2018-05-01 DIAGNOSIS — G93.31 POST-INFLUENZA SYNDROME: Primary | ICD-10-CM

## 2018-05-01 LAB
ALBUMIN SERPL-MCNC: 4.3 G/DL (ref 3.5–5.2)
ALBUMIN/GLOB SERPL: 1.6 G/DL
ALP SERPL-CCNC: 61 U/L (ref 39–117)
ALT SERPL W P-5'-P-CCNC: 16 U/L (ref 1–33)
ANION GAP SERPL CALCULATED.3IONS-SCNC: 10.9 MMOL/L
AST SERPL-CCNC: 24 U/L (ref 1–32)
BILIRUB SERPL-MCNC: 0.4 MG/DL (ref 0.1–1.2)
BUN BLD-MCNC: 21 MG/DL (ref 8–23)
BUN/CREAT SERPL: 21 (ref 7–25)
CALCIUM SPEC-SCNC: 9.5 MG/DL (ref 8.6–10.5)
CANCER AG15-3 SERPL-ACNC: 36.1 U/ML
CHLORIDE SERPL-SCNC: 104 MMOL/L (ref 98–107)
CO2 SERPL-SCNC: 27.1 MMOL/L (ref 22–29)
CREAT BLD-MCNC: 1 MG/DL (ref 0.57–1)
DEPRECATED RDW RBC AUTO: 45 FL (ref 37–54)
ERYTHROCYTE [DISTWIDTH] IN BLOOD BY AUTOMATED COUNT: 12.4 % (ref 11.7–13)
GFR SERPL CREATININE-BSD FRML MDRD: 54 ML/MIN/1.73
GLOBULIN UR ELPH-MCNC: 2.7 GM/DL
GLUCOSE BLD-MCNC: 94 MG/DL (ref 65–99)
HCT VFR BLD AUTO: 31.1 % (ref 35.6–45.5)
HGB BLD-MCNC: 10.1 G/DL (ref 11.9–15.5)
LYMPHOCYTES # BLD MANUAL: 1.44 10*3/MM3 (ref 0.8–7)
LYMPHOCYTES NFR BLD MANUAL: 17 % (ref 0–12)
LYMPHOCYTES NFR BLD MANUAL: 28 % (ref 24–44)
MCH RBC QN AUTO: 31.9 PG (ref 26.9–32)
MCHC RBC AUTO-ENTMCNC: 32.5 G/DL (ref 32.4–36.3)
MCV RBC AUTO: 98.1 FL (ref 80.5–98.2)
MONOCYTES # BLD AUTO: 0.88 10*3/MM3 (ref 0–1)
NEUTROPHILS # BLD AUTO: 2.83 10*3/MM3 (ref 1.9–8.1)
NEUTROPHILS NFR BLD MANUAL: 55 % (ref 42.7–76)
PLAT MORPH BLD: NORMAL
PLATELET # BLD AUTO: 81 10*3/MM3 (ref 140–500)
PMV BLD AUTO: 11 FL (ref 6–12)
POTASSIUM BLD-SCNC: 4.7 MMOL/L (ref 3.5–5.2)
PROT SERPL-MCNC: 7 G/DL (ref 6–8.5)
RBC # BLD AUTO: 3.17 10*6/MM3 (ref 3.9–5.2)
RBC MORPH BLD: NORMAL
SCAN SLIDE: NORMAL
SODIUM BLD-SCNC: 142 MMOL/L (ref 136–145)
WBC MORPH BLD: NORMAL
WBC NRBC COR # BLD: 5.15 10*3/MM3 (ref 4.5–10.7)

## 2018-05-01 PROCEDURE — 36415 COLL VENOUS BLD VENIPUNCTURE: CPT

## 2018-05-01 PROCEDURE — 36593 DECLOT VASCULAR DEVICE: CPT

## 2018-05-01 PROCEDURE — 80053 COMPREHEN METABOLIC PANEL: CPT | Performed by: INTERNAL MEDICINE

## 2018-05-01 PROCEDURE — 86300 IMMUNOASSAY TUMOR CA 15-3: CPT | Performed by: INTERNAL MEDICINE

## 2018-05-01 PROCEDURE — 99214 OFFICE O/P EST MOD 30 MIN: CPT | Performed by: INTERNAL MEDICINE

## 2018-05-01 PROCEDURE — 85007 BL SMEAR W/DIFF WBC COUNT: CPT | Performed by: INTERNAL MEDICINE

## 2018-05-01 PROCEDURE — 85025 COMPLETE CBC W/AUTO DIFF WBC: CPT | Performed by: INTERNAL MEDICINE

## 2018-05-01 PROCEDURE — 25010000002 ALTEPLASE 2 MG RECONSTITUTED SOLUTION: Performed by: INTERNAL MEDICINE

## 2018-05-01 RX ADMIN — ALTEPLASE 2 MG: 2.2 INJECTION, POWDER, LYOPHILIZED, FOR SOLUTION INTRAVENOUS at 15:40

## 2018-05-01 NOTE — PROGRESS NOTES
REASON FOR FOLLOWUP:   1. Newly diagnosed large right breast cancer.  Core needle biopsy reported invasive mammary carcinoma with focal lobular feature, grade 2.  ER negative, less than 1%; AK positive, moderate in staining, 5% to 10%. HER2 IHC 2+, FISH study positive 2.1.   2.  CT scan for chest abdomen and pelvis and breast MRI examination reported right axillary lymph node suspicious for metastatic disease.  No remote metastatic lesion.  Patient has stage IIIa (T3 N2 M0) disease.   3.  Patient was started neoadjuvant chemotherapy on 5/23/2017 with Taxol weekly plus Herceptin weekly for total 12 weeks, and Perjata every 3 weeks during chemotherapy.  Herceptin will be converted to every 3 weeks after chemotherapy finished.    4.  Chronic moderate thrombocytopenia, mild anemia, and intermittent mild neutropenia etiologies are not clear.  5.  Reaction to Herceptin C1D1.  Subsequently tolerated therapy with hydrocortisone as premedication.  6.  Potential cardiac strain developing, neuropathic symptoms persist, follow-up MRI and surgical assessment will be needed post initial chemotherapeutic phase  7.  MRI August 17 with interval resolution of abnormal enhancement within breast and right axillary adenopathy, surgery scheduled November 7, Herceptin ongoing every 3 weeks  8.  Patient seen in office November 28, status post surgery with apparent complete response, Herceptin continued  9.  Herceptin given December 19, subsequent injury in [place] leading to prolonged stay, single treatment given through additional cancer Center  10.  Patient seen May 01, 2018, no further Herceptin planned, baseline scans pursued posttreatment, post influenza syndrome, physical therapy planned                      HISTORY OF PRESENT ILLNESS:    The patient is a pleasant 77 y.o. with the above-mentioned history, who presents today in anticipation of cycle 4 of Herceptin, Perjeta and Taxol.  This is the first visit with this physician  involving this patient's case.  We have reviewed her status today with her slowly developing neuropathic symptoms in her lower extremities but also involving her fingers recognized significantly and she plays the piano and keyboard.  This is becoming harder to do but she is still able to do so.  She also notices neuropathy in her feet but is able to walk and function in daily activities.  Additional to this is her continued grieving at the death of her  though she, fortunately, has an excellent support system.  She continues to experience nausea that is well relieved with Compazine. She denies oral mucositis.  No diarrhea no constipation no chest pain no dyspnea.  No lower extremity edema.    She did received 2 units of PRBC's on   7/8/2017 and remains hematologically stable.   She does have difficulty with sleep and Ambien 10 mg daily at bedtime seems help much better compared to 5 mg dose.  She does not need refills today.  In reviewing her case July 26 she is entering the fourth cycle of her treatment and recent echocardiogram is reviewed with she and her close friend revealing EF of 66.7% though with global strain of -16%?  Suspicious for myopathic process.  Normal ventricular cavity size and wall thickness as well as contractility.  We have also discussed that she is responding to therapy and will need surgical assessment which may not as yet have been performed.  She has seen Dr. Melo for port placement and will ask him to review her likely just after she has completed his fourth cycle of therapy.  It is also apparent that she'll need a cardiac assessment as we continue subsequent Herceptin therapy perioperatively.      The patient underwent MRI of the breasts August 17 demonstrating interval resolution of abnormal enhancement within the right breast, resolution of right axillary adenopathy had also resolved.  The findings were consistent with a complete response to neoadjuvant chemotherapy.  The  patient was seen in subsequent follow-up with Dr. Melo and was determined to proceed with surgery and ultimately sentinel node evaluation.  This is now scheduled November 7 and there are additional plans to consider radiation therapy postoperatively and we have also discussed the use of anti-hormonal therapy considering her mildly positive NE status.    The patient was able to proceed to surgery undergoing right breast mastectomy and sentinel lymph node biopsy November 07, 2017.  She did well postoperatively seeing Dr. Melo November 16.  Pathology revealed no residual malignancy in the breast and 3 negative sentinel lymph nodes.  A formal review of her report reveals treatment effect particularly in her largest lymph node with focal fibrosis and scar, right breast mastectomy fibrosis associated with a cavitary biopsy site and no invasive or in situ carcinoma.  The patient is now seen in our office though she went to the wrong location and the seen late in the day.  We discussed her findings and agree that she would continue Herceptin at this point but be reviewed formally again in 3 weeks.  She is also be seen by radiation therapy for their input.   The patient, evidently, was seen by radiation therapy but decided not to pursue it until her last Herceptin therapy here December 19.  Following this she visited her daughter in [place] and, unfortunately, developed influenza and pneumonia.  She had a prolonged hospitalization and was at many sites in recovery over a several month only to return to [place] in the last several weeks.  She did take 1 IV Herceptin dose while in [place] but as she is reviewed May 05, 2018 we have discussed that it makes no particular sense to continue or add on to her previous history by extending Herceptin any further 3-4 months after her last treatment.  She therefore has completed Herceptin.    Past Medical History:   Diagnosis Date   • Acute bronchitis    • Alcoholism 1978     I haven't had a drink since then   • Anemia    • Breast cancer 05/03/2017    Right breast invasive mammary carcinoma with focal lobular features, grade 2, ER negative, less than 1% [place] positive, HER-2 positive, stage IIIa disease (T3, N2, M0   • Breast cancer 10/20/2004    Left breast ductal carcinoma in-situ, predominately intermediate grade with focal comedonecrosis (high grade), solid and bribridform patterns involving approximately five core biopsy fragments   • Breast mass 2005 & 2017   • Chronic pain    • Colitis, acute 09/10/2011    Hx of Acute colitis. ER records   • Colon polyp    • Conjunctivitis    • Cough    • Edema     Chronic lower extremity edema   • GERD (gastroesophageal reflux disease) 09/13/2011    Dr. Paul Negron   • GI (gastrointestinal bleed) 1997   • H/O Bowel obstruction 2013   • H/O jaundice    • Hernia    • History of infectious mononucleosis 1960   • History of migraine headaches    • History of transfusion 1997   • Hx of colonic polyps 06/11/2009    Dr. Giles   • Hx of dehydration 09/13/2011    Related to Refractory nausea and vomiting   • Hx of hypercholesterolemia 09/13/2011    Dr. Paul Saba   • Hyperlipidemia    • Hypertension    • Impacted cerumen of left ear    • Leukocytopenia    • Leukopenia    • Meningioma    • Osteoarthritis 09/13/2011    Dr. Jamil Miller   • Peptic ulceration    • Perioral dermatitis    • SBO (small bowel obstruction)     due to hernia with surgical repair in 09/2011   • SOB (shortness of breath) on exertion    • Thrombocytopenia    • Vertigo    Normal echocardiogram on 5/18/2017, LVEF 68%.    Past Surgical History:   Procedure Laterality Date   • APPENDECTOMY N/A 1960   • BLADDER REPAIR N/A 1980   • BREAST BIOPSY Left 10/20/2004    Mammotome incisional biopsy left breast, surgical specimen, left breast, localization clip placement, left breast, confirmatory diagnostic unilateral mammogram, left breast-Dr. Tyrese Alcala, [facility]   • BREAST BIOPSY Right  2017    PATH: INVASIVE MAMMARY CARCINOMA   • CHOLECYSTECTOMY N/A    • COLONOSCOPY N/A 2009    Hemorrhoids, otherwise normal, repeat in 5 years-Dr. Noemí Purcell   • EYE SURGERY  2017    lazer surgery for blood clot   • FEMORAL HERNIA REPAIR Right 2011    Repair of incarcerated femoral hernia-Dr. Alfred Mcdowell   • HYSTERECTOMY Bilateral    • MASTECTOMY Left     Left breast mastecotmy with sentinel lymph node biospy-[facility]   • MASTECTOMY W/ SENTINEL NODE BIOPSY Right 2017    Procedure: RIGHT BREAST MASTECTOMY WITH SENTINEL NODE BIOPSY;  Surgeon: Christian Melo MD;  Location: [facility] MAIN OR;  Service:    • RI INSJ TUNNELED CVC W/O SUBQ PORT/ AGE 5 YR/> Left 2017    Procedure: INSERTION VENOUS ACCESS DEVICE;  Surgeon: Christian Melo MD;  Location: [facility] MAIN OR;  Service: General   • REDUCTION MAMMAPLASTY Right     to match Lt. TRAM flap   • TONSILLECTOMY Bilateral    • UPPER GASTROINTESTINAL ENDOSCOPY N/A 2011    LA grade A esophagitis with no bleeding, large hiatus hernia (50cm), gastric ulcer-Dr.Laszlo Lezama   Lico catheter placement on 2017 by Dr. Melo.     OB/GYN history: Menarche age 16, menopause at 1985. , 1 miscarriage. No birth control pill use. She did have post menopause hormonal supplementation.      HEMATOLOGIC/ONCOLOGIC HISTORY: The patient is a 76 y.o. year old female whom we are consulted for a newly self discovered right breast mass, in 2017. Patient had ultrasound-guided biopsy on 5/3/2017 and confirmed to be invasive mammary carcinoma with focal lobular features, grade 2 Elen score 6/9. She is here for initial evaluation for management.      Patient is a 76-year-old  female who was seen here previously for chronic mild-to-moderate thrombocytopenia and mild anemia. Recently the patient reports she started having firmness of the right breast that started sometime in April or maybe even in March. She  noticed firmness spread from about around the 12 o'clock position, gradually towards the upper lateral side and lower part of the right breast associated mild pain. The patient thought it was related to fibrocystic changes. However, the symptom was getting worse. Patient also reported dented nipple after she started having pain in the right breast. She denies discharge from the nipple. She called her primary care physician, Dr. Martin, who ordered a mammogram study. This was done on 04/12/2017 with architectural distortion of the right breast centrally at 12 o'clock position and had scattered fibroglandular densities throughout the right breast.      The patient subsequently had right breast ultrasound examination on 04/26/2017. Discovered a large right breast mass measuring 5.8 x 3.5 x 3.9 cm. Patient subsequently had ultrasound-guided right breast biopsy on 05/03/2017. Pathology evaluation reported invasive mammary carcinoma with focal lobular feature. Elen score 6/9, overall grade 2. Sample was further sent to the [facility] for test. ER negative, less than 1%; IN positive, moderate in staining, 5% to 10%. HER2 IHC 2+, but FISH study positive 2.1.     She denies weight loss, she actually eats very well. No nausea vomiting. Patient does complain of insomnia, unable to sleep.      This patient has history of left breast DCIS back in 2007, had mastectomy at the [facility]. No hormonal therapy afterwards according to patient. This patient also had a small meningioma, followed by Dr. Smith in [facility], with most recent MRI of the brain in March 2017, with 18 mm meningioma, and followed on an annual basis.     Her neutrophil count on 5/16/17 is normal at 2500, however, records showed starting from 05/2015 and in 09/2016, 01/2017 and 03/2017, all 4 laboratory studies showed mild neutropenia with ANC fluctuating between 1200 and 1600. Etiology is not  clear.    Normal echocardiogram on 5/18/2017, LVEF 68%.      CT scan for chest abdomen and pelvis on 5/22/2017 and breast MRI examination on 5/22/2017 reported small right axillary lymph node suspicious for metastatic disease.  No remote metastatic lesion.  Patient has stage IIIa (T3 N2 M0) disease.      Patient will start neoadjuvant chemotherapy with Taxol weekly plus Herceptin weekly for total 12 weeks, and Perjeta every 3 weeks (3-week cycle) during chemotherapy.  Herceptin will be converted to every 3 weeks after chemotherapy finished.  Cycle 1 day 1 5/23/2017.   Status post neoadjuvant chemotherapy the patient was assessed with MRI showing a complete neoadjuvant response.  The patient was reviewed for surgery and questions concerning lymph node dissection-sentinel node evaluation-and need for radiation therapy postop were considered as well as use of additional Herceptin for the balance of the year as well as additional use of anti-hormonal therapy.  Surgery was scheduled November 07, 2017.  Patient next seen in office November 28 having undergone surgery on November 7 with results revealing no residual malignancy in either breast or associated sentinel lymph nodes.  Was elected to continue Herceptin when seen back in office November 20 having initiated Herceptin May 23, 2017.    Patient continued Herceptin with her last treatment given December 19, 2017.     Unfortunately she later experienced an accident while in [place] December 28 with prolonged hospitalization and prolonged ventilator management required.  Apparently she had at least one IV Herceptin therapy given while there and we were contacted March 20, 2018- Dr. Nuno.  Eventually the patient was able to return to [place] is seen back in office May 5 at which time we concluded that she completed Herceptin therapy as result of being off treatment for so long.  Plans were made for baseline scans.      MEDICATIONS: The current medication list was  reviewed with the patient and updated in the EMR this date per the Medical Assistant. Medication dosages and frequencies were confirmed to be accurate.       ALLERGIES:    Allergies   Allergen Reactions   • Codeine Nausea And Vomiting   • Morphine Nausea And Vomiting     SOCIAL HISTORY:   Social History   Substance Use Topics   • Smoking status: Former Smoker     Packs/day: 2.00     Years: 30.00     Types: Cigarettes     Start date: 1/30/1957     Quit date: 4/10/1987   • Smokeless tobacco: Former User      Comment: I haven't had a cigarette in over 30 years   • Alcohol use No      Comment: stopped, heavy in past     FAMILY HISTORY:  Family History   Problem Relation Age of Onset   • Heart disease Mother    • Aortic aneurysm Mother      abdominal   • Coronary artery disease Mother    • Hypertension Mother    • Miscarriages / Stillbirths Mother    • Heart disease Father    • Heart attack Father      acute   • Hypertension Father    • Early death Father    • Hearing loss Father    • Kidney disease Father    • Breast cancer Daughter 45   • Heart disease Brother    • Hypertension Brother    • Malig Hyperthermia Neg Hx      I have reviewed the patient's medical history in detail and updated the computerized patient record.    REVIEW OF SYSTEMS:  GENERAL: See history of present illness. No change in appetite or weight;   No fevers, chills, sweats.   SKIN: No nonhealing lesions. No rashes.   HEME/LYMPH: See HPI.   EYES: No vision changes or diplopia.   ENT: No tinnitus, hearing loss, gum bleeding, epistaxis, hoarseness or dysphagia.   RESPIRATORY: No cough, Has exertional dyspnea, No hemoptysis or wheezing.   CVS: No chest pain, palpitations, orthopnea, dyspnea on exertion or PND.   GI: See HPI.   : No lower tract obstructive symptoms, dysuria or hematuria.   MUSCULOSKELETAL: Chronic or joint pain, arthritis.  Has mild intermittent ankle swelling.   NEUROLOGICAL: See HPI  PSYCHIATRIC: See HPI     Objective:   Vitals:     "05/01/18 1549   BP: 128/78   Pulse: 71   Resp: 16   Temp: 98 °F (36.7 °C)   TempSrc: Oral   SpO2: 97%   Weight: Comment: pt  refuse to weigh   Height: 157.5 cm (62.01\")   PainSc: 0-No pain   ECOG 0      PHYSICAL EXAM:   GENERAL: Well-developed, well-nourished female, in no acute distress.   SKIN: Warm, dry without rashes, purpura or petechiae.  Left upper chest Lico catheter in place, no evidence of infection.   EYES: Pupils equal, round and reactive to light. EOMs intact. Conjunctivae normal.  EARS: Hearing intact.  NOSE:  No excoriations or nasal discharge.  MOUTH: Tongue is well-papillated; no stomatitis or ulcers. Lips normal.  THROAT: Oropharynx without lesions or exudates.  LYMPHATICS: No cervical, supraclavicular, axillary adenopathy.  CHEST: Lungs clear to auscultation. Good airflow.   BREAST:Postop, Well healed right mastectomy site with no evidence of recurrent disease, no axillary adenopathy bilaterally.  CARDIAC: Regular rate and rhythm without murmurs. Normal S1,S2.  ABDOMEN: Slightly distended, normal active bowel sounds,  no hepatosplenomegaly or masses.  EXTREMITIES: No clubbing, cyanosis or edema.  NEUROLOGICAL: Cranial Nerves II-XII grossly intact. No focal neurological deficits.  PSYCHIATRIC: Alert and oriented, pleased about her results      RECENT LABS:  Lab Results   Component Value Date    WBC 5.49 12/19/2017    HGB 10.5 (L) 12/19/2017    HCT 32.4 (L) 12/19/2017    MCV 95.6 12/19/2017     (L) 12/19/2017     Lab Results   Component Value Date    NEUTROABS 2.32 12/19/2017     Lab Results   Component Value Date    GLUCOSE 93 12/19/2017    BUN 11 12/19/2017    CREATININE 0.68 12/19/2017    EGFRIFNONA 84 12/19/2017    EGFRIFAFRI 95 09/27/2017    BCR 16.2 12/19/2017    K 3.7 12/19/2017    CO2 25.8 12/19/2017    CALCIUM 9.3 12/19/2017    PROTENTOTREF 6.5 03/27/2017    ALBUMIN 4.20 12/19/2017    LABIL2 1.6 12/19/2017    AST 27 12/19/2017    ALT 23 12/19/2017     Acquired echocardiogram 2D " complete with contrast   July 24, 2017     Interpretation Summary   · Left ventricular systolic function is normal. Calculated EF = 66.7%. Estimated EF was in agreement with the calculated EF. Estimated EF = 67%. Global strain = -16%. Strain data was reviewed by physician. The global longitudinal RV strain is slightly diminished and decreased from prior study where it was -21. This is suspicious for early myopathic process, possibly related to chemotherapy.  · Normal left ventricular cavity size and wall thickness noted. All left ventricular wall segments contract normally. Left ventricular diastolic dysfunction is noted (grade I) consistent with impaired relaxation.       Assessment/Plan   1. Large right breast cancer, locally advanced, stage IIIa (T3 N1/ 2 M0).  ER negative, less than 1%; LA positive, moderate in staining, 5% to 10%. HER2 IHC 2+, FISH study positive 2.1.  Physical examination showed a large mass, 7 cm x 7 cm initially which has decreased to approximately less than 4 cm ..  Patient  proceeded through 4 cycles ceptin,Perjeta and Taxol.   Her subsequent MRI examination showed complete response by imaging including right axillary lymphadenopathy.  We have continued Herceptin and that includes today October 20, 2017.  The case was discussed with Dr. Melo and plans were to proceed with mastectomy plus right axillary lymph node assessment via sentinel node evaluation. it was felt she likely require radiation therapy post procedure.  The patient was scheduled and proceeded to surgery November 7.  Her results, wonderfully, revealed no evidence of residual disease in the breast or associated sentinel lymph nodes. She was seen by radiation therapy and offered treatment did not pursue it.  Additionally, and somewhat complicating this story, she traveled to [place] and developed severe influenza, associated pneumonia and was hospitalized for several months only to return to [place] in the last several  weeks now being seen back May 1.  There is no particular benefit from trying to add Herceptin back to her therapy now and is felt that she is completed Herceptin treatment.  She wishes consider reconstruction and baseline CT scans will be requested in 5 weeks with the patient being seen in 6 weeks follow-up.    2.  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                                                                            3.  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

## 2018-05-04 ENCOUNTER — TELEPHONE (OUTPATIENT)
Dept: ONCOLOGY | Facility: CLINIC | Age: 78
End: 2018-05-04

## 2018-05-04 NOTE — TELEPHONE ENCOUNTER
Pt. States she is suppose to go to physical therapy at [place], but states she would rather go to some place closer to her.  Pt. Lives in [place].  States there is a place called [facility].  Order from dr. shell printed and faxed to [phone].  Confirmation received.  Their telephone # is [phone].  Informed pt. To call by the end of next week if she hasn't received a call from them.  V/u.

## 2018-05-14 ENCOUNTER — TELEPHONE (OUTPATIENT)
Dept: FAMILY MEDICINE CLINIC | Facility: CLINIC | Age: 78
End: 2018-05-14

## 2018-05-14 RX ORDER — HYDROCHLOROTHIAZIDE 25 MG/1
25 TABLET ORAL DAILY
Qty: 30 TABLET | Refills: 1 | Status: SHIPPED | OUTPATIENT
Start: 2018-05-14 | End: 2018-08-10 | Stop reason: SDUPTHER

## 2018-05-14 NOTE — TELEPHONE ENCOUNTER
SENT RX TO PHARMACY FOR PATIENT.     ----- Message from Deandra Marmolejo MA sent at 5/14/2018 12:12 PM EDT -----  Contact: PT  PT NEEDS RX REFILL hydrochlorothiazide (HYDRODIURIL) 25 MG tablet/ ONCE A DAY/ QTY 30        PT IS USING [facility] [address] [place] [address] [address] [facility] - [phone]  - [phone] FX      PT CAN BE REACHED [phone]

## 2018-05-24 LAB
ALBUMIN SERPL-MCNC: 4.1 G/DL (ref 3.5–5.2)
ALBUMIN/GLOB SERPL: 1.9 G/DL
ALP SERPL-CCNC: 59 U/L (ref 39–117)
ALT SERPL-CCNC: 17 U/L (ref 1–33)
AST SERPL-CCNC: 22 U/L (ref 1–32)
BASOPHILS # BLD AUTO: 0.01 10*3/MM3 (ref 0–0.2)
BASOPHILS NFR BLD AUTO: 0.2 % (ref 0–1.5)
BILIRUB SERPL-MCNC: 0.3 MG/DL (ref 0.1–1.2)
BUN SERPL-MCNC: 15 MG/DL (ref 8–23)
BUN/CREAT SERPL: 19.2 (ref 7–25)
CALCIUM SERPL-MCNC: 9.3 MG/DL (ref 8.6–10.5)
CHLORIDE SERPL-SCNC: 104 MMOL/L (ref 98–107)
CHOLEST SERPL-MCNC: 204 MG/DL (ref 0–200)
CO2 SERPL-SCNC: 26.3 MMOL/L (ref 22–29)
CREAT SERPL-MCNC: 0.78 MG/DL (ref 0.57–1)
DIFFERENTIAL COMMENT: NORMAL
EOSINOPHIL # BLD AUTO: 0.15 10*3/MM3 (ref 0–0.7)
EOSINOPHIL NFR BLD AUTO: 2.6 % (ref 0.3–6.2)
ERYTHROCYTE [DISTWIDTH] IN BLOOD BY AUTOMATED COUNT: 13.5 % (ref 11.7–13)
GFR SERPLBLD CREATININE-BSD FMLA CKD-EPI: 72 ML/MIN/1.73
GFR SERPLBLD CREATININE-BSD FMLA CKD-EPI: 87 ML/MIN/1.73
GLOBULIN SER CALC-MCNC: 2.2 GM/DL
GLUCOSE SERPL-MCNC: 104 MG/DL (ref 65–99)
HCT VFR BLD AUTO: 31.4 % (ref 35.6–45.5)
HDLC SERPL-MCNC: 35 MG/DL (ref 40–60)
HGB BLD-MCNC: 9.8 G/DL (ref 11.9–15.5)
IMM GRANULOCYTES # BLD: 0.05 10*3/MM3 (ref 0–0.03)
IMM GRANULOCYTES NFR BLD: 0.9 % (ref 0–0.5)
LDLC SERPL CALC-MCNC: 134 MG/DL (ref 0–100)
LDLC/HDLC SERPL: 3.83 {RATIO}
LYMPHOCYTES # BLD AUTO: 1.51 10*3/MM3 (ref 0.9–4.8)
LYMPHOCYTES NFR BLD AUTO: 26.3 % (ref 19.6–45.3)
MCH RBC QN AUTO: 30.7 PG (ref 26.9–32)
MCHC RBC AUTO-ENTMCNC: 31.2 G/DL (ref 32.4–36.3)
MCV RBC AUTO: 98.4 FL (ref 80.5–98.2)
MONOCYTES # BLD AUTO: 1.77 10*3/MM3 (ref 0.2–1.2)
MONOCYTES NFR BLD AUTO: 30.8 % (ref 5–12)
NEUTROPHILS # BLD AUTO: 2.31 10*3/MM3 (ref 1.9–8.1)
NEUTROPHILS NFR BLD AUTO: 40.1 % (ref 42.7–76)
NRBC BLD AUTO-RTO: 0 /100 WBC (ref 0–0)
PLATELET # BLD AUTO: 77 10*3/MM3 (ref 140–500)
PLATELET BLD QL SMEAR: NORMAL
POTASSIUM SERPL-SCNC: 4.4 MMOL/L (ref 3.5–5.2)
PROT SERPL-MCNC: 6.3 G/DL (ref 6–8.5)
RBC # BLD AUTO: 3.19 10*6/MM3 (ref 3.9–5.2)
RBC MORPH BLD: NORMAL
SODIUM SERPL-SCNC: 144 MMOL/L (ref 136–145)
TRIGL SERPL-MCNC: 175 MG/DL (ref 0–150)
VLDLC SERPL CALC-MCNC: 35 MG/DL (ref 5–40)
WBC # BLD AUTO: 5.75 10*3/MM3 (ref 4.5–10.7)

## 2018-05-31 ENCOUNTER — OFFICE VISIT (OUTPATIENT)
Dept: FAMILY MEDICINE CLINIC | Facility: CLINIC | Age: 78
End: 2018-05-31

## 2018-05-31 VITALS
BODY MASS INDEX: 26.68 KG/M2 | HEIGHT: 62 IN | OXYGEN SATURATION: 94 % | DIASTOLIC BLOOD PRESSURE: 76 MMHG | SYSTOLIC BLOOD PRESSURE: 126 MMHG | HEART RATE: 85 BPM | WEIGHT: 145 LBS

## 2018-05-31 DIAGNOSIS — E78.5 HYPERLIPIDEMIA, UNSPECIFIED HYPERLIPIDEMIA TYPE: ICD-10-CM

## 2018-05-31 DIAGNOSIS — I10 ESSENTIAL HYPERTENSION: ICD-10-CM

## 2018-05-31 DIAGNOSIS — Z23 NEED FOR VACCINATION: Primary | ICD-10-CM

## 2018-05-31 DIAGNOSIS — G62.0 PERIPHERAL NEUROPATHY DUE TO CHEMOTHERAPY (HCC): ICD-10-CM

## 2018-05-31 DIAGNOSIS — T45.1X5A PERIPHERAL NEUROPATHY DUE TO CHEMOTHERAPY (HCC): ICD-10-CM

## 2018-05-31 PROCEDURE — 90732 PPSV23 VACC 2 YRS+ SUBQ/IM: CPT | Performed by: INTERNAL MEDICINE

## 2018-05-31 PROCEDURE — G0009 ADMIN PNEUMOCOCCAL VACCINE: HCPCS | Performed by: INTERNAL MEDICINE

## 2018-05-31 PROCEDURE — 99214 OFFICE O/P EST MOD 30 MIN: CPT | Performed by: INTERNAL MEDICINE

## 2018-05-31 NOTE — PATIENT INSTRUCTIONS
Blood pressure is stable.  Total cholesterol has elevated to 204 and triglycerides are mildly elevated at 175.  HDL is low at 35 and LDL is moderately elevated at 134.  Fasting blood sugar is slightly increased at 104.  Hemoglobin is low at 9.8 and platelet count is low at 77.  Discussed low-sodium low sugar diet.

## 2018-05-31 NOTE — PROGRESS NOTES
Subjective   Roxana Brizuela is a 77 y.o. female. Patient is here today for   Chief Complaint   Patient presents with   • Hyperlipidemia     lab follow up   • Hypertension          Vitals:    05/31/18 1123   BP: 126/76   Pulse: 85   SpO2: 94%       The following portions of the patient's history were reviewed and updated as appropriate: allergies, current medications, past family history, past medical history, past social history, past surgical history and problem list.    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 ER records   • Colon polyp    • Conjunctivitis    • Cough    • Edema     Chronic lower extremity edema   • GERD (gastroesophageal reflux disease) 09/13/2011    Dr. Paul Negron   • GI (gastrointestinal bleed) 1997   • H/O Bowel obstruction 2013   • H/O jaundice    • Hernia    • History of infectious mononucleosis 1960   • History of migraine headaches    • History of transfusion 1997   • Hx of colonic polyps 06/11/2009    Dr. Giles   • Hx of dehydration 09/13/2011    Related to Refractory nausea and vomiting   • Hx of hypercholesterolemia 09/13/2011    Dr. Paul Saba   • Hyperlipidemia    • Hypertension    • Impacted cerumen of left ear    • Leukocytopenia    • Leukopenia    • Meningioma    • Osteoarthritis 09/13/2011    Dr. Jamil Miller   • Peptic ulceration    • Perioral dermatitis    • SBO  (small bowel obstruction)     due to hernia with surgical repair in 09/2011   • SOB (shortness of breath) on exertion    • Thrombocytopenia    • Vertigo       Allergies   Allergen Reactions   • Codeine Nausea And Vomiting   • Morphine Nausea And Vomiting      Social History     Social History   • Marital status:      Spouse name: Mike   • Number of children: 2   • Years of education: College     Occupational History   •  Retired     [employer]igator [employer]     Social History Main Topics   • Smoking status: Former Smoker     Packs/day: 2.00     Years: 30.00     Types: Cigarettes     Start date: 1/30/1957     Quit date: 4/10/1987   • Smokeless tobacco: Former User      Comment: I haven't had a cigarette in over 30 years   • Alcohol use No      Comment: stopped, heavy in past   • Drug use: No   • Sexual activity: No     Other Topics Concern   • Not on file     Social History Narrative   • No narrative on file        Current Outpatient Prescriptions:   •  Ascorbic Acid (VITAMIN C PO), Take 1,000 mg by mouth Daily., Disp: , Rfl:   •  Calcium Carbonate-Vitamin D (CALTRATE 600+D PO), Take 600 mg by mouth 2 (Two) Times a Day., Disp: , Rfl:   •  cephalexin (KEFLEX) 500 MG capsule, Take 500 mg by mouth 3 (Three) Times a Day., Disp: , Rfl:   •  diclofenac (VOLTAREN) 75 MG EC tablet, Take 75 mg by mouth 2 (Two) Times a Day., Disp: , Rfl:   •  gabapentin (NEURONTIN) 300 MG capsule, Take 600 mg by mouth 3 (Three) Times a Day., Disp: , Rfl:   •  Glucos-Chond-Sterol-Fish Oil (GLUCOSAMINE CHONDROITIN PLUS) capsule, Take 1 tablet by mouth 2 (Two) Times a Day., Disp: , Rfl:   •  hydrochlorothiazide (HYDRODIURIL) 25 MG tablet, Take 1 tablet by mouth Daily., Disp: 30 tablet, Rfl: 1  •  hydrOXYzine (VISTARIL) 25 MG capsule, TAKE ONE CAPSULE BY MOUTH TWICE DAILY, Disp: 60 capsule, Rfl: 0  •  KLOR-CON 20 MEQ CR tablet, TAKE ONE TABLET BY MOUTH ONCE DAILY, Disp: 30 tablet, Rfl: 0  •  Lactobacillus (PROBIOTIC ACIDOPHILUS  PO), Take 1 capsule by mouth Daily., Disp: , Rfl:   •  LORazepam (ATIVAN) 0.5 MG tablet, TAKE ONE TABLET BY MOUTH EVERY 8 HOURS AS NEEDED FOR ANXIETY, Disp: 90 tablet, Rfl: 0  •  MELATONIN PO, Take  by mouth As Needed., Disp: , Rfl:   •  pantoprazole (PROTONIX) 40 MG EC tablet, TAKE ONE TABLET BY MOUTH ONCE DAILY, Disp: 90 tablet, Rfl: 3  •  propranolol (INDERAL) 10 MG tablet, Take 1 tablet by mouth 3 (Three) Times a Day., Disp: 90 tablet, Rfl: 2  •  simvastatin (ZOCOR) 80 MG tablet, TAKE ONE TABLET BY MOUTH AT BEDTIME, Disp: 90 tablet, Rfl: 1  •  SUMAtriptan (IMITREX) 50 MG tablet, Take 50 mg by mouth Every 2 (Two) Hours As Needed for migraine. Take one tablet at onset of headache. May repeat dose one time in 2 hours if headache not relieved., Disp: , Rfl:   •  vitamin B-12 (CYANOCOBALAMIN) 1000 MCG tablet, Take 1,000 mcg by mouth Daily., Disp: , Rfl:   •  vitamin B-6 (PYRIDOXINE) 50 MG tablet, Take 50 mg by mouth Daily., Disp: , Rfl:      Objective   History of Present Illness Roxana is here for a blood pressure and lab follow-up.  She has hypertension, hyperlipidemia, gastroesophageal reflux, and neuropathy.  She also has breast cancer and is followed by oncology.  She was hospitalized in [place] with influenza and pneumonia and was on a ventilator.  He also developed Clostridium difficile colitis.  She has recovered.  She has not been eating as healthy as she should and has gotten back to some level of activity with continued physical therapy.  She does complain of some lower extremity edema and also states that her neuropathy is worse after her hospitalization.    Review of Systems   Constitutional: Positive for activity change. Negative for unexpected weight change.   Respiratory: Negative.    Cardiovascular: Positive for leg swelling.   Genitourinary: Negative.    Neurological: Negative.    Psychiatric/Behavioral: Negative.        Physical Exam   Constitutional: She appears well-developed and  well-nourished.   Neck: Carotid bruit is not present. No thyromegaly present.   Cardiovascular: Normal rate, regular rhythm and normal heart sounds.    130/70   Pulmonary/Chest: Effort normal and breath sounds normal.   Musculoskeletal: She exhibits edema (trace).   Lymphadenopathy:     She has no cervical adenopathy.   Neurological: She is alert.   Psychiatric: She has a normal mood and affect. Her behavior is normal. Judgment and thought content normal.   Vitals reviewed.      ASSESSMENT     Problem List Items Addressed This Visit        Cardiovascular and Mediastinum    Hyperlipidemia    Hypertension       Nervous and Auditory    Peripheral neuropathy due to chemotherapy      Other Visit Diagnoses     Need for vaccination    -  Primary    Relevant Orders    Pneumococcal Polysaccharide Vaccine 23-Valent Greater Than or Equal To 1yo Subcutaneous / IM (Completed)          PLAN  Patient Instructions   Blood pressure is stable.  Total cholesterol has elevated to 204 and triglycerides are mildly elevated at 175.  HDL is low at 35 and LDL is moderately elevated at 134.  Fasting blood sugar is slightly increased at 104.  Hemoglobin is low at 9.8 and platelet count is low at 77.  Discussed low-sodium low sugar diet.      Return in about 6 months (around 11/30/2018) for labs CMP, lipid, TSH.

## 2018-06-05 ENCOUNTER — APPOINTMENT (OUTPATIENT)
Dept: CT IMAGING | Facility: HOSPITAL | Age: 78
End: 2018-06-05
Attending: INTERNAL MEDICINE

## 2018-06-07 ENCOUNTER — HOSPITAL ENCOUNTER (OUTPATIENT)
Dept: CT IMAGING | Facility: HOSPITAL | Age: 78
Discharge: HOME | End: 2018-06-07
Attending: INTERNAL MEDICINE | Admitting: INTERNAL MEDICINE

## 2018-06-07 ENCOUNTER — INFUSION (OUTPATIENT)
Dept: ONCOLOGY | Facility: HOSPITAL | Age: 78
End: 2018-06-07

## 2018-06-07 DIAGNOSIS — C50.211 MALIGNANT NEOPLASM OF UPPER-INNER QUADRANT OF RIGHT BREAST IN FEMALE, ESTROGEN RECEPTOR NEGATIVE (HCC): ICD-10-CM

## 2018-06-07 DIAGNOSIS — Z45.2 ENCOUNTER FOR FITTING AND ADJUSTMENT OF VASCULAR CATHETER: Primary | ICD-10-CM

## 2018-06-07 DIAGNOSIS — Z17.1 MALIGNANT NEOPLASM OF UPPER-INNER QUADRANT OF RIGHT BREAST IN FEMALE, ESTROGEN RECEPTOR NEGATIVE (HCC): ICD-10-CM

## 2018-06-07 PROCEDURE — 82565 ASSAY OF CREATININE: CPT

## 2018-06-07 PROCEDURE — 71260 CT THORAX DX C+: CPT

## 2018-06-07 PROCEDURE — 25010000002 IOPAMIDOL 61 % SOLUTION: Performed by: INTERNAL MEDICINE

## 2018-06-07 PROCEDURE — 96523 IRRIG DRUG DELIVERY DEVICE: CPT | Performed by: INTERNAL MEDICINE

## 2018-06-07 PROCEDURE — 74177 CT ABD & PELVIS W/CONTRAST: CPT

## 2018-06-07 PROCEDURE — 0 DIATRIZOATE MEGLUMINE & SODIUM PER 1 ML: Performed by: INTERNAL MEDICINE

## 2018-06-07 RX ORDER — SODIUM CHLORIDE 0.9 % (FLUSH) 0.9 %
10 SYRINGE (ML) INJECTION AS NEEDED
Status: CANCELLED | OUTPATIENT
Start: 2018-06-07

## 2018-06-07 RX ORDER — SODIUM CHLORIDE 0.9 % (FLUSH) 0.9 %
10 SYRINGE (ML) INJECTION AS NEEDED
Status: DISCONTINUED | OUTPATIENT
Start: 2018-06-07 | End: 2018-06-07 | Stop reason: HOSPADM

## 2018-06-07 RX ADMIN — Medication 10 ML: at 10:21

## 2018-06-07 RX ADMIN — IOPAMIDOL 90 ML: 612 INJECTION, SOLUTION INTRAVENOUS at 10:39

## 2018-06-07 RX ADMIN — DIATRIZOATE MEGLUMINE AND DIATRIZOATE SODIUM 30 ML: 660; 100 LIQUID ORAL; RECTAL at 09:15

## 2018-06-08 ENCOUNTER — TELEPHONE (OUTPATIENT)
Dept: FAMILY MEDICINE CLINIC | Facility: CLINIC | Age: 78
End: 2018-06-08

## 2018-06-08 LAB — CREAT BLDA-MCNC: 0.8 MG/DL (ref 0.6–1.3)

## 2018-06-08 RX ORDER — DICLOFENAC SODIUM 75 MG/1
75 TABLET, DELAYED RELEASE ORAL 2 TIMES DAILY
Qty: 60 TABLET | Refills: 5 | Status: SHIPPED | OUTPATIENT
Start: 2018-06-08 | End: 2018-10-04 | Stop reason: HOSPADM

## 2018-06-08 RX ORDER — GABAPENTIN 300 MG/1
600 CAPSULE ORAL 3 TIMES DAILY
Qty: 180 CAPSULE | Refills: 2 | Status: SHIPPED | OUTPATIENT
Start: 2018-06-08 | End: 2018-09-03 | Stop reason: SDUPTHER

## 2018-06-08 NOTE — TELEPHONE ENCOUNTER
Called patient, prescription is ready for .     ----- Message from Shira Machado sent at 6/8/2018  8:52 AM EDT -----  REFILL ON:    GABAPENTIN 600MG 1TID QTY: 90    PLEASE CALL PT WHEN READY FOR  [phone]   # WANTS TO  TODAY IF POSSIBLE#      DICLOFENAC 75MG 1BID QTY: 60     PHARMACY: [address] [facility]    PLEASE CALL PATIENT BACK WITH ANY QUESTIONS.    THANK YOU

## 2018-06-08 NOTE — TELEPHONE ENCOUNTER
I called [facility] pharmacy to OK this and they said to disregard this message- they ordered the medication from a different  and will have it by Monday.    ----- Message from Naida Oglesby MA sent at 6/8/2018  1:18 PM EDT -----  50MG OK     ----- Message -----  From: Shira Machado  Sent: 6/8/2018  12:05 PM  To: VALENTINE Cohen FROM [facility] CALLED NAD SAID THAT THE PATIENT ASKED HER TO CALL ABOUT HER DICLOFENAC. IT WAS WRITTEN FOR 75MG BUT THEY ARE OUT OF STOCK AND PATIENT DOESN'T WANT TO GO TO A DIFFERENT PHARMACY. THEY HAVE A 50MG TABLET AVAIBLE OR WANTED TO KNOW IF SOMETHING OTHER COULD BE CALLED IN.    PLEASE CALL MARLYN BACK [phone]

## 2018-06-13 ENCOUNTER — OFFICE VISIT (OUTPATIENT)
Dept: ONCOLOGY | Facility: CLINIC | Age: 78
End: 2018-06-13

## 2018-06-13 ENCOUNTER — LAB (OUTPATIENT)
Dept: OTHER | Facility: HOSPITAL | Age: 78
End: 2018-06-13

## 2018-06-13 VITALS
OXYGEN SATURATION: 96 % | RESPIRATION RATE: 16 BRPM | DIASTOLIC BLOOD PRESSURE: 74 MMHG | TEMPERATURE: 98 F | WEIGHT: 147.4 LBS | BODY MASS INDEX: 27.12 KG/M2 | HEART RATE: 83 BPM | HEIGHT: 62 IN | SYSTOLIC BLOOD PRESSURE: 131 MMHG

## 2018-06-13 DIAGNOSIS — C50.211 MALIGNANT NEOPLASM OF UPPER-INNER QUADRANT OF RIGHT BREAST IN FEMALE, ESTROGEN RECEPTOR NEGATIVE (HCC): ICD-10-CM

## 2018-06-13 DIAGNOSIS — C50.911 MALIGNANT NEOPLASM OF RIGHT FEMALE BREAST, UNSPECIFIED ESTROGEN RECEPTOR STATUS, UNSPECIFIED SITE OF BREAST (HCC): Primary | ICD-10-CM

## 2018-06-13 DIAGNOSIS — D64.81 ANEMIA ASSOCIATED WITH CHEMOTHERAPY: ICD-10-CM

## 2018-06-13 DIAGNOSIS — Z17.1 MALIGNANT NEOPLASM OF UPPER-INNER QUADRANT OF RIGHT BREAST IN FEMALE, ESTROGEN RECEPTOR NEGATIVE (HCC): ICD-10-CM

## 2018-06-13 DIAGNOSIS — D72.818 OTHER DECREASED WHITE BLOOD CELL (WBC) COUNT: ICD-10-CM

## 2018-06-13 DIAGNOSIS — D64.81 ANEMIA DUE TO ANTINEOPLASTIC CHEMOTHERAPY: ICD-10-CM

## 2018-06-13 DIAGNOSIS — D64.81 ANEMIA DUE TO ANTINEOPLASTIC CHEMOTHERAPY: Primary | ICD-10-CM

## 2018-06-13 DIAGNOSIS — T45.1X5A ANEMIA DUE TO ANTINEOPLASTIC CHEMOTHERAPY: ICD-10-CM

## 2018-06-13 DIAGNOSIS — D69.6 THROMBOCYTOPENIA (HCC): ICD-10-CM

## 2018-06-13 DIAGNOSIS — C50.011 MALIGNANT NEOPLASM OF NIPPLE AND AREOLA, RIGHT FEMALE BREAST (HCC): ICD-10-CM

## 2018-06-13 DIAGNOSIS — T45.1X5A ANEMIA DUE TO ANTINEOPLASTIC CHEMOTHERAPY: Primary | ICD-10-CM

## 2018-06-13 DIAGNOSIS — T45.1X5A ANEMIA ASSOCIATED WITH CHEMOTHERAPY: ICD-10-CM

## 2018-06-13 LAB
BASOPHILS # BLD AUTO: 0.01 10*3/MM3 (ref 0–0.2)
BASOPHILS NFR BLD AUTO: 0.1 % (ref 0–1.5)
DEPRECATED RDW RBC AUTO: 43.2 FL (ref 37–54)
EOSINOPHIL # BLD AUTO: 0.14 10*3/MM3 (ref 0–0.7)
EOSINOPHIL NFR BLD AUTO: 1.6 % (ref 0.3–6.2)
ERYTHROCYTE [DISTWIDTH] IN BLOOD BY AUTOMATED COUNT: 12.9 % (ref 11.7–13)
HCT VFR BLD AUTO: 29.5 % (ref 35.6–45.5)
HGB BLD-MCNC: 9.9 G/DL (ref 11.9–15.5)
IMM GRANULOCYTES # BLD: 0.24 10*3/MM3 (ref 0–0.03)
IMM GRANULOCYTES NFR BLD: 2.7 % (ref 0–0.5)
LYMPHOCYTES # BLD AUTO: 2.15 10*3/MM3 (ref 0.9–4.8)
LYMPHOCYTES NFR BLD AUTO: 24 % (ref 19.6–45.3)
MCH RBC QN AUTO: 30.9 PG (ref 26.9–32)
MCHC RBC AUTO-ENTMCNC: 33.6 G/DL (ref 32.4–36.3)
MCV RBC AUTO: 92.2 FL (ref 80.5–98.2)
MONOCYTES # BLD AUTO: 2.07 10*3/MM3 (ref 0.2–1.2)
MONOCYTES NFR BLD AUTO: 23.2 % (ref 5–12)
NEUTROPHILS # BLD AUTO: 4.33 10*3/MM3 (ref 1.9–8.1)
NEUTROPHILS NFR BLD AUTO: 48.4 % (ref 42.7–76)
NRBC BLD MANUAL-RTO: 0 /100 WBC (ref 0–0)
PLAT MORPH BLD: NORMAL
PLATELET # BLD AUTO: 97 10*3/MM3 (ref 140–500)
PMV BLD AUTO: 11.4 FL (ref 6–12)
RBC # BLD AUTO: 3.2 10*6/MM3 (ref 3.9–5.2)
RBC MORPH BLD: NORMAL
WBC MORPH BLD: NORMAL
WBC NRBC COR # BLD: 8.94 10*3/MM3 (ref 4.5–10.7)

## 2018-06-13 PROCEDURE — 99214 OFFICE O/P EST MOD 30 MIN: CPT | Performed by: INTERNAL MEDICINE

## 2018-06-13 PROCEDURE — 85025 COMPLETE CBC W/AUTO DIFF WBC: CPT | Performed by: INTERNAL MEDICINE

## 2018-06-13 PROCEDURE — 85007 BL SMEAR W/DIFF WBC COUNT: CPT | Performed by: INTERNAL MEDICINE

## 2018-06-13 PROCEDURE — 36415 COLL VENOUS BLD VENIPUNCTURE: CPT

## 2018-06-13 NOTE — PROGRESS NOTES
REASON FOR FOLLOWUP:   1. Newly diagnosed large right breast cancer.  Core needle biopsy reported invasive mammary carcinoma with focal lobular feature, grade 2.  ER negative, less than 1%; VA positive, moderate in staining, 5% to 10%. HER2 IHC 2+, FISH study positive 2.1.   2.  CT scan for chest abdomen and pelvis and breast MRI examination reported right axillary lymph node suspicious for metastatic disease.  No remote metastatic lesion.  Patient has stage IIIa (T3 N2 M0) disease.   3.  Patient was started neoadjuvant chemotherapy on 5/23/2017 with Taxol weekly plus Herceptin weekly for total 12 weeks, and Perjata every 3 weeks during chemotherapy.  Herceptin will be converted to every 3 weeks after chemotherapy finished.    4.  Chronic moderate thrombocytopenia, mild anemia, and intermittent mild neutropenia etiologies are not clear.  5.  Reaction to Herceptin C1D1.  Subsequently tolerated therapy with hydrocortisone as premedication.  6.  Potential cardiac strain developing, neuropathic symptoms persist, follow-up MRI and surgical assessment will be needed post initial chemotherapeutic phase  7.  MRI August 17 with interval resolution of abnormal enhancement within breast and right axillary adenopathy, surgery scheduled November 7, Herceptin ongoing every 3 weeks  8.  Patient seen in office November 28, status post surgery with apparent complete response, Herceptin continued  9.  Herceptin given December 19, subsequent injury in [place] leading to prolonged stay, single treatment given through additional cancer Center  10.  Patient seen May 01, 2018, no further Herceptin planned, baseline scans pursued posttreatment, post influenza syndrome, physical therapy planned  11.  Patient seen June 13, 2018, excellent performance status, improving on physical therapy, equivocal CT  per thoracic adenopathy, follow-up PET/CT planned                     HISTORY OF PRESENT ILLNESS:    The patient is a pleasant 77 y.o.  with the above-mentioned history, who presents today in anticipation of cycle 4 of Herceptin, Perjeta and Taxol.  This is the first visit with this physician involving this patient's case.  We have reviewed her status today with her slowly developing neuropathic symptoms in her lower extremities but also involving her fingers recognized significantly and she plays the piano and keyboard.  This is becoming harder to do but she is still able to do so.  She also notices neuropathy in her feet but is able to walk and function in daily activities.  Additional to this is her continued grieving at the death of her  though she, fortunately, has an excellent support system.  She continues to experience nausea that is well relieved with Compazine. She denies oral mucositis.  No diarrhea no constipation no chest pain no dyspnea.  No lower extremity edema.    She did received 2 units of PRBC's on   7/8/2017 and remains hematologically stable.   She does have difficulty with sleep and Ambien 10 mg daily at bedtime seems help much better compared to 5 mg dose.  She does not need refills today.  In reviewing her case July 26 she is entering the fourth cycle of her treatment and recent echocardiogram is reviewed with she and her close friend revealing EF of 66.7% though with global strain of -16%?  Suspicious for myopathic process.  Normal ventricular cavity size and wall thickness as well as contractility.  We have also discussed that she is responding to therapy and will need surgical assessment which may not as yet have been performed.  She has seen Dr. Melo for port placement and will ask him to review her likely just after she has completed his fourth cycle of therapy.  It is also apparent that she'll need a cardiac assessment as we continue subsequent Herceptin therapy perioperatively.      The patient underwent MRI of the breasts August 17 demonstrating interval resolution of abnormal enhancement within the right  breast, resolution of right axillary adenopathy had also resolved.  The findings were consistent with a complete response to neoadjuvant chemotherapy.  The patient was seen in subsequent follow-up with Dr. Melo and was determined to proceed with surgery and ultimately sentinel node evaluation.  This is now scheduled November 7 and there are additional plans to consider radiation therapy postoperatively and we have also discussed the use of anti-hormonal therapy considering her mildly positive MD status.    The patient was able to proceed to surgery undergoing right breast mastectomy and sentinel lymph node biopsy November 07, 2017.  She did well postoperatively seeing Dr. Melo November 16.  Pathology revealed no residual malignancy in the breast and 3 negative sentinel lymph nodes.  A formal review of her report reveals treatment effect particularly in her largest lymph node with focal fibrosis and scar, right breast mastectomy fibrosis associated with a cavitary biopsy site and no invasive or in situ carcinoma.  The patient is now seen in our office though she went to the wrong location and the seen late in the day.  We discussed her findings and agree that she would continue Herceptin at this point but be reviewed formally again in 3 weeks.  She is also be seen by radiation therapy for their input.   The patient, evidently, was seen by radiation therapy but decided not to pursue it until her last Herceptin therapy here December 19.  Following this she visited her daughter in [place] and, unfortunately, developed influenza and pneumonia.  She had a prolonged hospitalization and was at many sites in recovery over a several month only to return to [place] in the last several weeks.  She did take 1 IV Herceptin dose while in [place] but as she is reviewed May 05, 2018 we have discussed that it makes no particular sense to continue or add on to her previous history by extending Herceptin any further 3-4  months after her last treatment.  She therefore has completed Herceptin.    The patient on to be tested post her treatment again baseline studies and CT of chest and pelvis were performed June 6 revealing interval increase in a few subcentimeter nodes in the left pectoral, axillary and mediastinal regions. These are all considerably small and of uncertain significance.  The patient's performance status remains excellent without any reduction and, in fact, has improved with physical therapy upon return.    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 ER records   • Colon polyp    • Conjunctivitis    • Cough    • Edema     Chronic lower extremity edema   • GERD (gastroesophageal reflux disease) 09/13/2011    Dr. Paul Negron   • GI (gastrointestinal bleed) 1997   • H/O Bowel obstruction 2013   • H/O jaundice    • Hernia    • History of infectious mononucleosis 1960   • History of migraine headaches    • History of transfusion 1997   • Hx of colonic polyps 06/11/2009    Dr. Giles   • Hx of dehydration 09/13/2011    Related to Refractory nausea and vomiting   • Hx of hypercholesterolemia 09/13/2011    Dr. Paul Saba   • Hyperlipidemia    • Hypertension    • Impacted cerumen of left ear    • Leukocytopenia    • Leukopenia    • Meningioma    • Osteoarthritis 09/13/2011    Dr. Jamil Miller   • Peptic ulceration    •  Perioral dermatitis    • SBO (small bowel obstruction)     due to hernia with surgical repair in 2011   • SOB (shortness of breath) on exertion    • Thrombocytopenia    • Vertigo    Normal echocardiogram on 2017, LVEF 68%.    Past Surgical History:   Procedure Laterality Date   • APPENDECTOMY N/A    • BLADDER REPAIR N/A    • BREAST BIOPSY Left 10/20/2004    Mammotome incisional biopsy left breast, surgical specimen, left breast, localization clip placement, left breast, confirmatory diagnostic unilateral mammogram, left breast-Dr. Tyrese Alcala, [facility]   • BREAST BIOPSY Right 2017    PATH: INVASIVE MAMMARY CARCINOMA   • CHOLECYSTECTOMY N/A    • COLONOSCOPY N/A 2009    Hemorrhoids, otherwise normal, repeat in 5 years-Dr. Noemí Purcell   • EYE SURGERY  2017    lazer surgery for blood clot   • FEMORAL HERNIA REPAIR Right 2011    Repair of incarcerated femoral hernia-Dr. Alfred Mcdowell   • HYSTERECTOMY Bilateral    • MASTECTOMY Left     Left breast mastecotmy with sentinel lymph node biospy-[facility]   • MASTECTOMY W/ SENTINEL NODE BIOPSY Right 2017    Procedure: RIGHT BREAST MASTECTOMY WITH SENTINEL NODE BIOPSY;  Surgeon: Christian Melo MD;  Location: [facility] OR;  Service:    • MO INSJ TUNNELED CVC W/O SUBQ PORT/ AGE 5 YR/> Left 2017    Procedure: INSERTION VENOUS ACCESS DEVICE;  Surgeon: Christian Melo MD;  Location: [facility] OR;  Service: General   • REDUCTION MAMMAPLASTY Right     to match Lt. TRAM flap   • TONSILLECTOMY Bilateral    • UPPER GASTROINTESTINAL ENDOSCOPY N/A 2011    LA grade A esophagitis with no bleeding, large hiatus hernia (50cm), gastric ulcer-Dr.Laszlo Lezama   Lico catheter placement on 2017 by Dr. Melo.     OB/GYN history: Menarche age 16, menopause at 1985. , 1 miscarriage. No birth control pill use. She did have post menopause hormonal supplementation.      HEMATOLOGIC/ONCOLOGIC HISTORY: The patient is  a 76 y.o. year old female whom we are consulted for a newly self discovered right breast mass, in April 2017. Patient had ultrasound-guided biopsy on 5/3/2017 and confirmed to be invasive mammary carcinoma with focal lobular features, grade 2 [place] score 6/9. She is here for initial evaluation for management.      Patient is a 76-year-old  female who was seen here previously for chronic mild-to-moderate thrombocytopenia and mild anemia. Recently the patient reports she started having firmness of the right breast that started sometime in April or maybe even in March. She noticed firmness spread from about around the 12 o'clock position, gradually towards the upper lateral side and lower part of the right breast associated mild pain. The patient thought it was related to fibrocystic changes. However, the symptom was getting worse. Patient also reported dented nipple after she started having pain in the right breast. She denies discharge from the nipple. She called her primary care physician, Dr. Martin, who ordered a mammogram study. This was done on 04/12/2017 with architectural distortion of the right breast centrally at 12 o'clock position and had scattered fibroglandular densities throughout the right breast.      The patient subsequently had right breast ultrasound examination on 04/26/2017. Discovered a large right breast mass measuring 5.8 x 3.5 x 3.9 cm. Patient subsequently had ultrasound-guided right breast biopsy on 05/03/2017. Pathology evaluation reported invasive mammary carcinoma with focal lobular feature. [place] score 6/9, overall grade 2. Sample was further sent to the [facility] for test. ER negative, less than 1%; NH positive, moderate in staining, 5% to 10%. HER2 IHC 2+, but FISH study positive 2.1.     She denies weight loss, she actually eats very well. No nausea vomiting. Patient does complain of insomnia, unable to sleep.      This patient has history of  left breast DCIS back in 2007, had mastectomy at the [facility]. No hormonal therapy afterwards according to patient. This patient also had a small meningioma, followed by Dr. Smith in [facility], with most recent MRI of the brain in March 2017, with 18 mm meningioma, and followed on an annual basis.     Her neutrophil count on 5/16/17 is normal at 2500, however, records showed starting from 05/2015 and in 09/2016, 01/2017 and 03/2017, all 4 laboratory studies showed mild neutropenia with ANC fluctuating between 1200 and 1600. Etiology is not clear.    Normal echocardiogram on 5/18/2017, LVEF 68%.      CT scan for chest abdomen and pelvis on 5/22/2017 and breast MRI examination on 5/22/2017 reported small right axillary lymph node suspicious for metastatic disease.  No remote metastatic lesion.  Patient has stage IIIa (T3 N2 M0) disease.      Patient will start neoadjuvant chemotherapy with Taxol weekly plus Herceptin weekly for total 12 weeks, and Perjeta every 3 weeks (3-week cycle) during chemotherapy.  Herceptin will be converted to every 3 weeks after chemotherapy finished.  Cycle 1 day 1 5/23/2017.   Status post neoadjuvant chemotherapy the patient was assessed with MRI showing a complete neoadjuvant response.  The patient was reviewed for surgery and questions concerning lymph node dissection-sentinel node evaluation-and need for radiation therapy postop were considered as well as use of additional Herceptin for the balance of the year as well as additional use of anti-hormonal therapy.  Surgery was scheduled November 07, 2017.  Patient next seen in office November 28 having undergone surgery on November 7 with results revealing no residual malignancy in either breast or associated sentinel lymph nodes.  Was elected to continue Herceptin when seen back in office November 20 having initiated Herceptin May 23, 2017.    Patient continued Herceptin with her last treatment  given December 19, 2017.     Unfortunately she later experienced an accident while in [place] December 28 with prolonged hospitalization and prolonged ventilator management required.  Apparently she had at least one IV Herceptin therapy given while there and we were contacted March 20, 2018- Dr. Nuno.  Eventually the patient was able to return to [place] is seen back in office May 5 at which time we concluded that she completed Herceptin therapy as result of being off treatment for so long.  Plans were made for baseline scans.      MEDICATIONS: The current medication list was reviewed with the patient and updated in the EMR this date per the Medical Assistant. Medication dosages and frequencies were confirmed to be accurate.       ALLERGIES:    Allergies   Allergen Reactions   • Codeine Nausea And Vomiting   • Morphine Nausea And Vomiting     SOCIAL HISTORY:   Social History   Substance Use Topics   • Smoking status: Former Smoker     Packs/day: 2.00     Years: 30.00     Types: Cigarettes     Start date: 1/30/1957     Quit date: 4/10/1987   • Smokeless tobacco: Former User      Comment: I haven't had a cigarette in over 30 years   • Alcohol use No      Comment: stopped, heavy in past     FAMILY HISTORY:  Family History   Problem Relation Age of Onset   • Heart disease Mother    • Aortic aneurysm Mother         abdominal   • Coronary artery disease Mother    • Hypertension Mother    • Miscarriages / Stillbirths Mother    • Heart disease Father    • Heart attack Father         acute   • Hypertension Father    • Early death Father    • Hearing loss Father    • Kidney disease Father    • Breast cancer Daughter 45   • Heart disease Brother    • Hypertension Brother    • Malig Hyperthermia Neg Hx      I have reviewed the patient's medical history in detail and updated the computerized patient record.    REVIEW OF SYSTEMS:  GENERAL: See history of present illness. No change in appetite or weight;   No fevers, chills,  sweats.   SKIN: No nonhealing lesions. No rashes.   HEME/LYMPH: See HPI.   EYES: No vision changes or diplopia.   ENT: No tinnitus, hearing loss, gum bleeding, epistaxis, hoarseness or dysphagia.   RESPIRATORY: No cough, Has exertional dyspnea, No hemoptysis or wheezing.   CVS: No chest pain, palpitations, orthopnea, dyspnea on exertion or PND.   GI: See HPI.   : No lower tract obstructive symptoms, dysuria or hematuria.   MUSCULOSKELETAL: Chronic or joint pain, arthritis.  Has mild intermittent ankle swelling.   NEUROLOGICAL: See HPI  PSYCHIATRIC: See HPI     Objective:   There were no vitals filed for this visit.ECOG 0      PHYSICAL EXAM:   GENERAL: Well-developed, well-nourished female, in no acute distress.   SKIN: Warm, dry without rashes, purpura or petechiae.  Left upper chest Lico catheter in place, no evidence of infection.   EYES: Pupils equal, round and reactive to light. EOMs intact. Conjunctivae normal.  EARS: Hearing intact.  NOSE:  No excoriations or nasal discharge.  MOUTH: Tongue is well-papillated; no stomatitis or ulcers. Lips normal.  THROAT: Oropharynx without lesions or exudates.  LYMPHATICS: No cervical, supraclavicular, axillary adenopathy.  CHEST: Lungs clear to auscultation. Good airflow.   BREAST:Postop, Well healed right mastectomy site with no evidence of recurrent disease, no axillary adenopathy bilaterally.  CARDIAC: Regular rate and rhythm without murmurs. Normal S1,S2.  ABDOMEN: Slightly distended, normal active bowel sounds,  no hepatosplenomegaly or masses.  EXTREMITIES: No clubbing, cyanosis or edema.  NEUROLOGICAL: Cranial Nerves II-XII grossly intact. No focal neurological deficits.  PSYCHIATRIC: Alert and oriented, pleased about her results      RECENT LABS:  Lab Results   Component Value Date    WBC 5.15 05/01/2018    HGB 9.8 (L) 05/24/2018    HCT 31.4 (L) 05/24/2018    MCV 98.4 (H) 05/24/2018    PLT 77 (L) 05/24/2018     Lab Results   Component Value Date    NEUTROABS 2.31  05/24/2018     Lab Results   Component Value Date    GLUCOSE 94 05/01/2018    BUN 15 05/24/2018    CREATININE 0.80 06/07/2018    EGFRIFNONA 72 05/24/2018    EGFRIFAFRI 87 05/24/2018    BCR 19.2 05/24/2018    K 4.4 05/24/2018    CO2 26.3 05/24/2018    CALCIUM 9.3 05/24/2018    PROTENTOTREF 6.3 05/24/2018    ALBUMIN 4.10 05/24/2018    LABIL2 1.9 05/24/2018    AST 22 05/24/2018    ALT 17 05/24/2018     CT CHEST, ABDOMEN, AND PELVIS WITH IV CONTRAST   June 07, 2018     FINDINGS:  CHEST: There are expected postsurgical changes at the right anterior  chest wall and axilla.  There is no lymphadenopathy at the right axilla  or subpectoral region. There is a 7 mm left subpectoral node which  previously measured 4 mm, image 25. There is a node adjacently which is  also slightly more prominent. There are shotty mediastinal nodes which  are stable, but there are also a few tiny mediastinal nodes which are  larger than previously. An 8 mm AP window node previously measured 4 mm.  The tiny node or density along the right internal mammary chain appears  stable, image 51. Left MediPort catheter tip is within the SVC.     ABDOMEN/PELVIS: The liver, spleen, pancreas, adrenals, and kidneys  appear unremarkable other than a small left renal cyst. No acute bowel  abnormality is seen. The uterus is surgically absent. Adnexa appear  unremarkable. There is no free fluid or lymphadenopathy.     IMPRESSION:  1. There has been interval increase in size of a few subcentimeter nodes  at the left subpectoral and axillary regions as well as the mediastinum.  The tiny node or density along the right internal mammary chain is  stable.  2. There is no evidence for metastatic disease within the abdomen or  pelvis.           Assessment/Plan   1. Large right breast cancer, locally advanced, stage IIIa (T3 N1/ 2 M0).  ER negative, less than 1%; IA positive, moderate in staining, 5% to 10%. HER2 IHC 2+, FISH study positive 2.1.  Physical examination  showed a large mass, 7 cm x 7 cm initially which has decreased to approximately less than 4 cm ..  Patient  proceeded through 4 cycles ceptin,Perjeta and Taxol.   Her subsequent MRI examination showed complete response by imaging including right axillary lymphadenopathy.  We have continued Herceptin and that includes today October 20, 2017.  The case was discussed with Dr. Melo and plans were to proceed with mastectomy plus right axillary lymph node assessment via sentinel node evaluation. it was felt she likely require radiation therapy post procedure.  The patient was scheduled and proceeded to surgery November 7.  Her results, wonderfully, revealed no evidence of residual disease in the breast or associated sentinel lymph nodes. She was seen by radiation therapy and offered treatment did not pursue it.  Additionally, and somewhat complicating this story, she traveled to [place] and developed severe influenza, associated pneumonia and was hospitalized for several months only to return to [place] in the last several weeks now being seen back May 1.  There is no particular benefit from trying to add Herceptin back to her therapy now and is felt that she is completed Herceptin treatment.  She wishes consider reconstruction and baseline CT scans will be requested in 5 weeks with the patient being seen in 6 weeks follow-up.The patient reviewed June 13, 2018 with the scans demonstrating very modest increase in left pectoral, axillary or mediastinal nodes.  These are of uncertain significance but do need follow-up in a PET/CT is planned in 7 weeks.  Her anemia will also be reviewed again with additional laboratory studies that visit.  She'll be seen in 8 weeks for general reassessment.       2.  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                                                                            3.  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

## 2018-06-15 ENCOUNTER — TELEPHONE (OUTPATIENT)
Dept: FAMILY MEDICINE CLINIC | Facility: CLINIC | Age: 78
End: 2018-06-15

## 2018-06-15 RX ORDER — PROPRANOLOL HYDROCHLORIDE 10 MG/1
10 TABLET ORAL 3 TIMES DAILY
Qty: 90 TABLET | Refills: 2 | Status: SHIPPED | OUTPATIENT
Start: 2018-06-15 | End: 2018-09-20 | Stop reason: SDUPTHER

## 2018-06-15 NOTE — TELEPHONE ENCOUNTER
Rx sent to pharmacy      ----- Message from Lexie Cuello MA sent at 6/15/2018  9:09 AM EDT -----  Contact: PATIENT  PT NEEDS RX FOR HER propranolol (INDERAL) 10 MG tablet TID #90 SENT TO [facility] IN [place], [place]. THANK YOU. PLEASE CALL PATIENT WITH ANY QUESTIONS -[phone].

## 2018-07-19 RX ORDER — SIMVASTATIN 80 MG
TABLET ORAL
Qty: 90 TABLET | Refills: 1 | Status: SHIPPED | OUTPATIENT
Start: 2018-07-19 | End: 2018-09-26

## 2018-07-19 RX ORDER — PANTOPRAZOLE SODIUM 40 MG/1
TABLET, DELAYED RELEASE ORAL
Qty: 90 TABLET | Refills: 0 | Status: SHIPPED | OUTPATIENT
Start: 2018-07-19 | End: 2018-09-26

## 2018-08-01 ENCOUNTER — HOSPITAL ENCOUNTER (OUTPATIENT)
Dept: PET IMAGING | Facility: HOSPITAL | Age: 78
Discharge: HOME | End: 2018-08-01
Attending: INTERNAL MEDICINE | Admitting: INTERNAL MEDICINE

## 2018-08-01 ENCOUNTER — LAB (OUTPATIENT)
Dept: LAB | Facility: HOSPITAL | Age: 78
End: 2018-08-01

## 2018-08-01 ENCOUNTER — HOSPITAL ENCOUNTER (OUTPATIENT)
Dept: PET IMAGING | Facility: HOSPITAL | Age: 78
Discharge: HOME | End: 2018-08-01
Attending: INTERNAL MEDICINE

## 2018-08-01 DIAGNOSIS — D64.81 ANEMIA DUE TO ANTINEOPLASTIC CHEMOTHERAPY: ICD-10-CM

## 2018-08-01 DIAGNOSIS — C50.911 MALIGNANT NEOPLASM OF RIGHT FEMALE BREAST, UNSPECIFIED ESTROGEN RECEPTOR STATUS, UNSPECIFIED SITE OF BREAST (HCC): ICD-10-CM

## 2018-08-01 DIAGNOSIS — T45.1X5A ANEMIA DUE TO ANTINEOPLASTIC CHEMOTHERAPY: ICD-10-CM

## 2018-08-01 DIAGNOSIS — C50.011 MALIGNANT NEOPLASM OF NIPPLE AND AREOLA, RIGHT FEMALE BREAST (HCC): ICD-10-CM

## 2018-08-01 LAB
ALBUMIN SERPL-MCNC: 4.2 G/DL (ref 3.5–5.2)
ALBUMIN/GLOB SERPL: 1.4 G/DL (ref 1.1–2.4)
ALP SERPL-CCNC: 51 U/L (ref 38–116)
ALT SERPL W P-5'-P-CCNC: 19 U/L (ref 0–33)
ANION GAP SERPL CALCULATED.3IONS-SCNC: 13 MMOL/L
AST SERPL-CCNC: 26 U/L (ref 0–32)
BILIRUB SERPL-MCNC: 0.6 MG/DL (ref 0.1–1.2)
BUN BLD-MCNC: 21 MG/DL (ref 6–20)
BUN/CREAT SERPL: 25.3 (ref 7.3–30)
CALCIUM SPEC-SCNC: 9.3 MG/DL (ref 8.5–10.2)
CANCER AG15-3 SERPL-ACNC: 23.7 U/ML
CHLORIDE SERPL-SCNC: 103 MMOL/L (ref 98–107)
CO2 SERPL-SCNC: 26 MMOL/L (ref 22–29)
CREAT BLD-MCNC: 0.83 MG/DL (ref 0.6–1.1)
DAT POLY-SP REAG RBC QL: NEGATIVE
FERRITIN SERPL-MCNC: 138.7 NG/ML
FOLATE SERPL-MCNC: 14.62 NG/ML (ref 4.78–24.2)
GFR SERPL CREATININE-BSD FRML MDRD: 67 ML/MIN/1.73
GLOBULIN UR ELPH-MCNC: 3 GM/DL (ref 1.8–3.5)
GLUCOSE BLD-MCNC: 69 MG/DL (ref 74–124)
GLUCOSE BLDC GLUCOMTR-MCNC: 87 MG/DL (ref 70–130)
HAPTOGLOB SERPL-MCNC: 162 MG/DL (ref 30–200)
IRON 24H UR-MRATE: 93 MCG/DL (ref 37–145)
IRON SATN MFR SERPL: 28 % (ref 14–48)
LDH SERPL-CCNC: 260 U/L (ref 99–259)
POTASSIUM BLD-SCNC: 3.6 MMOL/L (ref 3.5–4.7)
PROT SERPL-MCNC: 7.2 G/DL (ref 6.3–8)
SODIUM BLD-SCNC: 142 MMOL/L (ref 134–145)
TIBC SERPL-MCNC: 329 MCG/DL (ref 249–505)
TRANSFERRIN SERPL-MCNC: 235 MG/DL (ref 200–360)
VIT B12 BLD-MCNC: 1883 PG/ML (ref 211–946)

## 2018-08-01 PROCEDURE — 83615 LACTATE (LD) (LDH) ENZYME: CPT | Performed by: INTERNAL MEDICINE

## 2018-08-01 PROCEDURE — 82962 GLUCOSE BLOOD TEST: CPT

## 2018-08-01 PROCEDURE — 82728 ASSAY OF FERRITIN: CPT | Performed by: INTERNAL MEDICINE

## 2018-08-01 PROCEDURE — 83010 ASSAY OF HAPTOGLOBIN QUANT: CPT | Performed by: INTERNAL MEDICINE

## 2018-08-01 PROCEDURE — 78815 PET IMAGE W/CT SKULL-THIGH: CPT

## 2018-08-01 PROCEDURE — 82746 ASSAY OF FOLIC ACID SERUM: CPT | Performed by: INTERNAL MEDICINE

## 2018-08-01 PROCEDURE — 80053 COMPREHEN METABOLIC PANEL: CPT | Performed by: INTERNAL MEDICINE

## 2018-08-01 PROCEDURE — 86880 COOMBS TEST DIRECT: CPT | Performed by: INTERNAL MEDICINE

## 2018-08-01 PROCEDURE — 83540 ASSAY OF IRON: CPT | Performed by: INTERNAL MEDICINE

## 2018-08-01 PROCEDURE — 36415 COLL VENOUS BLD VENIPUNCTURE: CPT | Performed by: INTERNAL MEDICINE

## 2018-08-01 PROCEDURE — A9552 F18 FDG: HCPCS | Performed by: INTERNAL MEDICINE

## 2018-08-01 PROCEDURE — 82607 VITAMIN B-12: CPT | Performed by: INTERNAL MEDICINE

## 2018-08-01 PROCEDURE — 0 FLUDEOXYGLUCOSE F18 SOLUTION: Performed by: INTERNAL MEDICINE

## 2018-08-01 PROCEDURE — 86300 IMMUNOASSAY TUMOR CA 15-3: CPT | Performed by: INTERNAL MEDICINE

## 2018-08-01 PROCEDURE — 84466 ASSAY OF TRANSFERRIN: CPT | Performed by: INTERNAL MEDICINE

## 2018-08-01 RX ADMIN — FLUDEOXYGLUCOSE F18 1 DOSE: 300 INJECTION INTRAVENOUS at 11:01

## 2018-08-02 LAB — ETHNIC BACKGROUND STATED: 39.6 MIU/ML (ref 2.6–18.5)

## 2018-08-04 LAB
Lab: NORMAL
METHYLMALONATE SERPL-SCNC: 108 NMOL/L (ref 0–378)

## 2018-08-08 ENCOUNTER — APPOINTMENT (OUTPATIENT)
Dept: OTHER | Facility: HOSPITAL | Age: 78
End: 2018-08-08

## 2018-08-08 ENCOUNTER — OFFICE VISIT (OUTPATIENT)
Dept: ONCOLOGY | Facility: CLINIC | Age: 78
End: 2018-08-08

## 2018-08-08 VITALS
OXYGEN SATURATION: 99 % | HEIGHT: 63 IN | BODY MASS INDEX: 27.04 KG/M2 | DIASTOLIC BLOOD PRESSURE: 73 MMHG | WEIGHT: 152.6 LBS | TEMPERATURE: 97.9 F | SYSTOLIC BLOOD PRESSURE: 146 MMHG | HEART RATE: 83 BPM | RESPIRATION RATE: 12 BRPM

## 2018-08-08 DIAGNOSIS — C50.211 MALIGNANT NEOPLASM OF UPPER-INNER QUADRANT OF RIGHT BREAST IN FEMALE, ESTROGEN RECEPTOR NEGATIVE (HCC): ICD-10-CM

## 2018-08-08 DIAGNOSIS — T45.1X5A PERIPHERAL NEUROPATHY DUE TO CHEMOTHERAPY (HCC): ICD-10-CM

## 2018-08-08 DIAGNOSIS — G62.0 PERIPHERAL NEUROPATHY DUE TO CHEMOTHERAPY (HCC): ICD-10-CM

## 2018-08-08 DIAGNOSIS — E07.9 THYROID DISORDER: Primary | ICD-10-CM

## 2018-08-08 DIAGNOSIS — Z17.1 MALIGNANT NEOPLASM OF UPPER-INNER QUADRANT OF RIGHT BREAST IN FEMALE, ESTROGEN RECEPTOR NEGATIVE (HCC): ICD-10-CM

## 2018-08-08 LAB
BASOPHILS # BLD AUTO: 0 10*3/MM3 (ref 0–0.2)
BASOPHILS NFR BLD AUTO: 0 % (ref 0–1.5)
DEPRECATED RDW RBC AUTO: 53.9 FL (ref 37–54)
EOSINOPHIL # BLD AUTO: 0.17 10*3/MM3 (ref 0–0.7)
EOSINOPHIL NFR BLD AUTO: 3 % (ref 0.3–6.2)
ERYTHROCYTE [DISTWIDTH] IN BLOOD BY AUTOMATED COUNT: 15 % (ref 11.7–13)
HCT VFR BLD AUTO: 29.4 % (ref 35.6–45.5)
HGB BLD-MCNC: 9.4 G/DL (ref 11.9–15.5)
IMM GRANULOCYTES # BLD: 0.03 10*3/MM3 (ref 0–0.03)
IMM GRANULOCYTES NFR BLD: 0.5 % (ref 0–0.5)
LYMPHOCYTES # BLD AUTO: 1.82 10*3/MM3 (ref 0.9–4.8)
LYMPHOCYTES NFR BLD AUTO: 31.9 % (ref 19.6–45.3)
MCH RBC QN AUTO: 31.3 PG (ref 26.9–32)
MCHC RBC AUTO-ENTMCNC: 32 G/DL (ref 32.4–36.3)
MCV RBC AUTO: 98 FL (ref 80.5–98.2)
MONOCYTES # BLD AUTO: 1.38 10*3/MM3 (ref 0.2–1.2)
MONOCYTES NFR BLD AUTO: 24.2 % (ref 5–12)
NEUTROPHILS # BLD AUTO: 2.33 10*3/MM3 (ref 1.9–8.1)
NEUTROPHILS NFR BLD AUTO: 40.9 % (ref 42.7–76)
PLAT MORPH BLD: NORMAL
PLATELET # BLD AUTO: 93 10*3/MM3 (ref 140–500)
PMV BLD AUTO: 11.1 FL (ref 6–12)
RBC # BLD AUTO: 3 10*6/MM3 (ref 3.9–5.2)
RBC MORPH BLD: NORMAL
T3FREE SERPL-MCNC: 3.49 PG/ML (ref 2–4.4)
T4 SERPL-MCNC: 5.3 MCG/DL (ref 4.5–11.7)
TSH SERPL DL<=0.05 MIU/L-ACNC: 8.18 MIU/ML (ref 0.27–4.2)
WBC MORPH BLD: NORMAL
WBC NRBC COR # BLD: 5.7 10*3/MM3 (ref 4.5–10.7)

## 2018-08-08 PROCEDURE — 84481 FREE ASSAY (FT-3): CPT | Performed by: INTERNAL MEDICINE

## 2018-08-08 PROCEDURE — 84436 ASSAY OF TOTAL THYROXINE: CPT | Performed by: INTERNAL MEDICINE

## 2018-08-08 PROCEDURE — 85025 COMPLETE CBC W/AUTO DIFF WBC: CPT | Performed by: INTERNAL MEDICINE

## 2018-08-08 PROCEDURE — 84443 ASSAY THYROID STIM HORMONE: CPT | Performed by: INTERNAL MEDICINE

## 2018-08-08 PROCEDURE — 85007 BL SMEAR W/DIFF WBC COUNT: CPT | Performed by: INTERNAL MEDICINE

## 2018-08-08 PROCEDURE — 99214 OFFICE O/P EST MOD 30 MIN: CPT | Performed by: INTERNAL MEDICINE

## 2018-08-08 NOTE — PROGRESS NOTES
REASON FOR FOLLOWUP:   1. Newly diagnosed large right breast cancer.  Core needle biopsy reported invasive mammary carcinoma with focal lobular feature, grade 2.  ER negative, less than 1%; CA positive, moderate in staining, 5% to 10%. HER2 IHC 2+, FISH study positive 2.1.   2.  CT scan for chest abdomen and pelvis and breast MRI examination reported right axillary lymph node suspicious for metastatic disease.  No remote metastatic lesion.  Patient has stage IIIa (T3 N2 M0) disease.   3.  Patient was started neoadjuvant chemotherapy on 5/23/2017 with Taxol weekly plus Herceptin weekly for total 12 weeks, and Perjata every 3 weeks during chemotherapy.  Herceptin will be converted to every 3 weeks after chemotherapy finished.    4.  Chronic moderate thrombocytopenia, mild anemia, and intermittent mild neutropenia etiologies are not clear.  5.  Reaction to Herceptin C1D1.  Subsequently tolerated therapy with hydrocortisone as premedication.  6.  Potential cardiac strain developing, neuropathic symptoms persist, follow-up MRI and surgical assessment will be needed post initial chemotherapeutic phase  7.  MRI August 17 with interval resolution of abnormal enhancement within breast and right axillary adenopathy, surgery scheduled November 7, Herceptin ongoing every 3 weeks  8.  Patient seen in office November 28, status post surgery with apparent complete response, Herceptin continued  9.  Herceptin given December 19, subsequent injury in [place] leading to prolonged stay, single treatment given through additional cancer Center  10.  Patient seen May 01, 2018, no further Herceptin planned, baseline scans pursued posttreatment, post influenza syndrome, physical therapy planned  11.  Patient seen June 13, 2018, excellent performance status, improving on physical therapy, equivocal CT  per thoracic adenopathy, follow-up PET/CT planned   12.  PET/CT with improvement,?  Thyroid abnormality with ultrasound planned                                                                                                           HISTORY OF PRESENT ILLNESS:    The patient is a pleasant 77 y.o. with the above-mentioned history, who presents today in anticipation of cycle 4 of Herceptin, Perjeta and Taxol.  This is the first visit with this physician involving this patient's case.  We have reviewed her status today with her slowly developing neuropathic symptoms in her lower extremities but also involving her fingers recognized significantly and she plays the piano and keyboard.  This is becoming harder to do but she is still able to do so.  She also notices neuropathy in her feet but is able to walk and function in daily activities.  Additional to this is her continued grieving at the death of her  though she, fortunately, has an excellent support system.  She continues to experience nausea that is well relieved with Compazine. She denies oral mucositis.  No diarrhea no constipation no chest pain no dyspnea.  No lower extremity edema.    She did received 2 units of PRBC's on   7/8/2017 and remains hematologically stable.   She does have difficulty with sleep and Ambien 10 mg daily at bedtime seems help much better compared to 5 mg dose.  She does not need refills today.  In reviewing her case July 26 she is entering the fourth cycle of her treatment and recent echocardiogram is reviewed with she and her close friend revealing EF of 66.7% though with global strain of -16%?  Suspicious for myopathic process.  Normal ventricular cavity size and wall thickness as well as contractility.  We have also discussed that she is responding to therapy and will need surgical assessment which may not as yet have been performed.  She has seen Dr. Melo for port placement and will ask him to review her likely just after she has completed his fourth cycle of therapy.  It is also apparent that she'll need a cardiac assessment as we continue subsequent  Herceptin therapy perioperatively.      The patient underwent MRI of the breasts August 17 demonstrating interval resolution of abnormal enhancement within the right breast, resolution of right axillary adenopathy had also resolved.  The findings were consistent with a complete response to neoadjuvant chemotherapy.  The patient was seen in subsequent follow-up with Dr. Melo and was determined to proceed with surgery and ultimately sentinel node evaluation.  This is now scheduled November 7 and there are additional plans to consider radiation therapy postoperatively and we have also discussed the use of anti-hormonal therapy considering her mildly positive FL status.    The patient was able to proceed to surgery undergoing right breast mastectomy and sentinel lymph node biopsy November 07, 2017.  She did well postoperatively seeing Dr. Melo November 16.  Pathology revealed no residual malignancy in the breast and 3 negative sentinel lymph nodes.  A formal review of her report reveals treatment effect particularly in her largest lymph node with focal fibrosis and scar, right breast mastectomy fibrosis associated with a cavitary biopsy site and no invasive or in situ carcinoma.  The patient is now seen in our office though she went to the wrong location and the seen late in the day.  We discussed her findings and agree that she would continue Herceptin at this point but be reviewed formally again in 3 weeks.  She is also be seen by radiation therapy for their input.   The patient, evidently, was seen by radiation therapy but decided not to pursue it until her last Herceptin therapy here December 19.  Following this she visited her daughter in [place] and, unfortunately, developed influenza and pneumonia.  She had a prolonged hospitalization and was at many sites in recovery over a several month only to return to [place] in the last several weeks.  She did take 1 IV Herceptin dose while in [place] but as  she is reviewed May 05, 2018 we have discussed that it makes no particular sense to continue or add on to her previous history by extending Herceptin any further 3-4 months after her last treatment.  She therefore has completed Herceptin.    The patient on to be tested post her treatment again baseline studies and CT of chest and pelvis were performed June 6 revealing interval increase in a few subcentimeter nodes in the left pectoral, axillary and mediastinal regions. These are all considerably small and of uncertain significance.  The patient's performance status remains excellent without any reduction and, in fact, has improved with physical therapy upon return.       Patient is next seen August 08, 2018, fortunately feeling well.  A follow-up PET/CT had revealed an interval decrease in the subcentimeter nodes in the left subpectoral axillary region as well as mediastinum.  There was no hypermetabolic lymphadenopathy.  There was intense focus within slightly enlarged right thyroid gland.  Patient indicates she never had any thyroid issues of which she is aware.  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 ER records   • Colon polyp    • Conjunctivitis    • Cough    • Edema     Chronic lower extremity edema   • GERD (gastroesophageal reflux disease) 09/13/2011    Dr. Paul Negron   • GI  (gastrointestinal bleed) 1997   • H/O Bowel obstruction 2013   • H/O jaundice    • Hernia    • History of infectious mononucleosis 1960   • History of migraine headaches    • History of transfusion 1997   • Hx of colonic polyps 06/11/2009    Dr. Giles   • Hx of dehydration 09/13/2011    Related to Refractory nausea and vomiting   • Hx of hypercholesterolemia 09/13/2011    Dr. Paul Saba   • Hyperlipidemia    • Hypertension    • Impacted cerumen of left ear    • Leukocytopenia    • Leukopenia    • Meningioma (CMS/HCC)    • Osteoarthritis 09/13/2011    Dr. Jamil Miller   • Peptic ulceration    • Perioral dermatitis    • SBO (small bowel obstruction)     due to hernia with surgical repair in 09/2011   • SOB (shortness of breath) on exertion    • Thrombocytopenia (CMS/HCC)    • Vertigo    Normal echocardiogram on 5/18/2017, LVEF 68%.    Past Surgical History:   Procedure Laterality Date   • APPENDECTOMY N/A 1960   • BLADDER REPAIR N/A 1980   • BREAST BIOPSY Left 10/20/2004    Mammotome incisional biopsy left breast, surgical specimen, left breast, localization clip placement, left breast, confirmatory diagnostic unilateral mammogram, left breast-Dr. Tyrese Alcala, [facility]   • BREAST BIOPSY Right 05/03/2017    PATH: INVASIVE MAMMARY CARCINOMA   • CHOLECYSTECTOMY N/A 1990   • COLONOSCOPY N/A 06/11/2009    Hemorrhoids, otherwise normal, repeat in 5 years-Dr. Noemí Purcell   • EYE SURGERY  2017    lazer surgery for blood clot   • FEMORAL HERNIA REPAIR Right 09/13/2011    Repair of incarcerated femoral hernia-Dr. Alfred Mcdowell   • HYSTERECTOMY Bilateral 1980   • MASTECTOMY Left 2004    Left breast mastecotmy with sentinel lymph node biospy[facility]   • MASTECTOMY W/ SENTINEL NODE BIOPSY Right 11/7/2017    Procedure: RIGHT BREAST MASTECTOMY WITH SENTINEL NODE BIOPSY;  Surgeon: Christian Melo MD;  Location: [facility] OR;  Service:    • ME INSJ TUNNELED CVC W/O SUBQ PORT/ AGE 5 YR/> Left 5/22/2017     Procedure: INSERTION VENOUS ACCESS DEVICE;  Surgeon: Christian Melo MD;  Location: [facility];  Service: General   • REDUCTION MAMMAPLASTY Right     to match Lt. TRAM flap   • TONSILLECTOMY Bilateral    • UPPER GASTROINTESTINAL ENDOSCOPY N/A 2011    LA grade A esophagitis with no bleeding, large hiatus hernia (50cm), gastric ulcer-Dr.Laszlo Lezama   Lico catheter placement on 2017 by Dr. Melo.     OB/GYN history: Menarche age 16, menopause at 1985. , 1 miscarriage. No birth control pill use. She did have post menopause hormonal supplementation.      HEMATOLOGIC/ONCOLOGIC HISTORY: The patient is a 76 y.o. year old female whom we are consulted for a newly self discovered right breast mass, in 2017. Patient had ultrasound-guided biopsy on 5/3/2017 and confirmed to be invasive mammary carcinoma with focal lobular features, grade 2 [place] score 6/9. She is here for initial evaluation for management.      Patient is a 76-year-old  female who was seen here previously for chronic mild-to-moderate thrombocytopenia and mild anemia. Recently the patient reports she started having firmness of the right breast that started sometime in April or maybe even in March. She noticed firmness spread from about around the 12 o'clock position, gradually towards the upper lateral side and lower part of the right breast associated mild pain. The patient thought it was related to fibrocystic changes. However, the symptom was getting worse. Patient also reported dented nipple after she started having pain in the right breast. She denies discharge from the nipple. She called her primary care physician, Dr. Martin, who ordered a mammogram study. This was done on 2017 with architectural distortion of the right breast centrally at 12 o'clock position and had scattered fibroglandular densities throughout the right breast.      The patient subsequently had right breast ultrasound examination on  04/26/2017. Discovered a large right breast mass measuring 5.8 x 3.5 x 3.9 cm. Patient subsequently had ultrasound-guided right breast biopsy on 05/03/2017. Pathology evaluation reported invasive mammary carcinoma with focal lobular feature. [place] score 6/9, overall grade 2. Sample was further sent to the [facility] for test. ER negative, less than 1%; CA positive, moderate in staining, 5% to 10%. HER2 IHC 2+, but FISH study positive 2.1.     She denies weight loss, she actually eats very well. No nausea vomiting. Patient does complain of insomnia, unable to sleep.      This patient has history of left breast DCIS back in 2007, had mastectomy at the [facility]. No hormonal therapy afterwards according to patient. This patient also had a small meningioma, followed by Dr. Smith in [facility], with most recent MRI of the brain in March 2017, with 18 mm meningioma, and followed on an annual basis.     Her neutrophil count on 5/16/17 is normal at 2500, however, records showed starting from 05/2015 and in 09/2016, 01/2017 and 03/2017, all 4 laboratory studies showed mild neutropenia with ANC fluctuating between 1200 and 1600. Etiology is not clear.    Normal echocardiogram on 5/18/2017, LVEF 68%.      CT scan for chest abdomen and pelvis on 5/22/2017 and breast MRI examination on 5/22/2017 reported small right axillary lymph node suspicious for metastatic disease.  No remote metastatic lesion.  Patient has stage IIIa (T3 N2 M0) disease.      Patient will start neoadjuvant chemotherapy with Taxol weekly plus Herceptin weekly for total 12 weeks, and Perjeta every 3 weeks (3-week cycle) during chemotherapy.  Herceptin will be converted to every 3 weeks after chemotherapy finished.  Cycle 1 day 1 5/23/2017.   Status post neoadjuvant chemotherapy the patient was assessed with MRI showing a complete neoadjuvant response.  The patient was reviewed for surgery and  questions concerning lymph node dissection-sentinel node evaluation-and need for radiation therapy postop were considered as well as use of additional Herceptin for the balance of the year as well as additional use of anti-hormonal therapy.  Surgery was scheduled November 07, 2017.  Patient next seen in office November 28 having undergone surgery on November 7 with results revealing no residual malignancy in either breast or associated sentinel lymph nodes.  Was elected to continue Herceptin when seen back in office November 20 having initiated Herceptin May 23, 2017.    Patient continued Herceptin with her last treatment given December 19, 2017.     Unfortunately she later experienced an accident while in [place] December 28 with prolonged hospitalization and prolonged ventilator management required.  Apparently she had at least one IV Herceptin therapy given while there and we were contacted March 20, 2018- Dr. Nuno.  Eventually the patient was able to return to [place] is seen back in office May 5 at which time we concluded that she completed Herceptin therapy as result of being off treatment for so long.  Plans were made for baseline scans.      MEDICATIONS: The current medication list was reviewed with the patient and updated in the EMR this date per the Medical Assistant. Medication dosages and frequencies were confirmed to be accurate.       ALLERGIES:    Allergies   Allergen Reactions   • Codeine Nausea And Vomiting   • Morphine Nausea And Vomiting     SOCIAL HISTORY:   Social History   Substance Use Topics   • Smoking status: Former Smoker     Packs/day: 2.00     Years: 30.00     Types: Cigarettes     Start date: 1/30/1957     Quit date: 4/10/1987   • Smokeless tobacco: Former User      Comment: I haven't had a cigarette in over 30 years   • Alcohol use No      Comment: stopped, heavy in past     FAMILY HISTORY:  Family History   Problem Relation Age of Onset   • Heart disease Mother    • Aortic aneurysm  "Mother         abdominal   • Coronary artery disease Mother    • Hypertension Mother    • Miscarriages / Stillbirths Mother    • Heart disease Father    • Heart attack Father         acute   • Hypertension Father    • Early death Father    • Hearing loss Father    • Kidney disease Father    • Breast cancer Daughter 45   • Heart disease Brother    • Hypertension Brother    • Malig Hyperthermia Neg Hx      I have reviewed the patient's medical history in detail and updated the computerized patient record.    REVIEW OF SYSTEMS:  GENERAL: See history of present illness. No change in appetite or weight;   No fevers, chills, sweats.   SKIN: No nonhealing lesions. No rashes.   HEME/LYMPH: See HPI.   EYES: No vision changes or diplopia.   ENT: No tinnitus, hearing loss, gum bleeding, epistaxis, hoarseness or dysphagia.   RESPIRATORY: No cough, Has exertional dyspnea, No hemoptysis or wheezing.   CVS: No chest pain, palpitations, orthopnea, dyspnea on exertion or PND.   GI: See HPI.   : No lower tract obstructive symptoms, dysuria or hematuria.   MUSCULOSKELETAL: Chronic or joint pain, arthritis.  Has mild intermittent ankle swelling.   NEUROLOGICAL: See HPI  PSYCHIATRIC: See HPI     Objective:   Vitals:    08/08/18 1611   BP: 146/73   Pulse: 83   Resp: 12   Temp: 97.9 °F (36.6 °C)   TempSrc: Oral   SpO2: 99%   Weight: 69.2 kg (152 lb 9.6 oz)   Height: 159.4 cm (62.76\")   PainSc: 0-No pain   ECOG 0      PHYSICAL EXAM:   GENERAL: Well-developed, well-nourished female, in no acute distress.   SKIN: Warm, dry without rashes, purpura or petechiae.  Left upper chest Lico catheter in place, no evidence of infection.   EYES: Pupils equal, round and reactive to light. EOMs intact. Conjunctivae normal.  EARS: Hearing intact.  NOSE:  No excoriations or nasal discharge.  MOUTH: Tongue is well-papillated; no stomatitis or ulcers. Lips normal.  THROAT: Oropharynx without lesions or exudates.  LYMPHATICS: No cervical, supraclavicular, " axillary adenopathy.  CHEST: Lungs clear to auscultation. Good airflow.   BREAST:Postop, Well healed right mastectomy site with no evidence of recurrent disease, no axillary adenopathy bilaterally.  CARDIAC: Regular rate and rhythm without murmurs. Normal S1,S2.  ABDOMEN: Slightly distended, normal active bowel sounds,  no hepatosplenomegaly or masses.  EXTREMITIES: No clubbing, cyanosis or edema.  NEUROLOGICAL: Cranial Nerves II-XII grossly intact. No focal neurological deficits.  PSYCHIATRIC: Alert and oriented, pleased about her results      RECENT LABS:  Lab Results   Component Value Date    WBC 8.94 06/13/2018    HGB 9.9 (L) 06/13/2018    HCT 29.5 (L) 06/13/2018    MCV 92.2 06/13/2018    PLT 97 (L) 06/13/2018     Lab Results   Component Value Date    NEUTROABS 4.33 06/13/2018     Lab Results   Component Value Date    GLUCOSE 69 (L) 08/01/2018    BUN 21 (H) 08/01/2018    CREATININE 0.83 08/01/2018    EGFRIFNONA 67 08/01/2018    EGFRIFAFRI 87 05/24/2018    BCR 25.3 08/01/2018    K 3.6 08/01/2018    CO2 26.0 08/01/2018    CALCIUM 9.3 08/01/2018    PROTENTOTREF 6.3 05/24/2018    ALBUMIN 4.20 08/01/2018    LABIL2 1.9 05/24/2018    AST 26 08/01/2018    ALT 19 08/01/2018        F-18 FDG PET FROM SKULL BASE TO MID THIGH WITH PET/CT FUSION  August 01, 2018    IMPRESSION:  1. Interval decrease in the subcentimeter nodes at the left subpectoral  and axillary region, as well as the mediastinum. There is no  hypermetabolic lymphadenopathy.  2. There is an intense focus of hypermetabolic activity within the  slightly enlarged right thyroid lobe. No lesion is seen on the  corresponding CT sequence. Further characterization can be performed  with thyroid ultrasound.  3. Uncertain etiology of a tiny hypermetabolic focus within or adjacent  to a segment of distal ileum. No abnormality seen on the corresponding  CT sequence.             Assessment/Plan   1. Large right breast cancer, locally advanced, stage IIIa (T3 N1/ 2 M0).   ER negative, less than 1%; NH positive, moderate in staining, 5% to 10%. HER2 IHC 2+, FISH study positive 2.1.  Physical examination showed a large mass, 7 cm x 7 cm initially which has decreased to approximately less than 4 cm ..  Patient  proceeded through 4 cycles ceptin,Perjeta and Taxol.   Her subsequent MRI examination showed complete response by imaging including right axillary lymphadenopathy.  We have continued Herceptin and that includes today October 20, 2017.  The case was discussed with Dr. Melo and plans were to proceed with mastectomy plus right axillary lymph node assessment via sentinel node evaluation. it was felt she likely require radiation therapy post procedure.  The patient was scheduled and proceeded to surgery November 7.  Her results, wonderfully, revealed no evidence of residual disease in the breast or associated sentinel lymph nodes. She was seen by radiation therapy and offered treatment did not pursue it.  Additionally, and somewhat complicating this story, she traveled to [place] and developed severe influenza, associated pneumonia and was hospitalized for several months only to return to [place] in the last several weeks now being seen back May 1.  There is no particular benefit from trying to add Herceptin back to her therapy now and is felt that she is completed Herceptin treatment.  She wishes consider reconstruction and baseline CT scans will be requested in 5 weeks with the patient being seen in 6 weeks follow-up.The patient reviewed June 13, 2018 with the scans demonstrating very modest increase in left pectoral, axillary or mediastinal nodes.  These are of uncertain significance but do need follow-up in a PET/CT is planned in 7 weeks.  Her anemia will also be reviewed again with additional laboratory studies that visit.  She'll be seen in 8 weeks for general reassessment.    The patient's anemia workup was essentially negative and she is slowly improving when seen  back August 8.  Her PET/CT, fortunately, demonstrates improvement though there is potential abnormality within the right thyroid lobe.  We discussed thyroid function testing and follow-up thyroid ultrasound.  She will be seen back in approximately 2 months as we maintain her port monthly flushes.       2.  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                                                                            3.  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

## 2018-08-10 RX ORDER — HYDROCHLOROTHIAZIDE 25 MG/1
TABLET ORAL
Qty: 30 TABLET | Refills: 1 | Status: SHIPPED | OUTPATIENT
Start: 2018-08-10 | End: 2018-09-26

## 2018-08-15 ENCOUNTER — HOSPITAL ENCOUNTER (OUTPATIENT)
Dept: ULTRASOUND IMAGING | Facility: HOSPITAL | Age: 78
Discharge: HOME | End: 2018-08-15
Attending: INTERNAL MEDICINE | Admitting: INTERNAL MEDICINE

## 2018-08-15 DIAGNOSIS — E07.9 THYROID DISORDER: ICD-10-CM

## 2018-08-15 PROCEDURE — 76536 US EXAM OF HEAD AND NECK: CPT

## 2018-08-21 ENCOUNTER — TELEPHONE (OUTPATIENT)
Dept: FAMILY MEDICINE CLINIC | Facility: CLINIC | Age: 78
End: 2018-08-21

## 2018-08-21 ENCOUNTER — OFFICE VISIT (OUTPATIENT)
Dept: FAMILY MEDICINE CLINIC | Facility: CLINIC | Age: 78
End: 2018-08-21

## 2018-08-21 ENCOUNTER — HOSPITAL ENCOUNTER (OUTPATIENT)
Dept: GENERAL RADIOLOGY | Facility: HOSPITAL | Age: 78
Discharge: HOME | End: 2018-08-21
Admitting: INTERNAL MEDICINE

## 2018-08-21 ENCOUNTER — DOCUMENTATION (OUTPATIENT)
Dept: ONCOLOGY | Facility: CLINIC | Age: 78
End: 2018-08-21

## 2018-08-21 VITALS
HEART RATE: 72 BPM | SYSTOLIC BLOOD PRESSURE: 126 MMHG | DIASTOLIC BLOOD PRESSURE: 68 MMHG | WEIGHT: 152 LBS | BODY MASS INDEX: 26.93 KG/M2 | RESPIRATION RATE: 18 BRPM | HEIGHT: 63 IN

## 2018-08-21 DIAGNOSIS — E04.1 THYROID NODULE: Primary | ICD-10-CM

## 2018-08-21 DIAGNOSIS — M25.552 PAIN OF LEFT HIP JOINT: Primary | ICD-10-CM

## 2018-08-21 PROCEDURE — 73502 X-RAY EXAM HIP UNI 2-3 VIEWS: CPT

## 2018-08-21 PROCEDURE — 99214 OFFICE O/P EST MOD 30 MIN: CPT | Performed by: INTERNAL MEDICINE

## 2018-08-21 RX ORDER — TRAMADOL HYDROCHLORIDE 50 MG/1
50 TABLET ORAL EVERY 6 HOURS PRN
Qty: 30 TABLET | Refills: 0 | Status: SHIPPED | OUTPATIENT
Start: 2018-08-21 | End: 2019-01-14

## 2018-08-21 NOTE — PROGRESS NOTES
Subjective   Roxana Brizuela is a 77 y.o. female. Patient is here today for   Chief Complaint   Patient presents with   • Hip Pain     Left Sided Hip Pain x 2 days           Vitals:    08/21/18 1545   BP: 126/68   Pulse: 72   Resp: 18     The following portions of the patient's history were reviewed and updated as appropriate: allergies, current medications, past family history, past medical history, past social history, past surgical history and problem list.    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 ER records   • Colon polyp    • Conjunctivitis    • Cough    • Edema     Chronic lower extremity edema   • GERD (gastroesophageal reflux disease) 09/13/2011    Dr. aPul Negron   • GI (gastrointestinal bleed) 1997   • H/O Bowel obstruction 2013   • H/O jaundice    • Hernia    • History of infectious mononucleosis 1960   • History of migraine headaches    • History of transfusion 1997   • Hx of colonic polyps 06/11/2009    Dr. Giles   • Hx of dehydration 09/13/2011    Related to Refractory nausea and vomiting   • Hx of hypercholesterolemia 09/13/2011    Dr. Paul Saba   • Hyperlipidemia    • Hypertension    • Impacted cerumen of left ear    • Leukocytopenia    • Leukopenia    • Meningioma (CMS/[facility])    • Osteoarthritis 09/13/2011    Dr. Jamil Miller   • Peptic ulceration    •  Perioral dermatitis    • SBO (small bowel obstruction)     due to hernia with surgical repair in 09/2011   • SOB (shortness of breath) on exertion    • Thrombocytopenia (CMS/HCC)    • Vertigo       Allergies   Allergen Reactions   • Codeine Nausea And Vomiting   • Morphine Nausea And Vomiting      Social History     Social History   • Marital status:      Spouse name: Mike   • Number of children: 2   • Years of education: College     Occupational History   •  Retired     [employer]igator [employer]     Social History Main Topics   • Smoking status: Former Smoker     Packs/day: 2.00     Years: 30.00     Types: Cigarettes     Start date: 1/30/1957     Quit date: 4/10/1987   • Smokeless tobacco: Former User      Comment: I haven't had a cigarette in over 30 years   • Alcohol use No      Comment: stopped, heavy in past   • Drug use: No   • Sexual activity: No     Other Topics Concern   • Not on file     Social History Narrative   • No narrative on file        Current Outpatient Prescriptions:   •  Ascorbic Acid (VITAMIN C PO), Take 1,000 mg by mouth Daily., Disp: , Rfl:   •  calcium carbonate (OS-JUNAID) 600 MG tablet, Take 600 mg by mouth Daily., Disp: , Rfl:   •  diclofenac (VOLTAREN) 75 MG EC tablet, Take 1 tablet by mouth 2 (Two) Times a Day., Disp: 60 tablet, Rfl: 5  •  gabapentin (NEURONTIN) 300 MG capsule, Take 2 capsules by mouth 3 (Three) Times a Day., Disp: 180 capsule, Rfl: 2  •  Glucos-Chond-Sterol-Fish Oil (GLUCOSAMINE CHONDROITIN PLUS) capsule, Take 1 tablet by mouth 2 (Two) Times a Day., Disp: , Rfl:   •  hydrochlorothiazide (HYDRODIURIL) 25 MG tablet, TAKE 1 TABLET BY MOUTH ONCE DAILY, Disp: 30 tablet, Rfl: 1  •  hydrOXYzine (VISTARIL) 25 MG capsule, TAKE ONE CAPSULE BY MOUTH TWICE DAILY, Disp: 60 capsule, Rfl: 0  •  KLOR-CON 20 MEQ CR tablet, TAKE ONE TABLET BY MOUTH ONCE DAILY, Disp: 30 tablet, Rfl: 0  •  Lactobacillus (PROBIOTIC ACIDOPHILUS PO), Take 1 capsule by mouth Daily., Disp: , Rfl:    •  MELATONIN PO, Take  by mouth As Needed., Disp: , Rfl:   •  ondansetron ODT (ZOFRAN-ODT) 4 MG disintegrating tablet, Take 4 mg by mouth Every 8 (Eight) Hours As Needed for Nausea or Vomiting., Disp: , Rfl:   •  pantoprazole (PROTONIX) 40 MG EC tablet, TAKE ONE TABLET BY MOUTH ONCE DAILY, Disp: 90 tablet, Rfl: 0  •  propranolol (INDERAL) 10 MG tablet, Take 1 tablet by mouth 3 (Three) Times a Day., Disp: 90 tablet, Rfl: 2  •  simvastatin (ZOCOR) 80 MG tablet, TAKE ONE TABLET BY MOUTH AT BEDTIME, Disp: 90 tablet, Rfl: 1  •  SUMAtriptan (IMITREX) 50 MG tablet, Take 50 mg by mouth Every 2 (Two) Hours As Needed for migraine. Take one tablet at onset of headache. May repeat dose one time in 2 hours if headache not relieved., Disp: , Rfl:   •  vitamin B-12 (CYANOCOBALAMIN) 1000 MCG tablet, Take 1,000 mcg by mouth Daily., Disp: , Rfl:   •  vitamin B-6 (PYRIDOXINE) 50 MG tablet, Take 50 mg by mouth Daily., Disp: , Rfl:   •  traMADol (ULTRAM) 50 MG tablet, Take 1 tablet by mouth Every 6 (Six) Hours As Needed for Moderate Pain  or Severe Pain ., Disp: 30 tablet, Rfl: 0     Objective     History of Present Illness Marsha complains of left hip pain as started a couple days ago.  The pain is worse out of night and is 8 out of 10 in severity.  She is on diclofenac 75 mg twice a day and is also taken an Aleve.  The pain is keeping her up at night.  She has pain when she walks but is able to walk her dog.  She has a neuropathy from chemotherapy and is currently on gabapentin.  She previously went to a chiropractor who told her her hip was out of alignment and refer to physical therapy which helped.    Review of Systems   Constitutional: Positive for activity change.   Respiratory: Negative.    Cardiovascular: Negative.    Musculoskeletal: Positive for gait problem.   Neurological: Negative for weakness and numbness.       Physical Exam   Constitutional: She appears well-developed and well-nourished.   Musculoskeletal:   There  is tenderness of the left hip trochanter.  There is pain with external and internal rotation as well as hip flexion.   Psychiatric: She has a normal mood and affect. Her behavior is normal. Judgment and thought content normal.   Vitals reviewed.      ASSESSMENT     Problem List Items Addressed This Visit        Nervous and Auditory    Pain of left hip joint - Primary    Relevant Orders    XR Hip With or Without Pelvis 2 - 3 View Left    Ambulatory Referral to Physical Therapy          PLAN  Patient Instructions   Obtain a hip x-ray and call you the results.  Continue diclofenac 75 mg twice a day and take tramadol 50 mg at bedtime for moderate to severe pain.  Will refer to physical therapy as requested.    Return if symptoms worsen or fail to improve.

## 2018-08-21 NOTE — PROGRESS NOTES
I contacted the patient this morning about the finding of a right-sided thyroid nodule on ultrasound and seen previously on her PET/CT.  We will arrange an outpatient aspiration biopsy.  She is agreeable to proceed.

## 2018-08-21 NOTE — PATIENT INSTRUCTIONS
Obtain a hip x-ray and call you the results.  Continue diclofenac 75 mg twice a day and take tramadol 50 mg at bedtime for moderate to severe pain.  Will refer to physical therapy as requested.

## 2018-08-21 NOTE — TELEPHONE ENCOUNTER
SPOKE WITH PATIENT AND SHE WILL SEE DR MARTIN 08/22/18 AT 9AM. [facility]    ----- Message from Naida Oglesby MA sent at 8/21/2018  8:27 AM EDT -----  We have not seen her for hip pain, we can't place an order without seeing her for this problem first.   Please schedule her with Dr Martin.    Thank You       ----- Message -----  From: Shira Machado  Sent: 8/21/2018   8:20 AM  To: Naida Oglesby MA    NEEDS A PHYSICAL THERAPY ORDER FOR LEFT HIP PAIN, PATIENT SAID THAT DR ROSITA (MICHAEL) NEEDS AN ORDER TO TREAT.  PLEASE CALL PT BACK WITH ANY QUESTIONS [phone] (HOME), [phone] (CELL)     FAX: [phone]    THANK YOU

## 2018-08-22 ENCOUNTER — TRANSCRIBE ORDERS (OUTPATIENT)
Dept: INFUSION THERAPY | Facility: HOSPITAL | Age: 78
End: 2018-08-22

## 2018-08-22 RX ORDER — ASPIRIN 81 MG/1
81 TABLET, CHEWABLE ORAL DAILY
COMMUNITY
End: 2018-10-04 | Stop reason: HOSPADM

## 2018-08-24 ENCOUNTER — HOSPITAL ENCOUNTER (OUTPATIENT)
Dept: ULTRASOUND IMAGING | Facility: HOSPITAL | Age: 78
Discharge: HOME | End: 2018-08-24
Attending: INTERNAL MEDICINE | Admitting: RADIOLOGY

## 2018-08-24 ENCOUNTER — HOSPITAL ENCOUNTER (OUTPATIENT)
Dept: ULTRASOUND IMAGING | Facility: HOSPITAL | Age: 78
End: 2018-08-24
Attending: INTERNAL MEDICINE

## 2018-08-24 VITALS
DIASTOLIC BLOOD PRESSURE: 54 MMHG | RESPIRATION RATE: 16 BRPM | BODY MASS INDEX: 27.23 KG/M2 | TEMPERATURE: 97.3 F | HEIGHT: 62 IN | HEART RATE: 69 BPM | OXYGEN SATURATION: 92 % | WEIGHT: 148 LBS | SYSTOLIC BLOOD PRESSURE: 111 MMHG

## 2018-08-24 DIAGNOSIS — E04.1 THYROID NODULE: ICD-10-CM

## 2018-08-24 PROCEDURE — 76942 ECHO GUIDE FOR BIOPSY: CPT

## 2018-08-24 PROCEDURE — 88305 TISSUE EXAM BY PATHOLOGIST: CPT | Performed by: INTERNAL MEDICINE

## 2018-08-24 PROCEDURE — 85610 PROTHROMBIN TIME: CPT

## 2018-08-24 PROCEDURE — 88173 CYTOPATH EVAL FNA REPORT: CPT | Performed by: INTERNAL MEDICINE

## 2018-08-24 RX ORDER — LIDOCAINE HYDROCHLORIDE 10 MG/ML
10 INJECTION, SOLUTION INFILTRATION; PERINEURAL ONCE
Status: COMPLETED | OUTPATIENT
Start: 2018-08-24 | End: 2018-08-24

## 2018-08-24 RX ORDER — ALPRAZOLAM 0.5 MG/1
0.5 TABLET ORAL ONCE
Status: DISCONTINUED | OUTPATIENT
Start: 2018-08-24 | End: 2018-08-25 | Stop reason: HOSPADM

## 2018-08-24 RX ADMIN — LIDOCAINE HYDROCHLORIDE 6 ML: 10 INJECTION, SOLUTION INFILTRATION; PERINEURAL at 14:11

## 2018-08-25 ENCOUNTER — TELEPHONE (OUTPATIENT)
Dept: INTERVENTIONAL RADIOLOGY/VASCULAR | Facility: HOSPITAL | Age: 78
End: 2018-08-25

## 2018-08-27 ENCOUNTER — DOCUMENTATION (OUTPATIENT)
Dept: ONCOLOGY | Facility: CLINIC | Age: 78
End: 2018-08-27

## 2018-08-27 DIAGNOSIS — E04.1 THYROID NODULE: Primary | ICD-10-CM

## 2018-09-04 RX ORDER — GABAPENTIN 300 MG/1
CAPSULE ORAL
Qty: 180 CAPSULE | Refills: 2 | Status: SHIPPED | OUTPATIENT
Start: 2018-09-04 | End: 2018-09-26

## 2018-09-05 ENCOUNTER — INFUSION (OUTPATIENT)
Dept: ONCOLOGY | Facility: HOSPITAL | Age: 78
End: 2018-09-05

## 2018-09-05 DIAGNOSIS — Z45.2 ENCOUNTER FOR FITTING AND ADJUSTMENT OF VASCULAR CATHETER: Primary | ICD-10-CM

## 2018-09-05 PROCEDURE — 25010000002 HEPARIN FLUSH (PORCINE) 100 UNIT/ML SOLUTION: Performed by: INTERNAL MEDICINE

## 2018-09-05 PROCEDURE — 96523 IRRIG DRUG DELIVERY DEVICE: CPT | Performed by: INTERNAL MEDICINE

## 2018-09-05 RX ORDER — SODIUM CHLORIDE 0.9 % (FLUSH) 0.9 %
10 SYRINGE (ML) INJECTION AS NEEDED
Status: CANCELLED | OUTPATIENT
Start: 2018-09-05

## 2018-09-05 RX ORDER — SODIUM CHLORIDE 0.9 % (FLUSH) 0.9 %
10 SYRINGE (ML) INJECTION AS NEEDED
Status: DISCONTINUED | OUTPATIENT
Start: 2018-09-05 | End: 2018-09-05 | Stop reason: HOSPADM

## 2018-09-05 RX ADMIN — Medication 10 ML: at 13:59

## 2018-09-05 RX ADMIN — SODIUM CHLORIDE, PRESERVATIVE FREE 500 UNITS: 5 INJECTION INTRAVENOUS at 14:00

## 2018-09-06 RX ORDER — GABAPENTIN 300 MG/1
CAPSULE ORAL
Qty: 180 CAPSULE | Refills: 2 | OUTPATIENT
Start: 2018-09-06

## 2018-09-20 RX ORDER — PROPRANOLOL HYDROCHLORIDE 10 MG/1
TABLET ORAL
Qty: 90 TABLET | Refills: 2 | Status: SHIPPED | OUTPATIENT
Start: 2018-09-20 | End: 2018-09-26

## 2018-09-26 ENCOUNTER — APPOINTMENT (OUTPATIENT)
Dept: PREADMISSION TESTING | Facility: HOSPITAL | Age: 78
End: 2018-09-26

## 2018-09-26 VITALS
WEIGHT: 152 LBS | HEART RATE: 74 BPM | RESPIRATION RATE: 16 BRPM | SYSTOLIC BLOOD PRESSURE: 129 MMHG | BODY MASS INDEX: 26.93 KG/M2 | DIASTOLIC BLOOD PRESSURE: 77 MMHG | OXYGEN SATURATION: 99 % | TEMPERATURE: 98.1 F | HEIGHT: 63 IN

## 2018-09-26 LAB
ANION GAP SERPL CALCULATED.3IONS-SCNC: 12.1 MMOL/L
BUN BLD-MCNC: 13 MG/DL (ref 8–23)
BUN/CREAT SERPL: 16.9 (ref 7–25)
CALCIUM SPEC-SCNC: 9.5 MG/DL (ref 8.6–10.5)
CHLORIDE SERPL-SCNC: 106 MMOL/L (ref 98–107)
CO2 SERPL-SCNC: 27.9 MMOL/L (ref 22–29)
CREAT BLD-MCNC: 0.77 MG/DL (ref 0.57–1)
DEPRECATED RDW RBC AUTO: 46.4 FL (ref 37–54)
ERYTHROCYTE [DISTWIDTH] IN BLOOD BY AUTOMATED COUNT: 13.6 % (ref 11.7–13)
GFR SERPL CREATININE-BSD FRML MDRD: 73 ML/MIN/1.73
GLUCOSE BLD-MCNC: 92 MG/DL (ref 65–99)
HCT VFR BLD AUTO: 34.4 % (ref 35.6–45.5)
HGB BLD-MCNC: 10.6 G/DL (ref 11.9–15.5)
MCH RBC QN AUTO: 29 PG (ref 26.9–32)
MCHC RBC AUTO-ENTMCNC: 30.8 G/DL (ref 32.4–36.3)
MCV RBC AUTO: 94.2 FL (ref 80.5–98.2)
PLATELET # BLD AUTO: 93 10*3/MM3 (ref 140–500)
PMV BLD AUTO: 11.6 FL (ref 6–12)
POTASSIUM BLD-SCNC: 4.2 MMOL/L (ref 3.5–5.2)
RBC # BLD AUTO: 3.65 10*6/MM3 (ref 3.9–5.2)
SODIUM BLD-SCNC: 146 MMOL/L (ref 136–145)
WBC NRBC COR # BLD: 5.26 10*3/MM3 (ref 4.5–10.7)

## 2018-09-26 PROCEDURE — 85027 COMPLETE CBC AUTOMATED: CPT | Performed by: OTOLARYNGOLOGY

## 2018-09-26 PROCEDURE — 93010 ELECTROCARDIOGRAM REPORT: CPT | Performed by: INTERNAL MEDICINE

## 2018-09-26 PROCEDURE — 36415 COLL VENOUS BLD VENIPUNCTURE: CPT

## 2018-09-26 PROCEDURE — 80048 BASIC METABOLIC PNL TOTAL CA: CPT | Performed by: OTOLARYNGOLOGY

## 2018-09-26 PROCEDURE — 93005 ELECTROCARDIOGRAM TRACING: CPT

## 2018-09-26 RX ORDER — POTASSIUM CHLORIDE 1.5 G/1.77G
20 POWDER, FOR SOLUTION ORAL DAILY
COMMUNITY

## 2018-09-26 RX ORDER — PANTOPRAZOLE SODIUM 40 MG/1
40 TABLET, DELAYED RELEASE ORAL DAILY
COMMUNITY
End: 2018-10-18 | Stop reason: SDUPTHER

## 2018-09-26 RX ORDER — SIMVASTATIN 80 MG
80 TABLET ORAL NIGHTLY
COMMUNITY
End: 2019-01-24 | Stop reason: SDUPTHER

## 2018-09-26 RX ORDER — GABAPENTIN 300 MG/1
600 CAPSULE ORAL 3 TIMES DAILY
COMMUNITY
End: 2018-10-31 | Stop reason: SDUPTHER

## 2018-09-26 RX ORDER — HYDROCHLOROTHIAZIDE 25 MG/1
25 TABLET ORAL DAILY
COMMUNITY
End: 2018-10-11 | Stop reason: SDUPTHER

## 2018-09-26 RX ORDER — PROPRANOLOL HYDROCHLORIDE 10 MG/1
10 TABLET ORAL 3 TIMES DAILY
COMMUNITY
End: 2019-03-21 | Stop reason: SDUPTHER

## 2018-10-02 DIAGNOSIS — C50.511 MALIGNANT NEOPLASM OF LOWER-OUTER QUADRANT OF RIGHT FEMALE BREAST, UNSPECIFIED ESTROGEN RECEPTOR STATUS (HCC): Primary | ICD-10-CM

## 2018-10-02 RX ORDER — SODIUM CHLORIDE 0.9 % (FLUSH) 0.9 %
10 SYRINGE (ML) INJECTION AS NEEDED
Status: CANCELLED | OUTPATIENT
Start: 2018-10-03

## 2018-10-03 ENCOUNTER — INFUSION (OUTPATIENT)
Dept: ONCOLOGY | Facility: HOSPITAL | Age: 78
End: 2018-10-03

## 2018-10-03 ENCOUNTER — OFFICE VISIT (OUTPATIENT)
Dept: ONCOLOGY | Facility: CLINIC | Age: 78
End: 2018-10-03

## 2018-10-03 VITALS
BODY MASS INDEX: 26.95 KG/M2 | DIASTOLIC BLOOD PRESSURE: 77 MMHG | HEART RATE: 72 BPM | WEIGHT: 152.1 LBS | RESPIRATION RATE: 16 BRPM | TEMPERATURE: 98.4 F | HEIGHT: 63 IN | OXYGEN SATURATION: 99 % | SYSTOLIC BLOOD PRESSURE: 154 MMHG

## 2018-10-03 DIAGNOSIS — T45.1X5A ANEMIA DUE TO ANTINEOPLASTIC CHEMOTHERAPY: ICD-10-CM

## 2018-10-03 DIAGNOSIS — D64.81 ANEMIA DUE TO ANTINEOPLASTIC CHEMOTHERAPY: ICD-10-CM

## 2018-10-03 DIAGNOSIS — C50.511 MALIGNANT NEOPLASM OF LOWER-OUTER QUADRANT OF RIGHT FEMALE BREAST, UNSPECIFIED ESTROGEN RECEPTOR STATUS (HCC): Primary | ICD-10-CM

## 2018-10-03 DIAGNOSIS — Z45.2 ENCOUNTER FOR FITTING AND ADJUSTMENT OF VASCULAR CATHETER: ICD-10-CM

## 2018-10-03 DIAGNOSIS — E04.1 THYROID NODULE: Primary | ICD-10-CM

## 2018-10-03 LAB
BASOPHILS # BLD AUTO: 0.01 10*3/MM3 (ref 0–0.2)
BASOPHILS NFR BLD AUTO: 0.2 % (ref 0–1.5)
DEPRECATED RDW RBC AUTO: 44.2 FL (ref 37–54)
EOSINOPHIL # BLD AUTO: 0.08 10*3/MM3 (ref 0–0.7)
EOSINOPHIL NFR BLD AUTO: 1.7 % (ref 0.3–6.2)
ERYTHROCYTE [DISTWIDTH] IN BLOOD BY AUTOMATED COUNT: 13.2 % (ref 11.7–13)
HCT VFR BLD AUTO: 31.4 % (ref 35.6–45.5)
HGB BLD-MCNC: 10.2 G/DL (ref 11.9–15.5)
IMM GRANULOCYTES # BLD: 0.04 10*3/MM3 (ref 0–0.03)
IMM GRANULOCYTES NFR BLD: 0.8 % (ref 0–0.5)
LYMPHOCYTES # BLD AUTO: 1.72 10*3/MM3 (ref 0.9–4.8)
LYMPHOCYTES NFR BLD AUTO: 36.1 % (ref 19.6–45.3)
MCH RBC QN AUTO: 30.1 PG (ref 26.9–32)
MCHC RBC AUTO-ENTMCNC: 32.5 G/DL (ref 32.4–36.3)
MCV RBC AUTO: 92.6 FL (ref 80.5–98.2)
MONOCYTES # BLD AUTO: 1.01 10*3/MM3 (ref 0.2–1.2)
MONOCYTES NFR BLD AUTO: 21.2 % (ref 5–12)
NEUTROPHILS # BLD AUTO: 1.91 10*3/MM3 (ref 1.9–8.1)
NEUTROPHILS NFR BLD AUTO: 40 % (ref 42.7–76)
NRBC BLD MANUAL-RTO: 0 /100 WBC (ref 0–0)
PLAT MORPH BLD: NORMAL
PLATELET # BLD AUTO: 79 10*3/MM3 (ref 140–500)
PMV BLD AUTO: 10.8 FL (ref 6–12)
RBC # BLD AUTO: 3.39 10*6/MM3 (ref 3.9–5.2)
RBC MORPH BLD: NORMAL
WBC MORPH BLD: NORMAL
WBC NRBC COR # BLD: 4.77 10*3/MM3 (ref 4.5–10.7)

## 2018-10-03 PROCEDURE — 99213 OFFICE O/P EST LOW 20 MIN: CPT | Performed by: INTERNAL MEDICINE

## 2018-10-03 PROCEDURE — 96523 IRRIG DRUG DELIVERY DEVICE: CPT | Performed by: INTERNAL MEDICINE

## 2018-10-03 PROCEDURE — 85007 BL SMEAR W/DIFF WBC COUNT: CPT | Performed by: INTERNAL MEDICINE

## 2018-10-03 PROCEDURE — 85025 COMPLETE CBC W/AUTO DIFF WBC: CPT | Performed by: INTERNAL MEDICINE

## 2018-10-03 RX ORDER — SODIUM CHLORIDE 0.9 % (FLUSH) 0.9 %
10 SYRINGE (ML) INJECTION AS NEEDED
Status: DISCONTINUED | OUTPATIENT
Start: 2018-10-03 | End: 2018-10-03 | Stop reason: HOSPADM

## 2018-10-03 RX ORDER — SODIUM CHLORIDE 0.9 % (FLUSH) 0.9 %
10 SYRINGE (ML) INJECTION AS NEEDED
Status: CANCELLED | OUTPATIENT
Start: 2018-10-03

## 2018-10-03 RX ADMIN — SODIUM CHLORIDE, PRESERVATIVE FREE 500 UNITS: 5 INJECTION INTRAVENOUS at 13:37

## 2018-10-03 RX ADMIN — Medication 10 ML: at 13:37

## 2018-10-03 NOTE — PROGRESS NOTES
REASON FOR FOLLOWUP:   1. Newly diagnosed large right breast cancer.  Core needle biopsy reported invasive mammary carcinoma with focal lobular feature, grade 2.  ER negative, less than 1%; HI positive, moderate in staining, 5% to 10%. HER2 IHC 2+, FISH study positive 2.1.   2.  CT scan for chest abdomen and pelvis and breast MRI examination reported right axillary lymph node suspicious for metastatic disease.  No remote metastatic lesion.  Patient has stage IIIa (T3 N2 M0) disease.   3.  Patient was started neoadjuvant chemotherapy on 5/23/2017 with Taxol weekly plus Herceptin weekly for total 12 weeks, and Perjata every 3 weeks during chemotherapy.  Herceptin will be converted to every 3 weeks after chemotherapy finished.    4.  Chronic moderate thrombocytopenia, mild anemia, and intermittent mild neutropenia etiologies are not clear.  5.  Reaction to Herceptin C1D1.  Subsequently tolerated therapy with hydrocortisone as premedication.  6.  Potential cardiac strain developing, neuropathic symptoms persist, follow-up MRI and surgical assessment will be needed post initial chemotherapeutic phase  7.  MRI August 17 with interval resolution of abnormal enhancement within breast and right axillary adenopathy, surgery scheduled November 7, Herceptin ongoing every 3 weeks  8.  Patient seen in office November 28, status post surgery with apparent complete response, Herceptin continued  9.  Herceptin given December 19, subsequent injury in [place] leading to prolonged stay, single treatment given through additional cancer Center  10.  Patient seen May 01, 2018, no further Herceptin planned, baseline scans pursued posttreatment, post influenza syndrome, physical therapy planned  11.  Patient seen June 13, 2018, excellent performance status, improving on physical therapy, equivocal CT  per thoracic adenopathy, follow-up PET/CT planned   12.  PET/CT with improvement,?  Thyroid abnormality with ultrasound planned  13.   Patient seen October 3, partial thyroidectomy anticipated October 14-Dr. Fofana                                                                                                            HISTORY OF PRESENT ILLNESS:    The patient is a pleasant 77 y.o. with the above-mentioned history, who presents today in anticipation of cycle 4 of Herceptin, Perjeta and Taxol.  This is the first visit with this physician involving this patient's case.  We have reviewed her status today with her slowly developing neuropathic symptoms in her lower extremities but also involving her fingers recognized significantly and she plays the piano and keyboard.  This is becoming harder to do but she is still able to do so.  She also notices neuropathy in her feet but is able to walk and function in daily activities.  Additional to this is her continued grieving at the death of her  though she, fortunately, has an excellent support system.  She continues to experience nausea that is well relieved with Compazine. She denies oral mucositis.  No diarrhea no constipation no chest pain no dyspnea.  No lower extremity edema.    She did received 2 units of PRBC's on   7/8/2017 and remains hematologically stable.   She does have difficulty with sleep and Ambien 10 mg daily at bedtime seems help much better compared to 5 mg dose.  She does not need refills today.  In reviewing her case July 26 she is entering the fourth cycle of her treatment and recent echocardiogram is reviewed with she and her close friend revealing EF of 66.7% though with global strain of -16%?  Suspicious for myopathic process.  Normal ventricular cavity size and wall thickness as well as contractility.  We have also discussed that she is responding to therapy and will need surgical assessment which may not as yet have been performed.  She has seen Dr. Melo for port placement and will ask him to review her likely just after she has completed his fourth cycle of therapy.   It is also apparent that she'll need a cardiac assessment as we continue subsequent Herceptin therapy perioperatively.      The patient underwent MRI of the breasts August 17 demonstrating interval resolution of abnormal enhancement within the right breast, resolution of right axillary adenopathy had also resolved.  The findings were consistent with a complete response to neoadjuvant chemotherapy.  The patient was seen in subsequent follow-up with Dr. Melo and was determined to proceed with surgery and ultimately sentinel node evaluation.  This is now scheduled November 7 and there are additional plans to consider radiation therapy postoperatively and we have also discussed the use of anti-hormonal therapy considering her mildly positive SD status.    The patient was able to proceed to surgery undergoing right breast mastectomy and sentinel lymph node biopsy November 07, 2017.  She did well postoperatively seeing Dr. Melo November 16.  Pathology revealed no residual malignancy in the breast and 3 negative sentinel lymph nodes.  A formal review of her report reveals treatment effect particularly in her largest lymph node with focal fibrosis and scar, right breast mastectomy fibrosis associated with a cavitary biopsy site and no invasive or in situ carcinoma.  The patient is now seen in our office though she went to the wrong location and the seen late in the day.  We discussed her findings and agree that she would continue Herceptin at this point but be reviewed formally again in 3 weeks.  She is also be seen by radiation therapy for their input.   The patient, evidently, was seen by radiation therapy but decided not to pursue it until her last Herceptin therapy here December 19.  Following this she visited her daughter in [place] and, unfortunately, developed influenza and pneumonia.  She had a prolonged hospitalization and was at many sites in recovery over a several month only to return to [place] in  the last several weeks.  She did take 1 IV Herceptin dose while in [place] but as she is reviewed May 05, 2018 we have discussed that it makes no particular sense to continue or add on to her previous history by extending Herceptin any further 3-4 months after her last treatment.  She therefore has completed Herceptin.    The patient on to be tested post her treatment again baseline studies and CT of chest and pelvis were performed June 6 revealing interval increase in a few subcentimeter nodes in the left pectoral, axillary and mediastinal regions. These are all considerably small and of uncertain significance.  The patient's performance status remains excellent without any reduction and, in fact, has improved with physical therapy upon return.       Patient is next seen August 08, 2018, fortunately feeling well.  A follow-up PET/CT had revealed an interval decrease in the subcentimeter nodes in the left subpectoral axillary region as well as mediastinum.  There was no hypermetabolic lymphadenopathy.  There was intense focus within slightly enlarged right thyroid gland.  Patient indicates she never had any thyroid issues of which she is aware.  The patient was referred to ENT for assessment and the patient was seen by Dr. Fofana.  Ultimately a subtotal thyroidectomy is anticipated and now scheduled October 4.  The patient is willing to proceed.    Past Medical History:   Diagnosis Date   • Alcoholism (CMS/[facility]) 1978    I haven't had a drink since then   • Anemia    • Breast cancer (CMS/[facility]) 05/03/2017    Right breast invasive mammary carcinoma with focal lobular features, grade 2, ER negative, less than 1% CT positive, HER-2 positive, stage IIIa disease (T3, N2, M0   • Breast cancer (CMS/[facility]) 10/20/2004    Left breast ductal carcinoma in-situ, predominately intermediate grade with focal comedonecrosis (high grade), solid and bribridform patterns involving approximately five core biopsy fragments   • Edema      Chronic lower extremity edema   • GERD (gastroesophageal reflux disease) 09/13/2011    Dr. Paul Negron   • GI (gastrointestinal bleed) 1997   • H/O jaundice    • Hernia     INCISONAL. AROUND LEFT TRAM LAP SITE   • History of chemotherapy     2017 4 MONTHS IN 2017   • History of Clostridium difficile colitis     JAN 2018   • History of histoplasmosis    • History of infectious mononucleosis 1960   • History of migraine headaches    • History of pneumonia 12/27/2017    AS RESULT OF FLU. ENDED UP ON VENT IN [place]   • History of thrombocytopenia    • History of transfusion 1997   • History of vertigo    • Hx of colonic polyps 06/11/2009    Dr. Giles   • Hyperlipidemia    • Hypertension    • Meningioma (CMS/HCC)    • Neuropathy     HANDS AND FEET   • Osteoarthritis 09/13/2011    Dr. Jamil Miller   • Peptic ulceration    • Retina disorder     BLEED. FROM HISTOPLASMOSIS   • SBO (small bowel obstruction) (CMS/HCC)     due to hernia with surgical repair in 09/2011   • SOB (shortness of breath) on exertion    • Thyroid mass     RIGHT   Normal echocardiogram on 5/18/2017, LVEF 68%.    Past Surgical History:   Procedure Laterality Date   • APPENDECTOMY N/A 1960   • BLADDER REPAIR N/A 1980   • BREAST BIOPSY Left 10/20/2004    Mammotome incisional biopsy left breast, surgical specimen, left breast, localization clip placement, left breast, confirmatory diagnostic unilateral mammogram, left breast-Dr. Tyrese Alcala, [facility]   • BREAST BIOPSY Right 05/03/2017    PATH: INVASIVE MAMMARY CARCINOMA   • CHOLECYSTECTOMY N/A 1990   • COLONOSCOPY N/A 06/11/2009    Hemorrhoids, otherwise normal, repeat in 5 years-Dr. Noemí Purcell   • EYE SURGERY Right 2017    laser surgery for blood clot   • HYSTERECTOMY Bilateral 1980   • INGUINAL HERNIA REPAIR Right     DR ALESIA GAXIOLA   • MASTECTOMY Left 2004    Left breast mastecotmy with sentinel lymph node biospy-[facility]   • MASTECTOMY W/ SENTINEL NODE BIOPSY Right 11/7/2017     Procedure: RIGHT BREAST MASTECTOMY WITH SENTINEL NODE BIOPSY;  Surgeon: Christian Melo MD;  Location:  [facility];  Service:    • MS INSJ TUNNELED CVC W/O SUBQ PORT/ AGE 5 YR/> Left 2017    Procedure: INSERTION VENOUS ACCESS DEVICE;  Surgeon: Christian Melo MD;  Location:  [facility];  Service: General   • REDUCTION MAMMAPLASTY Right     to match Lt. TRAM flap   • SMALL INTESTINE SURGERY      INCARCRATED HERNIA   • TONSILLECTOMY Bilateral    • TRAM FLAP DELAYED Left     WITH MASTOPEXY   • UPPER GASTROINTESTINAL ENDOSCOPY N/A 2011    LA grade A esophagitis with no bleeding, large hiatus hernia (50cm), gastric ulcer-Dr.Laszlo Lezama   • US GUIDED FINE NEEDLE ASPIRATION  2018   Lico catheter placement on 2017 by Dr. Melo.     OB/GYN history: Menarche age 16, menopause at 1985. , 1 miscarriage. No birth control pill use. She did have post menopause hormonal supplementation.      HEMATOLOGIC/ONCOLOGIC HISTORY: The patient is a 76 y.o. year old female whom we are consulted for a newly self discovered right breast mass, in 2017. Patient had ultrasound-guided biopsy on 5/3/2017 and confirmed to be invasive mammary carcinoma with focal lobular features, grade 2 [place] score 6/9. She is here for initial evaluation for management.      Patient is a 76-year-old  female who was seen here previously for chronic mild-to-moderate thrombocytopenia and mild anemia. Recently the patient reports she started having firmness of the right breast that started sometime in April or maybe even in March. She noticed firmness spread from about around the 12 o'clock position, gradually towards the upper lateral side and lower part of the right breast associated mild pain. The patient thought it was related to fibrocystic changes. However, the symptom was getting worse. Patient also reported dented nipple after she started having pain in the right breast. She denies discharge from the  nipple. She called her primary care physician, Dr. Martin, who ordered a mammogram study. This was done on 04/12/2017 with architectural distortion of the right breast centrally at 12 o'clock position and had scattered fibroglandular densities throughout the right breast.      The patient subsequently had right breast ultrasound examination on 04/26/2017. Discovered a large right breast mass measuring 5.8 x 3.5 x 3.9 cm. Patient subsequently had ultrasound-guided right breast biopsy on 05/03/2017. Pathology evaluation reported invasive mammary carcinoma with focal lobular feature. Elen score 6/9, overall grade 2. Sample was further sent to the [facility] for test. ER negative, less than 1%; VA positive, moderate in staining, 5% to 10%. HER2 IHC 2+, but FISH study positive 2.1.     She denies weight loss, she actually eats very well. No nausea vomiting. Patient does complain of insomnia, unable to sleep.      This patient has history of left breast DCIS back in 2007, had mastectomy at the [facility]. No hormonal therapy afterwards according to patient. This patient also had a small meningioma, followed by Dr. Smith in [facility], with most recent MRI of the brain in March 2017, with 18 mm meningioma, and followed on an annual basis.     Her neutrophil count on 5/16/17 is normal at 2500, however, records showed starting from 05/2015 and in 09/2016, 01/2017 and 03/2017, all 4 laboratory studies showed mild neutropenia with ANC fluctuating between 1200 and 1600. Etiology is not clear.    Normal echocardiogram on 5/18/2017, LVEF 68%.      CT scan for chest abdomen and pelvis on 5/22/2017 and breast MRI examination on 5/22/2017 reported small right axillary lymph node suspicious for metastatic disease.  No remote metastatic lesion.  Patient has stage IIIa (T3 N2 M0) disease.      Patient will start neoadjuvant chemotherapy with Taxol weekly plus Herceptin  weekly for total 12 weeks, and Perjeta every 3 weeks (3-week cycle) during chemotherapy.  Herceptin will be converted to every 3 weeks after chemotherapy finished.  Cycle 1 day 1 5/23/2017.   Status post neoadjuvant chemotherapy the patient was assessed with MRI showing a complete neoadjuvant response.  The patient was reviewed for surgery and questions concerning lymph node dissection-sentinel node evaluation-and need for radiation therapy postop were considered as well as use of additional Herceptin for the balance of the year as well as additional use of anti-hormonal therapy.  Surgery was scheduled November 07, 2017.  Patient next seen in office November 28 having undergone surgery on November 7 with results revealing no residual malignancy in either breast or associated sentinel lymph nodes.  Was elected to continue Herceptin when seen back in office November 20 having initiated Herceptin May 23, 2017.    Patient continued Herceptin with her last treatment given December 19, 2017.     Unfortunately she later experienced an accident while in [place] December 28 with prolonged hospitalization and prolonged ventilator management required.  Apparently she had at least one IV Herceptin therapy given while there and we were contacted March 20, 2018- Dr. Nuno.  Eventually the patient was able to return to [place] is seen back in office May 5 at which time we concluded that she completed Herceptin therapy as result of being off treatment for so long.  Plans were made for baseline scans.      MEDICATIONS: The current medication list was reviewed with the patient and updated in the EMR this date per the Medical Assistant. Medication dosages and frequencies were confirmed to be accurate.       ALLERGIES:    Allergies   Allergen Reactions   • Codeine Nausea And Vomiting   • Morphine Nausea And Vomiting     SOCIAL HISTORY:   Social History   Substance Use Topics   • Smoking status: Former Smoker     Packs/day: 2.00      "Years: 30.00     Types: Cigarettes     Start date: 1/30/1957     Quit date: 4/10/1987   • Smokeless tobacco: Former User      Comment: I haven't had a cigarette in over 30 years   • Alcohol use No      Comment: stopped, heavy in past     FAMILY HISTORY:  Family History   Problem Relation Age of Onset   • Heart disease Mother    • Aortic aneurysm Mother         abdominal   • Coronary artery disease Mother    • Hypertension Mother    • Miscarriages / Stillbirths Mother    • Heart disease Father    • Heart attack Father         acute   • Hypertension Father    • Early death Father    • Hearing loss Father    • Kidney disease Father    • Breast cancer Daughter 45   • Heart disease Brother    • Hypertension Brother    • Malig Hyperthermia Neg Hx      I have reviewed the patient's medical history in detail and updated the computerized patient record.    REVIEW OF SYSTEMS:  GENERAL: See history of present illness. No change in appetite or weight;   No fevers, chills, sweats.   SKIN: No nonhealing lesions. No rashes.   HEME/LYMPH: See HPI.   EYES: No vision changes or diplopia.   ENT: No tinnitus, hearing loss, gum bleeding, epistaxis, hoarseness or dysphagia.   RESPIRATORY: No cough, Has exertional dyspnea, No hemoptysis or wheezing.   CVS: No chest pain, palpitations, orthopnea, dyspnea on exertion or PND.   GI: See HPI.   : No lower tract obstructive symptoms, dysuria or hematuria.   MUSCULOSKELETAL: Chronic or joint pain, arthritis.  Has mild intermittent ankle swelling.   NEUROLOGICAL: See HPI  PSYCHIATRIC: See HPI     Objective:   Vitals:    10/03/18 1343   BP: 154/77   Pulse: 72   Resp: 16   Temp: 98.4 °F (36.9 °C)   TempSrc: Oral   SpO2: 99%   Weight: 69 kg (152 lb 1.6 oz)   Height: 160 cm (62.99\")   PainSc: 0-No pain   ECOG 0      PHYSICAL EXAM:   GENERAL: Well-developed, well-nourished female, in no acute distress.   SKIN: Warm, dry without rashes, purpura or petechiae.  Left upper chest Lico catheter in place, " no evidence of infection.   EYES: Pupils equal, round and reactive to light. EOMs intact. Conjunctivae normal.  EARS: Hearing intact.  NOSE:  No excoriations or nasal discharge.  MOUTH: Tongue is well-papillated; no stomatitis or ulcers. Lips normal.  THROAT: Oropharynx without lesions or exudates.  LYMPHATICS: No cervical, supraclavicular, axillary adenopathy.  CHEST: Lungs clear to auscultation. Good airflow.   BREAST:Postop, Well healed right mastectomy site with no evidence of recurrent disease, no axillary adenopathy bilaterally.  CARDIAC: Regular rate and rhythm without murmurs. Normal S1,S2.  ABDOMEN: Slightly distended, normal active bowel sounds,  no hepatosplenomegaly or masses.  EXTREMITIES: No clubbing, cyanosis or edema.  NEUROLOGICAL: Cranial Nerves II-XII grossly intact. No focal neurological deficits.  PSYCHIATRIC: Alert and oriented, pleased about her results      RECENT LABS:  Lab Results   Component Value Date    WBC 4.77 10/03/2018    HGB 10.2 (L) 10/03/2018    HCT 31.4 (L) 10/03/2018    MCV 92.6 10/03/2018    PLT 79 (L) 10/03/2018     Lab Results   Component Value Date    NEUTROABS 1.91 10/03/2018     Lab Results   Component Value Date    GLUCOSE 92 09/26/2018    BUN 13 09/26/2018    CREATININE 0.77 09/26/2018    EGFRIFNONA 73 09/26/2018    EGFRIFAFRI 87 05/24/2018    BCR 16.9 09/26/2018    K 4.2 09/26/2018    CO2 27.9 09/26/2018    CALCIUM 9.5 09/26/2018    PROTENTOTREF 6.3 05/24/2018    ALBUMIN 4.20 08/01/2018    LABIL2 1.9 05/24/2018    AST 26 08/01/2018    ALT 19 08/01/2018        F-18 FDG PET FROM SKULL BASE TO MID THIGH WITH PET/CT FUSION  August 01, 2018    IMPRESSION:  1. Interval decrease in the subcentimeter nodes at the left subpectoral  and axillary region, as well as the mediastinum. There is no  hypermetabolic lymphadenopathy.  2. There is an intense focus of hypermetabolic activity within the  slightly enlarged right thyroid lobe. No lesion is seen on the  corresponding CT  sequence. Further characterization can be performed  with thyroid ultrasound.  3. Uncertain etiology of a tiny hypermetabolic focus within or adjacent  to a segment of distal ileum. No abnormality seen on the corresponding  CT sequence.             Assessment/Plan   1. Large right breast cancer, locally advanced, stage IIIa (T3 N1/ 2 M0).  ER negative, less than 1%; NM positive, moderate in staining, 5% to 10%. HER2 IHC 2+, FISH study positive 2.1.  Physical examination showed a large mass, 7 cm x 7 cm initially which has decreased to approximately less than 4 cm ..  Patient  proceeded through 4 cycles ceptin,Perjeta and Taxol.   Her subsequent MRI examination showed complete response by imaging including right axillary lymphadenopathy.  We have continued Herceptin and that includes today October 20, 2017.  The case was discussed with Dr. Melo and plans were to proceed with mastectomy plus right axillary lymph node assessment via sentinel node evaluation. it was felt she likely require radiation therapy post procedure.  The patient was scheduled and proceeded to surgery November 7.  Her results, wonderfully, revealed no evidence of residual disease in the breast or associated sentinel lymph nodes. She was seen by radiation therapy and offered treatment did not pursue it.  Additionally, and somewhat complicating this story, she traveled to [place] and developed severe influenza, associated pneumonia and was hospitalized for several months only to return to [place] in the last several weeks now being seen back May 1.  There is no particular benefit from trying to add Herceptin back to her therapy now and is felt that she is completed Herceptin treatment.  She wishes consider reconstruction and baseline CT scans will be requested in 5 weeks with the patient being seen in 6 weeks follow-up.The patient reviewed June 13, 2018 with the scans demonstrating very modest increase in left pectoral, axillary or  mediastinal nodes.  These are of uncertain significance but do need follow-up in a PET/CT is planned in 7 weeks.  Her anemia will also be reviewed again with additional laboratory studies that visit.  She'll be seen in 8 weeks for general reassessment.    The patient's anemia workup was essentially negative and she is slowly improving when seen back August 8.  Her PET/CT, fortunately, demonstrates improvement though there is potential abnormality within the right thyroid lobe.  We discussed thyroid function testing and follow-up thyroid ultrasound.  She will be seen back in approximately 2 months as we maintain her port monthly flushes.   The patient was reviewed by ENT and ultimately was felt that a partial thyroidectomy was in order and is now scheduled October 4.  Patient's one to proceed.  We'll plan to see her back in approximately 6 weeks.      2.  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                                                                            3.  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

## 2018-10-04 ENCOUNTER — ANESTHESIA EVENT (OUTPATIENT)
Dept: PERIOP | Facility: HOSPITAL | Age: 78
End: 2018-10-04

## 2018-10-04 ENCOUNTER — HOSPITAL ENCOUNTER (OUTPATIENT)
Facility: HOSPITAL | Age: 78
Setting detail: HOSPITAL OUTPATIENT SURGERY
Discharge: HOME | End: 2018-10-04
Attending: OTOLARYNGOLOGY | Admitting: OTOLARYNGOLOGY

## 2018-10-04 ENCOUNTER — ANESTHESIA (OUTPATIENT)
Dept: PERIOP | Facility: HOSPITAL | Age: 78
End: 2018-10-04

## 2018-10-04 VITALS
DIASTOLIC BLOOD PRESSURE: 69 MMHG | OXYGEN SATURATION: 94 % | BODY MASS INDEX: 26.91 KG/M2 | HEART RATE: 64 BPM | WEIGHT: 151.9 LBS | RESPIRATION RATE: 16 BRPM | TEMPERATURE: 98.7 F | SYSTOLIC BLOOD PRESSURE: 163 MMHG

## 2018-10-04 DIAGNOSIS — E05.90 HYPERTHYROIDISM: ICD-10-CM

## 2018-10-04 PROCEDURE — 25010000002 NEOSTIGMINE PER 0.5 MG: Performed by: NURSE ANESTHETIST, CERTIFIED REGISTERED

## 2018-10-04 PROCEDURE — 25010000002 FENTANYL CITRATE (PF) 100 MCG/2ML SOLUTION: Performed by: ANESTHESIOLOGY

## 2018-10-04 PROCEDURE — 25010000002 FENTANYL CITRATE (PF) 100 MCG/2ML SOLUTION: Performed by: NURSE ANESTHETIST, CERTIFIED REGISTERED

## 2018-10-04 PROCEDURE — 25010000002 DEXAMETHASONE PER 1 MG: Performed by: NURSE ANESTHETIST, CERTIFIED REGISTERED

## 2018-10-04 PROCEDURE — 25010000002 MIDAZOLAM PER 1 MG: Performed by: ANESTHESIOLOGY

## 2018-10-04 PROCEDURE — 25010000002 PROPOFOL 10 MG/ML EMULSION: Performed by: NURSE ANESTHETIST, CERTIFIED REGISTERED

## 2018-10-04 PROCEDURE — 25010000002 ONDANSETRON PER 1 MG: Performed by: NURSE ANESTHETIST, CERTIFIED REGISTERED

## 2018-10-04 PROCEDURE — 88307 TISSUE EXAM BY PATHOLOGIST: CPT | Performed by: OTOLARYNGOLOGY

## 2018-10-04 PROCEDURE — 25010000002 PROMETHAZINE PER 50 MG: Performed by: ANESTHESIOLOGY

## 2018-10-04 PROCEDURE — 88331 PATH CONSLTJ SURG 1 BLK 1SPC: CPT | Performed by: OTOLARYNGOLOGY

## 2018-10-04 RX ORDER — HYDROCODONE BITARTRATE AND ACETAMINOPHEN 5; 325 MG/1; MG/1
1-2 TABLET ORAL EVERY 4 HOURS PRN
Qty: 30 TABLET | Refills: 0 | Status: SHIPPED | OUTPATIENT
Start: 2018-10-04 | End: 2019-01-14

## 2018-10-04 RX ORDER — OXYCODONE HYDROCHLORIDE AND ACETAMINOPHEN 5; 325 MG/1; MG/1
1 TABLET ORAL ONCE AS NEEDED
Status: DISCONTINUED | OUTPATIENT
Start: 2018-10-04 | End: 2018-10-04 | Stop reason: HOSPADM

## 2018-10-04 RX ORDER — HYDRALAZINE HYDROCHLORIDE 20 MG/ML
5 INJECTION INTRAMUSCULAR; INTRAVENOUS
Status: DISCONTINUED | OUTPATIENT
Start: 2018-10-04 | End: 2018-10-04 | Stop reason: HOSPADM

## 2018-10-04 RX ORDER — PROMETHAZINE HYDROCHLORIDE 25 MG/ML
6.25 INJECTION, SOLUTION INTRAMUSCULAR; INTRAVENOUS ONCE AS NEEDED
Status: DISCONTINUED | OUTPATIENT
Start: 2018-10-04 | End: 2018-10-04 | Stop reason: HOSPADM

## 2018-10-04 RX ORDER — GLYCOPYRROLATE 0.2 MG/ML
INJECTION INTRAMUSCULAR; INTRAVENOUS AS NEEDED
Status: DISCONTINUED | OUTPATIENT
Start: 2018-10-04 | End: 2018-10-04 | Stop reason: SURG

## 2018-10-04 RX ORDER — BACITRACIN ZINC 500 [USP'U]/G
OINTMENT TOPICAL AS NEEDED
Status: DISCONTINUED | OUTPATIENT
Start: 2018-10-04 | End: 2018-10-04 | Stop reason: HOSPADM

## 2018-10-04 RX ORDER — PROMETHAZINE HYDROCHLORIDE 25 MG/1
25 SUPPOSITORY RECTAL ONCE AS NEEDED
Status: DISCONTINUED | OUTPATIENT
Start: 2018-10-04 | End: 2018-10-04 | Stop reason: HOSPADM

## 2018-10-04 RX ORDER — FENTANYL CITRATE 50 UG/ML
50 INJECTION, SOLUTION INTRAMUSCULAR; INTRAVENOUS
Status: DISCONTINUED | OUTPATIENT
Start: 2018-10-04 | End: 2018-10-04 | Stop reason: HOSPADM

## 2018-10-04 RX ORDER — ACETAMINOPHEN 325 MG/1
650 TABLET ORAL ONCE AS NEEDED
Status: DISCONTINUED | OUTPATIENT
Start: 2018-10-04 | End: 2018-10-04 | Stop reason: HOSPADM

## 2018-10-04 RX ORDER — PROMETHAZINE HYDROCHLORIDE 25 MG/1
25 TABLET ORAL ONCE AS NEEDED
Status: DISCONTINUED | OUTPATIENT
Start: 2018-10-04 | End: 2018-10-04 | Stop reason: HOSPADM

## 2018-10-04 RX ORDER — NALBUPHINE HCL 10 MG/ML
10 AMPUL (ML) INJECTION EVERY 4 HOURS PRN
Status: DISCONTINUED | OUTPATIENT
Start: 2018-10-04 | End: 2018-10-04 | Stop reason: HOSPADM

## 2018-10-04 RX ORDER — MIDAZOLAM HYDROCHLORIDE 1 MG/ML
2 INJECTION INTRAMUSCULAR; INTRAVENOUS
Status: DISCONTINUED | OUTPATIENT
Start: 2018-10-04 | End: 2018-10-04 | Stop reason: HOSPADM

## 2018-10-04 RX ORDER — SODIUM CHLORIDE 0.9 % (FLUSH) 0.9 %
1-10 SYRINGE (ML) INJECTION AS NEEDED
Status: DISCONTINUED | OUTPATIENT
Start: 2018-10-04 | End: 2018-10-04 | Stop reason: HOSPADM

## 2018-10-04 RX ORDER — ACETAMINOPHEN 325 MG/1
650 TABLET ORAL ONCE
Status: COMPLETED | OUTPATIENT
Start: 2018-10-04 | End: 2018-10-04

## 2018-10-04 RX ORDER — MAGNESIUM HYDROXIDE 1200 MG/15ML
LIQUID ORAL AS NEEDED
Status: DISCONTINUED | OUTPATIENT
Start: 2018-10-04 | End: 2018-10-04 | Stop reason: HOSPADM

## 2018-10-04 RX ORDER — MIDAZOLAM HYDROCHLORIDE 1 MG/ML
1 INJECTION INTRAMUSCULAR; INTRAVENOUS
Status: DISCONTINUED | OUTPATIENT
Start: 2018-10-04 | End: 2018-10-04 | Stop reason: HOSPADM

## 2018-10-04 RX ORDER — HYDROCODONE BITARTRATE AND ACETAMINOPHEN 5; 325 MG/1; MG/1
1 TABLET ORAL ONCE AS NEEDED
Status: DISCONTINUED | OUTPATIENT
Start: 2018-10-04 | End: 2018-10-04 | Stop reason: HOSPADM

## 2018-10-04 RX ORDER — DEXAMETHASONE SODIUM PHOSPHATE 10 MG/ML
INJECTION INTRAMUSCULAR; INTRAVENOUS AS NEEDED
Status: DISCONTINUED | OUTPATIENT
Start: 2018-10-04 | End: 2018-10-04 | Stop reason: SURG

## 2018-10-04 RX ORDER — ACETAMINOPHEN 650 MG/1
650 SUPPOSITORY RECTAL ONCE AS NEEDED
Status: DISCONTINUED | OUTPATIENT
Start: 2018-10-04 | End: 2018-10-04 | Stop reason: HOSPADM

## 2018-10-04 RX ORDER — NALOXONE HCL 0.4 MG/ML
0.4 VIAL (ML) INJECTION AS NEEDED
Status: DISCONTINUED | OUTPATIENT
Start: 2018-10-04 | End: 2018-10-04 | Stop reason: HOSPADM

## 2018-10-04 RX ORDER — FENTANYL CITRATE 50 UG/ML
INJECTION, SOLUTION INTRAMUSCULAR; INTRAVENOUS AS NEEDED
Status: DISCONTINUED | OUTPATIENT
Start: 2018-10-04 | End: 2018-10-04 | Stop reason: SURG

## 2018-10-04 RX ORDER — ONDANSETRON 2 MG/ML
INJECTION INTRAMUSCULAR; INTRAVENOUS AS NEEDED
Status: DISCONTINUED | OUTPATIENT
Start: 2018-10-04 | End: 2018-10-04 | Stop reason: SURG

## 2018-10-04 RX ORDER — NALBUPHINE HCL 10 MG/ML
2 AMPUL (ML) INJECTION EVERY 4 HOURS PRN
Status: DISCONTINUED | OUTPATIENT
Start: 2018-10-04 | End: 2018-10-04 | Stop reason: HOSPADM

## 2018-10-04 RX ORDER — SODIUM CHLORIDE 0.9 % (FLUSH) 0.9 %
3 SYRINGE (ML) INJECTION EVERY 12 HOURS SCHEDULED
Status: DISCONTINUED | OUTPATIENT
Start: 2018-10-04 | End: 2018-10-04 | Stop reason: HOSPADM

## 2018-10-04 RX ORDER — ROCURONIUM BROMIDE 10 MG/ML
INJECTION, SOLUTION INTRAVENOUS AS NEEDED
Status: DISCONTINUED | OUTPATIENT
Start: 2018-10-04 | End: 2018-10-04 | Stop reason: SURG

## 2018-10-04 RX ORDER — LIDOCAINE HYDROCHLORIDE 20 MG/ML
INJECTION, SOLUTION INFILTRATION; PERINEURAL AS NEEDED
Status: DISCONTINUED | OUTPATIENT
Start: 2018-10-04 | End: 2018-10-04 | Stop reason: SURG

## 2018-10-04 RX ORDER — LIDOCAINE HYDROCHLORIDE AND EPINEPHRINE 10; 10 MG/ML; UG/ML
INJECTION, SOLUTION INFILTRATION; PERINEURAL AS NEEDED
Status: DISCONTINUED | OUTPATIENT
Start: 2018-10-04 | End: 2018-10-04 | Stop reason: HOSPADM

## 2018-10-04 RX ORDER — SODIUM CHLORIDE, SODIUM LACTATE, POTASSIUM CHLORIDE, CALCIUM CHLORIDE 600; 310; 30; 20 MG/100ML; MG/100ML; MG/100ML; MG/100ML
9 INJECTION, SOLUTION INTRAVENOUS CONTINUOUS
Status: DISCONTINUED | OUTPATIENT
Start: 2018-10-04 | End: 2018-10-04 | Stop reason: HOSPADM

## 2018-10-04 RX ORDER — PROMETHAZINE HYDROCHLORIDE 25 MG/ML
2.5 INJECTION, SOLUTION INTRAMUSCULAR; INTRAVENOUS 2 TIMES DAILY PRN
Status: DISCONTINUED | OUTPATIENT
Start: 2018-10-04 | End: 2018-10-04 | Stop reason: HOSPADM

## 2018-10-04 RX ORDER — LIDOCAINE HYDROCHLORIDE 10 MG/ML
0.5 INJECTION, SOLUTION EPIDURAL; INFILTRATION; INTRACAUDAL; PERINEURAL ONCE AS NEEDED
Status: DISCONTINUED | OUTPATIENT
Start: 2018-10-04 | End: 2018-10-04 | Stop reason: HOSPADM

## 2018-10-04 RX ORDER — PROPOFOL 10 MG/ML
VIAL (ML) INTRAVENOUS AS NEEDED
Status: DISCONTINUED | OUTPATIENT
Start: 2018-10-04 | End: 2018-10-04 | Stop reason: SURG

## 2018-10-04 RX ORDER — HYDROMORPHONE HYDROCHLORIDE 1 MG/ML
0.5 INJECTION, SOLUTION INTRAMUSCULAR; INTRAVENOUS; SUBCUTANEOUS
Status: DISCONTINUED | OUTPATIENT
Start: 2018-10-04 | End: 2018-10-04 | Stop reason: HOSPADM

## 2018-10-04 RX ADMIN — SODIUM CHLORIDE, POTASSIUM CHLORIDE, SODIUM LACTATE AND CALCIUM CHLORIDE 9 ML/HR: 600; 310; 30; 20 INJECTION, SOLUTION INTRAVENOUS at 10:50

## 2018-10-04 RX ADMIN — FENTANYL CITRATE 50 MCG: 50 INJECTION INTRAMUSCULAR; INTRAVENOUS at 13:19

## 2018-10-04 RX ADMIN — FENTANYL CITRATE 50 MCG: 50 INJECTION INTRAMUSCULAR; INTRAVENOUS at 14:17

## 2018-10-04 RX ADMIN — PROMETHAZINE HYDROCHLORIDE 2.5 MG: 25 INJECTION, SOLUTION INTRAMUSCULAR; INTRAVENOUS at 15:24

## 2018-10-04 RX ADMIN — PROPOFOL 180 MG: 10 INJECTION, EMULSION INTRAVENOUS at 12:50

## 2018-10-04 RX ADMIN — GLYCOPYRROLATE 0.6 MG: 0.2 INJECTION INTRAMUSCULAR; INTRAVENOUS at 13:52

## 2018-10-04 RX ADMIN — ROCURONIUM BROMIDE 30 MG: 10 INJECTION INTRAVENOUS at 12:50

## 2018-10-04 RX ADMIN — LIDOCAINE HYDROCHLORIDE 60 MG: 20 INJECTION, SOLUTION INFILTRATION; PERINEURAL at 12:50

## 2018-10-04 RX ADMIN — MIDAZOLAM HYDROCHLORIDE 1 MG: 2 INJECTION, SOLUTION INTRAMUSCULAR; INTRAVENOUS at 10:55

## 2018-10-04 RX ADMIN — Medication 500 UNITS: at 15:59

## 2018-10-04 RX ADMIN — FENTANYL CITRATE 50 MCG: 50 INJECTION INTRAMUSCULAR; INTRAVENOUS at 14:35

## 2018-10-04 RX ADMIN — ACETAMINOPHEN 650 MG: 325 TABLET, FILM COATED ORAL at 10:55

## 2018-10-04 RX ADMIN — FENTANYL CITRATE 50 MCG: 50 INJECTION INTRAMUSCULAR; INTRAVENOUS at 12:49

## 2018-10-04 RX ADMIN — NEOSTIGMINE METHYLSULFATE 4 MG: 1 INJECTION INTRAMUSCULAR; INTRAVENOUS; SUBCUTANEOUS at 13:52

## 2018-10-04 RX ADMIN — ONDANSETRON 4 MG: 2 INJECTION INTRAMUSCULAR; INTRAVENOUS at 13:52

## 2018-10-04 RX ADMIN — DEXAMETHASONE SODIUM PHOSPHATE 6 MG: 10 INJECTION INTRAMUSCULAR; INTRAVENOUS at 13:00

## 2018-10-04 RX ADMIN — FENTANYL CITRATE 50 MCG: 50 INJECTION INTRAMUSCULAR; INTRAVENOUS at 13:29

## 2018-10-04 NOTE — BRIEF OP NOTE
THYROIDECTOMY  Progress Note    Roxana Brziuela  10/4/2018    Pre-op Diagnosis:   Right thyroid nodule       Post-Op Diagnosis Codes:   same    Procedure/CPT® Codes:      Procedure(s):  RT THYROID LOBECTOMY    Surgeon(s):  Julián Fofana MD  Assistant: Marques Gomez Sr., MD    Anesthesia: General    Staff:   Circulator: Lisa Paz RN  Scrub Person: Nadine Ragsdale      Estimated Blood Loss: 50 mL    Urine Voided: * No values recorded between 10/4/2018 12:44 PM and 10/4/2018  2:08 PM *    Specimens:                ID Type Source Tests Collected by Time   A : RIGHT THYROID LOBE AND ISTHMUS Tissue Thyroid TISSUE PATHOLOGY EXAM Julián Fofana MD 10/4/2018 1312         Drains:   Closed/Suction Drain Anterior Neck Bulb (Active)       Findings: nodular inflammed thyroid.  RLN identified and preserved. No cancer on frozen section    Complications: none    OPERATIVE REPORT: The patient was taken to the operating room placed in the supine position and placed under general endotracheal anesthesia.  The skin incision was planned in a natural skin crease and injected with 1% lidocaine with epinephrine.  Sterile prep and drape with Hibiclens was performed.  The incision was then made with a 15 blade scalpel.  This was carried through the subcutaneous fat and platysma.  Subplatysmal flaps were elevated.  The midline raphae was then found and the strap muscles were reflected laterally over the right thyroid lobe only.  Careful dissection on the capsule of the gland was performed.  The Harmonic scalpel was used to take down the blood supply superiorly, inferiorly and laterally.  Significant hypervascularity was noted.  As the thyroid was reflected anteromedially, the nerve was found and diussected free from the gland.The attachments at Berry's ligament were divided using the Harmonic scalpel.  The isthmus ws divided and the final attachments at the trachea were taken down using the Harmonic scalpel.   Irrigation of the operative site was performed and hemostasis was assured using the bipolar electrocautery.  Frozen section showed no obvious malignancy. A #10 round drain was placed exiting through separate skin incision.  The midline raphae was then reapproximated with 3-0 Vicryl suture.  A 4-0 Vicryl suture was used for platysmal and dermal closure.   5-0 nylon was then used to close the epidermis.  Bacitracin was then applied.  At this point the procedure was complete.  The patient was allowed to awake from anesthesia and taken to the recovery room in satisfactory condition.        Julián Fofana MD     Date: 10/4/2018  Time: 2:09 PM

## 2018-10-04 NOTE — DISCHARGE INSTRUCTIONS
General Anesthesia, Adult, Care After  These instructions provide you with information about caring for yourself after your procedure. Your health care provider may also give you more specific instructions. Your treatment has been planned according to current medical practices, but problems sometimes occur. Call your health care provider if you have any problems or questions after your procedure.  What can I expect after the procedure?  After the procedure, it is common to have:  · Vomiting.  · A sore throat.  · Mental slowness.    It is common to feel:  · Nauseous.  · Cold or shivery.  · Sleepy.  · Tired.  · Sore or achy, even in parts of your body where you did not have surgery.    Follow these instructions at home:  For at least 24 hours after the procedure:  · Do not:  ? Participate in activities where you could fall or become injured.  ? Drive.  ? Use heavy machinery.  ? Drink alcohol.  ? Take sleeping pills or medicines that cause drowsiness.  ? Make important decisions or sign legal documents.  ? Take care of children on your own.  · Rest.  Eating and drinking  · If you vomit, drink water, juice, or soup when you can drink without vomiting.  · Drink enough fluid to keep your urine clear or pale yellow.  · Make sure you have little or no nausea before eating solid foods.  · Follow the diet recommended by your health care provider.  General instructions  · Have a responsible adult stay with you until you are awake and alert.  · Return to your normal activities as told by your health care provider. Ask your health care provider what activities are safe for you.  · Take over-the-counter and prescription medicines only as told by your health care provider.  · If you smoke, do not smoke without supervision.  · Keep all follow-up visits as told by your health care provider. This is important.  Contact a health care provider if:  · You continue to have nausea or vomiting at home, and medicines are not helpful.  · You  cannot drink fluids or start eating again.  · You cannot urinate after 8-12 hours.  · You develop a skin rash.  · You have fever.  · You have increasing redness at the site of your procedure.  Get help right away if:  · You have difficulty breathing.  · You have chest pain.  · You have unexpected bleeding.  · You feel that you are having a life-threatening or urgent problem.  This information is not intended to replace advice given to you by your health care provider. Make sure you discuss any questions you have with your health care provider.  Document Released: 03/26/2002 Document Revised: 05/22/2017 Document Reviewed: 12/01/2016  [employer] Interactive Patient Education © 2018 [employer]

## 2018-10-04 NOTE — ANESTHESIA PROCEDURE NOTES
Airway  Urgency: elective    Date/Time: 10/4/2018 12:54 PM  Airway not difficult    General Information and Staff    Patient location during procedure: OR  Anesthesiologist: RENDER, ANJALI RAY  CRNA: ELVIA VALDOVINOS    Indications and Patient Condition  Indications for airway management: airway protection    Preoxygenated: yes  Mask difficulty assessment: 1 - vent by mask    Final Airway Details  Final airway type: endotracheal airway      Successful airway: ETT  Cuffed: yes   Successful intubation technique: video laryngoscopy  Endotracheal tube insertion site: oral  Blade: Karissa  Blade size: 3  ETT size: 7.0 mm  Cormack-Lehane Classification: grade IIa - partial view of glottis  Placement verified by: chest auscultation and capnometry   Measured from: lips  ETT to lips (cm): 21  Number of attempts at approach: 1    Additional Comments  Pre 02, sivi,, easy bvm, dlx1, grade 2a view, glide scope, intubation x 1 attempt, +etc02, +bebs, appears atraumatic, teeth unchanged

## 2018-10-04 NOTE — ANESTHESIA PREPROCEDURE EVALUATION
Anesthesia Evaluation     NPO Solid Status: > 8 hours             Airway   Mallampati: II  TM distance: >3 FB  Neck ROM: full  No difficulty expected  Dental - normal exam     Pulmonary - normal exam   (+) shortness of breath,   Cardiovascular     Rhythm: regular    (+) hypertension, hyperlipidemia,       Neuro/Psych  (+) psychiatric history,     GI/Hepatic/Renal/Endo    (+)  GERD, PUD, GI bleeding,     Musculoskeletal     Abdominal    Substance History      OB/GYN          Other                        Anesthesia Plan    ASA 2     general   (  D/W R&B of GA including but not limited to: heart, lung, liver, kidney, neurologic problems, positioning injuries, dental damage, corneal abrasion and TMJ.  .)  intravenous induction   Anesthetic plan, all risks, benefits, and alternatives have been provided, discussed and informed consent has been obtained with: patient.

## 2018-10-04 NOTE — ANESTHESIA POSTPROCEDURE EVALUATION
Patient: Roxana Brizuela    Procedure Summary     Date:  10/04/18 Room / Location:   [facility]  /  [facility]    Anesthesia Start:  1247 Anesthesia Stop:  1419    Procedure:  RT THYROID LOBECTOMY (Right Neck) Diagnosis:      Surgeon:  Julián Fofana MD Provider:  Render, Joselo Wright MD    Anesthesia Type:  general ASA Status:  2          Anesthesia Type: general  Last vitals  BP   163/69 (10/04/18 1546)   Temp   37.1 °C (98.7 °F) (10/04/18 1530)   Pulse   64 (10/04/18 1546)   Resp   16 (10/04/18 1546)     SpO2   94 % (10/04/18 1546)     Post Anesthesia Care and Evaluation      Comments: Patient discharged before being evaluated by an Anesthesiologist. No apparent complications per the record.  This case was not medically directed. I am completing this chart for medical records purposes; I personally have no medical involvement with this patient.    /69 (BP Location: Left arm, Patient Position: Lying)   Pulse 64   Temp 37.1 °C (98.7 °F) (Temporal Artery )   Resp 16   Wt 68.9 kg (151 lb 14.4 oz)   SpO2 94%   BMI 26.91 kg/m²

## 2018-10-11 RX ORDER — HYDROCHLOROTHIAZIDE 25 MG/1
TABLET ORAL
Qty: 30 TABLET | Refills: 1 | Status: SHIPPED | OUTPATIENT
Start: 2018-10-11 | End: 2018-12-13 | Stop reason: SDUPTHER

## 2018-10-15 LAB
CYTO UR: NORMAL
DX PRELIMINARY: NORMAL
LAB AP CASE REPORT: NORMAL
Lab: NORMAL
PATH REPORT.FINAL DX SPEC: NORMAL
PATH REPORT.GROSS SPEC: NORMAL

## 2018-10-18 RX ORDER — PANTOPRAZOLE SODIUM 40 MG/1
TABLET, DELAYED RELEASE ORAL
Qty: 90 TABLET | Refills: 0 | Status: SHIPPED | OUTPATIENT
Start: 2018-10-18 | End: 2019-01-24 | Stop reason: SDUPTHER

## 2018-11-01 RX ORDER — GABAPENTIN 300 MG/1
600 CAPSULE ORAL 3 TIMES DAILY
Qty: 180 CAPSULE | Refills: 2 | Status: SHIPPED | OUTPATIENT
Start: 2018-11-01 | End: 2019-02-01 | Stop reason: SDUPTHER

## 2018-11-27 DIAGNOSIS — D69.6 THROMBOCYTOPENIA (HCC): ICD-10-CM

## 2018-11-27 DIAGNOSIS — D64.81 ANEMIA DUE TO ANTINEOPLASTIC CHEMOTHERAPY: Primary | ICD-10-CM

## 2018-11-27 DIAGNOSIS — T45.1X5A ANEMIA DUE TO ANTINEOPLASTIC CHEMOTHERAPY: Primary | ICD-10-CM

## 2018-11-27 DIAGNOSIS — Z17.1 MALIGNANT NEOPLASM OF UPPER-INNER QUADRANT OF RIGHT BREAST IN FEMALE, ESTROGEN RECEPTOR NEGATIVE (HCC): ICD-10-CM

## 2018-11-27 DIAGNOSIS — C50.211 MALIGNANT NEOPLASM OF UPPER-INNER QUADRANT OF RIGHT BREAST IN FEMALE, ESTROGEN RECEPTOR NEGATIVE (HCC): ICD-10-CM

## 2018-11-28 ENCOUNTER — OFFICE VISIT (OUTPATIENT)
Dept: ONCOLOGY | Facility: CLINIC | Age: 78
End: 2018-11-28

## 2018-11-28 ENCOUNTER — INFUSION (OUTPATIENT)
Dept: ONCOLOGY | Facility: HOSPITAL | Age: 78
End: 2018-11-28

## 2018-11-28 VITALS
RESPIRATION RATE: 16 BRPM | HEART RATE: 68 BPM | WEIGHT: 147.3 LBS | BODY MASS INDEX: 26.1 KG/M2 | HEIGHT: 63 IN | OXYGEN SATURATION: 97 % | SYSTOLIC BLOOD PRESSURE: 152 MMHG | DIASTOLIC BLOOD PRESSURE: 72 MMHG | TEMPERATURE: 97.5 F

## 2018-11-28 DIAGNOSIS — Z17.1 MALIGNANT NEOPLASM OF UPPER-INNER QUADRANT OF RIGHT BREAST IN FEMALE, ESTROGEN RECEPTOR NEGATIVE (HCC): ICD-10-CM

## 2018-11-28 DIAGNOSIS — D64.81 ANEMIA DUE TO ANTINEOPLASTIC CHEMOTHERAPY: Primary | ICD-10-CM

## 2018-11-28 DIAGNOSIS — C50.511 MALIGNANT NEOPLASM OF LOWER-OUTER QUADRANT OF RIGHT FEMALE BREAST, UNSPECIFIED ESTROGEN RECEPTOR STATUS (HCC): ICD-10-CM

## 2018-11-28 DIAGNOSIS — Z17.1 MALIGNANT NEOPLASM OF UPPER-INNER QUADRANT OF RIGHT BREAST IN FEMALE, ESTROGEN RECEPTOR NEGATIVE (HCC): Primary | ICD-10-CM

## 2018-11-28 DIAGNOSIS — D34 HURTHLE CELL ADENOMA: ICD-10-CM

## 2018-11-28 DIAGNOSIS — T45.1X5A ANEMIA DUE TO ANTINEOPLASTIC CHEMOTHERAPY: Primary | ICD-10-CM

## 2018-11-28 DIAGNOSIS — D69.6 THROMBOCYTOPENIA (HCC): ICD-10-CM

## 2018-11-28 DIAGNOSIS — Z45.2 ENCOUNTER FOR FITTING AND ADJUSTMENT OF VASCULAR CATHETER: ICD-10-CM

## 2018-11-28 DIAGNOSIS — C50.211 MALIGNANT NEOPLASM OF UPPER-INNER QUADRANT OF RIGHT BREAST IN FEMALE, ESTROGEN RECEPTOR NEGATIVE (HCC): Primary | ICD-10-CM

## 2018-11-28 DIAGNOSIS — C50.211 MALIGNANT NEOPLASM OF UPPER-INNER QUADRANT OF RIGHT BREAST IN FEMALE, ESTROGEN RECEPTOR NEGATIVE (HCC): ICD-10-CM

## 2018-11-28 PROBLEM — E04.1 THYROID NODULE: Status: RESOLVED | Noted: 2018-08-21 | Resolved: 2018-11-28

## 2018-11-28 PROCEDURE — 99214 OFFICE O/P EST MOD 30 MIN: CPT | Performed by: INTERNAL MEDICINE

## 2018-11-28 PROCEDURE — 96523 IRRIG DRUG DELIVERY DEVICE: CPT | Performed by: INTERNAL MEDICINE

## 2018-11-28 PROCEDURE — 85025 COMPLETE CBC W/AUTO DIFF WBC: CPT | Performed by: INTERNAL MEDICINE

## 2018-11-28 RX ORDER — SODIUM CHLORIDE 0.9 % (FLUSH) 0.9 %
10 SYRINGE (ML) INJECTION AS NEEDED
Status: CANCELLED | OUTPATIENT
Start: 2018-11-28

## 2018-11-28 RX ORDER — SODIUM CHLORIDE 0.9 % (FLUSH) 0.9 %
10 SYRINGE (ML) INJECTION AS NEEDED
Status: DISCONTINUED | OUTPATIENT
Start: 2018-11-28 | End: 2018-11-28 | Stop reason: HOSPADM

## 2018-11-28 RX ADMIN — Medication 10 ML: at 10:30

## 2018-11-28 RX ADMIN — SODIUM CHLORIDE, PRESERVATIVE FREE 500 UNITS: 5 INJECTION INTRAVENOUS at 10:30

## 2018-11-28 NOTE — PROGRESS NOTES
REASON FOR FOLLOWUP:   1. Newly diagnosed large right breast cancer.  Core needle biopsy reported invasive mammary carcinoma with focal lobular feature, grade 2.  ER negative, less than 1%; GA positive, moderate in staining, 5% to 10%. HER2 IHC 2+, FISH study positive 2.1.   2.  CT scan for chest abdomen and pelvis and breast MRI examination reported right axillary lymph node suspicious for metastatic disease.  No remote metastatic lesion.  Patient has stage IIIa (T3 N2 M0) disease.   3.  Patient was started neoadjuvant chemotherapy on 5/23/2017 with Taxol weekly plus Herceptin weekly for total 12 weeks, and Perjata every 3 weeks during chemotherapy.  Herceptin will be converted to every 3 weeks after chemotherapy finished.    4.  Chronic moderate thrombocytopenia, mild anemia, and intermittent mild neutropenia etiologies are not clear.  5.  Reaction to Herceptin C1D1.  Subsequently tolerated therapy with hydrocortisone as premedication.  6.  Potential cardiac strain developing, neuropathic symptoms persist, follow-up MRI and surgical assessment will be needed post initial chemotherapeutic phase  7.  MRI August 17 with interval resolution of abnormal enhancement within breast and right axillary adenopathy, surgery scheduled November 7, Herceptin ongoing every 3 weeks  8.  Patient seen in office November 28, status post surgery with apparent complete response, Herceptin continued  9.  Herceptin given December 19, subsequent injury in [place] leading to prolonged stay, single treatment given through additional cancer Center  10.  Patient seen May 01, 2018, no further Herceptin planned, baseline scans pursued posttreatment, post influenza syndrome, physical therapy planned  11.  Patient seen June 13, 2018, excellent performance status, improving on physical therapy, equivocal CT  per thoracic adenopathy, follow-up PET/CT planned   12.  PET/CT with improvement,?  Thyroid abnormality with ultrasound planned  13.   Patient seen October 3, partial thyroidectomy anticipated October 14-Dr. Fofana                                                                                                            HISTORY OF PRESENT ILLNESS:    The patient is a pleasant 77 y.o. with the above-mentioned history, who presents today in anticipation of cycle 4 of Herceptin, Perjeta and Taxol.  This is the first visit with this physician involving this patient's case.  We have reviewed her status today with her slowly developing neuropathic symptoms in her lower extremities but also involving her fingers recognized significantly and she plays the piano and keyboard.  This is becoming harder to do but she is still able to do so.  She also notices neuropathy in her feet but is able to walk and function in daily activities.  Additional to this is her continued grieving at the death of her  though she, fortunately, has an excellent support system.  She continues to experience nausea that is well relieved with Compazine. She denies oral mucositis.  No diarrhea no constipation no chest pain no dyspnea.  No lower extremity edema.    She did received 2 units of PRBC's on   7/8/2017 and remains hematologically stable.   She does have difficulty with sleep and Ambien 10 mg daily at bedtime seems help much better compared to 5 mg dose.  She does not need refills today.  In reviewing her case July 26 she is entering the fourth cycle of her treatment and recent echocardiogram is reviewed with she and her close friend revealing EF of 66.7% though with global strain of -16%?  Suspicious for myopathic process.  Normal ventricular cavity size and wall thickness as well as contractility.  We have also discussed that she is responding to therapy and will need surgical assessment which may not as yet have been performed.  She has seen Dr. Melo for port placement and will ask him to review her likely just after she has completed his fourth cycle of therapy.   It is also apparent that she'll need a cardiac assessment as we continue subsequent Herceptin therapy perioperatively.      The patient underwent MRI of the breasts August 17 demonstrating interval resolution of abnormal enhancement within the right breast, resolution of right axillary adenopathy had also resolved.  The findings were consistent with a complete response to neoadjuvant chemotherapy.  The patient was seen in subsequent follow-up with Dr. Melo and was determined to proceed with surgery and ultimately sentinel node evaluation.  This is now scheduled November 7 and there are additional plans to consider radiation therapy postoperatively and we have also discussed the use of anti-hormonal therapy considering her mildly positive TN status.    The patient was able to proceed to surgery undergoing right breast mastectomy and sentinel lymph node biopsy November 07, 2017.  She did well postoperatively seeing Dr. Melo November 16.  Pathology revealed no residual malignancy in the breast and 3 negative sentinel lymph nodes.  A formal review of her report reveals treatment effect particularly in her largest lymph node with focal fibrosis and scar, right breast mastectomy fibrosis associated with a cavitary biopsy site and no invasive or in situ carcinoma.  The patient is now seen in our office though she went to the wrong location and the seen late in the day.  We discussed her findings and agree that she would continue Herceptin at this point but be reviewed formally again in 3 weeks.  She is also be seen by radiation therapy for their input.   The patient, evidently, was seen by radiation therapy but decided not to pursue it until her last Herceptin therapy here December 19.  Following this she visited her daughter in [place] and, unfortunately, developed influenza and pneumonia.  She had a prolonged hospitalization and was at many sites in recovery over a several month only to return to [place] in  the last several weeks.  She did take 1 IV Herceptin dose while in [place] but as she is reviewed May 05, 2018 we have discussed that it makes no particular sense to continue or add on to her previous history by extending Herceptin any further 3-4 months after her last treatment.  She therefore has completed Herceptin.    The patient on to be tested post her treatment again baseline studies and CT of chest and pelvis were performed June 6 revealing interval increase in a few subcentimeter nodes in the left pectoral, axillary and mediastinal regions. These are all considerably small and of uncertain significance.  The patient's performance status remains excellent without any reduction and, in fact, has improved with physical therapy upon return.       Patient is next seen August 08, 2018, fortunately feeling well.  A follow-up PET/CT had revealed an interval decrease in the subcentimeter nodes in the left subpectoral axillary region as well as mediastinum.  There was no hypermetabolic lymphadenopathy.  There was intense focus within slightly enlarged right thyroid gland.  Patient indicates she never had any thyroid issues of which she is aware.  The patient was referred to ENT for assessment and the patient was seen by Dr. Fofana.  Ultimately a subtotal thyroidectomy is anticipated and now scheduled October 4.  The patient is willing to proceed.        The patient proceeded to a right thyroid lobectomy performed November 04, 2018.  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  There was no definitive evidence of trans-capsular or vascular invasion.    The patient is seen in follow-up November 28 doing well and we discussed the surgical treatment of this thyroid lesion.  She feels that all this went well.  She has additional cataract surgery at the end of November  scheduled also.  Past Medical History:   Diagnosis Date   • Alcoholism (CMS/HCC) 1978    I haven't had a drink since then   •  Anemia    • Breast cancer (CMS/HCC) 05/03/2017    Right breast invasive mammary carcinoma with focal lobular features, grade 2, ER negative, less than 1% DE positive, HER-2 positive, stage IIIa disease (T3, N2, M0   • Breast cancer (CMS/HCC) 10/20/2004    Left breast ductal carcinoma in-situ, predominately intermediate grade with focal comedonecrosis (high grade), solid and bribridform patterns involving approximately five core biopsy fragments   • Edema     Chronic lower extremity edema   • GERD (gastroesophageal reflux disease) 09/13/2011    Dr. Paul Negron   • GI (gastrointestinal bleed) 1997   • H/O jaundice    • Hernia     INCISONAL. AROUND LEFT TRAM LAP SITE   • History of chemotherapy     2017 4 MONTHS IN 2017   • History of Clostridium difficile colitis     JAN 2018   • History of histoplasmosis    • History of infectious mononucleosis 1960   • History of migraine headaches    • History of pneumonia 12/27/2017    AS RESULT OF FLU. ENDED UP ON VENT IN [place]   • History of thrombocytopenia    • History of transfusion 1997   • History of vertigo    • Hx of colonic polyps 06/11/2009    Dr. Giles   • Hyperlipidemia    • Hypertension    • Meningioma (CMS/HCC)    • Neuropathy     HANDS AND FEET   • Osteoarthritis 09/13/2011    Dr. Jamil Miller   • Peptic ulceration    • Retina disorder     BLEED. FROM HISTOPLASMOSIS   • SBO (small bowel obstruction) (CMS/HCC)     due to hernia with surgical repair in 09/2011   • SOB (shortness of breath) on exertion    • Thyroid mass     RIGHT   Normal echocardiogram on 5/18/2017, LVEF 68%.    Past Surgical History:   Procedure Laterality Date   • APPENDECTOMY N/A 1960   • BLADDER REPAIR N/A 1980   • BREAST BIOPSY Left 10/20/2004    Mammotome incisional biopsy left breast, surgical specimen, left breast, localization clip placement, left breast, confirmatory diagnostic unilateral mammogram, left breast-Dr. Tyrese Alcala, [facility]   • BREAST BIOPSY Right 05/03/2017    PATH:  INVASIVE MAMMARY CARCINOMA   • CHOLECYSTECTOMY N/A    • COLONOSCOPY N/A 2009    Hemorrhoids, otherwise normal, repeat in 5 years-Dr. Noemí Purcell   • EYE SURGERY Right     laser surgery for blood clot   • HYSTERECTOMY Bilateral    • INGUINAL HERNIA REPAIR Right     DR ALESIA GAXIOLA   • MASTECTOMY Left     Left breast mastecotmy with sentinel lymph node biospy-[facility]   • REDUCTION MAMMAPLASTY Right     to match Lt. TRAM flap   • SMALL INTESTINE SURGERY      INCARCRATED HERNIA   • TONSILLECTOMY Bilateral    • TRAM FLAP DELAYED Left     WITH MASTOPEXY   • UPPER GASTROINTESTINAL ENDOSCOPY N/A 2011    LA grade A esophagitis with no bleeding, large hiatus hernia (50cm), gastric ulcer-Dr.Laszlo Lezama   • US GUIDED FINE NEEDLE ASPIRATION  2018   Lico catheter placement on 2017 by Dr. Melo.     OB/GYN history: Menarche age 16, menopause at . , 1 miscarriage. No birth control pill use. She did have post menopause hormonal supplementation.      HEMATOLOGIC/ONCOLOGIC HISTORY: The patient is a 76 y.o. year old female whom we are consulted for a newly self discovered right breast mass, in 2017. Patient had ultrasound-guided biopsy on 5/3/2017 and confirmed to be invasive mammary carcinoma with focal lobular features, grade 2 Elen score 6/9. She is here for initial evaluation for management.      Patient is a 76-year-old  female who was seen here previously for chronic mild-to-moderate thrombocytopenia and mild anemia. Recently the patient reports she started having firmness of the right breast that started sometime in April or maybe even in March. She noticed firmness spread from about around the 12 o'clock position, gradually towards the upper lateral side and lower part of the right breast associated mild pain. The patient thought it was related to fibrocystic changes. However, the symptom was getting worse. Patient also reported dented nipple after  she started having pain in the right breast. She denies discharge from the nipple. She called her primary care physician, Dr. Martin, who ordered a mammogram study. This was done on 04/12/2017 with architectural distortion of the right breast centrally at 12 o'clock position and had scattered fibroglandular densities throughout the right breast.      The patient subsequently had right breast ultrasound examination on 04/26/2017. Discovered a large right breast mass measuring 5.8 x 3.5 x 3.9 cm. Patient subsequently had ultrasound-guided right breast biopsy on 05/03/2017. Pathology evaluation reported invasive mammary carcinoma with focal lobular feature. Elen score 6/9, overall grade 2. Sample was further sent to the [facility] for test. ER negative, less than 1%; MA positive, moderate in staining, 5% to 10%. HER2 IHC 2+, but FISH study positive 2.1.     She denies weight loss, she actually eats very well. No nausea vomiting. Patient does complain of insomnia, unable to sleep.      This patient has history of left breast DCIS back in 2007, had mastectomy at the [facility]. No hormonal therapy afterwards according to patient. This patient also had a small meningioma, followed by Dr. Smith in [facility], with most recent MRI of the brain in March 2017, with 18 mm meningioma, and followed on an annual basis.     Her neutrophil count on 5/16/17 is normal at 2500, however, records showed starting from 05/2015 and in 09/2016, 01/2017 and 03/2017, all 4 laboratory studies showed mild neutropenia with ANC fluctuating between 1200 and 1600. Etiology is not clear.    Normal echocardiogram on 5/18/2017, LVEF 68%.      CT scan for chest abdomen and pelvis on 5/22/2017 and breast MRI examination on 5/22/2017 reported small right axillary lymph node suspicious for metastatic disease.  No remote metastatic lesion.  Patient has stage IIIa (T3 N2 M0) disease.       Patient will start neoadjuvant chemotherapy with Taxol weekly plus Herceptin weekly for total 12 weeks, and Perjeta every 3 weeks (3-week cycle) during chemotherapy.  Herceptin will be converted to every 3 weeks after chemotherapy finished.  Cycle 1 day 1 5/23/2017.   Status post neoadjuvant chemotherapy the patient was assessed with MRI showing a complete neoadjuvant response.  The patient was reviewed for surgery and questions concerning lymph node dissection-sentinel node evaluation-and need for radiation therapy postop were considered as well as use of additional Herceptin for the balance of the year as well as additional use of anti-hormonal therapy.  Surgery was scheduled November 07, 2017.  Patient next seen in office November 28 having undergone surgery on November 7 with results revealing no residual malignancy in either breast or associated sentinel lymph nodes.  Was elected to continue Herceptin when seen back in office November 20 having initiated Herceptin May 23, 2017.    Patient continued Herceptin with her last treatment given December 19, 2017.     Unfortunately she later experienced an accident while in [place] December 28 with prolonged hospitalization and prolonged ventilator management required.  Apparently she had at least one IV Herceptin therapy given while there and we were contacted March 20, 2018- Dr. Nuno.  Eventually the patient was able to return to [place] is seen back in office May 5 at which time we concluded that she completed Herceptin therapy as result of being off treatment for so long.  Plans were made for baseline scans.      MEDICATIONS: The current medication list was reviewed with the patient and updated in the EMR this date per the Medical Assistant. Medication dosages and frequencies were confirmed to be accurate.       ALLERGIES:    Allergies   Allergen Reactions   • Codeine Nausea And Vomiting   • Morphine Nausea And Vomiting     SOCIAL HISTORY:   Social History  "    Tobacco Use   • Smoking status: Former Smoker     Packs/day: 2.00     Years: 30.00     Pack years: 60.00     Types: Cigarettes     Start date: 1957     Last attempt to quit: 4/10/1987     Years since quittin.6   • Smokeless tobacco: Former User   • Tobacco comment: I haven't had a cigarette in over 30 years   Substance Use Topics   • Alcohol use: No     Comment: stopped, heavy in past   • Drug use: No     FAMILY HISTORY:  Family History   Problem Relation Age of Onset   • Heart disease Mother    • Aortic aneurysm Mother         abdominal   • Coronary artery disease Mother    • Hypertension Mother    • Miscarriages / Stillbirths Mother    • Heart disease Father    • Heart attack Father         acute   • Hypertension Father    • Early death Father    • Hearing loss Father    • Kidney disease Father    • Breast cancer Daughter 45   • Heart disease Brother    • Hypertension Brother    • Malig Hyperthermia Neg Hx      I have reviewed the patient's medical history in detail and updated the computerized patient record.    REVIEW OF SYSTEMS:  GENERAL: See history of present illness. No change in appetite or weight;   No fevers, chills, sweats.   SKIN: No nonhealing lesions. No rashes.   HEME/LYMPH: See HPI.   EYES: No vision changes or diplopia.   ENT: No tinnitus, hearing loss, gum bleeding, epistaxis, hoarseness or dysphagia.   RESPIRATORY: No cough, Has exertional dyspnea, No hemoptysis or wheezing.   CVS: No chest pain, palpitations, orthopnea, dyspnea on exertion or PND.   GI: See HPI.   : No lower tract obstructive symptoms, dysuria or hematuria.   MUSCULOSKELETAL: Chronic or joint pain, arthritis.  Has mild intermittent ankle swelling.   NEUROLOGICAL: See HPI  PSYCHIATRIC: See HPI     Objective:   Vitals:    18 1124   BP: 152/72   Pulse: 68   Resp: 16   Temp: 97.5 °F (36.4 °C)   TempSrc: Oral   SpO2: 97%   Weight: 66.8 kg (147 lb 4.8 oz)   Height: 160 cm (62.99\")   PainSc: 0-No pain   ECOG 0 "      PHYSICAL EXAM:   GENERAL: Well-developed, well-nourished female, in no acute distress.   SKIN: Warm, dry without rashes, purpura or petechiae.  Left upper chest Lico catheter in place, no evidence of infection.   EYES: Pupils equal, round and reactive to light. EOMs intact. Conjunctivae normal.  EARS: Hearing intact.  NOSE:  No excoriations or nasal discharge.  MOUTH: Tongue is well-papillated; no stomatitis or ulcers. Lips normal.  THROAT: Oropharynx without lesions or exudates.  Status post partial thyroidectomy well-healing  LYMPHATICS: No cervical, supraclavicular, axillary adenopathy.  CHEST: Lungs clear to auscultation. Good airflow.   BREAST:Postop, Well healed right mastectomy site with no evidence of recurrent disease, no axillary adenopathy bilaterally.  CARDIAC: Regular rate and rhythm without murmurs. Normal S1,S2.  ABDOMEN: Slightly distended, normal active bowel sounds,  no hepatosplenomegaly or masses.  EXTREMITIES: No clubbing, cyanosis or edema.  NEUROLOGICAL: Cranial Nerves II-XII grossly intact. No focal neurological deficits.  PSYCHIATRIC: Alert and oriented, pleased about her results      RECENT LABS:  Lab Results   Component Value Date    WBC 4.11 (L) 11/28/2018    HGB 10.3 (L) 11/28/2018    HCT 32.0 (L) 11/28/2018    MCV 93.0 11/28/2018    PLT 64 (L) 11/28/2018     Lab Results   Component Value Date    NEUTROABS 1.72 (L) 11/28/2018     Lab Results   Component Value Date    GLUCOSE 92 09/26/2018    BUN 13 09/26/2018    CREATININE 0.77 09/26/2018    EGFRIFNONA 73 09/26/2018    EGFRIFAFRI 87 05/24/2018    BCR 16.9 09/26/2018    K 4.2 09/26/2018    CO2 27.9 09/26/2018    CALCIUM 9.5 09/26/2018    PROTENTOTREF 6.3 05/24/2018    ALBUMIN 4.20 08/01/2018    LABIL2 1.9 05/24/2018    AST 26 08/01/2018    ALT 19 08/01/2018        F-18 FDG PET FROM SKULL BASE TO MID THIGH WITH PET/CT FUSION  August 01, 2018    IMPRESSION:  1. Interval decrease in the subcentimeter nodes at the left subpectoral  and  axillary region, as well as the mediastinum. There is no  hypermetabolic lymphadenopathy.  2. There is an intense focus of hypermetabolic activity within the  slightly enlarged right thyroid lobe. No lesion is seen on the  corresponding CT sequence. Further characterization can be performed  with thyroid ultrasound.  3. Uncertain etiology of a tiny hypermetabolic focus within or adjacent  to a segment of distal ileum. No abnormality seen on the corresponding  CT sequence.             Assessment/Plan   1. Large right breast cancer, locally advanced, stage IIIa (T3 N1/ 2 M0).  ER negative, less than 1%; AK positive, moderate in staining, 5% to 10%. HER2 IHC 2+, FISH study positive 2.1.  Physical examination showed a large mass, 7 cm x 7 cm initially which has decreased to approximately less than 4 cm ..  Patient  proceeded through 4 cycles ceptin,Perjeta and Taxol.   Her subsequent MRI examination showed complete response by imaging including right axillary lymphadenopathy.  We have continued Herceptin and that includes today October 20, 2017.  The case was discussed with Dr. Melo and plans were to proceed with mastectomy plus right axillary lymph node assessment via sentinel node evaluation. it was felt she likely require radiation therapy post procedure.  The patient was scheduled and proceeded to surgery November 7.  Her results, wonderfully, revealed no evidence of residual disease in the breast or associated sentinel lymph nodes. She was seen by radiation therapy and offered treatment did not pursue it.  Additionally, and somewhat complicating this story, she traveled to [place] and developed severe influenza, associated pneumonia and was hospitalized for several months only to return to [place] in the last several weeks now being seen back May 1.  There is no particular benefit from trying to add Herceptin back to her therapy now and is felt that she is completed Herceptin treatment.  She wishes  consider reconstruction and baseline CT scans will be requested in 5 weeks with the patient being seen in 6 weeks follow-up.The patient reviewed June 13, 2018 with the scans demonstrating very modest increase in left pectoral, axillary or mediastinal nodes.  These are of uncertain significance but do need follow-up in a PET/CT is planned in 7 weeks.  Her anemia will also be reviewed again with additional laboratory studies that visit.  She'll be seen in 8 weeks for general reassessment.    The patient's anemia workup was essentially negative and she is slowly improving when seen back August 8.  Her PET/CT, fortunately, demonstrates improvement though there is potential abnormality within the right thyroid lobe.  We discussed thyroid function testing and follow-up thyroid ultrasound.  She will be seen back in approximately 2 months as we maintain her port monthly flushes.   The patient was reviewed by ENT and ultimately was felt that a partial thyroidectomy was in order and is now scheduled October 4.  Patient's one to proceed.  We'll plan to see her back in approximately 6 weeks.      The patient is next seen November 28, 2015.  Her surgery went well with the findings as noted above.  She has follow-up with ENT in the next several weeks.  Additionally she has bilateral cataract surgery at the end of this month and into the beginning of next.  We discussed reassessment in general the CAT scan in approximately 23 weeks seeing her in 24 weeks.  She is agreeable with this plan and follow-up.     2.  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                                                                            3.  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

## 2018-11-29 LAB
BASOPHILS # BLD AUTO: 0.01 10*3/MM3 (ref 0–0.2)
BASOPHILS NFR BLD AUTO: 0.2 % (ref 0–1.5)
DEPRECATED RDW RBC AUTO: 48.3 FL (ref 37–54)
EOSINOPHIL # BLD AUTO: 0.14 10*3/MM3 (ref 0–0.7)
EOSINOPHIL NFR BLD AUTO: 3.4 % (ref 0.3–6.2)
ERYTHROCYTE [DISTWIDTH] IN BLOOD BY AUTOMATED COUNT: 14.2 % (ref 11.7–13)
HCT VFR BLD AUTO: 32 % (ref 35.6–45.5)
HGB BLD-MCNC: 10.3 G/DL (ref 11.9–15.5)
IMM GRANULOCYTES # BLD: 0.05 10*3/MM3 (ref 0–0.03)
IMM GRANULOCYTES NFR BLD: 1.2 % (ref 0–0.5)
LYMPHOCYTES # BLD AUTO: 1.27 10*3/MM3 (ref 0.9–4.8)
LYMPHOCYTES NFR BLD AUTO: 30.9 % (ref 19.6–45.3)
MCH RBC QN AUTO: 29.9 PG (ref 26.9–32)
MCHC RBC AUTO-ENTMCNC: 32.2 G/DL (ref 32.4–36.3)
MCV RBC AUTO: 93 FL (ref 80.5–98.2)
MONOCYTES # BLD AUTO: 0.92 10*3/MM3 (ref 0.2–1.2)
MONOCYTES NFR BLD AUTO: 22.4 % (ref 5–12)
NEUTROPHILS # BLD AUTO: 1.72 10*3/MM3 (ref 1.9–8.1)
NEUTROPHILS NFR BLD AUTO: 41.9 % (ref 42.7–76)
NRBC BLD MANUAL-RTO: 0 /100 WBC (ref 0–0)
PLATELET # BLD AUTO: 64 10*3/MM3 (ref 140–500)
PMV BLD AUTO: 12.4 FL (ref 6–12)
RBC # BLD AUTO: 3.44 10*6/MM3 (ref 3.9–5.2)
WBC NRBC COR # BLD: 4.11 10*3/MM3 (ref 4.5–10.7)

## 2018-12-13 RX ORDER — HYDROCHLOROTHIAZIDE 25 MG/1
TABLET ORAL
Qty: 30 TABLET | Refills: 1 | Status: SHIPPED | OUTPATIENT
Start: 2018-12-13 | End: 2019-02-06 | Stop reason: SDUPTHER

## 2018-12-20 ENCOUNTER — OFFICE VISIT (OUTPATIENT)
Dept: FAMILY MEDICINE CLINIC | Facility: CLINIC | Age: 78
End: 2018-12-20

## 2018-12-20 VITALS
BODY MASS INDEX: 25.87 KG/M2 | DIASTOLIC BLOOD PRESSURE: 54 MMHG | HEIGHT: 63 IN | HEART RATE: 70 BPM | SYSTOLIC BLOOD PRESSURE: 120 MMHG | RESPIRATION RATE: 18 BRPM | WEIGHT: 146 LBS

## 2018-12-20 DIAGNOSIS — R79.89 ELEVATED TSH: ICD-10-CM

## 2018-12-20 DIAGNOSIS — T45.1X5A PERIPHERAL NEUROPATHY DUE TO CHEMOTHERAPY (HCC): ICD-10-CM

## 2018-12-20 DIAGNOSIS — I10 ESSENTIAL HYPERTENSION: Primary | ICD-10-CM

## 2018-12-20 DIAGNOSIS — G62.0 PERIPHERAL NEUROPATHY DUE TO CHEMOTHERAPY (HCC): ICD-10-CM

## 2018-12-20 DIAGNOSIS — E07.9 THYROID DISORDER: ICD-10-CM

## 2018-12-20 DIAGNOSIS — E78.5 HYPERLIPIDEMIA, UNSPECIFIED HYPERLIPIDEMIA TYPE: ICD-10-CM

## 2018-12-20 PROCEDURE — 99214 OFFICE O/P EST MOD 30 MIN: CPT | Performed by: INTERNAL MEDICINE

## 2018-12-20 RX ORDER — DICLOFENAC SODIUM 75 MG/1
TABLET, DELAYED RELEASE ORAL
Qty: 60 TABLET | Refills: 5 | Status: SHIPPED | OUTPATIENT
Start: 2018-12-20 | End: 2019-06-29 | Stop reason: SDUPTHER

## 2018-12-20 RX ORDER — PROPRANOLOL HYDROCHLORIDE 10 MG/1
TABLET ORAL
Qty: 90 TABLET | Refills: 2 | Status: SHIPPED | OUTPATIENT
Start: 2018-12-20 | End: 2019-01-14 | Stop reason: SDUPTHER

## 2018-12-20 NOTE — PATIENT INSTRUCTIONS
Blood pressure is normal.  We will check a thyroid-stimulating hormone level and free T4 and call you the results.  Discussed symptoms of sleep apnea.  Suggest somebody observe you for snoring and witnessed apnea and if so will obtain a home sleep study.  Will continue physical therapy as requested.

## 2018-12-20 NOTE — PROGRESS NOTES
Subjective   Roxana Brizuela is a 77 y.o. female. Patient is here today for   Chief Complaint   Patient presents with   • Hypertension          Vitals:    12/20/18 1356   BP: 120/54   Pulse: 70   Resp: 18       The following portions of the patient's history were reviewed and updated as appropriate: allergies, current medications, past family history, past medical history, past social history, past surgical history and problem list.    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 Paul Negron   • GI (gastrointestinal bleed) 1997   • H/O jaundice    • Hernia     INCISONAL. AROUND LEFT TRAM LAP SITE   • History of chemotherapy     2017 4 MONTHS IN 2017   • History of Clostridium difficile colitis     JAN 2018   • History of histoplasmosis    • History of infectious mononucleosis 1960   • History of migraine headaches    • History of pneumonia 12/27/2017    AS RESULT OF FLU. ENDED UP ON VENT IN [place]   • History of thrombocytopenia    • History of transfusion 1997   • History of vertigo    • Hx of colonic polyps 06/11/2009    Dr. Giles   • Hyperlipidemia    • Hypertension    • Meningioma (CMS/[facility])    • Neuropathy     HANDS AND FEET   • Osteoarthritis 09/13/2011    Dr. Jamil Miller   • Peptic ulceration    • Retina disorder     BLEED. FROM HISTOPLASMOSIS   • SBO (small bowel obstruction) (CMS/[facility])      due to hernia with surgical repair in 2011   • SOB (shortness of breath) on exertion    • Thyroid mass     RIGHT      Allergies   Allergen Reactions   • Codeine Nausea And Vomiting   • Morphine Nausea And Vomiting      Social History     Socioeconomic History   • Marital status:      Spouse name: Mike   • Number of children: 2   • Years of education: College   • Highest education level: Not on file   Social Needs   • Financial resource strain: Not on file   • Food insecurity - worry: Not on file   • Food insecurity - inability: Not on file   • Transportation needs - medical: Not on file   • Transportation needs - non-medical: Not on file   Occupational History     Employer: RETIRED     Comment: [employer]   Tobacco Use   • Smoking status: Former Smoker     Packs/day: 2.00     Years: 30.00     Pack years: 60.00     Types: Cigarettes     Start date: 1957     Last attempt to quit: 4/10/1987     Years since quittin.7   • Smokeless tobacco: Former User   • Tobacco comment: I haven't had a cigarette in over 30 years   Substance and Sexual Activity   • Alcohol use: No     Comment: stopped, heavy in past   • Drug use: No   • Sexual activity: Not on file   Other Topics Concern   • Not on file   Social History Narrative   • Not on file        Current Outpatient Medications:   •  Ascorbic Acid (VITAMIN C PO), Take 1,000 mg by mouth Daily., Disp: , Rfl:   •  calcium carbonate (OS-JUNAID) 600 MG tablet, Take 600 mg by mouth Daily., Disp: , Rfl:   •  diclofenac (VOLTAREN) 75 MG EC tablet, TAKE 1 TABLET BY MOUTH TWICE DAILY, Disp: 60 tablet, Rfl: 5  •  gabapentin (NEURONTIN) 300 MG capsule, Take 2 capsules by mouth 3 (Three) Times a Day., Disp: 180 capsule, Rfl: 2  •  hydrochlorothiazide (HYDRODIURIL) 25 MG tablet, TAKE 1 TABLET BY MOUTH ONCE DAILY, Disp: 30 tablet, Rfl: 1  •  HYDROcodone-acetaminophen (NORCO) 5-325 MG per tablet, Take 1-2 tablets by mouth Every 4 (Four) Hours As Needed  (Pain)., Disp: 30 tablet, Rfl: 0  •  Lactobacillus (PROBIOTIC ACIDOPHILUS PO), Take 1 capsule by mouth Daily., Disp: , Rfl:   •  MELATONIN PO, Take 10 mg by mouth At Night As Needed., Disp: , Rfl:   •  ondansetron ODT (ZOFRAN-ODT) 4 MG disintegrating tablet, Take 4 mg by mouth Every 8 (Eight) Hours As Needed for Nausea or Vomiting., Disp: , Rfl:   •  pantoprazole (PROTONIX) 40 MG EC tablet, TAKE 1 TABLET BY MOUTH ONCE DAILY, Disp: 90 tablet, Rfl: 0  •  potassium chloride (KLOR-CON) 20 MEQ packet, Take 20 mEq by mouth Daily., Disp: , Rfl:   •  propranolol (INDERAL) 10 MG tablet, Take 10 mg by mouth 3 (Three) Times a Day., Disp: , Rfl:   •  propranolol (INDERAL) 10 MG tablet, TAKE 1 TABLET BY MOUTH THREE TIMES DAILY, Disp: 90 tablet, Rfl: 2  •  simvastatin (ZOCOR) 80 MG tablet, Take 80 mg by mouth Every Night., Disp: , Rfl:   •  SUMAtriptan (IMITREX) 50 MG tablet, Take 50 mg by mouth Every 2 (Two) Hours As Needed for migraine. Take one tablet at onset of headache. May repeat dose one time in 2 hours if headache not relieved., Disp: , Rfl:   •  traMADol (ULTRAM) 50 MG tablet, Take 1 tablet by mouth Every 6 (Six) Hours As Needed for Moderate Pain  or Severe Pain ., Disp: 30 tablet, Rfl: 0  •  vitamin B-12 (CYANOCOBALAMIN) 1000 MCG tablet, Take 1,000 mcg by mouth Daily., Disp: , Rfl:   •  vitamin B-6 (PYRIDOXINE) 50 MG tablet, Take 50 mg by mouth Daily., Disp: , Rfl:      Objective   History of Present Illness Marsha is here for a blood pressure check and lab follow-up.  She has hypertension, hyperlipidemia, gastroesophageal reflux, migraine headaches, anemia, and peripheral neuropathy.  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  She had thyroid studies and August and TSH was elevated.  She also has breast cancer and is followed by oncology.  She has been doing physical therapy and needs an order to continue.  She also requests an order to get her insurance to pay for life  alert through [employer].  She also states that her daughter would like to have her check for sleep apnea.  Prior to having thyroid surgery she received medicine and her daughter noted that her oxygen saturation dropped.  She does not know if she snores or has apnea.  She awakes feeling rested but does have afternoon fatigue.  She does take gabapentin.    Review of Systems   Constitutional: Positive for fatigue.   Respiratory: Negative.    Cardiovascular:        /70 average   Neurological: Positive for numbness.       Physical Exam   Constitutional: She appears well-developed and well-nourished.   Cardiovascular: Normal rate, regular rhythm and normal heart sounds.   124/60   Pulmonary/Chest: Effort normal and breath sounds normal.   Musculoskeletal: She exhibits no edema.   Psychiatric: She has a normal mood and affect. Her behavior is normal. Judgment and thought content normal.   Vitals reviewed.      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  We will check a thyroid-stimulating hormone level and free T4 and call you the results.  Discussed symptoms of sleep apnea.  Suggest somebody observe you for snoring and witnessed apnea and if so will obtain a home sleep study.  Will continue physical therapy as requested.      Return in about 6 months (around 6/20/2019) for labs CMP, lipid, TSH, urinalysis.

## 2018-12-21 LAB
T4 FREE SERPL-MCNC: 1.15 NG/DL (ref 0.93–1.7)
TSH SERPL DL<=0.005 MIU/L-ACNC: 7.84 MIU/ML (ref 0.27–4.2)

## 2018-12-26 ENCOUNTER — TELEPHONE (OUTPATIENT)
Dept: FAMILY MEDICINE CLINIC | Facility: CLINIC | Age: 78
End: 2018-12-26

## 2018-12-26 RX ORDER — LEVOTHYROXINE SODIUM 25 MCG
25 TABLET ORAL DAILY
Qty: 30 TABLET | Refills: 2 | Status: SHIPPED | OUTPATIENT
Start: 2018-12-26 | End: 2019-02-06 | Stop reason: DRUGHIGH

## 2018-12-26 NOTE — TELEPHONE ENCOUNTER
I called patient and notified her, per Dr. Butcher, that her TSH was elevated.   We will start her on Synthroid 25mcg daily and she needs labs rechecked in 3 months.   Patient understood.

## 2019-01-11 ENCOUNTER — TELEPHONE (OUTPATIENT)
Dept: FAMILY MEDICINE CLINIC | Facility: CLINIC | Age: 79
End: 2019-01-11

## 2019-01-11 NOTE — TELEPHONE ENCOUNTER
Order faxed to [facility]     ----- Message from Farrah Barrow Rep sent at 2019 10:38 AM EST -----  PT CALLED IN STATING HER ORDER FOR PT HAS  AND [facility] IN [place] HAS ADVISED HER SHE NEEDS A NEW ORDER FROM THE DR TO CONTINUE CARE FOR INSURANCE TO CONTINUE PAYING.  PLEASE SEND ORDER TO FAX # [phone]    IF YOU HAVE ANY QUESTIONS PLEASE CONTACT PT -[phone]

## 2019-01-14 ENCOUNTER — OFFICE VISIT (OUTPATIENT)
Dept: FAMILY MEDICINE CLINIC | Facility: CLINIC | Age: 79
End: 2019-01-14

## 2019-01-14 VITALS
SYSTOLIC BLOOD PRESSURE: 120 MMHG | OXYGEN SATURATION: 94 % | DIASTOLIC BLOOD PRESSURE: 64 MMHG | HEIGHT: 63 IN | BODY MASS INDEX: 27.29 KG/M2 | TEMPERATURE: 97.6 F | RESPIRATION RATE: 18 BRPM | WEIGHT: 154 LBS | HEART RATE: 78 BPM

## 2019-01-14 DIAGNOSIS — Z00.00 INITIAL MEDICARE ANNUAL WELLNESS VISIT: Primary | ICD-10-CM

## 2019-01-14 PROCEDURE — G0438 PPPS, INITIAL VISIT: HCPCS | Performed by: NURSE PRACTITIONER

## 2019-01-14 PROCEDURE — 96160 PT-FOCUSED HLTH RISK ASSMT: CPT | Performed by: NURSE PRACTITIONER

## 2019-01-14 NOTE — PROGRESS NOTES
QUICK REFERENCE INFORMATION:  The ABCs of the Annual Wellness Visit    Initial Medicare Wellness Visit    HEALTH RISK ASSESSMENT    1940    Recent Hospitalizations:  Recently treated at the following:  [facility].        Current Medical Providers:  Patient Care Team:  Brandt Martin MD as PCP - General  Brandt Martin MD as PCP - Family Medicine  Jamal Patel MD PhD as Consulting Physician (Hematology and Oncology)  Brandt Martin MD as Referring Physician (Internal Medicine)  Constance Elliott MD as Consulting Physician (Hematology and Oncology)  Oneil Coburn MD as Consulting Physician (Hematology and Oncology)        Smoking Status:  Social History     Tobacco Use   Smoking Status Former Smoker   • Packs/day: 2.00   • Years: 30.00   • Pack years: 60.00   • Types: Cigarettes   • Start date: 1957   • Last attempt to quit: 4/10/1987   • Years since quittin.7   Smokeless Tobacco Former User   Tobacco Comment    I haven't had a cigarette in over 30 years       Alcohol Consumption:  Social History     Substance and Sexual Activity   Alcohol Use No    Comment: stopped, heavy in past       Depression Screen:   PHQ-2/PHQ-9 Depression Screening 2019   Little interest or pleasure in doing things 0   Feeling down, depressed, or hopeless 0   Total Score 0       Health Habits and Functional and Cognitive Screening:  Functional & Cognitive Status 2019   Do you have difficulty preparing food and eating? No   Do you have difficulty bathing yourself, getting dressed or grooming yourself? No   Do you have difficulty using the toilet? No   Do you have difficulty moving around from place to place? No   Do you have trouble with steps or getting out of a bed or a chair? No   In the past year have you fallen or experienced a near fall? Yes   Current Diet Well Balanced Diet   Dental Exam Not up to date   Eye Exam Up to date   Exercise (times per week) 3 times per week    Current Exercise Activities Include Walking   Do you need help using the phone?  No   Are you deaf or do you have serious difficulty hearing?  No   Do you need help with transportation? No   Do you need help shopping? No   Do you need help preparing meals?  No   Do you need help with housework?  No   Do you need help with laundry? No   Do you need help taking your medications? No   Do you need help managing money? No   Do you ever drive or ride in a car without wearing a seat belt? No   Have you felt unusual stress, anger or loneliness in the last month? No   Who do you live with? Alone   If you need help, do you have trouble finding someone available to you? No   Have you been bothered in the last four weeks by sexual problems? No   Do you have difficulty concentrating, remembering or making decisions? No           Does the patient have evidence of cognitive impairment? No    Asiprin use counseling: Does not need ASA (and currently is not on it)      Recent Lab Results:    Visual Acuity:  No exam data present    Age-appropriate Screening Schedule:  Refer to the list below for future screening recommendations based on patient's age, sex and/or medical conditions. Orders for these recommended tests are listed in the plan section. The patient has been provided with a written plan.    Health Maintenance   Topic Date Due   • LIPID PANEL  05/24/2019   • MAMMOGRAM  08/17/2019   • COLONOSCOPY  10/30/2024   • TDAP/TD VACCINES (2 - Td) 02/03/2028   • INFLUENZA VACCINE  Completed   • PNEUMOCOCCAL VACCINES (65+ LOW/MEDIUM RISK)  Completed   • ZOSTER VACCINE  Completed        Subjective   History of Present Illness    Roxana Brizuela is a 78 y.o. female who presents for an Annual Wellness Visit.    The following portions of the patient's history were reviewed and updated as appropriate: allergies, current medications, past family history, past medical history, past social history, past surgical history and problem  list.    Outpatient Medications Prior to Visit   Medication Sig Dispense Refill   • Ascorbic Acid (VITAMIN C PO) Take 1,000 mg by mouth Daily.     • calcium carbonate (OS-JUNAID) 600 MG tablet Take 600 mg by mouth Daily.     • [facility] MAGNESIUM PO Take  by mouth.     • diclofenac (VOLTAREN) 75 MG EC tablet TAKE 1 TABLET BY MOUTH TWICE DAILY 60 tablet 5   • gabapentin (NEURONTIN) 300 MG capsule Take 2 capsules by mouth 3 (Three) Times a Day. 180 capsule 2   • hydrochlorothiazide (HYDRODIURIL) 25 MG tablet TAKE 1 TABLET BY MOUTH ONCE DAILY 30 tablet 1   • Lactobacillus (PROBIOTIC ACIDOPHILUS PO) Take 1 capsule by mouth Daily.     • MELATONIN PO Take 10 mg by mouth At Night As Needed.     • pantoprazole (PROTONIX) 40 MG EC tablet TAKE 1 TABLET BY MOUTH ONCE DAILY 90 tablet 0   • potassium chloride (KLOR-CON) 20 MEQ packet Take 20 mEq by mouth Daily.     • propranolol (INDERAL) 10 MG tablet Take 10 mg by mouth 3 (Three) Times a Day.     • simvastatin (ZOCOR) 80 MG tablet Take 80 mg by mouth Every Night.     • SUMAtriptan (IMITREX) 50 MG tablet Take 50 mg by mouth Every 2 (Two) Hours As Needed for migraine. Take one tablet at onset of headache. May repeat dose one time in 2 hours if headache not relieved.     • SYNTHROID 25 MCG tablet Take 1 tablet by mouth Daily. 30 tablet 2   • vitamin B-12 (CYANOCOBALAMIN) 1000 MCG tablet Take 1,000 mcg by mouth Daily.     • vitamin B-6 (PYRIDOXINE) 50 MG tablet Take 50 mg by mouth Daily.     • ondansetron ODT (ZOFRAN-ODT) 4 MG disintegrating tablet Take 4 mg by mouth Every 8 (Eight) Hours As Needed for Nausea or Vomiting.     • HYDROcodone-acetaminophen (NORCO) 5-325 MG per tablet Take 1-2 tablets by mouth Every 4 (Four) Hours As Needed (Pain). 30 tablet 0   • propranolol (INDERAL) 10 MG tablet TAKE 1 TABLET BY MOUTH THREE TIMES DAILY 90 tablet 2   • traMADol (ULTRAM) 50 MG tablet Take 1 tablet by mouth Every 6 (Six) Hours As Needed for Moderate Pain  or Severe Pain . 30 tablet 0     No  "facility-administered medications prior to visit.        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 Have asked the patient to send this to us to add to record.    Identification of Risk Factors:  Risk factors include: cardiovascular risk and increased fall risk.    Review of Systems    Compared to one year ago, the patient feels her physical health is the same.  Compared to one year ago, the patient feels her mental health is the same.    Objective     Physical Exam    Vitals:    01/14/19 1418   BP: 120/64   BP Location: Left arm   Patient Position: Sitting   Cuff Size: Adult   Pulse: 78   Resp: 18   Temp: 97.6 °F (36.4 °C)   TempSrc: Oral   SpO2: 94%   Weight: 69.9 kg (154 lb)   Height: 160 cm (62.99\")   PainSc: 0-No pain       Patient's Body mass index is 27.29 kg/m². BMI is within normal parameters. No follow-up required.      Assessment/Plan   Patient Self-Management and Personalized Health Advice  The patient has been provided with " information about: . and preventive services including:   · up to date on vaccines and screenings .    Visit Diagnoses:  No diagnosis found.    No orders of the defined types were placed in this encounter.      Outpatient Encounter Medications as of 1/14/2019   Medication Sig Dispense Refill   • Ascorbic Acid (VITAMIN C PO) Take 1,000 mg by mouth Daily.     • calcium carbonate (OS-JUNAID) 600 MG tablet Take 600 mg by mouth Daily.     • [facility] MAGNESIUM PO Take  by mouth.     • diclofenac (VOLTAREN) 75 MG EC tablet TAKE 1 TABLET BY MOUTH TWICE DAILY 60 tablet 5   • gabapentin (NEURONTIN) 300 MG capsule Take 2 capsules by mouth 3 (Three) Times a Day. 180 capsule 2   • hydrochlorothiazide (HYDRODIURIL) 25 MG tablet TAKE 1 TABLET BY MOUTH ONCE DAILY 30 tablet 1   • Lactobacillus (PROBIOTIC ACIDOPHILUS PO) Take 1 capsule by mouth Daily.     • MELATONIN PO Take 10 mg by mouth At Night As Needed.     • pantoprazole (PROTONIX) 40 MG EC tablet TAKE 1 TABLET BY MOUTH ONCE DAILY 90 tablet 0   • potassium chloride (KLOR-CON) 20 MEQ packet Take 20 mEq by mouth Daily.     • propranolol (INDERAL) 10 MG tablet Take 10 mg by mouth 3 (Three) Times a Day.     • simvastatin (ZOCOR) 80 MG tablet Take 80 mg by mouth Every Night.     • SUMAtriptan (IMITREX) 50 MG tablet Take 50 mg by mouth Every 2 (Two) Hours As Needed for migraine. Take one tablet at onset of headache. May repeat dose one time in 2 hours if headache not relieved.     • SYNTHROID 25 MCG tablet Take 1 tablet by mouth Daily. 30 tablet 2   • vitamin B-12 (CYANOCOBALAMIN) 1000 MCG tablet Take 1,000 mcg by mouth Daily.     • vitamin B-6 (PYRIDOXINE) 50 MG tablet Take 50 mg by mouth Daily.     • ondansetron ODT (ZOFRAN-ODT) 4 MG disintegrating tablet Take 4 mg by mouth Every 8 (Eight) Hours As Needed for Nausea or Vomiting.     • [DISCONTINUED] HYDROcodone-acetaminophen (NORCO) 5-325 MG per tablet Take 1-2 tablets by mouth Every 4 (Four) Hours As Needed (Pain). 30 tablet 0   •  [DISCONTINUED] propranolol (INDERAL) 10 MG tablet TAKE 1 TABLET BY MOUTH THREE TIMES DAILY 90 tablet 2   • [DISCONTINUED] traMADol (ULTRAM) 50 MG tablet Take 1 tablet by mouth Every 6 (Six) Hours As Needed for Moderate Pain  or Severe Pain . 30 tablet 0     No facility-administered encounter medications on file as of 1/14/2019.        Reviewed use of high risk medication in the elderly: yes  Reviewed for potential of harmful drug interactions in the elderly: yes    Follow Up:  Follow up as scheduled     An After Visit Summary and PPPS with all of these plans were given to the patient.

## 2019-01-24 RX ORDER — PANTOPRAZOLE SODIUM 40 MG/1
TABLET, DELAYED RELEASE ORAL
Qty: 90 TABLET | Refills: 0 | Status: SHIPPED | OUTPATIENT
Start: 2019-01-24 | End: 2019-04-18 | Stop reason: SDUPTHER

## 2019-01-24 RX ORDER — SIMVASTATIN 80 MG
TABLET ORAL
Qty: 90 TABLET | Refills: 1 | Status: SHIPPED | OUTPATIENT
Start: 2019-01-24 | End: 2019-07-18 | Stop reason: SDUPTHER

## 2019-02-01 RX ORDER — GABAPENTIN 300 MG/1
600 CAPSULE ORAL 3 TIMES DAILY
Qty: 180 CAPSULE | Refills: 2 | Status: SHIPPED | OUTPATIENT
Start: 2019-02-01 | End: 2019-05-07 | Stop reason: SDUPTHER

## 2019-02-01 NOTE — TELEPHONE ENCOUNTER
Called patient, prescription is ready for .         ----- Message from Mony Corral sent at 2/1/2019 10:14 AM EST -----  NEEDS A RX FOR GABAPENTIN 300MG # 2 PILLS 3 TIMES A DAY     PLEASE CALL WHEN READY TO      [phone]

## 2019-02-04 ENCOUNTER — OFFICE VISIT (OUTPATIENT)
Dept: FAMILY MEDICINE CLINIC | Facility: CLINIC | Age: 79
End: 2019-02-04

## 2019-02-04 VITALS
HEART RATE: 80 BPM | WEIGHT: 158 LBS | DIASTOLIC BLOOD PRESSURE: 78 MMHG | BODY MASS INDEX: 28 KG/M2 | SYSTOLIC BLOOD PRESSURE: 124 MMHG | RESPIRATION RATE: 16 BRPM | HEIGHT: 63 IN

## 2019-02-04 DIAGNOSIS — D64.81 ANEMIA DUE TO ANTINEOPLASTIC CHEMOTHERAPY: ICD-10-CM

## 2019-02-04 DIAGNOSIS — E89.0 POSTOPERATIVE HYPOTHYROIDISM: Primary | ICD-10-CM

## 2019-02-04 DIAGNOSIS — R53.83 OTHER FATIGUE: ICD-10-CM

## 2019-02-04 DIAGNOSIS — T45.1X5A ANEMIA DUE TO ANTINEOPLASTIC CHEMOTHERAPY: ICD-10-CM

## 2019-02-04 LAB
ALBUMIN SERPL-MCNC: 4.6 G/DL (ref 3.5–5.2)
ALBUMIN/GLOB SERPL: 2.1 G/DL
ALP SERPL-CCNC: 45 U/L (ref 39–117)
ALT SERPL-CCNC: 28 U/L (ref 1–33)
AST SERPL-CCNC: 29 U/L (ref 1–32)
BASOPHILS # BLD AUTO: 0.01 10*3/MM3 (ref 0–0.2)
BASOPHILS NFR BLD AUTO: 0.2 % (ref 0–1.5)
BILIRUB SERPL-MCNC: 0.6 MG/DL (ref 0.1–1.2)
BUN SERPL-MCNC: 18 MG/DL (ref 8–23)
BUN/CREAT SERPL: 18 (ref 7–25)
CALCIUM SERPL-MCNC: 9.5 MG/DL (ref 8.6–10.5)
CHLORIDE SERPL-SCNC: 101 MMOL/L (ref 98–107)
CO2 SERPL-SCNC: 27.6 MMOL/L (ref 22–29)
CREAT SERPL-MCNC: 1 MG/DL (ref 0.57–1)
EOSINOPHIL # BLD AUTO: 0.17 10*3/MM3 (ref 0–0.7)
EOSINOPHIL NFR BLD AUTO: 3.5 % (ref 0.3–6.2)
ERYTHROCYTE [DISTWIDTH] IN BLOOD BY AUTOMATED COUNT: 14 % (ref 11.7–13)
GLOBULIN SER CALC-MCNC: 2.2 GM/DL
GLUCOSE SERPL-MCNC: 101 MG/DL (ref 65–99)
HCT VFR BLD AUTO: 35.8 % (ref 35.6–45.5)
HGB BLD-MCNC: 10.9 G/DL (ref 11.9–15.5)
IMM GRANULOCYTES # BLD AUTO: 0.04 10*3/MM3 (ref 0–0.03)
IMM GRANULOCYTES NFR BLD AUTO: 0.8 % (ref 0–0.5)
LYMPHOCYTES # BLD AUTO: 1.66 10*3/MM3 (ref 0.9–4.8)
LYMPHOCYTES NFR BLD AUTO: 34.7 % (ref 19.6–45.3)
MCH RBC QN AUTO: 30 PG (ref 26.9–32)
MCHC RBC AUTO-ENTMCNC: 30.4 G/DL (ref 32.4–36.3)
MCV RBC AUTO: 98.6 FL (ref 80.5–98.2)
MONOCYTES # BLD AUTO: 0.88 10*3/MM3 (ref 0.2–1.2)
MONOCYTES NFR BLD AUTO: 18.4 % (ref 5–12)
NEUTROPHILS # BLD AUTO: 2.03 10*3/MM3 (ref 1.9–8.1)
NEUTROPHILS NFR BLD AUTO: 42.4 % (ref 42.7–76)
PLATELET # BLD AUTO: 74 10*3/MM3 (ref 140–500)
POTASSIUM SERPL-SCNC: 4.6 MMOL/L (ref 3.5–5.2)
PROT SERPL-MCNC: 6.8 G/DL (ref 6–8.5)
RBC # BLD AUTO: 3.63 10*6/MM3 (ref 3.9–5.2)
SODIUM SERPL-SCNC: 142 MMOL/L (ref 136–145)
T4 FREE SERPL-MCNC: 1.31 NG/DL (ref 0.93–1.7)
TSH SERPL DL<=0.005 MIU/L-ACNC: 6.64 MIU/ML (ref 0.27–4.2)
WBC # BLD AUTO: 4.79 10*3/MM3 (ref 4.5–10.7)

## 2019-02-04 PROCEDURE — 99214 OFFICE O/P EST MOD 30 MIN: CPT | Performed by: INTERNAL MEDICINE

## 2019-02-04 RX ORDER — MELOXICAM 15 MG/1
TABLET ORAL
COMMUNITY
Start: 2019-01-18 | End: 2019-10-29

## 2019-02-06 ENCOUNTER — TELEPHONE (OUTPATIENT)
Dept: FAMILY MEDICINE CLINIC | Facility: CLINIC | Age: 79
End: 2019-02-06

## 2019-02-06 RX ORDER — LEVOTHYROXINE SODIUM 50 MCG
50 TABLET ORAL DAILY
Qty: 30 TABLET | Refills: 2 | Status: SHIPPED | OUTPATIENT
Start: 2019-02-06 | End: 2019-05-09 | Stop reason: SDUPTHER

## 2019-02-06 NOTE — TELEPHONE ENCOUNTER
CALLED PATIENT AND LEFT VOICEMAIL TO CALL US BACK.     PER DR AWAD, HER TSH WAS HIGH. WE WILL INCREASE HER SYNTHROID TO 50MCG DAILY.     RX SENT TO PATIENT'S [facility]

## 2019-02-07 RX ORDER — HYDROCHLOROTHIAZIDE 25 MG/1
TABLET ORAL
Qty: 30 TABLET | Refills: 1 | Status: SHIPPED | OUTPATIENT
Start: 2019-02-07 | End: 2019-04-11 | Stop reason: SDUPTHER

## 2019-02-27 DIAGNOSIS — E07.9 THYROID DISORDER: Primary | ICD-10-CM

## 2019-03-07 LAB — TSH SERPL DL<=0.005 MIU/L-ACNC: 4.06 MIU/ML (ref 0.27–4.2)

## 2019-03-11 ENCOUNTER — OFFICE VISIT (OUTPATIENT)
Dept: FAMILY MEDICINE CLINIC | Facility: CLINIC | Age: 79
End: 2019-03-11

## 2019-03-11 VITALS
SYSTOLIC BLOOD PRESSURE: 124 MMHG | BODY MASS INDEX: 28 KG/M2 | HEIGHT: 63 IN | WEIGHT: 158 LBS | DIASTOLIC BLOOD PRESSURE: 56 MMHG | RESPIRATION RATE: 18 BRPM | HEART RATE: 66 BPM

## 2019-03-11 DIAGNOSIS — E07.9 THYROID DISORDER: ICD-10-CM

## 2019-03-11 DIAGNOSIS — E78.5 HYPERLIPIDEMIA, UNSPECIFIED HYPERLIPIDEMIA TYPE: ICD-10-CM

## 2019-03-11 DIAGNOSIS — I10 ESSENTIAL HYPERTENSION: Primary | ICD-10-CM

## 2019-03-11 PROCEDURE — 99213 OFFICE O/P EST LOW 20 MIN: CPT | Performed by: INTERNAL MEDICINE

## 2019-03-11 NOTE — PROGRESS NOTES
Subjective   Roxana Brizuela is a 78 y.o. female. Patient is here today for   Chief Complaint   Patient presents with   • Thyroid disorder          Vitals:    03/11/19 1049   BP: 124/56   Pulse: 66   Resp: 18       The following portions of the patient's history were reviewed and updated as appropriate: allergies, current medications, past family history, past medical history, past social history, past surgical history and problem list.    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 Paul Negron   • GI (gastrointestinal bleed) 1997   • H/O jaundice    • Hernia     INCISONAL. AROUND LEFT TRAM LAP SITE   • History of chemotherapy     2017 4 MONTHS IN 2017   • History of Clostridium difficile colitis     JAN 2018   • History of histoplasmosis    • History of infectious mononucleosis 1960   • History of migraine headaches    • History of pneumonia 12/27/2017    AS RESULT OF FLU. ENDED UP ON VENT IN [place]   • History of thrombocytopenia    • History of transfusion 1997   • History of vertigo    • Hx of colonic polyps 06/11/2009    Dr. Giles   • Hyperlipidemia    • Hypertension    • Meningioma (CMS/[facility])    • Neuropathy     HANDS AND FEET   • Osteoarthritis 09/13/2011    Dr. Jamil Miller   • Peptic ulceration    • Retina disorder     BLEED. FROM HISTOPLASMOSIS   • SBO (small bowel obstruction)  (CMS/[facility])     due to hernia with surgical repair in 2011   • SOB (shortness of breath) on exertion    • Thyroid mass     RIGHT      Allergies   Allergen Reactions   • Codeine Nausea And Vomiting   • Morphine Nausea And Vomiting      Social History     Socioeconomic History   • Marital status:      Spouse name: Mike   • Number of children: 2   • Years of education: College   • Highest education level: Not on file   Social Needs   • Financial resource strain: Not on file   • Food insecurity - worry: Not on file   • Food insecurity - inability: Not on file   • Transportation needs - medical: Not on file   • Transportation needs - non-medical: Not on file   Occupational History     Employer: RETIRED     Comment: [employer]   Tobacco Use   • Smoking status: Former Smoker     Packs/day: 2.00     Years: 30.00     Pack years: 60.00     Types: Cigarettes     Start date: 1957     Last attempt to quit: 4/10/1987     Years since quittin.9   • Smokeless tobacco: Former User   • Tobacco comment: I haven't had a cigarette in over 30 years   Substance and Sexual Activity   • Alcohol use: No     Comment: stopped, heavy in past   • Drug use: No   • Sexual activity: Not on file   Other Topics Concern   • Not on file   Social History Narrative   • Not on file        Current Outpatient Medications:   •  Ascorbic Acid (VITAMIN C PO), Take 1,000 mg by mouth Daily., Disp: , Rfl:   •  calcium carbonate (OS-JUNAID) 600 MG tablet, Take 600 mg by mouth Daily., Disp: , Rfl:   •  [employer] MAGNESIUM PO, Take  by mouth., Disp: , Rfl:   •  diclofenac (VOLTAREN) 75 MG EC tablet, TAKE 1 TABLET BY MOUTH TWICE DAILY, Disp: 60 tablet, Rfl: 5  •  gabapentin (NEURONTIN) 300 MG capsule, Take 2 capsules by mouth 3 (Three) Times a Day., Disp: 180 capsule, Rfl: 2  •  hydrochlorothiazide (HYDRODIURIL) 25 MG tablet, TAKE 1 TABLET BY MOUTH ONCE DAILY, Disp: 30 tablet, Rfl: 1  •  Lactobacillus (PROBIOTIC ACIDOPHILUS PO), Take 1  capsule by mouth Daily., Disp: , Rfl:   •  MELATONIN PO, Take 10 mg by mouth At Night As Needed., Disp: , Rfl:   •  meloxicam (MOBIC) 15 MG tablet, , Disp: , Rfl:   •  ondansetron ODT (ZOFRAN-ODT) 4 MG disintegrating tablet, Take 4 mg by mouth Every 8 (Eight) Hours As Needed for Nausea or Vomiting., Disp: , Rfl:   •  pantoprazole (PROTONIX) 40 MG EC tablet, TAKE 1 TABLET BY MOUTH ONCE DAILY, Disp: 90 tablet, Rfl: 0  •  potassium chloride (KLOR-CON) 20 MEQ packet, Take 20 mEq by mouth Daily., Disp: , Rfl:   •  propranolol (INDERAL) 10 MG tablet, Take 10 mg by mouth 3 (Three) Times a Day., Disp: , Rfl:   •  simvastatin (ZOCOR) 80 MG tablet, TAKE 1 TABLET BY MOUTH AT BEDTIME, Disp: 90 tablet, Rfl: 1  •  SUMAtriptan (IMITREX) 50 MG tablet, Take 50 mg by mouth Every 2 (Two) Hours As Needed for migraine. Take one tablet at onset of headache. May repeat dose one time in 2 hours if headache not relieved., Disp: , Rfl:   •  SYNTHROID 50 MCG tablet, Take 1 tablet by mouth Daily., Disp: 30 tablet, Rfl: 2  •  vitamin B-12 (CYANOCOBALAMIN) 1000 MCG tablet, Take 1,000 mcg by mouth Daily., Disp: , Rfl:   •  vitamin B-6 (PYRIDOXINE) 50 MG tablet, Take 50 mg by mouth Daily., Disp: , Rfl:      Objective   History of Present Illness Roxana is here for a lab follow-up.  She has hypothyroidism and is on Synthroid 50 mcg daily.  Her dose was adjusted 1 month ago.  She also has hyperlipidemia and gastroesophageal reflux.  She has breast cancer and is followed by oncology.    Review of Systems   Constitutional: Negative for unexpected weight change.   Respiratory: Negative.    Cardiovascular: Negative.    Neurological: Negative.    Psychiatric/Behavioral: Negative.        Physical Exam   Constitutional: She appears well-developed and well-nourished.   Neck: No thyromegaly present.   Cardiovascular: Normal rate, regular rhythm and normal heart sounds.   Psychiatric: She has a normal mood and affect. Her behavior is normal. Judgment and  thought content normal.   Vitals reviewed.      ASSESSMENT     Problem List Items Addressed This Visit        Cardiovascular and Mediastinum    Hyperlipidemia    Hypertension - Primary       Endocrine    Thyroid disorder          PLAN  Patient Instructions   Thyroid-stimulating hormone level is normal.  Will continue  Synthroid 50 mcg daily.      Return in about 6 months (around 9/11/2019) for labsCMP,LIPID,TSH.

## 2019-03-21 RX ORDER — PROPRANOLOL HYDROCHLORIDE 10 MG/1
TABLET ORAL
Qty: 90 TABLET | Refills: 2 | Status: SHIPPED | OUTPATIENT
Start: 2019-03-21 | End: 2019-06-20 | Stop reason: SDUPTHER

## 2019-04-11 RX ORDER — HYDROCHLOROTHIAZIDE 25 MG/1
TABLET ORAL
Qty: 30 TABLET | Refills: 1 | Status: SHIPPED | OUTPATIENT
Start: 2019-04-11 | End: 2019-06-06 | Stop reason: SDUPTHER

## 2019-04-18 RX ORDER — PANTOPRAZOLE SODIUM 40 MG/1
TABLET, DELAYED RELEASE ORAL
Qty: 90 TABLET | Refills: 0 | Status: SHIPPED | OUTPATIENT
Start: 2019-04-18 | End: 2019-07-11 | Stop reason: SDUPTHER

## 2019-05-07 ENCOUNTER — TRANSCRIBE ORDERS (OUTPATIENT)
Dept: ADMINISTRATIVE | Facility: HOSPITAL | Age: 79
End: 2019-05-07

## 2019-05-07 DIAGNOSIS — E04.1 THYROID NODULE: Primary | ICD-10-CM

## 2019-05-07 RX ORDER — GABAPENTIN 300 MG/1
600 CAPSULE ORAL 3 TIMES DAILY
Qty: 180 CAPSULE | Refills: 0 | Status: SHIPPED | OUTPATIENT
Start: 2019-05-07 | End: 2019-07-05 | Stop reason: SDUPTHER

## 2019-05-07 NOTE — TELEPHONE ENCOUNTER
RX IS UP FRONT AND READY TO BE PICKED UP. CALLED PT BUT VOICEMAIL WAS FULL. CALLED PT'S HOUSE NUMBER AND LEFT MESSAGE THAT SHE CAN COME .     ----- Message from Irene Toribio sent at 5/6/2019  9:42 AM EDT -----  PT NEEDS A REFILL ON HER GABAPENTIN 300 MG  6 TABLETS A DAY #180      R [phone]     THANK YOU

## 2019-05-08 ENCOUNTER — HOSPITAL ENCOUNTER (OUTPATIENT)
Dept: CT IMAGING | Facility: HOSPITAL | Age: 79
Discharge: HOME | End: 2019-05-08
Attending: INTERNAL MEDICINE | Admitting: INTERNAL MEDICINE

## 2019-05-08 DIAGNOSIS — C50.211 MALIGNANT NEOPLASM OF UPPER-INNER QUADRANT OF RIGHT BREAST IN FEMALE, ESTROGEN RECEPTOR NEGATIVE (HCC): ICD-10-CM

## 2019-05-08 DIAGNOSIS — Z17.1 MALIGNANT NEOPLASM OF UPPER-INNER QUADRANT OF RIGHT BREAST IN FEMALE, ESTROGEN RECEPTOR NEGATIVE (HCC): ICD-10-CM

## 2019-05-08 PROCEDURE — 71260 CT THORAX DX C+: CPT

## 2019-05-08 PROCEDURE — 0 DIATRIZOATE MEGLUMINE & SODIUM PER 1 ML: Performed by: INTERNAL MEDICINE

## 2019-05-08 PROCEDURE — 25010000002 IOPAMIDOL 61 % SOLUTION: Performed by: INTERNAL MEDICINE

## 2019-05-08 PROCEDURE — 74177 CT ABD & PELVIS W/CONTRAST: CPT

## 2019-05-08 PROCEDURE — 82565 ASSAY OF CREATININE: CPT

## 2019-05-08 RX ADMIN — DIATRIZOATE MEGLUMINE AND DIATRIZOATE SODIUM 30 ML: 660; 100 LIQUID ORAL; RECTAL at 10:30

## 2019-05-08 RX ADMIN — IOPAMIDOL 90 ML: 612 INJECTION, SOLUTION INTRAVENOUS at 11:31

## 2019-05-09 LAB — CREAT BLDA-MCNC: 0.9 MG/DL (ref 0.6–1.3)

## 2019-05-09 RX ORDER — LEVOTHYROXINE SODIUM 50 MCG
TABLET ORAL
Qty: 90 TABLET | Refills: 1 | Status: SHIPPED | OUTPATIENT
Start: 2019-05-09 | End: 2019-09-09

## 2019-05-09 NOTE — PROGRESS NOTES
REASON FOR FOLLOWUP:   1. Newly diagnosed large right breast cancer.  Core needle biopsy reported invasive mammary carcinoma with focal lobular feature, grade 2.  ER negative, less than 1%; FL positive, moderate in staining, 5% to 10%. HER2 IHC 2+, FISH study positive 2.1.   2.  CT scan for chest abdomen and pelvis and breast MRI examination reported right axillary lymph node suspicious for metastatic disease.  No remote metastatic lesion.  Patient has stage IIIa (T3 N2 M0) disease.   3.  Patient was started neoadjuvant chemotherapy on 5/23/2017 with Taxol weekly plus Herceptin weekly for total 12 weeks, and Perjata every 3 weeks during chemotherapy.  Herceptin will be converted to every 3 weeks after chemotherapy finished.    4.  Chronic moderate thrombocytopenia, mild anemia, and intermittent mild neutropenia etiologies are not clear.  5.  Reaction to Herceptin C1D1.  Subsequently tolerated therapy with hydrocortisone as premedication.  6.  Potential cardiac strain developing, neuropathic symptoms persist, follow-up MRI and surgical assessment will be needed post initial chemotherapeutic phase  7.  MRI August 17 with interval resolution of abnormal enhancement within breast and right axillary adenopathy, surgery scheduled November 7, Herceptin ongoing every 3 weeks  8.  Patient seen in office November 28, status post surgery with apparent complete response, Herceptin continued  9.  Herceptin given December 19, subsequent injury in [place] leading to prolonged stay, single treatment given through additional cancer Center  10.  Patient seen May 01, 2018, no further Herceptin planned, baseline scans pursued posttreatment, post influenza syndrome, physical therapy planned  11.  Patient seen June 13, 2018, excellent performance status, improving on physical therapy, equivocal CT  per thoracic adenopathy, follow-up PET/CT planned   12.  PET/CT with improvement,?  Thyroid abnormality with ultrasound planned  13.   Patient seen October 3, partial thyroidectomy anticipated October 14-    14.  Patient seen May 14, 2019, scans negative for evidence of recurrence                                                                                                            HISTORY OF PRESENT ILLNESS:    The patient is a pleasant 78 y.o. with the above-mentioned history, who presents today in anticipation of cycle 4 of Herceptin, Perjeta and Taxol.  This is the first visit with this physician involving this patient's case.  We have reviewed her status today with her slowly developing neuropathic symptoms in her lower extremities but also involving her fingers recognized significantly and she plays the piano and keyboard.  This is becoming harder to do but she is still able to do so.  She also notices neuropathy in her feet but is able to walk and function in daily activities.  Additional to this is her continued grieving at the death of her  though she, fortunately, has an excellent support system.  She continues to experience nausea that is well relieved with Compazine. She denies oral mucositis.  No diarrhea no constipation no chest pain no dyspnea.  No lower extremity edema.    She did received 2 units of PRBC's on   7/8/2017 and remains hematologically stable.   She does have difficulty with sleep and Ambien 10 mg daily at bedtime seems help much better compared to 5 mg dose.  She does not need refills today.  In reviewing her case July 26 she is entering the fourth cycle of her treatment and recent echocardiogram is reviewed with she and her close friend revealing EF of 66.7% though with global strain of -16%?  Suspicious for myopathic process.  Normal ventricular cavity size and wall thickness as well as contractility.  We have also discussed that she is responding to therapy and will need surgical assessment which may not as yet have been performed.  She has seen Dr. Melo for port placement and will ask him to  review her likely just after she has completed his fourth cycle of therapy.  It is also apparent that she'll need a cardiac assessment as we continue subsequent Herceptin therapy perioperatively.      The patient underwent MRI of the breasts August 17 demonstrating interval resolution of abnormal enhancement within the right breast, resolution of right axillary adenopathy had also resolved.  The findings were consistent with a complete response to neoadjuvant chemotherapy.  The patient was seen in subsequent follow-up with Dr. Melo and was determined to proceed with surgery and ultimately sentinel node evaluation.  This is now scheduled November 7 and there are additional plans to consider radiation therapy postoperatively and we have also discussed the use of anti-hormonal therapy considering her mildly positive IN status.    The patient was able to proceed to surgery undergoing right breast mastectomy and sentinel lymph node biopsy November 07, 2017.  She did well postoperatively seeing Dr. Melo November 16.  Pathology revealed no residual malignancy in the breast and 3 negative sentinel lymph nodes.  A formal review of her report reveals treatment effect particularly in her largest lymph node with focal fibrosis and scar, right breast mastectomy fibrosis associated with a cavitary biopsy site and no invasive or in situ carcinoma.  The patient is now seen in our office though she went to the wrong location and the seen late in the day.  We discussed her findings and agree that she would continue Herceptin at this point but be reviewed formally again in 3 weeks.  She is also be seen by radiation therapy for their input.   The patient, evidently, was seen by radiation therapy but decided not to pursue it until her last Herceptin therapy here December 19.  Following this she visited her daughter in [place] and, unfortunately, developed influenza and pneumonia.  She had a prolonged hospitalization and was at  many sites in recovery over a several month only to return to [place] in the last several weeks.  She did take 1 IV Herceptin dose while in [place] but as she is reviewed May 05, 2018 we have discussed that it makes no particular sense to continue or add on to her previous history by extending Herceptin any further 3-4 months after her last treatment.  She therefore has completed Herceptin.    The patient on to be tested post her treatment again baseline studies and CT of chest and pelvis were performed June 6 revealing interval increase in a few subcentimeter nodes in the left pectoral, axillary and mediastinal regions. These are all considerably small and of uncertain significance.  The patient's performance status remains excellent without any reduction and, in fact, has improved with physical therapy upon return.       Patient is next seen August 08, 2018, fortunately feeling well.  A follow-up PET/CT had revealed an interval decrease in the subcentimeter nodes in the left subpectoral axillary region as well as mediastinum.  There was no hypermetabolic lymphadenopathy.  There was intense focus within slightly enlarged right thyroid gland.  Patient indicates she never had any thyroid issues of which she is aware.  The patient was referred to ENT for assessment and the patient was seen by Dr. Fofana.  Ultimately a subtotal thyroidectomy is anticipated and now scheduled October 4.  The patient is willing to proceed.        The patient proceeded to a right thyroid lobectomy performed November 04, 2018.  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  There was no definitive evidence of trans-capsular or vascular invasion.    The patient is seen in follow-up November 28 doing well and we discussed the surgical treatment of this thyroid lesion.  She feels that all this went well.  She has additional cataract surgery at the end of November  scheduled also.  The patient did complete her  testing and was asked to return approximately 6 months later with a follow-up CT chest, abdomen and pelvis that demonstrate no evidence of recurrent disease.  As she is seen back May 14 she, unfortunately, fell and contused her face, left knee and left hand.  This required an ER visit.  Past Medical History:   Diagnosis Date   • Alcoholism (CMS/[facility]) 1978    I haven't had a drink since then   • Anemia    • Breast cancer (CMS/[facility]) 05/03/2017    Right breast invasive mammary carcinoma with focal lobular features, grade 2, ER negative, less than 1% RI positive, HER-2 positive, stage IIIa disease (T3, N2, M0   • Breast cancer (CMS/[facility]) 10/20/2004    Left breast ductal carcinoma in-situ, predominately intermediate grade with focal comedonecrosis (high grade), solid and bribridform patterns involving approximately five core biopsy fragments   • Edema     Chronic lower extremity edema   • GERD (gastroesophageal reflux disease) 09/13/2011    Dr. Paul Negron   • GI (gastrointestinal bleed) 1997   • H/O jaundice    • Hernia     INCISONAL. AROUND LEFT TRAM LAP SITE   • History of chemotherapy     2017 4 MONTHS IN 2017   • History of Clostridium difficile colitis     JAN 2018   • History of histoplasmosis    • History of infectious mononucleosis 1960   • History of migraine headaches    • History of pneumonia 12/27/2017    AS RESULT OF FLU. ENDED UP ON VENT IN [place]   • History of thrombocytopenia    • History of transfusion 1997   • History of vertigo    • Hx of colonic polyps 06/11/2009    Dr. Giles   • Hyperlipidemia    • Hypertension    • Meningioma (CMS/[facility])    • Neuropathy     HANDS AND FEET   • Osteoarthritis 09/13/2011    Dr. Jamil Miller   • Peptic ulceration    • Retina disorder     BLEED. FROM HISTOPLASMOSIS   • SBO (small bowel obstruction) (CMS/[facility])     due to hernia with surgical repair in 09/2011   • SOB (shortness of breath) on exertion    • Thyroid mass     RIGHT   Normal echocardiogram on  2017, LVEF 68%.    Past Surgical History:   Procedure Laterality Date   • APPENDECTOMY N/A    • BLADDER REPAIR N/A    • BREAST BIOPSY Left 10/20/2004    Mammotome incisional biopsy left breast, surgical specimen, left breast, localization clip placement, left breast, confirmatory diagnostic unilateral mammogram, left breast-Dr. Tyrese Alcala, [facility]   • BREAST BIOPSY Right 2017    PATH: INVASIVE MAMMARY CARCINOMA   • CHOLECYSTECTOMY N/A    • COLONOSCOPY N/A 2009    Hemorrhoids, otherwise normal, repeat in 5 years-Dr. Noemí Purcell   • EYE SURGERY Right     laser surgery for blood clot   • HYSTERECTOMY Bilateral    • INGUINAL HERNIA REPAIR Right     DR ALESIA GAXIOLA   • MASTECTOMY Left     Left breast mastecotmy with sentinel lymph node biospy-[facility]   • MASTECTOMY W/ SENTINEL NODE BIOPSY Right 2017    Procedure: RIGHT BREAST MASTECTOMY WITH SENTINEL NODE BIOPSY;  Surgeon: Christian Melo MD;  Location: [facility] OR;  Service:    • OR INSJ TUNNELED CVC W/O SUBQ PORT/ AGE 5 YR/> Left 2017    Procedure: INSERTION VENOUS ACCESS DEVICE;  Surgeon: Christian Melo MD;  Location: [facility] OR;  Service: General   • REDUCTION MAMMAPLASTY Right     to match Lt. TRAM flap   • SMALL INTESTINE SURGERY      INCARCRATED HERNIA   • THYROIDECTOMY Right 10/4/2018    Procedure: RT THYROID LOBECTOMY;  Surgeon: Julián Fofana MD;  [place] [facility] [place] [facility] [place]  Service: E[place]   • TONSILLECTOMY Bilateral    • TRAM FLAP DELAYED Left     WITH MASTOPEXY   • UPPER GASTROINTESTINAL ENDOSCOPY N/A 2011    LA grade A esophagitis with no bleeding, large hiatus hernia (50cm), gastric ulcer-Dr.Laszlo Lezama   • US GUIDED FINE NEEDLE ASPIRATION  2018   Lico catheter placement on 2017 by Dr. Melo.     OB/GYN history: Menarche age 16, menopause at 1985. , 1 miscarriage. No birth control pill use. She did have post menopause hormonal supplementation.       HEMATOLOGIC/ONCOLOGIC HISTORY: The patient is a 78 y.o. year old female whom we are consulted for a newly self discovered right breast mass, in April 2017. Patient had ultrasound-guided biopsy on 5/3/2017 and confirmed to be invasive mammary carcinoma with focal lobular features, grade 2 Elen score 6/9. She is here for initial evaluation for management.      Patient is a 76-year-old  female who was seen here previously for chronic mild-to-moderate thrombocytopenia and mild anemia. Recently the patient reports she started having firmness of the right breast that started sometime in April or maybe even in March. She noticed firmness spread from about around the 12 o'clock position, gradually towards the upper lateral side and lower part of the right breast associated mild pain. The patient thought it was related to fibrocystic changes. However, the symptom was getting worse. Patient also reported dented nipple after she started having pain in the right breast. She denies discharge from the nipple. She called her primary care physician, Dr. Martin, who ordered a mammogram study. This was done on 04/12/2017 with architectural distortion of the right breast centrally at 12 o'clock position and had scattered fibroglandular densities throughout the right breast.      The patient subsequently had right breast ultrasound examination on 04/26/2017. Discovered a large right breast mass measuring 5.8 x 3.5 x 3.9 cm. Patient subsequently had ultrasound-guided right breast biopsy on 05/03/2017. Pathology evaluation reported invasive mammary carcinoma with focal lobular feature. Elen score 6/9, overall grade 2. Sample was further sent to the [facility] for test. ER negative, less than 1%; KS positive, moderate in staining, 5% to 10%. HER2 IHC 2+, but FISH study positive 2.1.     She denies weight loss, she actually eats very well. No nausea vomiting. Patient does complain of insomnia,  unable to sleep.      This patient has history of left breast DCIS back in 2007, had mastectomy at the [facility]. No hormonal therapy afterwards according to patient. This patient also had a small meningioma, followed by Dr. Smith in [facility], with most recent MRI of the brain in March 2017, with 18 mm meningioma, and followed on an annual basis.     Her neutrophil count on 5/16/17 is normal at 2500, however, records showed starting from 05/2015 and in 09/2016, 01/2017 and 03/2017, all 4 laboratory studies showed mild neutropenia with ANC fluctuating between 1200 and 1600. Etiology is not clear.    Normal echocardiogram on 5/18/2017, LVEF 68%.      CT scan for chest abdomen and pelvis on 5/22/2017 and breast MRI examination on 5/22/2017 reported small right axillary lymph node suspicious for metastatic disease.  No remote metastatic lesion.  Patient has stage IIIa (T3 N2 M0) disease.      Patient will start neoadjuvant chemotherapy with Taxol weekly plus Herceptin weekly for total 12 weeks, and Perjeta every 3 weeks (3-week cycle) during chemotherapy.  Herceptin will be converted to every 3 weeks after chemotherapy finished.  Cycle 1 day 1 5/23/2017.   Status post neoadjuvant chemotherapy the patient was assessed with MRI showing a complete neoadjuvant response.  The patient was reviewed for surgery and questions concerning lymph node dissection-sentinel node evaluation-and need for radiation therapy postop were considered as well as use of additional Herceptin for the balance of the year as well as additional use of anti-hormonal therapy.  Surgery was scheduled November 07, 2017.  Patient next seen in office November 28 having undergone surgery on November 7 with results revealing no residual malignancy in either breast or associated sentinel lymph nodes.  Was elected to continue Herceptin when seen back in office November 20 having initiated Herceptin May 23, 2017.     Patient continued Herceptin with her last treatment given 2017.     Unfortunately she later experienced an accident while in [place]  with prolonged hospitalization and prolonged ventilator management required.  Apparently she had at least one IV Herceptin therapy given while there and we were contacted 2018- Dr. Nuno.  Eventually the patient was able to return to [place] is seen back in office May 5 at which time we concluded that she completed Herceptin therapy as result of being off treatment for so long.  Plans were made for baseline scans.  Patient follow-up reexaminations May 8, 2019 with no evidence of recurrent malignancy.      MEDICATIONS: The current medication list was reviewed with the patient and updated in the EMR this date per the Medical Assistant. Medication dosages and frequencies were confirmed to be accurate.       ALLERGIES:    Allergies   Allergen Reactions   • Codeine Nausea And Vomiting   • Morphine Nausea And Vomiting     SOCIAL HISTORY:   Social History     Tobacco Use   • Smoking status: Former Smoker     Packs/day: 2.00     Years: 30.00     Pack years: 60.00     Types: Cigarettes     Start date: 1957     Last attempt to quit: 4/10/1987     Years since quittin.1   • Smokeless tobacco: Former User   • Tobacco comment: I haven't had a cigarette in over 30 years   Substance Use Topics   • Alcohol use: No     Comment: stopped, heavy in past   • Drug use: No     FAMILY HISTORY:  Family History   Problem Relation Age of Onset   • Heart disease Mother    • Aortic aneurysm Mother         abdominal   • Coronary artery disease Mother    • Hypertension Mother    • Miscarriages / Stillbirths Mother    • Heart disease Father    • Heart attack Father         acute   • Hypertension Father    • Early death Father    • Hearing loss Father    • Kidney disease Father    • Breast cancer Daughter 45   • Heart disease Brother    • Hypertension Brother    • Kandy  "Hyperthermia Neg Hx      I have reviewed the patient's medical history in detail and updated the computerized patient record.    REVIEW OF SYSTEMS:  GENERAL: See history of present illness.  Recent fall and sustained contusions, no change in appetite or weight;   No fevers, chills, sweats.   SKIN: No nonhealing lesions. No rashes.   HEME/LYMPH: See HPI.   EYES: No vision changes or diplopia.   ENT: No tinnitus, hearing loss, gum bleeding, epistaxis, hoarseness or dysphagia.   RESPIRATORY: No cough, Has exertional dyspnea, No hemoptysis or wheezing.   CVS: No chest pain, palpitations, orthopnea, dyspnea on exertion or PND.   GI: No additional diarrhea, constipation or reflux  : No lower tract obstructive symptoms, dysuria or hematuria.   MUSCULOSKELETAL: Chronic or joint pain, arthritis.  Has mild intermittent ankle swelling.   NEUROLOGICAL: See HPI  PSYCHIATRIC: See HPI     Objective:   Vitals:    05/14/19 1129   BP: 144/79   Pulse: 79   Resp: 18   Temp: 98 °F (36.7 °C)   TempSrc: Oral   SpO2: 99%   Weight: 74.3 kg (163 lb 12.8 oz)   Height: 160 cm (62.99\")   PainSc: 0-No pain   ECOG 0      PHYSICAL EXAM:   GENERAL: Well-developed, well-nourished female, in no acute distress.   SKIN: Warm, dry without rashes, purpura or petechiae.  Left upper chest Lico catheter in place, no evidence of infection.  Contusions just lateral to the right eye, left hand and left knee  EYES: Pupils equal, round and reactive to light. EOMs intact. Conjunctivae normal.  EARS: Hearing intact.  NOSE:  No excoriations or nasal discharge.  MOUTH: Tongue is well-papillated; no stomatitis or ulcers. Lips normal.  THROAT: Oropharynx without lesions or exudates.  Status post partial thyroidectomy well-healing  LYMPHATICS: No cervical, supraclavicular, axillary adenopathy.  CHEST: Lungs clear to auscultation. Good airflow.   BREAST:Postop, Well healed right mastectomy site with no evidence of recurrent disease, no axillary adenopathy " bilaterally.  CARDIAC: Regular rate and rhythm without murmurs. Normal S1,S2.  ABDOMEN: Slightly distended, normal active bowel sounds,  no hepatosplenomegaly or masses.  EXTREMITIES: No clubbing, cyanosis or edema.  NEUROLOGICAL: Cranial Nerves II-XII grossly intact. No focal neurological deficits.  PSYCHIATRIC: Alert and oriented, pleased about her results      RECENT LABS:  Lab Results   Component Value Date    WBC 7.26 05/14/2019    HGB 10.9 (L) 05/14/2019    HCT 33.2 (L) 05/14/2019    MCV 92.7 05/14/2019    PLT 72 (L) 05/14/2019     Lab Results   Component Value Date    NEUTROABS 3.65 05/14/2019     Lab Results   Component Value Date    GLUCOSE 103 (H) 05/14/2019    BUN 22 05/14/2019    CREATININE 0.99 05/14/2019    EGFRIFNONA 54 (L) 05/14/2019    EGFRIFAFRI 65 02/04/2019    BCR 22.2 05/14/2019    K 3.8 05/14/2019    CO2 27.2 05/14/2019    CALCIUM 9.4 05/14/2019    PROTENTOTREF 6.8 02/04/2019    ALBUMIN 4.60 05/14/2019    LABIL2 2.1 02/04/2019    AST 32 05/14/2019    ALT 38 (H) 05/14/2019            CT CHEST W CONTRAST-, CT ABDOMEN PELVIS W CONTRAST 5/8/19      FINDINGS: In the interval, the right lobe of the thyroid gland has been  resected. The left lobe of the gland is unremarkable. The patient is  status post bilateral mastectomy with TRAM flap breast reconstruction on  the left and no reconstruction of the right. The chest wall is otherwise  unremarkable. No chest wall masses are identified. A few borderline  enlarged left subpectoral and axillary lymph nodes shown on the previous  CT scan have decreased in size. A single borderline enlarged lymph node  in the prevascular space is unchanged. There is no hilar or right  axillary lymphadenopathy. There is no definite internal mammary chain  lymphadenopathy. Lung window images demonstrate no noncalcified nodules  or parenchymal infiltrates. There are no pleural effusions.      There is mild diffuse hepatic steatosis that is focally more prominent  within the  left lobe of the liver. The liver is otherwise unremarkable.  The spleen and pancreas and adrenal glands appear within normal limits.  There is a small simple cortical cyst in the upper pole of the left  kidney. The kidneys are otherwise unremarkable. The stomach and small  and large bowel are unremarkable except for a few diverticuli in the  sigmoid colon. There is no CT evidence of diverticulitis. The  gallbladder and uterus are absent. Bone window images demonstrate no  lytic or blastic lesions.     IMPRESSION:  Postoperative changes as described. There is currently no  compelling evidence of metastatic disease within the chest, abdomen or  pelvis. A few borderline enlarged left subpectoral and axillary lymph  nodes have diminished in size since the preceding CT dated 06/07/2018.  There is mild hepatic steatosis.    Assessment/Plan   1. Large right breast cancer, locally advanced, stage IIIa (T3 N1/ 2 M0).  ER negative, less than 1%; ND positive, moderate in staining, 5% to 10%. HER2 IHC 2+, FISH study positive 2.1.  Physical examination showed a large mass, 7 cm x 7 cm initially which has decreased to approximately less than 4 cm ..  Patient  proceeded through 4 cycles ceptin,Perjeta and Taxol.   Her subsequent MRI examination showed complete response by imaging including right axillary lymphadenopathy.  We have continued Herceptin and that includes today October 20, 2017.  The case was discussed with Dr. Melo and plans were to proceed with mastectomy plus right axillary lymph node assessment via sentinel node evaluation. it was felt she likely require radiation therapy post procedure.  The patient was scheduled and proceeded to surgery November 7.  Her results, wonderfully, revealed no evidence of residual disease in the breast or associated sentinel lymph nodes. She was seen by radiation therapy and offered treatment did not pursue it.  Additionally, and somewhat complicating this story, she traveled to  [place] and developed severe influenza, associated pneumonia and was hospitalized for several months only to return to [place] in the last several weeks now being seen back May 1.  There is no particular benefit from trying to add Herceptin back to her therapy now and is felt that she is completed Herceptin treatment.  She wishes consider reconstruction and baseline CT scans will be requested in 5 weeks with the patient being seen in 6 weeks follow-up.The patient reviewed June 13, 2018 with the scans demonstrating very modest increase in left pectoral, axillary or mediastinal nodes.  These are of uncertain significance but do need follow-up in a PET/CT is planned in 7 weeks.  Her anemia will also be reviewed again with additional laboratory studies that visit.  She'll be seen in 8 weeks for general reassessment.    The patient's anemia workup was essentially negative and she is slowly improving when seen back August 8.  Her PET/CT, fortunately, demonstrates improvement though there is potential abnormality within the right thyroid lobe.  We discussed thyroid function testing and follow-up thyroid ultrasound.  She will be seen back in approximately 2 months as we maintain her port monthly flushes.   The patient was reviewed by ENT and ultimately was felt that a partial thyroidectomy was in order and is now scheduled October 4.  Patient's one to proceed.  We'll plan to see her back in approximately 6 weeks.      The patient is next seen November 28, 2015.  Her surgery went well with the findings as noted above.  She has follow-up with ENT in the next several weeks.  Additionally she has bilateral cataract surgery at the end of this month and into the beginning of next.  We discussed reassessment in general with follow-up scans and these were performed May 2019 which showed no evidence of recurrent malignancy.  Six-month follow-up planes with maintenance of her port every 6 weeks.      2. Recent fall with  contusions now slowly recovering                                                            3.  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

## 2019-05-14 ENCOUNTER — OFFICE VISIT (OUTPATIENT)
Dept: ONCOLOGY | Facility: CLINIC | Age: 79
End: 2019-05-14

## 2019-05-14 ENCOUNTER — LAB (OUTPATIENT)
Dept: ONCOLOGY | Facility: HOSPITAL | Age: 79
End: 2019-05-14

## 2019-05-14 VITALS
OXYGEN SATURATION: 99 % | SYSTOLIC BLOOD PRESSURE: 144 MMHG | TEMPERATURE: 98 F | RESPIRATION RATE: 18 BRPM | HEIGHT: 63 IN | HEART RATE: 79 BPM | WEIGHT: 163.8 LBS | BODY MASS INDEX: 29.02 KG/M2 | DIASTOLIC BLOOD PRESSURE: 79 MMHG

## 2019-05-14 DIAGNOSIS — C50.211 MALIGNANT NEOPLASM OF UPPER-INNER QUADRANT OF RIGHT BREAST IN FEMALE, ESTROGEN RECEPTOR NEGATIVE (HCC): Primary | ICD-10-CM

## 2019-05-14 DIAGNOSIS — C50.911 RIGHT BREAST CANCER WITH T3 TUMOR, >5 CM IN GREATEST DIMENSION (HCC): ICD-10-CM

## 2019-05-14 DIAGNOSIS — Z17.1 MALIGNANT NEOPLASM OF UPPER-INNER QUADRANT OF RIGHT BREAST IN FEMALE, ESTROGEN RECEPTOR NEGATIVE (HCC): Primary | ICD-10-CM

## 2019-05-14 DIAGNOSIS — Z45.2 ENCOUNTER FOR FITTING AND ADJUSTMENT OF VASCULAR CATHETER: ICD-10-CM

## 2019-05-14 LAB
ALBUMIN SERPL-MCNC: 4.6 G/DL (ref 3.5–5.2)
ALBUMIN/GLOB SERPL: 1.8 G/DL
ALP SERPL-CCNC: 55 U/L (ref 39–117)
ALT SERPL W P-5'-P-CCNC: 38 U/L (ref 1–33)
ANION GAP SERPL CALCULATED.3IONS-SCNC: 12.8 MMOL/L
AST SERPL-CCNC: 32 U/L (ref 1–32)
BASOPHILS # BLD AUTO: 0.02 10*3/MM3 (ref 0–0.2)
BASOPHILS NFR BLD AUTO: 0.3 % (ref 0–1.5)
BILIRUB SERPL-MCNC: 0.5 MG/DL (ref 0.1–1.2)
BUN BLD-MCNC: 22 MG/DL (ref 8–23)
BUN/CREAT SERPL: 22.2 (ref 7–25)
CALCIUM SPEC-SCNC: 9.4 MG/DL (ref 8.6–10.5)
CHLORIDE SERPL-SCNC: 100 MMOL/L (ref 98–107)
CO2 SERPL-SCNC: 27.2 MMOL/L (ref 22–29)
CREAT BLD-MCNC: 0.99 MG/DL (ref 0.57–1)
DEPRECATED RDW RBC AUTO: 44.2 FL (ref 37–54)
EOSINOPHIL # BLD AUTO: 0.16 10*3/MM3 (ref 0–0.4)
EOSINOPHIL NFR BLD AUTO: 2.2 % (ref 0.3–6.2)
ERYTHROCYTE [DISTWIDTH] IN BLOOD BY AUTOMATED COUNT: 13 % (ref 12.3–15.4)
GFR SERPL CREATININE-BSD FRML MDRD: 54 ML/MIN/1.73
GLOBULIN UR ELPH-MCNC: 2.5 GM/DL
GLUCOSE BLD-MCNC: 103 MG/DL (ref 65–99)
HCT VFR BLD AUTO: 33.2 % (ref 34–46.6)
HGB BLD-MCNC: 10.9 G/DL (ref 12–15.9)
IMM GRANULOCYTES # BLD AUTO: 0.23 10*3/MM3 (ref 0–0.05)
IMM GRANULOCYTES NFR BLD AUTO: 3.2 % (ref 0–0.5)
LYMPHOCYTES # BLD AUTO: 1.77 10*3/MM3 (ref 0.7–3.1)
LYMPHOCYTES NFR BLD AUTO: 24.4 % (ref 19.6–45.3)
MCH RBC QN AUTO: 30.4 PG (ref 26.6–33)
MCHC RBC AUTO-ENTMCNC: 32.8 G/DL (ref 31.5–35.7)
MCV RBC AUTO: 92.7 FL (ref 79–97)
MONOCYTES # BLD AUTO: 1.43 10*3/MM3 (ref 0.1–0.9)
MONOCYTES NFR BLD AUTO: 19.7 % (ref 5–12)
NEUTROPHILS # BLD AUTO: 3.65 10*3/MM3 (ref 1.7–7)
NEUTROPHILS NFR BLD AUTO: 50.2 % (ref 42.7–76)
NRBC BLD AUTO-RTO: 0 /100 WBC (ref 0–0.2)
PLATELET # BLD AUTO: 72 10*3/MM3 (ref 140–450)
PMV BLD AUTO: 12.4 FL (ref 6–12)
POTASSIUM BLD-SCNC: 3.8 MMOL/L (ref 3.5–5.2)
PROT SERPL-MCNC: 7.1 G/DL (ref 6–8.5)
RBC # BLD AUTO: 3.58 10*6/MM3 (ref 3.77–5.28)
SODIUM BLD-SCNC: 140 MMOL/L (ref 136–145)
WBC NRBC COR # BLD: 7.26 10*3/MM3 (ref 3.4–10.8)

## 2019-05-14 PROCEDURE — 99214 OFFICE O/P EST MOD 30 MIN: CPT | Performed by: INTERNAL MEDICINE

## 2019-05-14 PROCEDURE — 85025 COMPLETE CBC W/AUTO DIFF WBC: CPT | Performed by: INTERNAL MEDICINE

## 2019-05-14 PROCEDURE — 96523 IRRIG DRUG DELIVERY DEVICE: CPT | Performed by: INTERNAL MEDICINE

## 2019-05-14 PROCEDURE — 80053 COMPREHEN METABOLIC PANEL: CPT | Performed by: INTERNAL MEDICINE

## 2019-05-14 PROCEDURE — 36415 COLL VENOUS BLD VENIPUNCTURE: CPT

## 2019-05-14 RX ORDER — SODIUM CHLORIDE 0.9 % (FLUSH) 0.9 %
10 SYRINGE (ML) INJECTION AS NEEDED
Status: DISCONTINUED | OUTPATIENT
Start: 2019-05-14 | End: 2019-05-14 | Stop reason: HOSPADM

## 2019-05-14 RX ORDER — SODIUM CHLORIDE 0.9 % (FLUSH) 0.9 %
10 SYRINGE (ML) INJECTION AS NEEDED
Status: CANCELLED | OUTPATIENT
Start: 2019-05-14

## 2019-05-14 RX ADMIN — SODIUM CHLORIDE, PRESERVATIVE FREE 500 UNITS: 5 INJECTION INTRAVENOUS at 11:00

## 2019-05-14 RX ADMIN — Medication 10 ML: at 11:00

## 2019-06-03 ENCOUNTER — HOSPITAL ENCOUNTER (OUTPATIENT)
Dept: ULTRASOUND IMAGING | Facility: HOSPITAL | Age: 79
Discharge: HOME | End: 2019-06-03
Admitting: OTOLARYNGOLOGY

## 2019-06-03 DIAGNOSIS — E04.1 THYROID NODULE: ICD-10-CM

## 2019-06-03 PROCEDURE — 76536 US EXAM OF HEAD AND NECK: CPT

## 2019-06-06 RX ORDER — HYDROCHLOROTHIAZIDE 25 MG/1
TABLET ORAL
Qty: 90 TABLET | Refills: 1 | Status: SHIPPED | OUTPATIENT
Start: 2019-06-06 | End: 2019-12-09 | Stop reason: SDUPTHER

## 2019-06-20 RX ORDER — PROPRANOLOL HYDROCHLORIDE 10 MG/1
TABLET ORAL
Qty: 270 TABLET | Refills: 0 | Status: SHIPPED | OUTPATIENT
Start: 2019-06-20 | End: 2019-09-19 | Stop reason: SDUPTHER

## 2019-06-25 ENCOUNTER — APPOINTMENT (OUTPATIENT)
Dept: ONCOLOGY | Facility: HOSPITAL | Age: 79
End: 2019-06-25

## 2019-06-27 ENCOUNTER — INFUSION (OUTPATIENT)
Dept: ONCOLOGY | Facility: HOSPITAL | Age: 79
End: 2019-06-27

## 2019-06-27 DIAGNOSIS — Z45.2 ENCOUNTER FOR FITTING AND ADJUSTMENT OF VASCULAR CATHETER: Primary | ICD-10-CM

## 2019-06-27 PROCEDURE — 96523 IRRIG DRUG DELIVERY DEVICE: CPT | Performed by: INTERNAL MEDICINE

## 2019-06-27 RX ORDER — SODIUM CHLORIDE 0.9 % (FLUSH) 0.9 %
10 SYRINGE (ML) INJECTION AS NEEDED
Status: CANCELLED | OUTPATIENT
Start: 2019-06-27

## 2019-06-27 RX ORDER — SODIUM CHLORIDE 0.9 % (FLUSH) 0.9 %
10 SYRINGE (ML) INJECTION AS NEEDED
Status: DISCONTINUED | OUTPATIENT
Start: 2019-06-27 | End: 2019-06-27 | Stop reason: HOSPADM

## 2019-06-27 RX ADMIN — Medication 10 ML: at 11:20

## 2019-06-27 RX ADMIN — SODIUM CHLORIDE, PRESERVATIVE FREE 500 UNITS: 5 INJECTION INTRAVENOUS at 11:20

## 2019-07-01 RX ORDER — DICLOFENAC SODIUM 75 MG/1
TABLET, DELAYED RELEASE ORAL
Qty: 60 TABLET | Refills: 5 | Status: SHIPPED | OUTPATIENT
Start: 2019-07-01 | End: 2019-12-27

## 2019-07-05 RX ORDER — GABAPENTIN 300 MG/1
600 CAPSULE ORAL 3 TIMES DAILY
Qty: 180 CAPSULE | Refills: 0 | Status: SHIPPED | OUTPATIENT
Start: 2019-07-05 | End: 2019-08-06 | Stop reason: SDUPTHER

## 2019-07-05 RX ORDER — SUMATRIPTAN 50 MG/1
TABLET, FILM COATED ORAL
Qty: 10 TABLET | Refills: 0 | Status: SHIPPED | OUTPATIENT
Start: 2019-07-05 | End: 2021-07-03

## 2019-07-11 RX ORDER — PANTOPRAZOLE SODIUM 40 MG/1
TABLET, DELAYED RELEASE ORAL
Qty: 90 TABLET | Refills: 0 | Status: SHIPPED | OUTPATIENT
Start: 2019-07-11 | End: 2019-10-14 | Stop reason: SDUPTHER

## 2019-07-18 RX ORDER — SIMVASTATIN 80 MG
TABLET ORAL
Qty: 90 TABLET | Refills: 1 | Status: SHIPPED | OUTPATIENT
Start: 2019-07-18 | End: 2020-01-13

## 2019-08-06 ENCOUNTER — INFUSION (OUTPATIENT)
Dept: ONCOLOGY | Facility: HOSPITAL | Age: 79
End: 2019-08-06

## 2019-08-06 DIAGNOSIS — Z45.2 ENCOUNTER FOR FITTING AND ADJUSTMENT OF VASCULAR CATHETER: Primary | ICD-10-CM

## 2019-08-06 PROCEDURE — 96523 IRRIG DRUG DELIVERY DEVICE: CPT | Performed by: INTERNAL MEDICINE

## 2019-08-06 RX ORDER — SODIUM CHLORIDE 0.9 % (FLUSH) 0.9 %
10 SYRINGE (ML) INJECTION AS NEEDED
Status: CANCELLED | OUTPATIENT
Start: 2019-08-06

## 2019-08-06 RX ORDER — SODIUM CHLORIDE 0.9 % (FLUSH) 0.9 %
10 SYRINGE (ML) INJECTION AS NEEDED
Status: DISCONTINUED | OUTPATIENT
Start: 2019-08-06 | End: 2019-08-06 | Stop reason: HOSPADM

## 2019-08-06 RX ORDER — GABAPENTIN 300 MG/1
600 CAPSULE ORAL 3 TIMES DAILY
Qty: 180 CAPSULE | Refills: 0 | Status: SHIPPED | OUTPATIENT
Start: 2019-08-06 | End: 2019-09-09 | Stop reason: SDUPTHER

## 2019-08-06 RX ADMIN — SODIUM CHLORIDE, PRESERVATIVE FREE 500 UNITS: 5 INJECTION INTRAVENOUS at 11:28

## 2019-08-06 RX ADMIN — Medication 10 ML: at 11:27

## 2019-09-03 DIAGNOSIS — E78.5 HYPERLIPIDEMIA, UNSPECIFIED HYPERLIPIDEMIA TYPE: Primary | ICD-10-CM

## 2019-09-03 DIAGNOSIS — I10 ESSENTIAL HYPERTENSION: ICD-10-CM

## 2019-09-05 LAB
ALBUMIN SERPL-MCNC: 4.9 G/DL (ref 3.5–5.2)
ALBUMIN/GLOB SERPL: 2.2 G/DL
ALP SERPL-CCNC: 54 U/L (ref 39–117)
ALT SERPL-CCNC: 37 U/L (ref 1–33)
AST SERPL-CCNC: 33 U/L (ref 1–32)
BILIRUB SERPL-MCNC: 0.7 MG/DL (ref 0.2–1.2)
BUN SERPL-MCNC: 25 MG/DL (ref 8–23)
BUN/CREAT SERPL: 24.8 (ref 7–25)
CALCIUM SERPL-MCNC: 10 MG/DL (ref 8.6–10.5)
CHLORIDE SERPL-SCNC: 98 MMOL/L (ref 98–107)
CHOLEST SERPL-MCNC: 116 MG/DL (ref 0–200)
CO2 SERPL-SCNC: 29.3 MMOL/L (ref 22–29)
CREAT SERPL-MCNC: 1.01 MG/DL (ref 0.57–1)
GLOBULIN SER CALC-MCNC: 2.2 GM/DL
GLUCOSE SERPL-MCNC: 115 MG/DL (ref 65–99)
HDLC SERPL-MCNC: 33 MG/DL (ref 40–60)
LDLC SERPL CALC-MCNC: 62 MG/DL (ref 0–100)
LDLC/HDLC SERPL: 1.88 {RATIO}
POTASSIUM SERPL-SCNC: 4.6 MMOL/L (ref 3.5–5.2)
PROT SERPL-MCNC: 7.1 G/DL (ref 6–8.5)
SODIUM SERPL-SCNC: 140 MMOL/L (ref 136–145)
TRIGL SERPL-MCNC: 105 MG/DL (ref 0–150)
TSH SERPL DL<=0.005 MIU/L-ACNC: 6.83 UIU/ML (ref 0.27–4.2)
VLDLC SERPL CALC-MCNC: 21 MG/DL

## 2019-09-09 ENCOUNTER — OFFICE VISIT (OUTPATIENT)
Dept: FAMILY MEDICINE CLINIC | Facility: CLINIC | Age: 79
End: 2019-09-09

## 2019-09-09 VITALS
SYSTOLIC BLOOD PRESSURE: 120 MMHG | OXYGEN SATURATION: 97 % | HEART RATE: 68 BPM | HEIGHT: 62 IN | TEMPERATURE: 98 F | RESPIRATION RATE: 17 BRPM | WEIGHT: 152 LBS | BODY MASS INDEX: 27.97 KG/M2 | DIASTOLIC BLOOD PRESSURE: 60 MMHG

## 2019-09-09 DIAGNOSIS — E03.9 ACQUIRED HYPOTHYROIDISM: ICD-10-CM

## 2019-09-09 DIAGNOSIS — E78.5 HYPERLIPIDEMIA, UNSPECIFIED HYPERLIPIDEMIA TYPE: ICD-10-CM

## 2019-09-09 DIAGNOSIS — I10 ESSENTIAL HYPERTENSION: Primary | ICD-10-CM

## 2019-09-09 PROCEDURE — 99214 OFFICE O/P EST MOD 30 MIN: CPT | Performed by: INTERNAL MEDICINE

## 2019-09-09 RX ORDER — LEVOTHYROXINE SODIUM 0.05 MG/1
TABLET ORAL
COMMUNITY
Start: 2019-05-09 | End: 2019-09-09

## 2019-09-09 RX ORDER — GABAPENTIN 300 MG/1
600 CAPSULE ORAL 3 TIMES DAILY
Qty: 180 CAPSULE | Refills: 0 | Status: SHIPPED | OUTPATIENT
Start: 2019-09-09 | End: 2019-12-16 | Stop reason: SDUPTHER

## 2019-09-09 RX ORDER — LEVOTHYROXINE SODIUM 0.07 MG/1
75 TABLET ORAL DAILY
Qty: 90 TABLET | Refills: 3 | Status: SHIPPED | OUTPATIENT
Start: 2019-09-09 | End: 2020-08-20 | Stop reason: SDUPTHER

## 2019-09-09 RX ORDER — POLYMYXIN B SULFATE AND TRIMETHOPRIM 1; 10000 MG/ML; [USP'U]/ML
SOLUTION OPHTHALMIC
Refills: 11 | COMMUNITY
Start: 2019-08-27

## 2019-09-09 NOTE — PATIENT INSTRUCTIONS
Blood pressure is normal.  Total and LDL cholesterol are low and HDL cholesterol is low.  Fasting blood sugar is elevated at 115.  BUN and creatinine are slightly increased secondary to degree of hydration.  Thyroid-stimulating hormone level is high.  Will increase Synthroid to 75 mcg.  Discussed seeing a plastic surgeon.  
[place]

## 2019-09-09 NOTE — PROGRESS NOTES
Subjective   Roxana Brizuela is a 78 y.o. female. Patient is here today for   Chief Complaint   Patient presents with   • Hypertension   • Hypothyroidism          Vitals:    09/09/19 1116   BP: 120/60   Pulse: 68   Resp: 17   Temp: 98 °F (36.7 °C)   SpO2: 97%       The following portions of the patient's history were reviewed and updated as appropriate: allergies, current medications, past family history, past medical history, past social history, past surgical history and problem list.    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 Paul Negron   • GI (gastrointestinal bleed) 1997   • H/O jaundice    • Hernia     INCISONAL. AROUND LEFT TRAM LAP SITE   • History of chemotherapy     2017 4 MONTHS IN 2017   • History of Clostridium difficile colitis     JAN 2018   • History of histoplasmosis    • History of infectious mononucleosis 1960   • History of migraine headaches    • History of pneumonia 12/27/2017    AS RESULT OF FLU. ENDED UP ON VENT IN [place]   • History of thrombocytopenia    • History of transfusion 1997   • History of vertigo    • Hx of colonic polyps 06/11/2009    Dr. Giles   • Hyperlipidemia    • Hypertension    • Meningioma (CMS/[facility])    • Neuropathy     HANDS AND FEET   • Osteoarthritis 09/13/2011    Dr. Jamil Miller   • Peptic ulceration    • Retina disorder     BLEED. FROM  HISTOPLASMOSIS   • SBO (small bowel obstruction) (CMS/HCC)     due to hernia with surgical repair in 2011   • SOB (shortness of breath) on exertion    • Thyroid mass     RIGHT      Allergies   Allergen Reactions   • Codeine Nausea And Vomiting   • Morphine Nausea And Vomiting      Social History     Socioeconomic History   • Marital status:      Spouse name: Mike   • Number of children: 2   • Years of education: College   • Highest education level: Not on file   Occupational History     Employer: RETIRED     Comment: [employer]   Tobacco Use   • Smoking status: Former Smoker     Packs/day: 2.00     Years: 30.00     Pack years: 60.00     Types: Cigarettes     Start date: 1957     Last attempt to quit: 4/10/1987     Years since quittin.4   • Smokeless tobacco: Former User   • Tobacco comment: I haven't had a cigarette in over 30 years   Substance and Sexual Activity   • Alcohol use: No     Comment: stopped, heavy in past   • Drug use: No        Current Outpatient Medications:   •  Ascorbic Acid (VITAMIN C PO), Take 1,000 mg by mouth Daily., Disp: , Rfl:   •  calcium carbonate (OS-JUNAID) 600 MG tablet, Take 600 mg by mouth Daily., Disp: , Rfl:   •  [employer] MAGNESIUM PO, Take  by mouth., Disp: , Rfl:   •  diclofenac (VOLTAREN) 75 MG EC tablet, TAKE 1 TABLET BY MOUTH TWICE DAILY, Disp: 60 tablet, Rfl: 5  •  gabapentin (NEURONTIN) 300 MG capsule, Take 2 capsules by mouth 3 (Three) Times a Day., Disp: 180 capsule, Rfl: 0  •  hydrochlorothiazide (HYDRODIURIL) 25 MG tablet, TAKE 1 TABLET BY MOUTH ONCE DAILY, Disp: 90 tablet, Rfl: 1  •  Lactobacillus (PROBIOTIC ACIDOPHILUS PO), Take 1 capsule by mouth Daily., Disp: , Rfl:   •  levothyroxine (SYNTHROID) 75 MCG tablet, Take 1 tablet by mouth Daily., Disp: 90 tablet, Rfl: 3  •  MELATONIN PO, Take 10 mg by mouth At Night As Needed., Disp: , Rfl:   •  meloxicam (MOBIC) 15 MG tablet, , Disp: , Rfl:   •  ondansetron ODT (ZOFRAN-ODT) 4 MG  disintegrating tablet, Take 4 mg by mouth Every 8 (Eight) Hours As Needed for Nausea or Vomiting., Disp: , Rfl:   •  pantoprazole (PROTONIX) 40 MG EC tablet, TAKE 1 TABLET BY MOUTH ONCE DAILY, Disp: 90 tablet, Rfl: 0  •  potassium chloride (KLOR-CON) 20 MEQ packet, Take 20 mEq by mouth Daily., Disp: , Rfl:   •  propranolol (INDERAL) 10 MG tablet, TAKE 1 TABLET BY MOUTH THREE TIMES DAILY, Disp: 270 tablet, Rfl: 0  •  simvastatin (ZOCOR) 80 MG tablet, TAKE 1 TABLET BY MOUTH AT BEDTIME, Disp: 90 tablet, Rfl: 1  •  SUMAtriptan (IMITREX) 50 MG tablet, Take one tablet at onset of headache. May repeat dose one time in 2 hours if headache not relieved., Disp: 10 tablet, Rfl: 0  •  trimethoprim-polymyxin b (POLYTRIM) 17854-9.1 UNIT/ML-% ophthalmic solution, INSTILL 1 DROP INTO RIGHT EYE 4 TIMES DAILY FOR 4 DAYS PRIOR TO INJECTION AND FOR 4 DAYS AFTER, Disp: , Rfl: 11  •  vitamin B-12 (CYANOCOBALAMIN) 1000 MCG tablet, Take 1,000 mcg by mouth Daily., Disp: , Rfl:   •  vitamin B-6 (PYRIDOXINE) 50 MG tablet, Take 50 mg by mouth Daily., Disp: , Rfl:      Objective   History of Present Illness Roxana is here for  lab follow-up.  She has hypothyroidism, hyper lipidemia, neuropathy, gastroesophageal reflux, migraine headaches.  She also has history of breast cancer and is followed by oncology.  She complains of fatigue and hair loss.  She eats healthy and exercises on a regular basis.  She takes 600 mg gabapentin 3 times a day and needs a prescription.  She has questions about the possibility of breast reconstructive surgery.    Review of Systems   Constitutional:        Weight loss 6 lbs   Respiratory: Negative.    Cardiovascular: Negative.    Neurological: Negative.    Psychiatric/Behavioral: Negative.        Physical Exam   Constitutional: She appears well-developed and well-nourished.   Neck: Carotid bruit is not present. No thyromegaly present.   Cardiovascular: Normal rate, regular rhythm and normal heart sounds.   124/60    Pulmonary/Chest: Effort normal and breath sounds normal.   Musculoskeletal: She exhibits no edema.   Neurological: She is alert.   Psychiatric: She has a normal mood and affect. Her behavior is normal. Judgment and thought content normal.   Vitals reviewed.      ASSESSMENT     Problem List Items Addressed This Visit        Cardiovascular and Mediastinum    Hyperlipidemia    Hypertension - Primary       Endocrine    Hypothyroid    Relevant Medications    levothyroxine (SYNTHROID) 75 MCG tablet          PLAN  Patient Instructions   Blood pressure is normal.  Total and LDL cholesterol are low and HDL cholesterol is low.  Fasting blood sugar is elevated at 115.  BUN and creatinine are slightly increased secondary to degree of hydration.  Thyroid-stimulating hormone level is high.  Will increase Synthroid to 75 mcg.  Discussed seeing a plastic surgeon.      Return in about 1 month (around 10/9/2019) for labsTSH, free T4.

## 2019-09-17 ENCOUNTER — INFUSION (OUTPATIENT)
Dept: ONCOLOGY | Facility: HOSPITAL | Age: 79
End: 2019-09-17

## 2019-09-17 DIAGNOSIS — Z45.2 ENCOUNTER FOR FITTING AND ADJUSTMENT OF VASCULAR CATHETER: Primary | ICD-10-CM

## 2019-09-17 PROCEDURE — 96523 IRRIG DRUG DELIVERY DEVICE: CPT

## 2019-09-17 RX ORDER — SODIUM CHLORIDE 0.9 % (FLUSH) 0.9 %
10 SYRINGE (ML) INJECTION AS NEEDED
Status: DISCONTINUED | OUTPATIENT
Start: 2019-09-17 | End: 2019-09-17 | Stop reason: HOSPADM

## 2019-09-17 RX ORDER — SODIUM CHLORIDE 0.9 % (FLUSH) 0.9 %
10 SYRINGE (ML) INJECTION AS NEEDED
Status: CANCELLED | OUTPATIENT
Start: 2019-09-17

## 2019-09-17 RX ADMIN — Medication 500 UNITS: at 11:39

## 2019-09-17 RX ADMIN — SODIUM CHLORIDE, PRESERVATIVE FREE 10 ML: 5 INJECTION INTRAVENOUS at 11:39

## 2019-09-19 RX ORDER — PROPRANOLOL HYDROCHLORIDE 10 MG/1
TABLET ORAL
Qty: 270 TABLET | Refills: 1 | Status: SHIPPED | OUTPATIENT
Start: 2019-09-19 | End: 2020-03-18

## 2019-10-14 RX ORDER — PANTOPRAZOLE SODIUM 40 MG/1
TABLET, DELAYED RELEASE ORAL
Qty: 90 TABLET | Refills: 0 | Status: SHIPPED | OUTPATIENT
Start: 2019-10-14 | End: 2020-01-13

## 2019-10-22 ENCOUNTER — INFUSION (OUTPATIENT)
Dept: ONCOLOGY | Facility: HOSPITAL | Age: 79
End: 2019-10-22

## 2019-10-22 DIAGNOSIS — Z17.1 MALIGNANT NEOPLASM OF UPPER-INNER QUADRANT OF RIGHT BREAST IN FEMALE, ESTROGEN RECEPTOR NEGATIVE (HCC): Primary | ICD-10-CM

## 2019-10-22 DIAGNOSIS — Z45.2 ENCOUNTER FOR FITTING AND ADJUSTMENT OF VASCULAR CATHETER: ICD-10-CM

## 2019-10-22 DIAGNOSIS — C50.211 MALIGNANT NEOPLASM OF UPPER-INNER QUADRANT OF RIGHT BREAST IN FEMALE, ESTROGEN RECEPTOR NEGATIVE (HCC): Primary | ICD-10-CM

## 2019-10-22 LAB
ALBUMIN SERPL-MCNC: 4.5 G/DL (ref 3.5–5.2)
ALBUMIN/GLOB SERPL: 1.9 G/DL
ALP SERPL-CCNC: 53 U/L (ref 39–117)
ALT SERPL W P-5'-P-CCNC: 34 U/L (ref 1–33)
ANION GAP SERPL CALCULATED.3IONS-SCNC: 10.6 MMOL/L (ref 5–15)
AST SERPL-CCNC: 34 U/L (ref 1–32)
BASOPHILS # BLD AUTO: 0.02 10*3/MM3 (ref 0–0.2)
BASOPHILS NFR BLD AUTO: 0.4 % (ref 0–1.5)
BILIRUB SERPL-MCNC: 0.6 MG/DL (ref 0.1–1.2)
BUN BLD-MCNC: 26 MG/DL (ref 8–23)
BUN/CREAT SERPL: 31 (ref 7–25)
CALCIUM SPEC-SCNC: 9.6 MG/DL (ref 8.6–10.5)
CHLORIDE SERPL-SCNC: 101 MMOL/L (ref 98–107)
CO2 SERPL-SCNC: 27.4 MMOL/L (ref 22–29)
CREAT BLD-MCNC: 0.84 MG/DL (ref 0.57–1)
DEPRECATED RDW RBC AUTO: 44.4 FL (ref 37–54)
EOSINOPHIL # BLD AUTO: 0.14 10*3/MM3 (ref 0–0.4)
EOSINOPHIL NFR BLD AUTO: 2.8 % (ref 0.3–6.2)
ERYTHROCYTE [DISTWIDTH] IN BLOOD BY AUTOMATED COUNT: 13.1 % (ref 12.3–15.4)
GFR SERPL CREATININE-BSD FRML MDRD: 66 ML/MIN/1.73
GLOBULIN UR ELPH-MCNC: 2.4 GM/DL
GLUCOSE BLD-MCNC: 103 MG/DL (ref 65–99)
HCT VFR BLD AUTO: 32.7 % (ref 34–46.6)
HGB BLD-MCNC: 10.6 G/DL (ref 12–15.9)
IMM GRANULOCYTES # BLD AUTO: 0.1 10*3/MM3 (ref 0–0.05)
IMM GRANULOCYTES NFR BLD AUTO: 2 % (ref 0–0.5)
LYMPHOCYTES # BLD AUTO: 1.6 10*3/MM3 (ref 0.7–3.1)
LYMPHOCYTES NFR BLD AUTO: 32.4 % (ref 19.6–45.3)
MCH RBC QN AUTO: 30.4 PG (ref 26.6–33)
MCHC RBC AUTO-ENTMCNC: 32.4 G/DL (ref 31.5–35.7)
MCV RBC AUTO: 93.7 FL (ref 79–97)
MONOCYTES # BLD AUTO: 1.22 10*3/MM3 (ref 0.1–0.9)
MONOCYTES NFR BLD AUTO: 24.7 % (ref 5–12)
NEUTROPHILS # BLD AUTO: 1.86 10*3/MM3 (ref 1.7–7)
NEUTROPHILS NFR BLD AUTO: 37.7 % (ref 42.7–76)
NRBC BLD AUTO-RTO: 0 /100 WBC (ref 0–0.2)
PLAT MORPH BLD: NORMAL
PLATELET # BLD AUTO: 56 10*3/MM3 (ref 140–450)
PMV BLD AUTO: 12.7 FL (ref 6–12)
POTASSIUM BLD-SCNC: 4.1 MMOL/L (ref 3.5–5.2)
PROT SERPL-MCNC: 6.9 G/DL (ref 6–8.5)
RBC # BLD AUTO: 3.49 10*6/MM3 (ref 3.77–5.28)
RBC MORPH BLD: NORMAL
SODIUM BLD-SCNC: 139 MMOL/L (ref 136–145)
WBC MORPH BLD: NORMAL
WBC NRBC COR # BLD: 4.94 10*3/MM3 (ref 3.4–10.8)

## 2019-10-22 PROCEDURE — 96523 IRRIG DRUG DELIVERY DEVICE: CPT

## 2019-10-22 PROCEDURE — 85007 BL SMEAR W/DIFF WBC COUNT: CPT | Performed by: INTERNAL MEDICINE

## 2019-10-22 PROCEDURE — 80053 COMPREHEN METABOLIC PANEL: CPT | Performed by: INTERNAL MEDICINE

## 2019-10-22 PROCEDURE — 85025 COMPLETE CBC W/AUTO DIFF WBC: CPT | Performed by: INTERNAL MEDICINE

## 2019-10-22 RX ORDER — SODIUM CHLORIDE 0.9 % (FLUSH) 0.9 %
10 SYRINGE (ML) INJECTION AS NEEDED
Status: DISCONTINUED | OUTPATIENT
Start: 2019-10-22 | End: 2019-10-22 | Stop reason: HOSPADM

## 2019-10-22 RX ORDER — SODIUM CHLORIDE 0.9 % (FLUSH) 0.9 %
10 SYRINGE (ML) INJECTION AS NEEDED
Status: CANCELLED | OUTPATIENT
Start: 2019-10-22

## 2019-10-22 RX ADMIN — Medication 10 ML: at 11:00

## 2019-10-22 RX ADMIN — SODIUM CHLORIDE, PRESERVATIVE FREE 500 UNITS: 5 INJECTION INTRAVENOUS at 11:00

## 2019-10-22 NOTE — PROGRESS NOTES
REASON FOR FOLLOWUP:   1. Newly diagnosed large right breast cancer.  Core needle biopsy reported invasive mammary carcinoma with focal lobular feature, grade 2.  ER negative, less than 1%; WI positive, moderate in staining, 5% to 10%. HER2 IHC 2+, FISH study positive 2.1.   2.  CT scan for chest abdomen and pelvis and breast MRI examination reported right axillary lymph node suspicious for metastatic disease.  No remote metastatic lesion.  Patient has stage IIIa (T3 N2 M0) disease.   3.  Patient was started neoadjuvant chemotherapy on 5/23/2017 with Taxol weekly plus Herceptin weekly for total 12 weeks, and Perjata every 3 weeks during chemotherapy.  Herceptin will be converted to every 3 weeks after chemotherapy finished.    4.  Chronic moderate thrombocytopenia, mild anemia, and intermittent mild neutropenia etiologies are not clear.  5.  Reaction to Herceptin C1D1.  Subsequently tolerated therapy with hydrocortisone as premedication.  6.  Potential cardiac strain developing, neuropathic symptoms persist, follow-up MRI and surgical assessment will be needed post initial chemotherapeutic phase  7.  MRI August 17 with interval resolution of abnormal enhancement within breast and right axillary adenopathy, surgery scheduled November 7, Herceptin ongoing every 3 weeks  8.  Patient seen in office November 28, status post surgery with apparent complete response, Herceptin continued  9.  Herceptin given December 19, subsequent injury in [place] leading to prolonged stay, single treatment given through additional cancer Center  10.  Patient seen May 01, 2018, no further Herceptin planned, baseline scans pursued posttreatment, post influenza syndrome, physical therapy planned  11.  Patient seen June 13, 2018, excellent performance status, improving on physical therapy, equivocal CT  per thoracic adenopathy, follow-up PET/CT planned   12.  PET/CT with improvement,?  Thyroid abnormality with ultrasound planned  13.   Patient seen October 3, partial thyroidectomy anticipated October 14-    14.  Patient seen May 14, 2019, scans negative for evidence of recurrence     15.  Patient seen 3/29/2019 clinically stable                                                                                                           HISTORY OF PRESENT ILLNESS:    The patient is a pleasant 78 y.o. with the above-mentioned history, who presents today in anticipation of cycle 4 of Herceptin, Perjeta and Taxol.  This is the first visit with this physician involving this patient's case.  We have reviewed her status today with her slowly developing neuropathic symptoms in her lower extremities but also involving her fingers recognized significantly and she plays the piano and keyboard.  This is becoming harder to do but she is still able to do so.  She also notices neuropathy in her feet but is able to walk and function in daily activities.  Additional to this is her continued grieving at the death of her  though she, fortunately, has an excellent support system.  She continues to experience nausea that is well relieved with Compazine. She denies oral mucositis.  No diarrhea no constipation no chest pain no dyspnea.  No lower extremity edema.    She did received 2 units of PRBC's on   7/8/2017 and remains hematologically stable.   She does have difficulty with sleep and Ambien 10 mg daily at bedtime seems help much better compared to 5 mg dose.  She does not need refills today.  In reviewing her case July 26 she is entering the fourth cycle of her treatment and recent echocardiogram is reviewed with she and her close friend revealing EF of 66.7% though with global strain of -16%?  Suspicious for myopathic process.  Normal ventricular cavity size and wall thickness as well as contractility.  We have also discussed that she is responding to therapy and will need surgical assessment which may not as yet have been performed.  She has seen   Kameron for port placement and will ask him to review her likely just after she has completed his fourth cycle of therapy.  It is also apparent that she'll need a cardiac assessment as we continue subsequent Herceptin therapy perioperatively.      The patient underwent MRI of the breasts August 17 demonstrating interval resolution of abnormal enhancement within the right breast, resolution of right axillary adenopathy had also resolved.  The findings were consistent with a complete response to neoadjuvant chemotherapy.  The patient was seen in subsequent follow-up with Dr. Melo and was determined to proceed with surgery and ultimately sentinel node evaluation.  This is now scheduled November 7 and there are additional plans to consider radiation therapy postoperatively and we have also discussed the use of anti-hormonal therapy considering her mildly positive KS status.    The patient was able to proceed to surgery undergoing right breast mastectomy and sentinel lymph node biopsy November 07, 2017.  She did well postoperatively seeing Dr. Melo November 16.  Pathology revealed no residual malignancy in the breast and 3 negative sentinel lymph nodes.  A formal review of her report reveals treatment effect particularly in her largest lymph node with focal fibrosis and scar, right breast mastectomy fibrosis associated with a cavitary biopsy site and no invasive or in situ carcinoma.  The patient is now seen in our office though she went to the wrong location and the seen late in the day.  We discussed her findings and agree that she would continue Herceptin at this point but be reviewed formally again in 3 weeks.  She is also be seen by radiation therapy for their input.   The patient, evidently, was seen by radiation therapy but decided not to pursue it until her last Herceptin therapy here December 19.  Following this she visited her daughter in [place] and, unfortunately, developed influenza and pneumonia.   She had a prolonged hospitalization and was at many sites in recovery over a several month only to return to [place] in the last several weeks.  She did take 1 IV Herceptin dose while in [place] but as she is reviewed May 05, 2018 we have discussed that it makes no particular sense to continue or add on to her previous history by extending Herceptin any further 3-4 months after her last treatment.  She therefore has completed Herceptin.    The patient on to be tested post her treatment again baseline studies and CT of chest and pelvis were performed June 6 revealing interval increase in a few subcentimeter nodes in the left pectoral, axillary and mediastinal regions. These are all considerably small and of uncertain significance.  The patient's performance status remains excellent without any reduction and, in fact, has improved with physical therapy upon return.       Patient is next seen August 08, 2018, fortunately feeling well.  A follow-up PET/CT had revealed an interval decrease in the subcentimeter nodes in the left subpectoral axillary region as well as mediastinum.  There was no hypermetabolic lymphadenopathy.  There was intense focus within slightly enlarged right thyroid gland.  Patient indicates she never had any thyroid issues of which she is aware.  The patient was referred to ENT for assessment and the patient was seen by Dr. Fofana.  Ultimately a subtotal thyroidectomy is anticipated and now scheduled October 4.  The patient is willing to proceed.        The patient proceeded to a right thyroid lobectomy performed November 04, 2018.  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  There was no definitive evidence of trans-capsular or vascular invasion.    The patient is seen in follow-up November 28 doing well and we discussed the surgical treatment of this thyroid lesion.  She feels that all this went well.  She has additional cataract surgery at the end of November   scheduled also.  The patient did complete her testing and was asked to return approximately 6 months later with a follow-up CT chest, abdomen and pelvis that demonstrate no evidence of recurrent disease.  As she is seen back May 14 she, unfortunately, fell and contused her face, left knee and left hand.  This required an ER visit.  Is elected to follow her back in 6 months maintaining her port in the interval and she is seen October 29 again without indications of recurrent disease and relatively stable clinically.  She is seeing plastic surgery about reconstruction and otherwise continue her current medication list and following with her primary care regularly.  Past Medical History:   Diagnosis Date   • Alcoholism (CMS/[facility]) 1978    I haven't had a drink since then   • Anemia    • Breast cancer (CMS/[facility]) 05/03/2017    Right breast invasive mammary carcinoma with focal lobular features, grade 2, ER negative, less than 1% NM positive, HER-2 positive, stage IIIa disease (T3, N2, M0   • Breast cancer (CMS/[facility]) 10/20/2004    Left breast ductal carcinoma in-situ, predominately intermediate grade with focal comedonecrosis (high grade), solid and bribridform patterns involving approximately five core biopsy fragments   • Edema     Chronic lower extremity edema   • GERD (gastroesophageal reflux disease) 09/13/2011    Dr. Paul Negron   • GI (gastrointestinal bleed) 1997   • H/O jaundice    • Hernia     INCISONAL. AROUND LEFT TRAM LAP SITE   • History of chemotherapy     2017 4 MONTHS IN 2017   • History of Clostridium difficile colitis     JAN 2018   • History of histoplasmosis    • History of infectious mononucleosis 1960   • History of migraine headaches    • History of pneumonia 12/27/2017    AS RESULT OF FLU. ENDED UP ON VENT IN [place]   • History of thrombocytopenia    • History of transfusion 1997   • History of vertigo    • Hx of colonic polyps 06/11/2009    Dr. Giles   • Hyperlipidemia    • Hypertension     • Meningioma (CMS/HCC)    • Neuropathy     HANDS AND FEET   • Osteoarthritis 09/13/2011    Dr. Jamil Miller   • Peptic ulceration    • Retina disorder     BLEED. FROM HISTOPLASMOSIS   • SBO (small bowel obstruction) (CMS/HCC)     due to hernia with surgical repair in 09/2011   • SOB (shortness of breath) on exertion    • Thyroid mass     RIGHT   Normal echocardiogram on 5/18/2017, LVEF 68%.    Past Surgical History:   Procedure Laterality Date   • APPENDECTOMY N/A 1960   • BLADDER REPAIR N/A 1980   • BREAST BIOPSY Left 10/20/2004    Mammotome incisional biopsy left breast, surgical specimen, left breast, localization clip placement, left breast, confirmatory diagnostic unilateral mammogram, left breast-Dr. Tyrese Alcala, [facility]   • BREAST BIOPSY Right 05/03/2017    PATH: INVASIVE MAMMARY CARCINOMA   • CHOLECYSTECTOMY N/A 1990   • COLONOSCOPY N/A 06/11/2009    Hemorrhoids, otherwise normal, repeat in 5 years-Dr. Noemí Purcell   • EYE SURGERY Right 2017    laser surgery for blood clot   • HYSTERECTOMY Bilateral 1980   • INGUINAL HERNIA REPAIR Right     DR ALESIA GAXIOLA   • MASTECTOMY Left 2004    Left breast mastecotmy with sentinel lymph node biospy-[facility]   • MASTECTOMY W/ SENTINEL NODE BIOPSY Right 11/7/2017    Procedure: RIGHT BREAST MASTECTOMY WITH SENTINEL NODE BIOPSY;  Surgeon: Christian Melo MD;  Location: [facility] OR;  Service:    • UT INSJ TUNNELED CVC W/O SUBQ PORT/ AGE 5 YR/> Left 5/22/2017    Procedure: INSERTION VENOUS ACCESS DEVICE;  Surgeon: Christian Melo MD;  Location: [facility] OR;  Service: General   • REDUCTION MAMMAPLASTY Right     to match Lt. TRAM flap   • SMALL INTESTINE SURGERY      INCARCRATED HERNIA   • THYROIDECTOMY Right 10/4/2018    Procedure: RT THYROID LOBECTOMY;  Surgeon: Julián Fofana MD;  Location: [facility];  Service: ENT   • TONSILLECTOMY Bilateral    • TRAM FLAP DELAYED Left     WITH MASTOPEXY   • UPPER GASTROINTESTINAL ENDOSCOPY N/A 09/12/2011    LA  grade A esophagitis with no bleeding, large hiatus hernia (50cm), gastric ulcer-Dr.Laszlo Lezama   • US GUIDED FINE NEEDLE ASPIRATION  2018   Lico catheter placement on 2017 by Dr. Melo.     OB/GYN history: Menarche age 16, menopause at 1985. , 1 miscarriage. No birth control pill use. She did have post menopause hormonal supplementation.      HEMATOLOGIC/ONCOLOGIC HISTORY: The patient is a 78 y.o. year old female whom we are consulted for a newly self discovered right breast mass, in 2017. Patient had ultrasound-guided biopsy on 5/3/2017 and confirmed to be invasive mammary carcinoma with focal lobular features, grade 2 Elen score 6/9. She is here for initial evaluation for management.      Patient is a 76-year-old  female who was seen here previously for chronic mild-to-moderate thrombocytopenia and mild anemia. Recently the patient reports she started having firmness of the right breast that started sometime in April or maybe even in March. She noticed firmness spread from about around the 12 o'clock position, gradually towards the upper lateral side and lower part of the right breast associated mild pain. The patient thought it was related to fibrocystic changes. However, the symptom was getting worse. Patient also reported dented nipple after she started having pain in the right breast. She denies discharge from the nipple. She called her primary care physician, Dr. Martin, who ordered a mammogram study. This was done on 2017 with architectural distortion of the right breast centrally at 12 o'clock position and had scattered fibroglandular densities throughout the right breast.      The patient subsequently had right breast ultrasound examination on 2017. Discovered a large right breast mass measuring 5.8 x 3.5 x 3.9 cm. Patient subsequently had ultrasound-guided right breast biopsy on 2017. Pathology evaluation reported invasive mammary carcinoma with  focal lobular feature. Elen score 6/9, overall grade 2. Sample was further sent to the [facility] for test. ER negative, less than 1%; VT positive, moderate in staining, 5% to 10%. HER2 IHC 2+, but FISH study positive 2.1.     She denies weight loss, she actually eats very well. No nausea vomiting. Patient does complain of insomnia, unable to sleep.      This patient has history of left breast DCIS back in 2007, had mastectomy at [facility]. No hormonal therapy afterwards according to patient. This patient also had a small meningioma, followed by Dr. Smith in [facility], with most recent MRI of the brain in March 2017, with 18 mm meningioma, and followed on an annual basis.     Her neutrophil count on 5/16/17 is normal at 2500, however, records showed starting from 05/2015 and in 09/2016, 01/2017 and 03/2017, all 4 laboratory studies showed mild neutropenia with ANC fluctuating between 1200 and 1600. Etiology is not clear.    Normal echocardiogram on 5/18/2017, LVEF 68%.      CT scan for chest abdomen and pelvis on 5/22/2017 and breast MRI examination on 5/22/2017 reported small right axillary lymph node suspicious for metastatic disease.  No remote metastatic lesion.  Patient has stage IIIa (T3 N2 M0) disease.      Patient will start neoadjuvant chemotherapy with Taxol weekly plus Herceptin weekly for total 12 weeks, and Perjeta every 3 weeks (3-week cycle) during chemotherapy.  Herceptin will be converted to every 3 weeks after chemotherapy finished.  Cycle 1 day 1 5/23/2017.   Status post neoadjuvant chemotherapy the patient was assessed with MRI showing a complete neoadjuvant response.  The patient was reviewed for surgery and questions concerning lymph node dissection-sentinel node evaluation-and need for radiation therapy postop were considered as well as use of additional Herceptin for the balance of the year as well as additional use of  anti-hormonal therapy.  Surgery was scheduled 2017.  Patient next seen in office  having undergone surgery on  with results revealing no residual malignancy in either breast or associated sentinel lymph nodes.  Was elected to continue Herceptin when seen back in office  having initiated Herceptin May 23, 2017.    Patient continued Herceptin with her last treatment given 2017.     Unfortunately she later experienced an accident while in [place]  with prolonged hospitalization and prolonged ventilator management required.  Apparently she had at least one IV Herceptin therapy given while there and we were contacted 2018- Dr. Nuno.  Eventually the patient was able to return to [place] is seen back in office May 5 at which time we concluded that she completed Herceptin therapy as result of being off treatment for so long.  Plans were made for baseline scans.  Patient follow-up reexaminations May 8, 2019 with no evidence of recurrent malignancy.  This was again a circumstance when she was assessed 2019.      MEDICATIONS: The current medication list was reviewed with the patient and updated in the EMR this date per the Medical Assistant. Medication dosages and frequencies were confirmed to be accurate.       ALLERGIES:    Allergies   Allergen Reactions   • Codeine Nausea And Vomiting   • Morphine Nausea And Vomiting     SOCIAL HISTORY:   Social History     Tobacco Use   • Smoking status: Former Smoker     Packs/day: 2.00     Years: 30.00     Pack years: 60.00     Types: Cigarettes     Start date: 1957     Last attempt to quit: 4/10/1987     Years since quittin.5   • Smokeless tobacco: Former User   • Tobacco comment: I haven't had a cigarette in over 30 years   Substance Use Topics   • Alcohol use: No     Comment: stopped, heavy in past   • Drug use: No     FAMILY HISTORY:  Family History   Problem Relation Age of Onset  "  • Heart disease Mother    • Aortic aneurysm Mother         abdominal   • Coronary artery disease Mother    • Hypertension Mother    • Miscarriages / Stillbirths Mother    • Heart disease Father    • Heart attack Father         acute   • Hypertension Father    • Early death Father    • Hearing loss Father    • Kidney disease Father    • Breast cancer Daughter 45   • Heart disease Brother    • Hypertension Brother    • Malig Hyperthermia Neg Hx      I have reviewed the patient's medical history in detail and updated the computerized patient record.    REVIEW OF SYSTEMS:  GENERAL: See history of present illness.  Recent fall and sustained contusions, no change in appetite or weight;   No fevers, chills, sweats.   SKIN: No nonhealing lesions. No rashes.   HEME/LYMPH: See HPI.   EYES: No vision changes or diplopia.   ENT: No tinnitus, hearing loss, gum bleeding, epistaxis, hoarseness or dysphagia.   RESPIRATORY: No cough, Has exertional dyspnea, No hemoptysis or wheezing.   CVS: No chest pain, palpitations, orthopnea, dyspnea on exertion or PND.   GI: No additional diarrhea, constipation or reflux  : No lower tract obstructive symptoms, dysuria or hematuria.   MUSCULOSKELETAL: Chronic or joint pain, arthritis.  Has mild intermittent ankle swelling.   NEUROLOGICAL: See HPI  PSYCHIATRIC: See HPI     Objective:   Vitals:    10/29/19 1118   BP: 145/71   Pulse: 68   Resp: 18   Temp: 97.6 °F (36.4 °C)   TempSrc: Oral   SpO2: 99%   Weight: 66.3 kg (146 lb 1.6 oz)   Height: 157.5 cm (62.01\")   PainSc: 0-No pain   ECOG 0      PHYSICAL EXAM:   GENERAL: Well-developed, well-nourished female, in no acute distress.   SKIN: Warm, dry without rashes, purpura or petechiae.  Left upper chest Lico catheter in place, no evidence of infection.  Contusions just lateral to the right eye, left hand and left knee  EYES: Pupils equal, round and reactive to light. EOMs intact. Conjunctivae normal.  EARS: Hearing intact.  NOSE:  No " excoriations or nasal discharge.  MOUTH: Tongue is well-papillated; no stomatitis or ulcers. Lips normal.  THROAT: Oropharynx without lesions or exudates.  Status post partial thyroidectomy well-healing  LYMPHATICS: No cervical, supraclavicular, axillary adenopathy.  CHEST: Lungs clear to auscultation. Good airflow.   BREAST:Postop, Well healed right mastectomy site with no evidence of recurrent disease, no axillary adenopathy bilaterally.  CARDIAC: Regular rate and rhythm without murmurs. Normal S1,S2.  ABDOMEN: Slightly distended, normal active bowel sounds,  no hepatosplenomegaly or masses.  EXTREMITIES: No clubbing, cyanosis or edema.  NEUROLOGICAL: Cranial Nerves II-XII grossly intact. No focal neurological deficits.  PSYCHIATRIC: Alert and oriented, pleased about her results      RECENT LABS:  Lab Results   Component Value Date    WBC 4.94 10/22/2019    HGB 10.6 (L) 10/22/2019    HCT 32.7 (L) 10/22/2019    MCV 93.7 10/22/2019    PLT 56 (L) 10/22/2019     Lab Results   Component Value Date    NEUTROABS 1.86 10/22/2019     Lab Results   Component Value Date    GLUCOSE 103 (H) 10/22/2019    BUN 26 (H) 10/22/2019    CREATININE 0.84 10/22/2019    EGFRIFNONA 66 10/22/2019    EGFRIFAFRI 64 09/04/2019    BCR 31.0 (H) 10/22/2019    K 4.1 10/22/2019    CO2 27.4 10/22/2019    CALCIUM 9.6 10/22/2019    PROTENTOTREF 7.1 09/04/2019    ALBUMIN 4.50 10/22/2019    LABIL2 2.2 09/04/2019    AST 34 (H) 10/22/2019    ALT 34 (H) 10/22/2019            CT CHEST W CONTRAST-, CT ABDOMEN PELVIS W CONTRAST 5/8/19      FINDINGS: In the interval, the right lobe of the thyroid gland has been  resected. The left lobe of the gland is unremarkable. The patient is  status post bilateral mastectomy with TRAM flap breast reconstruction on  the left and no reconstruction of the right. The chest wall is otherwise  unremarkable. No chest wall masses are identified. A few borderline  enlarged left subpectoral and axillary lymph nodes shown on the  previous  CT scan have decreased in size. A single borderline enlarged lymph node  in the prevascular space is unchanged. There is no hilar or right  axillary lymphadenopathy. There is no definite internal mammary chain  lymphadenopathy. Lung window images demonstrate no noncalcified nodules  or parenchymal infiltrates. There are no pleural effusions.      There is mild diffuse hepatic steatosis that is focally more prominent  within the left lobe of the liver. The liver is otherwise unremarkable.  The spleen and pancreas and adrenal glands appear within normal limits.  There is a small simple cortical cyst in the upper pole of the left  kidney. The kidneys are otherwise unremarkable. The stomach and small  and large bowel are unremarkable except for a few diverticuli in the  sigmoid colon. There is no CT evidence of diverticulitis. The  gallbladder and uterus are absent. Bone window images demonstrate no  lytic or blastic lesions.     IMPRESSION:  Postoperative changes as described. There is currently no  compelling evidence of metastatic disease within the chest, abdomen or  pelvis. A few borderline enlarged left subpectoral and axillary lymph  nodes have diminished in size since the preceding CT dated 06/07/2018.  There is mild hepatic steatosis.    Assessment/Plan   1. Large right breast cancer, locally advanced, stage IIIa (T3 N1/ 2 M0).  ER negative, less than 1%; WA positive, moderate in staining, 5% to 10%. HER2 IHC 2+, FISH study positive 2.1.  Physical examination showed a large mass, 7 cm x 7 cm initially which has decreased to approximately less than 4 cm ..  Patient  proceeded through 4 cycles ceptin,Perjeta and Taxol.   Her subsequent MRI examination showed complete response by imaging including right axillary lymphadenopathy.  We have continued Herceptin and that includes today October 20, 2017.  The case was discussed with Dr. Melo and plans were to proceed with mastectomy plus right axillary  lymph node assessment via sentinel node evaluation. it was felt she likely require radiation therapy post procedure.  The patient was scheduled and proceeded to surgery November 7.  Her results, wonderfully, revealed no evidence of residual disease in the breast or associated sentinel lymph nodes. She was seen by radiation therapy and offered treatment did not pursue it.  Additionally, and somewhat complicating this story, she traveled to [place] and developed severe influenza, associated pneumonia and was hospitalized for several months only to return to [place] in the last several weeks now being seen back May 1.  There is no particular benefit from trying to add Herceptin back to her therapy now and is felt that she is completed Herceptin treatment.  She wishes consider reconstruction and baseline CT scans will be requested in 5 weeks with the patient being seen in 6 weeks follow-up.The patient reviewed June 13, 2018 with the scans demonstrating very modest increase in left pectoral, axillary or mediastinal nodes.  These are of uncertain significance but do need follow-up in a PET/CT is planned in 7 weeks.  Her anemia will also be reviewed again with additional laboratory studies that visit.  She'll be seen in 8 weeks for general reassessment.    The patient's anemia workup was essentially negative and she is slowly improving when seen back August 8.  Her PET/CT, fortunately, demonstrates improvement though there is potential abnormality within the right thyroid lobe.  We discussed thyroid function testing and follow-up thyroid ultrasound.  She will be seen back in approximately 2 months as we maintain her port monthly flushes.   The patient was reviewed by ENT and ultimately was felt that a partial thyroidectomy was in order and is now scheduled October 4.  Patient's one to proceed.  We'll plan to see her back in approximately 6 weeks.      The patient is next seen November 28, 2015.  Her surgery went well  with the findings as noted above.  She has follow-up with ENT in the next several weeks.  Additionally she has bilateral cataract surgery at the end of this month and into the beginning of next.  We discussed reassessment in general with follow-up scans and these were performed May 2019 which showed no evidence of recurrent malignancy.  Six-month follow-up planes with maintenance of her port every 6 weeks.  The patient is reassessed October 29, 2019 fortunately continue to do relatively well.  She has had no additional medical issues since last seen we will plan to see her back in 6 months again meeting report.  She does plan to see plastic surgery concerning reconstruction concerning her breast cancer history.  A copy this note will be sent to the plastic surgeon.      2.  Previous fall with contusions now recovered                                                            3.  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

## 2019-10-29 ENCOUNTER — APPOINTMENT (OUTPATIENT)
Dept: OTHER | Facility: HOSPITAL | Age: 79
End: 2019-10-29

## 2019-10-29 ENCOUNTER — OFFICE VISIT (OUTPATIENT)
Dept: ONCOLOGY | Facility: CLINIC | Age: 79
End: 2019-10-29

## 2019-10-29 VITALS
HEART RATE: 68 BPM | WEIGHT: 146.1 LBS | BODY MASS INDEX: 26.89 KG/M2 | HEIGHT: 62 IN | RESPIRATION RATE: 18 BRPM | DIASTOLIC BLOOD PRESSURE: 71 MMHG | TEMPERATURE: 97.6 F | SYSTOLIC BLOOD PRESSURE: 145 MMHG | OXYGEN SATURATION: 99 %

## 2019-10-29 DIAGNOSIS — C50.211 MALIGNANT NEOPLASM OF UPPER-INNER QUADRANT OF RIGHT BREAST IN FEMALE, ESTROGEN RECEPTOR NEGATIVE (HCC): Primary | ICD-10-CM

## 2019-10-29 DIAGNOSIS — Z17.1 MALIGNANT NEOPLASM OF UPPER-INNER QUADRANT OF RIGHT BREAST IN FEMALE, ESTROGEN RECEPTOR NEGATIVE (HCC): Primary | ICD-10-CM

## 2019-10-29 PROCEDURE — 99213 OFFICE O/P EST LOW 20 MIN: CPT | Performed by: INTERNAL MEDICINE

## 2019-10-29 PROCEDURE — G0463 HOSPITAL OUTPT CLINIC VISIT: HCPCS | Performed by: INTERNAL MEDICINE

## 2019-11-27 DIAGNOSIS — E03.9 ACQUIRED HYPOTHYROIDISM: Primary | ICD-10-CM

## 2019-12-09 LAB
T4 FREE SERPL-MCNC: 1.71 NG/DL (ref 0.93–1.7)
TSH SERPL DL<=0.005 MIU/L-ACNC: 2.43 UIU/ML (ref 0.27–4.2)

## 2019-12-09 RX ORDER — HYDROCHLOROTHIAZIDE 25 MG/1
TABLET ORAL
Qty: 90 TABLET | Refills: 1 | Status: SHIPPED | OUTPATIENT
Start: 2019-12-09 | End: 2020-06-11

## 2019-12-11 ENCOUNTER — APPOINTMENT (OUTPATIENT)
Dept: ONCOLOGY | Facility: HOSPITAL | Age: 79
End: 2019-12-11

## 2019-12-16 ENCOUNTER — OFFICE VISIT (OUTPATIENT)
Dept: FAMILY MEDICINE CLINIC | Facility: CLINIC | Age: 79
End: 2019-12-16

## 2019-12-16 VITALS
SYSTOLIC BLOOD PRESSURE: 120 MMHG | RESPIRATION RATE: 17 BRPM | HEART RATE: 75 BPM | WEIGHT: 143 LBS | HEIGHT: 61 IN | DIASTOLIC BLOOD PRESSURE: 60 MMHG | OXYGEN SATURATION: 98 % | BODY MASS INDEX: 27 KG/M2 | TEMPERATURE: 97.3 F

## 2019-12-16 DIAGNOSIS — I10 ESSENTIAL HYPERTENSION: Primary | ICD-10-CM

## 2019-12-16 DIAGNOSIS — E78.5 HYPERLIPIDEMIA, UNSPECIFIED HYPERLIPIDEMIA TYPE: ICD-10-CM

## 2019-12-16 DIAGNOSIS — T45.1X5A PERIPHERAL NEUROPATHY DUE TO CHEMOTHERAPY (HCC): ICD-10-CM

## 2019-12-16 DIAGNOSIS — E03.9 ACQUIRED HYPOTHYROIDISM: ICD-10-CM

## 2019-12-16 DIAGNOSIS — G62.0 PERIPHERAL NEUROPATHY DUE TO CHEMOTHERAPY (HCC): ICD-10-CM

## 2019-12-16 PROCEDURE — 99214 OFFICE O/P EST MOD 30 MIN: CPT | Performed by: INTERNAL MEDICINE

## 2019-12-16 RX ORDER — GABAPENTIN 300 MG/1
600 CAPSULE ORAL 3 TIMES DAILY
Qty: 180 CAPSULE | Refills: 0 | Status: SHIPPED | OUTPATIENT
Start: 2019-12-16 | End: 2020-06-25

## 2019-12-16 NOTE — PATIENT INSTRUCTIONS
Blood pressure is stable.  Thyroid-stimulating hormone level and free T4 are normal.  We will continue current blood pressure medicine and thyroid medicine.  Discussed neuropathy.  We will continue gabapentin 600 mg 3 times a day.

## 2019-12-16 NOTE — PROGRESS NOTES
Subjective   Roxana Brizuela is a 78 y.o. female. Patient is here today for   Chief Complaint   Patient presents with   • Hypothyroidism   • Hypertension   • Hyperlipidemia          Vitals:    12/16/19 1028   BP: 120/60   Pulse: 75   Resp: 17   Temp: 97.3 °F (36.3 °C)   SpO2: 98%     Body mass index is 27.02 kg/m².      The following portions of the patient's history were reviewed and updated as appropriate: allergies, current medications, past family history, past medical history, past social history, past surgical history and problem list.    Past Medical History:   Diagnosis Date   • Alcoholism (CMS/[facility]) 1978    I haven't had a drink since then   • Anemia    • Breast cancer (CMS/[facility]) 05/03/2017    Right breast invasive mammary carcinoma with focal lobular features, grade 2, ER negative, less than 1% CT positive, HER-2 positive, stage IIIa disease (T3, N2, M0   • Breast cancer (CMS/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 Paul Negron   • GI (gastrointestinal bleed) 1997   • H/O jaundice    • Hernia     INCISONAL. AROUND LEFT TRAM LAP SITE   • History of chemotherapy     2017 4 MONTHS IN 2017   • History of Clostridium difficile colitis     JAN 2018   • History of histoplasmosis    • History of infectious mononucleosis 1960   • History of migraine headaches    • History of pneumonia 12/27/2017    AS RESULT OF FLU. ENDED UP ON VENT IN [place]   • History of thrombocytopenia    • History of transfusion 1997   • History of vertigo    • Hx of colonic polyps 06/11/2009    Dr. Giles   • Hyperlipidemia    • Hypertension    • Meningioma (CMS/[facility])    • Neuropathy     HANDS AND FEET   • Osteoarthritis 09/13/2011    Dr. Jamil Miller   •  Peptic ulceration    • Retina disorder     BLEED. FROM HISTOPLASMOSIS   • SBO (small bowel obstruction) (CMS/HCC)     due to hernia with surgical repair in 2011   • SOB (shortness of breath) on exertion    • Thyroid mass     RIGHT      Allergies   Allergen Reactions   • Codeine Nausea And Vomiting   • Morphine Nausea And Vomiting      Social History     Socioeconomic History   • Marital status:      Spouse name: Mike   • Number of children: 2   • Years of education: College   • Highest education level: Not on file   Occupational History     Employer: RETIRED     Comment: [employer]   Tobacco Use   • Smoking status: Former Smoker     Packs/day: 2.00     Years: 30.00     Pack years: 60.00     Types: Cigarettes     Start date: 1957     Last attempt to quit: 4/10/1987     Years since quittin.7   • Smokeless tobacco: Former User   • Tobacco comment: I haven't had a cigarette in over 30 years   Substance and Sexual Activity   • Alcohol use: No     Comment: stopped, heavy in past   • Drug use: No        Current Outpatient Medications:   •  Ascorbic Acid (VITAMIN C PO), Take 1,000 mg by mouth Daily., Disp: , Rfl:   •  calcium carbonate (OS-JUNAID) 600 MG tablet, Take 600 mg by mouth Daily., Disp: , Rfl:   •  diclofenac (VOLTAREN) 75 MG EC tablet, TAKE 1 TABLET BY MOUTH TWICE DAILY, Disp: 60 tablet, Rfl: 5  •  gabapentin (NEURONTIN) 300 MG capsule, Take 2 capsules by mouth 3 (Three) Times a Day., Disp: 180 capsule, Rfl: 0  •  hydroCHLOROthiazide (HYDRODIURIL) 25 MG tablet, TAKE 1 TABLET BY MOUTH ONCE DAILY, Disp: 90 tablet, Rfl: 1  •  Lactobacillus (PROBIOTIC ACIDOPHILUS PO), Take 1 capsule by mouth Daily., Disp: , Rfl:   •  levothyroxine (SYNTHROID) 75 MCG tablet, Take 1 tablet by mouth Daily., Disp: 90 tablet, Rfl: 3  •  MELATONIN PO, Take 10 mg by mouth At Night As Needed., Disp: , Rfl:   •  ondansetron ODT (ZOFRAN-ODT) 4 MG disintegrating tablet, Take 4 mg by mouth Every 8 (Eight)  Hours As Needed for Nausea or Vomiting., Disp: , Rfl:   •  pantoprazole (PROTONIX) 40 MG EC tablet, TAKE 1 TABLET BY MOUTH ONCE DAILY, Disp: 90 tablet, Rfl: 0  •  potassium chloride (KLOR-CON) 20 MEQ packet, Take 20 mEq by mouth Daily., Disp: , Rfl:   •  propranolol (INDERAL) 10 MG tablet, TAKE 1 TABLET BY MOUTH THREE TIMES DAILY, Disp: 270 tablet, Rfl: 1  •  simvastatin (ZOCOR) 80 MG tablet, TAKE 1 TABLET BY MOUTH AT BEDTIME, Disp: 90 tablet, Rfl: 1  •  SUMAtriptan (IMITREX) 50 MG tablet, Take one tablet at onset of headache. May repeat dose one time in 2 hours if headache not relieved., Disp: 10 tablet, Rfl: 0  •  trimethoprim-polymyxin b (POLYTRIM) 99343-3.1 UNIT/ML-% ophthalmic solution, INSTILL 1 DROP INTO RIGHT EYE 4 TIMES DAILY FOR 4 DAYS PRIOR TO INJECTION AND FOR 4 DAYS AFTER, Disp: , Rfl: 11  •  vitamin B-12 (CYANOCOBALAMIN) 1000 MCG tablet, Take 1,000 mcg by mouth Daily., Disp: , Rfl:   •  vitamin B-6 (PYRIDOXINE) 50 MG tablet, Take 50 mg by mouth Daily., Disp: , Rfl:      Objective   History of Present Illness Roxana is here for blood pressure and lab follow-up.  She has hypertension, hyperlipidemia, hypothyroidism, and peripheral neuropathy.  She generally feels well.  She eats healthy and is physically active.  She does monitor blood pressure reports stable readings.  Thyroid-stimulating hormone was elevated 3 months ago and levothyroxine was increased.  She is feeling better.  She has more energy.  She continues to complain of feet and hand numbness.  She developed peripheral neuropathy from chemotherapy from breast cancer.  She is followed by oncology.  She is on 600 mg of gabapentin 3 times a day and needs a refill.    Review of Systems   Constitutional: Negative for activity change and unexpected weight change.   Respiratory: Negative.    Cardiovascular:        /<80   Neurological: Positive for numbness.   Psychiatric/Behavioral: Negative.        Physical Exam   Constitutional: She appears  well-developed and well-nourished.   Cardiovascular: Normal rate, regular rhythm and normal heart sounds.   124/60   Musculoskeletal: She exhibits no edema.   Neurological: She is alert.   Psychiatric: She has a normal mood and affect. Her behavior is normal. Judgment and thought content normal.   Vitals reviewed.      ASSESSMENT     Problem List Items Addressed This Visit        Cardiovascular and Mediastinum    Hyperlipidemia    Hypertension - Primary       Endocrine    Hypothyroid       Nervous and Auditory    Peripheral neuropathy due to chemotherapy (CMS/HCC)    Relevant Medications    gabapentin (NEURONTIN) 300 MG capsule          PLAN  Patient Instructions   Blood pressure is stable.  Thyroid-stimulating hormone level and free T4 are normal.  We will continue current blood pressure medicine and thyroid medicine.  Discussed neuropathy.  We will continue gabapentin 600 mg 3 times a day.      Return in about 6 months (around 6/16/2020) for labsTSH,LIPID,B12.

## 2019-12-18 ENCOUNTER — INFUSION (OUTPATIENT)
Dept: ONCOLOGY | Facility: HOSPITAL | Age: 79
End: 2019-12-18

## 2019-12-18 DIAGNOSIS — Z45.2 ENCOUNTER FOR FITTING AND ADJUSTMENT OF VASCULAR CATHETER: Primary | ICD-10-CM

## 2019-12-18 PROCEDURE — 96523 IRRIG DRUG DELIVERY DEVICE: CPT | Performed by: INTERNAL MEDICINE

## 2019-12-18 RX ORDER — HEPARIN SODIUM (PORCINE) LOCK FLUSH IV SOLN 100 UNIT/ML 100 UNIT/ML
500 SOLUTION INTRAVENOUS AS NEEDED
Status: CANCELLED | OUTPATIENT
Start: 2019-12-18

## 2019-12-18 RX ORDER — SODIUM CHLORIDE 0.9 % (FLUSH) 0.9 %
10 SYRINGE (ML) INJECTION AS NEEDED
Status: CANCELLED | OUTPATIENT
Start: 2019-12-18

## 2019-12-18 RX ORDER — SODIUM CHLORIDE 0.9 % (FLUSH) 0.9 %
10 SYRINGE (ML) INJECTION AS NEEDED
Status: DISCONTINUED | OUTPATIENT
Start: 2019-12-18 | End: 2019-12-18 | Stop reason: HOSPADM

## 2019-12-18 RX ADMIN — SODIUM CHLORIDE, PRESERVATIVE FREE 10 ML: 5 INJECTION INTRAVENOUS at 11:58

## 2019-12-18 RX ADMIN — Medication 500 UNITS: at 11:58

## 2019-12-27 RX ORDER — DICLOFENAC SODIUM 75 MG/1
TABLET, DELAYED RELEASE ORAL
Qty: 180 TABLET | Refills: 0 | Status: SHIPPED | OUTPATIENT
Start: 2019-12-27 | End: 2020-03-18

## 2020-01-13 RX ORDER — SIMVASTATIN 80 MG
TABLET ORAL
Qty: 90 TABLET | Refills: 1 | Status: SHIPPED | OUTPATIENT
Start: 2020-01-13 | End: 2020-07-15

## 2020-01-13 RX ORDER — PANTOPRAZOLE SODIUM 40 MG/1
TABLET, DELAYED RELEASE ORAL
Qty: 90 TABLET | Refills: 1 | Status: SHIPPED | OUTPATIENT
Start: 2020-01-13 | End: 2020-07-15

## 2020-01-22 ENCOUNTER — INFUSION (OUTPATIENT)
Dept: ONCOLOGY | Facility: HOSPITAL | Age: 80
End: 2020-01-22

## 2020-01-22 DIAGNOSIS — Z45.2 ENCOUNTER FOR FITTING AND ADJUSTMENT OF VASCULAR CATHETER: Primary | ICD-10-CM

## 2020-01-22 PROCEDURE — 96523 IRRIG DRUG DELIVERY DEVICE: CPT | Performed by: INTERNAL MEDICINE

## 2020-01-22 PROCEDURE — 25010000003 HEPARIN LOCK FLUCH PER 10 UNITS: Performed by: INTERNAL MEDICINE

## 2020-01-22 RX ORDER — SODIUM CHLORIDE 0.9 % (FLUSH) 0.9 %
10 SYRINGE (ML) INJECTION AS NEEDED
Status: DISCONTINUED | OUTPATIENT
Start: 2020-01-22 | End: 2020-01-22 | Stop reason: HOSPADM

## 2020-01-22 RX ORDER — HEPARIN SODIUM (PORCINE) LOCK FLUSH IV SOLN 100 UNIT/ML 100 UNIT/ML
500 SOLUTION INTRAVENOUS AS NEEDED
Status: CANCELLED | OUTPATIENT
Start: 2020-01-22

## 2020-01-22 RX ORDER — SODIUM CHLORIDE 0.9 % (FLUSH) 0.9 %
10 SYRINGE (ML) INJECTION AS NEEDED
Status: CANCELLED | OUTPATIENT
Start: 2020-01-22

## 2020-01-22 RX ORDER — HEPARIN SODIUM (PORCINE) LOCK FLUSH IV SOLN 100 UNIT/ML 100 UNIT/ML
500 SOLUTION INTRAVENOUS AS NEEDED
Status: DISCONTINUED | OUTPATIENT
Start: 2020-01-22 | End: 2020-01-22 | Stop reason: HOSPADM

## 2020-01-22 RX ADMIN — Medication 500 UNITS: at 11:40

## 2020-01-22 RX ADMIN — SODIUM CHLORIDE, PRESERVATIVE FREE 10 ML: 5 INJECTION INTRAVENOUS at 11:40

## 2020-02-24 ENCOUNTER — PATIENT OUTREACH (OUTPATIENT)
Dept: CASE MANAGEMENT | Facility: OTHER | Age: 80
End: 2020-02-24

## 2020-02-24 NOTE — OUTREACH NOTE
LM informing patient that she is due for her AWV. Pt was advised to call and schedule.     Tarun Calle, [facility]  [facility] and    Phone: [phone]

## 2020-03-04 ENCOUNTER — INFUSION (OUTPATIENT)
Dept: ONCOLOGY | Facility: HOSPITAL | Age: 80
End: 2020-03-04

## 2020-03-04 DIAGNOSIS — Z45.2 ENCOUNTER FOR FITTING AND ADJUSTMENT OF VASCULAR CATHETER: Primary | ICD-10-CM

## 2020-03-04 PROCEDURE — 96523 IRRIG DRUG DELIVERY DEVICE: CPT

## 2020-03-04 PROCEDURE — 25010000003 HEPARIN LOCK FLUCH PER 10 UNITS: Performed by: INTERNAL MEDICINE

## 2020-03-04 RX ORDER — SODIUM CHLORIDE 0.9 % (FLUSH) 0.9 %
10 SYRINGE (ML) INJECTION AS NEEDED
Status: DISCONTINUED | OUTPATIENT
Start: 2020-03-04 | End: 2020-03-04 | Stop reason: HOSPADM

## 2020-03-04 RX ORDER — HEPARIN SODIUM (PORCINE) LOCK FLUSH IV SOLN 100 UNIT/ML 100 UNIT/ML
500 SOLUTION INTRAVENOUS AS NEEDED
Status: DISCONTINUED | OUTPATIENT
Start: 2020-03-04 | End: 2020-03-04 | Stop reason: HOSPADM

## 2020-03-04 RX ORDER — HEPARIN SODIUM (PORCINE) LOCK FLUSH IV SOLN 100 UNIT/ML 100 UNIT/ML
500 SOLUTION INTRAVENOUS AS NEEDED
Status: CANCELLED | OUTPATIENT
Start: 2020-03-04

## 2020-03-04 RX ORDER — SODIUM CHLORIDE 0.9 % (FLUSH) 0.9 %
10 SYRINGE (ML) INJECTION AS NEEDED
Status: CANCELLED | OUTPATIENT
Start: 2020-03-04

## 2020-03-04 RX ADMIN — Medication 500 UNITS: at 11:32

## 2020-03-04 RX ADMIN — SODIUM CHLORIDE, PRESERVATIVE FREE 10 ML: 5 INJECTION INTRAVENOUS at 11:32

## 2020-03-16 ENCOUNTER — TELEPHONE (OUTPATIENT)
Dept: ONCOLOGY | Facility: CLINIC | Age: 80
End: 2020-03-16

## 2020-03-16 NOTE — TELEPHONE ENCOUNTER
Patient has an appointment on 04/08/20 and would like an appointment on 04/07/20 at [place] with dr. shell       Call patient back at [phone]

## 2020-03-18 RX ORDER — DICLOFENAC SODIUM 75 MG/1
TABLET, DELAYED RELEASE ORAL
Qty: 180 TABLET | Refills: 0 | Status: SHIPPED | OUTPATIENT
Start: 2020-03-18 | End: 2020-06-25

## 2020-03-18 RX ORDER — PROPRANOLOL HYDROCHLORIDE 10 MG/1
TABLET ORAL
Qty: 270 TABLET | Refills: 0 | Status: SHIPPED | OUTPATIENT
Start: 2020-03-18 | End: 2020-06-11

## 2020-03-31 ENCOUNTER — TELEPHONE (OUTPATIENT)
Dept: ONCOLOGY | Facility: CLINIC | Age: 80
End: 2020-03-31

## 2020-04-07 ENCOUNTER — APPOINTMENT (OUTPATIENT)
Dept: ONCOLOGY | Facility: HOSPITAL | Age: 80
End: 2020-04-07

## 2020-06-11 RX ORDER — PROPRANOLOL HYDROCHLORIDE 10 MG/1
TABLET ORAL
Qty: 270 TABLET | Refills: 0 | Status: SHIPPED | OUTPATIENT
Start: 2020-06-11 | End: 2020-09-17

## 2020-06-11 RX ORDER — HYDROCHLOROTHIAZIDE 25 MG/1
TABLET ORAL
Qty: 90 TABLET | Refills: 0 | Status: SHIPPED | OUTPATIENT
Start: 2020-06-11 | End: 2020-09-09

## 2020-06-12 ENCOUNTER — TRANSCRIBE ORDERS (OUTPATIENT)
Dept: ADMINISTRATIVE | Facility: HOSPITAL | Age: 80
End: 2020-06-12

## 2020-06-12 DIAGNOSIS — E04.1 THYROID NODULE: Primary | ICD-10-CM

## 2020-06-24 DIAGNOSIS — G62.0 PERIPHERAL NEUROPATHY DUE TO CHEMOTHERAPY (HCC): ICD-10-CM

## 2020-06-24 DIAGNOSIS — T45.1X5A PERIPHERAL NEUROPATHY DUE TO CHEMOTHERAPY (HCC): ICD-10-CM

## 2020-06-25 DIAGNOSIS — T45.1X5A PERIPHERAL NEUROPATHY DUE TO CHEMOTHERAPY (HCC): ICD-10-CM

## 2020-06-25 DIAGNOSIS — G62.0 PERIPHERAL NEUROPATHY DUE TO CHEMOTHERAPY (HCC): ICD-10-CM

## 2020-06-25 RX ORDER — GABAPENTIN 300 MG/1
600 CAPSULE ORAL 3 TIMES DAILY
Qty: 180 CAPSULE | Refills: 0 | Status: SHIPPED | OUTPATIENT
Start: 2020-06-25 | End: 2020-06-29

## 2020-06-25 RX ORDER — GABAPENTIN 300 MG/1
CAPSULE ORAL
Qty: 180 CAPSULE | Refills: 0 | Status: SHIPPED | OUTPATIENT
Start: 2020-06-25 | End: 2020-06-25 | Stop reason: SDUPTHER

## 2020-06-25 RX ORDER — DICLOFENAC SODIUM 75 MG/1
TABLET, DELAYED RELEASE ORAL
Qty: 180 TABLET | Refills: 0 | Status: ON HOLD | OUTPATIENT
Start: 2020-06-25 | End: 2020-10-01

## 2020-06-29 DIAGNOSIS — T45.1X5A PERIPHERAL NEUROPATHY DUE TO CHEMOTHERAPY (HCC): ICD-10-CM

## 2020-06-29 DIAGNOSIS — G62.0 PERIPHERAL NEUROPATHY DUE TO CHEMOTHERAPY (HCC): ICD-10-CM

## 2020-06-29 RX ORDER — GABAPENTIN 300 MG/1
CAPSULE ORAL
Qty: 180 CAPSULE | Refills: 2 | Status: SHIPPED | OUTPATIENT
Start: 2020-06-29 | End: 2020-09-01 | Stop reason: SDUPTHER

## 2020-07-07 ENCOUNTER — OFFICE VISIT (OUTPATIENT)
Dept: ONCOLOGY | Facility: CLINIC | Age: 80
End: 2020-07-07

## 2020-07-07 ENCOUNTER — INFUSION (OUTPATIENT)
Dept: ONCOLOGY | Facility: HOSPITAL | Age: 80
End: 2020-07-07

## 2020-07-07 VITALS
BODY MASS INDEX: 29.47 KG/M2 | OXYGEN SATURATION: 98 % | SYSTOLIC BLOOD PRESSURE: 148 MMHG | DIASTOLIC BLOOD PRESSURE: 75 MMHG | WEIGHT: 156.1 LBS | HEIGHT: 61 IN | RESPIRATION RATE: 18 BRPM | HEART RATE: 72 BPM | TEMPERATURE: 97.8 F

## 2020-07-07 DIAGNOSIS — Z17.1 MALIGNANT NEOPLASM OF UPPER-INNER QUADRANT OF RIGHT BREAST IN FEMALE, ESTROGEN RECEPTOR NEGATIVE (HCC): Primary | ICD-10-CM

## 2020-07-07 DIAGNOSIS — C50.211 MALIGNANT NEOPLASM OF UPPER-INNER QUADRANT OF RIGHT BREAST IN FEMALE, ESTROGEN RECEPTOR NEGATIVE (HCC): ICD-10-CM

## 2020-07-07 DIAGNOSIS — Z17.1 MALIGNANT NEOPLASM OF UPPER-INNER QUADRANT OF RIGHT BREAST IN FEMALE, ESTROGEN RECEPTOR NEGATIVE (HCC): ICD-10-CM

## 2020-07-07 DIAGNOSIS — C50.211 MALIGNANT NEOPLASM OF UPPER-INNER QUADRANT OF RIGHT BREAST IN FEMALE, ESTROGEN RECEPTOR NEGATIVE (HCC): Primary | ICD-10-CM

## 2020-07-07 DIAGNOSIS — Z45.2 ENCOUNTER FOR FITTING AND ADJUSTMENT OF VASCULAR CATHETER: ICD-10-CM

## 2020-07-07 DIAGNOSIS — K21.9 GASTROESOPHAGEAL REFLUX DISEASE, ESOPHAGITIS PRESENCE NOT SPECIFIED: Primary | ICD-10-CM

## 2020-07-07 DIAGNOSIS — C50.911 RIGHT BREAST CANCER WITH T3 TUMOR, >5 CM IN GREATEST DIMENSION (HCC): ICD-10-CM

## 2020-07-07 LAB
ALBUMIN SERPL-MCNC: 4.7 G/DL (ref 3.5–5.2)
ALBUMIN/GLOB SERPL: 2.1 G/DL
ALP SERPL-CCNC: 52 U/L (ref 39–117)
ALT SERPL W P-5'-P-CCNC: 28 U/L (ref 1–33)
ANION GAP SERPL CALCULATED.3IONS-SCNC: 9 MMOL/L (ref 5–15)
AST SERPL-CCNC: 28 U/L (ref 1–32)
BILIRUB SERPL-MCNC: 0.4 MG/DL (ref 0–1.2)
BUN SERPL-MCNC: 19 MG/DL (ref 8–23)
BUN/CREAT SERPL: 21.6 (ref 7–25)
CALCIUM SPEC-SCNC: 9.5 MG/DL (ref 8.6–10.5)
CHLORIDE SERPL-SCNC: 104 MMOL/L (ref 98–107)
CO2 SERPL-SCNC: 28 MMOL/L (ref 22–29)
CREAT SERPL-MCNC: 0.88 MG/DL (ref 0.57–1)
DEPRECATED RDW RBC AUTO: 45.1 FL (ref 37–54)
EOSINOPHIL # BLD MANUAL: 0.25 10*3/MM3 (ref 0–0.4)
EOSINOPHIL NFR BLD MANUAL: 4 % (ref 0.3–6.2)
ERYTHROCYTE [DISTWIDTH] IN BLOOD BY AUTOMATED COUNT: 13.2 % (ref 12.3–15.4)
GFR SERPL CREATININE-BSD FRML MDRD: 62 ML/MIN/1.73
GLOBULIN UR ELPH-MCNC: 2.2 GM/DL
GLUCOSE SERPL-MCNC: 136 MG/DL (ref 65–99)
HCT VFR BLD AUTO: 34.1 % (ref 34–46.6)
HGB BLD-MCNC: 11.2 G/DL (ref 12–15.9)
LYMPHOCYTES # BLD MANUAL: 2.22 10*3/MM3 (ref 0.7–3.1)
LYMPHOCYTES NFR BLD MANUAL: 24 % (ref 5–12)
LYMPHOCYTES NFR BLD MANUAL: 35 % (ref 19.6–45.3)
MCH RBC QN AUTO: 30.6 PG (ref 26.6–33)
MCHC RBC AUTO-ENTMCNC: 32.8 G/DL (ref 31.5–35.7)
MCV RBC AUTO: 93.2 FL (ref 79–97)
MONOCYTES # BLD AUTO: 1.52 10*3/MM3 (ref 0.1–0.9)
NEUTROPHILS # BLD AUTO: 2.34 10*3/MM3 (ref 1.7–7)
NEUTROPHILS NFR BLD MANUAL: 37 % (ref 42.7–76)
PLAT MORPH BLD: NORMAL
PLATELET # BLD AUTO: 58 10*3/MM3 (ref 140–450)
PMV BLD AUTO: 12 FL (ref 6–12)
POTASSIUM SERPL-SCNC: 3.9 MMOL/L (ref 3.5–5.2)
PROT SERPL-MCNC: 6.9 G/DL (ref 6–8.5)
RBC # BLD AUTO: 3.66 10*6/MM3 (ref 3.77–5.28)
RBC MORPH BLD: NORMAL
SCAN SLIDE: NORMAL
SODIUM SERPL-SCNC: 141 MMOL/L (ref 136–145)
WBC # BLD AUTO: 6.33 10*3/MM3 (ref 3.4–10.8)
WBC MORPH BLD: NORMAL

## 2020-07-07 PROCEDURE — 25010000003 HEPARIN LOCK FLUSH PER 10 UNITS: Performed by: INTERNAL MEDICINE

## 2020-07-07 PROCEDURE — G0463 HOSPITAL OUTPT CLINIC VISIT: HCPCS

## 2020-07-07 PROCEDURE — 85025 COMPLETE CBC W/AUTO DIFF WBC: CPT | Performed by: INTERNAL MEDICINE

## 2020-07-07 PROCEDURE — 85007 BL SMEAR W/DIFF WBC COUNT: CPT | Performed by: INTERNAL MEDICINE

## 2020-07-07 PROCEDURE — 80053 COMPREHEN METABOLIC PANEL: CPT | Performed by: INTERNAL MEDICINE

## 2020-07-07 PROCEDURE — 99213 OFFICE O/P EST LOW 20 MIN: CPT | Performed by: INTERNAL MEDICINE

## 2020-07-07 PROCEDURE — 96523 IRRIG DRUG DELIVERY DEVICE: CPT

## 2020-07-07 RX ORDER — SODIUM CHLORIDE 0.9 % (FLUSH) 0.9 %
10 SYRINGE (ML) INJECTION AS NEEDED
Status: DISCONTINUED | OUTPATIENT
Start: 2020-07-07 | End: 2020-07-07 | Stop reason: HOSPADM

## 2020-07-07 RX ORDER — HEPARIN SODIUM (PORCINE) LOCK FLUSH IV SOLN 100 UNIT/ML 100 UNIT/ML
500 SOLUTION INTRAVENOUS AS NEEDED
Status: CANCELLED | OUTPATIENT
Start: 2020-07-07

## 2020-07-07 RX ORDER — HEPARIN SODIUM (PORCINE) LOCK FLUSH IV SOLN 100 UNIT/ML 100 UNIT/ML
500 SOLUTION INTRAVENOUS AS NEEDED
Status: DISCONTINUED | OUTPATIENT
Start: 2020-07-07 | End: 2020-07-07 | Stop reason: HOSPADM

## 2020-07-07 RX ORDER — SODIUM CHLORIDE 0.9 % (FLUSH) 0.9 %
10 SYRINGE (ML) INJECTION AS NEEDED
Status: CANCELLED | OUTPATIENT
Start: 2020-07-07

## 2020-07-07 RX ADMIN — Medication 10 ML: at 13:41

## 2020-07-07 RX ADMIN — SODIUM CHLORIDE, PRESERVATIVE FREE 500 UNITS: 5 INJECTION INTRAVENOUS at 13:41

## 2020-07-15 ENCOUNTER — HOSPITAL ENCOUNTER (OUTPATIENT)
Dept: ULTRASOUND IMAGING | Facility: HOSPITAL | Age: 80
Discharge: HOME | End: 2020-07-15
Admitting: OTOLARYNGOLOGY

## 2020-07-15 DIAGNOSIS — E04.1 THYROID NODULE: ICD-10-CM

## 2020-07-15 PROCEDURE — 76536 US EXAM OF HEAD AND NECK: CPT

## 2020-07-15 RX ORDER — SIMVASTATIN 80 MG
TABLET ORAL
Qty: 90 TABLET | Refills: 0 | Status: SHIPPED | OUTPATIENT
Start: 2020-07-15 | End: 2020-10-15

## 2020-07-15 RX ORDER — PANTOPRAZOLE SODIUM 40 MG/1
TABLET, DELAYED RELEASE ORAL
Qty: 90 TABLET | Refills: 0 | Status: SHIPPED | OUTPATIENT
Start: 2020-07-15 | End: 2020-10-15

## 2020-08-18 ENCOUNTER — INFUSION (OUTPATIENT)
Dept: ONCOLOGY | Facility: HOSPITAL | Age: 80
End: 2020-08-18

## 2020-08-18 DIAGNOSIS — Z45.2 ENCOUNTER FOR FITTING AND ADJUSTMENT OF VASCULAR CATHETER: Primary | ICD-10-CM

## 2020-08-18 PROCEDURE — 25010000003 HEPARIN LOCK FLUSH PER 10 UNITS: Performed by: INTERNAL MEDICINE

## 2020-08-18 PROCEDURE — 96523 IRRIG DRUG DELIVERY DEVICE: CPT

## 2020-08-18 RX ORDER — HEPARIN SODIUM (PORCINE) LOCK FLUSH IV SOLN 100 UNIT/ML 100 UNIT/ML
500 SOLUTION INTRAVENOUS AS NEEDED
Status: DISCONTINUED | OUTPATIENT
Start: 2020-08-18 | End: 2020-08-18 | Stop reason: HOSPADM

## 2020-08-18 RX ORDER — SODIUM CHLORIDE 0.9 % (FLUSH) 0.9 %
10 SYRINGE (ML) INJECTION AS NEEDED
Status: DISCONTINUED | OUTPATIENT
Start: 2020-08-18 | End: 2020-08-18 | Stop reason: HOSPADM

## 2020-08-18 RX ORDER — HEPARIN SODIUM (PORCINE) LOCK FLUSH IV SOLN 100 UNIT/ML 100 UNIT/ML
500 SOLUTION INTRAVENOUS AS NEEDED
Status: CANCELLED | OUTPATIENT
Start: 2020-08-18

## 2020-08-18 RX ORDER — SODIUM CHLORIDE 0.9 % (FLUSH) 0.9 %
10 SYRINGE (ML) INJECTION AS NEEDED
Status: CANCELLED | OUTPATIENT
Start: 2020-08-18

## 2020-08-18 RX ADMIN — SODIUM CHLORIDE, PRESERVATIVE FREE 10 ML: 5 INJECTION INTRAVENOUS at 13:40

## 2020-08-18 RX ADMIN — Medication 500 UNITS: at 13:40

## 2020-08-19 ENCOUNTER — OFFICE VISIT (OUTPATIENT)
Dept: GASTROENTEROLOGY | Facility: CLINIC | Age: 80
End: 2020-08-19

## 2020-08-19 VITALS
DIASTOLIC BLOOD PRESSURE: 76 MMHG | SYSTOLIC BLOOD PRESSURE: 140 MMHG | WEIGHT: 164.9 LBS | TEMPERATURE: 96.9 F | BODY MASS INDEX: 32.38 KG/M2 | HEIGHT: 60 IN

## 2020-08-19 DIAGNOSIS — R15.2 INCONTINENCE OF FECES WITH FECAL URGENCY: ICD-10-CM

## 2020-08-19 DIAGNOSIS — K21.9 GASTROESOPHAGEAL REFLUX DISEASE, ESOPHAGITIS PRESENCE NOT SPECIFIED: ICD-10-CM

## 2020-08-19 DIAGNOSIS — K59.09 OTHER CONSTIPATION: Primary | ICD-10-CM

## 2020-08-19 DIAGNOSIS — R15.9 INCONTINENCE OF FECES WITH FECAL URGENCY: ICD-10-CM

## 2020-08-19 PROCEDURE — 99204 OFFICE O/P NEW MOD 45 MIN: CPT | Performed by: INTERNAL MEDICINE

## 2020-08-19 NOTE — PROGRESS NOTES
Chief Complaint   Patient presents with   • Diarrhea   • Abdominal Cramping     Subjective     HPI  Roxana Brizuela is a 79 y.o. female who presents for evaluation of diarrhea and cramping.  Symptoms present off and on for 2 years.  Most symptoms are occurring in the morning when she is walking her dog.  Happens about once per week.  The other days of the week she is constipated and not having BMs.  She will have an urgent large BM.  She is usually out and walking her dog and then will have unexpected and urgent need to have a BM.  Sometimes she has incontinence.  This is very embarrassing for her and frustrating.    Not taking anything for the BMS.  She is not taking any laxatives when she is not having bowel movements.  She is not taking any antidiarrheal.    She does have symptoms of GERD, not controlled by pantoprazole.  She is on diclofenac.    Gaining weight.     No family hx of GI malignancies    Last colonoscopy about 2009, she recalls it was normal    EGD many years ago.      No smoking, no ETOH    She has had a kera, appy, hysterectomy.    Today's visit was in the office.  Both the patient and I were wearing face masks and proper hand hygiene was performed before and after the physical exam.     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 Paul Negron   • GI (gastrointestinal bleed) 1997   •  H/O jaundice    • Hernia     INCISONAL. AROUND LEFT TRAM LAP SITE   • History of chemotherapy     2017 4 MONTHS IN 2017   • History of Clostridium difficile colitis     2018   • History of histoplasmosis    • History of infectious mononucleosis    • History of migraine headaches    • History of pneumonia 2017    AS RESULT OF FLU. ENDED UP ON VENT IN [place]   • History of thrombocytopenia    • History of transfusion    • History of vertigo    • Hx of colonic polyps 2009    Dr. Giles   • Hyperlipidemia    • Hypertension    • Meningioma (CMS/HCC)    • Neuropathy     HANDS AND FEET   • Osteoarthritis 2011    Dr. Jamil Miller   • Peptic ulceration    • Retina disorder     BLEED. FROM HISTOPLASMOSIS   • SBO (small bowel obstruction) (CMS/HCC)     due to hernia with surgical repair in 2011   • SOB (shortness of breath) on exertion    • Thyroid mass     RIGHT       Social History     Socioeconomic History   • Marital status:      Spouse name: Mike   • Number of children: 2   • Years of education: College   • Highest education level: Not on file   Occupational History     Employer: RETIRED     Comment: [employer]   Tobacco Use   • Smoking status: Former Smoker     Packs/day: 2.00     Years: 30.00     Pack years: 60.00     Types: Cigarettes     Start date: 1957     Last attempt to quit: 4/10/1987     Years since quittin.3   • Smokeless tobacco: Never Used   • Tobacco comment: I haven't had a cigarette in over 30 years   Substance and Sexual Activity   • Alcohol use: No     Comment: stopped, heavy in past   • Drug use: No   • Sexual activity: Defer         Current Outpatient Medications:   •  Ascorbic Acid (VITAMIN C PO), Take 1,000 mg by mouth Daily., Disp: , Rfl:   •  calcium carbonate (OS-JUNAID) 600 MG tablet, Take 600 mg by mouth Daily., Disp: , Rfl:   •  diclofenac (VOLTAREN) 75 MG EC tablet, Take 1 tablet by mouth twice daily, Disp: 180  tablet, Rfl: 0  •  gabapentin (NEURONTIN) 300 MG capsule, TAKE 2 CAPSULES BY MOUTH THREE TIMES DAILY, Disp: 180 capsule, Rfl: 2  •  hydroCHLOROthiazide (HYDRODIURIL) 25 MG tablet, Take 1 tablet by mouth once daily, Disp: 90 tablet, Rfl: 0  •  Lactobacillus (PROBIOTIC ACIDOPHILUS PO), Take 1 capsule by mouth Daily., Disp: , Rfl:   •  levothyroxine (SYNTHROID) 75 MCG tablet, Take 1 tablet by mouth Daily., Disp: 90 tablet, Rfl: 3  •  MELATONIN PO, Take 10 mg by mouth At Night As Needed., Disp: , Rfl:   •  pantoprazole (PROTONIX) 40 MG EC tablet, Take 1 tablet by mouth once daily, Disp: 90 tablet, Rfl: 0  •  potassium chloride (KLOR-CON) 20 MEQ packet, Take 20 mEq by mouth Daily., Disp: , Rfl:   •  propranolol (INDERAL) 10 MG tablet, TAKE 1 TABLET BY MOUTH THREE TIMES DAILY, Disp: 270 tablet, Rfl: 0  •  simvastatin (ZOCOR) 80 MG tablet, TAKE 1 TABLET BY MOUTH AT BEDTIME, Disp: 90 tablet, Rfl: 0  •  SUMAtriptan (IMITREX) 50 MG tablet, Take one tablet at onset of headache. May repeat dose one time in 2 hours if headache not relieved., Disp: 10 tablet, Rfl: 0  •  trimethoprim-polymyxin b (POLYTRIM) 84729-4.1 UNIT/ML-% ophthalmic solution, INSTILL 1 DROP INTO RIGHT EYE 4 TIMES DAILY FOR 4 DAYS PRIOR TO INJECTION AND FOR 4 DAYS AFTER, Disp: , Rfl: 11  •  vitamin B-12 (CYANOCOBALAMIN) 1000 MCG tablet, Take 1,000 mcg by mouth Daily., Disp: , Rfl:   •  vitamin B-6 (PYRIDOXINE) 50 MG tablet, Take 50 mg by mouth Daily., Disp: , Rfl:   •  ondansetron ODT (ZOFRAN-ODT) 4 MG disintegrating tablet, Take 4 mg by mouth Every 8 (Eight) Hours As Needed for Nausea or Vomiting., Disp: , Rfl:   •  polyethylene glycol (MIRALAX) powder powder, Take 17 g by mouth Daily., Disp: 255 g, Rfl: 3    Review of Systems   Constitutional: Negative for activity change, appetite change and fatigue.   HENT: Negative for sore throat and trouble swallowing.    Respiratory: Negative.    Cardiovascular: Negative.    Gastrointestinal: Negative for abdominal  "distention, abdominal pain and blood in stool. Positive for constipation, diarrhea, urgency.  Endocrine: Negative for cold intolerance and heat intolerance.   Genitourinary: Negative for difficulty urinating, dysuria and frequency.   Musculoskeletal: Negative for arthralgias, back pain and myalgias.   Skin: Negative.    Hematological: Negative for adenopathy. Does not bruise/bleed easily.   All other systems reviewed and are negative.    Objective   Vitals:    08/19/20 1442   BP: 140/76   Temp: 96.9 °F (36.1 °C)         08/19/20  1442   Weight: 74.8 kg (164 lb 14.4 oz)     Body mass index is 32.2 kg/m².          /76   Temp 96.9 °F (36.1 °C)   Ht 152.4 cm (60\")   Wt 74.8 kg (164 lb 14.4 oz)   BMI 32.20 kg/m²     General Appearance:  Alert, cooperative, no distress, appears stated age   Head:  Normocephalic, without obvious abnormality, atraumatic   Eyes:  PERRL, conjunctiva/corneas clear, EOM's intact   Ears:  Normal external appearance of ear, hearing intact   Nose: Nares normal,mucosa normal, no drainage or sinus tenderness   Throat: Lips, mucosa, and tongue normal; teeth and gums normal   Neck: Supple, symmetrical, trachea midline, no adenopathy;  thyroid: not enlarged, symmetric, no tenderness/mass/nodule   Back:   Symmetric, no curvature, ROM normal   Lungs:   Clear to auscultation bilaterally, respirations unlabored, effort normal   Heart:  Regular rate and rhythm, S1 and S2 normal, no murmur, rub, or gallop, no lower extremity edema   Abdomen:   Soft, non-tender, bowel sounds active all four quadrants,  no masses, no organomegaly   Rectal: Deferred   Musculoskeletal: Normal gait, normal station, no atrophy of extremities, normal strength and tone   Skin: Normal color, texture, and turgor, no rashes or lesions   Lymph nodes: Cervical and supraclavicular nodes normal   Psychiatric: Alert and oriented x 3, normal mood and affect,  normal recent and remote memory, normal judgment and insight         WBC "   Date Value Ref Range Status   07/07/2020 6.33 3.40 - 10.80 10*3/mm3 Final   02/04/2019 4.79 4.50 - 10.70 10*3/mm3 Final     RBC   Date Value Ref Range Status   07/07/2020 3.66 (L) 3.77 - 5.28 10*6/mm3 Final   02/04/2019 3.63 (L) 3.90 - 5.20 10*6/mm3 Final     Hemoglobin   Date Value Ref Range Status   07/07/2020 11.2 (L) 12.0 - 15.9 g/dL Final     Hematocrit   Date Value Ref Range Status   07/07/2020 34.1 34.0 - 46.6 % Final     MCV   Date Value Ref Range Status   07/07/2020 93.2 79.0 - 97.0 fL Final     MCH   Date Value Ref Range Status   07/07/2020 30.6 26.6 - 33.0 pg Final     MCHC   Date Value Ref Range Status   07/07/2020 32.8 31.5 - 35.7 g/dL Final     RDW   Date Value Ref Range Status   07/07/2020 13.2 12.3 - 15.4 % Final     RDW-SD   Date Value Ref Range Status   07/07/2020 45.1 37.0 - 54.0 fl Final     MPV   Date Value Ref Range Status   07/07/2020 12.0 6.0 - 12.0 fL Final     Platelets   Date Value Ref Range Status   07/07/2020 58 (L) 140 - 450 10*3/mm3 Final     Neutrophil %   Date Value Ref Range Status   10/22/2019 37.7 (L) 42.7 - 76.0 % Final     Lymphocyte %   Date Value Ref Range Status   10/22/2019 32.4 19.6 - 45.3 % Final     Monocyte %   Date Value Ref Range Status   10/22/2019 24.7 (H) 5.0 - 12.0 % Final     Eosinophil %   Date Value Ref Range Status   10/22/2019 2.8 0.3 - 6.2 % Final     Basophil %   Date Value Ref Range Status   10/22/2019 0.4 0.0 - 1.5 % Final     Immature Grans %   Date Value Ref Range Status   10/22/2019 2.0 (H) 0.0 - 0.5 % Final     Neutrophils Absolute   Date Value Ref Range Status   07/07/2020 2.34 1.70 - 7.00 10*3/mm3 Final     Neutrophils, Absolute   Date Value Ref Range Status   10/22/2019 1.86 1.70 - 7.00 10*3/mm3 Final     Lymphocytes, Absolute   Date Value Ref Range Status   10/22/2019 1.60 0.70 - 3.10 10*3/mm3 Final     Monocytes, Absolute   Date Value Ref Range Status   10/22/2019 1.22 (H) 0.10 - 0.90 10*3/mm3 Final     Eosinophils Absolute   Date Value Ref  Range Status   07/07/2020 0.25 0.00 - 0.40 10*3/mm3 Final     Eosinophils, Absolute   Date Value Ref Range Status   10/22/2019 0.14 0.00 - 0.40 10*3/mm3 Final     Basophils, Absolute   Date Value Ref Range Status   10/22/2019 0.02 0.00 - 0.20 10*3/mm3 Final     Immature Grans, Absolute   Date Value Ref Range Status   10/22/2019 0.10 (H) 0.00 - 0.05 10*3/mm3 Final     nRBC   Date Value Ref Range Status   10/22/2019 0.0 0.0 - 0.2 /100 WBC Final       Lab Results   Component Value Date    GLUCOSE 136 (H) 07/07/2020    BUN 19 07/07/2020    CREATININE 0.88 07/07/2020    EGFRIFNONA 62 07/07/2020    EGFRIFAFRI 64 09/04/2019    BCR 21.6 07/07/2020    CO2 28.0 07/07/2020    CALCIUM 9.5 07/07/2020    PROTENTOTREF 7.1 09/04/2019    ALBUMIN 4.70 07/07/2020    LABIL2 2.2 09/04/2019    AST 28 07/07/2020    ALT 28 07/07/2020         Imaging Results (Last 7 Days)     ** No results found for the last 168 hours. **            Assessment/Plan    Fecal incontinence with urgency: This is following actually not having bowel movements for a number of days.  I suspect this is finally her body getting rid of all the stool leading to urgency    Constipation: She is constipated the majority of the week prior to her large and difficult to control bowel movements once per week    GERD: She has been on PPI.  She does still endorse that she has some breakthrough reflux symptoms    Plan  I am going to have her take the daily dose of MiraLAX.  I discussed with her that my goal is to get her moving her bowels more regularly so that she does not have a significant blowout once per week.  Also, I have given her samples of IBgard to use and have recommended that she take 2 capsules every morning to try to prevent the urgency that she gets while she is walking her dog.  We may need to consider stronger laxative to get her bowels moving more efficaciously if the MiraLAX does not help    Roxana was seen today for diarrhea and abdominal  cramping.    Diagnoses and all orders for this visit:    Other constipation    Gastroesophageal reflux disease, esophagitis presence not specified    Incontinence of feces with fecal urgency    Other orders  -     polyethylene glycol (MIRALAX) powder powder; Take 17 g by mouth Daily.        Dictated utilizing Dragon dictation

## 2020-08-20 RX ORDER — LEVOTHYROXINE SODIUM 0.07 MG/1
75 TABLET ORAL DAILY
Qty: 90 TABLET | Refills: 3 | Status: SHIPPED | OUTPATIENT
Start: 2020-08-20 | End: 2021-08-16

## 2020-09-01 ENCOUNTER — TELEPHONE (OUTPATIENT)
Dept: FAMILY MEDICINE CLINIC | Facility: CLINIC | Age: 80
End: 2020-09-01

## 2020-09-01 DIAGNOSIS — G62.0 PERIPHERAL NEUROPATHY DUE TO CHEMOTHERAPY (HCC): ICD-10-CM

## 2020-09-01 DIAGNOSIS — T45.1X5A PERIPHERAL NEUROPATHY DUE TO CHEMOTHERAPY (HCC): ICD-10-CM

## 2020-09-01 DIAGNOSIS — G62.0 PERIPHERAL NEUROPATHY DUE TO CHEMOTHERAPY: ICD-10-CM

## 2020-09-01 DIAGNOSIS — T45.1X5A PERIPHERAL NEUROPATHY DUE TO CHEMOTHERAPY: ICD-10-CM

## 2020-09-01 RX ORDER — GABAPENTIN 300 MG/1
600 CAPSULE ORAL 3 TIMES DAILY
Qty: 180 CAPSULE | Refills: 2 | Status: SHIPPED | OUTPATIENT
Start: 2020-09-01 | End: 2020-11-20 | Stop reason: SDUPTHER

## 2020-09-01 RX ORDER — GABAPENTIN 300 MG/1
600 CAPSULE ORAL 3 TIMES DAILY
Qty: 180 CAPSULE | Refills: 2 | Status: CANCELLED | OUTPATIENT
Start: 2020-09-01

## 2020-09-01 RX ORDER — GABAPENTIN 300 MG/1
CAPSULE ORAL
Qty: 180 CAPSULE | Refills: 0 | OUTPATIENT
Start: 2020-09-01

## 2020-09-01 RX ORDER — SULFAMETHOXAZOLE AND TRIMETHOPRIM 800; 160 MG/1; MG/1
1 TABLET ORAL 2 TIMES DAILY
Qty: 10 TABLET | Refills: 0 | Status: SHIPPED | OUTPATIENT
Start: 2020-09-01 | End: 2020-09-28

## 2020-09-01 NOTE — TELEPHONE ENCOUNTER
PATIENT STATES: that she is in covid-19 questionnaire so she cant make a appt to be seen to get a refill for her meds. She is also saying that she is having a Urinary tract infection. So she really just needs help to see what she can do to get her meds (gabapentin (NEURONTIN) 300 MG capsule). Please advise     PATIENT CAN BE REACHED ON:[phone]    PHARMACY PREFERRED:[facility] [address] [place] [address] - [phone] [facility] [phone] FX

## 2020-09-04 RX ORDER — HYDROCHLOROTHIAZIDE 25 MG/1
TABLET ORAL
Qty: 90 TABLET | Refills: 0 | OUTPATIENT
Start: 2020-09-04

## 2020-09-09 RX ORDER — HYDROCHLOROTHIAZIDE 25 MG/1
TABLET ORAL
Qty: 30 TABLET | Refills: 0 | Status: SHIPPED | OUTPATIENT
Start: 2020-09-09 | End: 2020-10-15

## 2020-09-15 RX ORDER — HYDROCHLOROTHIAZIDE 25 MG/1
TABLET ORAL
Qty: 90 TABLET | Refills: 0 | OUTPATIENT
Start: 2020-09-15

## 2020-09-16 RX ORDER — PROPRANOLOL HYDROCHLORIDE 10 MG/1
TABLET ORAL
Qty: 270 TABLET | Refills: 0 | OUTPATIENT
Start: 2020-09-16

## 2020-09-17 RX ORDER — PROPRANOLOL HYDROCHLORIDE 10 MG/1
TABLET ORAL
Qty: 270 TABLET | Refills: 0 | Status: SHIPPED | OUTPATIENT
Start: 2020-09-17 | End: 2020-12-07

## 2020-09-28 ENCOUNTER — HOSPITAL ENCOUNTER (OUTPATIENT)
Facility: HOSPITAL | Age: 80
Setting detail: OBSERVATION
Discharge: HOME | End: 2020-10-01
Attending: EMERGENCY MEDICINE | Admitting: INTERNAL MEDICINE

## 2020-09-28 ENCOUNTER — APPOINTMENT (OUTPATIENT)
Dept: GENERAL RADIOLOGY | Facility: HOSPITAL | Age: 80
End: 2020-09-28

## 2020-09-28 DIAGNOSIS — D72.829 LEUKOCYTOSIS, UNSPECIFIED TYPE: Primary | ICD-10-CM

## 2020-09-28 DIAGNOSIS — R50.9 FEVER, UNKNOWN ORIGIN: ICD-10-CM

## 2020-09-28 DIAGNOSIS — D69.6 THROMBOCYTOPENIA (HCC): ICD-10-CM

## 2020-09-28 LAB
ALBUMIN SERPL-MCNC: 4.6 G/DL (ref 3.5–5.2)
ALBUMIN/GLOB SERPL: 1.7 G/DL
ALP SERPL-CCNC: 63 U/L (ref 39–117)
ALT SERPL W P-5'-P-CCNC: 34 U/L (ref 1–33)
ANION GAP SERPL CALCULATED.3IONS-SCNC: 15.6 MMOL/L (ref 5–15)
ANISOCYTOSIS BLD QL: ABNORMAL
AST SERPL-CCNC: 31 U/L (ref 1–32)
B PARAPERT DNA SPEC QL NAA+PROBE: NOT DETECTED
B PERT DNA SPEC QL NAA+PROBE: NOT DETECTED
BACTERIA UR QL AUTO: NORMAL /HPF
BILIRUB SERPL-MCNC: 0.6 MG/DL (ref 0–1.2)
BILIRUB UR QL STRIP: NEGATIVE
BUN SERPL-MCNC: 17 MG/DL (ref 8–23)
BUN/CREAT SERPL: 19.1 (ref 7–25)
C PNEUM DNA NPH QL NAA+NON-PROBE: NOT DETECTED
CALCIUM SPEC-SCNC: 10 MG/DL (ref 8.6–10.5)
CHLORIDE SERPL-SCNC: 100 MMOL/L (ref 98–107)
CLARITY UR: CLEAR
CO2 SERPL-SCNC: 22.4 MMOL/L (ref 22–29)
COLOR UR: YELLOW
CREAT SERPL-MCNC: 0.89 MG/DL (ref 0.57–1)
D-LACTATE SERPL-SCNC: 1 MMOL/L (ref 0.5–2)
DEPRECATED RDW RBC AUTO: 50.7 FL (ref 37–54)
ERYTHROCYTE [DISTWIDTH] IN BLOOD BY AUTOMATED COUNT: 14.6 % (ref 12.3–15.4)
FLUAV H1 2009 PAND RNA NPH QL NAA+PROBE: NOT DETECTED
FLUAV H1 HA GENE NPH QL NAA+PROBE: NOT DETECTED
FLUAV H3 RNA NPH QL NAA+PROBE: NOT DETECTED
FLUAV SUBTYP SPEC NAA+PROBE: NOT DETECTED
FLUBV RNA ISLT QL NAA+PROBE: NOT DETECTED
GFR SERPL CREATININE-BSD FRML MDRD: 61 ML/MIN/1.73
GLOBULIN UR ELPH-MCNC: 2.7 GM/DL
GLUCOSE SERPL-MCNC: 99 MG/DL (ref 65–99)
GLUCOSE UR STRIP-MCNC: NEGATIVE MG/DL
HADV DNA SPEC NAA+PROBE: NOT DETECTED
HCOV 229E RNA SPEC QL NAA+PROBE: NOT DETECTED
HCOV HKU1 RNA SPEC QL NAA+PROBE: NOT DETECTED
HCOV NL63 RNA SPEC QL NAA+PROBE: NOT DETECTED
HCOV OC43 RNA SPEC QL NAA+PROBE: NOT DETECTED
HCT VFR BLD AUTO: 35.6 % (ref 34–46.6)
HGB BLD-MCNC: 11.4 G/DL (ref 12–15.9)
HGB UR QL STRIP.AUTO: ABNORMAL
HMPV RNA NPH QL NAA+NON-PROBE: NOT DETECTED
HOLD SPECIMEN: NORMAL
HOLD SPECIMEN: NORMAL
HPIV1 RNA SPEC QL NAA+PROBE: NOT DETECTED
HPIV2 RNA SPEC QL NAA+PROBE: NOT DETECTED
HPIV3 RNA NPH QL NAA+PROBE: NOT DETECTED
HPIV4 P GENE NPH QL NAA+PROBE: NOT DETECTED
HYALINE CASTS UR QL AUTO: NORMAL /LPF
KETONES UR QL STRIP: NEGATIVE
LEUKOCYTE ESTERASE UR QL STRIP.AUTO: NEGATIVE
LIPASE SERPL-CCNC: 19 U/L (ref 13–60)
LYMPHOCYTES # BLD MANUAL: 1.05 10*3/MM3 (ref 0.7–3.1)
LYMPHOCYTES NFR BLD MANUAL: 15.1 % (ref 5–12)
LYMPHOCYTES NFR BLD MANUAL: 5.4 % (ref 19.6–45.3)
M PNEUMO IGG SER IA-ACNC: NOT DETECTED
MCH RBC QN AUTO: 30.4 PG (ref 26.6–33)
MCHC RBC AUTO-ENTMCNC: 32 G/DL (ref 31.5–35.7)
MCV RBC AUTO: 94.9 FL (ref 79–97)
MICROCYTES BLD QL: ABNORMAL
MONOCYTES # BLD AUTO: 2.92 10*3/MM3 (ref 0.1–0.9)
NEUTROPHILS # BLD AUTO: 15.41 10*3/MM3 (ref 1.7–7)
NEUTROPHILS NFR BLD MANUAL: 79.6 % (ref 42.7–76)
NITRITE UR QL STRIP: NEGATIVE
PH UR STRIP.AUTO: 6 [PH] (ref 5–8)
PLAT MORPH BLD: NORMAL
PLATELET # BLD AUTO: 44 10*3/MM3 (ref 140–450)
PMV BLD AUTO: 12.4 FL (ref 6–12)
POLYCHROMASIA BLD QL SMEAR: ABNORMAL
POTASSIUM SERPL-SCNC: 3.7 MMOL/L (ref 3.5–5.2)
PROCALCITONIN SERPL-MCNC: 0.75 NG/ML (ref 0–0.25)
PROT SERPL-MCNC: 7.3 G/DL (ref 6–8.5)
PROT UR QL STRIP: NEGATIVE
RBC # BLD AUTO: 3.75 10*6/MM3 (ref 3.77–5.28)
RBC # UR: NORMAL /HPF
REF LAB TEST METHOD: NORMAL
RHINOVIRUS RNA SPEC NAA+PROBE: NOT DETECTED
RSV RNA NPH QL NAA+NON-PROBE: NOT DETECTED
SARS-COV-2 RNA NPH QL NAA+NON-PROBE: NOT DETECTED
SODIUM SERPL-SCNC: 138 MMOL/L (ref 136–145)
SP GR UR STRIP: 1.01 (ref 1–1.03)
SQUAMOUS #/AREA URNS HPF: NORMAL /HPF
UROBILINOGEN UR QL STRIP: ABNORMAL
WBC # BLD AUTO: 19.36 10*3/MM3 (ref 3.4–10.8)
WBC MORPH BLD: NORMAL
WBC UR QL AUTO: NORMAL /HPF
WHOLE BLOOD HOLD SPECIMEN: NORMAL
WHOLE BLOOD HOLD SPECIMEN: NORMAL

## 2020-09-28 PROCEDURE — 0202U NFCT DS 22 TRGT SARS-COV-2: CPT | Performed by: EMERGENCY MEDICINE

## 2020-09-28 PROCEDURE — 71045 X-RAY EXAM CHEST 1 VIEW: CPT

## 2020-09-28 PROCEDURE — 87086 URINE CULTURE/COLONY COUNT: CPT | Performed by: NURSE PRACTITIONER

## 2020-09-28 PROCEDURE — 81001 URINALYSIS AUTO W/SCOPE: CPT | Performed by: EMERGENCY MEDICINE

## 2020-09-28 PROCEDURE — 85025 COMPLETE CBC W/AUTO DIFF WBC: CPT

## 2020-09-28 PROCEDURE — 85007 BL SMEAR W/DIFF WBC COUNT: CPT | Performed by: EMERGENCY MEDICINE

## 2020-09-28 PROCEDURE — 80053 COMPREHEN METABOLIC PANEL: CPT | Performed by: EMERGENCY MEDICINE

## 2020-09-28 PROCEDURE — 96361 HYDRATE IV INFUSION ADD-ON: CPT

## 2020-09-28 PROCEDURE — 99284 EMERGENCY DEPT VISIT MOD MDM: CPT

## 2020-09-28 PROCEDURE — G0378 HOSPITAL OBSERVATION PER HR: HCPCS

## 2020-09-28 PROCEDURE — 99285 EMERGENCY DEPT VISIT HI MDM: CPT

## 2020-09-28 PROCEDURE — 83605 ASSAY OF LACTIC ACID: CPT | Performed by: EMERGENCY MEDICINE

## 2020-09-28 PROCEDURE — 84145 PROCALCITONIN (PCT): CPT | Performed by: NURSE PRACTITIONER

## 2020-09-28 PROCEDURE — 83690 ASSAY OF LIPASE: CPT | Performed by: EMERGENCY MEDICINE

## 2020-09-28 PROCEDURE — 87040 BLOOD CULTURE FOR BACTERIA: CPT | Performed by: EMERGENCY MEDICINE

## 2020-09-28 PROCEDURE — 36415 COLL VENOUS BLD VENIPUNCTURE: CPT

## 2020-09-28 RX ORDER — GABAPENTIN 300 MG/1
300 CAPSULE ORAL ONCE
Status: COMPLETED | OUTPATIENT
Start: 2020-09-28 | End: 2020-09-28

## 2020-09-28 RX ORDER — PANTOPRAZOLE SODIUM 40 MG/1
40 TABLET, DELAYED RELEASE ORAL
Status: DISCONTINUED | OUTPATIENT
Start: 2020-09-29 | End: 2020-09-29

## 2020-09-28 RX ORDER — ACETAMINOPHEN 325 MG/1
650 TABLET ORAL EVERY 4 HOURS PRN
Status: DISCONTINUED | OUTPATIENT
Start: 2020-09-28 | End: 2020-10-01 | Stop reason: HOSPADM

## 2020-09-28 RX ORDER — LEVOTHYROXINE SODIUM 0.07 MG/1
75 TABLET ORAL
Status: DISCONTINUED | OUTPATIENT
Start: 2020-09-29 | End: 2020-10-01 | Stop reason: HOSPADM

## 2020-09-28 RX ORDER — SODIUM CHLORIDE 0.9 % (FLUSH) 0.9 %
10 SYRINGE (ML) INJECTION EVERY 12 HOURS SCHEDULED
Status: DISCONTINUED | OUTPATIENT
Start: 2020-09-28 | End: 2020-10-01 | Stop reason: HOSPADM

## 2020-09-28 RX ORDER — ACETAMINOPHEN 500 MG
1000 TABLET ORAL ONCE
Status: COMPLETED | OUTPATIENT
Start: 2020-09-28 | End: 2020-09-28

## 2020-09-28 RX ORDER — POTASSIUM CHLORIDE 1.5 G/1.77G
20 POWDER, FOR SOLUTION ORAL DAILY
Status: DISCONTINUED | OUTPATIENT
Start: 2020-09-29 | End: 2020-10-01 | Stop reason: HOSPADM

## 2020-09-28 RX ORDER — SUMATRIPTAN 50 MG/1
50 TABLET, FILM COATED ORAL ONCE
Status: COMPLETED | OUTPATIENT
Start: 2020-09-29 | End: 2020-09-29

## 2020-09-28 RX ORDER — GABAPENTIN 300 MG/1
600 CAPSULE ORAL 3 TIMES DAILY
Status: DISCONTINUED | OUTPATIENT
Start: 2020-09-29 | End: 2020-10-01 | Stop reason: HOSPADM

## 2020-09-28 RX ORDER — ACETAMINOPHEN 160 MG/5ML
650 SOLUTION ORAL EVERY 4 HOURS PRN
Status: DISCONTINUED | OUTPATIENT
Start: 2020-09-28 | End: 2020-10-01 | Stop reason: HOSPADM

## 2020-09-28 RX ORDER — ACETAMINOPHEN 650 MG/1
650 SUPPOSITORY RECTAL EVERY 4 HOURS PRN
Status: DISCONTINUED | OUTPATIENT
Start: 2020-09-28 | End: 2020-10-01 | Stop reason: HOSPADM

## 2020-09-28 RX ORDER — SODIUM CHLORIDE 0.9 % (FLUSH) 0.9 %
10 SYRINGE (ML) INJECTION AS NEEDED
Status: DISCONTINUED | OUTPATIENT
Start: 2020-09-28 | End: 2020-10-01 | Stop reason: HOSPADM

## 2020-09-28 RX ORDER — PROPRANOLOL HYDROCHLORIDE 20 MG/1
10 TABLET ORAL ONCE
Status: COMPLETED | OUTPATIENT
Start: 2020-09-28 | End: 2020-09-28

## 2020-09-28 RX ORDER — ATORVASTATIN CALCIUM 20 MG/1
40 TABLET, FILM COATED ORAL DAILY
Status: DISCONTINUED | OUTPATIENT
Start: 2020-09-29 | End: 2020-10-01 | Stop reason: HOSPADM

## 2020-09-28 RX ORDER — PROPRANOLOL HYDROCHLORIDE 20 MG/1
10 TABLET ORAL 3 TIMES DAILY
Status: DISCONTINUED | OUTPATIENT
Start: 2020-09-29 | End: 2020-09-29

## 2020-09-28 RX ORDER — SODIUM CHLORIDE 9 MG/ML
100 INJECTION, SOLUTION INTRAVENOUS CONTINUOUS
Status: DISCONTINUED | OUTPATIENT
Start: 2020-09-28 | End: 2020-09-30

## 2020-09-28 RX ORDER — ONDANSETRON 2 MG/ML
4 INJECTION INTRAMUSCULAR; INTRAVENOUS EVERY 6 HOURS PRN
Status: DISCONTINUED | OUTPATIENT
Start: 2020-09-28 | End: 2020-10-01 | Stop reason: HOSPADM

## 2020-09-28 RX ORDER — HYDROCHLOROTHIAZIDE 25 MG/1
25 TABLET ORAL DAILY
Status: DISCONTINUED | OUTPATIENT
Start: 2020-09-29 | End: 2020-09-29

## 2020-09-28 RX ORDER — HEPARIN SODIUM (PORCINE) LOCK FLUSH IV SOLN 100 UNIT/ML 100 UNIT/ML
5 SOLUTION INTRAVENOUS AS NEEDED
Status: DISCONTINUED | OUTPATIENT
Start: 2020-09-28 | End: 2020-10-01 | Stop reason: HOSPADM

## 2020-09-28 RX ORDER — SODIUM CHLORIDE 0.9 % (FLUSH) 0.9 %
10 SYRINGE (ML) INJECTION EVERY 12 HOURS SCHEDULED
Status: DISCONTINUED | OUTPATIENT
Start: 2020-09-28 | End: 2020-09-29

## 2020-09-28 RX ORDER — CHOLECALCIFEROL (VITAMIN D3) 125 MCG
10 CAPSULE ORAL NIGHTLY PRN
Status: DISCONTINUED | OUTPATIENT
Start: 2020-09-28 | End: 2020-10-01 | Stop reason: HOSPADM

## 2020-09-28 RX ORDER — SODIUM CHLORIDE 0.9 % (FLUSH) 0.9 %
20 SYRINGE (ML) INJECTION AS NEEDED
Status: DISCONTINUED | OUTPATIENT
Start: 2020-09-28 | End: 2020-10-01 | Stop reason: HOSPADM

## 2020-09-28 RX ADMIN — GABAPENTIN 300 MG: 300 CAPSULE ORAL at 22:01

## 2020-09-28 RX ADMIN — SODIUM CHLORIDE, PRESERVATIVE FREE 10 ML: 5 INJECTION INTRAVENOUS at 23:09

## 2020-09-28 RX ADMIN — SODIUM CHLORIDE 100 ML/HR: 9 INJECTION, SOLUTION INTRAVENOUS at 23:06

## 2020-09-28 RX ADMIN — GABAPENTIN 300 MG: 300 CAPSULE ORAL at 22:19

## 2020-09-28 RX ADMIN — ACETAMINOPHEN 1000 MG: 500 TABLET ORAL at 21:43

## 2020-09-28 RX ADMIN — PROPRANOLOL HYDROCHLORIDE 10 MG: 20 TABLET ORAL at 22:11

## 2020-09-29 ENCOUNTER — APPOINTMENT (OUTPATIENT)
Dept: CARDIOLOGY | Facility: HOSPITAL | Age: 80
End: 2020-09-29

## 2020-09-29 ENCOUNTER — APPOINTMENT (OUTPATIENT)
Dept: CT IMAGING | Facility: HOSPITAL | Age: 80
End: 2020-09-29

## 2020-09-29 PROBLEM — K52.9 COLITIS, ACUTE: Status: ACTIVE | Noted: 2020-09-29

## 2020-09-29 PROBLEM — R93.3 ABNORMAL CT SCAN, COLON: Status: ACTIVE | Noted: 2020-09-29

## 2020-09-29 LAB
APTT PPP: 31.3 SECONDS (ref 22.7–35.4)
BACTERIA SPEC AEROBE CULT: NORMAL
BASOPHILS # BLD MANUAL: 0.12 10*3/MM3 (ref 0–0.2)
BASOPHILS NFR BLD AUTO: 1 % (ref 0–1.5)
DEPRECATED RDW RBC AUTO: 47.9 FL (ref 37–54)
EOSINOPHIL # BLD MANUAL: 0.23 10*3/MM3 (ref 0–0.4)
EOSINOPHIL NFR BLD MANUAL: 2 % (ref 0.3–6.2)
ERYTHROCYTE [DISTWIDTH] IN BLOOD BY AUTOMATED COUNT: 14.4 % (ref 12.3–15.4)
FERRITIN SERPL-MCNC: 98 NG/ML (ref 13–150)
FIBRINOGEN PPP-MCNC: 408 MG/DL (ref 219–464)
FOLATE SERPL-MCNC: 8.17 NG/ML (ref 4.78–24.2)
HCT VFR BLD AUTO: 31 % (ref 34–46.6)
HGB BLD-MCNC: 10.4 G/DL (ref 12–15.9)
HGB RETIC QN AUTO: 32.3 PG (ref 29.8–36.1)
IMM RETICS NFR: 20.4 % (ref 3–15.8)
INR PPP: 1.22 (ref 0.9–1.1)
IRON 24H UR-MRATE: 27 MCG/DL (ref 37–145)
IRON SATN MFR SERPL: 9 % (ref 20–50)
LYMPHOCYTES # BLD MANUAL: 1.62 10*3/MM3 (ref 0.7–3.1)
LYMPHOCYTES NFR BLD MANUAL: 14 % (ref 19.6–45.3)
LYMPHOCYTES NFR BLD MANUAL: 33 % (ref 5–12)
MCH RBC QN AUTO: 31 PG (ref 26.6–33)
MCHC RBC AUTO-ENTMCNC: 33.5 G/DL (ref 31.5–35.7)
MCV RBC AUTO: 92.5 FL (ref 79–97)
MONOCYTES # BLD AUTO: 3.83 10*3/MM3 (ref 0.1–0.9)
NEUTROPHILS # BLD AUTO: 5.8 10*3/MM3 (ref 1.7–7)
NEUTROPHILS NFR BLD MANUAL: 50 % (ref 42.7–76)
PLAT MORPH BLD: NORMAL
PLATELET # BLD AUTO: 38 10*3/MM3 (ref 140–450)
PLATELET # BLD AUTO: 38 10*3/MM3 (ref 140–450)
PLATELETS.RETICULATED NFR BLD AUTO: 19.7 % (ref 0.9–6.5)
PMV BLD AUTO: 12.7 FL (ref 6–12)
PROTHROMBIN TIME: 15.2 SECONDS (ref 11.7–14.2)
RBC # BLD AUTO: 3.35 10*6/MM3 (ref 3.77–5.28)
RBC MORPH BLD: NORMAL
RETICS # AUTO: 0.11 10*6/MM3 (ref 0.02–0.13)
RETICS/RBC NFR AUTO: 3.34 % (ref 0.7–1.9)
TIBC SERPL-MCNC: 292 MCG/DL (ref 298–536)
TRANSFERRIN SERPL-MCNC: 196 MG/DL (ref 200–360)
VIT B12 BLD-MCNC: 1619 PG/ML (ref 211–946)
WBC # BLD AUTO: 11.6 10*3/MM3 (ref 3.4–10.8)
WBC MORPH BLD: NORMAL

## 2020-09-29 PROCEDURE — 85610 PROTHROMBIN TIME: CPT | Performed by: INTERNAL MEDICINE

## 2020-09-29 PROCEDURE — 85007 BL SMEAR W/DIFF WBC COUNT: CPT | Performed by: INTERNAL MEDICINE

## 2020-09-29 PROCEDURE — 25010000002 IRON SUCROSE PER 1 MG: Performed by: NURSE PRACTITIONER

## 2020-09-29 PROCEDURE — 83540 ASSAY OF IRON: CPT | Performed by: INTERNAL MEDICINE

## 2020-09-29 PROCEDURE — 99205 OFFICE O/P NEW HI 60 MIN: CPT | Performed by: INTERNAL MEDICINE

## 2020-09-29 PROCEDURE — 25010000002 VANCOMYCIN 10 G RECONSTITUTED SOLUTION: Performed by: NURSE PRACTITIONER

## 2020-09-29 PROCEDURE — 84466 ASSAY OF TRANSFERRIN: CPT | Performed by: INTERNAL MEDICINE

## 2020-09-29 PROCEDURE — G0378 HOSPITAL OBSERVATION PER HR: HCPCS

## 2020-09-29 PROCEDURE — 93306 TTE W/DOPPLER COMPLETE: CPT | Performed by: INTERNAL MEDICINE

## 2020-09-29 PROCEDURE — 96367 TX/PROPH/DG ADDL SEQ IV INF: CPT

## 2020-09-29 PROCEDURE — 93356 MYOCRD STRAIN IMG SPCKL TRCK: CPT | Performed by: INTERNAL MEDICINE

## 2020-09-29 PROCEDURE — 85055 RETICULATED PLATELET ASSAY: CPT | Performed by: INTERNAL MEDICINE

## 2020-09-29 PROCEDURE — 99215 OFFICE O/P EST HI 40 MIN: CPT | Performed by: INTERNAL MEDICINE

## 2020-09-29 PROCEDURE — 82728 ASSAY OF FERRITIN: CPT | Performed by: INTERNAL MEDICINE

## 2020-09-29 PROCEDURE — 87040 BLOOD CULTURE FOR BACTERIA: CPT | Performed by: NURSE PRACTITIONER

## 2020-09-29 PROCEDURE — 93306 TTE W/DOPPLER COMPLETE: CPT

## 2020-09-29 PROCEDURE — 96361 HYDRATE IV INFUSION ADD-ON: CPT

## 2020-09-29 PROCEDURE — 0 DIATRIZOATE MEGLUMINE & SODIUM PER 1 ML: Performed by: INTERNAL MEDICINE

## 2020-09-29 PROCEDURE — 82607 VITAMIN B-12: CPT | Performed by: INTERNAL MEDICINE

## 2020-09-29 PROCEDURE — 71260 CT THORAX DX C+: CPT

## 2020-09-29 PROCEDURE — 74177 CT ABD & PELVIS W/CONTRAST: CPT

## 2020-09-29 PROCEDURE — 96365 THER/PROPH/DIAG IV INF INIT: CPT

## 2020-09-29 PROCEDURE — 85046 RETICYTE/HGB CONCENTRATE: CPT | Performed by: INTERNAL MEDICINE

## 2020-09-29 PROCEDURE — 82746 ASSAY OF FOLIC ACID SERUM: CPT | Performed by: INTERNAL MEDICINE

## 2020-09-29 PROCEDURE — 85025 COMPLETE CBC W/AUTO DIFF WBC: CPT | Performed by: INTERNAL MEDICINE

## 2020-09-29 PROCEDURE — 25010000002 IOPAMIDOL 61 % SOLUTION: Performed by: HOSPITALIST

## 2020-09-29 PROCEDURE — 93356 MYOCRD STRAIN IMG SPCKL TRCK: CPT

## 2020-09-29 PROCEDURE — 85730 THROMBOPLASTIN TIME PARTIAL: CPT | Performed by: INTERNAL MEDICINE

## 2020-09-29 PROCEDURE — 85384 FIBRINOGEN ACTIVITY: CPT | Performed by: INTERNAL MEDICINE

## 2020-09-29 RX ORDER — SODIUM CHLORIDE 0.9 % (FLUSH) 0.9 %
10 SYRINGE (ML) INJECTION AS NEEDED
Status: CANCELLED | OUTPATIENT
Start: 2020-09-28

## 2020-09-29 RX ORDER — ACETAMINOPHEN 160 MG/5ML
650 SOLUTION ORAL EVERY 4 HOURS PRN
Status: CANCELLED | OUTPATIENT
Start: 2020-09-28

## 2020-09-29 RX ORDER — SODIUM CHLORIDE 0.9 % (FLUSH) 0.9 %
10 SYRINGE (ML) INJECTION EVERY 12 HOURS SCHEDULED
Status: CANCELLED | OUTPATIENT
Start: 2020-09-28

## 2020-09-29 RX ORDER — ACETAMINOPHEN 325 MG/1
650 TABLET ORAL EVERY 4 HOURS PRN
Status: CANCELLED | OUTPATIENT
Start: 2020-09-28

## 2020-09-29 RX ORDER — FAMOTIDINE 20 MG/1
20 TABLET, FILM COATED ORAL
Status: DISCONTINUED | OUTPATIENT
Start: 2020-09-29 | End: 2020-10-01 | Stop reason: HOSPADM

## 2020-09-29 RX ORDER — SODIUM CHLORIDE 9 MG/ML
100 INJECTION, SOLUTION INTRAVENOUS CONTINUOUS
Status: CANCELLED | OUTPATIENT
Start: 2020-09-28

## 2020-09-29 RX ORDER — ACETAMINOPHEN 650 MG/1
650 SUPPOSITORY RECTAL EVERY 4 HOURS PRN
Status: CANCELLED | OUTPATIENT
Start: 2020-09-28

## 2020-09-29 RX ORDER — PROPRANOLOL HYDROCHLORIDE 20 MG/1
10 TABLET ORAL
Status: DISCONTINUED | OUTPATIENT
Start: 2020-09-29 | End: 2020-10-01 | Stop reason: HOSPADM

## 2020-09-29 RX ORDER — ONDANSETRON 2 MG/ML
4 INJECTION INTRAMUSCULAR; INTRAVENOUS EVERY 6 HOURS PRN
Status: CANCELLED | OUTPATIENT
Start: 2020-09-28

## 2020-09-29 RX ADMIN — PROPRANOLOL HYDROCHLORIDE 10 MG: 20 TABLET ORAL at 08:21

## 2020-09-29 RX ADMIN — PROPRANOLOL HYDROCHLORIDE 10 MG: 20 TABLET ORAL at 12:00

## 2020-09-29 RX ADMIN — FAMOTIDINE 20 MG: 20 TABLET, FILM COATED ORAL at 17:40

## 2020-09-29 RX ADMIN — HYDROCHLOROTHIAZIDE 25 MG: 25 TABLET ORAL at 08:21

## 2020-09-29 RX ADMIN — GABAPENTIN 600 MG: 300 CAPSULE ORAL at 12:41

## 2020-09-29 RX ADMIN — Medication 10 MG: at 22:57

## 2020-09-29 RX ADMIN — PANTOPRAZOLE SODIUM 40 MG: 40 TABLET, DELAYED RELEASE ORAL at 05:25

## 2020-09-29 RX ADMIN — ATORVASTATIN CALCIUM 40 MG: 20 TABLET, FILM COATED ORAL at 08:21

## 2020-09-29 RX ADMIN — SUMATRIPTAN SUCCINATE 50 MG: 50 TABLET ORAL at 00:38

## 2020-09-29 RX ADMIN — LEVOTHYROXINE SODIUM 75 MCG: 75 TABLET ORAL at 05:25

## 2020-09-29 RX ADMIN — DIATRIZOATE MEGLUMINE AND DIATRIZOATE SODIUM 30 ML: 600; 100 SOLUTION ORAL; RECTAL at 08:21

## 2020-09-29 RX ADMIN — POTASSIUM CHLORIDE 20 MEQ: 1.5 POWDER, FOR SOLUTION ORAL at 08:21

## 2020-09-29 RX ADMIN — ACETAMINOPHEN 650 MG: 325 TABLET, FILM COATED ORAL at 12:34

## 2020-09-29 RX ADMIN — Medication 10 MG: at 00:37

## 2020-09-29 RX ADMIN — GABAPENTIN 600 MG: 300 CAPSULE ORAL at 17:40

## 2020-09-29 RX ADMIN — IRON SUCROSE 100 MG: 20 INJECTION, SOLUTION INTRAVENOUS at 20:05

## 2020-09-29 RX ADMIN — IOPAMIDOL 85 ML: 612 INJECTION, SOLUTION INTRAVENOUS at 11:15

## 2020-09-29 RX ADMIN — SODIUM CHLORIDE 100 ML/HR: 9 INJECTION, SOLUTION INTRAVENOUS at 21:52

## 2020-09-29 RX ADMIN — SODIUM CHLORIDE, PRESERVATIVE FREE 10 ML: 5 INJECTION INTRAVENOUS at 08:28

## 2020-09-29 RX ADMIN — VANCOMYCIN HYDROCHLORIDE 1500 MG: 10 INJECTION, POWDER, LYOPHILIZED, FOR SOLUTION INTRAVENOUS at 00:38

## 2020-09-29 RX ADMIN — GABAPENTIN 600 MG: 300 CAPSULE ORAL at 08:21

## 2020-09-29 RX ADMIN — SODIUM CHLORIDE, PRESERVATIVE FREE 10 ML: 5 INJECTION INTRAVENOUS at 20:05

## 2020-09-29 RX ADMIN — PROPRANOLOL HYDROCHLORIDE 10 MG: 20 TABLET ORAL at 17:40

## 2020-09-30 PROBLEM — G43.009 NONINTRACTABLE MIGRAINE: Status: ACTIVE | Noted: 2020-09-30

## 2020-09-30 PROBLEM — D72.829 LEUKOCYTOSIS: Status: RESOLVED | Noted: 2020-09-28 | Resolved: 2020-09-30

## 2020-09-30 PROBLEM — A04.0 ENTEROPATHOGENIC ESCHERICHIA COLI INFECTION: Status: ACTIVE | Noted: 2020-09-29

## 2020-09-30 PROBLEM — R50.9 FEVER: Status: RESOLVED | Noted: 2020-09-28 | Resolved: 2020-09-30

## 2020-09-30 LAB
ADV 40+41 DNA STL QL NAA+NON-PROBE: NOT DETECTED
ANION GAP SERPL CALCULATED.3IONS-SCNC: 10.3 MMOL/L (ref 5–15)
ASTRO TYP 1-8 RNA STL QL NAA+NON-PROBE: NOT DETECTED
BASOPHILS # BLD AUTO: 0.01 10*3/MM3 (ref 0–0.2)
BASOPHILS NFR BLD AUTO: 0.1 % (ref 0–1.5)
BUN SERPL-MCNC: 8 MG/DL (ref 8–23)
BUN/CREAT SERPL: 10.7 (ref 7–25)
C CAYETANENSIS DNA STL QL NAA+NON-PROBE: NOT DETECTED
C DIFF TOX GENS STL QL NAA+PROBE: NEGATIVE
CALCIUM SPEC-SCNC: 8.4 MG/DL (ref 8.6–10.5)
CAMPY SP DNA.DIARRHEA STL QL NAA+PROBE: NOT DETECTED
CHLORIDE SERPL-SCNC: 108 MMOL/L (ref 98–107)
CO2 SERPL-SCNC: 22.7 MMOL/L (ref 22–29)
CREAT SERPL-MCNC: 0.75 MG/DL (ref 0.57–1)
CRYPTOSP STL CULT: NOT DETECTED
DEPRECATED RDW RBC AUTO: 50.3 FL (ref 37–54)
E COLI DNA SPEC QL NAA+PROBE: NOT DETECTED
E HISTOLYT AG STL-ACNC: NOT DETECTED
EAEC PAA PLAS AGGR+AATA ST NAA+NON-PRB: NOT DETECTED
EC STX1 + STX2 GENES STL NAA+PROBE: NOT DETECTED
EOSINOPHIL # BLD AUTO: 0.12 10*3/MM3 (ref 0–0.4)
EOSINOPHIL NFR BLD AUTO: 1.2 % (ref 0.3–6.2)
EPEC EAE GENE STL QL NAA+NON-PROBE: DETECTED
ERYTHROCYTE [DISTWIDTH] IN BLOOD BY AUTOMATED COUNT: 14.5 % (ref 12.3–15.4)
ETEC LTA+ST1A+ST1B TOX ST NAA+NON-PROBE: NOT DETECTED
G LAMBLIA DNA SPEC QL NAA+PROBE: NOT DETECTED
GFR SERPL CREATININE-BSD FRML MDRD: 75 ML/MIN/1.73
GLUCOSE SERPL-MCNC: 107 MG/DL (ref 65–99)
HCT VFR BLD AUTO: 30.2 % (ref 34–46.6)
HGB BLD-MCNC: 9.9 G/DL (ref 12–15.9)
LYMPHOCYTES # BLD AUTO: 1.43 10*3/MM3 (ref 0.7–3.1)
LYMPHOCYTES NFR BLD AUTO: 13.9 % (ref 19.6–45.3)
MCH RBC QN AUTO: 31 PG (ref 26.6–33)
MCHC RBC AUTO-ENTMCNC: 32.8 G/DL (ref 31.5–35.7)
MCV RBC AUTO: 94.7 FL (ref 79–97)
MONOCYTES # BLD AUTO: 3.79 10*3/MM3 (ref 0.1–0.9)
MONOCYTES NFR BLD AUTO: 36.8 % (ref 5–12)
NEUTROPHILS NFR BLD AUTO: 4.81 10*3/MM3 (ref 1.7–7)
NEUTROPHILS NFR BLD AUTO: 46.5 % (ref 42.7–76)
NOROVIRUS GI+II RNA STL QL NAA+NON-PROBE: NOT DETECTED
P SHIGELLOIDES DNA STL QL NAA+PROBE: NOT DETECTED
PLATELET # BLD AUTO: 34 10*3/MM3 (ref 140–450)
PMV BLD AUTO: 13.7 FL (ref 6–12)
POTASSIUM SERPL-SCNC: 3.5 MMOL/L (ref 3.5–5.2)
RBC # BLD AUTO: 3.19 10*6/MM3 (ref 3.77–5.28)
RV RNA STL NAA+PROBE: NOT DETECTED
SALMONELLA DNA SPEC QL NAA+PROBE: NOT DETECTED
SAPO I+II+IV+V RNA STL QL NAA+NON-PROBE: NOT DETECTED
SHIGELLA SP+EIEC IPAH STL QL NAA+PROBE: NOT DETECTED
SODIUM SERPL-SCNC: 141 MMOL/L (ref 136–145)
V CHOLERAE DNA SPEC QL NAA+PROBE: NOT DETECTED
VIBRIO DNA SPEC NAA+PROBE: NOT DETECTED
WBC # BLD AUTO: 10.31 10*3/MM3 (ref 3.4–10.8)
YERSINIA STL CULT: NOT DETECTED

## 2020-09-30 PROCEDURE — 87493 C DIFF AMPLIFIED PROBE: CPT | Performed by: HOSPITALIST

## 2020-09-30 PROCEDURE — 99214 OFFICE O/P EST MOD 30 MIN: CPT | Performed by: INTERNAL MEDICINE

## 2020-09-30 PROCEDURE — G0378 HOSPITAL OBSERVATION PER HR: HCPCS

## 2020-09-30 PROCEDURE — 96366 THER/PROPH/DIAG IV INF ADDON: CPT

## 2020-09-30 PROCEDURE — 25010000002 ONDANSETRON PER 1 MG: Performed by: NURSE PRACTITIONER

## 2020-09-30 PROCEDURE — 0097U HC BIOFIRE FILMARRAY GI PANEL: CPT | Performed by: HOSPITALIST

## 2020-09-30 PROCEDURE — 80048 BASIC METABOLIC PNL TOTAL CA: CPT | Performed by: HOSPITALIST

## 2020-09-30 PROCEDURE — 96375 TX/PRO/DX INJ NEW DRUG ADDON: CPT

## 2020-09-30 PROCEDURE — 99215 OFFICE O/P EST HI 40 MIN: CPT | Performed by: INTERNAL MEDICINE

## 2020-09-30 PROCEDURE — 85025 COMPLETE CBC W/AUTO DIFF WBC: CPT | Performed by: INTERNAL MEDICINE

## 2020-09-30 PROCEDURE — 63710000001 DIPHENHYDRAMINE PER 50 MG: Performed by: INTERNAL MEDICINE

## 2020-09-30 PROCEDURE — 25010000002 IRON SUCROSE PER 1 MG: Performed by: INTERNAL MEDICINE

## 2020-09-30 RX ORDER — ACETAMINOPHEN 325 MG/1
325 TABLET ORAL DAILY
Status: COMPLETED | OUTPATIENT
Start: 2020-09-30 | End: 2020-09-30

## 2020-09-30 RX ORDER — SUMATRIPTAN 50 MG/1
50 TABLET, FILM COATED ORAL ONCE AS NEEDED
Status: COMPLETED | OUTPATIENT
Start: 2020-09-30 | End: 2020-09-30

## 2020-09-30 RX ORDER — DIPHENHYDRAMINE HCL 25 MG
25 CAPSULE ORAL ONCE
Status: COMPLETED | OUTPATIENT
Start: 2020-09-30 | End: 2020-09-30

## 2020-09-30 RX ADMIN — GABAPENTIN 600 MG: 300 CAPSULE ORAL at 05:38

## 2020-09-30 RX ADMIN — SODIUM CHLORIDE, PRESERVATIVE FREE 10 ML: 5 INJECTION INTRAVENOUS at 08:46

## 2020-09-30 RX ADMIN — FAMOTIDINE 20 MG: 20 TABLET, FILM COATED ORAL at 17:17

## 2020-09-30 RX ADMIN — GABAPENTIN 600 MG: 300 CAPSULE ORAL at 17:17

## 2020-09-30 RX ADMIN — LEVOTHYROXINE SODIUM 75 MCG: 75 TABLET ORAL at 05:38

## 2020-09-30 RX ADMIN — IRON SUCROSE 300 MG: 20 INJECTION, SOLUTION INTRAVENOUS at 16:06

## 2020-09-30 RX ADMIN — PROPRANOLOL HYDROCHLORIDE 10 MG: 20 TABLET ORAL at 12:43

## 2020-09-30 RX ADMIN — FAMOTIDINE 20 MG: 20 TABLET, FILM COATED ORAL at 05:38

## 2020-09-30 RX ADMIN — ONDANSETRON HYDROCHLORIDE 4 MG: 2 INJECTION, SOLUTION INTRAMUSCULAR; INTRAVENOUS at 14:10

## 2020-09-30 RX ADMIN — PROPRANOLOL HYDROCHLORIDE 10 MG: 20 TABLET ORAL at 17:17

## 2020-09-30 RX ADMIN — PROPRANOLOL HYDROCHLORIDE 10 MG: 20 TABLET ORAL at 05:39

## 2020-09-30 RX ADMIN — ATORVASTATIN CALCIUM 40 MG: 20 TABLET, FILM COATED ORAL at 08:45

## 2020-09-30 RX ADMIN — SUMATRIPTAN SUCCINATE 50 MG: 50 TABLET ORAL at 16:19

## 2020-09-30 RX ADMIN — SODIUM CHLORIDE, PRESERVATIVE FREE 10 ML: 5 INJECTION INTRAVENOUS at 19:37

## 2020-09-30 RX ADMIN — Medication 10 MG: at 22:42

## 2020-09-30 RX ADMIN — GABAPENTIN 600 MG: 300 CAPSULE ORAL at 12:42

## 2020-09-30 RX ADMIN — ACETAMINOPHEN 650 MG: 325 TABLET, FILM COATED ORAL at 05:42

## 2020-09-30 RX ADMIN — POTASSIUM CHLORIDE 20 MEQ: 1.5 POWDER, FOR SOLUTION ORAL at 08:45

## 2020-09-30 RX ADMIN — ACETAMINOPHEN 325 MG: 325 TABLET, FILM COATED ORAL at 15:42

## 2020-09-30 RX ADMIN — DIPHENHYDRAMINE HYDROCHLORIDE 25 MG: 25 CAPSULE ORAL at 15:41

## 2020-10-01 ENCOUNTER — READMISSION MANAGEMENT (OUTPATIENT)
Dept: CALL CENTER | Facility: HOSPITAL | Age: 80
End: 2020-10-01

## 2020-10-01 VITALS
SYSTOLIC BLOOD PRESSURE: 111 MMHG | WEIGHT: 156 LBS | DIASTOLIC BLOOD PRESSURE: 66 MMHG | OXYGEN SATURATION: 97 % | HEART RATE: 71 BPM | HEIGHT: 61 IN | TEMPERATURE: 98.7 F | BODY MASS INDEX: 29.45 KG/M2 | RESPIRATION RATE: 16 BRPM

## 2020-10-01 DIAGNOSIS — D72.829 LEUKOCYTOSIS, UNSPECIFIED TYPE: Primary | ICD-10-CM

## 2020-10-01 DIAGNOSIS — D69.6 THROMBOCYTOPENIA (HCC): ICD-10-CM

## 2020-10-01 LAB
ALBUMIN SERPL-MCNC: 3.6 G/DL (ref 3.5–5.2)
ANION GAP SERPL CALCULATED.3IONS-SCNC: 7 MMOL/L (ref 5–15)
BASOPHILS # BLD AUTO: 0.02 10*3/MM3 (ref 0–0.2)
BASOPHILS NFR BLD AUTO: 0.2 % (ref 0–1.5)
BUN SERPL-MCNC: 9 MG/DL (ref 8–23)
BUN/CREAT SERPL: 13.2 (ref 7–25)
CALCIUM SPEC-SCNC: 8.5 MG/DL (ref 8.6–10.5)
CHLORIDE SERPL-SCNC: 110 MMOL/L (ref 98–107)
CO2 SERPL-SCNC: 25 MMOL/L (ref 22–29)
CREAT SERPL-MCNC: 0.68 MG/DL (ref 0.57–1)
DEPRECATED RDW RBC AUTO: 49.6 FL (ref 37–54)
EOSINOPHIL # BLD AUTO: 0.15 10*3/MM3 (ref 0–0.4)
EOSINOPHIL NFR BLD AUTO: 1.7 % (ref 0.3–6.2)
ERYTHROCYTE [DISTWIDTH] IN BLOOD BY AUTOMATED COUNT: 14.5 % (ref 12.3–15.4)
GFR SERPL CREATININE-BSD FRML MDRD: 83 ML/MIN/1.73
GLUCOSE SERPL-MCNC: 103 MG/DL (ref 65–99)
HCT VFR BLD AUTO: 29.1 % (ref 34–46.6)
HGB BLD-MCNC: 9.3 G/DL (ref 12–15.9)
LYMPHOCYTES # BLD AUTO: 1.69 10*3/MM3 (ref 0.7–3.1)
LYMPHOCYTES NFR BLD AUTO: 18.8 % (ref 19.6–45.3)
MCH RBC QN AUTO: 30.4 PG (ref 26.6–33)
MCHC RBC AUTO-ENTMCNC: 32 G/DL (ref 31.5–35.7)
MCV RBC AUTO: 95.1 FL (ref 79–97)
MONOCYTES # BLD AUTO: 2.68 10*3/MM3 (ref 0.1–0.9)
MONOCYTES NFR BLD AUTO: 29.9 % (ref 5–12)
NEUTROPHILS NFR BLD AUTO: 4.1 10*3/MM3 (ref 1.7–7)
NEUTROPHILS NFR BLD AUTO: 45.7 % (ref 42.7–76)
PHOSPHATE SERPL-MCNC: 3.2 MG/DL (ref 2.5–4.5)
PLATELET # BLD AUTO: 33 10*3/MM3 (ref 140–450)
PMV BLD AUTO: 11.8 FL (ref 6–12)
POTASSIUM SERPL-SCNC: 4.1 MMOL/L (ref 3.5–5.2)
RBC # BLD AUTO: 3.06 10*6/MM3 (ref 3.77–5.28)
SODIUM SERPL-SCNC: 142 MMOL/L (ref 136–145)
WBC # BLD AUTO: 8.97 10*3/MM3 (ref 3.4–10.8)

## 2020-10-01 PROCEDURE — G0378 HOSPITAL OBSERVATION PER HR: HCPCS

## 2020-10-01 PROCEDURE — 99214 OFFICE O/P EST MOD 30 MIN: CPT | Performed by: INTERNAL MEDICINE

## 2020-10-01 PROCEDURE — 85025 COMPLETE CBC W/AUTO DIFF WBC: CPT | Performed by: INTERNAL MEDICINE

## 2020-10-01 PROCEDURE — 80069 RENAL FUNCTION PANEL: CPT | Performed by: HOSPITALIST

## 2020-10-01 PROCEDURE — 25010000003 HEPARIN LOCK FLUSH PER 10 UNITS: Performed by: EMERGENCY MEDICINE

## 2020-10-01 RX ORDER — DICLOFENAC SODIUM 75 MG/1
TABLET, DELAYED RELEASE ORAL
Qty: 180 TABLET | Refills: 0 | Status: SHIPPED | OUTPATIENT
Start: 2020-10-01 | End: 2020-10-14

## 2020-10-01 RX ADMIN — POTASSIUM CHLORIDE 20 MEQ: 1.5 POWDER, FOR SOLUTION ORAL at 08:51

## 2020-10-01 RX ADMIN — PROPRANOLOL HYDROCHLORIDE 10 MG: 20 TABLET ORAL at 05:07

## 2020-10-01 RX ADMIN — HEPARIN 500 UNITS: 100 SYRINGE at 08:58

## 2020-10-01 RX ADMIN — ATORVASTATIN CALCIUM 40 MG: 20 TABLET, FILM COATED ORAL at 08:51

## 2020-10-01 RX ADMIN — FAMOTIDINE 20 MG: 20 TABLET, FILM COATED ORAL at 05:07

## 2020-10-01 RX ADMIN — LEVOTHYROXINE SODIUM 75 MCG: 75 TABLET ORAL at 05:07

## 2020-10-01 RX ADMIN — GABAPENTIN 600 MG: 300 CAPSULE ORAL at 05:07

## 2020-10-01 RX ADMIN — SODIUM CHLORIDE, PRESERVATIVE FREE 10 ML: 5 INJECTION INTRAVENOUS at 08:53

## 2020-10-01 RX ADMIN — ACETAMINOPHEN 650 MG: 325 TABLET, FILM COATED ORAL at 10:38

## 2020-10-01 NOTE — PROGRESS NOTES
REASON FOR FOLLOWUP/CHIEF COMPLAINT:    Breast cancer.  Colitis.  Cytopenias.    HISTORY OF PRESENT ILLNESS:   Diarrhea resolved  Had a formed bowel movement this morning.  Planning to go home.  Mild nausea which she attributes to drinking some orange juice.    Past Medical History, Past Surgical History, Social History, Family History have been reviewed and are without significant changes except as mentioned.    Review of Systems   Review of Systems   Constitutional: Negative for activity change.   HENT: Negative for nosebleeds and trouble swallowing.    Respiratory: Negative for shortness of breath and wheezing.    Cardiovascular: Negative for chest pain and palpitations.   Gastrointestinal: Positive for nausea. Negative for constipation and diarrhea.   Genitourinary: Negative for dysuria and hematuria.   Musculoskeletal: Negative for arthralgias and myalgias.   Skin: Negative for rash and wound.   Neurological: Negative for seizures and syncope.   Hematological: Negative for adenopathy. Does not bruise/bleed easily.   Psychiatric/Behavioral: Negative for confusion.       Medications:  The current medication list was reviewed in the EMR    ALLERGIES:    Allergies   Allergen Reactions   • Codeine Nausea And Vomiting   • Morphine Nausea And Vomiting              Vitals:    09/30/20 1517 09/30/20 1930 10/01/20 0457 10/01/20 0804   BP: 131/62 120/64 115/60 111/66   BP Location: Right arm Left arm Left arm Left arm   Patient Position: Lying Lying Lying Lying   Pulse: 75 68 73 71   Resp: 18 18 16 16   Temp: 99.2 °F (37.3 °C) 98 °F (36.7 °C) 97.9 °F (36.6 °C) 98.7 °F (37.1 °C)   TempSrc: Oral Oral Oral Oral   SpO2: 97% 96% 98% 97%   Weight:       Height:         Physical Exam    CONSTITUTIONAL:  Vital signs reviewed.  No distress, looks comfortable.  EYES:  Conjunctiva and lids unremarkable.  PERRLA  EARS,NOSE,MOUTH,THROAT:  Ears and nose appear unremarkable.  Lips, teeth, gums appear unremarkable.  RESPIRATORY:   Normal respiratory effort.  Lungs clear to auscultation bilaterally.  CARDIOVASCULAR:  Normal S1, S2.  No murmurs rubs or gallops.  No significant lower extremity edema.  GASTROINTESTINAL: Abdomen appears unremarkable.  Nontender.  No hepatomegaly.  No splenomegaly.  NEURO: cranial nerves 2-12 grossly intact.  No focal deficits.  Appears to have equal strength all 4 extremities.  MUSCULOSKELETAL:  Unremarkable digits/nails.  No cyanosis or clubbing.  SKIN:  Warm.  No rashes.  PSYCHIATRIC:  Normal judgment and insight.  Normal mood and affect.     RECENT LABS:  WBC   Date Value Ref Range Status   10/01/2020 8.97 3.40 - 10.80 10*3/mm3 Final   09/30/2020 10.31 3.40 - 10.80 10*3/mm3 Final   09/29/2020 11.60 (H) 3.40 - 10.80 10*3/mm3 Final   09/28/2020 19.36 (H) 3.40 - 10.80 10*3/mm3 Final     Hemoglobin   Date Value Ref Range Status   10/01/2020 9.3 (L) 12.0 - 15.9 g/dL Final   09/30/2020 9.9 (L) 12.0 - 15.9 g/dL Final   09/29/2020 10.4 (L) 12.0 - 15.9 g/dL Final   09/28/2020 11.4 (L) 12.0 - 15.9 g/dL Final     Platelets   Date Value Ref Range Status   10/01/2020 33 (C) 140 - 450 10*3/mm3 Final   09/30/2020 34 (C) 140 - 450 10*3/mm3 Final   09/29/2020 38 (C) 140 - 450 10*3/mm3 Final   09/29/2020 38 (C) 140 - 450 10*3/mm3 Final   09/28/2020 44 (C) 140 - 450 10*3/mm3 Final       ASSESSMENT/PLAN:  Roxana Brizuela [id number]        *Stage IIIa right breast cancer.    · T3N 1/2 M0.  ER negative.  IN 5 to 10%.  HER-2 positive by FISH.    · Neoadjuvant Herceptin Perjeta Taxol.    · Right mastectomy, Dr. Melo, 11/7/2017: Pathologic complete remission.  · Declined adjuvant XRT.  Did not receive more Herceptin due to a prolonged illness from influenza with associated pneumonia.  · Has remained in remission since then.  · Followed by Dr. Coburn, in our office, last visit 7/7/2020.  · CT CAP 9/29/2020: No evidence of recurrence.     *Chronic thrombocytopenia  PLT baseline mostly 60-95.  PLT 33 (suggested remaining off NSAIDs until  PLT returns to baseline)    *Chronic anemia  Hb 9.3    *Iron deficiency anemia.  · Ferritin 98, 9% saturation.  Reticulate hemoglobin normal.  MCV 92.  · She was given Venofer 100 mg IV 9/29/2020.  · Venofer 300 mg IV 9/30/2020.  (Will not give more than this)     *Intermittent mild neutropenia.  Not an issue currently is currently she has had leukocytosis     *Etiology of cytopenias.  This is not been clear.  She is followed by Dr. Coburn in our office.  · Unremarkable: B12, folate, fibrinogen, PTT.  INR only minimally increased at 1.2.     *Leukocytosis.  Predominance of mature segmented neutrophils.  Therefore, appears reactive.  I suspect this is due to what ever is causing her fever.  WBC down to 9     *Fever and myalgias.  This began the day of admission, 9/28/2020.    *Colitis.  GI and ID have evaluated.  Holding on antibiotics due to history of C. Difficile.    *Hemorrhoids.  I suggested using Tucks pads for wiping.     Plan  · Weekly CBC and IPF with nurse review in our office.  · Dr. Coburn with CBC and IPF in office in 3 weeks.  · Transfuse 1 unit PLT if PLT <20.    Chart reviewed and summarized including hospitalist note

## 2020-10-01 NOTE — PROGRESS NOTES
Case Management Discharge Note      Final Note: Home with family--no needs    Provided Post Acute Provider List?: Yes  Post Acute Provider List: Home Health, Nursing Home  Provided Post Acute Provider Quality & Resource List?: N/A  Delivered To: Patient  Method of Delivery: In person    Selected Continued Care - Discharged on 10/1/2020 Admission date: 9/28/2020 - Discharge disposition: Home or Self Care    Destination    No services have been selected for the patient.              Durable Medical Equipment    No services have been selected for the patient.              Dialysis/Infusion    No services have been selected for the patient.              Home Medical Care    No services have been selected for the patient.              Therapy    No services have been selected for the patient.              Community Resources    No services have been selected for the patient.                       Final Discharge Disposition Code: 01 - home or self-care

## 2020-10-01 NOTE — PLAN OF CARE
Goal Outcome Evaluation:  Plan of Care Reviewed With: patient  Progress: improving  Outcome Summary: Denies pain or nausea tonight.  tolerated IV Venofer yesterday.  Left Powerport with good blood return. Up ad sukhdeep. Plan is to be DC;d today home. VSS

## 2020-10-01 NOTE — OUTREACH NOTE
Prep Survey      Responses   [facility] patient discharged from?  [place]   Is LACE score < 7 ?  Yes   Eligibility  [facility]   Date of Admission  09/28/20   Date of Discharge  10/01/20   Discharge diagnosis  Acute colitis due to enteropathogenic Escherichia coli infection   Does the patient have one of the following disease processes/diagnoses(primary or secondary)?  Other   Does the patient have Home health ordered?  No   Is there a DME ordered?  No   Prep survey completed?  Yes          Emily Diaz RN

## 2020-10-01 NOTE — DISCHARGE SUMMARY
Patient Name: Roxana Brizuela  : 1940  MRN: [id number]    Date of Admission: 2020  Date of Discharge:  10/1/2020  Primary Care Physician: Brandt Martin MD      Chief Complaint:   Fever and Flank Pain      Discharge Diagnoses     Active Hospital Problems    Diagnosis  POA   • **Acute colitis due to enteropathogenic Escherichia coli infection [A04.0]  Yes   • Nonintractable migraine [G43.009]  No   • Malignant neoplasm of right female breast (CMS/HCC) [C50.911]  Yes   • Thrombocytopenia (CMS/HCC) [D69.6]  Yes   • Iron deficiency anemia [D50.9]  Yes   • Hyperlipidemia [E78.5]  Yes   • Hypertension [I10]  Yes   • Meningioma (CMS/HCC) [D32.9]  Yes   • Gastroesophageal reflux disease [K21.9]  Yes      Resolved Hospital Problems    Diagnosis Date Resolved POA   • Leukocytosis [D72.829] 2020 Yes   • Fever [R50.9] 2020 Yes        Hospital Course     Ms. Brizuela is a 79 y.o. female with a history of hypertension, hyperlipidemia, thrombocytopenia, history of C. difficile colitis, GERD, left breast ductal carcinoma in situ, right breast invasive mammary carcinoma, meningioma who presented to [facility] initially complaining of fever and flank pain.  Please see the admitting history and physical for further details.  She was found to have acute colitis due to enteropathogenic E. coli infection and was admitted to the hospital for further evaluation and treatment.      Infectious disease consulted for enteropathogenic E. coli infection and recommend avoid antimicrobial therapy secondary to history of C. difficile colitis.  Gastroenterology consulted recommend to also avoid bacterial therapy and manage with conservative measures given history of C. difficile colitis and patient's clinical improvement throughout hospital admission.  Further recommendations per gastroenterology including close follow-up with Dr. Carlin in the office and consider outpatient colonoscopy in 6 to 8  "weeks.    Oncology consulted for stage IIIa right breast cancer and acute on chronic thrombocytopenia recommend remaining off NSAIDs until platelets returned to baseline, weekly CBC and IPF with nurse and CBC office in follow-up Dr. Francisco with CBC and IVF in office in 3 weeks.  Recommend transfuse 1 unit platelets of platelets less than 20.    Patient eager to discharge on 10/1/2020 noted afebrile without evidence of leukocytosis and reports BM \"normal\" / formed stool x1 on 10/1/2020.    Day of Discharge     Patient appears relatively comfortable in no apparent distress.  Tolerating p.o.  Reports \"normal\" formed stool occurrence on 10/1/2020.  Eager to discharge home today agrees a plan for follow-up oncology.    Physical Exam:  Temp:  [97.9 °F (36.6 °C)-99.2 °F (37.3 °C)] 98.7 °F (37.1 °C)  Heart Rate:  [68-75] 71  Resp:  [16-18] 16  BP: (111-131)/(60-66) 111/66  Body mass index is 29.48 kg/m².     Physical Exam  Constitutional:       Appearance: Normal appearance. She is not toxic-appearing or diaphoretic.   HENT:      Head: Normocephalic and atraumatic.   Eyes:      Extraocular Movements: Extraocular movements intact.      Pupils: Pupils are equal, round, and reactive to light.   Neck:      Musculoskeletal: Normal range of motion and neck supple.   Cardiovascular:      Rate and Rhythm: Normal rate.      Heart sounds: Normal heart sounds.   Pulmonary:      Effort: Pulmonary effort is normal. No respiratory distress.      Breath sounds: Normal breath sounds.   Abdominal:      General: Bowel sounds are normal. There is no distension.      Palpations: Abdomen is soft.      Tenderness: There is no abdominal tenderness.   Musculoskeletal:      Right lower leg: No edema.      Left lower leg: No edema.   Skin:     General: Skin is warm and dry.   Neurological:      General: No focal deficit present.      Mental Status: She is alert.      Cranial Nerves: No cranial nerve deficit.   Psychiatric:         Mood and Affect: " Mood normal.         Behavior: Behavior normal.         Thought Content: Thought content normal.         Judgment: Judgment normal.         Consultants     Consult Orders (all) (From admission, onward)     Start     Ordered    09/29/20 1629  Inpatient Gastroenterology Consult  Once     Specialty:  Gastroenterology  Provider:  James Lutz MD    09/29/20 1628    09/29/20 0010  Inpatient Infectious Diseases Consult  Once     Specialty:  Infectious Diseases  Provider:  Henry Farley MD    09/29/20 0010    09/28/20 2254  Inpatient Hematology & Oncology Consult  Once     Specialty:  Hematology and Oncology  Provider:  Oneil Coburn MD    09/28/20 2254 09/28/20 2124  [facility] (on-call MD unless specified) Details  Once     Specialty:  Hospitalist  Provider:  Rakan Drummond MD    09/28/20 2123 09/28/20 2120  [facility] (on-call MD unless specified) Details  Once     Specialty:  Hospitalist  Provider:  Rakan Drummond MD    09/28/20 2119              Procedures     Imaging Results (All)     Procedure Component Value Units Date/Time    CT Abdomen Pelvis With Contrast [[id number]] Collected: 09/29/20 1144     Updated: 09/30/20 1042    Narrative:      CT CHEST, ABDOMEN AND PELVIS WITH CONTRAST     HISTORY: Sepsis. Leukocytosis with thrombocytopenia.     TECHNIQUE: Axial CT images of the chest abdomen and pelvis were obtained  following administration of intravenous and oral contrast. Coronal and  sagittal reconstructions were then obtained.     COMPARISON: CT dated 05/08/2019.     FINDINGS:     CT CHEST: There is a left-sided chest wall port with tip at the  cavoatrial junction. The right lobe of thyroid gland has been removed.  There is no pathological mediastinal lymphadenopathy. No pleural or  pericardial effusion is seen. Small sliding hiatal hernia with  circumferential wall thickening of the distal esophagus that may  represent esophagitis. The central airways are patent without  endobronchial lesion.  Upper lobe predominant background emphysematous  changes are present. A small area of groundglass density seen within the  superior segment of the right lower lobe measuring up to 1.1 cm has  enlarged from 2019 where it measured up to 7 mm. There is a sub-4 mm  subpleural left upper lobe nodule seen on image 45 that appears more  prominent today than on the prior study and this may be related to slice  selection. Changes from right axillary lymph node dissection are  present. There has been bilateral mastectomy with TRAM flap  reconstruction on the left. A small alley density adjacent to the right  internal mammary chain is unchanged and best seen on image 46.     CT ABDOMEN AND PELVIS:The liver demonstrates a somewhat lobulated  contour with caudate lobe hypertrophy and this may be related to chronic  liver disease. The spleen is bulky measuring 12 cm in AP diameter. The  splenoportal axis is confluent. The pancreas is normal. Bilateral  adrenal glands are normal. Both kidneys are normal in size and  attenuation. Partly exophytic cyst within the midpole of the left kidney  measures up to 2 cm. The urinary bladder is partially distended and  normal. The uterus is not identified and is likely surgically absent.  Stable mildly bulky appearance of the right ovary. The small and large  bowel loops demonstrate normal caliber. Circumferential wall thickening  is seen involving the right colon and extending up to the hepatic  flexure. There is adjacent pericolic stranding and prominent lymph node.  Index pericolic lymph node at this location measures up to 7 mm in short  axis dimension. There is no pathological retroperitoneal  lymphadenopathy. Marked calcified plaque is seen throughout the  abdominal aorta and its branches. There is a small sliding hiatal hernia  present. 3 mm anterolisthesis of L5 on S1 with vacuum disc phenomena.       Impression:      1. Mild low-density wall thickening involving the right colon  extending  from the ileocecal valve to the hepatic flexure. There is adjacent  pericolic fat stranding and enlarged lymph nodes. The findings suggest  acute colitis, this may be inflammatory/infectious. Follow-up with  colonoscopy following resolution of acute findings.  2. Liver morphology most suggestive of chronic liver disease.  3. Small sliding hiatal hernia.  4. Ground glass density within the right lower lobe measuring up to 1.1  cm today larger than prior imaging where it measured up to 7 mm.  Follow-up with CT chest is recommended in 6 months to reevaluate.     Radiation dose reduction techniques were utilized, including automated  exposure control and exposure modulation based on body size.     This report was finalized on 9/30/2020 10:39 AM by Dr. Erica Baker M.D.       CT Chest With Contrast [[id number]] Collected: 09/29/20 1144     Updated: 09/30/20 1042    Narrative:      CT CHEST, ABDOMEN AND PELVIS WITH CONTRAST     HISTORY: Sepsis. Leukocytosis with thrombocytopenia.     TECHNIQUE: Axial CT images of the chest abdomen and pelvis were obtained  following administration of intravenous and oral contrast. Coronal and  sagittal reconstructions were then obtained.     COMPARISON: CT dated 05/08/2019.     FINDINGS:     CT CHEST: There is a left-sided chest wall port with tip at the  cavoatrial junction. The right lobe of thyroid gland has been removed.  There is no pathological mediastinal lymphadenopathy. No pleural or  pericardial effusion is seen. Small sliding hiatal hernia with  circumferential wall thickening of the distal esophagus that may  represent esophagitis. The central airways are patent without  endobronchial lesion. Upper lobe predominant background emphysematous  changes are present. A small area of groundglass density seen within the  superior segment of the right lower lobe measuring up to 1.1 cm has  enlarged from 2019 where it measured up to 7 mm. There is a sub-4 mm  subpleural  left upper lobe nodule seen on image 45 that appears more  prominent today than on the prior study and this may be related to slice  selection. Changes from right axillary lymph node dissection are  present. There has been bilateral mastectomy with TRAM flap  reconstruction on the left. A small alley density adjacent to the right  internal mammary chain is unchanged and best seen on image 46.     CT ABDOMEN AND PELVIS:The liver demonstrates a somewhat lobulated  contour with caudate lobe hypertrophy and this may be related to chronic  liver disease. The spleen is bulky measuring 12 cm in AP diameter. The  splenoportal axis is confluent. The pancreas is normal. Bilateral  adrenal glands are normal. Both kidneys are normal in size and  attenuation. Partly exophytic cyst within the midpole of the left kidney  measures up to 2 cm. The urinary bladder is partially distended and  normal. The uterus is not identified and is likely surgically absent.  Stable mildly bulky appearance of the right ovary. The small and large  bowel loops demonstrate normal caliber. Circumferential wall thickening  is seen involving the right colon and extending up to the hepatic  flexure. There is adjacent pericolic stranding and prominent lymph node.  Index pericolic lymph node at this location measures up to 7 mm in short  axis dimension. There is no pathological retroperitoneal  lymphadenopathy. Marked calcified plaque is seen throughout the  abdominal aorta and its branches. There is a small sliding hiatal hernia  present. 3 mm anterolisthesis of L5 on S1 with vacuum disc phenomena.       Impression:      1. Mild low-density wall thickening involving the right colon extending  from the ileocecal valve to the hepatic flexure. There is adjacent  pericolic fat stranding and enlarged lymph nodes. The findings suggest  acute colitis, this may be inflammatory/infectious. Follow-up with  colonoscopy following resolution of acute findings.  2.  Liver morphology most suggestive of chronic liver disease.  3. Small sliding hiatal hernia.  4. Ground glass density within the right lower lobe measuring up to 1.1  cm today larger than prior imaging where it measured up to 7 mm.  Follow-up with CT chest is recommended in 6 months to reevaluate.     Radiation dose reduction techniques were utilized, including automated  exposure control and exposure modulation based on body size.     This report was finalized on 9/30/2020 10:39 AM by Dr. Erica Baker M.D.       XR Chest AP [[id number]] Collected: 09/28/20 2029     Updated: 09/28/20 2102    Narrative:      ONE VIEW PORTABLE CHEST     HISTORY: Fever and cough. Possible Covid 19 pneumonia.     FINDINGS: The lungs are well-expanded and clear except for a calcified  granuloma at the left base. The heart is top normal in size and there is  no change from 05/22/2017. A left-sided MediPort catheter ends in the  SVC without change.     This report was finalized on 9/28/2020 8:59 PM by Dr. Dwayne Fernandez M.D.             Pertinent Labs     Results from last 7 days   Lab Units 10/01/20  0506 09/30/20  0404 09/29/20  1057 09/28/20  1735   WBC 10*3/mm3 8.97 10.31 11.60* 19.36*   HEMOGLOBIN g/dL 9.3* 9.9* 10.4* 11.4*   PLATELETS 10*3/mm3 33* 34* 38*  38* 44*     Results from last 7 days   Lab Units 10/01/20  0506 09/30/20  0404 09/28/20  1735   SODIUM mmol/L 142 141 138   POTASSIUM mmol/L 4.1 3.5 3.7   CHLORIDE mmol/L 110* 108* 100   CO2 mmol/L 25.0 22.7 22.4   BUN mg/dL 9 8 17   CREATININE mg/dL 0.68 0.75 0.89   GLUCOSE mg/dL 103* 107* 99   Estimated Creatinine Clearance: 51.3 mL/min (by C-G formula based on SCr of 0.68 mg/dL).  Results from last 7 days   Lab Units 10/01/20  0506 09/28/20  1735   ALBUMIN g/dL 3.60 4.60   BILIRUBIN mg/dL  --  0.6   ALK PHOS U/L  --  63   AST (SGOT) U/L  --  31   ALT (SGPT) U/L  --  34*     Results from last 7 days   Lab Units 10/01/20  0506 09/30/20  0404 09/28/20  1735   CALCIUM mg/dL 8.5*  8.4* 10.0   ALBUMIN g/dL 3.60  --  4.60   PHOSPHORUS mg/dL 3.2  --   --      Results from last 7 days   Lab Units 09/28/20  1735   LIPASE U/L 19             Invalid input(s): LDLCALC  Results from last 7 days   Lab Units 09/29/20  0037 09/28/20  2152 09/28/20  1944 09/28/20  1824   BLOODCX  No growth at 2 days No growth at 2 days No growth at 2 days  --    URINECX   --   --   --  25,000 CFU/mL Mixed Cherise Isolated       Test Results Pending at Discharge     Pending Labs     Order Current Status    Blood Culture - Blood, Arm, Left Preliminary result    Blood Culture - Blood, Arm, Right Preliminary result    Blood Culture - Blood, Blood, Central Line Preliminary result          Discharge Details        Discharge Medications      Continue These Medications      Instructions Start Date   calcium carbonate 600 MG tablet  Commonly known as: OS-JUNAID   600 mg, Oral, Daily      diclofenac 75 MG EC tablet  Commonly known as: VOLTAREN   Take 1 tablet by mouth twice daily      gabapentin 300 MG capsule  Commonly known as: NEURONTIN   600 mg, Oral, 3 Times Daily      hydroCHLOROthiazide 25 MG tablet  Commonly known as: HYDRODIURIL   TAKE 1 TABLET BY MOUTH ONCE DAILY . APPOINTMENT REQUIRED FOR FUTURE REFILLS      levothyroxine 75 MCG tablet  Commonly known as: Synthroid   75 mcg, Oral, Daily      MELATONIN PO   10 mg, Oral, Nightly PRN      ondansetron ODT 4 MG disintegrating tablet  Commonly known as: ZOFRAN-ODT   4 mg, Oral, Every 8 Hours PRN      pantoprazole 40 MG EC tablet  Commonly known as: PROTONIX   Take 1 tablet by mouth once daily      potassium chloride 20 MEQ packet  Commonly known as: KLOR-CON   20 mEq, Oral, Daily      PROBIOTIC ACIDOPHILUS PO   1 capsule, Oral, Daily      propranolol 10 MG tablet  Commonly known as: INDERAL   TAKE 1 TABLET BY MOUTH THREE TIMES DAILY      simvastatin 80 MG tablet  Commonly known as: ZOCOR   TAKE 1 TABLET BY MOUTH AT BEDTIME      SUMAtriptan 50 MG tablet  Commonly known as: IMITREX    Take one tablet at onset of headache. May repeat dose one time in 2 hours if headache not relieved.       trimethoprim-polymyxin b 02956-6.1 UNIT/ML-% ophthalmic solution  Commonly known as: POLYTRIM   INSTILL 1 DROP INTO RIGHT EYE 4 TIMES DAILY FOR 4 DAYS PRIOR TO INJECTION AND FOR 4 DAYS AFTER      vitamin B-12 1000 MCG tablet  Commonly known as: CYANOCOBALAMIN   1,000 mcg, Oral, Daily      vitamin B-6 50 MG tablet  Commonly known as: PYRIDOXINE   50 mg, Oral, Daily      VITAMIN C PO   1,000 mg, Oral, Daily             Allergies   Allergen Reactions   • Codeine Nausea And Vomiting   • Morphine Nausea And Vomiting         Discharge Disposition:  Home or Self Care    Discharge Diet:  No active diet order      Discharge Activity:       CODE STATUS:    Code Status and Medical Interventions:   Ordered at: 09/28/20 2257     Code Status:    CPR     Medical Interventions (Level of Support Prior to Arrest):    Full       Future Appointments   Date Time Provider Department Center   10/6/2020  1:30 PM [facility] EP PORT CHAIR  [facility]   10/21/2020  2:00 PM Maite Anderson APRN [facility] None   11/17/2020  1:30 PM [facility] EP PORT CHAIR  [facility]   12/29/2020  1:30 PM [facility] EP PORT CHAIR  [facility] EP [facility]   12/29/2020  2:00 PM Oneil Coburn MD [facility]     Additional Instructions for the Follow-ups that You Need to Schedule     Discharge Follow-up with PCP   As directed       Currently Documented PCP:    Brandt Martin MD    PCP Phone Number:    [phone]     Follow Up Details: Please call to schedule a week earliest available follow-up appointment PCP; [facility]         Discharge Follow-up with Specialty: Gastroenterology   As directed      Specialty: Gastroenterology    Follow Up Details: Please call schedule follow-up appointment gastroenterology as previously discussed; possible colonoscopy in 6-8 weeks         Discharge Follow-up with Specified Provider: Dr. Downing   As directed        To: Dr. Downing    Follow Up Details: Please follow-up with Dr. Downing as previously discussed         CBC (No Diff)    Oct 08, 2020 (Approximate)      CBC (No Diff)    Oct 15, 2020 (Approximate)      CBC (No Diff)    Oct 22, 2020 (Approximate)        Follow-up Information     Brandt Martin MD .    Specialty: Internal Medicine  Why: Please call to schedule a week earliest available follow-up appointment PCP; [facility]  Contact information:  [address] [address]  [phone]                   Additional Instructions for the Follow-ups that You Need to Schedule     Discharge Follow-up with PCP   As directed       Currently Documented PCP:    Brandt Martin MD    PCP Phone Number:    [phone]     Follow Up Details: Please call to schedule a week earliest available follow-up appointment PCP; [facility]         Discharge Follow-up with Specialty: Gastroenterology   As directed      Specialty: Gastroenterology    Follow Up Details: Please call schedule follow-up appointment gastroenterology as previously discussed; possible colonoscopy in 6-8 weeks         Discharge Follow-up with Specified Provider: Dr. Downing   As directed      To: Dr. Downing    Follow Up Details: Please follow-up with Dr. Downing as previously discussed         CBC (No Diff)    Oct 08, 2020 (Approximate)      CBC (No Diff)    Oct 15, 2020 (Approximate)      CBC (No Diff)    Oct 22, 2020 (Approximate)        Time Spent on Discharge:  Greater than 30 minutes      RUDY Rios  [facility]  10/01/20  10:16 EDT

## 2020-10-02 ENCOUNTER — TRANSITIONAL CARE MANAGEMENT TELEPHONE ENCOUNTER (OUTPATIENT)
Dept: CALL CENTER | Facility: HOSPITAL | Age: 80
End: 2020-10-02

## 2020-10-02 NOTE — OUTREACH NOTE
Call Center TCM Note      Responses   [facility] patient discharged from?  [place]   Does the patient have one of the following disease processes/diagnoses(primary or secondary)?  Other   TCM attempt successful?  Yes   Call start time  1603   Call end time  1608   Discharge diagnosis  Acute colitis due to enteropathogenic Escherichia coli infection   Meds reviewed with patient/caregiver?  Yes   Is the patient having any side effects they believe may be caused by any medication additions or changes?  No   Does the patient have all medications ordered at discharge?  Yes   Is the patient taking all medications as directed (includes completed medication regime)?  Yes   Does the patient have a primary care provider?   Yes   Does the patient have an appointment with their PCP within 7 days of discharge?  No   Comments regarding PCP  Patient declines PCP followup. She has numerous appts scheduled with [facility].    Nursing Interventions  Verified appointment date/time/provider, Educated patient on importance of making appointment, Advised patient to make appointment   Has the patient kept scheduled appointments due by today?  N/A   Has home health visited the patient within 72 hours of discharge?  N/A   Psychosocial issues?  No   Comments  Patient is feeling better. Temp max today is 99.9. She will continue to monitor.   Did the patient receive a copy of their discharge instructions?  Yes   Nursing interventions  Reviewed instructions with patient   What is the patient's perception of their health status since discharge?  Improving   Is the patient/caregiver able to teach back signs and symptoms related to disease process for when to call PCP?  Yes   Is the patient/caregiver able to teach back signs and symptoms related to disease process for when to call 911?  Yes   Is the patient/caregiver able to teach back the hierarchy of who to call/visit for symptoms/problems? PCP, Specialist, Home health nurse, Urgent  [facility] [place], ED, 911  Yes   TCM call completed?  Yes          Alexandro Rey, RN    10/2/2020, 16:08 EDT

## 2020-10-03 LAB
BACTERIA SPEC AEROBE CULT: NORMAL
BACTERIA SPEC AEROBE CULT: NORMAL

## 2020-10-04 LAB — BACTERIA SPEC AEROBE CULT: NORMAL

## 2020-10-06 ENCOUNTER — CLINICAL SUPPORT (OUTPATIENT)
Dept: ONCOLOGY | Facility: HOSPITAL | Age: 80
End: 2020-10-06

## 2020-10-06 ENCOUNTER — INFUSION (OUTPATIENT)
Dept: ONCOLOGY | Facility: HOSPITAL | Age: 80
End: 2020-10-06

## 2020-10-06 DIAGNOSIS — D72.829 LEUKOCYTOSIS, UNSPECIFIED TYPE: Primary | ICD-10-CM

## 2020-10-06 DIAGNOSIS — Z45.2 ENCOUNTER FOR FITTING AND ADJUSTMENT OF VASCULAR CATHETER: ICD-10-CM

## 2020-10-06 DIAGNOSIS — D69.6 THROMBOCYTOPENIA (HCC): ICD-10-CM

## 2020-10-06 LAB
BASOPHILS # BLD AUTO: 0.02 10*3/MM3 (ref 0–0.2)
BASOPHILS NFR BLD AUTO: 0.1 % (ref 0–1.5)
DEPRECATED RDW RBC AUTO: 51 FL (ref 37–54)
EOSINOPHIL # BLD AUTO: 0.14 10*3/MM3 (ref 0–0.4)
EOSINOPHIL NFR BLD AUTO: 1 % (ref 0.3–6.2)
ERYTHROCYTE [DISTWIDTH] IN BLOOD BY AUTOMATED COUNT: 14.7 % (ref 12.3–15.4)
HCT VFR BLD AUTO: 31.9 % (ref 34–46.6)
HGB BLD-MCNC: 10.2 G/DL (ref 12–15.9)
IMM GRANULOCYTES # BLD AUTO: 0.36 10*3/MM3 (ref 0–0.05)
IMM GRANULOCYTES NFR BLD AUTO: 2.7 % (ref 0–0.5)
LYMPHOCYTES # BLD AUTO: 2.1 10*3/MM3 (ref 0.7–3.1)
LYMPHOCYTES NFR BLD AUTO: 15.7 % (ref 19.6–45.3)
MCH RBC QN AUTO: 30.6 PG (ref 26.6–33)
MCHC RBC AUTO-ENTMCNC: 32 G/DL (ref 31.5–35.7)
MCV RBC AUTO: 95.8 FL (ref 79–97)
MONOCYTES # BLD AUTO: 3.35 10*3/MM3 (ref 0.1–0.9)
MONOCYTES NFR BLD AUTO: 25.1 % (ref 5–12)
NEUTROPHILS NFR BLD AUTO: 55.4 % (ref 42.7–76)
NEUTROPHILS NFR BLD AUTO: 7.39 10*3/MM3 (ref 1.7–7)
NRBC BLD AUTO-RTO: 0 /100 WBC (ref 0–0.2)
PLAT MORPH BLD: NORMAL
PLATELET # BLD AUTO: 64 10*3/MM3 (ref 140–450)
PLATELET # BLD AUTO: 64 10*3/MM3 (ref 140–450)
PLATELETS.RETICULATED NFR BLD AUTO: 18 % (ref 0.9–6.5)
PMV BLD AUTO: 12.3 FL (ref 6–12)
RBC # BLD AUTO: 3.33 10*6/MM3 (ref 3.77–5.28)
RBC MORPH BLD: NORMAL
WBC # BLD AUTO: 13.36 10*3/MM3 (ref 3.4–10.8)
WBC MORPH BLD: NORMAL

## 2020-10-06 PROCEDURE — 85007 BL SMEAR W/DIFF WBC COUNT: CPT | Performed by: INTERNAL MEDICINE

## 2020-10-06 PROCEDURE — 85025 COMPLETE CBC W/AUTO DIFF WBC: CPT | Performed by: INTERNAL MEDICINE

## 2020-10-06 PROCEDURE — 25010000003 HEPARIN LOCK FLUSH PER 10 UNITS: Performed by: INTERNAL MEDICINE

## 2020-10-06 PROCEDURE — 96523 IRRIG DRUG DELIVERY DEVICE: CPT

## 2020-10-06 PROCEDURE — G0463 HOSPITAL OUTPT CLINIC VISIT: HCPCS

## 2020-10-06 PROCEDURE — 85055 RETICULATED PLATELET ASSAY: CPT | Performed by: INTERNAL MEDICINE

## 2020-10-06 RX ORDER — SODIUM CHLORIDE 0.9 % (FLUSH) 0.9 %
10 SYRINGE (ML) INJECTION AS NEEDED
Status: DISCONTINUED | OUTPATIENT
Start: 2020-10-06 | End: 2020-10-06 | Stop reason: HOSPADM

## 2020-10-06 RX ORDER — SODIUM CHLORIDE 0.9 % (FLUSH) 0.9 %
10 SYRINGE (ML) INJECTION AS NEEDED
Status: CANCELLED | OUTPATIENT
Start: 2020-10-06

## 2020-10-06 RX ORDER — HEPARIN SODIUM (PORCINE) LOCK FLUSH IV SOLN 100 UNIT/ML 100 UNIT/ML
500 SOLUTION INTRAVENOUS AS NEEDED
Status: DISCONTINUED | OUTPATIENT
Start: 2020-10-06 | End: 2020-10-06 | Stop reason: HOSPADM

## 2020-10-06 RX ORDER — HEPARIN SODIUM (PORCINE) LOCK FLUSH IV SOLN 100 UNIT/ML 100 UNIT/ML
500 SOLUTION INTRAVENOUS AS NEEDED
Status: CANCELLED | OUTPATIENT
Start: 2020-10-06

## 2020-10-06 RX ADMIN — Medication 500 UNITS: at 13:53

## 2020-10-06 RX ADMIN — SODIUM CHLORIDE, PRESERVATIVE FREE 10 ML: 5 INJECTION INTRAVENOUS at 13:53

## 2020-10-06 NOTE — NURSING NOTE
Lab Results   Component Value Date    WBC 13.36 (H) 10/06/2020    HGB 10.2 (L) 10/06/2020    HCT 31.9 (L) 10/06/2020    MCV 95.8 10/06/2020    PLT 64 (L) 10/06/2020    PLT 64 (L) 10/06/2020     Pt is here for lab with RN review.  CBC reviewed with pt, counts are stable for this pt at this time. PT voices experiencing diarrhea frequently still due to ecoli. Pt instructed to contact her PCP if symptoms worsen.  opy of labs given to pt and f/u appt reviewed. Pt is instructed to call the office with any concerns or new symptoms prior to next visit. Pt vu

## 2020-10-08 ENCOUNTER — APPOINTMENT (OUTPATIENT)
Dept: ONCOLOGY | Facility: HOSPITAL | Age: 80
End: 2020-10-08

## 2020-10-08 ENCOUNTER — APPOINTMENT (OUTPATIENT)
Dept: LAB | Facility: HOSPITAL | Age: 80
End: 2020-10-08

## 2020-10-08 LAB
AORTIC DIMENSIONLESS INDEX: 0.9 (DI)
BH CV ECHO MEAS - ACS: 1.6 CM
BH CV ECHO MEAS - AO MAX PG (FULL): 2.4 MMHG
BH CV ECHO MEAS - AO MAX PG: 8.2 MMHG
BH CV ECHO MEAS - AO MEAN PG (FULL): 1 MMHG
BH CV ECHO MEAS - AO MEAN PG: 4 MMHG
BH CV ECHO MEAS - AO ROOT AREA (BSA CORRECTED): 1.5
BH CV ECHO MEAS - AO ROOT AREA: 5.3 CM^2
BH CV ECHO MEAS - AO ROOT DIAM: 2.6 CM
BH CV ECHO MEAS - AO V2 MAX: 143 CM/SEC
BH CV ECHO MEAS - AO V2 MEAN: 89.9 CM/SEC
BH CV ECHO MEAS - AO V2 VTI: 23.9 CM
BH CV ECHO MEAS - AVA(I,A): 2.4 CM^2
BH CV ECHO MEAS - AVA(I,D): 2.4 CM^2
BH CV ECHO MEAS - AVA(V,A): 2.4 CM^2
BH CV ECHO MEAS - AVA(V,D): 2.4 CM^2
BH CV ECHO MEAS - BSA(HAYCOCK): 1.8 M^2
BH CV ECHO MEAS - BSA: 1.7 M^2
BH CV ECHO MEAS - BZI_BMI: 29.5 KILOGRAMS/M^2
BH CV ECHO MEAS - BZI_METRIC_HEIGHT: 154.9 CM
BH CV ECHO MEAS - BZI_METRIC_WEIGHT: 70.8 KG
BH CV ECHO MEAS - EDV(CUBED): 46.7 ML
BH CV ECHO MEAS - EDV(MOD-SP2): 51 ML
BH CV ECHO MEAS - EDV(MOD-SP4): 57 ML
BH CV ECHO MEAS - EDV(TEICH): 54.4 ML
BH CV ECHO MEAS - EF(CUBED): 82.9 %
BH CV ECHO MEAS - EF(MOD-BP): 66.4 %
BH CV ECHO MEAS - EF(MOD-SP2): 68.6 %
BH CV ECHO MEAS - EF(MOD-SP4): 64.9 %
BH CV ECHO MEAS - EF(TEICH): 76.6 %
BH CV ECHO MEAS - ESV(CUBED): 8 ML
BH CV ECHO MEAS - ESV(MOD-SP2): 16 ML
BH CV ECHO MEAS - ESV(MOD-SP4): 20 ML
BH CV ECHO MEAS - ESV(TEICH): 12.7 ML
BH CV ECHO MEAS - FS: 44.4 %
BH CV ECHO MEAS - IVS/LVPW: 1
BH CV ECHO MEAS - IVSD: 0.8 CM
BH CV ECHO MEAS - LAT PEAK E' VEL: 9.6 CM/SEC
BH CV ECHO MEAS - LV DIASTOLIC VOL/BSA (35-75): 33.5 ML/M^2
BH CV ECHO MEAS - LV MASS(C)D: 78.8 GRAMS
BH CV ECHO MEAS - LV MASS(C)DI: 46.3 GRAMS/M^2
BH CV ECHO MEAS - LV MAX PG: 5.8 MMHG
BH CV ECHO MEAS - LV MEAN PG: 3 MMHG
BH CV ECHO MEAS - LV SYSTOLIC VOL/BSA (12-30): 11.8 ML/M^2
BH CV ECHO MEAS - LV V1 MAX: 120 CM/SEC
BH CV ECHO MEAS - LV V1 MEAN: 78.2 CM/SEC
BH CV ECHO MEAS - LV V1 VTI: 20.5 CM
BH CV ECHO MEAS - LVIDD: 3.6 CM
BH CV ECHO MEAS - LVIDS: 2 CM
BH CV ECHO MEAS - LVLD AP2: 6.4 CM
BH CV ECHO MEAS - LVLD AP4: 6.5 CM
BH CV ECHO MEAS - LVLS AP2: 5.4 CM
BH CV ECHO MEAS - LVLS AP4: 5.2 CM
BH CV ECHO MEAS - LVOT AREA (M): 2.8 CM^2
BH CV ECHO MEAS - LVOT AREA: 2.8 CM^2
BH CV ECHO MEAS - LVOT DIAM: 1.9 CM
BH CV ECHO MEAS - LVPWD: 0.8 CM
BH CV ECHO MEAS - MED PEAK E' VEL: 8.5 CM/SEC
BH CV ECHO MEAS - MV A DUR: 0.12 SEC
BH CV ECHO MEAS - MV A MAX VEL: 93.1 CM/SEC
BH CV ECHO MEAS - MV DEC SLOPE: 454 CM/SEC^2
BH CV ECHO MEAS - MV DEC TIME: 232 SEC
BH CV ECHO MEAS - MV E MAX VEL: 79.5 CM/SEC
BH CV ECHO MEAS - MV E/A: 0.85
BH CV ECHO MEAS - MV MAX PG: 4.8 MMHG
BH CV ECHO MEAS - MV MEAN PG: 2 MMHG
BH CV ECHO MEAS - MV P1/2T MAX VEL: 102 CM/SEC
BH CV ECHO MEAS - MV P1/2T: 65.8 MSEC
BH CV ECHO MEAS - MV V2 MAX: 109 CM/SEC
BH CV ECHO MEAS - MV V2 MEAN: 71.6 CM/SEC
BH CV ECHO MEAS - MV V2 VTI: 23.2 CM
BH CV ECHO MEAS - MVA P1/2T LCG: 2.2 CM^2
BH CV ECHO MEAS - MVA(P1/2T): 3.3 CM^2
BH CV ECHO MEAS - MVA(VTI): 2.5 CM^2
BH CV ECHO MEAS - PA ACC TIME: 0.07 SEC
BH CV ECHO MEAS - PA MAX PG (FULL): 1.6 MMHG
BH CV ECHO MEAS - PA MAX PG: 5.5 MMHG
BH CV ECHO MEAS - PA PR(ACCEL): 47.1 MMHG
BH CV ECHO MEAS - PA V2 MAX: 117 CM/SEC
BH CV ECHO MEAS - PULM A REVS DUR: 0.13 SEC
BH CV ECHO MEAS - PULM A REVS VEL: 32.5 CM/SEC
BH CV ECHO MEAS - PULM DIAS VEL: 37.3 CM/SEC
BH CV ECHO MEAS - PULM S/D: 1.7
BH CV ECHO MEAS - PULM SYS VEL: 62.6 CM/SEC
BH CV ECHO MEAS - PVA(V,A): 2.4 CM^2
BH CV ECHO MEAS - PVA(V,D): 2.4 CM^2
BH CV ECHO MEAS - QP/QS: 0.71
BH CV ECHO MEAS - RAP SYSTOLE: 3 MMHG
BH CV ECHO MEAS - RV MAX PG: 3.9 MMHG
BH CV ECHO MEAS - RV MEAN PG: 2 MMHG
BH CV ECHO MEAS - RV V1 MAX: 99 CM/SEC
BH CV ECHO MEAS - RV V1 MEAN: 58.3 CM/SEC
BH CV ECHO MEAS - RV V1 VTI: 14.5 CM
BH CV ECHO MEAS - RVOT AREA: 2.8 CM^2
BH CV ECHO MEAS - RVOT DIAM: 1.9 CM
BH CV ECHO MEAS - RVSP: 10 MMHG
BH CV ECHO MEAS - SI(AO): 74.7 ML/M^2
BH CV ECHO MEAS - SI(CUBED): 22.7 ML/M^2
BH CV ECHO MEAS - SI(LVOT): 34.2 ML/M^2
BH CV ECHO MEAS - SI(MOD-SP2): 20.6 ML/M^2
BH CV ECHO MEAS - SI(MOD-SP4): 21.8 ML/M^2
BH CV ECHO MEAS - SI(TEICH): 24.5 ML/M^2
BH CV ECHO MEAS - SV(AO): 126.9 ML
BH CV ECHO MEAS - SV(CUBED): 38.7 ML
BH CV ECHO MEAS - SV(LVOT): 58.1 ML
BH CV ECHO MEAS - SV(MOD-SP2): 35 ML
BH CV ECHO MEAS - SV(MOD-SP4): 37 ML
BH CV ECHO MEAS - SV(RVOT): 41.1 ML
BH CV ECHO MEAS - SV(TEICH): 41.7 ML
BH CV ECHO MEAS - TAPSE (>1.6): 2.3 CM
BH CV ECHO MEAS - TR MAX PG: 7 MMHG
BH CV ECHO MEAS - TR MAX VEL: 129 CM/SEC
BH CV ECHO MEASUREMENTS AVERAGE E/E' RATIO: 8.78
BH CV XLRA - RV BASE: 3.4 CM
BH CV XLRA - RV LENGTH: 5.6 CM
BH CV XLRA - RV MID: 3 CM
BH CV XLRA - TDI S': 14 CM/SEC
LEFT ATRIUM VOLUME INDEX: 20 ML/M2
MAXIMAL PREDICTED HEART RATE: 141 BPM
STRESS TARGET HR: 120 BPM

## 2020-10-13 ENCOUNTER — CLINICAL SUPPORT (OUTPATIENT)
Dept: ONCOLOGY | Facility: HOSPITAL | Age: 80
End: 2020-10-13

## 2020-10-13 ENCOUNTER — INFUSION (OUTPATIENT)
Dept: ONCOLOGY | Facility: HOSPITAL | Age: 80
End: 2020-10-13

## 2020-10-13 VITALS
TEMPERATURE: 98.2 F | BODY MASS INDEX: 28.92 KG/M2 | HEART RATE: 76 BPM | WEIGHT: 153.2 LBS | SYSTOLIC BLOOD PRESSURE: 121 MMHG | OXYGEN SATURATION: 100 % | RESPIRATION RATE: 18 BRPM | DIASTOLIC BLOOD PRESSURE: 68 MMHG | HEIGHT: 61 IN

## 2020-10-13 DIAGNOSIS — D69.6 THROMBOCYTOPENIA (HCC): ICD-10-CM

## 2020-10-13 DIAGNOSIS — Z45.2 ENCOUNTER FOR FITTING AND ADJUSTMENT OF VASCULAR CATHETER: Primary | ICD-10-CM

## 2020-10-13 LAB
DEPRECATED RDW RBC AUTO: 47.1 FL (ref 37–54)
ERYTHROCYTE [DISTWIDTH] IN BLOOD BY AUTOMATED COUNT: 14.2 % (ref 12.3–15.4)
HCT VFR BLD AUTO: 30.6 % (ref 34–46.6)
HGB BLD-MCNC: 10 G/DL (ref 12–15.9)
MCH RBC QN AUTO: 30.6 PG (ref 26.6–33)
MCHC RBC AUTO-ENTMCNC: 32.7 G/DL (ref 31.5–35.7)
MCV RBC AUTO: 93.6 FL (ref 79–97)
PLATELET # BLD AUTO: 67 10*3/MM3 (ref 140–450)
PMV BLD AUTO: 11.8 FL (ref 6–12)
RBC # BLD AUTO: 3.27 10*6/MM3 (ref 3.77–5.28)
WBC # BLD AUTO: 12.54 10*3/MM3 (ref 3.4–10.8)

## 2020-10-13 PROCEDURE — G0463 HOSPITAL OUTPT CLINIC VISIT: HCPCS

## 2020-10-13 PROCEDURE — 85027 COMPLETE CBC AUTOMATED: CPT | Performed by: NURSE PRACTITIONER

## 2020-10-13 PROCEDURE — 25010000003 HEPARIN LOCK FLUSH PER 10 UNITS: Performed by: INTERNAL MEDICINE

## 2020-10-13 RX ORDER — SODIUM CHLORIDE 0.9 % (FLUSH) 0.9 %
10 SYRINGE (ML) INJECTION AS NEEDED
Status: DISCONTINUED | OUTPATIENT
Start: 2020-10-13 | End: 2020-10-13 | Stop reason: HOSPADM

## 2020-10-13 RX ORDER — HEPARIN SODIUM (PORCINE) LOCK FLUSH IV SOLN 100 UNIT/ML 100 UNIT/ML
500 SOLUTION INTRAVENOUS AS NEEDED
Status: DISCONTINUED | OUTPATIENT
Start: 2020-10-13 | End: 2020-10-13 | Stop reason: HOSPADM

## 2020-10-13 RX ORDER — SODIUM CHLORIDE 0.9 % (FLUSH) 0.9 %
10 SYRINGE (ML) INJECTION AS NEEDED
Status: CANCELLED | OUTPATIENT
Start: 2020-10-13

## 2020-10-13 RX ORDER — HEPARIN SODIUM (PORCINE) LOCK FLUSH IV SOLN 100 UNIT/ML 100 UNIT/ML
500 SOLUTION INTRAVENOUS AS NEEDED
Status: CANCELLED | OUTPATIENT
Start: 2020-10-13

## 2020-10-13 RX ADMIN — Medication 500 UNITS: at 14:12

## 2020-10-13 RX ADMIN — SODIUM CHLORIDE, PRESERVATIVE FREE 10 ML: 5 INJECTION INTRAVENOUS at 14:12

## 2020-10-13 NOTE — NURSING NOTE
Pt is here for lab with RN review.  CBC reviewed with pt, counts are stable for this pt at this time. Pt has no complaints.  Copy of labs given to pt and f/u appt reviewed. Pt is instructed to call the office with any concerns or new symptoms prior to next visit. Pt vu    Lab Results   Component Value Date    WBC 12.54 (H) 10/13/2020    HGB 10.0 (L) 10/13/2020    HCT 30.6 (L) 10/13/2020    MCV 93.6 10/13/2020    PLT 67 (L) 10/13/2020

## 2020-10-14 NOTE — TELEPHONE ENCOUNTER
Last OV: 12/16/2020.   Okay to refill for 30 day supply with note attached that pt must be seen for future refills?

## 2020-10-15 RX ORDER — HYDROCHLOROTHIAZIDE 25 MG/1
TABLET ORAL
Qty: 30 TABLET | Refills: 0 | Status: SHIPPED | OUTPATIENT
Start: 2020-10-15 | End: 2020-11-20 | Stop reason: SDUPTHER

## 2020-10-15 RX ORDER — SIMVASTATIN 80 MG
TABLET ORAL
Qty: 90 TABLET | Refills: 0 | Status: SHIPPED | OUTPATIENT
Start: 2020-10-15 | End: 2020-12-14

## 2020-10-15 RX ORDER — DICLOFENAC SODIUM 75 MG/1
75 TABLET, DELAYED RELEASE ORAL 2 TIMES DAILY
Qty: 180 TABLET | Refills: 3 | Status: SHIPPED | OUTPATIENT
Start: 2020-10-15 | End: 2022-01-17

## 2020-10-15 RX ORDER — PANTOPRAZOLE SODIUM 40 MG/1
TABLET, DELAYED RELEASE ORAL
Qty: 90 TABLET | Refills: 0 | Status: SHIPPED | OUTPATIENT
Start: 2020-10-15 | End: 2021-01-18

## 2020-10-20 NOTE — PROGRESS NOTES
REASON FOR FOLLOWUP: Discussed patient's recent hospitalization      1. Newly diagnosed large right breast cancer.  Core needle biopsy reported invasive mammary carcinoma with focal lobular feature, grade 2.  ER negative, less than 1%; CO positive, moderate in staining, 5% to 10%. HER2 IHC 2+, FISH study positive 2.1.   2.  CT scan for chest abdomen and pelvis and breast MRI examination reported right axillary lymph node suspicious for metastatic disease.  No remote metastatic lesion.  Patient has stage IIIa (T3 N2 M0) disease.   3.  Patient was started neoadjuvant chemotherapy on 5/23/2017 with Taxol weekly plus Herceptin weekly for total 12 weeks, and Perjata every 3 weeks during chemotherapy.  Herceptin will be converted to every 3 weeks after chemotherapy finished.    4.  Chronic moderate thrombocytopenia, mild anemia, and intermittent mild neutropenia etiologies are not clear.  5.  Reaction to Herceptin C1D1.  Subsequently tolerated therapy with hydrocortisone as premedication.  6.  Potential cardiac strain developing, neuropathic symptoms persist, follow-up MRI and surgical assessment will be needed post initial chemotherapeutic phase  7.  MRI August 17 with interval resolution of abnormal enhancement within breast and right axillary adenopathy, surgery scheduled November 7, Herceptin ongoing every 3 weeks  8.  Patient seen in office November 28, status post surgery with apparent complete response, Herceptin continued  9.  Herceptin given December 19, subsequent injury in [place] leading to prolonged stay, single treatment given through additional cancer Center  10.  Patient seen May 01, 2018, no further Herceptin planned, baseline scans pursued posttreatment, post influenza syndrome, physical therapy planned  11.  Patient seen June 13, 2018, excellent performance status, improving on physical therapy, equivocal CT  per thoracic adenopathy, follow-up PET/CT planned   12.  PET/CT with improvement,?  Thyroid  abnormality with ultrasound planned  13.  Patient seen October 3, partial thyroidectomy anticipated October 14-    14.  Patient seen May 14, 2019, scans negative for evidence of recurrence     15.  Patient seen 3/29/2019 clinically stable  16.  Patient reviewed July 7, ongoing GERD symptoms, GI referral planned.  17.  Hospitalization September 28-October 1-acute colitis due to enteropathic E. coli                                                                                                           HISTORY OF PRESENT ILLNESS:    The patient is a pleasant 79 y.o. with the above-mentioned history, who presents today in anticipation of cycle 4 of Herceptin, Perjeta and Taxol.  This is the first visit with this physician involving this patient's case.  We have reviewed her status today with her slowly developing neuropathic symptoms in her lower extremities but also involving her fingers recognized significantly and she plays the piano and keyboard.  This is becoming harder to do but she is still able to do so.  She also notices neuropathy in her feet but is able to walk and function in daily activities.  Additional to this is her continued grieving at the death of her  though she, fortunately, has an excellent support system.  She continues to experience nausea that is well relieved with Compazine. She denies oral mucositis.  No diarrhea no constipation no chest pain no dyspnea.  No lower extremity edema.    She did received 2 units of PRBC's on   7/8/2017 and remains hematologically stable.   She does have difficulty with sleep and Ambien 10 mg daily at bedtime seems help much better compared to 5 mg dose.  She does not need refills today.  In reviewing her case July 26 she is entering the fourth cycle of her treatment and recent echocardiogram is reviewed with she and her close friend revealing EF of 66.7% though with global strain of -16%?  Suspicious for myopathic process.  Normal ventricular  cavity size and wall thickness as well as contractility.  We have also discussed that she is responding to therapy and will need surgical assessment which may not as yet have been performed.  She has seen Dr. Melo for port placement and will ask him to review her likely just after she has completed his fourth cycle of therapy.  It is also apparent that she'll need a cardiac assessment as we continue subsequent Herceptin therapy perioperatively.      The patient underwent MRI of the breasts August 17 demonstrating interval resolution of abnormal enhancement within the right breast, resolution of right axillary adenopathy had also resolved.  The findings were consistent with a complete response to neoadjuvant chemotherapy.  The patient was seen in subsequent follow-up with Dr. Melo and was determined to proceed with surgery and ultimately sentinel node evaluation.  This is now scheduled November 7 and there are additional plans to consider radiation therapy postoperatively and we have also discussed the use of anti-hormonal therapy considering her mildly positive ID status.    The patient was able to proceed to surgery undergoing right breast mastectomy and sentinel lymph node biopsy November 07, 2017.  She did well postoperatively seeing Dr. Melo November 16.  Pathology revealed no residual malignancy in the breast and 3 negative sentinel lymph nodes.  A formal review of her report reveals treatment effect particularly in her largest lymph node with focal fibrosis and scar, right breast mastectomy fibrosis associated with a cavitary biopsy site and no invasive or in situ carcinoma.  The patient is now seen in our office though she went to the wrong location and the seen late in the day.  We discussed her findings and agree that she would continue Herceptin at this point but be reviewed formally again in 3 weeks.  She is also be seen by radiation therapy for their input.   The patient, evidently, was seen by  radiation therapy but decided not to pursue it until her last Herceptin therapy here December 19.  Following this she visited her daughter in [place] and, unfortunately, developed influenza and pneumonia.  She had a prolonged hospitalization and was at many sites in recovery over a several month only to return to [place] in the last several weeks.  She did take 1 IV Herceptin dose while in [place] but as she is reviewed May 05, 2018 we have discussed that it makes no particular sense to continue or add on to her previous history by extending Herceptin any further 3-4 months after her last treatment.  She therefore has completed Herceptin.    The patient on to be tested post her treatment again baseline studies and CT of chest and pelvis were performed June 6 revealing interval increase in a few subcentimeter nodes in the left pectoral, axillary and mediastinal regions. These are all considerably small and of uncertain significance.  The patient's performance status remains excellent without any reduction and, in fact, has improved with physical therapy upon return.       Patient is next seen August 08, 2018, fortunately feeling well.  A follow-up PET/CT had revealed an interval decrease in the subcentimeter nodes in the left subpectoral axillary region as well as mediastinum.  There was no hypermetabolic lymphadenopathy.  There was intense focus within slightly enlarged right thyroid gland.  Patient indicates she never had any thyroid issues of which she is aware.  The patient was referred to ENT for assessment and the patient was seen by Dr. Fofana.  Ultimately a subtotal thyroidectomy is anticipated and now scheduled October 4.  The patient is willing to proceed.        The patient proceeded to a right thyroid lobectomy performed November 04, 2018.  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  There was no definitive evidence of trans-capsular or vascular invasion.    The patient  is seen in follow-up November 28 doing well and we discussed the surgical treatment of this thyroid lesion.  She feels that all this went well.  She has additional cataract surgery at the end of November scheduled also.  The patient did complete her testing and was asked to return approximately 6 months later with a follow-up CT chest, abdomen and pelvis that demonstrate no evidence of recurrent disease.  As she is seen back May 14 she, unfortunately, fell and contused her face, left knee and left hand.  This required an ER visit.  Is elected to follow her back in 6 months maintaining her port in the interval and she is seen October 29 again without indications of recurrent disease and relatively stable clinically.  She is seeing plastic surgery about reconstruction and otherwise continue her current medication list and following with her primary care regularly.  The patient is next seen July 7, 2020 indicating that she did not proceed to any  plastic surgery and with the COVID-19 epidemic she does not wish to have any elective procedures.  She has, however, having significant GI reflux symptoms that have worsened substantially last several months despite PPI therapy.    The patient had follow-up for meningioma September 11, 2020 unchanged from previous.        The patient is next reviewed October 27, 2020 having been hospitalized September 28 through October 1, 2020.  She had presented with fever and flank pain and was found to have acute colitis due to enteropathogenic E. coli.  Antibiotics were avoided secondary to history of C. difficile colitis and fortunately she did not want to improve albeit slowly.  She had evidence of acute on chronic thrombocytopenia with a platelet count dropping to close to 30,000 and slowly rebounding assessed October 6 at 64,000 and October 13 at 67,000.  Fortunately she is feeling considerably better with her bowel function normalizing.       Past Medical History:   Diagnosis Date    • Alcoholism (CMS/HCC) 1978    I haven't had a drink since then   • Anemia    • Breast cancer (CMS/HCC) 05/03/2017    Right breast invasive mammary carcinoma with focal lobular features, grade 2, ER negative, less than 1% LA positive, HER-2 positive, stage IIIa disease (T3, N2, M0   • Breast cancer (CMS/[facility]) 10/20/2004    Left breast ductal carcinoma in-situ, predominately intermediate grade with focal comedonecrosis (high grade), solid and bribridform patterns involving approximately five core biopsy fragments   • Edema     Chronic lower extremity edema   • GERD (gastroesophageal reflux disease) 09/13/2011    Dr. Paul Negron   • GI (gastrointestinal bleed) 1997   • H/O jaundice    • Hernia     INCISONAL. AROUND LEFT TRAM LAP SITE   • History of chemotherapy     2017 4 MONTHS IN 2017   • History of Clostridium difficile colitis     JAN 2018   • History of histoplasmosis    • History of infectious mononucleosis 1960   • History of migraine headaches    • History of pneumonia 12/27/2017    AS RESULT OF FLU. ENDED UP ON VENT IN [place]   • History of thrombocytopenia    • History of transfusion 1997   • History of vertigo    • Hx of colonic polyps 06/11/2009    Dr. Giles   • Hyperlipidemia    • Hypertension    • Meningioma (CMS/HCC)    • Neuropathy     HANDS AND FEET   • Osteoarthritis 09/13/2011    Dr. Jamil Miller   • Peptic ulceration    • Retina disorder     BLEED. FROM HISTOPLASMOSIS   • SBO (small bowel obstruction) (CMS/HCC)     due to hernia with surgical repair in 09/2011   • SOB (shortness of breath) on exertion    • Thyroid mass     RIGHT   Normal echocardiogram on 5/18/2017, LVEF 68%.    Past Surgical History:   Procedure Laterality Date   • APPENDECTOMY N/A 1960   • BLADDER REPAIR N/A 1980   • BREAST BIOPSY Left 10/20/2004    Mammotome incisional biopsy left breast, surgical specimen, left breast, localization clip placement, left breast, confirmatory diagnostic unilateral mammogram, left  breast-Dr. Tyrese Alcala, [facility]   • BREAST BIOPSY Right 2017    PATH: INVASIVE MAMMARY CARCINOMA   • CHOLECYSTECTOMY N/A    • COLONOSCOPY N/A 2009    Hemorrhoids, otherwise normal, repeat in 5 years-Dr. Noemí Purcell   • EYE SURGERY Right     laser surgery for blood clot   • HYSTERECTOMY Bilateral    • INGUINAL HERNIA REPAIR Right     DR ALESIA GAXIOLA   • MASTECTOMY Left     Left breast mastecotmy with sentinel lymph node bios-[facility]   • MASTECTOMY W/ SENTINEL NODE BIOPSY Right 2017    Procedure: RIGHT BREAST MASTECTOMY WITH SENTINEL NODE BIOPSY;  Surgeon: Christian Melo MD;  Location: [facility] OR;  Service:    • OK INSJ TUNNELED CVC W/O SUBQ PORT/ AGE 5 YR/> Left 2017    Procedure: INSERTION VENOUS ACCESS DEVICE;  Surgeon: Christian Melo MD;  Location: [facility] MAIN OR;  Service: General   • REDUCTION MAMMAPLASTY Right     to match Lt. TRAM flap   • SMALL INTESTINE SURGERY      INCARCRATED HERNIA   • THYROIDECTOMY Right 10/4/2018    Procedure: RT THYROID LOBECTOMY;  Surgeon: Julián Fofana MD;  Location: [facility] OR OSC;  Service: ENT   • TONSILLECTOMY Bilateral    • TRAM FLAP DELAYED Left     WITH MASTOPEXY   • UPPER GASTROINTESTINAL ENDOSCOPY N/A 2011    LA grade A esophagitis with no bleeding, large hiatus hernia (50cm), gastric ulcer-Dr.Laszlo Lezama   • US GUIDED FINE NEEDLE ASPIRATION  2018   Lico catheter placement on 2017 by Dr. Melo.     OB/GYN history: Menarche age 16, menopause at 1985. , 1 miscarriage. No birth control pill use. She did have post menopause hormonal supplementation.      HEMATOLOGIC/ONCOLOGIC HISTORY: The patient is a 79 y.o. year old female whom we are consulted for a newly self discovered right breast mass, in 2017. Patient had ultrasound-guided biopsy on 5/3/2017 and confirmed to be invasive mammary carcinoma with focal lobular features, grade 2 Elen score 6/9. She is here for initial evaluation  for management.      Patient is a 76-year-old  female who was seen here previously for chronic mild-to-moderate thrombocytopenia and mild anemia. Recently the patient reports she started having firmness of the right breast that started sometime in April or maybe even in March. She noticed firmness spread from about around the 12 o'clock position, gradually towards the upper lateral side and lower part of the right breast associated mild pain. The patient thought it was related to fibrocystic changes. However, the symptom was getting worse. Patient also reported dented nipple after she started having pain in the right breast. She denies discharge from the nipple. She called her primary care physician, Dr. Martin, who ordered a mammogram study. This was done on 04/12/2017 with architectural distortion of the right breast centrally at 12 o'clock position and had scattered fibroglandular densities throughout the right breast.      The patient subsequently had right breast ultrasound examination on 04/26/2017. Discovered a large right breast mass measuring 5.8 x 3.5 x 3.9 cm. Patient subsequently had ultrasound-guided right breast biopsy on 05/03/2017. Pathology evaluation reported invasive mammary carcinoma with focal lobular feature. Elen score 6/9, overall grade 2. Sample was further sent to the [facility] for test. ER negative, less than 1%; AZ positive, moderate in staining, 5% to 10%. HER2 IHC 2+, but FISH study positive 2.1.     She denies weight loss, she actually eats very well. No nausea vomiting. Patient does complain of insomnia, unable to sleep.      This patient has history of left breast DCIS back in 2007, had mastectomy at the [facility]. No hormonal therapy afterwards according to patient. This patient also had a small meningioma, followed by Dr. Smith in [facility], with most recent MRI of the brain in March 2017, with 18 mm  meningioma, and followed on an annual basis.     Her neutrophil count on 5/16/17 is normal at 2500, however, records showed starting from 05/2015 and in 09/2016, 01/2017 and 03/2017, all 4 laboratory studies showed mild neutropenia with ANC fluctuating between 1200 and 1600. Etiology is not clear.    Normal echocardiogram on 5/18/2017, LVEF 68%.      CT scan for chest abdomen and pelvis on 5/22/2017 and breast MRI examination on 5/22/2017 reported small right axillary lymph node suspicious for metastatic disease.  No remote metastatic lesion.  Patient has stage IIIa (T3 N2 M0) disease.      Patient will start neoadjuvant chemotherapy with Taxol weekly plus Herceptin weekly for total 12 weeks, and Perjeta every 3 weeks (3-week cycle) during chemotherapy.  Herceptin will be converted to every 3 weeks after chemotherapy finished.  Cycle 1 day 1 5/23/2017.   Status post neoadjuvant chemotherapy the patient was assessed with MRI showing a complete neoadjuvant response.  The patient was reviewed for surgery and questions concerning lymph node dissection-sentinel node evaluation-and need for radiation therapy postop were considered as well as use of additional Herceptin for the balance of the year as well as additional use of anti-hormonal therapy.  Surgery was scheduled November 07, 2017.  Patient next seen in office November 28 having undergone surgery on November 7 with results revealing no residual malignancy in either breast or associated sentinel lymph nodes.  Was elected to continue Herceptin when seen back in office November 20 having initiated Herceptin May 23, 2017.    Patient continued Herceptin with her last treatment given December 19, 2017.     Unfortunately she later experienced an accident while in [place] December 28 with prolonged hospitalization and prolonged ventilator management required.  Apparently she had at least one IV Herceptin therapy given while there and we were contacted March 20, 2018-   Yaakov.  Eventually the patient was able to return to [place] is seen back in office May 5 at which time we concluded that she completed Herceptin therapy as result of being off treatment for so long.  Plans were made for baseline scans.  Patient follow-up reexaminations May 8, 2019 with no evidence of recurrent malignancy.  This was again a circumstance when she was assessed 2019.  Patient seen 2020 without evidence of recurrent malignancy.  Recurrent GI symptoms noted with GI referral planned.  Patient hospitalized -2020 with enteropathic E. coli, acute on chronic thrombocytopenia.    MEDICATIONS: The current medication list was reviewed with the patient and updated in the EMR this date per the Medical Assistant. Medication dosages and frequencies were confirmed to be accurate.       ALLERGIES:    Allergies   Allergen Reactions   • Codeine Nausea And Vomiting   • Morphine Nausea And Vomiting     SOCIAL HISTORY:   Social History     Tobacco Use   • Smoking status: Former Smoker     Packs/day: 2.00     Years: 30.00     Pack years: 60.00     Types: Cigarettes     Start date: 1957     Quit date: 4/10/1987     Years since quittin.5   • Smokeless tobacco: Never Used   • Tobacco comment: I haven't had a cigarette in over 30 years   Substance Use Topics   • Alcohol use: No     Comment: stopped, heavy in past   • Drug use: No     FAMILY HISTORY:  Family History   Problem Relation Age of Onset   • Heart disease Mother    • Aortic aneurysm Mother         abdominal   • Coronary artery disease Mother    • Hypertension Mother    • Miscarriages / Stillbirths Mother    • Diverticulitis Mother    • Heart disease Father    • Heart attack Father         acute   • Hypertension Father    • Early death Father    • Hearing loss Father    • Kidney disease Father    • Breast cancer Daughter 45   • Heart disease Brother    • Hypertension Brother    • Malig Hyperthermia Neg Hx      I have  "reviewed the patient's medical history in detail and updated the computerized patient record.    REVIEW OF SYSTEMS:  GENERAL: See history of present illness.  Recent fall and sustained contusions, no change in appetite or weight;   No fevers, chills, sweats.   SKIN: No nonhealing lesions. No rashes.   HEME/LYMPH: See HPI.   EYES: No vision changes or diplopia.   ENT: No tinnitus, hearing loss, gum bleeding, epistaxis, hoarseness or dysphagia.   RESPIRATORY: No cough, Has exertional dyspnea, No hemoptysis or wheezing.   CVS: No chest pain, palpitations, orthopnea, dyspnea on exertion or PND.   GI: Worsening reflux symptoms  : No lower tract obstructive symptoms, dysuria or hematuria.   MUSCULOSKELETAL: Chronic or joint pain, arthritis.  Has mild intermittent ankle swelling.   NEUROLOGICAL: See HPI  PSYCHIATRIC: See HPI     Objective:   Vitals:    10/27/20 1549   BP: 120/73   Pulse: 80   Resp: 18   Temp: 97.1 °F (36.2 °C)   TempSrc: Temporal   SpO2: 92%   Weight: 67.3 kg (148 lb 4.8 oz)   Height: 154.9 cm (60.98\")   PainSc: 0-No pain   ECOG 0      PHYSICAL EXAM:   GENERAL: Well-developed, well-nourished female, in no acute distress.   SKIN: Warm, dry without rashes, purpura or petechiae.  Left upper chest Lico catheter in place, no evidence of infection.  Contusions just lateral to the right eye, left hand and left knee  EYES: Pupils equal, round and reactive to light. EOMs intact. Conjunctivae normal.  EARS: Hearing intact.  NOSE:  No excoriations or nasal discharge.  MOUTH: Tongue is well-papillated; no stomatitis or ulcers. Lips normal.  THROAT: Oropharynx without lesions or exudates.  Status post partial thyroidectomy well-healing  LYMPHATICS: No cervical, supraclavicular, axillary adenopathy.  CHEST: Lungs clear to auscultation. Good airflow.   BREAST:Postop, Well healed right mastectomy site with no evidence of recurrent disease, no axillary adenopathy bilaterally.  CARDIAC: Regular rate and rhythm without " murmurs. Normal S1,S2.  ABDOMEN: Slightly distended, normal active bowel sounds,  no hepatosplenomegaly or masses.  EXTREMITIES: No clubbing, cyanosis or edema.  NEUROLOGICAL: Cranial Nerves II-XII grossly intact. No focal neurological deficits.  PSYCHIATRIC: Alert and oriented, pleased about her results      RECENT LABS:  Lab Results   Component Value Date    WBC 10.07 10/27/2020    HGB 10.2 (L) 10/27/2020    HCT 31.7 (L) 10/27/2020    MCV 95.5 10/27/2020    PLT 66 (L) 10/27/2020    PLT 66 (L) 10/27/2020     Lab Results   Component Value Date    NEUTROABS 4.65 10/27/2020     Lab Results   Component Value Date    GLUCOSE 103 (H) 10/01/2020    BUN 9 10/01/2020    CREATININE 0.68 10/01/2020    EGFRIFNONA 83 10/01/2020    EGFRIFAFRI 64 09/04/2019    BCR 13.2 10/01/2020    K 4.1 10/01/2020    CO2 25.0 10/01/2020    CALCIUM 8.5 (L) 10/01/2020    PROTENTOTREF 7.1 09/04/2019    ALBUMIN 3.60 10/01/2020    LABIL2 2.2 09/04/2019    AST 31 09/28/2020    ALT 34 (H) 09/28/2020            Assessment/Plan   1. Large right breast cancer, locally advanced, stage IIIa (T3 N1/ 2 M0).  ER negative, less than 1%; MO positive, moderate in staining, 5% to 10%. HER2 IHC 2+, FISH study positive 2.1.  Physical examination showed a large mass, 7 cm x 7 cm initially which has decreased to approximately less than 4 cm ..  Patient  proceeded through 4 cycles ceptin,Perjeta and Taxol.   Her subsequent MRI examination showed complete response by imaging including right axillary lymphadenopathy.  We have continued Herceptin and that includes today October 20, 2017.  The case was discussed with Dr. Melo and plans were to proceed with mastectomy plus right axillary lymph node assessment via sentinel node evaluation. it was felt she likely require radiation therapy post procedure.  The patient was scheduled and proceeded to surgery November 7.  Her results, wonderfully, revealed no evidence of residual disease in the breast or associated sentinel  lymph nodes. She was seen by radiation therapy and offered treatment did not pursue it.  Additionally, and somewhat complicating this story, she traveled to [place] and developed severe influenza, associated pneumonia and was hospitalized for several months only to return to [place] in the last several weeks now being seen back May 1.  There is no particular benefit from trying to add Herceptin back to her therapy now and is felt that she is completed Herceptin treatment.  She wishes consider reconstruction and baseline CT scans will be requested in 5 weeks with the patient being seen in 6 weeks follow-up.The patient reviewed June 13, 2018 with the scans demonstrating very modest increase in left pectoral, axillary or mediastinal nodes.  These are of uncertain significance but do need follow-up in a PET/CT is planned in 7 weeks.  Her anemia will also be reviewed again with additional laboratory studies that visit.  She'll be seen in 8 weeks for general reassessment.    The patient's anemia workup was essentially negative and she is slowly improving when seen back August 8.  Her PET/CT, fortunately, demonstrates improvement though there is potential abnormality within the right thyroid lobe.  We discussed thyroid function testing and follow-up thyroid ultrasound.  She will be seen back in approximately 2 months as we maintain her port monthly flushes.   The patient was reviewed by ENT and ultimately was felt that a partial thyroidectomy was in order and is now scheduled October 4.  Patient's one to proceed.  We'll plan to see her back in approximately 6 weeks.      The patient is next seen November 28, 2015.  Her surgery went well with the findings as noted above.  She has follow-up with ENT in the next several weeks.  Additionally she has bilateral cataract surgery at the end of this month and into the beginning of next.  We discussed reassessment in general with follow-up scans and these were performed May  2019 which showed no evidence of recurrent malignancy.  Six-month follow-up planes with maintenance of her port every 6 weeks.  The patient is reassessed October 29, 2019 fortunately continue to do relatively well.  She has had no additional medical issues since last seen we will plan to see her back in 6 months again meeting report.  She does plan to see plastic surgery concerning reconstruction concerning her breast cancer history.  A copy this note will be sent to the plastic surgeon.     The patient is next seen July 7, 2020 without evidence of recurrence of malignancy.  We plan 6-month follow-up.  The patient is reviewed October 27, 2020 without evidence recurrent disease, repeat scans during recent hospitalization September 20-October 1 without evidence of recurrent malignancy.      2.  Previous fall with contusions now recovered                                                               3.  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  Worsening reflux symptoms, GI referral planned    5.  Hospitalization September 20-October 1 with enteropathic E. coli.  This responded to conservative therapy with plans for reassessment by Dr. Carlin with outpatient colonoscopy.    6.  Acute upon chronic thrombocytopenia-modest albeit slow recovery when evaluated October 27.      Plan:  *3 months PAGE, RN evaluation    *6 months PAGE BERGER

## 2020-10-27 ENCOUNTER — INFUSION (OUTPATIENT)
Dept: ONCOLOGY | Facility: HOSPITAL | Age: 80
End: 2020-10-27

## 2020-10-27 ENCOUNTER — OFFICE VISIT (OUTPATIENT)
Dept: ONCOLOGY | Facility: CLINIC | Age: 80
End: 2020-10-27

## 2020-10-27 VITALS
BODY MASS INDEX: 28 KG/M2 | SYSTOLIC BLOOD PRESSURE: 120 MMHG | TEMPERATURE: 97.1 F | HEART RATE: 80 BPM | WEIGHT: 148.3 LBS | HEIGHT: 61 IN | OXYGEN SATURATION: 92 % | RESPIRATION RATE: 18 BRPM | DIASTOLIC BLOOD PRESSURE: 73 MMHG

## 2020-10-27 DIAGNOSIS — Z45.2 ENCOUNTER FOR FITTING AND ADJUSTMENT OF VASCULAR CATHETER: Primary | ICD-10-CM

## 2020-10-27 DIAGNOSIS — C50.211 MALIGNANT NEOPLASM OF UPPER-INNER QUADRANT OF RIGHT BREAST IN FEMALE, ESTROGEN RECEPTOR NEGATIVE (HCC): ICD-10-CM

## 2020-10-27 DIAGNOSIS — C50.511 MALIGNANT NEOPLASM OF LOWER-OUTER QUADRANT OF RIGHT FEMALE BREAST, UNSPECIFIED ESTROGEN RECEPTOR STATUS (HCC): ICD-10-CM

## 2020-10-27 DIAGNOSIS — Z17.1 MALIGNANT NEOPLASM OF UPPER-INNER QUADRANT OF RIGHT BREAST IN FEMALE, ESTROGEN RECEPTOR NEGATIVE (HCC): ICD-10-CM

## 2020-10-27 DIAGNOSIS — D69.6 THROMBOCYTOPENIA (HCC): ICD-10-CM

## 2020-10-27 DIAGNOSIS — D72.829 LEUKOCYTOSIS, UNSPECIFIED TYPE: ICD-10-CM

## 2020-10-27 DIAGNOSIS — D69.6 THROMBOCYTOPENIA (HCC): Primary | ICD-10-CM

## 2020-10-27 DIAGNOSIS — T45.1X5A ANEMIA ASSOCIATED WITH CHEMOTHERAPY: ICD-10-CM

## 2020-10-27 DIAGNOSIS — D50.9 IRON DEFICIENCY ANEMIA, UNSPECIFIED IRON DEFICIENCY ANEMIA TYPE: ICD-10-CM

## 2020-10-27 DIAGNOSIS — D64.81 ANEMIA ASSOCIATED WITH CHEMOTHERAPY: ICD-10-CM

## 2020-10-27 LAB
BASOPHILS # BLD AUTO: 0.02 10*3/MM3 (ref 0–0.2)
BASOPHILS NFR BLD AUTO: 0.2 % (ref 0–1.5)
DEPRECATED RDW RBC AUTO: 51.8 FL (ref 37–54)
EOSINOPHIL # BLD AUTO: 0.17 10*3/MM3 (ref 0–0.4)
EOSINOPHIL NFR BLD AUTO: 1.7 % (ref 0.3–6.2)
ERYTHROCYTE [DISTWIDTH] IN BLOOD BY AUTOMATED COUNT: 15.1 % (ref 12.3–15.4)
HCT VFR BLD AUTO: 31.7 % (ref 34–46.6)
HGB BLD-MCNC: 10.2 G/DL (ref 12–15.9)
IMM GRANULOCYTES # BLD AUTO: 0.29 10*3/MM3 (ref 0–0.05)
IMM GRANULOCYTES NFR BLD AUTO: 2.9 % (ref 0–0.5)
LYMPHOCYTES # BLD AUTO: 2.25 10*3/MM3 (ref 0.7–3.1)
LYMPHOCYTES NFR BLD AUTO: 22.3 % (ref 19.6–45.3)
MCH RBC QN AUTO: 30.7 PG (ref 26.6–33)
MCHC RBC AUTO-ENTMCNC: 32.2 G/DL (ref 31.5–35.7)
MCV RBC AUTO: 95.5 FL (ref 79–97)
MONOCYTES # BLD AUTO: 2.69 10*3/MM3 (ref 0.1–0.9)
MONOCYTES NFR BLD AUTO: 26.7 % (ref 5–12)
NEUTROPHILS NFR BLD AUTO: 4.65 10*3/MM3 (ref 1.7–7)
NEUTROPHILS NFR BLD AUTO: 46.2 % (ref 42.7–76)
NRBC BLD AUTO-RTO: 0 /100 WBC (ref 0–0.2)
PLAT MORPH BLD: NORMAL
PLATELET # BLD AUTO: 66 10*3/MM3 (ref 140–450)
PLATELET # BLD AUTO: 66 10*3/MM3 (ref 140–450)
PLATELETS.RETICULATED NFR BLD AUTO: 9.5 % (ref 0.9–6.5)
PMV BLD AUTO: 10.9 FL (ref 6–12)
RBC # BLD AUTO: 3.32 10*6/MM3 (ref 3.77–5.28)
RBC MORPH BLD: NORMAL
WBC # BLD AUTO: 10.07 10*3/MM3 (ref 3.4–10.8)
WBC MORPH BLD: NORMAL

## 2020-10-27 PROCEDURE — 85025 COMPLETE CBC W/AUTO DIFF WBC: CPT | Performed by: INTERNAL MEDICINE

## 2020-10-27 PROCEDURE — 25010000003 HEPARIN LOCK FLUSH PER 10 UNITS: Performed by: INTERNAL MEDICINE

## 2020-10-27 PROCEDURE — 85007 BL SMEAR W/DIFF WBC COUNT: CPT | Performed by: INTERNAL MEDICINE

## 2020-10-27 PROCEDURE — 99214 OFFICE O/P EST MOD 30 MIN: CPT | Performed by: INTERNAL MEDICINE

## 2020-10-27 PROCEDURE — G0463 HOSPITAL OUTPT CLINIC VISIT: HCPCS

## 2020-10-27 PROCEDURE — 96523 IRRIG DRUG DELIVERY DEVICE: CPT

## 2020-10-27 PROCEDURE — 85055 RETICULATED PLATELET ASSAY: CPT | Performed by: INTERNAL MEDICINE

## 2020-10-27 RX ORDER — HEPARIN SODIUM (PORCINE) LOCK FLUSH IV SOLN 100 UNIT/ML 100 UNIT/ML
500 SOLUTION INTRAVENOUS AS NEEDED
Status: DISCONTINUED | OUTPATIENT
Start: 2020-10-27 | End: 2020-10-27 | Stop reason: HOSPADM

## 2020-10-27 RX ORDER — SODIUM CHLORIDE 0.9 % (FLUSH) 0.9 %
10 SYRINGE (ML) INJECTION AS NEEDED
Status: CANCELLED | OUTPATIENT
Start: 2020-10-27

## 2020-10-27 RX ORDER — SODIUM CHLORIDE 0.9 % (FLUSH) 0.9 %
10 SYRINGE (ML) INJECTION AS NEEDED
Status: DISCONTINUED | OUTPATIENT
Start: 2020-10-27 | End: 2020-10-27 | Stop reason: HOSPADM

## 2020-10-27 RX ORDER — HEPARIN SODIUM (PORCINE) LOCK FLUSH IV SOLN 100 UNIT/ML 100 UNIT/ML
500 SOLUTION INTRAVENOUS AS NEEDED
Status: CANCELLED | OUTPATIENT
Start: 2020-10-27

## 2020-10-27 RX ADMIN — SODIUM CHLORIDE, PRESERVATIVE FREE 10 ML: 5 INJECTION INTRAVENOUS at 15:42

## 2020-10-27 RX ADMIN — Medication 500 UNITS: at 15:43

## 2020-11-06 RX ORDER — HYDROCHLOROTHIAZIDE 25 MG/1
TABLET ORAL
Qty: 30 TABLET | Refills: 0 | OUTPATIENT
Start: 2020-11-06

## 2020-11-14 NOTE — TELEPHONE ENCOUNTER
Pt called requesting to speak with nurse in regards to appts on 4/7/20.    Pt wants to know what she should do for these appts and if she should come in for these or not. Pt would like to reschedule but does not know if she should.    Call pt at [phone].  
Pt called wanting to move her apt on 4/7 out. D/W Dr. Coburn. Per Dr. Coburn, OK to move pt's apt out approx 3 months. Message sent to scheduling.   
- - -

## 2020-11-20 ENCOUNTER — OFFICE VISIT (OUTPATIENT)
Dept: FAMILY MEDICINE CLINIC | Facility: CLINIC | Age: 80
End: 2020-11-20

## 2020-11-20 VITALS
BODY MASS INDEX: 32 KG/M2 | SYSTOLIC BLOOD PRESSURE: 140 MMHG | WEIGHT: 163 LBS | OXYGEN SATURATION: 98 % | HEIGHT: 60 IN | DIASTOLIC BLOOD PRESSURE: 70 MMHG | RESPIRATION RATE: 16 BRPM | TEMPERATURE: 96.8 F | HEART RATE: 80 BPM

## 2020-11-20 DIAGNOSIS — G62.0 PERIPHERAL NEUROPATHY DUE TO CHEMOTHERAPY (HCC): ICD-10-CM

## 2020-11-20 DIAGNOSIS — E03.9 ACQUIRED HYPOTHYROIDISM: ICD-10-CM

## 2020-11-20 DIAGNOSIS — G43.809 OTHER MIGRAINE WITHOUT STATUS MIGRAINOSUS, NOT INTRACTABLE: ICD-10-CM

## 2020-11-20 DIAGNOSIS — I10 ESSENTIAL HYPERTENSION: Primary | ICD-10-CM

## 2020-11-20 DIAGNOSIS — T45.1X5A PERIPHERAL NEUROPATHY DUE TO CHEMOTHERAPY (HCC): ICD-10-CM

## 2020-11-20 DIAGNOSIS — E78.5 HYPERLIPIDEMIA, UNSPECIFIED HYPERLIPIDEMIA TYPE: ICD-10-CM

## 2020-11-20 PROCEDURE — 99214 OFFICE O/P EST MOD 30 MIN: CPT | Performed by: INTERNAL MEDICINE

## 2020-11-20 RX ORDER — GABAPENTIN 300 MG/1
600 CAPSULE ORAL 3 TIMES DAILY
Qty: 180 CAPSULE | Refills: 2 | Status: SHIPPED | OUTPATIENT
Start: 2020-11-20 | End: 2021-03-02

## 2020-11-20 RX ORDER — HYDROCHLOROTHIAZIDE 25 MG/1
TABLET ORAL
Qty: 30 TABLET | Refills: 0 | OUTPATIENT
Start: 2020-11-20

## 2020-11-20 RX ORDER — HYDROCHLOROTHIAZIDE 25 MG/1
25 TABLET ORAL DAILY
Qty: 90 TABLET | Refills: 3 | Status: SHIPPED | OUTPATIENT
Start: 2020-11-20 | End: 2021-11-08

## 2020-11-20 NOTE — PATIENT INSTRUCTIONS
Blood pressure is high today but very stable by your numbers.  Will set up some fasting lab work including a comprehensive metabolic panel, thyroid-stimulating hormone level, and lipid panel.  Continue diet and exercise.  We will continue current medicines.

## 2020-11-20 NOTE — PROGRESS NOTES
Subjective   Roxana Brizuela is a 79 y.o. female. Patient is here today for   Chief Complaint   Patient presents with   • Hypertension     no labs          Vitals:    11/20/20 1422   BP: 140/70   Pulse: 80   Resp: 16   Temp: 96.8 °F (36 °C)   SpO2: 98%     Body mass index is 31.83 kg/m².    The following portions of the patient's history were reviewed and updated as appropriate: allergies, current medications, past family history, past medical history, past social history, past surgical history and problem list.    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 Paul Negron   • GI (gastrointestinal bleed) 1997   • H/O jaundice    • Hernia     INCISONAL. AROUND LEFT TRAM LAP SITE   • History of chemotherapy     2017 4 MONTHS IN 2017   • History of Clostridium difficile colitis     JAN 2018   • History of histoplasmosis    • History of infectious mononucleosis 1960   • History of migraine headaches    • History of pneumonia 12/27/2017    AS RESULT OF FLU. ENDED UP ON VENT IN [place]   • History of thrombocytopenia    • History of transfusion 1997   • History of vertigo    • Hx of colonic polyps 06/11/2009    Dr. Giles   • Hyperlipidemia    • Hypertension    • Meningioma (CMS/[facility])    • Neuropathy     HANDS AND FEET   • Osteoarthritis 09/13/2011    Dr. Jamil Miller   • Peptic ulceration    • Retina  disorder     BLEED. FROM HISTOPLASMOSIS   • SBO (small bowel obstruction) (CMS/HCC)     due to hernia with surgical repair in 2011   • SOB (shortness of breath) on exertion    • Thyroid mass     RIGHT      Allergies   Allergen Reactions   • Codeine Nausea And Vomiting   • Morphine Nausea And Vomiting      Social History     Socioeconomic History   • Marital status:      Spouse name: Mike   • Number of children: 2   • Years of education: College   • Highest education level: Not on file   Occupational History     Employer: RETIRED     Comment: [employer]   Tobacco Use   • Smoking status: Former Smoker     Packs/day: 2.00     Years: 30.00     Pack years: 60.00     Types: Cigarettes     Start date: 1957     Quit date: 4/10/1987     Years since quittin.6   • Smokeless tobacco: Never Used   • Tobacco comment: I haven't had a cigarette in over 30 years   Substance and Sexual Activity   • Alcohol use: No     Comment: stopped, heavy in past   • Drug use: No   • Sexual activity: Defer        Current Outpatient Medications:   •  Ascorbic Acid (VITAMIN C PO), Take 1,000 mg by mouth Daily., Disp: , Rfl:   •  calcium carbonate (OS-JUNAID) 600 MG tablet, Take 600 mg by mouth Daily., Disp: , Rfl:   •  diclofenac (VOLTAREN) 75 MG EC tablet, Take 1 tablet by mouth 2 (Two) Times a Day. Patient must be seen for future refills., Disp: 180 tablet, Rfl: 3  •  gabapentin (NEURONTIN) 300 MG capsule, Take 2 capsules by mouth 3 (Three) Times a Day., Disp: 180 capsule, Rfl: 2  •  hydroCHLOROthiazide (HYDRODIURIL) 25 MG tablet, Take 1 tablet by mouth Daily., Disp: 90 tablet, Rfl: 3  •  Lactobacillus (PROBIOTIC ACIDOPHILUS PO), Take 1 capsule by mouth Daily., Disp: , Rfl:   •  levothyroxine (Synthroid) 75 MCG tablet, Take 1 tablet by mouth Daily., Disp: 90 tablet, Rfl: 3  •  MELATONIN PO, Take 10 mg by mouth At Night As Needed., Disp: , Rfl:   •  ondansetron ODT (ZOFRAN-ODT) 4 MG disintegrating tablet,  Take 4 mg by mouth Every 8 (Eight) Hours As Needed for Nausea or Vomiting., Disp: , Rfl:   •  pantoprazole (PROTONIX) 40 MG EC tablet, Take 1 tablet by mouth once daily, Disp: 90 tablet, Rfl: 0  •  potassium chloride (KLOR-CON) 20 MEQ packet, Take 20 mEq by mouth Daily., Disp: , Rfl:   •  propranolol (INDERAL) 10 MG tablet, TAKE 1 TABLET BY MOUTH THREE TIMES DAILY, Disp: 270 tablet, Rfl: 0  •  simvastatin (ZOCOR) 80 MG tablet, TAKE 1 TABLET BY MOUTH AT BEDTIME, Disp: 90 tablet, Rfl: 0  •  SUMAtriptan (IMITREX) 50 MG tablet, Take one tablet at onset of headache. May repeat dose one time in 2 hours if headache not relieved., Disp: 10 tablet, Rfl: 0  •  trimethoprim-polymyxin b (POLYTRIM) 22669-4.1 UNIT/ML-% ophthalmic solution, INSTILL 1 DROP INTO RIGHT EYE 4 TIMES DAILY FOR 4 DAYS PRIOR TO INJECTION AND FOR 4 DAYS AFTER, Disp: , Rfl: 11  •  vitamin B-12 (CYANOCOBALAMIN) 1000 MCG tablet, Take 1,000 mcg by mouth Daily., Disp: , Rfl:   •  vitamin B-6 (PYRIDOXINE) 50 MG tablet, Take 50 mg by mouth Daily., Disp: , Rfl:      Objective     History of Present Illness Roxana is here for blood pressure check and to have some medicines refilled.  She has hypertension, hyperlipidemia, hypothyroidism, neuropathy, iron deficiency anemia, and history of breast cancer.  She is followed by hematology/oncology.  She feels well.  She eats healthy and walks every day for exercise.  She monitors her blood pressure and reports normal readings.  She takes gabapentin for the neuropathy which helps a lot.  She was admitted to [facility], operated by [facility] in September with acute colitis secondary to enteropathic E. coli.    Review of Systems   Constitutional: Negative for activity change and unexpected weight change.   Respiratory: Negative for cough and shortness of breath.    Cardiovascular:        BP average 120/70   Gastrointestinal:        As in HPI   Genitourinary: Negative.    Neurological: Positive for numbness.   Psychiatric/Behavioral:  Negative.        Physical Exam  Vitals signs reviewed.   Constitutional:       Appearance: Normal appearance.   Neck:      Vascular: No carotid bruit.   Cardiovascular:      Rate and Rhythm: Normal rate and regular rhythm.      Heart sounds: Normal heart sounds.      Comments: 142 over 70x2  Pulmonary:      Effort: Pulmonary effort is normal.      Breath sounds: Normal breath sounds.   Musculoskeletal:      Right lower leg: No edema.      Left lower leg: No edema.   Neurological:      Mental Status: She is alert.   Psychiatric:         Mood and Affect: Mood normal.         Behavior: Behavior normal.         Thought Content: Thought content normal.         Judgment: Judgment normal.         ASSESSMENT     Problems Addressed this Visit        Cardiovascular and Mediastinum    Hyperlipidemia    Hypertension - Primary    Relevant Medications    hydroCHLOROthiazide (HYDRODIURIL) 25 MG tablet    Nonintractable migraine    Relevant Medications    gabapentin (NEURONTIN) 300 MG capsule       Endocrine    Hypothyroid       Nervous and Auditory    Peripheral neuropathy due to chemotherapy (CMS/HCC)    Relevant Medications    gabapentin (NEURONTIN) 300 MG capsule      Diagnoses       Codes Comments    Essential hypertension    -  Primary ICD-10-CM: I10  ICD-9-CM: 401.9     Hyperlipidemia, unspecified hyperlipidemia type     ICD-10-CM: E78.5  ICD-9-CM: 272.4     Acquired hypothyroidism     ICD-10-CM: E03.9  ICD-9-CM: 244.9     Other migraine without status migrainosus, not intractable     ICD-10-CM: G43.809  ICD-9-CM: 346.80     Peripheral neuropathy due to chemotherapy (CMS/HCC)     ICD-10-CM: G62.0, T45.1X5A  ICD-9-CM: 357.7, E933.1           PLAN  Patient Instructions   Blood pressure is high today but very stable by your numbers.  Will set up some fasting lab work including a comprehensive metabolic panel, thyroid-stimulating hormone level, and lipid panel.  Continue diet and exercise.  We will continue current  medicines.    Return in about 6 months (around 5/20/2021), or AWV.

## 2020-11-24 ENCOUNTER — INFUSION (OUTPATIENT)
Dept: ONCOLOGY | Facility: HOSPITAL | Age: 80
End: 2020-11-24

## 2020-11-24 DIAGNOSIS — Z45.2 ENCOUNTER FOR FITTING AND ADJUSTMENT OF VASCULAR CATHETER: Primary | ICD-10-CM

## 2020-11-24 PROCEDURE — 96523 IRRIG DRUG DELIVERY DEVICE: CPT

## 2020-11-24 PROCEDURE — 25010000003 HEPARIN LOCK FLUSH PER 10 UNITS: Performed by: INTERNAL MEDICINE

## 2020-11-24 RX ORDER — HEPARIN SODIUM (PORCINE) LOCK FLUSH IV SOLN 100 UNIT/ML 100 UNIT/ML
500 SOLUTION INTRAVENOUS AS NEEDED
Status: DISCONTINUED | OUTPATIENT
Start: 2020-11-24 | End: 2020-11-24 | Stop reason: HOSPADM

## 2020-11-24 RX ORDER — SODIUM CHLORIDE 0.9 % (FLUSH) 0.9 %
10 SYRINGE (ML) INJECTION AS NEEDED
Status: DISCONTINUED | OUTPATIENT
Start: 2020-11-24 | End: 2020-11-24 | Stop reason: HOSPADM

## 2020-11-24 RX ORDER — HEPARIN SODIUM (PORCINE) LOCK FLUSH IV SOLN 100 UNIT/ML 100 UNIT/ML
500 SOLUTION INTRAVENOUS AS NEEDED
Status: CANCELLED | OUTPATIENT
Start: 2020-11-24

## 2020-11-24 RX ORDER — SODIUM CHLORIDE 0.9 % (FLUSH) 0.9 %
10 SYRINGE (ML) INJECTION AS NEEDED
Status: CANCELLED | OUTPATIENT
Start: 2020-11-24

## 2020-11-24 RX ADMIN — Medication 500 UNITS: at 13:58

## 2020-11-24 RX ADMIN — SODIUM CHLORIDE, PRESERVATIVE FREE 10 ML: 5 INJECTION INTRAVENOUS at 13:57

## 2020-11-30 DIAGNOSIS — E78.5 HYPERLIPIDEMIA, UNSPECIFIED HYPERLIPIDEMIA TYPE: Primary | ICD-10-CM

## 2020-11-30 DIAGNOSIS — I10 ESSENTIAL HYPERTENSION: ICD-10-CM

## 2020-12-07 RX ORDER — PROPRANOLOL HYDROCHLORIDE 10 MG/1
TABLET ORAL
Qty: 270 TABLET | Refills: 0 | Status: SHIPPED | OUTPATIENT
Start: 2020-12-07 | End: 2021-03-08

## 2020-12-08 LAB
ALBUMIN SERPL-MCNC: 4.8 G/DL (ref 3.5–5.2)
ALBUMIN/GLOB SERPL: 2.2 G/DL
ALP SERPL-CCNC: 57 U/L (ref 39–117)
ALT SERPL-CCNC: 34 U/L (ref 1–33)
AST SERPL-CCNC: 35 U/L (ref 1–32)
BILIRUB SERPL-MCNC: 0.8 MG/DL (ref 0–1.2)
BUN SERPL-MCNC: 18 MG/DL (ref 8–23)
BUN/CREAT SERPL: 18.4 (ref 7–25)
CALCIUM SERPL-MCNC: 9.3 MG/DL (ref 8.6–10.5)
CHLORIDE SERPL-SCNC: 101 MMOL/L (ref 98–107)
CHOLEST SERPL-MCNC: 158 MG/DL (ref 0–200)
CO2 SERPL-SCNC: 27.9 MMOL/L (ref 22–29)
CREAT SERPL-MCNC: 0.98 MG/DL (ref 0.57–1)
GLOBULIN SER CALC-MCNC: 2.2 GM/DL
GLUCOSE SERPL-MCNC: 93 MG/DL (ref 65–99)
HDLC SERPL-MCNC: 38 MG/DL (ref 40–60)
LDLC SERPL CALC-MCNC: 93 MG/DL (ref 0–100)
LDLC/HDLC SERPL: 2.36 {RATIO}
POTASSIUM SERPL-SCNC: 4.4 MMOL/L (ref 3.5–5.2)
PROT SERPL-MCNC: 7 G/DL (ref 6–8.5)
SODIUM SERPL-SCNC: 139 MMOL/L (ref 136–145)
TRIGL SERPL-MCNC: 151 MG/DL (ref 0–150)
TSH SERPL DL<=0.005 MIU/L-ACNC: 2.18 UIU/ML (ref 0.27–4.2)
VLDLC SERPL CALC-MCNC: 27 MG/DL (ref 5–40)

## 2020-12-11 ENCOUNTER — OFFICE VISIT (OUTPATIENT)
Dept: FAMILY MEDICINE CLINIC | Facility: CLINIC | Age: 80
End: 2020-12-11

## 2020-12-11 VITALS
HEART RATE: 88 BPM | SYSTOLIC BLOOD PRESSURE: 150 MMHG | OXYGEN SATURATION: 95 % | HEIGHT: 60 IN | DIASTOLIC BLOOD PRESSURE: 70 MMHG | BODY MASS INDEX: 32 KG/M2 | WEIGHT: 163 LBS | RESPIRATION RATE: 17 BRPM | TEMPERATURE: 98 F

## 2020-12-11 DIAGNOSIS — E03.9 ACQUIRED HYPOTHYROIDISM: ICD-10-CM

## 2020-12-11 DIAGNOSIS — E78.5 HYPERLIPIDEMIA, UNSPECIFIED HYPERLIPIDEMIA TYPE: ICD-10-CM

## 2020-12-11 DIAGNOSIS — I10 ESSENTIAL HYPERTENSION: Primary | ICD-10-CM

## 2020-12-11 PROCEDURE — 99214 OFFICE O/P EST MOD 30 MIN: CPT | Performed by: INTERNAL MEDICINE

## 2020-12-11 NOTE — PROGRESS NOTES
Subjective   Roxana Brizuela is a 79 y.o. female. Patient is here today for   Chief Complaint   Patient presents with   • Hypertension   • Hyperlipidemia          Vitals:    12/11/20 1306   BP: 150/70   Pulse: 88   Resp: 17   Temp: 98 °F (36.7 °C)   SpO2: 95%     Body mass index is 31.83 kg/m².      The following portions of the patient's history were reviewed and updated as appropriate: allergies, current medications, past family history, past medical history, past social history, past surgical history and problem list.    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 Paul Negron   • GI (gastrointestinal bleed) 1997   • H/O jaundice    • Hernia     INCISONAL. AROUND LEFT TRAM LAP SITE   • History of chemotherapy     2017 4 MONTHS IN 2017   • History of Clostridium difficile colitis     JAN 2018   • History of histoplasmosis    • History of infectious mononucleosis 1960   • History of migraine headaches    • History of pneumonia 12/27/2017    AS RESULT OF FLU. ENDED UP ON VENT IN [place]   • History of thrombocytopenia    • History of transfusion 1997   • History of vertigo    • Hx of colonic polyps 06/11/2009    Dr. Giles   • Hyperlipidemia    • Hypertension    • Meningioma (CMS/[facility])    • Neuropathy     HANDS AND FEET   • Osteoarthritis 09/13/2011    Dr. Jamil Miller   • Peptic ulceration    •  Retina disorder     BLEED. FROM HISTOPLASMOSIS   • SBO (small bowel obstruction) (CMS/HCC)     due to hernia with surgical repair in 2011   • SOB (shortness of breath) on exertion    • Thyroid mass     RIGHT      Allergies   Allergen Reactions   • Codeine Nausea And Vomiting   • Morphine Nausea And Vomiting      Social History     Socioeconomic History   • Marital status:      Spouse name: Mike   • Number of children: 2   • Years of education: College   • Highest education level: Not on file   Occupational History     Employer: RETIRED     Comment: [employer]   Tobacco Use   • Smoking status: Former Smoker     Packs/day: 2.00     Years: 30.00     Pack years: 60.00     Types: Cigarettes     Start date: 1957     Quit date: 4/10/1987     Years since quittin.6   • Smokeless tobacco: Never Used   • Tobacco comment: I haven't had a cigarette in over 30 years   Substance and Sexual Activity   • Alcohol use: No     Comment: stopped, heavy in past   • Drug use: No   • Sexual activity: Defer        Current Outpatient Medications:   •  Ascorbic Acid (VITAMIN C PO), Take 1,000 mg by mouth Daily., Disp: , Rfl:   •  calcium carbonate (OS-JUNAID) 600 MG tablet, Take 600 mg by mouth Daily., Disp: , Rfl:   •  diclofenac (VOLTAREN) 75 MG EC tablet, Take 1 tablet by mouth 2 (Two) Times a Day. Patient must be seen for future refills., Disp: 180 tablet, Rfl: 3  •  gabapentin (NEURONTIN) 300 MG capsule, Take 2 capsules by mouth 3 (Three) Times a Day., Disp: 180 capsule, Rfl: 2  •  hydroCHLOROthiazide (HYDRODIURIL) 25 MG tablet, Take 1 tablet by mouth Daily., Disp: 90 tablet, Rfl: 3  •  Lactobacillus (PROBIOTIC ACIDOPHILUS PO), Take 1 capsule by mouth Daily., Disp: , Rfl:   •  levothyroxine (Synthroid) 75 MCG tablet, Take 1 tablet by mouth Daily., Disp: 90 tablet, Rfl: 3  •  MELATONIN PO, Take 10 mg by mouth At Night As Needed., Disp: , Rfl:   •  ondansetron ODT (ZOFRAN-ODT) 4 MG disintegrating  tablet, Take 4 mg by mouth Every 8 (Eight) Hours As Needed for Nausea or Vomiting., Disp: , Rfl:   •  pantoprazole (PROTONIX) 40 MG EC tablet, Take 1 tablet by mouth once daily, Disp: 90 tablet, Rfl: 0  •  potassium chloride (KLOR-CON) 20 MEQ packet, Take 20 mEq by mouth Daily., Disp: , Rfl:   •  propranolol (INDERAL) 10 MG tablet, TAKE 1 TABLET BY MOUTH THREE TIMES DAILY . APPOINTMENT REQUIRED FOR FUTURE REFILLS, Disp: 270 tablet, Rfl: 0  •  simvastatin (ZOCOR) 80 MG tablet, TAKE 1 TABLET BY MOUTH AT BEDTIME, Disp: 90 tablet, Rfl: 0  •  SUMAtriptan (IMITREX) 50 MG tablet, Take one tablet at onset of headache. May repeat dose one time in 2 hours if headache not relieved., Disp: 10 tablet, Rfl: 0  •  trimethoprim-polymyxin b (POLYTRIM) 84982-7.1 UNIT/ML-% ophthalmic solution, INSTILL 1 DROP INTO RIGHT EYE 4 TIMES DAILY FOR 4 DAYS PRIOR TO INJECTION AND FOR 4 DAYS AFTER, Disp: , Rfl: 11  •  vitamin B-12 (CYANOCOBALAMIN) 1000 MCG tablet, Take 1,000 mcg by mouth Daily., Disp: , Rfl:   •  vitamin B-6 (PYRIDOXINE) 50 MG tablet, Take 50 mg by mouth Daily., Disp: , Rfl:      Objective   History of Present Illness Marsha is here for blood pressure check and lab follow-up.  She has hypertension, hyperlipidemia, gastroesophageal reflux, hypothyroidism, peripheral neuropathy, and migraine headaches.  She also has history of breast cancer and is followed by oncology.  She feels well.  She eats healthy and walks daily for exercise.  She monitors her blood pressure reports stable readings.  She had an echocardiogram in September which showed some tricuspid regurgitation and was otherwise normal.    Review of Systems   Constitutional: Negative for activity change and unexpected weight change.   Respiratory: Negative for shortness of breath and stridor.    Gastrointestinal: Negative.    Genitourinary: Negative.    Neurological: Negative.    Psychiatric/Behavioral: Negative.        Physical Exam  Vitals signs reviewed.    Constitutional:       Appearance: Normal appearance.   Cardiovascular:      Rate and Rhythm: Normal rate and regular rhythm.      Heart sounds: Normal heart sounds.      Comments: 130/60  Pulmonary:      Effort: Pulmonary effort is normal.      Breath sounds: Normal breath sounds.   Musculoskeletal:      Right lower leg: No edema.      Left lower leg: No edema.   Neurological:      Mental Status: She is alert.   Psychiatric:         Mood and Affect: Mood normal.         Behavior: Behavior normal.         Thought Content: Thought content normal.         Judgment: Judgment normal.         ASSESSMENT     Problems Addressed this Visit        Cardiovascular and Mediastinum    Hyperlipidemia    Hypertension - Primary       Endocrine    Hypothyroid      Diagnoses       Codes Comments    Essential hypertension    -  Primary ICD-10-CM: I10  ICD-9-CM: 401.9     Hyperlipidemia, unspecified hyperlipidemia type     ICD-10-CM: E78.5  ICD-9-CM: 272.4     Acquired hypothyroidism     ICD-10-CM: E03.9  ICD-9-CM: 244.9           PLAN  Patient Instructions   Blood pressure is stable.  Total and LDL cholesterol are normal and HDL cholesterol is low.  Fasting glucose and kidney functions are normal.  Thyroid-stimulating hormone level is normal.  Continue diet, exercise, and current medicines.      Return in about 6 months (around 6/11/2021), or AWV, for labsLIPID,TSH.

## 2020-12-11 NOTE — PATIENT INSTRUCTIONS
Blood pressure is stable.  Total and LDL cholesterol are normal and HDL cholesterol is low.  Fasting glucose and kidney functions are normal.  Thyroid-stimulating hormone level is normal.  Continue diet, exercise, and current medicines.

## 2020-12-14 RX ORDER — SIMVASTATIN 80 MG
TABLET ORAL
Qty: 90 TABLET | Refills: 3 | Status: SHIPPED | OUTPATIENT
Start: 2020-12-14 | End: 2021-11-03

## 2020-12-22 ENCOUNTER — INFUSION (OUTPATIENT)
Dept: ONCOLOGY | Facility: HOSPITAL | Age: 80
End: 2020-12-22

## 2020-12-22 DIAGNOSIS — Z45.2 ENCOUNTER FOR FITTING AND ADJUSTMENT OF VASCULAR CATHETER: Primary | ICD-10-CM

## 2020-12-22 PROCEDURE — 96523 IRRIG DRUG DELIVERY DEVICE: CPT

## 2020-12-22 PROCEDURE — 25010000003 HEPARIN LOCK FLUSH PER 10 UNITS: Performed by: INTERNAL MEDICINE

## 2020-12-22 PROCEDURE — 96372 THER/PROPH/DIAG INJ SC/IM: CPT

## 2020-12-22 RX ORDER — SODIUM CHLORIDE 0.9 % (FLUSH) 0.9 %
10 SYRINGE (ML) INJECTION AS NEEDED
Status: CANCELLED | OUTPATIENT
Start: 2020-12-22

## 2020-12-22 RX ORDER — SODIUM CHLORIDE 0.9 % (FLUSH) 0.9 %
10 SYRINGE (ML) INJECTION AS NEEDED
Status: DISCONTINUED | OUTPATIENT
Start: 2020-12-22 | End: 2020-12-22 | Stop reason: HOSPADM

## 2020-12-22 RX ORDER — HEPARIN SODIUM (PORCINE) LOCK FLUSH IV SOLN 100 UNIT/ML 100 UNIT/ML
500 SOLUTION INTRAVENOUS AS NEEDED
Status: DISCONTINUED | OUTPATIENT
Start: 2020-12-22 | End: 2020-12-22 | Stop reason: HOSPADM

## 2020-12-22 RX ORDER — HEPARIN SODIUM (PORCINE) LOCK FLUSH IV SOLN 100 UNIT/ML 100 UNIT/ML
500 SOLUTION INTRAVENOUS AS NEEDED
Status: CANCELLED | OUTPATIENT
Start: 2020-12-22

## 2020-12-22 RX ADMIN — SODIUM CHLORIDE, PRESERVATIVE FREE 10 ML: 5 INJECTION INTRAVENOUS at 13:50

## 2020-12-22 RX ADMIN — Medication 500 UNITS: at 13:50

## 2021-01-18 RX ORDER — PANTOPRAZOLE SODIUM 40 MG/1
TABLET, DELAYED RELEASE ORAL
Qty: 90 TABLET | Refills: 3 | Status: SHIPPED | OUTPATIENT
Start: 2021-01-18 | End: 2021-10-25

## 2021-01-19 ENCOUNTER — APPOINTMENT (OUTPATIENT)
Dept: ONCOLOGY | Facility: HOSPITAL | Age: 81
End: 2021-01-19

## 2021-01-19 ENCOUNTER — CLINICAL SUPPORT (OUTPATIENT)
Dept: ONCOLOGY | Facility: HOSPITAL | Age: 81
End: 2021-01-19

## 2021-01-19 DIAGNOSIS — Z45.2 ENCOUNTER FOR FITTING AND ADJUSTMENT OF VASCULAR CATHETER: Primary | ICD-10-CM

## 2021-01-19 DIAGNOSIS — D64.81 ANEMIA ASSOCIATED WITH CHEMOTHERAPY: ICD-10-CM

## 2021-01-19 DIAGNOSIS — T45.1X5A ANEMIA ASSOCIATED WITH CHEMOTHERAPY: ICD-10-CM

## 2021-01-19 DIAGNOSIS — D50.9 IRON DEFICIENCY ANEMIA, UNSPECIFIED IRON DEFICIENCY ANEMIA TYPE: ICD-10-CM

## 2021-01-19 DIAGNOSIS — D69.6 THROMBOCYTOPENIA (HCC): ICD-10-CM

## 2021-01-19 LAB
BASOPHILS # BLD AUTO: 0.03 10*3/MM3 (ref 0–0.2)
BASOPHILS NFR BLD AUTO: 0.3 % (ref 0–1.5)
DEPRECATED RDW RBC AUTO: 49.1 FL (ref 37–54)
EOSINOPHIL # BLD AUTO: 0.09 10*3/MM3 (ref 0–0.4)
EOSINOPHIL NFR BLD AUTO: 0.9 % (ref 0.3–6.2)
ERYTHROCYTE [DISTWIDTH] IN BLOOD BY AUTOMATED COUNT: 14.3 % (ref 12.3–15.4)
HCT VFR BLD AUTO: 33.9 % (ref 34–46.6)
HGB BLD-MCNC: 11 G/DL (ref 12–15.9)
IMM GRANULOCYTES # BLD AUTO: 0.36 10*3/MM3 (ref 0–0.05)
IMM GRANULOCYTES NFR BLD AUTO: 3.5 % (ref 0–0.5)
LYMPHOCYTES # BLD AUTO: 2.35 10*3/MM3 (ref 0.7–3.1)
LYMPHOCYTES NFR BLD AUTO: 22.7 % (ref 19.6–45.3)
MCH RBC QN AUTO: 30.7 PG (ref 26.6–33)
MCHC RBC AUTO-ENTMCNC: 32.4 G/DL (ref 31.5–35.7)
MCV RBC AUTO: 94.7 FL (ref 79–97)
MONOCYTES # BLD AUTO: 3.18 10*3/MM3 (ref 0.1–0.9)
MONOCYTES NFR BLD AUTO: 30.7 % (ref 5–12)
NEUTROPHILS NFR BLD AUTO: 4.36 10*3/MM3 (ref 1.7–7)
NEUTROPHILS NFR BLD AUTO: 41.9 % (ref 42.7–76)
NRBC BLD AUTO-RTO: 0 /100 WBC (ref 0–0.2)
PLAT MORPH BLD: NORMAL
PLATELET # BLD AUTO: 54 10*3/MM3 (ref 140–450)
PMV BLD AUTO: 11.1 FL (ref 6–12)
RBC # BLD AUTO: 3.58 10*6/MM3 (ref 3.77–5.28)
RBC MORPH BLD: NORMAL
WBC # BLD AUTO: 10.37 10*3/MM3 (ref 3.4–10.8)
WBC MORPH BLD: NORMAL

## 2021-01-19 PROCEDURE — 85025 COMPLETE CBC W/AUTO DIFF WBC: CPT | Performed by: INTERNAL MEDICINE

## 2021-01-19 PROCEDURE — G0463 HOSPITAL OUTPT CLINIC VISIT: HCPCS

## 2021-01-19 PROCEDURE — 25010000003 HEPARIN LOCK FLUSH PER 10 UNITS: Performed by: INTERNAL MEDICINE

## 2021-01-19 PROCEDURE — 85007 BL SMEAR W/DIFF WBC COUNT: CPT | Performed by: INTERNAL MEDICINE

## 2021-01-19 PROCEDURE — 36591 DRAW BLOOD OFF VENOUS DEVICE: CPT

## 2021-01-19 RX ORDER — HEPARIN SODIUM (PORCINE) LOCK FLUSH IV SOLN 100 UNIT/ML 100 UNIT/ML
500 SOLUTION INTRAVENOUS AS NEEDED
Status: CANCELLED | OUTPATIENT
Start: 2021-01-19

## 2021-01-19 RX ORDER — SODIUM CHLORIDE 0.9 % (FLUSH) 0.9 %
10 SYRINGE (ML) INJECTION AS NEEDED
Status: DISCONTINUED | OUTPATIENT
Start: 2021-01-19 | End: 2021-01-19 | Stop reason: HOSPADM

## 2021-01-19 RX ORDER — SODIUM CHLORIDE 0.9 % (FLUSH) 0.9 %
10 SYRINGE (ML) INJECTION AS NEEDED
Status: CANCELLED | OUTPATIENT
Start: 2021-01-19

## 2021-01-19 RX ORDER — HEPARIN SODIUM (PORCINE) LOCK FLUSH IV SOLN 100 UNIT/ML 100 UNIT/ML
500 SOLUTION INTRAVENOUS AS NEEDED
Status: DISCONTINUED | OUTPATIENT
Start: 2021-01-19 | End: 2021-01-19 | Stop reason: HOSPADM

## 2021-01-19 RX ADMIN — Medication 500 UNITS: at 13:45

## 2021-01-19 RX ADMIN — SODIUM CHLORIDE, PRESERVATIVE FREE 10 ML: 5 INJECTION INTRAVENOUS at 13:45

## 2021-01-19 NOTE — NURSING NOTE
Lab Results   Component Value Date    WBC 10.37 01/19/2021    HGB 11.0 (L) 01/19/2021    HCT 33.9 (L) 01/19/2021    MCV 94.7 01/19/2021    PLT 54 (L) 01/19/2021     Pt is here for lab with RN review.  CBC reviewed with pt, counts are stable for this pt at this time. Pt has no complaints.  Copy of labs given to pt and f/u appt reviewed. Pt is instructed to call the office with any concerns or new symptoms prior to next visit. Pt vu

## 2021-02-27 DIAGNOSIS — T45.1X5A PERIPHERAL NEUROPATHY DUE TO CHEMOTHERAPY (HCC): ICD-10-CM

## 2021-02-27 DIAGNOSIS — G62.0 PERIPHERAL NEUROPATHY DUE TO CHEMOTHERAPY (HCC): ICD-10-CM

## 2021-03-02 ENCOUNTER — TRANSCRIBE ORDERS (OUTPATIENT)
Dept: ADMINISTRATIVE | Facility: HOSPITAL | Age: 81
End: 2021-03-02

## 2021-03-02 DIAGNOSIS — Z13.6 ENCOUNTER FOR SCREENING FOR VASCULAR DISEASE: Primary | ICD-10-CM

## 2021-03-02 DIAGNOSIS — Z23 IMMUNIZATION DUE: ICD-10-CM

## 2021-03-02 RX ORDER — GABAPENTIN 300 MG/1
CAPSULE ORAL
Qty: 180 CAPSULE | Refills: 5 | Status: SHIPPED | OUTPATIENT
Start: 2021-03-02 | End: 2021-08-25

## 2021-03-08 RX ORDER — PROPRANOLOL HYDROCHLORIDE 10 MG/1
TABLET ORAL
Qty: 270 TABLET | Refills: 0 | Status: SHIPPED | OUTPATIENT
Start: 2021-03-08 | End: 2021-06-11

## 2021-03-16 ENCOUNTER — INFUSION (OUTPATIENT)
Dept: ONCOLOGY | Facility: HOSPITAL | Age: 81
End: 2021-03-16

## 2021-03-16 DIAGNOSIS — Z45.2 ENCOUNTER FOR FITTING AND ADJUSTMENT OF VASCULAR CATHETER: Primary | ICD-10-CM

## 2021-03-16 PROCEDURE — 25010000003 HEPARIN LOCK FLUSH PER 10 UNITS: Performed by: INTERNAL MEDICINE

## 2021-03-16 PROCEDURE — 96523 IRRIG DRUG DELIVERY DEVICE: CPT

## 2021-03-16 RX ORDER — HEPARIN SODIUM (PORCINE) LOCK FLUSH IV SOLN 100 UNIT/ML 100 UNIT/ML
500 SOLUTION INTRAVENOUS AS NEEDED
Status: CANCELLED | OUTPATIENT
Start: 2021-03-16

## 2021-03-16 RX ORDER — HEPARIN SODIUM (PORCINE) LOCK FLUSH IV SOLN 100 UNIT/ML 100 UNIT/ML
500 SOLUTION INTRAVENOUS AS NEEDED
Status: DISCONTINUED | OUTPATIENT
Start: 2021-03-16 | End: 2021-03-16 | Stop reason: HOSPADM

## 2021-03-16 RX ORDER — SODIUM CHLORIDE 0.9 % (FLUSH) 0.9 %
10 SYRINGE (ML) INJECTION AS NEEDED
Status: DISCONTINUED | OUTPATIENT
Start: 2021-03-16 | End: 2021-03-16 | Stop reason: HOSPADM

## 2021-03-16 RX ORDER — SODIUM CHLORIDE 0.9 % (FLUSH) 0.9 %
10 SYRINGE (ML) INJECTION AS NEEDED
Status: CANCELLED | OUTPATIENT
Start: 2021-03-16

## 2021-03-16 RX ADMIN — Medication 500 UNITS: at 13:44

## 2021-03-16 RX ADMIN — Medication 10 ML: at 13:44

## 2021-03-30 ENCOUNTER — HOSPITAL ENCOUNTER (OUTPATIENT)
Dept: CARDIOLOGY | Facility: HOSPITAL | Age: 81
Discharge: HOME | End: 2021-03-30
Admitting: SURGERY

## 2021-03-30 VITALS
SYSTOLIC BLOOD PRESSURE: 127 MMHG | DIASTOLIC BLOOD PRESSURE: 54 MMHG | HEIGHT: 62 IN | WEIGHT: 164 LBS | HEART RATE: 74 BPM | BODY MASS INDEX: 30.18 KG/M2

## 2021-03-30 DIAGNOSIS — Z13.6 ENCOUNTER FOR SCREENING FOR VASCULAR DISEASE: ICD-10-CM

## 2021-03-30 LAB
BH CV ECHO MEAS - DIST AO DIAM: 1.35 CM
BH CV VAS BP LEFT ARM: NORMAL MMHG
BH CV VAS BP RIGHT ARM: NORMAL MMHG
BH CV XLRA MEAS - MID AO DIAM: 1.57 CM
BH CV XLRA MEAS - PAD LEFT ABI DP: 1.18
BH CV XLRA MEAS - PAD LEFT ABI PT: 1.16
BH CV XLRA MEAS - PAD LEFT ARM: 127 MMHG
BH CV XLRA MEAS - PAD LEFT LEG DP: 150 MMHG
BH CV XLRA MEAS - PAD LEFT LEG PT: 147 MMHG
BH CV XLRA MEAS - PAD RIGHT ABI DP: 1.17
BH CV XLRA MEAS - PAD RIGHT ABI PT: 1.18
BH CV XLRA MEAS - PAD RIGHT ARM: NORMAL MMHG
BH CV XLRA MEAS - PAD RIGHT LEG DP: 148 MMHG
BH CV XLRA MEAS - PAD RIGHT LEG PT: 150 MMHG
BH CV XLRA MEAS - PROX AO DIAM: 1.62 CM
BH CV XLRA MEAS LEFT ICA/CCA RATIO: 0.93
BH CV XLRA MEAS LEFT MID CCA PSV: NORMAL CM/SEC
BH CV XLRA MEAS LEFT MID ICA PSV: NORMAL CM/SEC
BH CV XLRA MEAS LEFT PROX ECA PSV: NORMAL CM/SEC
BH CV XLRA MEAS RIGHT ICA/CCA RATIO: 0.9
BH CV XLRA MEAS RIGHT MID CCA PSV: NORMAL CM/SEC
BH CV XLRA MEAS RIGHT MID ICA PSV: NORMAL CM/SEC
BH CV XLRA MEAS RIGHT PROX ECA PSV: NORMAL CM/SEC

## 2021-03-30 PROCEDURE — 93799 UNLISTED CV SVC/PROCEDURE: CPT

## 2021-04-07 NOTE — TELEPHONE ENCOUNTER
[facility] called to verify whether or not the patient should have an appointment with them prior to surgery. They had spoken with the patient and she was unsure of whether or not she will have surgery. Per this encounter, they were advised to go ahead and offer her an appointment. I was advised she would be scheduled within a week.   no

## 2021-04-13 ENCOUNTER — INFUSION (OUTPATIENT)
Dept: ONCOLOGY | Facility: HOSPITAL | Age: 81
End: 2021-04-13

## 2021-04-13 ENCOUNTER — OFFICE VISIT (OUTPATIENT)
Dept: ONCOLOGY | Facility: CLINIC | Age: 81
End: 2021-04-13

## 2021-04-13 VITALS
HEIGHT: 61 IN | HEART RATE: 76 BPM | OXYGEN SATURATION: 97 % | BODY MASS INDEX: 30.15 KG/M2 | RESPIRATION RATE: 17 BRPM | TEMPERATURE: 96.9 F | SYSTOLIC BLOOD PRESSURE: 148 MMHG | WEIGHT: 159.7 LBS | DIASTOLIC BLOOD PRESSURE: 76 MMHG

## 2021-04-13 DIAGNOSIS — D69.6 THROMBOCYTOPENIA (HCC): Primary | ICD-10-CM

## 2021-04-13 DIAGNOSIS — D64.81 ANEMIA ASSOCIATED WITH CHEMOTHERAPY: ICD-10-CM

## 2021-04-13 DIAGNOSIS — Z45.2 ENCOUNTER FOR FITTING AND ADJUSTMENT OF VASCULAR CATHETER: ICD-10-CM

## 2021-04-13 DIAGNOSIS — D69.3 ITP SECONDARY TO INFECTION (HCC): Primary | ICD-10-CM

## 2021-04-13 DIAGNOSIS — D50.9 IRON DEFICIENCY ANEMIA, UNSPECIFIED IRON DEFICIENCY ANEMIA TYPE: ICD-10-CM

## 2021-04-13 DIAGNOSIS — T45.1X5A ANEMIA ASSOCIATED WITH CHEMOTHERAPY: ICD-10-CM

## 2021-04-13 LAB
BASOPHILS # BLD AUTO: 0.02 10*3/MM3 (ref 0–0.2)
BASOPHILS NFR BLD AUTO: 0.2 % (ref 0–1.5)
DEPRECATED RDW RBC AUTO: 48.2 FL (ref 37–54)
EOSINOPHIL # BLD AUTO: 0.1 10*3/MM3 (ref 0–0.4)
EOSINOPHIL NFR BLD AUTO: 0.9 % (ref 0.3–6.2)
ERYTHROCYTE [DISTWIDTH] IN BLOOD BY AUTOMATED COUNT: 14.2 % (ref 12.3–15.4)
HCT VFR BLD AUTO: 32.4 % (ref 34–46.6)
HGB BLD-MCNC: 10.8 G/DL (ref 12–15.9)
IMM GRANULOCYTES # BLD AUTO: 0.2 10*3/MM3 (ref 0–0.05)
IMM GRANULOCYTES NFR BLD AUTO: 1.8 % (ref 0–0.5)
LYMPHOCYTES # BLD AUTO: 1.74 10*3/MM3 (ref 0.7–3.1)
LYMPHOCYTES NFR BLD AUTO: 15.5 % (ref 19.6–45.3)
MCH RBC QN AUTO: 31.1 PG (ref 26.6–33)
MCHC RBC AUTO-ENTMCNC: 33.3 G/DL (ref 31.5–35.7)
MCV RBC AUTO: 93.4 FL (ref 79–97)
MONOCYTES # BLD AUTO: 3.62 10*3/MM3 (ref 0.1–0.9)
MONOCYTES NFR BLD AUTO: 32.1 % (ref 5–12)
NEUTROPHILS NFR BLD AUTO: 49.5 % (ref 42.7–76)
NEUTROPHILS NFR BLD AUTO: 5.58 10*3/MM3 (ref 1.7–7)
NRBC BLD AUTO-RTO: 0 /100 WBC (ref 0–0.2)
PLAT MORPH BLD: NORMAL
PLATELET # BLD AUTO: 44 10*3/MM3 (ref 140–450)
PMV BLD AUTO: 12.3 FL (ref 6–12)
RBC # BLD AUTO: 3.47 10*6/MM3 (ref 3.77–5.28)
RBC MORPH BLD: NORMAL
WBC # BLD AUTO: 11.26 10*3/MM3 (ref 3.4–10.8)
WBC MORPH BLD: NORMAL

## 2021-04-13 PROCEDURE — 36591 DRAW BLOOD OFF VENOUS DEVICE: CPT

## 2021-04-13 PROCEDURE — 85025 COMPLETE CBC W/AUTO DIFF WBC: CPT | Performed by: INTERNAL MEDICINE

## 2021-04-13 PROCEDURE — 85007 BL SMEAR W/DIFF WBC COUNT: CPT | Performed by: INTERNAL MEDICINE

## 2021-04-13 PROCEDURE — 25010000002 HEPARIN LOCK FLUSH PER 10 UNITS: Performed by: INTERNAL MEDICINE

## 2021-04-13 PROCEDURE — 99214 OFFICE O/P EST MOD 30 MIN: CPT | Performed by: INTERNAL MEDICINE

## 2021-04-13 RX ORDER — SODIUM CHLORIDE 0.9 % (FLUSH) 0.9 %
10 SYRINGE (ML) INJECTION AS NEEDED
Status: DISCONTINUED | OUTPATIENT
Start: 2021-04-13 | End: 2021-04-13 | Stop reason: HOSPADM

## 2021-04-13 RX ORDER — SODIUM CHLORIDE 0.9 % (FLUSH) 0.9 %
10 SYRINGE (ML) INJECTION AS NEEDED
Status: CANCELLED | OUTPATIENT
Start: 2021-04-13

## 2021-04-13 RX ORDER — HEPARIN SODIUM (PORCINE) LOCK FLUSH IV SOLN 100 UNIT/ML 100 UNIT/ML
500 SOLUTION INTRAVENOUS AS NEEDED
Status: DISCONTINUED | OUTPATIENT
Start: 2021-04-13 | End: 2021-04-13 | Stop reason: HOSPADM

## 2021-04-13 RX ORDER — HEPARIN SODIUM (PORCINE) LOCK FLUSH IV SOLN 100 UNIT/ML 100 UNIT/ML
500 SOLUTION INTRAVENOUS AS NEEDED
Status: CANCELLED | OUTPATIENT
Start: 2021-04-13

## 2021-04-13 RX ORDER — PREDNISONE 20 MG/1
20 TABLET ORAL 2 TIMES DAILY
Qty: 60 TABLET | Refills: 2 | Status: SHIPPED | OUTPATIENT
Start: 2021-04-13 | End: 2021-05-13

## 2021-04-13 RX ADMIN — SODIUM CHLORIDE, PRESERVATIVE FREE 10 ML: 5 INJECTION INTRAVENOUS at 14:06

## 2021-04-13 RX ADMIN — Medication 500 UNITS: at 14:07

## 2021-04-13 NOTE — PROGRESS NOTES
REASON FOR FOLLOWUP: Patient noticing increasing fatigue      1. Newly diagnosed large right breast cancer.  Core needle biopsy reported invasive mammary carcinoma with focal lobular feature, grade 2.  ER negative, less than 1%; TN positive, moderate in staining, 5% to 10%. HER2 IHC 2+, FISH study positive 2.1.   2.  CT scan for chest abdomen and pelvis and breast MRI examination reported right axillary lymph node suspicious for metastatic disease.  No remote metastatic lesion.  Patient has stage IIIa (T3 N2 M0) disease.   3.  Patient was started neoadjuvant chemotherapy on 5/23/2017 with Taxol weekly plus Herceptin weekly for total 12 weeks, and Perjata every 3 weeks during chemotherapy.  Herceptin will be converted to every 3 weeks after chemotherapy finished.    4.  Chronic moderate thrombocytopenia, mild anemia, and intermittent mild neutropenia etiologies are not clear.  5.  Reaction to Herceptin C1D1.  Subsequently tolerated therapy with hydrocortisone as premedication.  6.  Potential cardiac strain developing, neuropathic symptoms persist, follow-up MRI and surgical assessment will be needed post initial chemotherapeutic phase  7.  MRI August 17 with interval resolution of abnormal enhancement within breast and right axillary adenopathy, surgery scheduled November 7, Herceptin ongoing every 3 weeks  8.  Patient seen in office November 28, status post surgery with apparent complete response, Herceptin continued  9.  Herceptin given December 19, subsequent injury in [place] leading to prolonged stay, single treatment given through additional cancer Center  10.  Patient seen May 01, 2018, no further Herceptin planned, baseline scans pursued posttreatment, post influenza syndrome, physical therapy planned  11.  Patient seen June 13, 2018, excellent performance status, improving on physical therapy, equivocal CT  per thoracic adenopathy, follow-up PET/CT planned   12.  PET/CT with improvement,?  Thyroid  abnormality with ultrasound planned  13.  Patient seen October 3, partial thyroidectomy anticipated October 14-    14.  Patient seen May 14, 2019, scans negative for evidence of recurrence     15.  Patient seen 3/29/2019 clinically stable  16.  Patient reviewed July 7, ongoing GERD symptoms, GI referral planned.  17.  Hospitalization September 28-October 1-acute colitis due to enteropathic E. coli-residual thrombocytopenia  18.  Subsequent testing consistent with chronic ITP                                                                                                           HISTORY OF PRESENT ILLNESS:    The patient is a pleasant 80 y.o. with the above-mentioned history, who presents today in anticipation of cycle 4 of Herceptin, Perjeta and Taxol.  This is the first visit with this physician involving this patient's case.  We have reviewed her status today with her slowly developing neuropathic symptoms in her lower extremities but also involving her fingers recognized significantly and she plays the piano and keyboard.  This is becoming harder to do but she is still able to do so.  She also notices neuropathy in her feet but is able to walk and function in daily activities.  Additional to this is her continued grieving at the death of her  though she, fortunately, has an excellent support system.  She continues to experience nausea that is well relieved with Compazine. She denies oral mucositis.  No diarrhea no constipation no chest pain no dyspnea.  No lower extremity edema.    She did received 2 units of PRBC's on   7/8/2017 and remains hematologically stable.   She does have difficulty with sleep and Ambien 10 mg daily at bedtime seems help much better compared to 5 mg dose.  She does not need refills today.  In reviewing her case July 26 she is entering the fourth cycle of her treatment and recent echocardiogram is reviewed with she and her close friend revealing EF of 66.7% though with  global strain of -16%?  Suspicious for myopathic process.  Normal ventricular cavity size and wall thickness as well as contractility.  We have also discussed that she is responding to therapy and will need surgical assessment which may not as yet have been performed.  She has seen Dr. Melo for port placement and will ask him to review her likely just after she has completed his fourth cycle of therapy.  It is also apparent that she'll need a cardiac assessment as we continue subsequent Herceptin therapy perioperatively.      The patient underwent MRI of the breasts August 17 demonstrating interval resolution of abnormal enhancement within the right breast, resolution of right axillary adenopathy had also resolved.  The findings were consistent with a complete response to neoadjuvant chemotherapy.  The patient was seen in subsequent follow-up with Dr. Melo and was determined to proceed with surgery and ultimately sentinel node evaluation.  This is now scheduled November 7 and there are additional plans to consider radiation therapy postoperatively and we have also discussed the use of anti-hormonal therapy considering her mildly positive AK status.    The patient was able to proceed to surgery undergoing right breast mastectomy and sentinel lymph node biopsy November 07, 2017.  She did well postoperatively seeing Dr. Melo November 16.  Pathology revealed no residual malignancy in the breast and 3 negative sentinel lymph nodes.  A formal review of her report reveals treatment effect particularly in her largest lymph node with focal fibrosis and scar, right breast mastectomy fibrosis associated with a cavitary biopsy site and no invasive or in situ carcinoma.  The patient is now seen in our office though she went to the wrong location and the seen late in the day.  We discussed her findings and agree that she would continue Herceptin at this point but be reviewed formally again in 3 weeks.  She is also be  seen by radiation therapy for their input.   The patient, evidently, was seen by radiation therapy but decided not to pursue it until her last Herceptin therapy here December 19.  Following this she visited her daughter in [place] and, unfortunately, developed influenza and pneumonia.  She had a prolonged hospitalization and was at many sites in recovery over a several month only to return to [place] in the last several weeks.  She did take 1 IV Herceptin dose while in [place] but as she is reviewed May 05, 2018 we have discussed that it makes no particular sense to continue or add on to her previous history by extending Herceptin any further 3-4 months after her last treatment.  She therefore has completed Herceptin.    The patient on to be tested post her treatment again baseline studies and CT of chest and pelvis were performed June 6 revealing interval increase in a few subcentimeter nodes in the left pectoral, axillary and mediastinal regions. These are all considerably small and of uncertain significance.  The patient's performance status remains excellent without any reduction and, in fact, has improved with physical therapy upon return.       Patient is next seen August 08, 2018, fortunately feeling well.  A follow-up PET/CT had revealed an interval decrease in the subcentimeter nodes in the left subpectoral axillary region as well as mediastinum.  There was no hypermetabolic lymphadenopathy.  There was intense focus within slightly enlarged right thyroid gland.  Patient indicates she never had any thyroid issues of which she is aware.  The patient was referred to ENT for assessment and the patient was seen by Dr. Fofana.  Ultimately a subtotal thyroidectomy is anticipated and now scheduled October 4.  The patient is willing to proceed.        The patient proceeded to a right thyroid lobectomy performed November 04, 2018.  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  There  was no definitive evidence of trans-capsular or vascular invasion.    The patient is seen in follow-up November 28 doing well and we discussed the surgical treatment of this thyroid lesion.  She feels that all this went well.  She has additional cataract surgery at the end of November  scheduled also.  The patient did complete her testing and was asked to return approximately 6 months later with a follow-up CT chest, abdomen and pelvis that demonstrate no evidence of recurrent disease.  As she is seen back May 14 she, unfortunately, fell and contused her face, left knee and left hand.  This required an ER visit.  Is elected to follow her back in 6 months maintaining her port in the interval and she is seen October 29 again without indications of recurrent disease and relatively stable clinically.  She is seeing plastic surgery about reconstruction and otherwise continue her current medication list and following with her primary care regularly.  The patient is next seen July 7, 2020 indicating that she did not proceed to any  plastic surgery and with the COVID-19 epidemic she does not wish to have any elective procedures.  She has, however, having significant GI reflux symptoms that have worsened substantially last several months despite PPI therapy.    The patient had follow-up for meningioma September 11, 2020 unchanged from previous.        The patient is next reviewed October 27, 2020 having been hospitalized September 28 through October 1, 2020.  She had presented with fever and flank pain and was found to have acute colitis due to enteropathogenic E. coli.  Antibiotics were avoided secondary to history of C. difficile colitis and fortunately she did not want to improve albeit slowly.  She had evidence of acute on chronic thrombocytopenia with a platelet count dropping to close to 30,000 and slowly rebounding assessed October 6 at 64,000 and October 13 at 67,000.  Fortunately she is feeling considerably better  with her bowel function normalizing.  We had the patient return for ongoing testing documenting continued thrombocytopenia and IPF at 9.5 818 October 2020.  She seen back April 13, 2021 she has further thrombocytopenia with peripheral smear showing enlarged platelets.  Is felt this consistent with chronic ITP.       Past Medical History:   Diagnosis Date   • Alcoholism (CMS/[facility]) 1978    I haven't had a drink since then   • Anemia    • Breast cancer (CMS/[facility]) 05/03/2017    Right breast invasive mammary carcinoma with focal lobular features, grade 2, ER negative, less than 1% W[place] positive, HER-2 positive, stage IIIa disease (T3, N2, M0   • Breast cancer (CMS/[facility]) 10/20/2004    Left breast ductal carcinoma in-situ, predominately intermediate grade with focal comedonecrosis (high grade), solid and bribridform patterns involving approximately five core biopsy fragments   • Edema     Chronic lower extremity edema   • GERD (gastroesophageal reflux disease) 09/13/2011    Dr. Paul Negron   • GI (gastrointestinal bleed) 1997   • H/O jaundice    • Hernia     INCISONAL. AROUND LEFT TRAM LAP SITE   • History of chemotherapy     2017 4 MONTHS IN 2017   • History of Clostridium difficile colitis     JAN 2018   • History of histoplasmosis    • History of infectious mononucleosis 1960   • History of migraine headaches    • History of pneumonia 12/27/2017    AS RESULT OF FLU. ENDED UP ON VENT IN [place]   • History of thrombocytopenia    • History of transfusion 1997   • History of vertigo    • Hx of colonic polyps 06/11/2009    Dr. Giles   • Hyperlipidemia    • Hypertension    • Meningioma (CMS/[facility])    • Neuropathy     HANDS AND FEET   • Osteoarthritis 09/13/2011    Dr. Jamil Miller   • Peptic ulceration    • Retina disorder     BLEED. FROM HISTOPLASMOSIS   • SBO (small bowel obstruction) (CMS/[facility])     due to hernia with surgical repair in 09/2011   • SOB (shortness of breath) on exertion    • Thyroid mass     RIGHT    Normal echocardiogram on 2017, LVEF 68%.    Past Surgical History:   Procedure Laterality Date   • APPENDECTOMY N/A    • BLADDER REPAIR N/A    • BREAST BIOPSY Left 10/20/2004    Mammotome incisional biopsy left breast, surgical specimen, left breast, localization clip placement, left breast, confirmatory diagnostic unilateral mammogram, left breast-Dr. Tyrese Alcala, [facility]   • BREAST BIOPSY Right 2017    PATH: INVASIVE MAMMARY CARCINOMA   • CHOLECYSTECTOMY N/A    • COLONOSCOPY N/A 2009    Hemorrhoids, otherwise normal, repeat in 5 years-Dr. Noemí Purcell   • EYE SURGERY Right     laser surgery for blood clot   • HYSTERECTOMY Bilateral    • INGUINAL HERNIA REPAIR Right     DR ALESIA GAXIOLA   • MASTECTOMY Left     Left breast mastecotmy with sentinel lymph node biospy-[facility]   • MASTECTOMY W/ SENTINEL NODE BIOPSY Right 2017    Procedure: RIGHT BREAST MASTECTOMY WITH SENTINEL NODE BIOPSY;  Surgeon: Christian Melo MD;  Location: [facility] OR;  Service:    • NC INSJ TUNNELED CVC W/O SUBQ PORT/ AGE 5 YR/> Left 2017    Procedure: INSERTION VENOUS ACCESS DEVICE;  Surgeon: Christian Melo MD;  Location: [facility] OR;  Service: General   • REDUCTION MAMMAPLASTY Right     to match Lt. TRAM flap   • SMALL INTESTINE SURGERY      INCARCRATED HERNIA   • THYROIDECTOMY Right 10/4/2018    Procedure: RT THYROID LOBECTOMY;  Surgeon: Julián Fofana MD;  Location: [facility];  Service: ENT   • TONSILLECTOMY Bilateral    • TRAM FLAP DELAYED Left     WITH MASTOPEXY   • UPPER GASTROINTESTINAL ENDOSCOPY N/A 2011    LA grade A esophagitis with no bleeding, large hiatus hernia (50cm), gastric ulcer-Dr.Laszlo Lezama   • US GUIDED FINE NEEDLE ASPIRATION  2018   Lico catheter placement on 2017 by Dr. Melo.     OB/GYN history: Menarche age 16, menopause at 1985. , 1 miscarriage. No birth control pill use. She did have post menopause hormonal  supplementation.      HEMATOLOGIC/ONCOLOGIC HISTORY: The patient is a 80y.o. year old female whom we are consulted for a newly self discovered right breast mass, in April 2017. Patient had ultrasound-guided biopsy on 5/3/2017 and confirmed to be invasive mammary carcinoma with focal lobular features, grade 2 Elen score 6/9. She is here for initial evaluation for management.      Patient is a 76-year-old  female who was seen here previously for chronic mild-to-moderate thrombocytopenia and mild anemia. Recently the patient reports she started having firmness of the right breast that started sometime in April or maybe even in March. She noticed firmness spread from about around the 12 o'clock position, gradually towards the upper lateral side and lower part of the right breast associated mild pain. The patient thought it was related to fibrocystic changes. However, the symptom was getting worse. Patient also reported dented nipple after she started having pain in the right breast. She denies discharge from the nipple. She called her primary care physician, Dr. Martin, who ordered a mammogram study. This was done on 04/12/2017 with architectural distortion of the right breast centrally at 12 o'clock position and had scattered fibroglandular densities throughout the right breast.      The patient subsequently had right breast ultrasound examination on 04/26/2017. Discovered a large right breast mass measuring 5.8 x 3.5 x 3.9 cm. Patient subsequently had ultrasound-guided right breast biopsy on 05/03/2017. Pathology evaluation reported invasive mammary carcinoma with focal lobular feature. [place] score 6/9, overall grade 2. Sample was further sent to the [facility] for test. ER negative, less than 1%; VT positive, moderate in staining, 5% to 10%. HER2 IHC 2+, but FISH study positive 2.1.     She denies weight loss, she actually eats very well. No nausea vomiting. Patient does  complain of insomnia, unable to sleep.      This patient has history of left breast DCIS back in 2007, had mastectomy at the [facility]. No hormonal therapy afterwards according to patient. This patient also had a small meningioma, followed by Dr. Smith in [facility], with most recent MRI of the brain in March 2017, with 18 mm meningioma, and followed on an annual basis.     Her neutrophil count on 5/16/17 is normal at 2500, however, records showed starting from 05/2015 and in 09/2016, 01/2017 and 03/2017, all 4 laboratory studies showed mild neutropenia with ANC fluctuating between 1200 and 1600. Etiology is not clear.    Normal echocardiogram on 5/18/2017, LVEF 68%.      CT scan for chest abdomen and pelvis on 5/22/2017 and breast MRI examination on 5/22/2017 reported small right axillary lymph node suspicious for metastatic disease.  No remote metastatic lesion.  Patient has stage IIIa (T3 N2 M0) disease.      Patient will start neoadjuvant chemotherapy with Taxol weekly plus Herceptin weekly for total 12 weeks, and Perjeta every 3 weeks (3-week cycle) during chemotherapy.  Herceptin will be converted to every 3 weeks after chemotherapy finished.  Cycle 1 day 1 5/23/2017.   Status post neoadjuvant chemotherapy the patient was assessed with MRI showing a complete neoadjuvant response.  The patient was reviewed for surgery and questions concerning lymph node dissection-sentinel node evaluation-and need for radiation therapy postop were considered as well as use of additional Herceptin for the balance of the year as well as additional use of anti-hormonal therapy.  Surgery was scheduled November 07, 2017.  Patient next seen in office November 28 having undergone surgery on November 7 with results revealing no residual malignancy in either breast or associated sentinel lymph nodes.  Was elected to continue Herceptin when seen back in office November 20 having initiated Herceptin  May 23, 2017.    Patient continued Herceptin with her last treatment given 2017.     Unfortunately she later experienced an accident while in [place]  with prolonged hospitalization and prolonged ventilator management required.  Apparently she had at least one IV Herceptin therapy given while there and we were contacted 2018- Dr. Nuno.  Eventually the patient was able to return to [place] is seen back in office May 5 at which time we concluded that she completed Herceptin therapy as result of being off treatment for so long.  Plans were made for baseline scans.  Patient follow-up reexaminations May 8, 2019 with no evidence of recurrent malignancy.  This was again a circumstance when she was assessed 2019.  Patient seen 2020 without evidence of recurrent malignancy.  Recurrent GI symptoms noted with GI referral planned.  Patient hospitalized -2020 with enteropathic E. coli, acute on chronic thrombocytopenia.     Subsequent testing felt consistent with chronic ITP and trial of steroids initiated 2021.    MEDICATIONS: The current medication list was reviewed with the patient and updated in the EMR this date per the Medical Assistant. Medication dosages and frequencies were confirmed to be accurate.       ALLERGIES:    Allergies   Allergen Reactions   • Codeine Nausea And Vomiting   • Morphine Nausea And Vomiting     SOCIAL HISTORY:   Social History     Tobacco Use   • Smoking status: Former Smoker     Packs/day: 2.00     Years: 30.00     Pack years: 60.00     Types: Cigarettes     Start date: 1957     Quit date: 4/10/1987     Years since quittin.0   • Smokeless tobacco: Never Used   • Tobacco comment: I haven't had a cigarette in over 30 years   Vaping Use   • Vaping Use: Never used   Substance Use Topics   • Alcohol use: No     Comment: stopped, heavy in past   • Drug use: No     FAMILY HISTORY:  Family History   Problem  "Relation Age of Onset   • Heart disease Mother    • Aortic aneurysm Mother         abdominal   • Coronary artery disease Mother    • Hypertension Mother    • Miscarriages / Stillbirths Mother    • Diverticulitis Mother    • Heart disease Father    • Heart attack Father         acute   • Hypertension Father    • Early death Father    • Hearing loss Father    • Kidney disease Father    • Breast cancer Daughter 45   • Heart disease Brother    • Hypertension Brother    • Malig Hyperthermia Neg Hx      I have reviewed the patient's medical history in detail and updated the computerized patient record.    REVIEW OF SYSTEMS:  GENERAL: See history of present illness.  Recent fall and sustained contusions, no change in appetite or weight;   No fevers, chills, sweats.   SKIN: No nonhealing lesions. No rashes.   HEME/LYMPH: See HPI.   EYES: No vision changes or diplopia.   ENT: No tinnitus, hearing loss, gum bleeding, epistaxis, hoarseness or dysphagia.   RESPIRATORY: No cough, Has exertional dyspnea, No hemoptysis or wheezing.   CVS: No chest pain, palpitations, orthopnea, dyspnea on exertion or PND.   GI: Worsening reflux symptoms  : No lower tract obstructive symptoms, dysuria or hematuria.   MUSCULOSKELETAL: Chronic or joint pain, arthritis.  Has mild intermittent ankle swelling.   NEUROLOGICAL: See HPI  PSYCHIATRIC: See HPI     Objective:   Vitals:    04/13/21 1412   BP: 148/76   Pulse: 76   Resp: 17   Temp: 96.9 °F (36.1 °C)   TempSrc: Skin   SpO2: 97%   Weight: 72.4 kg (159 lb 11.2 oz)   Height: 154.9 cm (60.98\")   PainSc: 0-No pain   ECOG 0      PHYSICAL EXAM:   GENERAL: Well-developed, well-nourished female, in no acute distress.   SKIN: Warm, dry without rashes, purpura or petechiae.  Left upper chest Lico catheter in place, no evidence of infection.  Contusions just lateral to the right eye, left hand and left knee  EYES: Pupils equal, round and reactive to light. EOMs intact. Conjunctivae normal.  EARS: Hearing " intact.  NOSE:  No excoriations or nasal discharge.  MOUTH: Tongue is well-papillated; no stomatitis or ulcers. Lips normal.  THROAT: Oropharynx without lesions or exudates.  Status post partial thyroidectomy well-healing  LYMPHATICS: No cervical, supraclavicular, axillary adenopathy.  CHEST: Lungs clear to auscultation. Good airflow.   BREAST:Postop, Well healed right mastectomy site with no evidence of recurrent disease, no axillary adenopathy bilaterally.  CARDIAC: Regular rate and rhythm without murmurs. Normal S1,S2.  ABDOMEN: Slightly distended, normal active bowel sounds,  no hepatosplenomegaly or masses.  EXTREMITIES: No clubbing, cyanosis or edema.  NEUROLOGICAL: Cranial Nerves II-XII grossly intact. No focal neurological deficits.  PSYCHIATRIC: Alert and oriented, pleased about her results      RECENT LABS:  Lab Results   Component Value Date    WBC 11.26 (H) 04/13/2021    HGB 10.8 (L) 04/13/2021    HCT 32.4 (L) 04/13/2021    MCV 93.4 04/13/2021    PLT 44 (C) 04/13/2021     Lab Results   Component Value Date    NEUTROABS 4.36 01/19/2021     Lab Results   Component Value Date    GLUCOSE 103 (H) 10/01/2020    BUN 18 12/07/2020    CREATININE 0.98 12/07/2020    EGFRIFNONA 55 (L) 12/07/2020    EGFRIFAFRI 66 12/07/2020    BCR 18.4 12/07/2020    K 4.4 12/07/2020    CO2 27.9 12/07/2020    CALCIUM 9.3 12/07/2020    PROTENTOTREF 7.0 12/07/2020    ALBUMIN 4.80 12/07/2020    LABIL2 2.2 12/07/2020    AST 35 (H) 12/07/2020    ALT 34 (H) 12/07/2020            Assessment/Plan   1. Large right breast cancer, locally advanced, stage IIIa (T3 N1/ 2 M0).  ER negative, less than 1%; OH positive, moderate in staining, 5% to 10%. HER2 IHC 2+, FISH study positive 2.1.  Physical examination showed a large mass, 7 cm x 7 cm initially which has decreased to approximately less than 4 cm ..  Patient  proceeded through 4 cycles ceptin,Perjeta and Taxol.   Her subsequent MRI examination showed complete response by imaging including  right axillary lymphadenopathy.  We have continued Herceptin and that includes today October 20, 2017.  The case was discussed with Dr. Melo and plans were to proceed with mastectomy plus right axillary lymph node assessment via sentinel node evaluation. it was felt she likely require radiation therapy post procedure.  The patient was scheduled and proceeded to surgery November 7.  Her results, wonderfully, revealed no evidence of residual disease in the breast or associated sentinel lymph nodes. She was seen by radiation therapy and offered treatment did not pursue it.  Additionally, and somewhat complicating this story, she traveled to [place] and developed severe influenza, associated pneumonia and was hospitalized for several months only to return to [place] in the last several weeks now being seen back May 1.  There is no particular benefit from trying to add Herceptin back to her therapy now and is felt that she is completed Herceptin treatment.  She wishes consider reconstruction and baseline CT scans will be requested in 5 weeks with the patient being seen in 6 weeks follow-up.The patient reviewed June 13, 2018 with the scans demonstrating very modest increase in left pectoral, axillary or mediastinal nodes.  These are of uncertain significance but do need follow-up in a PET/CT is planned in 7 weeks.  Her anemia will also be reviewed again with additional laboratory studies that visit.  She'll be seen in 8 weeks for general reassessment.    The patient's anemia workup was essentially negative and she is slowly improving when seen back August 8.  Her PET/CT, fortunately, demonstrates improvement though there is potential abnormality within the right thyroid lobe.  We discussed thyroid function testing and follow-up thyroid ultrasound.  She will be seen back in approximately 2 months as we maintain her port monthly flushes.   The patient was reviewed by ENT and ultimately was felt that a partial  thyroidectomy was in order and is now scheduled October 4.  Patient's one to proceed.  We'll plan to see her back in approximately 6 weeks.      The patient is next seen November 28, 2015.  Her surgery went well with the findings as noted above.  She has follow-up with ENT in the next several weeks.  Additionally she has bilateral cataract surgery at the end of this month and into the beginning of next.  We discussed reassessment in general with follow-up scans and these were performed May 2019 which showed no evidence of recurrent malignancy.  Six-month follow-up planes with maintenance of her port every 6 weeks.  The patient is reassessed October 29, 2019 fortunately continue to do relatively well.  She has had no additional medical issues since last seen we will plan to see her back in 6 months again meeting report.  She does plan to see plastic surgery concerning reconstruction concerning her breast cancer history.  A copy this note will be sent to the plastic surgeon.     The patient is next seen July 7, 2020 without evidence of recurrence of malignancy.  We plan 6-month follow-up.  The patient is reviewed October 27, 2020 without evidence recurrent disease, repeat scans during recent hospitalization September 20-October 1 without evidence of recurrent malignancy.     Reevaluation April 13, 2021 - for recurrence      2.  Previous fall with contusions now recovered                                                                           3.  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  Worsening reflux symptoms, GI referral planned    5.  Hospitalization September 20-October 1 with enteropathic E. coli.  This responded to conservative therapy with plans for reassessment by Dr. Carlin with outpatient colonoscopy.    6.  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  As this is assessed April 13 the patient's platelet count continues to decrease and we  have discussed a trial of half milligram per kilogram prednisone-40 mg daily with weekly checks and adjustment as needed including rapid taper.      Plan:  *Prednisone 0.5 mg/kg or 20 mg twice daily with food    *1 week NP, CBC, IPF    *2 weeks MD, CBC, IPF, taper as possible

## 2021-04-20 ENCOUNTER — OFFICE VISIT (OUTPATIENT)
Dept: ONCOLOGY | Facility: CLINIC | Age: 81
End: 2021-04-20

## 2021-04-20 ENCOUNTER — INFUSION (OUTPATIENT)
Dept: ONCOLOGY | Facility: HOSPITAL | Age: 81
End: 2021-04-20

## 2021-04-20 VITALS
OXYGEN SATURATION: 93 % | RESPIRATION RATE: 17 BRPM | SYSTOLIC BLOOD PRESSURE: 154 MMHG | DIASTOLIC BLOOD PRESSURE: 80 MMHG | HEART RATE: 71 BPM | TEMPERATURE: 97.5 F | WEIGHT: 156.9 LBS | BODY MASS INDEX: 29.62 KG/M2 | HEIGHT: 61 IN

## 2021-04-20 DIAGNOSIS — Z45.2 ENCOUNTER FOR FITTING AND ADJUSTMENT OF VASCULAR CATHETER: ICD-10-CM

## 2021-04-20 DIAGNOSIS — D69.3 ITP SECONDARY TO INFECTION (HCC): Primary | ICD-10-CM

## 2021-04-20 DIAGNOSIS — D69.3 ACUTE ITP (HCC): Primary | ICD-10-CM

## 2021-04-20 LAB
DEPRECATED RDW RBC AUTO: 49.4 FL (ref 37–54)
ERYTHROCYTE [DISTWIDTH] IN BLOOD BY AUTOMATED COUNT: 14.6 % (ref 12.3–15.4)
HCT VFR BLD AUTO: 34.5 % (ref 34–46.6)
HGB BLD-MCNC: 11.4 G/DL (ref 12–15.9)
LYMPHOCYTES # BLD MANUAL: 2.88 10*3/MM3 (ref 0.7–3.1)
LYMPHOCYTES NFR BLD MANUAL: 21 % (ref 19.6–45.3)
LYMPHOCYTES NFR BLD MANUAL: 8 % (ref 5–12)
MCH RBC QN AUTO: 31.1 PG (ref 26.6–33)
MCHC RBC AUTO-ENTMCNC: 33 G/DL (ref 31.5–35.7)
MCV RBC AUTO: 94 FL (ref 79–97)
MONOCYTES # BLD AUTO: 1.1 10*3/MM3 (ref 0.1–0.9)
MYELOCYTES NFR BLD MANUAL: 4 % (ref 0–0)
NEUTROPHILS # BLD AUTO: 9.18 10*3/MM3 (ref 1.7–7)
NEUTROPHILS NFR BLD MANUAL: 67 % (ref 42.7–76)
PLAT MORPH BLD: NORMAL
PLATELET # BLD AUTO: 65 10*3/MM3 (ref 140–450)
PLATELET # BLD AUTO: 65 10*3/MM3 (ref 140–450)
PLATELETS.RETICULATED NFR BLD AUTO: 18.5 % (ref 0.9–6.5)
PMV BLD AUTO: 11.9 FL (ref 6–12)
RBC # BLD AUTO: 3.67 10*6/MM3 (ref 3.77–5.28)
RBC MORPH BLD: NORMAL
SCAN SLIDE: NORMAL
WBC # BLD AUTO: 13.7 10*3/MM3 (ref 3.4–10.8)
WBC MORPH BLD: NORMAL

## 2021-04-20 PROCEDURE — 96523 IRRIG DRUG DELIVERY DEVICE: CPT

## 2021-04-20 PROCEDURE — 99213 OFFICE O/P EST LOW 20 MIN: CPT | Performed by: NURSE PRACTITIONER

## 2021-04-20 PROCEDURE — 36591 DRAW BLOOD OFF VENOUS DEVICE: CPT

## 2021-04-20 PROCEDURE — 25010000002 HEPARIN LOCK FLUSH PER 10 UNITS: Performed by: INTERNAL MEDICINE

## 2021-04-20 PROCEDURE — 85007 BL SMEAR W/DIFF WBC COUNT: CPT | Performed by: INTERNAL MEDICINE

## 2021-04-20 PROCEDURE — 85055 RETICULATED PLATELET ASSAY: CPT | Performed by: INTERNAL MEDICINE

## 2021-04-20 PROCEDURE — 85025 COMPLETE CBC W/AUTO DIFF WBC: CPT | Performed by: INTERNAL MEDICINE

## 2021-04-20 RX ORDER — HEPARIN SODIUM (PORCINE) LOCK FLUSH IV SOLN 100 UNIT/ML 100 UNIT/ML
500 SOLUTION INTRAVENOUS AS NEEDED
Status: DISCONTINUED | OUTPATIENT
Start: 2021-04-20 | End: 2021-04-20 | Stop reason: HOSPADM

## 2021-04-20 RX ORDER — SODIUM CHLORIDE 0.9 % (FLUSH) 0.9 %
10 SYRINGE (ML) INJECTION AS NEEDED
Status: CANCELLED | OUTPATIENT
Start: 2021-04-20

## 2021-04-20 RX ORDER — HEPARIN SODIUM (PORCINE) LOCK FLUSH IV SOLN 100 UNIT/ML 100 UNIT/ML
500 SOLUTION INTRAVENOUS AS NEEDED
Status: CANCELLED | OUTPATIENT
Start: 2021-04-20

## 2021-04-20 RX ORDER — SODIUM CHLORIDE 0.9 % (FLUSH) 0.9 %
10 SYRINGE (ML) INJECTION AS NEEDED
Status: DISCONTINUED | OUTPATIENT
Start: 2021-04-20 | End: 2021-04-20 | Stop reason: HOSPADM

## 2021-04-20 RX ADMIN — Medication 500 UNITS: at 14:00

## 2021-04-20 RX ADMIN — SODIUM CHLORIDE, PRESERVATIVE FREE 10 ML: 5 INJECTION INTRAVENOUS at 14:00

## 2021-04-20 NOTE — PROGRESS NOTES
REASON FOR FOLLOWUP: Patient noticing increasing fatigue      1. Newly diagnosed large right breast cancer.  Core needle biopsy reported invasive mammary carcinoma with focal lobular feature, grade 2.  ER negative, less than 1%; OR positive, moderate in staining, 5% to 10%. HER2 IHC 2+, FISH study positive 2.1.   2.  CT scan for chest abdomen and pelvis and breast MRI examination reported right axillary lymph node suspicious for metastatic disease.  No remote metastatic lesion.  Patient has stage IIIa (T3 N2 M0) disease.   3.  Patient was started neoadjuvant chemotherapy on 5/23/2017 with Taxol weekly plus Herceptin weekly for total 12 weeks, and Perjata every 3 weeks during chemotherapy.  Herceptin will be converted to every 3 weeks after chemotherapy finished.    4.  Chronic moderate thrombocytopenia, mild anemia, and intermittent mild neutropenia etiologies are not clear.  5.  Reaction to Herceptin C1D1.  Subsequently tolerated therapy with hydrocortisone as premedication.  6.  Potential cardiac strain developing, neuropathic symptoms persist, follow-up MRI and surgical assessment will be needed post initial chemotherapeutic phase  7.  MRI August 17 with interval resolution of abnormal enhancement within breast and right axillary adenopathy, surgery scheduled November 7, Herceptin ongoing every 3 weeks  8.  Patient seen in office November 28, status post surgery with apparent complete response, Herceptin continued  9.  Herceptin given December 19, subsequent injury in [place] leading to prolonged stay, single treatment given through additional cancer Center  10.  Patient seen May 01, 2018, no further Herceptin planned, baseline scans pursued posttreatment, post influenza syndrome, physical therapy planned  11.  Patient seen June 13, 2018, excellent performance status, improving on physical therapy, equivocal CT  per thoracic adenopathy, follow-up PET/CT planned   12.  PET/CT with improvement,?  Thyroid  abnormality with ultrasound planned  13.  Patient seen October 3, partial thyroidectomy anticipated October 14-    14.  Patient seen May 14, 2019, scans negative for evidence of recurrence     15.  Patient seen 3/29/2019 clinically stable  16.  Patient reviewed July 7, ongoing GERD symptoms, GI referral planned.  17.  Hospitalization September 28-October 1-acute colitis due to enteropathic E. coli-residual thrombocytopenia  18.  Subsequent testing consistent with chronic ITP                                                                                                           HISTORY OF PRESENT ILLNESS:    The patient is a pleasant 80 y.o. with the above-mentioned history, who presents today in anticipation of cycle 4 of Herceptin, Perjeta and Taxol.  This is the first visit with this physician involving this patient's case.  We have reviewed her status today with her slowly developing neuropathic symptoms in her lower extremities but also involving her fingers recognized significantly and she plays the piano and keyboard.  This is becoming harder to do but she is still able to do so.  She also notices neuropathy in her feet but is able to walk and function in daily activities.  Additional to this is her continued grieving at the death of her  though she, fortunately, has an excellent support system.  She continues to experience nausea that is well relieved with Compazine. She denies oral mucositis.  No diarrhea no constipation no chest pain no dyspnea.  No lower extremity edema.    She did received 2 units of PRBC's on   7/8/2017 and remains hematologically stable.   She does have difficulty with sleep and Ambien 10 mg daily at bedtime seems help much better compared to 5 mg dose.  She does not need refills today.  In reviewing her case July 26 she is entering the fourth cycle of her treatment and recent echocardiogram is reviewed with she and her close friend revealing EF of 66.7% though with  global strain of -16%?  Suspicious for myopathic process.  Normal ventricular cavity size and wall thickness as well as contractility.  We have also discussed that she is responding to therapy and will need surgical assessment which may not as yet have been performed.  She has seen Dr. Melo for port placement and will ask him to review her likely just after she has completed his fourth cycle of therapy.  It is also apparent that she'll need a cardiac assessment as we continue subsequent Herceptin therapy perioperatively.      The patient underwent MRI of the breasts August 17 demonstrating interval resolution of abnormal enhancement within the right breast, resolution of right axillary adenopathy had also resolved.  The findings were consistent with a complete response to neoadjuvant chemotherapy.  The patient was seen in subsequent follow-up with Dr. Melo and was determined to proceed with surgery and ultimately sentinel node evaluation.  This is now scheduled November 7 and there are additional plans to consider radiation therapy postoperatively and we have also discussed the use of anti-hormonal therapy considering her mildly positive [place] status.    The patient was able to proceed to surgery undergoing right breast mastectomy and sentinel lymph node biopsy November 07, 2017.  She did well postoperatively seeing Dr. Melo November 16.  Pathology revealed no residual malignancy in the breast and 3 negative sentinel lymph nodes.  A formal review of her report reveals treatment effect particularly in her largest lymph node with focal fibrosis and scar, right breast mastectomy fibrosis associated with a cavitary biopsy site and no invasive or in situ carcinoma.  The patient is now seen in our office though she went to the wrong location and the seen late in the day.  We discussed her findings and agree that she would continue Herceptin at this point but be reviewed formally again in 3 weeks.  She is also be  seen by radiation therapy for their input.   The patient, evidently, was seen by radiation therapy but decided not to pursue it until her last Herceptin therapy here December 19.  Following this she visited her daughter in [place] and, unfortunately, developed influenza and pneumonia.  She had a prolonged hospitalization and was at many sites in recovery over a several month only to return to [place] in the last several weeks.  She did take 1 IV Herceptin dose while in [place] but as she is reviewed May 05, 2018 we have discussed that it makes no particular sense to continue or add on to her previous history by extending Herceptin any further 3-4 months after her last treatment.  She therefore has completed Herceptin.    The patient on to be tested post her treatment again baseline studies and CT of chest and pelvis were performed June 6 revealing interval increase in a few subcentimeter nodes in the left pectoral, axillary and mediastinal regions. These are all considerably small and of uncertain significance.  The patient's performance status remains excellent without any reduction and, in fact, has improved with physical therapy upon return.       Patient is next seen August 08, 2018, fortunately feeling well.  A follow-up PET/CT had revealed an interval decrease in the subcentimeter nodes in the left subpectoral axillary region as well as mediastinum.  There was no hypermetabolic lymphadenopathy.  There was intense focus within slightly enlarged right thyroid gland.  Patient indicates she never had any thyroid issues of which she is aware.  The patient was referred to ENT for assessment and the patient was seen by Dr. Fofana.  Ultimately a subtotal thyroidectomy is anticipated and now scheduled October 4.  The patient is willing to proceed.        The patient proceeded to a right thyroid lobectomy performed November 04, 2018.  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  There  was no definitive evidence of trans-capsular or vascular invasion.    The patient is seen in follow-up November 28 doing well and we discussed the surgical treatment of this thyroid lesion.  She feels that all this went well.  She has additional cataract surgery at the end of November  scheduled also.  The patient did complete her testing and was asked to return approximately 6 months later with a follow-up CT chest, abdomen and pelvis that demonstrate no evidence of recurrent disease.  As she is seen back May 14 she, unfortunately, fell and contused her face, left knee and left hand.  This required an ER visit.  Is elected to follow her back in 6 months maintaining her port in the interval and she is seen October 29 again without indications of recurrent disease and relatively stable clinically.  She is seeing plastic surgery about reconstruction and otherwise continue her current medication list and following with her primary care regularly.  The patient is next seen July 7, 2020 indicating that she did not proceed to any  plastic surgery and with the COVID-19 epidemic she does not wish to have any elective procedures.  She has, however, having significant GI reflux symptoms that have worsened substantially last several months despite PPI therapy.    The patient had follow-up for meningioma September 11, 2020 unchanged from previous.        The patient is next reviewed October 27, 2020 having been hospitalized September 28 through October 1, 2020.  She had presented with fever and flank pain and was found to have acute colitis due to enteropathogenic E. coli.  Antibiotics were avoided secondary to history of C. difficile colitis and fortunately she did not want to improve albeit slowly.  She had evidence of acute on chronic thrombocytopenia with a platelet count dropping to close to 30,000 and slowly rebounding assessed October 6 at 64,000 and October 13 at 67,000.  Fortunately she is feeling considerably better  with her bowel function normalizing.  We had the patient return for ongoing testing documenting continued thrombocytopenia and IPF at 9.5 818 October 2020.  She seen back April 13, 2021 she has further thrombocytopenia with peripheral smear showing enlarged platelets.  Is felt this consistent with chronic ITP.   The patient is here today for reevaluation following an acute episode of ITP.  When seen on April 13, 2022, her platelets have declined to 44,000.  She was initiated on prednisone therapy at 0.5 mg/kg which came to 20 mg twice daily.  She has been on this now for 1 week and generally was struggling with insomnia and increased appetite.  Thankfully, over the last few days she has been sleeping better.  Of note, the patient was struggling with severe depression and actually feels like over the last week that the prednisone has enhanced her mood.  Her platelets today have improved back to her baseline at 65,000 with an elevated IPF of 18.5.  She denies any excess bleeding or bruising.  No chest pain or shortness of breath noted.  She has no other concerns today.       Past Medical History:   Diagnosis Date   • Alcoholism (CMS/Formerly [facility]) 1978    I haven't had a drink since then   • Anemia    • Breast cancer (CMS/Formerly [facility]) 05/03/2017    Right breast invasive mammary carcinoma with focal lobular features, grade 2, ER negative, less than 1% WI positive, HER-2 positive, stage IIIa disease (T3, N2, M0   • Breast cancer (CMS/Formerly [facility]) 10/20/2004    Left breast ductal carcinoma in-situ, predominately intermediate grade with focal comedonecrosis (high grade), solid and bribridform patterns involving approximately five core biopsy fragments   • Edema     Chronic lower extremity edema   • GERD (gastroesophageal reflux disease) 09/13/2011    Dr. Paul Negron   • GI (gastrointestinal bleed) 1997   • H/O jaundice    • Hernia     INCISONAL. AROUND LEFT TRAM LAP SITE   • History of chemotherapy     2017 4 MONTHS IN 2017   • History of  Clostridium difficile colitis     JAN 2018   • History of histoplasmosis    • History of infectious mononucleosis 1960   • History of migraine headaches    • History of pneumonia 12/27/2017    AS RESULT OF FLU. ENDED UP ON VENT IN [place]   • History of thrombocytopenia    • History of transfusion 1997   • History of vertigo    • Hx of colonic polyps 06/11/2009    Dr. Giles   • Hyperlipidemia    • Hypertension    • Meningioma (CMS/HCC)    • Neuropathy     HANDS AND FEET   • Osteoarthritis 09/13/2011    Dr. Jamil Miller   • Peptic ulceration    • Retina disorder     BLEED. FROM HISTOPLASMOSIS   • SBO (small bowel obstruction) (CMS/HCC)     due to hernia with surgical repair in 09/2011   • SOB (shortness of breath) on exertion    • Thyroid mass     RIGHT   Normal echocardiogram on 5/18/2017, LVEF 68%.    Past Surgical History:   Procedure Laterality Date   • APPENDECTOMY N/A 1960   • BLADDER REPAIR N/A 1980   • BREAST BIOPSY Left 10/20/2004    Mammotome incisional biopsy left breast, surgical specimen, left breast, localization clip placement, left breast, confirmatory diagnostic unilateral mammogram, left breast-Dr. Tyrese Alcala, [facility]   • BREAST BIOPSY Right 05/03/2017    PATH: INVASIVE MAMMARY CARCINOMA   • CHOLECYSTECTOMY N/A 1990   • COLONOSCOPY N/A 06/11/2009    Hemorrhoids, otherwise normal, repeat in 5 years-Dr. Noemí Purcell   • EYE SURGERY Right 2017    laser surgery for blood clot   • HYSTERECTOMY Bilateral 1980   • INGUINAL HERNIA REPAIR Right     DR ALESIA GAXIOLA   • MASTECTOMY Left 2004    Left breast mastecotmy with sentinel lymph node biospy-[facility]   • MASTECTOMY W/ SENTINEL NODE BIOPSY Right 11/7/2017    Procedure: RIGHT BREAST MASTECTOMY WITH SENTINEL NODE BIOPSY;  Surgeon: Christian Melo MD;  Location: [facility] OR;  Service:    • CO INSJ TUNNELED CVC W/O SUBQ PORT/ AGE 5 YR/> Left 5/22/2017    Procedure: INSERTION VENOUS ACCESS DEVICE;  Surgeon: Christian Melo MD;  Location:   [facility] OR;  Service: General   • REDUCTION MAMMAPLASTY Right     to match Lt. TRAM flap   • SMALL INTESTINE SURGERY      INCARCRATED HERNIA   • THYROIDECTOMY Right 10/4/2018    Procedure: RT THYROID LOBECTOMY;  Surgeon: Julián Fofana MD;  [place]  [facility] [place] [facility] [place]  Service: ENT   • TONSILLECTOMY Bilateral    • TRAM FLAP DELAYED Left     WITH MASTOPEXY   • UPPER GASTROINTESTINAL ENDOSCOPY N/A 2011    LA grade A esophagitis with no bleeding, large hiatus hernia (50cm), gastric ulcer-Dr.Laszlo Lezama   • US GUIDED FINE NEEDLE ASPIRATION  2018   Lico catheter placement on 2017 by Dr. Melo.     OB/GYN history: Menarche age 16, menopause at 1985. , 1 miscarriage. No birth control pill use. She did have post menopause hormonal supplementation.      HEMATOLOGIC/ONCOLOGIC HISTORY: The patient is a 80y.o. year old female whom we are consulted for a newly self discovered right breast mass, in 2017. Patient had ultrasound-guided biopsy on 5/3/2017 and confirmed to be invasive mammary carcinoma with focal lobular features, grade 2 Minden score 6/9. She is here for initial evaluation for management.      Patient is a 76-year-old  female who was seen here previously for chronic mild-to-moderate thrombocytopenia and mild anemia. Recently the patient reports she started having firmness of the right breast that started sometime in April or maybe even in March. She noticed firmness spread from about around the 12 o'clock position, gradually towards the upper lateral side and lower part of the right breast associated mild pain. The patient thought it was related to fibrocystic changes. However, the symptom was getting worse. Patient also reported dented nipple after she started having pain in the right breast. She denies discharge from the nipple. She called her primary care physician, Dr. Martin, who ordered a mammogram study. This was done on 2017 with architectural distortion  of the right breast centrally at 12 o'clock position and had scattered fibroglandular densities throughout the right breast.      The patient subsequently had right breast ultrasound examination on 04/26/2017. Discovered a large right breast mass measuring 5.8 x 3.5 x 3.9 cm. Patient subsequently had ultrasound-guided right breast biopsy on 05/03/2017. Pathology evaluation reported invasive mammary carcinoma with focal lobular feature. Elen score 6/9, overall grade 2. Sample was further sent to the [facility] for test. ER negative, less than 1%; WV positive, moderate in staining, 5% to 10%. HER2 IHC 2+, but FISH study positive 2.1.     She denies weight loss, she actually eats very well. No nausea vomiting. Patient does complain of insomnia, unable to sleep.      This patient has history of left breast DCIS back in 2007, had mastectomy at the [facility]. No hormonal therapy afterwards according to patient. This patient also had a small meningioma, followed by Dr. Smith in [facility], with most recent MRI of the brain in March 2017, with 18 mm meningioma, and followed on an annual basis.     Her neutrophil count on 5/16/17 is normal at 2500, however, records showed starting from 05/2015 and in 09/2016, 01/2017 and 03/2017, all 4 laboratory studies showed mild neutropenia with ANC fluctuating between 1200 and 1600. Etiology is not clear.    Normal echocardiogram on 5/18/2017, LVEF 68%.      CT scan for chest abdomen and pelvis on 5/22/2017 and breast MRI examination on 5/22/2017 reported small right axillary lymph node suspicious for metastatic disease.  No remote metastatic lesion.  Patient has stage IIIa (T3 N2 M0) disease.      Patient will start neoadjuvant chemotherapy with Taxol weekly plus Herceptin weekly for total 12 weeks, and Perjeta every 3 weeks (3-week cycle) during chemotherapy.  Herceptin will be converted to every 3 weeks after  chemotherapy finished.  Cycle 1 day 1 5/23/2017.   Status post neoadjuvant chemotherapy the patient was assessed with MRI showing a complete neoadjuvant response.  The patient was reviewed for surgery and questions concerning lymph node dissection-sentinel node evaluation-and need for radiation therapy postop were considered as well as use of additional Herceptin for the balance of the year as well as additional use of anti-hormonal therapy.  Surgery was scheduled November 07, 2017.  Patient next seen in office November 28 having undergone surgery on November 7 with results revealing no residual malignancy in either breast or associated sentinel lymph nodes.  Was elected to continue Herceptin when seen back in office November 20 having initiated Herceptin May 23, 2017.    Patient continued Herceptin with her last treatment given December 19, 2017.     Unfortunately she later experienced an accident while in [place] December 28 with prolonged hospitalization and prolonged ventilator management required.  Apparently she had at least one IV Herceptin therapy given while there and we were contacted March 20, 2018- Dr. Nuno.  Eventually the patient was able to return to [place] is seen back in office May 5 at which time we concluded that she completed Herceptin therapy as result of being off treatment for so long.  Plans were made for baseline scans.  Patient follow-up reexaminations May 8, 2019 with no evidence of recurrent malignancy.  This was again a circumstance when she was assessed October 29, 2019.  Patient seen July 7, 2020 without evidence of recurrent malignancy.  Recurrent GI symptoms noted with GI referral planned.  Patient hospitalized September 28-October 1, 2020 with enteropathic E. coli, acute on chronic thrombocytopenia.     Subsequent testing felt consistent with chronic ITP and trial of steroids initiated April 14, 2021.    MEDICATIONS: The current medication list was reviewed with the patient and  updated in the EMR this date per the Medical Assistant. Medication dosages and frequencies were confirmed to be accurate.       ALLERGIES:    Allergies   Allergen Reactions   • Codeine Nausea And Vomiting   • Morphine Nausea And Vomiting     SOCIAL HISTORY:   Social History     Tobacco Use   • Smoking status: Former Smoker     Packs/day: 2.00     Years: 30.00     Pack years: 60.00     Types: Cigarettes     Start date: 1957     Quit date: 4/10/1987     Years since quittin.0   • Smokeless tobacco: Never Used   • Tobacco comment: I haven't had a cigarette in over 30 years   Vaping Use   • Vaping Use: Never used   Substance Use Topics   • Alcohol use: No     Comment: stopped, heavy in past   • Drug use: No     FAMILY HISTORY:  Family History   Problem Relation Age of Onset   • Heart disease Mother    • Aortic aneurysm Mother         abdominal   • Coronary artery disease Mother    • Hypertension Mother    • Miscarriages / Stillbirths Mother    • Diverticulitis Mother    • Heart disease Father    • Heart attack Father         acute   • Hypertension Father    • Early death Father    • Hearing loss Father    • Kidney disease Father    • Breast cancer Daughter 45   • Heart disease Brother    • Hypertension Brother    • Malig Hyperthermia Neg Hx      I have reviewed the patient's medical history in detail and updated the computerized patient record.    REVIEW OF SYSTEMS:  GENERAL: See history of present illness.   no change in appetite or weight;   No fevers, chills, sweats.   SKIN: No nonhealing lesions. No rashes.   HEME/LYMPH: See HPI.   EYES: No vision changes or diplopia.   ENT: No tinnitus, hearing loss, gum bleeding, epistaxis, hoarseness or dysphagia.   RESPIRATORY: No cough, Has exertional dyspnea, No hemoptysis or wheezing.   CVS: No chest pain, palpitations, orthopnea, dyspnea on exertion or PND.   GI: Worsening reflux symptoms  : No lower tract obstructive symptoms, dysuria or hematuria.  "  MUSCULOSKELETAL: Chronic or joint pain, arthritis.  Has mild intermittent ankle swelling.   NEUROLOGICAL: See HPI  PSYCHIATRIC: See HPI     Objective:   Vitals:    04/20/21 1410   BP: 154/80   Pulse: 71   Resp: 17   Temp: 97.5 °F (36.4 °C)   TempSrc: Temporal   SpO2: 93%   Weight: 71.2 kg (156 lb 14.4 oz)   Height: 154.9 cm (60.98\")   PainSc: 0-No pain   ECOG 0      PHYSICAL EXAM:   GENERAL: Well-developed, well-nourished female, in no acute distress.   SKIN: Warm, dry without rashes, purpura or petechiae.  Left upper chest Lico catheter in place, no evidence of infection.    EYES: Pupils equal, round and reactive to light. EOMs intact. Conjunctivae normal.  EARS: Hearing intact.  NOSE:  No excoriations or nasal discharge.  MOUTH: Tongue is well-papillated; no stomatitis or ulcers. Lips normal.  THROAT: Oropharynx without lesions or exudates.  Status post partial thyroidectomy well-healing  LYMPHATICS: No cervical, supraclavicular, axillary adenopathy.  CHEST: Lungs clear to auscultation. Good airflow.   BREAST:Postop, Well healed right mastectomy site with no evidence of recurrent disease, no axillary adenopathy bilaterally.  CARDIAC: Regular rate and rhythm without murmurs. Normal S1,S2.  ABDOMEN: No distention, normal active bowel sounds,  no hepatosplenomegaly or masses.  EXTREMITIES: No clubbing, cyanosis or edema.  NEUROLOGICAL: Cranial Nerves II-XII grossly intact. No focal neurological deficits.  PSYCHIATRIC: Alert and oriented, pleased about her results      RECENT LABS:  Lab Results   Component Value Date    WBC 13.70 (H) 04/20/2021    HGB 11.4 (L) 04/20/2021    HCT 34.5 04/20/2021    MCV 94.0 04/20/2021    PLT 65 (L) 04/20/2021    PLT 65 (L) 04/20/2021     Lab Results   Component Value Date    NEUTROABS 9.18 (H) 04/20/2021     Lab Results   Component Value Date    GLUCOSE 103 (H) 10/01/2020    BUN 18 12/07/2020    CREATININE 0.98 12/07/2020    EGFRIFNONA 55 (L) 12/07/2020    EGFRIFAFRI 66 12/07/2020    " BCR 18.4 12/07/2020    K 4.4 12/07/2020    CO2 27.9 12/07/2020    CALCIUM 9.3 12/07/2020    PROTENTOTREF 7.0 12/07/2020    ALBUMIN 4.80 12/07/2020    LABIL2 2.2 12/07/2020    AST 35 (H) 12/07/2020    ALT 34 (H) 12/07/2020            Assessment/Plan   1. Large right breast cancer, locally advanced, stage IIIa (T3 N1/ 2 M0).  ER negative, less than 1%; NC positive, moderate in staining, 5% to 10%. HER2 IHC 2+, FISH study positive 2.1.  Physical examination showed a large mass, 7 cm x 7 cm initially which has decreased to approximately less than 4 cm ..  Patient  proceeded through 4 cycles ceptin,Perjeta and Taxol.   Her subsequent MRI examination showed complete response by imaging including right axillary lymphadenopathy.  We have continued Herceptin and that includes today October 20, 2017.  The case was discussed with Dr. Melo and plans were to proceed with mastectomy plus right axillary lymph node assessment via sentinel node evaluation. it was felt she likely require radiation therapy post procedure.  The patient was scheduled and proceeded to surgery November 7.  Her results, wonderfully, revealed no evidence of residual disease in the breast or associated sentinel lymph nodes. She was seen by radiation therapy and offered treatment did not pursue it.  Additionally, and somewhat complicating this story, she traveled to [place] and developed severe influenza, associated pneumonia and was hospitalized for several months only to return to [place] in the last several weeks now being seen back May 1.  There is no particular benefit from trying to add Herceptin back to her therapy now and is felt that she is completed Herceptin treatment.  She wishes consider reconstruction and baseline CT scans will be requested in 5 weeks with the patient being seen in 6 weeks follow-up.The patient reviewed June 13, 2018 with the scans demonstrating very modest increase in left pectoral, axillary or mediastinal nodes.   These are of uncertain significance but do need follow-up in a PET/CT is planned in 7 weeks.  Her anemia will also be reviewed again with additional laboratory studies that visit.  She'll be seen in 8 weeks for general reassessment.    The patient's anemia workup was essentially negative and she is slowly improving when seen back August 8.  Her PET/CT, fortunately, demonstrates improvement though there is potential abnormality within the right thyroid lobe.  We discussed thyroid function testing and follow-up thyroid ultrasound.  She will be seen back in approximately 2 months as we maintain her port monthly flushes.   The patient was reviewed by ENT and ultimately was felt that a partial thyroidectomy was in order and is now scheduled October 4.  Patient's one to proceed.  We'll plan to see her back in approximately 6 weeks.      The patient is next seen November 28, 2015.  Her surgery went well with the findings as noted above.  She has follow-up with ENT in the next several weeks.  Additionally she has bilateral cataract surgery at the end of this month and into the beginning of next.  We discussed reassessment in general with follow-up scans and these were performed May 2019 which showed no evidence of recurrent malignancy.  Six-month follow-up planes with maintenance of her port every 6 weeks.  The patient is reassessed October 29, 2019 fortunately continue to do relatively well.  She has had no additional medical issues since last seen we will plan to see her back in 6 months again meeting report.  She does plan to see plastic surgery concerning reconstruction concerning her breast cancer history.  A copy this note will be sent to the plastic surgeon.     The patient is next seen July 7, 2020 without evidence of recurrence of malignancy.  We plan 6-month follow-up.  The patient is reviewed October 27, 2020 without evidence recurrent disease, repeat scans during recent hospitalization September 20-October 1  without evidence of recurrent malignancy.Reevaluation April 13, 2021 - for recurrence   The patient returns today, April 20, 2021 for evaluation of acute thrombocytopenia.  She has now been on 20 mg of prednisone twice daily for 1 week.  Thankfully, her platelets have increased back to near her baseline at 65,000.  She did struggle with side effects of the prednisone which are outlined above.  For the last 2 days however, she is sleeping better.  We will proceed with her current dosing of prednisone and will have her return in 1 week for MD reevaluation.  At that time, if her platelets remain stable/improved we will plan to have the patient undergo a rapid taper of the prednisone.      2.  Previous fall with contusions now recovered                                                                             3.  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  Worsening reflux symptoms, GI referral planned.  She did not complain of this today.    5.  Hospitalization September 20-October 1 with enteropathic E. coli.  This responded to conservative therapy with plans for reassessment by Dr. Carlin with outpatient colonoscopy.    6.  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   As outlined above, the patient was initiated on prednisone 20 mg twice daily for acute thrombocytopenia.  Her platelets are a recovering to her baseline.  We will continue on her current dosing of the prednisone and have her return next week for MD assessment      Plan:  *Prednisone 0.5 mg/kg or 20 mg twice daily with food.  Continue current dosing    *1 week MD, CBC, IPF, taper as possible    *I have asked the patient to call our office with any new issues or concerns prior to her next visit.  She has verbalized understanding

## 2021-04-27 ENCOUNTER — OFFICE VISIT (OUTPATIENT)
Dept: ONCOLOGY | Facility: CLINIC | Age: 81
End: 2021-04-27

## 2021-04-27 ENCOUNTER — INFUSION (OUTPATIENT)
Dept: ONCOLOGY | Facility: HOSPITAL | Age: 81
End: 2021-04-27

## 2021-04-27 VITALS
HEART RATE: 68 BPM | OXYGEN SATURATION: 97 % | WEIGHT: 153.7 LBS | SYSTOLIC BLOOD PRESSURE: 172 MMHG | DIASTOLIC BLOOD PRESSURE: 79 MMHG | TEMPERATURE: 97.7 F | BODY MASS INDEX: 29.02 KG/M2 | HEIGHT: 61 IN | RESPIRATION RATE: 18 BRPM

## 2021-04-27 DIAGNOSIS — Z45.2 ENCOUNTER FOR FITTING AND ADJUSTMENT OF VASCULAR CATHETER: ICD-10-CM

## 2021-04-27 DIAGNOSIS — D69.3 ITP SECONDARY TO INFECTION (HCC): Primary | ICD-10-CM

## 2021-04-27 DIAGNOSIS — D69.6 THROMBOCYTOPENIA (HCC): Primary | ICD-10-CM

## 2021-04-27 LAB
DEPRECATED RDW RBC AUTO: 52.8 FL (ref 37–54)
ERYTHROCYTE [DISTWIDTH] IN BLOOD BY AUTOMATED COUNT: 15.4 % (ref 12.3–15.4)
HCT VFR BLD AUTO: 36.3 % (ref 34–46.6)
HGB BLD-MCNC: 11.9 G/DL (ref 12–15.9)
LYMPHOCYTES # BLD MANUAL: 1.53 10*3/MM3 (ref 0.7–3.1)
LYMPHOCYTES NFR BLD MANUAL: 5 % (ref 5–12)
LYMPHOCYTES NFR BLD MANUAL: 9 % (ref 19.6–45.3)
MCH RBC QN AUTO: 31.1 PG (ref 26.6–33)
MCHC RBC AUTO-ENTMCNC: 32.8 G/DL (ref 31.5–35.7)
MCV RBC AUTO: 94.8 FL (ref 79–97)
MONOCYTES # BLD AUTO: 0.85 10*3/MM3 (ref 0.1–0.9)
MYELOCYTES NFR BLD MANUAL: 2 % (ref 0–0)
NEUTROPHILS # BLD AUTO: 14.24 10*3/MM3 (ref 1.7–7)
NEUTROPHILS NFR BLD MANUAL: 84 % (ref 42.7–76)
PLAT MORPH BLD: NORMAL
PLATELET # BLD AUTO: 66 10*3/MM3 (ref 140–450)
PLATELET # BLD AUTO: 66 10*3/MM3 (ref 140–450)
PLATELETS.RETICULATED NFR BLD AUTO: 15.8 % (ref 0.9–6.5)
PMV BLD AUTO: 12.4 FL (ref 6–12)
RBC # BLD AUTO: 3.83 10*6/MM3 (ref 3.77–5.28)
RBC MORPH BLD: NORMAL
SCAN SLIDE: NORMAL
WBC # BLD AUTO: 16.95 10*3/MM3 (ref 3.4–10.8)
WBC MORPH BLD: NORMAL

## 2021-04-27 PROCEDURE — 99213 OFFICE O/P EST LOW 20 MIN: CPT | Performed by: INTERNAL MEDICINE

## 2021-04-27 PROCEDURE — 85025 COMPLETE CBC W/AUTO DIFF WBC: CPT | Performed by: INTERNAL MEDICINE

## 2021-04-27 PROCEDURE — 85055 RETICULATED PLATELET ASSAY: CPT | Performed by: INTERNAL MEDICINE

## 2021-04-27 PROCEDURE — 85007 BL SMEAR W/DIFF WBC COUNT: CPT | Performed by: INTERNAL MEDICINE

## 2021-04-27 PROCEDURE — 36591 DRAW BLOOD OFF VENOUS DEVICE: CPT

## 2021-04-27 RX ORDER — HEPARIN SODIUM (PORCINE) LOCK FLUSH IV SOLN 100 UNIT/ML 100 UNIT/ML
500 SOLUTION INTRAVENOUS AS NEEDED
Status: DISCONTINUED | OUTPATIENT
Start: 2021-04-27 | End: 2021-04-27 | Stop reason: HOSPADM

## 2021-04-27 RX ORDER — SODIUM CHLORIDE 0.9 % (FLUSH) 0.9 %
10 SYRINGE (ML) INJECTION AS NEEDED
Status: DISCONTINUED | OUTPATIENT
Start: 2021-04-27 | End: 2021-04-27 | Stop reason: HOSPADM

## 2021-04-27 RX ORDER — SODIUM CHLORIDE 0.9 % (FLUSH) 0.9 %
10 SYRINGE (ML) INJECTION AS NEEDED
Status: CANCELLED | OUTPATIENT
Start: 2021-04-27

## 2021-04-27 RX ORDER — HEPARIN SODIUM (PORCINE) LOCK FLUSH IV SOLN 100 UNIT/ML 100 UNIT/ML
500 SOLUTION INTRAVENOUS AS NEEDED
Status: CANCELLED | OUTPATIENT
Start: 2021-04-27

## 2021-04-27 NOTE — PROGRESS NOTES
REASON FOR FOLLOWUP: Patient noticing increasing fatigue      1. Newly diagnosed large right breast cancer.  Core needle biopsy reported invasive mammary carcinoma with focal lobular feature, grade 2.  ER negative, less than 1%; ND positive, moderate in staining, 5% to 10%. HER2 IHC 2+, FISH study positive 2.1.   2.  CT scan for chest abdomen and pelvis and breast MRI examination reported right axillary lymph node suspicious for metastatic disease.  No remote metastatic lesion.  Patient has stage IIIa (T3 N2 M0) disease.   3.  Patient was started neoadjuvant chemotherapy on 5/23/2017 with Taxol weekly plus Herceptin weekly for total 12 weeks, and Perjata every 3 weeks during chemotherapy.  Herceptin will be converted to every 3 weeks after chemotherapy finished.    4.  Chronic moderate thrombocytopenia, mild anemia, and intermittent mild neutropenia etiologies are not clear.  5.  Reaction to Herceptin C1D1.  Subsequently tolerated therapy with hydrocortisone as premedication.  6.  Potential cardiac strain developing, neuropathic symptoms persist, follow-up MRI and surgical assessment will be needed post initial chemotherapeutic phase  7.  MRI August 17 with interval resolution of abnormal enhancement within breast and right axillary adenopathy, surgery scheduled November 7, Herceptin ongoing every 3 weeks  8.  Patient seen in office November 28, status post surgery with apparent complete response, Herceptin continued  9.  Herceptin given December 19, subsequent injury in [place] leading to prolonged stay, single treatment given through additional cancer Center  10.  Patient seen May 01, 2018, no further Herceptin planned, baseline scans pursued posttreatment, post influenza syndrome, physical therapy planned  11.  Patient seen June 13, 2018, excellent performance status, improving on physical therapy, equivocal CT  per thoracic adenopathy, follow-up PET/CT planned   12.  PET/CT with improvement,?  Thyroid  abnormality with ultrasound planned  13.  Patient seen October 3, partial thyroidectomy anticipated October 14-    14.  Patient seen May 14, 2019, scans negative for evidence of recurrence     15.  Patient seen 3/29/2019 clinically stable  16.  Patient reviewed July 7, ongoing GERD symptoms, GI referral planned.  17.  Hospitalization September 28-October 1-acute colitis due to enteropathic E. coli-residual thrombocytopenia  18.  Subsequent testing consistent with chronic ITP                                                                                                           HISTORY OF PRESENT ILLNESS:    The patient is a pleasant 80 y.o. with the above-mentioned history, who presents today in anticipation of cycle 4 of Herceptin, Perjeta and Taxol.  This is the first visit with this physician involving this patient's case.  We have reviewed her status today with her slowly developing neuropathic symptoms in her lower extremities but also involving her fingers recognized significantly and she plays the piano and keyboard.  This is becoming harder to do but she is still able to do so.  She also notices neuropathy in her feet but is able to walk and function in daily activities.  Additional to this is her continued grieving at the death of her  though she, fortunately, has an excellent support system.  She continues to experience nausea that is well relieved with Compazine. She denies oral mucositis.  No diarrhea no constipation no chest pain no dyspnea.  No lower extremity edema.    She did received 2 units of PRBC's on   7/8/2017 and remains hematologically stable.   She does have difficulty with sleep and Ambien 10 mg daily at bedtime seems help much better compared to 5 mg dose.  She does not need refills today.  In reviewing her case July 26 she is entering the fourth cycle of her treatment and recent echocardiogram is reviewed with she and her close friend revealing EF of 66.7% though with  global strain of -16%?  Suspicious for myopathic process.  Normal ventricular cavity size and wall thickness as well as contractility.  We have also discussed that she is responding to therapy and will need surgical assessment which may not as yet have been performed.  She has seen Dr. Melo for port placement and will ask him to review her likely just after she has completed his fourth cycle of therapy.  It is also apparent that she'll need a cardiac assessment as we continue subsequent Herceptin therapy perioperatively.      The patient underwent MRI of the breasts August 17 demonstrating interval resolution of abnormal enhancement within the right breast, resolution of right axillary adenopathy had also resolved.  The findings were consistent with a complete response to neoadjuvant chemotherapy.  The patient was seen in subsequent follow-up with Dr. Melo and was determined to proceed with surgery and ultimately sentinel node evaluation.  This is now scheduled November 7 and there are additional plans to consider radiation therapy postoperatively and we have also discussed the use of anti-hormonal therapy considering her mildly positive WI status.    The patient was able to proceed to surgery undergoing right breast mastectomy and sentinel lymph node biopsy November 07, 2017.  She did well postoperatively seeing Dr. Melo November 16.  Pathology revealed no residual malignancy in the breast and 3 negative sentinel lymph nodes.  A formal review of her report reveals treatment effect particularly in her largest lymph node with focal fibrosis and scar, right breast mastectomy fibrosis associated with a cavitary biopsy site and no invasive or in situ carcinoma.  The patient is now seen in our office though she went to the wrong location and the seen late in the day.  We discussed her findings and agree that she would continue Herceptin at this point but be reviewed formally again in 3 weeks.  She is also be  seen by radiation therapy for their input.   The patient, evidently, was seen by radiation therapy but decided not to pursue it until her last Herceptin therapy here December 19.  Following this she visited her daughter in [place] and, unfortunately, developed influenza and pneumonia.  She had a prolonged hospitalization and was at many sites in recovery over a several month only to return to [place] in the last several weeks.  She did take 1 IV Herceptin dose while in [place] but as she is reviewed May 05, 2018 we have discussed that it makes no particular sense to continue or add on to her previous history by extending Herceptin any further 3-4 months after her last treatment.  She therefore has completed Herceptin.    The patient on to be tested post her treatment again baseline studies and CT of chest and pelvis were performed June 6 revealing interval increase in a few subcentimeter nodes in the left pectoral, axillary and mediastinal regions. These are all considerably small and of uncertain significance.  The patient's performance status remains excellent without any reduction and, in fact, has improved with physical therapy upon return.       Patient is next seen August 08, 2018, fortunately feeling well.  A follow-up PET/CT had revealed an interval decrease in the subcentimeter nodes in the left subpectoral axillary region as well as mediastinum.  There was no hypermetabolic lymphadenopathy.  There was intense focus within slightly enlarged right thyroid gland.  Patient indicates she never had any thyroid issues of which she is aware.  The patient was referred to ENT for assessment and the patient was seen by Dr. Fofana.  Ultimately a subtotal thyroidectomy is anticipated and now scheduled October 4.  The patient is willing to proceed.        The patient proceeded to a right thyroid lobectomy performed November 04, 2018.  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  There  was no definitive evidence of trans-capsular or vascular invasion.    The patient is seen in follow-up November 28 doing well and we discussed the surgical treatment of this thyroid lesion.  She feels that all this went well.  She has additional cataract surgery at the end of November  scheduled also.  The patient did complete her testing and was asked to return approximately 6 months later with a follow-up CT chest, abdomen and pelvis that demonstrate no evidence of recurrent disease.  As she is seen back May 14 she, unfortunately, fell and contused her face, left knee and left hand.  This required an ER visit.  Is elected to follow her back in 6 months maintaining her port in the interval and she is seen October 29 again without indications of recurrent disease and relatively stable clinically.  She is seeing plastic surgery about reconstruction and otherwise continue her current medication list and following with her primary care regularly.  The patient is next seen July 7, 2020 indicating that she did not proceed to any  plastic surgery and with the COVID-19 epidemic she does not wish to have any elective procedures.  She has, however, having significant GI reflux symptoms that have worsened substantially last several months despite PPI therapy.    The patient had follow-up for meningioma September 11, 2020 unchanged from previous.        The patient is next reviewed October 27, 2020 having been hospitalized September 28 through October 1, 2020.  She had presented with fever and flank pain and was found to have acute colitis due to enteropathogenic E. coli.  Antibiotics were avoided secondary to history of C. difficile colitis and fortunately she did not want to improve albeit slowly.  She had evidence of acute on chronic thrombocytopenia with a platelet count dropping to close to 30,000 and slowly rebounding assessed October 6 at 64,000 and October 13 at 67,000.  Fortunately she is feeling considerably better  with her bowel function normalizing.  We had the patient return for ongoing testing documenting continued thrombocytopenia and IPF at 9.5 818 October 2020.  She seen back April 13, 2021 she has further thrombocytopenia with peripheral smear showing enlarged platelets.  Is felt this consistent with chronic ITP.   The patient is here today for reevaluation following an acute episode of ITP.  When seen on April 13, 2022, her platelets have declined to 44,000.  She was initiated on prednisone therapy at 0.5 mg/kg which came to 20 mg twice daily.  She has been on this now for 1 week and generally was struggling with insomnia and increased appetite.  Thankfully, over the last few days she has been sleeping better.  Of note, the patient was struggling with severe depression and actually feels like over the last week that the prednisone has enhanced her mood.  Her platelets today have improved back to her baseline at 65,000 with an elevated IPF of 18.5.  She denies any excess bleeding or bruising.  No chest pain or shortness of breath noted.  She has no other concerns today.       Past Medical History:   Diagnosis Date   • Alcoholism (CMS/[facility]) 1978    I haven't had a drink since then   • Anemia    • Breast cancer (CMS/[facility]) 05/03/2017    Right breast invasive mammary carcinoma with focal lobular features, grade 2, ER negative, less than 1% ID positive, HER-2 positive, stage IIIa disease (T3, N2, M0   • Breast cancer (CMS/[facility]) 10/20/2004    Left breast ductal carcinoma in-situ, predominately intermediate grade with focal comedonecrosis (high grade), solid and bribridform patterns involving approximately five core biopsy fragments   • Edema     Chronic lower extremity edema   • GERD (gastroesophageal reflux disease) 09/13/2011    Dr. Paul Negron   • GI (gastrointestinal bleed) 1997   • H/O jaundice    • Hernia     INCISONAL. AROUND LEFT TRAM LAP SITE   • History of chemotherapy     2017 4 MONTHS IN 2017   • History of  Clostridium difficile colitis     JAN 2018   • History of histoplasmosis    • History of infectious mononucleosis 1960   • History of migraine headaches    • History of pneumonia 12/27/2017    AS RESULT OF FLU. ENDED UP ON VENT IN [place]   • History of thrombocytopenia    • History of transfusion 1997   • History of vertigo    • Hx of colonic polyps 06/11/2009    Dr. Giles   • Hyperlipidemia    • Hypertension    • Meningioma (CMS/HCC)    • Neuropathy     HANDS AND FEET   • Osteoarthritis 09/13/2011    Dr. Jamil Miller   • Peptic ulceration    • Retina disorder     BLEED. FROM HISTOPLASMOSIS   • SBO (small bowel obstruction) (CMS/HCC)     due to hernia with surgical repair in 09/2011   • SOB (shortness of breath) on exertion    • Thyroid mass     RIGHT   Normal echocardiogram on 5/18/2017, LVEF 68%.    Past Surgical History:   Procedure Laterality Date   • APPENDECTOMY N/A 1960   • BLADDER REPAIR N/A 1980   • BREAST BIOPSY Left 10/20/2004    Mammotome incisional biopsy left breast, surgical specimen, left breast, localization clip placement, left breast, confirmatory diagnostic unilateral mammogram, left breast-Dr. Tyrese Alcala, [facility]   • BREAST BIOPSY Right 05/03/2017    PATH: INVASIVE MAMMARY CARCINOMA   • CHOLECYSTECTOMY N/A 1990   • COLONOSCOPY N/A 06/11/2009    Hemorrhoids, otherwise normal, repeat in 5 years-Dr. Noemí Purcell   • EYE SURGERY Right 2017    laser surgery for blood clot   • HYSTERECTOMY Bilateral 1980   • INGUINAL HERNIA REPAIR Right     DR ALESIA GAXIOLA   • MASTECTOMY Left 2004    Left breast mastecotmy with sentinel lymph node biospy-[facility]   • MASTECTOMY W/ SENTINEL NODE BIOPSY Right 11/7/2017    Procedure: RIGHT BREAST MASTECTOMY WITH SENTINEL NODE BIOPSY;  Surgeon: Christian Melo MD;  Location: [facility] OR;  Service:    • PA INSJ TUNNELED CVC W/O SUBQ PORT/ AGE 5 YR/> Left 5/22/2017    Procedure: INSERTION VENOUS ACCESS DEVICE;  Surgeon: Christian Melo MD;  Location:   [facility];  Service: General   • REDUCTION MAMMAPLASTY Right     to match Lt. TRAM flap   • SMALL INTESTINE SURGERY      INCARCRATED HERNIA   • THYROIDECTOMY Right 10/4/2018    Procedure: RT THYROID LOBECTOMY;  Surgeon: Julián Fofana MD;  [place]  [facility] [place] [facility]  Service: ENT   • TONSILLECTOMY Bilateral    • TRAM FLAP DELAYED Left     WITH MASTOPEXY   • UPPER GASTROINTESTINAL ENDOSCOPY N/A 2011    LA grade A esophagitis with no bleeding, large hiatus hernia (50cm), gastric ulcer-Dr.Laszlo Lezama   • US GUIDED FINE NEEDLE ASPIRATION  2018   Lico catheter placement on 2017 by Dr. Melo.     OB/GYN history: Menarche age 16, menopause at 1985. , 1 miscarriage. No birth control pill use. She did have post menopause hormonal supplementation.      HEMATOLOGIC/ONCOLOGIC HISTORY: The patient is a 80y.o. year old female whom we are consulted for a newly self discovered right breast mass, in 2017. Patient had ultrasound-guided biopsy on 5/3/2017 and confirmed to be invasive mammary carcinoma with focal lobular features, grade 2 [place] score 6/9. She is here for initial evaluation for management.      Patient is a 76-year-old  female who was seen here previously for chronic mild-to-moderate thrombocytopenia and mild anemia. Recently the patient reports she started having firmness of the right breast that started sometime in April or maybe even in March. She noticed firmness spread from about around the 12 o'clock position, gradually towards the upper lateral side and lower part of the right breast associated mild pain. The patient thought it was related to fibrocystic changes. However, the symptom was getting worse. Patient also reported dented nipple after she started having pain in the right breast. She denies discharge from the nipple. She called her primary care physician, Dr. Martin, who ordered a mammogram study. This was done on 2017 with architectural distortion  of the right breast centrally at 12 o'clock position and had scattered fibroglandular densities throughout the right breast.      The patient subsequently had right breast ultrasound examination on 04/26/2017. Discovered a large right breast mass measuring 5.8 x 3.5 x 3.9 cm. Patient subsequently had ultrasound-guided right breast biopsy on 05/03/2017. Pathology evaluation reported invasive mammary carcinoma with focal lobular feature. Elen score 6/9, overall grade 2. Sample was further sent to the [facility] for test. ER negative, less than 1%; CO positive, moderate in staining, 5% to 10%. HER2 IHC 2+, but FISH study positive 2.1.     She denies weight loss, she actually eats very well. No nausea vomiting. Patient does complain of insomnia, unable to sleep.      This patient has history of left breast DCIS back in 2007, had mastectomy at the [facility]. No hormonal therapy afterwards according to patient. This patient also had a small meningioma, followed by Dr. Smith in [facility], with most recent MRI of the brain in March 2017, with 18 mm meningioma, and followed on an annual basis.     Her neutrophil count on 5/16/17 is normal at 2500, however, records showed starting from 05/2015 and in 09/2016, 01/2017 and 03/2017, all 4 laboratory studies showed mild neutropenia with ANC fluctuating between 1200 and 1600. Etiology is not clear.    Normal echocardiogram on 5/18/2017, LVEF 68%.      CT scan for chest abdomen and pelvis on 5/22/2017 and breast MRI examination on 5/22/2017 reported small right axillary lymph node suspicious for metastatic disease.  No remote metastatic lesion.  Patient has stage IIIa (T3 N2 M0) disease.      Patient will start neoadjuvant chemotherapy with Taxol weekly plus Herceptin weekly for total 12 weeks, and Perjeta every 3 weeks (3-week cycle) during chemotherapy.  Herceptin will be converted to every 3 weeks after  chemotherapy finished.  Cycle 1 day 1 5/23/2017.   Status post neoadjuvant chemotherapy the patient was assessed with MRI showing a complete neoadjuvant response.  The patient was reviewed for surgery and questions concerning lymph node dissection-sentinel node evaluation-and need for radiation therapy postop were considered as well as use of additional Herceptin for the balance of the year as well as additional use of anti-hormonal therapy.  Surgery was scheduled November 07, 2017.  Patient next seen in office November 28 having undergone surgery on November 7 with results revealing no residual malignancy in either breast or associated sentinel lymph nodes.  Was elected to continue Herceptin when seen back in office November 20 having initiated Herceptin May 23, 2017.    Patient continued Herceptin with her last treatment given December 19, 2017.     Unfortunately she later experienced an accident while in [place] December 28 with prolonged hospitalization and prolonged ventilator management required.  Apparently she had at least one IV Herceptin therapy given while there and we were contacted March 20, 2018- Dr. Nuno.  Eventually the patient was able to return to [place] is seen back in office May 5 at which time we concluded that she completed Herceptin therapy as result of being off treatment for so long.  Plans were made for baseline scans.  Patient follow-up reexaminations May 8, 2019 with no evidence of recurrent malignancy.  This was again a circumstance when she was assessed October 29, 2019.  Patient seen July 7, 2020 without evidence of recurrent malignancy.  Recurrent GI symptoms noted with GI referral planned.  Patient hospitalized September 28-October 1, 2020 with enteropathic E. coli, acute on chronic thrombocytopenia.     Subsequent testing felt consistent with chronic ITP and trial of steroids initiated April 14, 2021.    MEDICATIONS: The current medication list was reviewed with the patient and  updated in the EMR this date per the Medical Assistant. Medication dosages and frequencies were confirmed to be accurate.       ALLERGIES:    Allergies   Allergen Reactions   • Codeine Nausea And Vomiting   • Morphine Nausea And Vomiting     SOCIAL HISTORY:   Social History     Tobacco Use   • Smoking status: Former Smoker     Packs/day: 2.00     Years: 30.00     Pack years: 60.00     Types: Cigarettes     Start date: 1957     Quit date: 4/10/1987     Years since quittin.0   • Smokeless tobacco: Never Used   • Tobacco comment: I haven't had a cigarette in over 30 years   Vaping Use   • Vaping Use: Never used   Substance Use Topics   • Alcohol use: No     Comment: stopped, heavy in past   • Drug use: No     FAMILY HISTORY:  Family History   Problem Relation Age of Onset   • Heart disease Mother    • Aortic aneurysm Mother         abdominal   • Coronary artery disease Mother    • Hypertension Mother    • Miscarriages / Stillbirths Mother    • Diverticulitis Mother    • Heart disease Father    • Heart attack Father         acute   • Hypertension Father    • Early death Father    • Hearing loss Father    • Kidney disease Father    • Breast cancer Daughter 45   • Heart disease Brother    • Hypertension Brother    • Malig Hyperthermia Neg Hx      I have reviewed the patient's medical history in detail and updated the computerized patient record.    REVIEW OF SYSTEMS:  GENERAL: See history of present illness.   no change in appetite or weight;   No fevers, chills, sweats.   SKIN: No nonhealing lesions. No rashes.   HEME/LYMPH: See HPI.   EYES: No vision changes or diplopia.   ENT: No tinnitus, hearing loss, gum bleeding, epistaxis, hoarseness or dysphagia.   RESPIRATORY: No cough, Has exertional dyspnea, No hemoptysis or wheezing.   CVS: No chest pain, palpitations, orthopnea, dyspnea on exertion or PND.   GI: Worsening reflux symptoms  : No lower tract obstructive symptoms, dysuria or hematuria.  "  MUSCULOSKELETAL: Chronic or joint pain, arthritis.  Has mild intermittent ankle swelling.   NEUROLOGICAL: See HPI  PSYCHIATRIC: See HPI     Objective:   Vitals:    04/27/21 1409   BP: 172/79   Pulse: 68   Resp: 18   Temp: 97.7 °F (36.5 °C)   TempSrc: Temporal   SpO2: 97%   Weight: 69.7 kg (153 lb 11.2 oz)   Height: 154.9 cm (60.98\")   PainSc: 0-No pain   ECOG 0      PHYSICAL EXAM:   GENERAL: Well-developed, well-nourished female, in no acute distress.   SKIN: Warm, dry without rashes, purpura or petechiae.  Left upper chest Lico catheter in place, no evidence of infection.    EYES: Pupils equal, round and reactive to light. EOMs intact. Conjunctivae normal.  EARS: Hearing intact.  NOSE:  No excoriations or nasal discharge.  MOUTH: Tongue is well-papillated; no stomatitis or ulcers. Lips normal.  THROAT: Oropharynx without lesions or exudates.  Status post partial thyroidectomy well-healing  LYMPHATICS: No cervical, supraclavicular, axillary adenopathy.  CHEST: Lungs clear to auscultation. Good airflow.   BREAST:Postop, Well healed right mastectomy site with no evidence of recurrent disease, no axillary adenopathy bilaterally.  CARDIAC: Regular rate and rhythm without murmurs. Normal S1,S2.  ABDOMEN: No distention, normal active bowel sounds,  no hepatosplenomegaly or masses.  EXTREMITIES: No clubbing, cyanosis or edema.  NEUROLOGICAL: Cranial Nerves II-XII grossly intact. No focal neurological deficits.  PSYCHIATRIC: Alert and oriented, pleased about her results      RECENT LABS:  Lab Results   Component Value Date    WBC 13.70 (H) 04/20/2021    HGB 11.4 (L) 04/20/2021    HCT 34.5 04/20/2021    MCV 94.0 04/20/2021    PLT 65 (L) 04/20/2021    PLT 65 (L) 04/20/2021     Lab Results   Component Value Date    NEUTROABS 9.18 (H) 04/20/2021     Lab Results   Component Value Date    GLUCOSE 103 (H) 10/01/2020    BUN 18 12/07/2020    CREATININE 0.98 12/07/2020    EGFRIFNONA 55 (L) 12/07/2020    EGFRIFAFRI 66 12/07/2020    " BCR 18.4 12/07/2020    K 4.4 12/07/2020    CO2 27.9 12/07/2020    CALCIUM 9.3 12/07/2020    PROTENTOTREF 7.0 12/07/2020    ALBUMIN 4.80 12/07/2020    LABIL2 2.2 12/07/2020    AST 35 (H) 12/07/2020    ALT 34 (H) 12/07/2020            Assessment/Plan   1. Large right breast cancer, locally advanced, stage IIIa (T3 N1/ 2 M0).  ER negative, less than 1%; VA positive, moderate in staining, 5% to 10%. HER2 IHC 2+, FISH study positive 2.1.  Physical examination showed a large mass, 7 cm x 7 cm initially which has decreased to approximately less than 4 cm ..  Patient  proceeded through 4 cycles ceptin,Perjeta and Taxol.   Her subsequent MRI examination showed complete response by imaging including right axillary lymphadenopathy.  We have continued Herceptin and that includes today October 20, 2017.  The case was discussed with Dr. Melo and plans were to proceed with mastectomy plus right axillary lymph node assessment via sentinel node evaluation. it was felt she likely require radiation therapy post procedure.  The patient was scheduled and proceeded to surgery November 7.  Her results, wonderfully, revealed no evidence of residual disease in the breast or associated sentinel lymph nodes. She was seen by radiation therapy and offered treatment did not pursue it.  Additionally, and somewhat complicating this story, she traveled to [place] and developed severe influenza, associated pneumonia and was hospitalized for several months only to return to [place] in the last several weeks now being seen back May 1.  There is no particular benefit from trying to add Herceptin back to her therapy now and is felt that she is completed Herceptin treatment.  She wishes consider reconstruction and baseline CT scans will be requested in 5 weeks with the patient being seen in 6 weeks follow-up.The patient reviewed June 13, 2018 with the scans demonstrating very modest increase in left pectoral, axillary or mediastinal nodes.   These are of uncertain significance but do need follow-up in a PET/CT is planned in 7 weeks.  Her anemia will also be reviewed again with additional laboratory studies that visit.  She'll be seen in 8 weeks for general reassessment.    The patient's anemia workup was essentially negative and she is slowly improving when seen back August 8.  Her PET/CT, fortunately, demonstrates improvement though there is potential abnormality within the right thyroid lobe.  We discussed thyroid function testing and follow-up thyroid ultrasound.  She will be seen back in approximately 2 months as we maintain her port monthly flushes.   The patient was reviewed by ENT and ultimately was felt that a partial thyroidectomy was in order and is now scheduled October 4.  Patient's one to proceed.  We'll plan to see her back in approximately 6 weeks.      The patient is next seen November 28, 2015.  Her surgery went well with the findings as noted above.  She has follow-up with ENT in the next several weeks.  Additionally she has bilateral cataract surgery at the end of this month and into the beginning of next.  We discussed reassessment in general with follow-up scans and these were performed May 2019 which showed no evidence of recurrent malignancy.  Six-month follow-up planes with maintenance of her port every 6 weeks.  The patient is reassessed October 29, 2019 fortunately continue to do relatively well.  She has had no additional medical issues since last seen we will plan to see her back in 6 months again meeting report.  She does plan to see plastic surgery concerning reconstruction concerning her breast cancer history.  A copy this note will be sent to the plastic surgeon.     The patient is next seen July 7, 2020 without evidence of recurrence of malignancy.  We plan 6-month follow-up.  The patient is reviewed October 27, 2020 without evidence recurrent disease, repeat scans during recent hospitalization September 20-October 1  without evidence of recurrent malignancy.Reevaluation April 13, 2021 - for recurrence   The patient returns today, April 20, 2021 for evaluation of acute thrombocytopenia.  She has now been on 20 mg of prednisone twice daily for 1 week.  Thankfully, her platelets have increased back to near her baseline at 65,000.  She did struggle with side effects of the prednisone which are outlined above.  For the last 2 days however, she is sleeping better.  We will proceed with her current dosing of prednisone and will have her return in 1 week for MD reevaluation.  At that time, if her platelets remain stable/improved we will plan to have the patient undergo a rapid taper of the prednisone.      2.  Previous fall with contusions now recovered                                                                             3.  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  Worsening reflux symptoms, GI referral planned.  She did not complain of this today.    5.  Hospitalization September 20-October 1 with enteropathic E. coli.  This responded to conservative therapy with plans for reassessment by Dr. Carlin with outpatient colonoscopy.    6.  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   As outlined above, the patient was initiated on prednisone 20 mg twice daily for acute thrombocytopenia.  Her platelets are a recovering to her baseline.  We will continue on her current dosing of the prednisone and have her return next week for MD assessment      Plan:  *Prednisone 0.5 mg/kg or 20 mg twice daily with food.  Continue current dosing    *1 week MD, CBC, IPF, taper as possible    *I have asked the patient to call our office with any new issues or concerns prior to her next visit.  She has verbalized understanding

## 2021-04-27 NOTE — PROGRESS NOTES
REASON FOR FOLLOWUP: Patient doing well on current steroid dosing    1. Newly diagnosed large right breast cancer.  Core needle biopsy reported invasive mammary carcinoma with focal lobular feature, grade 2.  ER negative, less than 1%; NJ positive, moderate in staining, 5% to 10%. HER2 IHC 2+, FISH study positive 2.1.   2.  CT scan for chest abdomen and pelvis and breast MRI examination reported right axillary lymph node suspicious for metastatic disease.  No remote metastatic lesion.  Patient has stage IIIa (T3 N2 M0) disease.   3.  Patient was started neoadjuvant chemotherapy on 5/23/2017 with Taxol weekly plus Herceptin weekly for total 12 weeks, and Perjata every 3 weeks during chemotherapy.  Herceptin will be converted to every 3 weeks after chemotherapy finished.    4.  Chronic moderate thrombocytopenia, mild anemia, and intermittent mild neutropenia etiologies are not clear.  5.  Reaction to Herceptin C1D1.  Subsequently tolerated therapy with hydrocortisone as premedication.  6.  Potential cardiac strain developing, neuropathic symptoms persist, follow-up MRI and surgical assessment will be needed post initial chemotherapeutic phase  7.  MRI August 17 with interval resolution of abnormal enhancement within breast and right axillary adenopathy, surgery scheduled November 7, Herceptin ongoing every 3 weeks  8.  Patient seen in office November 28, status post surgery with apparent complete response, Herceptin continued  9.  Herceptin given December 19, subsequent injury in [place] leading to prolonged stay, single treatment given through additional cancer Center  10.  Patient seen May 01, 2018, no further Herceptin planned, baseline scans pursued posttreatment, post influenza syndrome, physical therapy planned  11.  Patient seen June 13, 2018, excellent performance status, improving on physical therapy, equivocal CT  per thoracic adenopathy, follow-up PET/CT planned   12.  PET/CT with improvement,?  Thyroid  abnormality with ultrasound planned  13.  Patient seen October 3, partial thyroidectomy anticipated October 14-    14.  Patient seen May 14, 2019, scans negative for evidence of recurrence     15.  Patient seen 3/29/2019 clinically stable  16.  Patient reviewed July 7, ongoing GERD symptoms, GI referral planned.  17.  Hospitalization September 28-October 1-acute colitis due to enteropathic E. coli-residual thrombocytopenia  18.  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 steroids.                                                                                                             HISTORY OF PRESENT ILLNESS:    The patient is a pleasant 80 y.o. with the above-mentioned history, who presents today in anticipation of cycle 4 of Herceptin, Perjeta and Taxol.  This is the first visit with this physician involving this patient's case.  We have reviewed her status today with her slowly developing neuropathic symptoms in her lower extremities but also involving her fingers recognized significantly and she plays the piano and keyboard.  This is becoming harder to do but she is still able to do so.  She also notices neuropathy in her feet but is able to walk and function in daily activities.  Additional to this is her continued grieving at the death of her  though she, fortunately, has an excellent support system.  She continues to experience nausea that is well relieved with Compazine. She denies oral mucositis.  No diarrhea no constipation no chest pain no dyspnea.  No lower extremity edema.    She did received 2 units of PRBC's on   7/8/2017 and remains hematologically stable.   She does have difficulty with sleep and Ambien 10 mg daily at bedtime seems help much better compared to 5 mg dose.  She does not need refills today.  In reviewing her case July 26 she is entering the fourth cycle of her treatment and recent echocardiogram is reviewed with she and her close friend  revealing EF of 66.7% though with global strain of -16%?  Suspicious for myopathic process.  Normal ventricular cavity size and wall thickness as well as contractility.  We have also discussed that she is responding to therapy and will need surgical assessment which may not as yet have been performed.  She has seen Dr. Melo for port placement and will ask him to review her likely just after she has completed his fourth cycle of therapy.  It is also apparent that she'll need a cardiac assessment as we continue subsequent Herceptin therapy perioperatively.      The patient underwent MRI of the breasts August 17 demonstrating interval resolution of abnormal enhancement within the right breast, resolution of right axillary adenopathy had also resolved.  The findings were consistent with a complete response to neoadjuvant chemotherapy.  The patient was seen in subsequent follow-up with Dr. Melo and was determined to proceed with surgery and ultimately sentinel node evaluation.  This is now scheduled November 7 and there are additional plans to consider radiation therapy postoperatively and we have also discussed the use of anti-hormonal therapy considering her mildly positive AK status.    The patient was able to proceed to surgery undergoing right breast mastectomy and sentinel lymph node biopsy November 07, 2017.  She did well postoperatively seeing Dr. Melo November 16.  Pathology revealed no residual malignancy in the breast and 3 negative sentinel lymph nodes.  A formal review of her report reveals treatment effect particularly in her largest lymph node with focal fibrosis and scar, right breast mastectomy fibrosis associated with a cavitary biopsy site and no invasive or in situ carcinoma.  The patient is now seen in our office though she went to the wrong location and the seen late in the day.  We discussed her findings and agree that she would continue Herceptin at this point but be reviewed formally  again in 3 weeks.  She is also be seen by radiation therapy for their input.   The patient, evidently, was seen by radiation therapy but decided not to pursue it until her last Herceptin therapy here December 19.  Following this she visited her daughter in [place] and, unfortunately, developed influenza and pneumonia.  She had a prolonged hospitalization and was at many sites in recovery over a several month only to return to [place] in the last several weeks.  She did take 1 IV Herceptin dose while in [place] but as she is reviewed May 05, 2018 we have discussed that it makes no particular sense to continue or add on to her previous history by extending Herceptin any further 3-4 months after her last treatment.  She therefore has completed Herceptin.    The patient on to be tested post her treatment again baseline studies and CT of chest and pelvis were performed June 6 revealing interval increase in a few subcentimeter nodes in the left pectoral, axillary and mediastinal regions. These are all considerably small and of uncertain significance.  The patient's performance status remains excellent without any reduction and, in fact, has improved with physical therapy upon return.       Patient is next seen August 08, 2018, fortunately feeling well.  A follow-up PET/CT had revealed an interval decrease in the subcentimeter nodes in the left subpectoral axillary region as well as mediastinum.  There was no hypermetabolic lymphadenopathy.  There was intense focus within slightly enlarged right thyroid gland.  Patient indicates she never had any thyroid issues of which she is aware.  The patient was referred to ENT for assessment and the patient was seen by Dr. Fofana.  Ultimately a subtotal thyroidectomy is anticipated and now scheduled October 4.  The patient is willing to proceed.        The patient proceeded to a right thyroid lobectomy performed November 04, 2018.  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  There was no definitive evidence of trans-capsular or vascular invasion.    The patient is seen in follow-up November 28 doing well and we discussed the surgical treatment of this thyroid lesion.  She feels that all this went well.  She has additional cataract surgery at the end of November  scheduled also.  The patient did complete her testing and was asked to return approximately 6 months later with a follow-up CT chest, abdomen and pelvis that demonstrate no evidence of recurrent disease.  As she is seen back May 14 she, unfortunately, fell and contused her face, left knee and left hand.  This required an ER visit.  Is elected to follow her back in 6 months maintaining her port in the interval and she is seen October 29 again without indications of recurrent disease and relatively stable clinically.  She is seeing plastic surgery about reconstruction and otherwise continue her current medication list and following with her primary care regularly.  The patient is next seen July 7, 2020 indicating that she did not proceed to any  plastic surgery and with the COVID-19 epidemic she does not wish to have any elective procedures.  She has, however, having significant GI reflux symptoms that have worsened substantially last several months despite PPI therapy.    The patient had follow-up for meningioma September 11, 2020 unchanged from previous.        The patient is next reviewed October 27, 2020 having been hospitalized September 28 through October 1, 2020.  She had presented with fever and flank pain and was found to have acute colitis due to enteropathogenic E. coli.  Antibiotics were avoided secondary to history of C. difficile colitis and fortunately she did not want to improve albeit slowly.  She had evidence of acute on chronic thrombocytopenia with a platelet count dropping to close to 30,000 and slowly rebounding assessed October 6 at 64,000 and October 13 at 67,000.  Fortunately  she is feeling considerably better with her bowel function normalizing.  We had the patient return for ongoing testing documenting continued thrombocytopenia and IPF at 9.5 818 October 2020.  She seen back April 13, 2021 she has further thrombocytopenia with peripheral smear showing enlarged platelets.  Is felt this consistent with chronic ITP.   The patient was seen 4/20/2021 for reevaluation following an acute episode of ITP.  When seen on April 13, 2022, her platelets have declined to 44,000.  She was initiated on prednisone therapy at 0.5 mg/kg which came to 20 mg twice daily.  She has been on this now for 1 week and generally was struggling with insomnia and increased appetite.  Thankfully, over the last few days she has been sleeping better.  Of note, the patient was struggling with severe depression and actually feels like over the last week that the prednisone has enhanced her mood.  Her platelets today have improved back to her baseline at 65,000 with an elevated IPF of 18.5.  She denies any excess bleeding or bruising.  No chest pain or shortness of breath noted.  She has no other concerns today.     The patient was placed on 0.5 mg/kg prednisone 20 mg p.o. twice daily and, fortunately, responded to to this with improvement of her platelet count over 60,000.  She is next seen 4/27/2021 feeling about the same and we are discussing continue with his current dose at least over the next 1 to 2 weeks.       Past Medical History:   Diagnosis Date   • Alcoholism (CMS/[facility]) 1978    I haven't had a drink since then   • Anemia    • Breast cancer (CMS/[facility]) 05/03/2017    Right breast invasive mammary carcinoma with focal lobular features, grade 2, ER negative, less than 1% AL positive, HER-2 positive, stage IIIa disease (T3, N2, M0   • Breast cancer (CMS/[facility]) 10/20/2004    Left breast ductal carcinoma in-situ, predominately intermediate grade with focal comedonecrosis (high grade), solid and bribridform patterns  involving approximately five core biopsy fragments   • Edema     Chronic lower extremity edema   • GERD (gastroesophageal reflux disease) 09/13/2011    Dr. Paul Negron   • GI (gastrointestinal bleed) 1997   • H/O jaundice    • Hernia     INCISONAL. AROUND LEFT TRAM LAP SITE   • History of chemotherapy     2017 4 MONTHS IN 2017   • History of Clostridium difficile colitis     JAN 2018   • History of histoplasmosis    • History of infectious mononucleosis 1960   • History of migraine headaches    • History of pneumonia 12/27/2017    AS RESULT OF FLU. ENDED UP ON VENT IN [place]   • History of thrombocytopenia    • History of transfusion 1997   • History of vertigo    • Hx of colonic polyps 06/11/2009    Dr. Giles   • Hyperlipidemia    • Hypertension    • Meningioma (CMS/HCC)    • Neuropathy     HANDS AND FEET   • Osteoarthritis 09/13/2011    Dr. Jamil Miller   • Peptic ulceration    • Retina disorder     BLEED. FROM HISTOPLASMOSIS   • SBO (small bowel obstruction) (CMS/HCC)     due to hernia with surgical repair in 09/2011   • SOB (shortness of breath) on exertion    • Thyroid mass     RIGHT   Normal echocardiogram on 5/18/2017, LVEF 68%.    Past Surgical History:   Procedure Laterality Date   • APPENDECTOMY N/A 1960   • BLADDER REPAIR N/A 1980   • BREAST BIOPSY Left 10/20/2004    Mammotome incisional biopsy left breast, surgical specimen, left breast, localization clip placement, left breast, confirmatory diagnostic unilateral mammogram, left breast-Dr. Tyrese Alcala, [facility]   • BREAST BIOPSY Right 05/03/2017    PATH: INVASIVE MAMMARY CARCINOMA   • CHOLECYSTECTOMY N/A 1990   • COLONOSCOPY N/A 06/11/2009    Hemorrhoids, otherwise normal, repeat in 5 years-Dr. Noemí Purcell   • EYE SURGERY Right 2017    laser surgery for blood clot   • HYSTERECTOMY Bilateral 1980   • INGUINAL HERNIA REPAIR Right     DR ALESIA GAXIOLA   • MASTECTOMY Left 2004    Left breast mastecotmy with sentinel lymph node biospy-[facility]    • MASTECTOMY W/ SENTINEL NODE BIOPSY Right 2017    Procedure: RIGHT BREAST MASTECTOMY WITH SENTINEL NODE BIOPSY;  Surgeon: Christian Melo MD;  Location: [facility] MA[facility] OR;  Service:    • UT INSJ TUNNELED CVC W/O SUBQ PORT/ AGE 5 YR/> Left 2017    Procedure: INSERTION VENOUS ACCESS DEVICE;  Surgeon: Christian Melo MD;  [facility] MA[facility] OR;  Service: General   • REDUCTION MAMMAPLASTY Right     to match Lt. TRAM flap   • SMALL INTESTINE SURGERY      INCARCRATED HERNIA   • THYROIDECTOMY Right 10/4/2018    Procedure: RT THYROID LOBECTOMY;  Surgeon: Julián Fofana MD;  Location:  [facility];  Service: ENT   • TONSILLECTOMY Bilateral    • TRAM FLAP DELAYED Left     WITH MASTOPEXY   • UPPER GASTROINTESTINAL ENDOSCOPY N/A 2011    LA grade A esophagitis with no bleeding, large hiatus hernia (50cm), gastric ulcer-Dr.Laszlo Lezama   • US GUIDED FINE NEEDLE ASPIRATION  2018   Lico catheter placement on 2017 by Dr. Melo.     OB/GYN history: Menarche age 16, menopause at 1985. , 1 miscarriage. No birth control pill use. She did have post menopause hormonal supplementation.      HEMATOLOGIC/ONCOLOGIC HISTORY: The patient is a 80y.o. year old female whom we are consulted for a newly self discovered right breast mass, in 2017. Patient had ultrasound-guided biopsy on 5/3/2017 and confirmed to be invasive mammary carcinoma with focal lobular features, grade 2 Elen score 6/9. She is here for initial evaluation for management.      Patient is a 76-year-old  female who was seen here previously for chronic mild-to-moderate thrombocytopenia and mild anemia. Recently the patient reports she started having firmness of the right breast that started sometime in April or maybe even in March. She noticed firmness spread from about around the 12 o'clock position, gradually towards the upper lateral side and lower part of the right breast associated mild pain. The patient  thought it was related to fibrocystic changes. However, the symptom was getting worse. Patient also reported dented nipple after she started having pain in the right breast. She denies discharge from the nipple. She called her primary care physician, Dr. Martin, who ordered a mammogram study. This was done on 04/12/2017 with architectural distortion of the right breast centrally at 12 o'clock position and had scattered fibroglandular densities throughout the right breast.      The patient subsequently had right breast ultrasound examination on 04/26/2017. Discovered a large right breast mass measuring 5.8 x 3.5 x 3.9 cm. Patient subsequently had ultrasound-guided right breast biopsy on 05/03/2017. Pathology evaluation reported invasive mammary carcinoma with focal lobular feature. Elen score 6/9, overall grade 2. Sample was further sent to the [facility] for test. ER negative, less than 1%; MD positive, moderate in staining, 5% to 10%. HER2 IHC 2+, but FISH study positive 2.1.     She denies weight loss, she actually eats very well. No nausea vomiting. Patient does complain of insomnia, unable to sleep.      This patient has history of left breast DCIS back in 2007, had mastectomy at [facility]. No hormonal therapy afterwards according to patient. This patient also had a small meningioma, followed [facility] Dr. Smith in [facility], with most recent MRI of the brain in March 2017, with 18 mm meningioma, and followed on an annual basis.     Her neutrophil count on 5/16/17 is normal at 2500, however, records showed starting from 05/2015 and in 09/2016, 01/2017 and 03/2017, all 4 laboratory studies showed mild neutropenia with ANC fluctuating between 1200 and 1600. Etiology is not clear.    Normal echocardiogram on 5/18/2017, LVEF 68%.      CT scan for chest abdomen and pelvis on 5/22/2017 and breast MRI examination on 5/22/2017 reported small right axillary  lymph node suspicious for metastatic disease.  No remote metastatic lesion.  Patient has stage IIIa (T3 N2 M0) disease.      Patient will start neoadjuvant chemotherapy with Taxol weekly plus Herceptin weekly for total 12 weeks, and Perjeta every 3 weeks (3-week cycle) during chemotherapy.  Herceptin will be converted to every 3 weeks after chemotherapy finished.  Cycle 1 day 1 5/23/2017.   Status post neoadjuvant chemotherapy the patient was assessed with MRI showing a complete neoadjuvant response.  The patient was reviewed for surgery and questions concerning lymph node dissection-sentinel node evaluation-and need for radiation therapy postop were considered as well as use of additional Herceptin for the balance of the year as well as additional use of anti-hormonal therapy.  Surgery was scheduled November 07, 2017.  Patient next seen in office November 28 having undergone surgery on November 7 with results revealing no residual malignancy in either breast or associated sentinel lymph nodes.  Was elected to continue Herceptin when seen back in office November 20 having initiated Herceptin May 23, 2017.    Patient continued Herceptin with her last treatment given December 19, 2017.     Unfortunately she later experienced an accident while in [place] December 28 with prolonged hospitalization and prolonged ventilator management required.  Apparently she had at least one IV Herceptin therapy given while there and we were contacted March 20, 2018- Dr. Nuno.  Eventually the patient was able to return to [place] is seen back in office May 5 at which time we concluded that she completed Herceptin therapy as result of being off treatment for so long.  Plans were made for baseline scans.  Patient follow-up reexaminations May 8, 2019 with no evidence of recurrent malignancy.  This was again a circumstance when she was assessed October 29, 2019.  Patient seen July 7, 2020 without evidence of recurrent malignancy.   Recurrent GI symptoms noted with GI referral planned.  Patient hospitalized -2020 with enteropathic E. coli, acute on chronic thrombocytopenia.     Subsequent testing felt consistent with chronic ITP and trial of steroids initiated 2021.    MEDICATIONS: The current medication list was reviewed with the patient and updated in the EMR this date per the Medical Assistant. Medication dosages and frequencies were confirmed to be accurate.       ALLERGIES:    Allergies   Allergen Reactions   • Codeine Nausea And Vomiting   • Morphine Nausea And Vomiting     SOCIAL HISTORY:   Social History     Tobacco Use   • Smoking status: Former Smoker     Packs/day: 2.00     Years: 30.00     Pack years: 60.00     Types: Cigarettes     Start date: 1957     Quit date: 4/10/1987     Years since quittin.0   • Smokeless tobacco: Never Used   • Tobacco comment: I haven't had a cigarette in over 30 years   Vaping Use   • Vaping Use: Never used   Substance Use Topics   • Alcohol use: No     Comment: stopped, heavy in past   • Drug use: No     FAMILY HISTORY:  Family History   Problem Relation Age of Onset   • Heart disease Mother    • Aortic aneurysm Mother         abdominal   • Coronary artery disease Mother    • Hypertension Mother    • Miscarriages / Stillbirths Mother    • Diverticulitis Mother    • Heart disease Father    • Heart attack Father         acute   • Hypertension Father    • Early death Father    • Hearing loss Father    • Kidney disease Father    • Breast cancer Daughter 45   • Heart disease Brother    • Hypertension Brother    • Malig Hyperthermia Neg Hx      I have reviewed the patient's medical history in detail and updated the computerized patient record.    REVIEW OF SYSTEMS:  GENERAL: See history of present illness.   no change in appetite or weight;   No fevers, chills, sweats.   SKIN: No nonhealing lesions. No rashes.   HEME/LYMPH: See HPI.   EYES: No vision changes or  "diplopia.   ENT: No tinnitus, hearing loss, gum bleeding, epistaxis, hoarseness or dysphagia.   RESPIRATORY: No cough, Has exertional dyspnea, No hemoptysis or wheezing.   CVS: No chest pain, palpitations, orthopnea, dyspnea on exertion or PND.   GI: Worsening reflux symptoms  : No lower tract obstructive symptoms, dysuria or hematuria.   MUSCULOSKELETAL: Chronic or joint pain, arthritis.  Has mild intermittent ankle swelling.   NEUROLOGICAL: See HPI  PSYCHIATRIC: See HPI     Objective:   Vitals:    04/27/21 1409   BP: 172/79   Pulse: 68   Resp: 18   Temp: 97.7 °F (36.5 °C)   TempSrc: Temporal   SpO2: 97%   Weight: 69.7 kg (153 lb 11.2 oz)   Height: 154.9 cm (60.98\")   PainSc: 0-No pain   ECOG 0      PHYSICAL EXAM:   GENERAL: Well-developed, well-nourished female, in no acute distress.   SKIN: Warm, dry without rashes, purpura or petechiae.  Left upper chest Lico catheter in place, no evidence of infection.    EYES: Pupils equal, round and reactive to light. EOMs intact. Conjunctivae normal.  EARS: Hearing intact.  NOSE:  No excoriations or nasal discharge.  MOUTH: Tongue is well-papillated; no stomatitis or ulcers. Lips normal.  THROAT: Oropharynx without lesions or exudates.  Status post partial thyroidectomy well-healing  LYMPHATICS: No cervical, supraclavicular, axillary adenopathy.  CHEST: Lungs clear to auscultation. Good airflow.   BREAST:Postop, Well healed right mastectomy site with no evidence of recurrent disease, no axillary adenopathy bilaterally.  CARDIAC: Regular rate and rhythm without murmurs. Normal S1,S2.  ABDOMEN: No distention, normal active bowel sounds,  no hepatosplenomegaly or masses.  EXTREMITIES: No clubbing, cyanosis or edema.  NEUROLOGICAL: Cranial Nerves II-XII grossly intact. No focal neurological deficits.  PSYCHIATRIC: Alert and oriented, pleased about her results      RECENT LABS:  Lab Results   Component Value Date    WBC 13.70 (H) 04/20/2021    HGB 11.4 (L) 04/20/2021    HCT " 34.5 04/20/2021    MCV 94.0 04/20/2021    PLT 65 (L) 04/20/2021    PLT 65 (L) 04/20/2021     Lab Results   Component Value Date    NEUTROABS 9.18 (H) 04/20/2021     Lab Results   Component Value Date    GLUCOSE 103 (H) 10/01/2020    BUN 18 12/07/2020    CREATININE 0.98 12/07/2020    EGFRIFNONA 55 (L) 12/07/2020    EGFRIFAFRI 66 12/07/2020    BCR 18.4 12/07/2020    K 4.4 12/07/2020    CO2 27.9 12/07/2020    CALCIUM 9.3 12/07/2020    PROTENTOTREF 7.0 12/07/2020    ALBUMIN 4.80 12/07/2020    LABIL2 2.2 12/07/2020    AST 35 (H) 12/07/2020    ALT 34 (H) 12/07/2020            Assessment/Plan   1. Large right breast cancer, locally advanced, stage IIIa (T3 N1/ 2 M0).  ER negative, less than 1%; LA positive, moderate in staining, 5% to 10%. HER2 IHC 2+, FISH study positive 2.1.  Physical examination showed a large mass, 7 cm x 7 cm initially which has decreased to approximately less than 4 cm ..  Patient  proceeded through 4 cycles ceptin,Perjeta and Taxol.   Her subsequent MRI examination showed complete response by imaging including right axillary lymphadenopathy.  We have continued Herceptin and that includes today October 20, 2017.  The case was discussed with Dr. Melo and plans were to proceed with mastectomy plus right axillary lymph node assessment via sentinel node evaluation. it was felt she likely require radiation therapy post procedure.  The patient was scheduled and proceeded to surgery November 7.  Her results, wonderfully, revealed no evidence of residual disease in the breast or associated sentinel lymph nodes. She was seen by radiation therapy and offered treatment did not pursue it.  Additionally, and somewhat complicating this story, she traveled to [place] and developed severe influenza, associated pneumonia and was hospitalized for several months only to return to [place] in the last several weeks now being seen back May 1.  There is no particular benefit from trying to add Herceptin back to  her therapy now and is felt that she is completed Herceptin treatment.  She wishes consider reconstruction and baseline CT scans will be requested in 5 weeks with the patient being seen in 6 weeks follow-up.The patient reviewed June 13, 2018 with the scans demonstrating very modest increase in left pectoral, axillary or mediastinal nodes.  These are of uncertain significance but do need follow-up in a PET/CT is planned in 7 weeks.  Her anemia will also be reviewed again with additional laboratory studies that visit.  She'll be seen in 8 weeks for general reassessment.    The patient's anemia workup was essentially negative and she is slowly improving when seen back August 8.  Her PET/CT, fortunately, demonstrates improvement though there is potential abnormality within the right thyroid lobe.  We discussed thyroid function testing and follow-up thyroid ultrasound.  She will be seen back in approximately 2 months as we maintain her port monthly flushes.   The patient was reviewed by ENT and ultimately was felt that a partial thyroidectomy was in order and is now scheduled October 4.  Patient's one to proceed.  We'll plan to see her back in approximately 6 weeks.      The patient is next seen November 28, 2015.  Her surgery went well with the findings as noted above.  She has follow-up with ENT in the next several weeks.  Additionally she has bilateral cataract surgery at the end of this month and into the beginning of next.  We discussed reassessment in general with follow-up scans and these were performed May 2019 which showed no evidence of recurrent malignancy.  Six-month follow-up planes with maintenance of her port every 6 weeks.  The patient is reassessed October 29, 2019 fortunately continue to do relatively well.  She has had no additional medical issues since last seen we will plan to see her back in 6 months again meeting report.  She does plan to see plastic surgery concerning reconstruction concerning her  breast cancer history.  A copy this note will be sent to the plastic surgeon.     The patient is next seen July 7, 2020 without evidence of recurrence of malignancy.  We plan 6-month follow-up.  The patient is reviewed October 27, 2020 without evidence recurrent disease, repeat scans during recent hospitalization September 20-October 1 without evidence of recurrent malignancy.Reevaluation April 13, 2021 - for recurrence   The patient returns today, April 20, 2021 for evaluation of acute thrombocytopenia.  She has now been on 20 mg of prednisone twice daily for 1 week.  Thankfully, her platelets have increased back to near her baseline at 65,000.  She did struggle with side effects of the prednisone which are outlined above.  For the last 2 days however, she is sleeping better.  We will proceed with her current dosing of prednisone and will have her return in 1 week for MD reevaluation.  At that time, if her platelets remain stable/improved we will plan to have the patient undergo a rapid taper of the prednisone.  Patient return for assessment this with improvement in her platelet count to over 60,000.  She has called to request continuing the steroids, reassessment 1 week and if at the same level start to slowly taper.      2.  Previous fall with contusions now recovered                                                                             3.  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  Worsening reflux symptoms, GI referral planned.  She did not complain of this today.    5.  Hospitalization September 20-October 1 with enteropathic E. coli.  This responded to conservative therapy with plans for reassessment by Dr. Carlin with outpatient colonoscopy.      Plan:  *Return 1 week for CBC, iron evaluation    *2 weeks NP, CBC, IPF

## 2021-05-05 ENCOUNTER — LAB (OUTPATIENT)
Dept: ONCOLOGY | Facility: HOSPITAL | Age: 81
End: 2021-05-05

## 2021-05-05 ENCOUNTER — CLINICAL SUPPORT (OUTPATIENT)
Dept: ONCOLOGY | Facility: HOSPITAL | Age: 81
End: 2021-05-05

## 2021-05-05 VITALS
HEIGHT: 62 IN | TEMPERATURE: 97.1 F | RESPIRATION RATE: 18 BRPM | DIASTOLIC BLOOD PRESSURE: 71 MMHG | BODY MASS INDEX: 27.82 KG/M2 | SYSTOLIC BLOOD PRESSURE: 144 MMHG | OXYGEN SATURATION: 99 % | WEIGHT: 151.2 LBS | HEART RATE: 68 BPM

## 2021-05-05 DIAGNOSIS — D69.6 THROMBOCYTOPENIA (HCC): Primary | ICD-10-CM

## 2021-05-05 DIAGNOSIS — Z45.2 ENCOUNTER FOR FITTING AND ADJUSTMENT OF VASCULAR CATHETER: ICD-10-CM

## 2021-05-05 LAB
DEPRECATED RDW RBC AUTO: 53.8 FL (ref 37–54)
ERYTHROCYTE [DISTWIDTH] IN BLOOD BY AUTOMATED COUNT: 15.9 % (ref 12.3–15.4)
FERRITIN SERPL-MCNC: 149.5 NG/ML (ref 13–150)
HCT VFR BLD AUTO: 34.5 % (ref 34–46.6)
HGB BLD-MCNC: 11.2 G/DL (ref 12–15.9)
IRON 24H UR-MRATE: 126 MCG/DL (ref 37–145)
IRON SATN MFR SERPL: 41 % (ref 20–50)
LYMPHOCYTES # BLD MANUAL: 0.63 10*3/MM3 (ref 0.7–3.1)
LYMPHOCYTES NFR BLD MANUAL: 7 % (ref 5–12)
LYMPHOCYTES NFR BLD MANUAL: 8 % (ref 19.6–45.3)
MCH RBC QN AUTO: 30.9 PG (ref 26.6–33)
MCHC RBC AUTO-ENTMCNC: 32.5 G/DL (ref 31.5–35.7)
MCV RBC AUTO: 95 FL (ref 79–97)
METAMYELOCYTES NFR BLD MANUAL: 1 % (ref 0–0)
MONOCYTES # BLD AUTO: 0.55 10*3/MM3 (ref 0.1–0.9)
MYELOCYTES NFR BLD MANUAL: 4 % (ref 0–0)
NEUTROPHILS # BLD AUTO: 6.26 10*3/MM3 (ref 1.7–7)
NEUTROPHILS NFR BLD MANUAL: 80 % (ref 42.7–76)
PLAT MORPH BLD: NORMAL
PLATELET # BLD AUTO: 61 10*3/MM3 (ref 140–450)
PMV BLD AUTO: 12.2 FL (ref 6–12)
RBC # BLD AUTO: 3.63 10*6/MM3 (ref 3.77–5.28)
RBC MORPH BLD: NORMAL
SCAN SLIDE: NORMAL
TIBC SERPL-MCNC: 307 MCG/DL (ref 298–536)
TRANSFERRIN SERPL-MCNC: 206 MG/DL (ref 200–360)
WBC # BLD AUTO: 7.83 10*3/MM3 (ref 3.4–10.8)
WBC MORPH BLD: NORMAL

## 2021-05-05 PROCEDURE — 85007 BL SMEAR W/DIFF WBC COUNT: CPT | Performed by: INTERNAL MEDICINE

## 2021-05-05 PROCEDURE — 84466 ASSAY OF TRANSFERRIN: CPT | Performed by: INTERNAL MEDICINE

## 2021-05-05 PROCEDURE — 82728 ASSAY OF FERRITIN: CPT | Performed by: INTERNAL MEDICINE

## 2021-05-05 PROCEDURE — 25010000002 HEPARIN LOCK FLUSH PER 10 UNITS: Performed by: INTERNAL MEDICINE

## 2021-05-05 PROCEDURE — 36415 COLL VENOUS BLD VENIPUNCTURE: CPT

## 2021-05-05 PROCEDURE — 36591 DRAW BLOOD OFF VENOUS DEVICE: CPT

## 2021-05-05 PROCEDURE — 83540 ASSAY OF IRON: CPT | Performed by: INTERNAL MEDICINE

## 2021-05-05 PROCEDURE — 85025 COMPLETE CBC W/AUTO DIFF WBC: CPT | Performed by: INTERNAL MEDICINE

## 2021-05-05 RX ORDER — SODIUM CHLORIDE 0.9 % (FLUSH) 0.9 %
10 SYRINGE (ML) INJECTION AS NEEDED
Status: DISCONTINUED | OUTPATIENT
Start: 2021-05-05 | End: 2021-05-05 | Stop reason: HOSPADM

## 2021-05-05 RX ORDER — HEPARIN SODIUM (PORCINE) LOCK FLUSH IV SOLN 100 UNIT/ML 100 UNIT/ML
500 SOLUTION INTRAVENOUS AS NEEDED
Status: DISCONTINUED | OUTPATIENT
Start: 2021-05-05 | End: 2021-05-05 | Stop reason: HOSPADM

## 2021-05-05 RX ORDER — SODIUM CHLORIDE 0.9 % (FLUSH) 0.9 %
10 SYRINGE (ML) INJECTION AS NEEDED
Status: CANCELLED | OUTPATIENT
Start: 2021-05-05

## 2021-05-05 RX ORDER — HEPARIN SODIUM (PORCINE) LOCK FLUSH IV SOLN 100 UNIT/ML 100 UNIT/ML
500 SOLUTION INTRAVENOUS AS NEEDED
Status: CANCELLED | OUTPATIENT
Start: 2021-05-05

## 2021-05-05 RX ADMIN — SODIUM CHLORIDE, PRESERVATIVE FREE 10 ML: 5 INJECTION INTRAVENOUS at 13:39

## 2021-05-05 RX ADMIN — Medication 500 UNITS: at 13:39

## 2021-05-05 NOTE — NURSING NOTE
Pt is here for lab with RN review.  CBC reviewed with pt, counts are stable for this pt at this time; however platelets dropped from 65K to 61K. In review of Dr. Cervantes note of 04- if platelets stayed within hte 60K range then the steroids could be slowly tapered. Mrs. Brizuela states that at present she is taking prednisone 20 mg BID and prefers to stay at that dosing due to the fact that platelets dropped and she is tolerating the steroids. She is due to return 05-11 for labs and review with NP. She has noted increase bruising of arms. Iron studies were also reviewed and doted to be wnl.    Copy of labs given to pt.Pt is instructed to call the office with any concerns or new symptoms prior to next visit. Pt vu

## 2021-05-11 ENCOUNTER — LAB (OUTPATIENT)
Dept: OTHER | Facility: HOSPITAL | Age: 81
End: 2021-05-11

## 2021-05-11 ENCOUNTER — OFFICE VISIT (OUTPATIENT)
Dept: ONCOLOGY | Facility: CLINIC | Age: 81
End: 2021-05-11

## 2021-05-11 VITALS
SYSTOLIC BLOOD PRESSURE: 162 MMHG | DIASTOLIC BLOOD PRESSURE: 75 MMHG | HEART RATE: 74 BPM | RESPIRATION RATE: 18 BRPM | OXYGEN SATURATION: 18 % | TEMPERATURE: 97.7 F | HEIGHT: 61 IN | WEIGHT: 149.3 LBS | BODY MASS INDEX: 28.19 KG/M2

## 2021-05-11 DIAGNOSIS — D69.3 ITP SECONDARY TO INFECTION (HCC): ICD-10-CM

## 2021-05-11 DIAGNOSIS — D50.9 IRON DEFICIENCY ANEMIA, UNSPECIFIED IRON DEFICIENCY ANEMIA TYPE: Primary | ICD-10-CM

## 2021-05-11 DIAGNOSIS — D69.6 THROMBOCYTOPENIA (HCC): Primary | ICD-10-CM

## 2021-05-11 PROCEDURE — 85007 BL SMEAR W/DIFF WBC COUNT: CPT | Performed by: INTERNAL MEDICINE

## 2021-05-11 PROCEDURE — 99213 OFFICE O/P EST LOW 20 MIN: CPT | Performed by: NURSE PRACTITIONER

## 2021-05-11 PROCEDURE — 85025 COMPLETE CBC W/AUTO DIFF WBC: CPT | Performed by: INTERNAL MEDICINE

## 2021-05-11 PROCEDURE — 36415 COLL VENOUS BLD VENIPUNCTURE: CPT

## 2021-05-11 PROCEDURE — 85055 RETICULATED PLATELET ASSAY: CPT | Performed by: INTERNAL MEDICINE

## 2021-05-11 NOTE — PROGRESS NOTES
REASON FOR FOLLOWUP: Patient doing well on current steroid dosing    1. Newly diagnosed large right breast cancer.  Core needle biopsy reported invasive mammary carcinoma with focal lobular feature, grade 2.  ER negative, less than 1%; NE positive, moderate in staining, 5% to 10%. HER2 IHC 2+, FISH study positive 2.1.   2.  CT scan for chest abdomen and pelvis and breast MRI examination reported right axillary lymph node suspicious for metastatic disease.  No remote metastatic lesion.  Patient has stage IIIa (T3 N2 M0) disease.   3.  Patient was started neoadjuvant chemotherapy on 5/23/2017 with Taxol weekly plus Herceptin weekly for total 12 weeks, and Perjata every 3 weeks during chemotherapy.  Herceptin will be converted to every 3 weeks after chemotherapy finished.    4.  Chronic moderate thrombocytopenia, mild anemia, and intermittent mild neutropenia etiologies are not clear.  5.  Reaction to Herceptin C1D1.  Subsequently tolerated therapy with hydrocortisone as premedication.  6.  Potential cardiac strain developing, neuropathic symptoms persist, follow-up MRI and surgical assessment will be needed post initial chemotherapeutic phase  7.  MRI August 17 with interval resolution of abnormal enhancement within breast and right axillary adenopathy, surgery scheduled November 7, Herceptin ongoing every 3 weeks  8.  Patient seen in office November 28, status post surgery with apparent complete response, Herceptin continued  9.  Herceptin given December 19, subsequent injury in [place] leading to prolonged stay, single treatment given through additional cancer Center  10.  Patient seen May 01, 2018, no further Herceptin planned, baseline scans pursued posttreatment, post influenza syndrome, physical therapy planned  11.  Patient seen June 13, 2018, excellent performance status, improving on physical therapy, equivocal CT  per thoracic adenopathy, follow-up PET/CT planned   12.  PET/CT with improvement,?  Thyroid  abnormality with ultrasound planned  13.  Patient seen October 3, partial thyroidectomy anticipated October 14-    14.  Patient seen May 14, 2019, scans negative for evidence of recurrence     15.  Patient seen 3/29/2019 clinically stable  16.  Patient reviewed July 7, ongoing GERD symptoms, GI referral planned.  17.  Hospitalization September 28-October 1-acute colitis due to enteropathic E. coli-residual thrombocytopenia  18.  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 steroids.                                                                                                             HISTORY OF PRESENT ILLNESS:    The patient is a pleasant 80 y.o. with the above-mentioned history, who presents today in anticipation of cycle 4 of Herceptin, Perjeta and Taxol.  This is the first visit with this physician involving this patient's case.  We have reviewed her status today with her slowly developing neuropathic symptoms in her lower extremities but also involving her fingers recognized significantly and she plays the piano and keyboard.  This is becoming harder to do but she is still able to do so.  She also notices neuropathy in her feet but is able to walk and function in daily activities.  Additional to this is her continued grieving at the death of her  though she, fortunately, has an excellent support system.  She continues to experience nausea that is well relieved with Compazine. She denies oral mucositis.  No diarrhea no constipation no chest pain no dyspnea.  No lower extremity edema.    She did received 2 units of PRBC's on   7/8/2017 and remains hematologically stable.   She does have difficulty with sleep and Ambien 10 mg daily at bedtime seems help much better compared to 5 mg dose.  She does not need refills today.  In reviewing her case July 26 she is entering the fourth cycle of her treatment and recent echocardiogram is reviewed with she and her close friend  revealing EF of 66.7% though with global strain of -16%?  Suspicious for myopathic process.  Normal ventricular cavity size and wall thickness as well as contractility.  We have also discussed that she is responding to therapy and will need surgical assessment which may not as yet have been performed.  She has seen Dr. Melo for port placement and will ask him to review her likely just after she has completed his fourth cycle of therapy.  It is also apparent that she'll need a cardiac assessment as we continue subsequent Herceptin therapy perioperatively.      The patient underwent MRI of the breasts August 17 demonstrating interval resolution of abnormal enhancement within the right breast, resolution of right axillary adenopathy had also resolved.  The findings were consistent with a complete response to neoadjuvant chemotherapy.  The patient was seen in subsequent follow-up with Dr. Melo and was determined to proceed with surgery and ultimately sentinel node evaluation.  This is now scheduled November 7 and there are additional plans to consider radiation therapy postoperatively and we have also discussed the use of anti-hormonal therapy considering her mildly positive SD status.    The patient was able to proceed to surgery undergoing right breast mastectomy and sentinel lymph node biopsy November 07, 2017.  She did well postoperatively seeing Dr. Melo November 16.  Pathology revealed no residual malignancy in the breast and 3 negative sentinel lymph nodes.  A formal review of her report reveals treatment effect particularly in her largest lymph node with focal fibrosis and scar, right breast mastectomy fibrosis associated with a cavitary biopsy site and no invasive or in situ carcinoma.  The patient is now seen in our office though she went to the wrong location and the seen late in the day.  We discussed her findings and agree that she would continue Herceptin at this point but be reviewed formally  again in 3 weeks.  She is also be seen by radiation therapy for their input.   The patient, evidently, was seen by radiation therapy but decided not to pursue it until her last Herceptin therapy here December 19.  Following this she visited her daughter in [place] and, unfortunately, developed influenza and pneumonia.  She had a prolonged hospitalization and was at many sites in recovery over a several month only to return to [place] in the last several weeks.  She did take 1 IV Herceptin dose while in [place] but as she is reviewed May 05, 2018 we have discussed that it makes no particular sense to continue or add on to her previous history by extending Herceptin any further 3-4 months after her last treatment.  She therefore has completed Herceptin.    The patient on to be tested post her treatment again baseline studies and CT of chest and pelvis were performed June 6 revealing interval increase in a few subcentimeter nodes in the left pectoral, axillary and mediastinal regions. These are all considerably small and of uncertain significance.  The patient's performance status remains excellent without any reduction and, in fact, has improved with physical therapy upon return.       Patient is next seen August 08, 2018, fortunately feeling well.  A follow-up PET/CT had revealed an interval decrease in the subcentimeter nodes in the left subpectoral axillary region as well as mediastinum.  There was no hypermetabolic lymphadenopathy.  There was intense focus within slightly enlarged right thyroid gland.  Patient indicates she never had any thyroid issues of which she is aware.  The patient was referred to ENT for assessment and the patient was seen by Dr. Fofana.  Ultimately a subtotal thyroidectomy is anticipated and now scheduled October 4.  The patient is willing to proceed.        The patient proceeded to a right thyroid lobectomy performed November 04, 2018.  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  There was no definitive evidence of trans-capsular or vascular invasion.    The patient is seen in follow-up November 28 doing well and we discussed the surgical treatment of this thyroid lesion.  She feels that all this went well.  She has additional cataract surgery at the end of November  scheduled also.  The patient did complete her testing and was asked to return approximately 6 months later with a follow-up CT chest, abdomen and pelvis that demonstrate no evidence of recurrent disease.  As she is seen back May 14 she, unfortunately, fell and contused her face, left knee and left hand.  This required an ER visit.  Is elected to follow her back in 6 months maintaining her port in the interval and she is seen October 29 again without indications of recurrent disease and relatively stable clinically.  She is seeing plastic surgery about reconstruction and otherwise continue her current medication list and following with her primary care regularly.  The patient is next seen July 7, 2020 indicating that she did not proceed to any  plastic surgery and with the COVID-19 epidemic she does not wish to have any elective procedures.  She has, however, having significant GI reflux symptoms that have worsened substantially last several months despite PPI therapy.    The patient had follow-up for meningioma September 11, 2020 unchanged from previous.        The patient is next reviewed October 27, 2020 having been hospitalized September 28 through October 1, 2020.  She had presented with fever and flank pain and was found to have acute colitis due to enteropathogenic E. coli.  Antibiotics were avoided secondary to history of C. difficile colitis and fortunately she did not want to improve albeit slowly.  She had evidence of acute on chronic thrombocytopenia with a platelet count dropping to close to 30,000 and slowly rebounding assessed October 6 at 64,000 and October 13 at 67,000.  Fortunately  she is feeling considerably better with her bowel function normalizing.  We had the patient return for ongoing testing documenting continued thrombocytopenia and IPF at 9.5 818 October 2020.  She seen back April 13, 2021 she has further thrombocytopenia with peripheral smear showing enlarged platelets.  Is felt this consistent with chronic ITP.   The patient was seen 4/20/2021 for reevaluation following an acute episode of ITP.  When seen on April 13, 2022, her platelets have declined to 44,000.  She was initiated on prednisone therapy at 0.5 mg/kg which came to 20 mg twice daily.  She has been on this now for 1 week and generally was struggling with insomnia and increased appetite.  Thankfully, over the last few days she has been sleeping better.  Of note, the patient was struggling with severe depression and actually feels like over the last week that the prednisone has enhanced her mood.  Her platelets today have improved back to her baseline at 65,000 with an elevated IPF of 18.5.  She denies any excess bleeding or bruising.  No chest pain or shortness of breath noted.  She has no other concerns today.     The patient was placed on 0.5 mg/kg prednisone 20 mg p.o. twice daily and, fortunately, responded to to this with improvement of her platelet count over 60,000.  She is next seen 4/27/2021 feeling about the same and we are discussing continue with his current dose at least over the next 1 to 2 weeks.   The patient is here today May 11, 2021 continuing on prednisone at 20 mg twice daily for acute ITP.  She is generally tolerating treatment well.  She has had some insomnia though this has improved over the last several days.  She is eating quite well but is trying to control what she takes in to avoid weight gain.  She denies any significant lower extremity swelling.  Her platelet count today is improved to 68,000 with an IPF of 11.  She has no other concerns today.       Past Medical History:   Diagnosis Date    • Alcoholism (CMS/HCC) 1978    I haven't had a drink since then   • Anemia    • Breast cancer (CMS/HCC) 05/03/2017    Right breast invasive mammary carcinoma with focal lobular features, grade 2, ER negative, less than 1% DC positive, HER-2 positive, stage IIIa disease (T3, N2, M0   • Breast cancer (CMS/[facility]) 10/20/2004    Left breast ductal carcinoma in-situ, predominately intermediate grade with focal comedonecrosis (high grade), solid and bribridform patterns involving approximately five core biopsy fragments   • Edema     Chronic lower extremity edema   • GERD (gastroesophageal reflux disease) 09/13/2011    Dr. Paul Negron   • GI (gastrointestinal bleed) 1997   • H/O jaundice    • Hernia     INCISONAL. AROUND LEFT TRAM LAP SITE   • History of chemotherapy     2017 4 MONTHS IN 2017   • History of Clostridium difficile colitis     JAN 2018   • History of histoplasmosis    • History of infectious mononucleosis 1960   • History of migraine headaches    • History of pneumonia 12/27/2017    AS RESULT OF FLU. ENDED UP ON VENT IN [place]   • History of thrombocytopenia    • History of transfusion 1997   • History of vertigo    • Hx of colonic polyps 06/11/2009    Dr. Giles   • Hyperlipidemia    • Hypertension    • Meningioma (CMS/HCC)    • Neuropathy     HANDS AND FEET   • Osteoarthritis 09/13/2011    Dr. Jamil Miller   • Peptic ulceration    • Retina disorder     BLEED. FROM HISTOPLASMOSIS   • SBO (small bowel obstruction) (CMS/HCC)     due to hernia with surgical repair in 09/2011   • SOB (shortness of breath) on exertion    • Thyroid mass     RIGHT   Normal echocardiogram on 5/18/2017, LVEF 68%.    Past Surgical History:   Procedure Laterality Date   • APPENDECTOMY N/A 1960   • BLADDER REPAIR N/A 1980   • BREAST BIOPSY Left 10/20/2004    Mammotome incisional biopsy left breast, surgical specimen, left breast, localization clip placement, left breast, confirmatory diagnostic unilateral mammogram, left  breast-Dr. Tyrese Alcala, [facility]   • BREAST BIOPSY Right 2017    PATH: INVASIVE MAMMARY CARCINOMA   • CHOLECYSTECTOMY N/A    • COLONOSCOPY N/A 2009    Hemorrhoids, otherwise normal, repeat in 5 years-Dr. Noemí Purcell   • EYE SURGERY Right     laser surgery for blood clot   • HYSTERECTOMY Bilateral    • INGUINAL HERNIA REPAIR Right     DR ALESIA GAXIOLA   • MASTECTOMY Left     Left breast mastecotmy with sentinel lymph node bios-[facility]   • MASTECTOMY W/ SENTINEL NODE BIOPSY Right 2017    Procedure: RIGHT BREAST MASTECTOMY WITH SENTINEL NODE BIOPSY;  Surgeon: Christian Melo MD;  Location: [facility] OR;  Service:    • MN INSJ TUNNELED CVC W/O SUBQ PORT/ AGE 5 YR/> Left 2017    Procedure: INSERTION VENOUS ACCESS DEVICE;  Surgeon: Christian Melo MD;  Location: [facility] MAIN OR;  Service: General   • REDUCTION MAMMAPLASTY Right     to match Lt. TRAM flap   • SMALL INTESTINE SURGERY      INCARCRATED HERNIA   • THYROIDECTOMY Right 10/4/2018    Procedure: RT THYROID LOBECTOMY;  Surgeon: Julián Fofana MD;  Location: [facility] OR [facility];  Service: ENT   • TONSILLECTOMY Bilateral    • TRAM FLAP DELAYED Left     WITH MASTOPEXY   • UPPER GASTROINTESTINAL ENDOSCOPY N/A 2011    LA grade A esophagitis with no bleeding, large hiatus hernia (50cm), gastric ulcer-Dr.Laszlo Lezama   • US GUIDED FINE NEEDLE ASPIRATION  2018   Lico catheter placement on 2017 by Dr. Melo.     OB/GYN history: Menarche age 16, menopause at 1985. , 1 miscarriage. No birth control pill use. She did have post menopause hormonal supplementation.      HEMATOLOGIC/ONCOLOGIC HISTORY: The patient is a 80y.o. year old female whom we are consulted for a newly self discovered right breast mass, in 2017. Patient had ultrasound-guided biopsy on 5/3/2017 and confirmed to be invasive mammary carcinoma with focal lobular features, grade 2 Randle score 6/9. She is here for initial evaluation  for management.      Patient is a 76-year-old  female who was seen here previously for chronic mild-to-moderate thrombocytopenia and mild anemia. Recently the patient reports she started having firmness of the right breast that started sometime in April or maybe even in March. She noticed firmness spread from about around the 12 o'clock position, gradually towards the upper lateral side and lower part of the right breast associated mild pain. The patient thought it was related to fibrocystic changes. However, the symptom was getting worse. Patient also reported dented nipple after she started having pain in the right breast. She denies discharge from the nipple. She called her primary care physician, Dr. Martin, who ordered a mammogram study. This was done on 04/12/2017 with architectural distortion of the right breast centrally at 12 o'clock position and had scattered fibroglandular densities throughout the right breast.      The patient subsequently had right breast ultrasound examination on 04/26/2017. Discovered a large right breast mass measuring 5.8 x 3.5 x 3.9 cm. Patient subsequently had ultrasound-guided right breast biopsy on 05/03/2017. Pathology evaluation reported invasive mammary carcinoma with focal lobular feature. Elen score 6/9, overall grade 2. Sample was further sent to the [facility] for test. ER negative, less than 1%; MI positive, moderate in staining, 5% to 10%. HER2 IHC 2+, but FISH study positive 2.1.     She denies weight loss, she actually eats very well. No nausea vomiting. Patient does complain of insomnia, unable to sleep.      This patient has history of left breast DCIS back in 2007, had mastectomy at the [facility]. No hormonal therapy afterwards according to patient. This patient also had a small meningioma, followed by Dr. Smith in [facility], with most recent MRI of the brain in March 2017, with 18 mm  meningioma, and followed on an annual basis.     Her neutrophil count on 5/16/17 is normal at 2500, however, records showed starting from 05/2015 and in 09/2016, 01/2017 and 03/2017, all 4 laboratory studies showed mild neutropenia with ANC fluctuating between 1200 and 1600. Etiology is not clear.    Normal echocardiogram on 5/18/2017, LVEF 68%.      CT scan for chest abdomen and pelvis on 5/22/2017 and breast MRI examination on 5/22/2017 reported small right axillary lymph node suspicious for metastatic disease.  No remote metastatic lesion.  Patient has stage IIIa (T3 N2 M0) disease.      Patient will start neoadjuvant chemotherapy with Taxol weekly plus Herceptin weekly for total 12 weeks, and Perjeta every 3 weeks (3-week cycle) during chemotherapy.  Herceptin will be converted to every 3 weeks after chemotherapy finished.  Cycle 1 day 1 5/23/2017.   Status post neoadjuvant chemotherapy the patient was assessed with MRI showing a complete neoadjuvant response.  The patient was reviewed for surgery and questions concerning lymph node dissection-sentinel node evaluation-and need for radiation therapy postop were considered as well as use of additional Herceptin for the balance of the year as well as additional use of anti-hormonal therapy.  Surgery was scheduled November 07, 2017.  Patient next seen in office November 28 having undergone surgery on November 7 with results revealing no residual malignancy in either breast or associated sentinel lymph nodes.  Was elected to continue Herceptin when seen back in office November 20 having initiated Herceptin May 23, 2017.    Patient continued Herceptin with her last treatment given December 19, 2017.     Unfortunately she later experienced an accident while in [place] December 28 with prolonged hospitalization and prolonged ventilator management required.  Apparently she had at least one IV Herceptin therapy given while there and we were contacted March 20, 2018-   Yaakov.  Eventually the patient was able to return to [place] is seen back in office May 5 at which time we concluded that she completed Herceptin therapy as result of being off treatment for so long.  Plans were made for baseline scans.  Patient follow-up reexaminations May 8, 2019 with no evidence of recurrent malignancy.  This was again a circumstance when she was assessed 2019.  Patient seen 2020 without evidence of recurrent malignancy.  Recurrent GI symptoms noted with GI referral planned.  Patient hospitalized -2020 with enteropathic E. coli, acute on chronic thrombocytopenia.     Subsequent testing felt consistent with chronic ITP and trial of steroids initiated 2021.    MEDICATIONS: The current medication list was reviewed with the patient and updated in the EMR this date per the Medical Assistant. Medication dosages and frequencies were confirmed to be accurate.       ALLERGIES:    Allergies   Allergen Reactions   • Codeine Nausea And Vomiting   • Morphine Nausea And Vomiting     SOCIAL HISTORY:   Social History     Tobacco Use   • Smoking status: Former Smoker     Packs/day: 2.00     Years: 30.00     Pack years: 60.00     Types: Cigarettes     Start date: 1957     Quit date: 4/10/1987     Years since quittin.1   • Smokeless tobacco: Never Used   • Tobacco comment: I haven't had a cigarette in over 30 years   Vaping Use   • Vaping Use: Never used   Substance Use Topics   • Alcohol use: No     Comment: stopped, heavy in past   • Drug use: No     FAMILY HISTORY:  Family History   Problem Relation Age of Onset   • Heart disease Mother    • Aortic aneurysm Mother         abdominal   • Coronary artery disease Mother    • Hypertension Mother    • Miscarriages / Stillbirths Mother    • Diverticulitis Mother    • Heart disease Father    • Heart attack Father         acute   • Hypertension Father    • Early death Father    • Hearing loss Father    •  "Kidney disease Father    • Breast cancer Daughter 45   • Heart disease Brother    • Hypertension Brother    • Malig Hyperthermia Neg Hx      I have reviewed the patient's medical history in detail and updated the computerized patient record.    REVIEW OF SYSTEMS:  GENERAL: See history of present illness.   no change in appetite or weight;   No fevers, chills, sweats.   SKIN: No nonhealing lesions. No rashes.   HEME/LYMPH: See HPI.   EYES: No vision changes or diplopia.   ENT: No tinnitus, hearing loss, gum bleeding, epistaxis, hoarseness or dysphagia.   RESPIRATORY: No cough, Has exertional dyspnea, No hemoptysis or wheezing.   CVS: No chest pain, palpitations, orthopnea, dyspnea on exertion or PND.   GI: Worsening reflux symptoms  : No lower tract obstructive symptoms, dysuria or hematuria.   MUSCULOSKELETAL: Chronic or joint pain, arthritis.  Has mild intermittent ankle swelling.   NEUROLOGICAL: See HPI  PSYCHIATRIC: See HPI, somewhat manic state likely secondary to steroids     Objective:   Vitals:    05/11/21 1419   BP: 162/75   Pulse: 74   Resp: 18   Temp: 97.7 °F (36.5 °C)   TempSrc: Temporal   SpO2: (!) 18%   Weight: 67.7 kg (149 lb 4.8 oz)   Height: 154.9 cm (60.98\")   PainSc: 10-Worst pain ever   PainLoc: Hand   ECOG 0      PHYSICAL EXAM:   GENERAL: Well-developed, well-nourished female, in no acute distress.   SKIN: Warm, dry without rashes, purpura or petechiae.  Left upper chest Lico catheter in place, no evidence of infection.    EYES: Pupils equal, round and reactive to light. EOMs intact. Conjunctivae normal.  EARS: Hearing intact.  NOSE:  No excoriations or nasal discharge.  MOUTH: Tongue is well-papillated; no stomatitis or ulcers. Lips normal.  THROAT: Oropharynx without lesions or exudates.  Status post partial thyroidectomy well-healing  LYMPHATICS: No cervical, supraclavicular, axillary adenopathy.  CHEST: Lungs clear to auscultation. Good airflow.   BREAST:Postop, Well healed right " mastectomy site with no evidence of recurrent disease, no axillary adenopathy bilaterally.  CARDIAC: Regular rate and rhythm without murmurs. Normal S1,S2.  ABDOMEN: No distention, normal active bowel sounds,  no hepatosplenomegaly or masses.  EXTREMITIES: No clubbing, cyanosis or edema.  NEUROLOGICAL: Cranial Nerves II-XII grossly intact. No focal neurological deficits.  PSYCHIATRIC: Alert and oriented, again pleased about her results    I have reexamined the patient and the results are consistent with the previously documented exam. RUDY Sanders     RECENT LABS:  Lab Results   Component Value Date    WBC 7.88 05/11/2021    HGB 11.8 (L) 05/11/2021    HCT 34.9 05/11/2021    MCV 92.8 05/11/2021    PLT 68 (L) 05/11/2021    PLT 68 (L) 05/11/2021     Lab Results   Component Value Date    NEUTROABS 6.26 05/05/2021     Lab Results   Component Value Date    GLUCOSE 103 (H) 10/01/2020    BUN 18 12/07/2020    CREATININE 0.98 12/07/2020    EGFRIFNONA 55 (L) 12/07/2020    EGFRIFAFRI 66 12/07/2020    BCR 18.4 12/07/2020    K 4.4 12/07/2020    CO2 27.9 12/07/2020    CALCIUM 9.3 12/07/2020    PROTENTOTREF 7.0 12/07/2020    ALBUMIN 4.80 12/07/2020    LABIL2 2.2 12/07/2020    AST 35 (H) 12/07/2020    ALT 34 (H) 12/07/2020            Assessment/Plan   1. Large right breast cancer, locally advanced, stage IIIa (T3 N1/ 2 M0).  ER negative, less than 1%; PA positive, moderate in staining, 5% to 10%. HER2 IHC 2+, FISH study positive 2.1.  Physical examination showed a large mass, 7 cm x 7 cm initially which has decreased to approximately less than 4 cm ..  Patient  proceeded through 4 cycles ceptin,Perjeta and Taxol.   Her subsequent MRI examination showed complete response by imaging including right axillary lymphadenopathy.  We have continued Herceptin and that includes today October 20, 2017.  The case was discussed with Dr. Melo and plans were to proceed with mastectomy plus right axillary lymph node assessment via  sentinel node evaluation. it was felt she likely require radiation therapy post procedure.  The patient was scheduled and proceeded to surgery November 7.  Her results, wonderfully, revealed no evidence of residual disease in the breast or associated sentinel lymph nodes. She was seen by radiation therapy and offered treatment did not pursue it.  Additionally, and somewhat complicating this story, she traveled to [place] and developed severe influenza, associated pneumonia and was hospitalized for several months only to return to [place] in the last several weeks now being seen back May 1.  There is no particular benefit from trying to add Herceptin back to her therapy now and is felt that she is completed Herceptin treatment.  She wishes consider reconstruction and baseline CT scans will be requested in 5 weeks with the patient being seen in 6 weeks follow-up.The patient reviewed June 13, 2018 with the scans demonstrating very modest increase in left pectoral, axillary or mediastinal nodes.  These are of uncertain significance but do need follow-up in a PET/CT is planned in 7 weeks.  Her anemia will also be reviewed again with additional laboratory studies that visit.  She'll be seen in 8 weeks for general reassessment.    The patient's anemia workup was essentially negative and she is slowly improving when seen back August 8.  Her PET/CT, fortunately, demonstrates improvement though there is potential abnormality within the right thyroid lobe.  We discussed thyroid function testing and follow-up thyroid ultrasound.  She will be seen back in approximately 2 months as we maintain her port monthly flushes.   The patient was reviewed by ENT and ultimately was felt that a partial thyroidectomy was in order and is now scheduled October 4.  Patient's one to proceed.  We'll plan to see her back in approximately 6 weeks.      The patient is next seen November 28, 2015.  Her surgery went well with the findings as  noted above.  She has follow-up with ENT in the next several weeks.  Additionally she has bilateral cataract surgery at the end of this month and into the beginning of next.  We discussed reassessment in general with follow-up scans and these were performed May 2019 which showed no evidence of recurrent malignancy.  Six-month follow-up planes with maintenance of her port every 6 weeks.  The patient is reassessed October 29, 2019 fortunately continue to do relatively well.  She has had no additional medical issues since last seen we will plan to see her back in 6 months again meeting report.  She does plan to see plastic surgery concerning reconstruction concerning her breast cancer history.  A copy this note will be sent to the plastic surgeon.     The patient is next seen July 7, 2020 without evidence of recurrence of malignancy.  We plan 6-month follow-up.  The patient is reviewed October 27, 2020 without evidence recurrent disease, repeat scans during recent hospitalization September 20-October 1 without evidence of recurrent malignancy.Reevaluation April 13, 2021 - for recurrence   The patient returns today, April 20, 2021 for evaluation of acute thrombocytopenia.  She has now been on 20 mg of prednisone twice daily for 1 week.  Thankfully, her platelets have increased back to near her baseline at 65,000.  She did struggle with side effects of the prednisone which are outlined above.  For the last 2 days however, she is sleeping better.  We will proceed with her current dosing of prednisone and will have her return in 1 week for MD reevaluation.  At that time, if her platelets remain stable/improved we will plan to have the patient undergo a rapid taper of the prednisone.  Patient return for assessment this with improvement in her platelet count to over 60,000.  She has called to request continuing the steroids, reassessment 1 week and if at the same level start to slowly taper.  The patient returns today May  11, 2021 for reevaluation.  Her platelets have improved to 68,000 and her IPF has declined to 11.  After review with Dr. Coburn, we will plan to taper her prednisone to 30 mg daily.  She will take 20 mg in the morning and 10 mg in the afternoons.  She will continue this over the next week.  She will then have a lab and RN review to determine if she will be able to further taper the prednisone.  She will see Dr. Coburn in formal follow-up in 1 month.  We do intend to rapid taper of the prednisone.      2.  Previous fall with contusions now recovered                                                                             3.  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  Worsening reflux symptoms, GI referral planned.  Not an issue today    5.  Hospitalization September 20-October 1 with enteropathic E. coli.  This responded to conservative therapy with plans for reassessment by Dr. Carlin with outpatient colonoscopy.      Plan:  *Return in 1 week for CBC, IPF  and RN review    *MD reevaluation in 1 month with CBC and IPF    *I have asked the patient to call our office with any new issues or concerns prior to her next visit.  She has verbalized understanding

## 2021-05-18 ENCOUNTER — LAB (OUTPATIENT)
Dept: ONCOLOGY | Facility: HOSPITAL | Age: 81
End: 2021-05-18

## 2021-05-18 ENCOUNTER — CLINICAL SUPPORT (OUTPATIENT)
Dept: ONCOLOGY | Facility: HOSPITAL | Age: 81
End: 2021-05-18

## 2021-05-18 DIAGNOSIS — D69.6 THROMBOCYTOPENIA (HCC): Primary | ICD-10-CM

## 2021-05-18 DIAGNOSIS — Z45.2 ENCOUNTER FOR FITTING AND ADJUSTMENT OF VASCULAR CATHETER: ICD-10-CM

## 2021-05-18 DIAGNOSIS — D69.6 THROMBOCYTOPENIA (HCC): ICD-10-CM

## 2021-05-18 LAB
ANISOCYTOSIS BLD QL: NORMAL
BASOPHILS # BLD AUTO: 0.01 10*3/MM3 (ref 0–0.2)
BASOPHILS NFR BLD AUTO: 0.2 % (ref 0–1.5)
DEPRECATED RDW RBC AUTO: 62.1 FL (ref 37–54)
EOSINOPHIL # BLD AUTO: 0.01 10*3/MM3 (ref 0–0.4)
EOSINOPHIL NFR BLD AUTO: 0.2 % (ref 0.3–6.2)
ERYTHROCYTE [DISTWIDTH] IN BLOOD BY AUTOMATED COUNT: 17.7 % (ref 12.3–15.4)
HCT VFR BLD AUTO: 33 % (ref 34–46.6)
HGB BLD-MCNC: 10.8 G/DL (ref 12–15.9)
IMM GRANULOCYTES # BLD AUTO: 0.37 10*3/MM3 (ref 0–0.05)
IMM GRANULOCYTES NFR BLD AUTO: 7.1 % (ref 0–0.5)
LYMPHOCYTES # BLD AUTO: 0.64 10*3/MM3 (ref 0.7–3.1)
LYMPHOCYTES NFR BLD AUTO: 12.3 % (ref 19.6–45.3)
MCH RBC QN AUTO: 32 PG (ref 26.6–33)
MCHC RBC AUTO-ENTMCNC: 32.7 G/DL (ref 31.5–35.7)
MCV RBC AUTO: 97.9 FL (ref 79–97)
MONOCYTES # BLD AUTO: 0.34 10*3/MM3 (ref 0.1–0.9)
MONOCYTES NFR BLD AUTO: 6.5 % (ref 5–12)
NEUTROPHILS NFR BLD AUTO: 3.83 10*3/MM3 (ref 1.7–7)
NEUTROPHILS NFR BLD AUTO: 73.7 % (ref 42.7–76)
NRBC BLD AUTO-RTO: 0.4 /100 WBC (ref 0–0.2)
PLAT MORPH BLD: NORMAL
PLATELET # BLD AUTO: 69 10*3/MM3 (ref 140–450)
PLATELET # BLD AUTO: 69 10*3/MM3 (ref 140–450)
PLATELETS.RETICULATED NFR BLD AUTO: 12 % (ref 0.9–6.5)
PMV BLD AUTO: 11.7 FL (ref 6–12)
RBC # BLD AUTO: 3.37 10*6/MM3 (ref 3.77–5.28)
WBC # BLD AUTO: 5.2 10*3/MM3 (ref 3.4–10.8)
WBC MORPH BLD: NORMAL

## 2021-05-18 PROCEDURE — 85055 RETICULATED PLATELET ASSAY: CPT | Performed by: INTERNAL MEDICINE

## 2021-05-18 PROCEDURE — 85025 COMPLETE CBC W/AUTO DIFF WBC: CPT | Performed by: INTERNAL MEDICINE

## 2021-05-18 PROCEDURE — 25010000002 HEPARIN LOCK FLUSH PER 10 UNITS: Performed by: INTERNAL MEDICINE

## 2021-05-18 PROCEDURE — 85007 BL SMEAR W/DIFF WBC COUNT: CPT | Performed by: INTERNAL MEDICINE

## 2021-05-18 PROCEDURE — 36591 DRAW BLOOD OFF VENOUS DEVICE: CPT

## 2021-05-18 PROCEDURE — 36415 COLL VENOUS BLD VENIPUNCTURE: CPT

## 2021-05-18 RX ORDER — SODIUM CHLORIDE 0.9 % (FLUSH) 0.9 %
10 SYRINGE (ML) INJECTION AS NEEDED
Status: CANCELLED | OUTPATIENT
Start: 2021-05-18

## 2021-05-18 RX ORDER — HEPARIN SODIUM (PORCINE) LOCK FLUSH IV SOLN 100 UNIT/ML 100 UNIT/ML
500 SOLUTION INTRAVENOUS AS NEEDED
Status: DISCONTINUED | OUTPATIENT
Start: 2021-05-18 | End: 2021-05-18 | Stop reason: HOSPADM

## 2021-05-18 RX ORDER — HEPARIN SODIUM (PORCINE) LOCK FLUSH IV SOLN 100 UNIT/ML 100 UNIT/ML
500 SOLUTION INTRAVENOUS AS NEEDED
Status: CANCELLED | OUTPATIENT
Start: 2021-05-18

## 2021-05-18 RX ORDER — SODIUM CHLORIDE 0.9 % (FLUSH) 0.9 %
10 SYRINGE (ML) INJECTION AS NEEDED
Status: DISCONTINUED | OUTPATIENT
Start: 2021-05-18 | End: 2021-05-18 | Stop reason: HOSPADM

## 2021-05-18 RX ADMIN — SODIUM CHLORIDE, PRESERVATIVE FREE 10 ML: 5 INJECTION INTRAVENOUS at 14:18

## 2021-05-18 RX ADMIN — Medication 500 UNITS: at 14:18

## 2021-05-18 NOTE — NURSING NOTE
Lab Results   Component Value Date    WBC 5.20 05/18/2021    HGB 10.8 (L) 05/18/2021    HCT 33.0 (L) 05/18/2021    MCV 97.9 (H) 05/18/2021    PLT 69 (L) 05/18/2021    PLT 69 (L) 05/18/2021     Pt is here for lab with RN review.  CBC reviewed with pt, counts are stable for this pt at this time. Pt has no complaints.  Copy of labs given to pt and f/u appt reviewed. Pt is instructed to call the office with any concerns or new symptoms prior to next visit. Pt vu

## 2021-06-02 DIAGNOSIS — E03.9 ACQUIRED HYPOTHYROIDISM: ICD-10-CM

## 2021-06-02 DIAGNOSIS — E78.5 HYPERLIPIDEMIA, UNSPECIFIED HYPERLIPIDEMIA TYPE: ICD-10-CM

## 2021-06-03 ENCOUNTER — TRANSCRIBE ORDERS (OUTPATIENT)
Dept: ADMINISTRATIVE | Facility: HOSPITAL | Age: 81
End: 2021-06-03

## 2021-06-03 DIAGNOSIS — E04.1 THYROID NODULE: ICD-10-CM

## 2021-06-08 LAB
CHOLEST SERPL-MCNC: 120 MG/DL (ref 0–200)
HDLC SERPL-MCNC: 31 MG/DL (ref 40–60)
LDLC SERPL CALC-MCNC: 61 MG/DL (ref 0–100)
LDLC/HDLC SERPL: 1.8 {RATIO}
TRIGL SERPL-MCNC: 166 MG/DL (ref 0–150)
TSH SERPL DL<=0.005 MIU/L-ACNC: 2.28 UIU/ML (ref 0.27–4.2)
VLDLC SERPL CALC-MCNC: 28 MG/DL (ref 5–40)

## 2021-06-10 ENCOUNTER — OFFICE VISIT (OUTPATIENT)
Dept: FAMILY MEDICINE CLINIC | Facility: CLINIC | Age: 81
End: 2021-06-10

## 2021-06-10 VITALS
RESPIRATION RATE: 18 BRPM | OXYGEN SATURATION: 97 % | SYSTOLIC BLOOD PRESSURE: 124 MMHG | BODY MASS INDEX: 27.38 KG/M2 | DIASTOLIC BLOOD PRESSURE: 66 MMHG | HEIGHT: 61 IN | WEIGHT: 145 LBS | HEART RATE: 72 BPM | TEMPERATURE: 96.8 F

## 2021-06-10 DIAGNOSIS — Z78.0 POSTMENOPAUSAL: Primary | ICD-10-CM

## 2021-06-10 PROCEDURE — G0439 PPPS, SUBSEQ VISIT: HCPCS | Performed by: INTERNAL MEDICINE

## 2021-06-10 PROCEDURE — 96160 PT-FOCUSED HLTH RISK ASSMT: CPT | Performed by: INTERNAL MEDICINE

## 2021-06-10 RX ORDER — PREDNISONE 1 MG/1
40 TABLET ORAL DAILY
COMMUNITY
End: 2021-06-15

## 2021-06-10 NOTE — PROGRESS NOTES
The ABCs of the Annual Wellness Visit  Subsequent Medicare Wellness Visit    Chief Complaint   Patient presents with   • Medicare Wellness-subsequent       Subjective   History of Present Illness:  Roxana Brizuela is a 80 y.o. female who presents for a Subsequent Medicare Wellness Visit.    HEALTH RISK ASSESSMENT    Recent Hospitalizations:  Recently treated at the following:  [facility]    Current Medical Providers:  Patient Care Team:  Brandt Martin MD as PCP - General  Brandt Martin MD as PCP - Family Medicine  Jamal Patel MD PhD as Consulting Physician (Hematology and Oncology)  Brandt Martin MD as Referring Physician (Internal Medicine)  Constance Elliott MD as Consulting Physician (Hematology and Oncology)  Oneil Coburn MD as Consulting Physician (Hematology and Oncology)    Smoking Status:  Social History     Tobacco Use   Smoking Status Former Smoker   • Packs/day: 2.00   • Years: 30.00   • Pack years: 60.00   • Types: Cigarettes   • Start date: 1957   • Quit date: 4/10/1987   • Years since quittin.2   Smokeless Tobacco Never Used   Tobacco Comment    I haven't had a cigarette in over 30 years       Alcohol Consumption:  Social History     Substance and Sexual Activity   Alcohol Use No    Comment: stopped, heavy in past       Depression Screen:   PHQ-2/PHQ-9 Depression Screening 6/10/2021   Little interest or pleasure in doing things 0   Feeling down, depressed, or hopeless 0   Trouble falling or staying asleep, or sleeping too much -   Feeling tired or having little energy -   Poor appetite or overeating -   Feeling bad about yourself - or that you are a failure or have let yourself or your family down -   Trouble concentrating on things, such as reading the newspaper or watching television -   Moving or speaking so slowly that other people could have noticed. Or the opposite - being so fidgety or restless that you have been moving around a lot more than  usual -   Thoughts that you would be better off dead, or of hurting yourself in some way -   Total Score 0   If you checked off any problems, how difficult have these problems made it for you to do your work, take care of things at home, or get along with other people? -       Fall Risk Screen:  JOSEFA Fall Risk Assessment was completed, and patient is at LOW risk for falls.Assessment completed on:6/10/2021    Health Habits and Functional and Cognitive Screening:  Functional & Cognitive Status 6/10/2021   Do you have difficulty preparing food and eating? No   Do you have difficulty bathing yourself, getting dressed or grooming yourself? No   Do you have difficulty using the toilet? No   Do you have difficulty moving around from place to place? No   Do you have trouble with steps or getting out of a bed or a chair? No   Current Diet -   Dental Exam Unknown   Eye Exam Unknown   Exercise (times per week) -   Current Exercise Activities Include None   Do you need help using the phone?  No   Are you deaf or do you have serious difficulty hearing?  No   Do you need help with transportation? No   Do you need help shopping? No   Do you need help preparing meals?  No   Do you need help with housework?  No   Do you need help with laundry? No   Do you need help taking your medications? No   Do you need help managing money? No   Do you ever drive or ride in a car without wearing a seat belt? No   Have you felt unusual stress, anger or loneliness in the last month? Yes   Who do you live with? Alone   If you need help, do you have trouble finding someone available to you? No   Have you been bothered in the last four weeks by sexual problems? No   Do you have difficulty concentrating, remembering or making decisions? No         Does the patient have evidence of cognitive impairment? No    Asprin use counseling:Does not need ASA but is currently taking (advised patient that ASA is not indicated and patient chooses to stop  it)    Age-appropriate Screening Schedule:  Refer to the list below for future screening recommendations based on patient's age, sex and/or medical conditions. Orders for these recommended tests are listed in the plan section. The patient has been provided with a written plan.    Health Maintenance   Topic Date Due   • DXA SCAN  Never done   • MAMMOGRAM  08/17/2019   • INFLUENZA VACCINE  08/01/2021   • LIPID PANEL  06/07/2022   • TDAP/TD VACCINES (2 - Td or Tdap) 02/03/2028   • ZOSTER VACCINE  Completed          The following portions of the patient's history were reviewed and updated as appropriate: allergies, current medications, past family history, past medical history, past social history, past surgical history and problem list.    Outpatient Medications Prior to Visit   Medication Sig Dispense Refill   • Ascorbic Acid (VITAMIN C PO) Take 1,000 mg by mouth Daily.     • calcium carbonate (OS-JUNAID) 600 MG tablet Take 600 mg by mouth Daily.     • diclofenac (VOLTAREN) 75 MG EC tablet Take 1 tablet by mouth 2 (Two) Times a Day. Patient must be seen for future refills. 180 tablet 3   • gabapentin (NEURONTIN) 300 MG capsule TAKE 2 CAPSULES BY MOUTH THREE TIMES DAILY 180 capsule 5   • hydroCHLOROthiazide (HYDRODIURIL) 25 MG tablet Take 1 tablet by mouth Daily. 90 tablet 3   • Lactobacillus (PROBIOTIC ACIDOPHILUS PO) Take 1 capsule by mouth Daily.     • levothyroxine (Synthroid) 75 MCG tablet Take 1 tablet by mouth Daily. 90 tablet 3   • MELATONIN PO Take 10 mg by mouth At Night As Needed.     • ondansetron ODT (ZOFRAN-ODT) 4 MG disintegrating tablet Take 4 mg by mouth Every 8 (Eight) Hours As Needed for Nausea or Vomiting.     • pantoprazole (PROTONIX) 40 MG EC tablet Take 1 tablet by mouth once daily 90 tablet 3   • potassium chloride (KLOR-CON) 20 MEQ packet Take 20 mEq by mouth Daily.     • predniSONE (DELTASONE) 1 MG tablet Take 40 mg by mouth Daily.     • simvastatin (ZOCOR) 80 MG tablet TAKE 1 TABLET BY MOUTH AT  BEDTIME 90 tablet 3   • SUMAtriptan (IMITREX) 50 MG tablet Take one tablet at onset of headache. May repeat dose one time in 2 hours if headache not relieved. 10 tablet 0   • trimethoprim-polymyxin b (POLYTRIM) 63959-9.1 UNIT/ML-% ophthalmic solution INSTILL 1 DROP INTO RIGHT EYE 4 TIMES DAILY FOR 4 DAYS PRIOR TO INJECTION AND FOR 4 DAYS AFTER  11   • vitamin B-12 (CYANOCOBALAMIN) 1000 MCG tablet Take 1,000 mcg by mouth Daily.     • vitamin B-6 (PYRIDOXINE) 50 MG tablet Take 50 mg by mouth Daily.     • propranolol (INDERAL) 10 MG tablet Take 1 tablet by mouth  tablet 0     No facility-administered medications prior to visit.       Patient Active Problem List   Diagnosis   • Iron deficiency anemia   • Hyperlipidemia   • Hypertension   • Leukopenia   • Dyspnea on exertion   • Laceration   • Meningioma (CMS/HCC)   • Thrombocytopenia (CMS/HCC)   • Malignant neoplasm of right female breast (CMS/HCC)   • Drug induced insomnia (CMS/HCC)   • Hypersensitivity reaction   • Hypokalemia   • Dehydration   • History of cardiac arrhythmia   • Dysuria   • Peripheral neuropathy due to chemotherapy (CMS/HCC)   • Chemotherapy-induced nausea   • Acute cystitis with hematuria   • Nausea   • Anemia associated with chemotherapy   • Malignant neoplasm of lower-outer quadrant of right female breast (CMS/HCC)   • Encounter for fitting and adjustment of vascular catheter   • Right breast cancer with T3 tumor, >5 cm in greatest dimension (CMS/HCC)   • Cancer of right female breast (CMS/HCC)   • Post-influenza syndrome   • Hypothyroid   • Pain of left hip joint   • Hurthle cell adenoma   • Elevated TSH   • Postoperative hypothyroidism   • Fatigue   • Gastroesophageal reflux disease   • Other constipation   • Incontinence of feces with fecal urgency   • Acute colitis due to enteropathogenic Escherichia coli infection   • Nonintractable migraine       Advanced Care Planning:  ACP discussion was held with the patient during this visit.  "Patient has an advance directive in EMR which is still valid.     Review of Systems   Constitutional: Negative for activity change and unexpected weight change.   Respiratory: Negative for cough and shortness of breath.    Cardiovascular: Negative.    Gastrointestinal: Negative.    Genitourinary: Negative.    Musculoskeletal: Negative.    Neurological: Positive for numbness.   Psychiatric/Behavioral: Negative.        Compared to one year ago, the patient feels her physical health is worse.  Compared to one year ago, the patient feels her mental health is the same.    Reviewed chart for potential of high risk medication in the elderly: yes  Reviewed chart for potential of harmful drug interactions in the elderly:yes    Objective  Roxana is here for blood pressure check, lab follow-up, and annual wellness visit.  She has hypertension, hyperlipidemia, hypothyroidism, peripheral neuropathy, gastroesophageal reflux.  She feels well.  She eats healthy, stays physically active, and exercises as much as she can.  She also has breast cancer and mild anemia and is followed by hematology oncology.  She had vascular screening in March which showed some unilateral carotid artery plaque without stenosis.  She does monitor her blood pressure reports stable readings.       Vitals:    06/10/21 1320   BP: 124/66   BP Location: Left arm   Patient Position: Sitting   Cuff Size: Small Adult   Pulse: 72   Resp: 18   Temp: 96.8 °F (36 °C)   TempSrc: Temporal   SpO2: 97%   Weight: 65.8 kg (145 lb)   Height: 154.9 cm (60.98\")       Body mass index is 27.41 kg/m².  Discussed the patient's BMI with her. The BMI is above average; BMI management plan is completed.    Physical Exam  Vitals reviewed.   Constitutional:       Appearance: Normal appearance.   Neck:      Vascular: No carotid bruit.   Cardiovascular:      Rate and Rhythm: Normal rate and regular rhythm.      Heart sounds: Normal heart sounds.   Pulmonary:      Effort: Pulmonary " effort is normal.      Breath sounds: Normal breath sounds.   Musculoskeletal:      Right lower leg: No edema.      Left lower leg: Edema present.   Neurological:      Mental Status: She is alert.   Psychiatric:         Mood and Affect: Mood normal.         Behavior: Behavior normal.         Thought Content: Thought content normal.         Judgment: Judgment normal.         Lab Results   Component Value Date    CHLPL 120 06/07/2021    TRIG 166 (H) 06/07/2021    HDL 31 (L) 06/07/2021    LDL 61 06/07/2021    VLDL 28 06/07/2021        Assessment/Plan   Medicare Risks and Personalized Health Plan  CMS Preventative Services Quick Reference  Abdominal Aortic Aneurysm Screening  Advance Directive Discussion  Breast Cancer/Mammogram Screening  Cardiovascular risk  Chronic Pain   Colon Cancer Screening  Diabetic Lab Screening   Fall Risk  Hearing Problem  Immunizations Discussed/Encouraged (specific immunizations; Tdap, Hepatitis A Vaccine/Series, Influenza, Pneumococcal 23, Shingrix and COVID19 )  Obesity/Overweight   Osteoporosis Risk  Polypharmacy    The above risks/problems have been discussed with the patient.  Pertinent information has been shared with the patient in the After Visit Summary.  Follow up plans and orders are seen below in the Assessment/Plan Section.    Diagnoses and all orders for this visit:    1. Postmenopausal (Primary)  -     DEXA Bone Density Axial      Follow Up:  No follow-ups on file.     An After Visit Summary and PPPS were given to the patient.

## 2021-06-11 RX ORDER — PROPRANOLOL HYDROCHLORIDE 10 MG/1
TABLET ORAL
Qty: 270 TABLET | Refills: 0 | Status: SHIPPED | OUTPATIENT
Start: 2021-06-11 | End: 2021-09-07

## 2021-06-14 NOTE — PROGRESS NOTES
REASON FOR FOLLOWUP: Patient doing well on current steroid dosing    1. Newly diagnosed large right breast cancer.  Core needle biopsy reported invasive mammary carcinoma with focal lobular feature, grade 2.  ER negative, less than 1%; SD positive, moderate in staining, 5% to 10%. HER2 IHC 2+, FISH study positive 2.1.   2.  CT scan for chest abdomen and pelvis and breast MRI examination reported right axillary lymph node suspicious for metastatic disease.  No remote metastatic lesion.  Patient has stage IIIa (T3 N2 M0) disease.   3.  Patient was started neoadjuvant chemotherapy on 5/23/2017 with Taxol weekly plus Herceptin weekly for total 12 weeks, and Perjata every 3 weeks during chemotherapy.  Herceptin will be converted to every 3 weeks after chemotherapy finished.    4.  Chronic moderate thrombocytopenia, mild anemia, and intermittent mild neutropenia etiologies are not clear.  5.  Reaction to Herceptin C1D1.  Subsequently tolerated therapy with hydrocortisone as premedication.  6.  Potential cardiac strain developing, neuropathic symptoms persist, follow-up MRI and surgical assessment will be needed post initial chemotherapeutic phase  7.  MRI August 17 with interval resolution of abnormal enhancement within breast and right axillary adenopathy, surgery scheduled November 7, Herceptin ongoing every 3 weeks  8.  Patient seen in office November 28, status post surgery with apparent complete response, Herceptin continued  9.  Herceptin given December 19, subsequent injury in [place] leading to prolonged stay, single treatment given through additional cancer Center  10.  Patient seen May 01, 2018, no further Herceptin planned, baseline scans pursued posttreatment, post influenza syndrome, physical therapy planned  11.  Patient seen June 13, 2018, excellent performance status, improving on physical therapy, equivocal CT  per thoracic adenopathy, follow-up PET/CT planned   12.  PET/CT with improvement,?  Thyroid  abnormality with ultrasound planned  13.  Patient seen October 3, partial thyroidectomy anticipated October 14-    14.  Patient seen May 14, 2019, scans negative for evidence of recurrence     15.  Patient seen 3/29/2019 clinically stable  16.  Patient reviewed July 7, ongoing GERD symptoms, GI referral planned.  17.  Hospitalization September 28-October 1-acute colitis due to enteropathic E. coli-residual thrombocytopenia  18.  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 steroids.  19.  Patient poorly responsive to p.o. steroids, additional taper planned 6/15/2021                                                                                                             HISTORY OF PRESENT ILLNESS:    The patient is a pleasant 80 y.o. with the above-mentioned history, who presents today in anticipation of cycle 4 of Herceptin, Perjeta and Taxol.  This is the first visit with this physician involving this patient's case.  We have reviewed her status today with her slowly developing neuropathic symptoms in her lower extremities but also involving her fingers recognized significantly and she plays the piano and keyboard.  This is becoming harder to do but she is still able to do so.  She also notices neuropathy in her feet but is able to walk and function in daily activities.  Additional to this is her continued grieving at the death of her  though she, fortunately, has an excellent support system.  She continues to experience nausea that is well relieved with Compazine. She denies oral mucositis.  No diarrhea no constipation no chest pain no dyspnea.  No lower extremity edema.    She did received 2 units of PRBC's on   7/8/2017 and remains hematologically stable.   She does have difficulty with sleep and Ambien 10 mg daily at bedtime seems help much better compared to 5 mg dose.  She does not need refills today.  In reviewing her case July 26 she is entering the fourth cycle of  her treatment and recent echocardiogram is reviewed with she and her close friend revealing EF of 66.7% though with global strain of -16%?  Suspicious for myopathic process.  Normal ventricular cavity size and wall thickness as well as contractility.  We have also discussed that she is responding to therapy and will need surgical assessment which may not as yet have been performed.  She has seen Dr. Melo for port placement and will ask him to review her likely just after she has completed his fourth cycle of therapy.  It is also apparent that she'll need a cardiac assessment as we continue subsequent Herceptin therapy perioperatively.      The patient underwent MRI of the breasts August 17 demonstrating interval resolution of abnormal enhancement within the right breast, resolution of right axillary adenopathy had also resolved.  The findings were consistent with a complete response to neoadjuvant chemotherapy.  The patient was seen in subsequent follow-up with Dr. Melo and was determined to proceed with surgery and ultimately sentinel node evaluation.  This is now scheduled November 7 and there are additional plans to consider radiation therapy postoperatively and we have also discussed the use of anti-hormonal therapy considering her mildly positive IL status.    The patient was able to proceed to surgery undergoing right breast mastectomy and sentinel lymph node biopsy November 07, 2017.  She did well postoperatively seeing Dr. Melo November 16.  Pathology revealed no residual malignancy in the breast and 3 negative sentinel lymph nodes.  A formal review of her report reveals treatment effect particularly in her largest lymph node with focal fibrosis and scar, right breast mastectomy fibrosis associated with a cavitary biopsy site and no invasive or in situ carcinoma.  The patient is now seen in our office though she went to the wrong location and the seen late in the day.  We discussed her findings and  agree that she would continue Herceptin at this point but be reviewed formally again in 3 weeks.  She is also be seen by radiation therapy for their input.   The patient, evidently, was seen by radiation therapy but decided not to pursue it until her last Herceptin therapy here December 19.  Following this she visited her daughter in [place] and, unfortunately, developed influenza and pneumonia.  She had a prolonged hospitalization and was at many sites in recovery over a several month only to return to [place] in the last several weeks.  She did take 1 IV Herceptin dose while in [place] but as she is reviewed May 05, 2018 we have discussed that it makes no particular sense to continue or add on to her previous history by extending Herceptin any further 3-4 months after her last treatment.  She therefore has completed Herceptin.    The patient on to be tested post her treatment again baseline studies and CT of chest and pelvis were performed June 6 revealing interval increase in a few subcentimeter nodes in the left pectoral, axillary and mediastinal regions. These are all considerably small and of uncertain significance.  The patient's performance status remains excellent without any reduction and, in fact, has improved with physical therapy upon return.       Patient is next seen August 08, 2018, fortunately feeling well.  A follow-up PET/CT had revealed an interval decrease in the subcentimeter nodes in the left subpectoral axillary region as well as mediastinum.  There was no hypermetabolic lymphadenopathy.  There was intense focus within slightly enlarged right thyroid gland.  Patient indicates she never had any thyroid issues of which she is aware.  The patient was referred to ENT for assessment and the patient was seen by Dr. Fofana.  Ultimately a subtotal thyroidectomy is anticipated and now scheduled October 4.  The patient is willing to proceed.        The patient proceeded to a right thyroid  lobectomy performed November 04, 2018.  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  There was no definitive evidence of trans-capsular or vascular invasion.    The patient is seen in follow-up November 28 doing well and we discussed the surgical treatment of this thyroid lesion.  She feels that all this went well.  She has additional cataract surgery at the end of November  scheduled also.  The patient did complete her testing and was asked to return approximately 6 months later with a follow-up CT chest, abdomen and pelvis that demonstrate no evidence of recurrent disease.  As she is seen back May 14 she, unfortunately, fell and contused her face, left knee and left hand.  This required an ER visit.  Is elected to follow her back in 6 months maintaining her port in the interval and she is seen October 29 again without indications of recurrent disease and relatively stable clinically.  She is seeing plastic surgery about reconstruction and otherwise continue her current medication list and following with her primary care regularly.  The patient is next seen July 7, 2020 indicating that she did not proceed to any  plastic surgery and with the COVID-19 epidemic she does not wish to have any elective procedures.  She has, however, having significant GI reflux symptoms that have worsened substantially last several months despite PPI therapy.    The patient had follow-up for meningioma September 11, 2020 unchanged from previous.        The patient is next reviewed October 27, 2020 having been hospitalized September 28 through October 1, 2020.  She had presented with fever and flank pain and was found to have acute colitis due to enteropathogenic E. coli.  Antibiotics were avoided secondary to history of C. difficile colitis and fortunately she did not want to improve albeit slowly.  She had evidence of acute on chronic thrombocytopenia with a platelet count dropping to close to 30,000 and slowly  rebounding assessed October 6 at 64,000 and October 13 at 67,000.  Fortunately she is feeling considerably better with her bowel function normalizing.  We had the patient return for ongoing testing documenting continued thrombocytopenia and IPF at 9.5 818 October 2020.  She seen back April 13, 2021 she has further thrombocytopenia with peripheral smear showing enlarged platelets.  Is felt this consistent with chronic ITP.   The patient was seen 4/20/2021 for reevaluation following an acute episode of ITP.  When seen on April 13, 2022, her platelets have declined to 44,000.  She was initiated on prednisone therapy at 0.5 mg/kg which came to 20 mg twice daily.  She has been on this now for 1 week and generally was struggling with insomnia and increased appetite.  Thankfully, over the last few days she has been sleeping better.  Of note, the patient was struggling with severe depression and actually feels like over the last week that the prednisone has enhanced her mood.  Her platelets today have improved back to her baseline at 65,000 with an elevated IPF of 18.5.  She denies any excess bleeding or bruising.  No chest pain or shortness of breath noted.  She has no other concerns today.     The patient was placed on 0.5 mg/kg prednisone 20 mg p.o. twice daily and, fortunately, responded to to this with improvement of her platelet count over 60,000.  She is next seen 4/27/2021 feeling about the same and we are discussing continue with his current dose at least over the next 1 to 2 weeks.    The patient returns for follow-up as we have tried to taper her steroids.  She was seen 5/11 at 60,000 IPF of 11, prednisone reduced to 30 mg daily.  Her subsequent testing included 5/18 with both of 69,000, IPF of 12.  The patient is reviewed 6/15/2021 now with platelet count of over 100,000, plans to further taper prednisone now to 20 mg daily.  She is having trouble on prednisone with generally worsening agitation and tremor.          Past Medical History:   Diagnosis Date   • Alcoholism (CMS/HCC) 1978    I haven't had a drink since then   • Anemia    • Breast cancer (CMS/[facility]) 05/03/2017    Right breast invasive mammary carcinoma with focal lobular features, grade 2, ER negative, less than 1% AK positive, HER-2 positive, stage IIIa disease (T3, N2, M0   • Breast cancer (CMS/[facility]) 10/20/2004    Left breast ductal carcinoma in-situ, predominately intermediate grade with focal comedonecrosis (high grade), solid and bribridform patterns involving approximately five core biopsy fragments   • Edema     Chronic lower extremity edema   • GERD (gastroesophageal reflux disease) 09/13/2011    Dr. Paul Negron   • GI (gastrointestinal bleed) 1997   • H/O jaundice    • Hernia     INCISONAL. AROUND LEFT TRAM LAP SITE   • History of chemotherapy     2017 4 MONTHS IN 2017   • History of Clostridium difficile colitis     JAN 2018   • History of histoplasmosis    • History of infectious mononucleosis 1960   • History of migraine headaches    • History of pneumonia 12/27/2017    AS RESULT OF FLU. ENDED UP ON VENT IN [place]   • History of thrombocytopenia    • History of transfusion 1997   • History of vertigo    • Hx of colonic polyps 06/11/2009    Dr. Giles   • Hyperlipidemia    • Hypertension    • Meningioma (CMS/[facility])    • Neuropathy     HANDS AND FEET   • Osteoarthritis 09/13/2011    Dr. Jamil Miller   • Peptic ulceration    • Retina disorder     BLEED. FROM HISTOPLASMOSIS   • SBO (small bowel obstruction) (CMS/HCC)     due to hernia with surgical repair in 09/2011   • SOB (shortness of breath) on exertion    • Thyroid mass     RIGHT   Normal echocardiogram on 5/18/2017, LVEF 68%.    Past Surgical History:   Procedure Laterality Date   • APPENDECTOMY N/A 1960   • BLADDER REPAIR N/A 1980   • BREAST BIOPSY Left 10/20/2004    Mammotome incisional biopsy left breast, surgical specimen, left breast, localization clip placement, left breast, confirmatory  diagnostic unilateral mammogram, left breast-Dr. Tyrese Alcala, [facility]   • BREAST BIOPSY Right 2017    PATH: INVASIVE MAMMARY CARCINOMA   • CHOLECYSTECTOMY N/A    • COLONOSCOPY N/A 2009    Hemorrhoids, otherwise normal, repeat in 5 years-Dr. Noemí Purcell   • EYE SURGERY Right     laser surgery for blood clot   • HYSTERECTOMY Bilateral    • INGUINAL HERNIA REPAIR Right     DR ALESIA GAXIOLA   • MASTECTOMY Left     Left breast mastecotmy with sentinel lymph node biospy-[facility]   • MASTECTOMY W/ SENTINEL NODE BIOPSY Right 2017    Procedure: RIGHT BREAST MASTECTOMY WITH SENTINEL NODE BIOPSY;  Surgeon: Christian Melo MD;  Location: [facility] MAIN OR;  Service:    • NY INSJ TUNNELED CVC W/O SUBQ PORT/ AGE 5 YR/> Left 2017    Procedure: INSERTION VENOUS ACCESS DEVICE;  Surgeon: Christian Melo MD;  Location: [facility] MAIN OR;  Service: General   • REDUCTION MAMMAPLASTY Right     to match Lt. TRAM flap   • SMALL INTESTINE SURGERY      INCARCRATED HERNIA   • THYROIDECTOMY Right 10/4/2018    Procedure: RT THYROID LOBECTOMY;  Surgeon: Julián Fofana MD;  Location: [facility] OR OSC;  Service: ENT   • TONSILLECTOMY Bilateral    • TRAM FLAP DELAYED Left     WITH MASTOPEXY   • UPPER GASTROINTESTINAL ENDOSCOPY N/A 2011    LA grade A esophagitis with no bleeding, large hiatus hernia (50cm), gastric ulcer-Dr.Laszlo Lezama   • US GUIDED FINE NEEDLE ASPIRATION  2018   Lico catheter placement on 2017 by Dr. Melo.     OB/GYN history: Menarche age 16, menopause at 1985. , 1 miscarriage. No birth control pill use. She did have post menopause hormonal supplementation.      HEMATOLOGIC/ONCOLOGIC HISTORY: The patient is a 80y.o. year old female whom we are consulted for a newly self discovered right breast mass, in 2017. Patient had ultrasound-guided biopsy on 5/3/2017 and confirmed to be invasive mammary carcinoma with focal lobular features, grade 2 Elen score 6/9.  She is here for initial evaluation for management.      Patient is a 76-year-old  female who was seen here previously for chronic mild-to-moderate thrombocytopenia and mild anemia. Recently the patient reports she started having firmness of the right breast that started sometime in April or maybe even in March. She noticed firmness spread from about around the 12 o'clock position, gradually towards the upper lateral side and lower part of the right breast associated mild pain. The patient thought it was related to fibrocystic changes. However, the symptom was getting worse. Patient also reported dented nipple after she started having pain in the right breast. She denies discharge from the nipple. She called her primary care physician, Dr. Martin, who ordered a mammogram study. This was done on 04/12/2017 with architectural distortion of the right breast centrally at 12 o'clock position and had scattered fibroglandular densities throughout the right breast.      The patient subsequently had right breast ultrasound examination on 04/26/2017. Discovered a large right breast mass measuring 5.8 x 3.5 x 3.9 cm. Patient subsequently had ultrasound-guided right breast biopsy on 05/03/2017. Pathology evaluation reported invasive mammary carcinoma with focal lobular feature. [place] score 6/9, overall grade 2. Sample was further sent to the [facility] for test. ER negative, less than 1%; NJ positive, moderate in staining, 5% to 10%. HER2 IHC 2+, but FISH study positive 2.1.     She denies weight loss, she actually eats very well. No nausea vomiting. Patient does complain of insomnia, unable to sleep.      This patient has history of left breast DCIS back in 2007, had mastectomy at the [facility]. No hormonal therapy afterwards according to patient. This patient also had a small meningioma, followed by Dr. Smith in [facility], with most recent MRI of the  brain in March 2017, with 18 mm meningioma, and followed on an annual basis.     Her neutrophil count on 5/16/17 is normal at 2500, however, records showed starting from 05/2015 and in 09/2016, 01/2017 and 03/2017, all 4 laboratory studies showed mild neutropenia with ANC fluctuating between 1200 and 1600. Etiology is not clear.    Normal echocardiogram on 5/18/2017, LVEF 68%.      CT scan for chest abdomen and pelvis on 5/22/2017 and breast MRI examination on 5/22/2017 reported small right axillary lymph node suspicious for metastatic disease.  No remote metastatic lesion.  Patient has stage IIIa (T3 N2 M0) disease.      Patient will start neoadjuvant chemotherapy with Taxol weekly plus Herceptin weekly for total 12 weeks, and Perjeta every 3 weeks (3-week cycle) during chemotherapy.  Herceptin will be converted to every 3 weeks after chemotherapy finished.  Cycle 1 day 1 5/23/2017.   Status post neoadjuvant chemotherapy the patient was assessed with MRI showing a complete neoadjuvant response.  The patient was reviewed for surgery and questions concerning lymph node dissection-sentinel node evaluation-and need for radiation therapy postop were considered as well as use of additional Herceptin for the balance of the year as well as additional use of anti-hormonal therapy.  Surgery was scheduled November 07, 2017.  Patient next seen in office November 28 having undergone surgery on November 7 with results revealing no residual malignancy in either breast or associated sentinel lymph nodes.  Was elected to continue Herceptin when seen back in office November 20 having initiated Herceptin May 23, 2017.    Patient continued Herceptin with her last treatment given December 19, 2017.     Unfortunately she later experienced an accident while in [place] December 28 with prolonged hospitalization and prolonged ventilator management required.  Apparently she had at least one IV Herceptin therapy given while there and we were  contacted 2018- Dr. Nuno.  Eventually the patient was able to return to [facility] is seen back in office May 5 at which time we concluded that she completed Herceptin therapy as result of being off treatment for so long.  Plans were made for baseline scans.  Patient follow-up reexaminations May 8, 2019 with no evidence of recurrent malignancy.  This was again a circumstance when she was assessed 2019.  Patient seen 2020 without evidence of recurrent malignancy.  Recurrent GI symptoms noted with GI referral planned.  Patient hospitalized -2020 with enteropathic E. coli, acute on chronic thrombocytopenia.     Subsequent testing felt consistent with chronic ITP and trial of steroids initiated 2021.     Patient on various prednisone doses with improvement of her platelet count over 100,000 with 6/15/2021.  MEDICATIONS: The current medication list was reviewed with the patient and updated in the EMR this date per the Medical Assistant. Medication dosages and frequencies were confirmed to be accurate.       ALLERGIES:    Allergies   Allergen Reactions   • Codeine Nausea And Vomiting   • Morphine Nausea And Vomiting     SOCIAL HISTORY:   Social History     Tobacco Use   • Smoking status: Former Smoker     Packs/day: 2.00     Years: 30.00     Pack years: 60.00     Types: Cigarettes     Start date: 1957     Quit date: 4/10/1987     Years since quittin.2   • Smokeless tobacco: Never Used   • Tobacco comment: I haven't had a cigarette in over 30 years   Vaping Use   • Vaping Use: Never used   Substance Use Topics   • Alcohol use: No     Comment: stopped, heavy in past   • Drug use: No     FAMILY HISTORY:  Family History   Problem Relation Age of Onset   • Heart disease Mother    • Aortic aneurysm Mother         abdominal   • Coronary artery disease Mother    • Hypertension Mother    • Miscarriages / Stillbirths Mother    • Diverticulitis Mother    • Heart  "disease Father    • Heart attack Father         acute   • Hypertension Father    • Early death Father    • Hearing loss Father    • Kidney disease Father    • Breast cancer Daughter 45   • Heart disease Brother    • Hypertension Brother    • Malig Hyperthermia Neg Hx      I have reviewed the patient's medical history in detail and updated the computerized patient record.    REVIEW OF SYSTEMS:  GENERAL: See history of present illness.   no change in appetite or weight;   No fevers, chills, sweats.   SKIN: No nonhealing lesions. No rashes.   HEME/LYMPH: See HPI.   EYES: No vision changes or diplopia.   ENT: No tinnitus, hearing loss, gum bleeding, epistaxis, hoarseness or dysphagia.   RESPIRATORY: No cough, Has exertional dyspnea, No hemoptysis or wheezing.   CVS: No chest pain, palpitations, orthopnea, dyspnea on exertion or PND.   GI: Worsening reflux symptoms  : No lower tract obstructive symptoms, dysuria or hematuria.   MUSCULOSKELETAL: Chronic or joint pain, arthritis.  Has mild intermittent ankle swelling.   NEUROLOGICAL: See HPI  PSYCHIATRIC: See HPI     Objective:   Vitals:    06/15/21 1426   BP: 126/72   Pulse: 71   Resp: 16   Temp: 96.5 °F (35.8 °C)   TempSrc: Temporal   SpO2: 98%   Weight: 65.1 kg (143 lb 8 oz)   Height: 154.9 cm (60.98\")   PainSc: 0-No pain   ECOG 0      PHYSICAL EXAM:   GENERAL: Well-developed, well-nourished female, in no acute distress.   SKIN: Warm, dry without rashes, purpura or petechiae.  Left upper chest Lico catheter in place, no evidence of infection.    EYES: Pupils equal, round and reactive to light. EOMs intact. Conjunctivae normal.  EARS: Hearing intact.  NOSE:  No excoriations or nasal discharge.  MOUTH: Tongue is well-papillated; no stomatitis or ulcers. Lips normal.  THROAT: Oropharynx without lesions or exudates.  Status post partial thyroidectomy well-healing  LYMPHATICS: No cervical, supraclavicular, axillary adenopathy.  CHEST: Lungs clear to auscultation. Good " airflow.   BREAST:Postop, Well healed right mastectomy site with no evidence of recurrent disease, no axillary adenopathy bilaterally.  CARDIAC: Regular rate and rhythm without murmurs. Normal S1,S2.  ABDOMEN: No distention, normal active bowel sounds,  no hepatosplenomegaly or masses.  EXTREMITIES: No clubbing, cyanosis or edema.  NEUROLOGICAL: Cranial Nerves II-XII grossly intact. No focal neurological deficits.  PSYCHIATRIC: Alert and oriented, pleased about her results      RECENT LABS:  Lab Results   Component Value Date    WBC 5.20 05/18/2021    HGB 10.8 (L) 05/18/2021    HCT 33.0 (L) 05/18/2021    MCV 97.9 (H) 05/18/2021    PLT 69 (L) 05/18/2021    PLT 69 (L) 05/18/2021     Lab Results   Component Value Date    NEUTROABS 3.83 05/18/2021     Lab Results   Component Value Date    GLUCOSE 103 (H) 10/01/2020    BUN 18 12/07/2020    CREATININE 0.98 12/07/2020    EGFRIFNONA 55 (L) 12/07/2020    EGFRIFAFRI 66 12/07/2020    BCR 18.4 12/07/2020    K 4.4 12/07/2020    CO2 27.9 12/07/2020    CALCIUM 9.3 12/07/2020    PROTENTOTREF 7.0 12/07/2020    ALBUMIN 4.80 12/07/2020    LABIL2 2.2 12/07/2020    AST 35 (H) 12/07/2020    ALT 34 (H) 12/07/2020            Assessment/Plan   1. Large right breast cancer, locally advanced, stage IIIa (T3 N1/ 2 M0).  ER negative, less than 1%; [place] positive, moderate in staining, 5% to 10%. HER2 IHC 2+, FISH study positive 2.1.  Physical examination showed a large mass, 7 cm x 7 cm initially which has decreased to approximately less than 4 cm ..  Patient  proceeded through 4 cycles ceptin,Perjeta and Taxol.   Her subsequent MRI examination showed complete response by imaging including right axillary lymphadenopathy.  We have continued Herceptin and that includes today October 20, 2017.  The case was discussed with Dr. Melo and plans were to proceed with mastectomy plus right axillary lymph node assessment via sentinel node evaluation. it was felt she likely require radiation therapy post  procedure.  The patient was scheduled and proceeded to surgery November 7.  Her results, wonderfully, revealed no evidence of residual disease in the breast or associated sentinel lymph nodes. She was seen by radiation therapy and offered treatment did not pursue it.  Additionally, and somewhat complicating this story, she traveled to [place] and developed severe influenza, associated pneumonia and was hospitalized for several months only to return to [place] in the last several weeks now being seen back May 1.  There is no particular benefit from trying to add Herceptin back to her therapy now and is felt that she is completed Herceptin treatment.  She wishes consider reconstruction and baseline CT scans will be requested in 5 weeks with the patient being seen in 6 weeks follow-up.The patient reviewed June 13, 2018 with the scans demonstrating very modest increase in left pectoral, axillary or mediastinal nodes.  These are of uncertain significance but do need follow-up in a PET/CT is planned in 7 weeks.  Her anemia will also be reviewed again with additional laboratory studies that visit.  She'll be seen in 8 weeks for general reassessment.    The patient's anemia workup was essentially negative and she is slowly improving when seen back August 8.  Her PET/CT, fortunately, demonstrates improvement though there is potential abnormality within the right thyroid lobe.  We discussed thyroid function testing and follow-up thyroid ultrasound.  She will be seen back in approximately 2 months as we maintain her port monthly flushes.   The patient was reviewed by ENT and ultimately was felt that a partial thyroidectomy was in order and is now scheduled October 4.  Patient's one to proceed.  We'll plan to see her back in approximately 6 weeks.      The patient is next seen November 28, 2015.  Her surgery went well with the findings as noted above.  She has follow-up with ENT in the next several weeks.  Additionally  she has bilateral cataract surgery at the end of this month and into the beginning of next.  We discussed reassessment in general with follow-up scans and these were performed May 2019 which showed no evidence of recurrent malignancy.  Six-month follow-up planes with maintenance of her port every 6 weeks.  The patient is reassessed October 29, 2019 fortunately continue to do relatively well.  She has had no additional medical issues since last seen we will plan to see her back in 6 months again meeting report.  She does plan to see plastic surgery concerning reconstruction concerning her breast cancer history.  A copy this note will be sent to the plastic surgeon.     The patient is next seen July 7, 2020 without evidence of recurrence of malignancy.  We plan 6-month follow-up.  The patient is reviewed October 27, 2020 without evidence recurrent disease, repeat scans during recent hospitalization September 20-October 1 without evidence of recurrent malignancy.Reevaluation April 13, 2021 - for recurrence   The patient returns today, April 20, 2021 for evaluation of acute thrombocytopenia.  She has now been on 20 mg of prednisone twice daily for 1 week.  Thankfully, her platelets have increased back to near her baseline at 65,000.  She did struggle with side effects of the prednisone which are outlined above.  For the last 2 days however, she is sleeping better.  We will proceed with her current dosing of prednisone and will have her return in 1 week for MD reevaluation.  At that time, if her platelets remain stable/improved we will plan to have the patient undergo a rapid taper of the prednisone.  Patient return for assessment this with improvement in her platelet count to over 60,000.  She has called to request continuing the steroids, reassessment 1 week and if at the same level start to slowly taper.      Patient continues steroid taper seen 6/15/2021 platelet count over 100,000, dropped to prednisone 20 mg daily  with weekly assessment dropping 5 mg each week.      2.  Previous fall with contusions now recovered                                                                             3.  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  Worsening reflux symptoms, GI referral planned.  She did not complain of this today.    5.  Hospitalization September 20-October 1 with enteropathic E. coli.  This responded to conservative therapy with plans for reassessment by Dr. Carlin with outpatient colonoscopy.      Plan:    *Reduce prednisone to 20 mg p.o. daily a.m. after breakfast    *Return 1 week for CBC, IPF, RN evaluation    *Plans to drop 5 mg dosing per week with prescription including 5 mg tablets E scribed to pharmacy    *2 weeks NP, CBC, IPF

## 2021-06-15 ENCOUNTER — INFUSION (OUTPATIENT)
Dept: ONCOLOGY | Facility: HOSPITAL | Age: 81
End: 2021-06-15

## 2021-06-15 ENCOUNTER — OFFICE VISIT (OUTPATIENT)
Dept: ONCOLOGY | Facility: CLINIC | Age: 81
End: 2021-06-15

## 2021-06-15 VITALS
HEART RATE: 71 BPM | HEIGHT: 61 IN | TEMPERATURE: 96.5 F | WEIGHT: 143.5 LBS | SYSTOLIC BLOOD PRESSURE: 126 MMHG | RESPIRATION RATE: 16 BRPM | OXYGEN SATURATION: 98 % | DIASTOLIC BLOOD PRESSURE: 72 MMHG | BODY MASS INDEX: 27.09 KG/M2

## 2021-06-15 DIAGNOSIS — Z45.2 ENCOUNTER FOR FITTING AND ADJUSTMENT OF VASCULAR CATHETER: ICD-10-CM

## 2021-06-15 DIAGNOSIS — C50.911 MALIGNANT NEOPLASM OF RIGHT FEMALE BREAST, UNSPECIFIED ESTROGEN RECEPTOR STATUS, UNSPECIFIED SITE OF BREAST (HCC): ICD-10-CM

## 2021-06-15 DIAGNOSIS — C50.511 MALIGNANT NEOPLASM OF LOWER-OUTER QUADRANT OF RIGHT FEMALE BREAST, UNSPECIFIED ESTROGEN RECEPTOR STATUS (HCC): ICD-10-CM

## 2021-06-15 DIAGNOSIS — Z17.1 MALIGNANT NEOPLASM OF UPPER-INNER QUADRANT OF RIGHT BREAST IN FEMALE, ESTROGEN RECEPTOR NEGATIVE (HCC): Primary | ICD-10-CM

## 2021-06-15 DIAGNOSIS — D69.6 THROMBOCYTOPENIA (HCC): ICD-10-CM

## 2021-06-15 DIAGNOSIS — C50.911 RIGHT BREAST CANCER WITH T3 TUMOR, >5 CM IN GREATEST DIMENSION (HCC): Primary | ICD-10-CM

## 2021-06-15 DIAGNOSIS — C50.211 MALIGNANT NEOPLASM OF UPPER-INNER QUADRANT OF RIGHT BREAST IN FEMALE, ESTROGEN RECEPTOR NEGATIVE (HCC): Primary | ICD-10-CM

## 2021-06-15 LAB
ANISOCYTOSIS BLD QL: NORMAL
BASOPHILS # BLD AUTO: 0.04 10*3/MM3 (ref 0–0.2)
BASOPHILS NFR BLD AUTO: 0.5 % (ref 0–1.5)
DEPRECATED RDW RBC AUTO: 70.4 FL (ref 37–54)
EOSINOPHIL # BLD AUTO: 0 10*3/MM3 (ref 0–0.4)
EOSINOPHIL NFR BLD AUTO: 0 % (ref 0.3–6.2)
ERYTHROCYTE [DISTWIDTH] IN BLOOD BY AUTOMATED COUNT: 18.9 % (ref 12.3–15.4)
HCT VFR BLD AUTO: 34.7 % (ref 34–46.6)
HGB BLD-MCNC: 11.2 G/DL (ref 12–15.9)
IMM GRANULOCYTES # BLD AUTO: 0.82 10*3/MM3 (ref 0–0.05)
IMM GRANULOCYTES NFR BLD AUTO: 10.3 % (ref 0–0.5)
LYMPHOCYTES # BLD AUTO: 0.91 10*3/MM3 (ref 0.7–3.1)
LYMPHOCYTES NFR BLD AUTO: 11.4 % (ref 19.6–45.3)
MACROCYTES BLD QL SMEAR: NORMAL
MCH RBC QN AUTO: 32.8 PG (ref 26.6–33)
MCHC RBC AUTO-ENTMCNC: 32.3 G/DL (ref 31.5–35.7)
MCV RBC AUTO: 101.8 FL (ref 79–97)
MONOCYTES # BLD AUTO: 0.36 10*3/MM3 (ref 0.1–0.9)
MONOCYTES NFR BLD AUTO: 4.5 % (ref 5–12)
NEUTROPHILS NFR BLD AUTO: 5.84 10*3/MM3 (ref 1.7–7)
NEUTROPHILS NFR BLD AUTO: 73.3 % (ref 42.7–76)
NRBC BLD AUTO-RTO: 0.5 /100 WBC (ref 0–0.2)
PLAT MORPH BLD: NORMAL
PLATELET # BLD AUTO: 103 10*3/MM3 (ref 140–450)
PLATELET # BLD AUTO: 103 10*3/MM3 (ref 140–450)
PLATELETS.RETICULATED NFR BLD AUTO: 11.8 % (ref 0.9–6.5)
PMV BLD AUTO: 11.7 FL (ref 6–12)
RBC # BLD AUTO: 3.41 10*6/MM3 (ref 3.77–5.28)
WBC # BLD AUTO: 7.97 10*3/MM3 (ref 3.4–10.8)
WBC MORPH BLD: NORMAL

## 2021-06-15 PROCEDURE — 99214 OFFICE O/P EST MOD 30 MIN: CPT | Performed by: INTERNAL MEDICINE

## 2021-06-15 PROCEDURE — 25010000002 HEPARIN LOCK FLUSH PER 10 UNITS: Performed by: INTERNAL MEDICINE

## 2021-06-15 PROCEDURE — 85025 COMPLETE CBC W/AUTO DIFF WBC: CPT | Performed by: INTERNAL MEDICINE

## 2021-06-15 PROCEDURE — 85007 BL SMEAR W/DIFF WBC COUNT: CPT | Performed by: INTERNAL MEDICINE

## 2021-06-15 PROCEDURE — 36591 DRAW BLOOD OFF VENOUS DEVICE: CPT

## 2021-06-15 PROCEDURE — 85055 RETICULATED PLATELET ASSAY: CPT | Performed by: INTERNAL MEDICINE

## 2021-06-15 RX ORDER — HEPARIN SODIUM (PORCINE) LOCK FLUSH IV SOLN 100 UNIT/ML 100 UNIT/ML
500 SOLUTION INTRAVENOUS AS NEEDED
Status: CANCELLED | OUTPATIENT
Start: 2021-06-15

## 2021-06-15 RX ORDER — SODIUM CHLORIDE 0.9 % (FLUSH) 0.9 %
10 SYRINGE (ML) INJECTION AS NEEDED
Status: DISCONTINUED | OUTPATIENT
Start: 2021-06-15 | End: 2021-06-15 | Stop reason: HOSPADM

## 2021-06-15 RX ORDER — HEPARIN SODIUM (PORCINE) LOCK FLUSH IV SOLN 100 UNIT/ML 100 UNIT/ML
500 SOLUTION INTRAVENOUS AS NEEDED
Status: DISCONTINUED | OUTPATIENT
Start: 2021-06-15 | End: 2021-06-15 | Stop reason: HOSPADM

## 2021-06-15 RX ORDER — SODIUM CHLORIDE 0.9 % (FLUSH) 0.9 %
10 SYRINGE (ML) INJECTION AS NEEDED
Status: CANCELLED | OUTPATIENT
Start: 2021-06-15

## 2021-06-15 RX ORDER — PREDNISONE 1 MG/1
20 TABLET ORAL DAILY
Qty: 60 TABLET | Refills: 1 | Status: SHIPPED | OUTPATIENT
Start: 2021-06-15 | End: 2021-11-12

## 2021-06-15 RX ADMIN — Medication 500 UNITS: at 14:17

## 2021-06-15 RX ADMIN — SODIUM CHLORIDE, PRESERVATIVE FREE 10 ML: 5 INJECTION INTRAVENOUS at 14:17

## 2021-06-15 NOTE — PATIENT INSTRUCTIONS
Blood pressure is stable.  Total and LDL cholesterol are normal.  Triglycerides are mildly elevated and HDL cholesterol is low.  Thyroid-stimulating hormone is normal.  Continue diet, exercise, and current medicines.

## 2021-06-22 ENCOUNTER — LAB (OUTPATIENT)
Dept: OTHER | Facility: HOSPITAL | Age: 81
End: 2021-06-22

## 2021-06-22 ENCOUNTER — CLINICAL SUPPORT (OUTPATIENT)
Dept: ONCOLOGY | Facility: HOSPITAL | Age: 81
End: 2021-06-22

## 2021-06-22 VITALS — TEMPERATURE: 97.5 F

## 2021-06-22 DIAGNOSIS — D50.9 IRON DEFICIENCY ANEMIA, UNSPECIFIED IRON DEFICIENCY ANEMIA TYPE: Primary | ICD-10-CM

## 2021-06-22 LAB
BASOPHILS # BLD AUTO: 0.02 10*3/MM3 (ref 0–0.2)
BASOPHILS NFR BLD AUTO: 0.3 % (ref 0–1.5)
DEPRECATED RDW RBC AUTO: 67.8 FL (ref 37–54)
EOSINOPHIL # BLD AUTO: 0.02 10*3/MM3 (ref 0–0.4)
EOSINOPHIL NFR BLD AUTO: 0.3 % (ref 0.3–6.2)
ERYTHROCYTE [DISTWIDTH] IN BLOOD BY AUTOMATED COUNT: 18.8 % (ref 12.3–15.4)
HCT VFR BLD AUTO: 35.3 % (ref 34–46.6)
HGB BLD-MCNC: 11.9 G/DL (ref 12–15.9)
IMM GRANULOCYTES # BLD AUTO: 0.26 10*3/MM3 (ref 0–0.05)
IMM GRANULOCYTES NFR BLD AUTO: 4 % (ref 0–0.5)
LYMPHOCYTES # BLD AUTO: 1.09 10*3/MM3 (ref 0.7–3.1)
LYMPHOCYTES NFR BLD AUTO: 16.9 % (ref 19.6–45.3)
MCH RBC QN AUTO: 33.5 PG (ref 26.6–33)
MCHC RBC AUTO-ENTMCNC: 33.7 G/DL (ref 31.5–35.7)
MCV RBC AUTO: 99.4 FL (ref 79–97)
MONOCYTES # BLD AUTO: 0.57 10*3/MM3 (ref 0.1–0.9)
MONOCYTES NFR BLD AUTO: 8.8 % (ref 5–12)
NEUTROPHILS NFR BLD AUTO: 4.5 10*3/MM3 (ref 1.7–7)
NEUTROPHILS NFR BLD AUTO: 69.7 % (ref 42.7–76)
NRBC BLD AUTO-RTO: 0 /100 WBC (ref 0–0.2)
PLAT MORPH BLD: NORMAL
PLATELET # BLD AUTO: 87 10*3/MM3 (ref 140–450)
PLATELET # BLD AUTO: 87 10*3/MM3 (ref 140–450)
PLATELETS.RETICULATED NFR BLD AUTO: 12.5 % (ref 0.9–6.5)
PMV BLD AUTO: 11.9 FL (ref 6–12)
RBC # BLD AUTO: 3.55 10*6/MM3 (ref 3.77–5.28)
RBC MORPH BLD: NORMAL
WBC # BLD AUTO: 6.46 10*3/MM3 (ref 3.4–10.8)
WBC MORPH BLD: NORMAL

## 2021-06-22 PROCEDURE — G0463 HOSPITAL OUTPT CLINIC VISIT: HCPCS

## 2021-06-22 PROCEDURE — 85007 BL SMEAR W/DIFF WBC COUNT: CPT | Performed by: INTERNAL MEDICINE

## 2021-06-22 PROCEDURE — 85055 RETICULATED PLATELET ASSAY: CPT | Performed by: INTERNAL MEDICINE

## 2021-06-22 PROCEDURE — 36415 COLL VENOUS BLD VENIPUNCTURE: CPT

## 2021-06-22 PROCEDURE — 85025 COMPLETE CBC W/AUTO DIFF WBC: CPT | Performed by: INTERNAL MEDICINE

## 2021-06-22 NOTE — NURSING NOTE
Pt is here for lab with RN review.  CBC reviewed with pt, counts are stable for this pt at this time. Pt has no complaints.  Copy of labs given to pt and f/u appt reviewed. Pt is instructed to call the office with any concerns or new symptoms prior to next visit. Pt vu  Lab Results   Component Value Date    WBC 6.46 06/22/2021    HGB 11.9 (L) 06/22/2021    HCT 35.3 06/22/2021    MCV 99.4 (H) 06/22/2021    PLT 87 (L) 06/22/2021    PLT 87 (L) 06/22/2021

## 2021-06-25 DIAGNOSIS — D69.6 THROMBOCYTOPENIA (HCC): Primary | ICD-10-CM

## 2021-06-30 ENCOUNTER — OFFICE VISIT (OUTPATIENT)
Dept: ONCOLOGY | Facility: CLINIC | Age: 81
End: 2021-06-30

## 2021-06-30 ENCOUNTER — LAB (OUTPATIENT)
Dept: OTHER | Facility: HOSPITAL | Age: 81
End: 2021-06-30

## 2021-06-30 VITALS
SYSTOLIC BLOOD PRESSURE: 152 MMHG | RESPIRATION RATE: 16 BRPM | HEIGHT: 61 IN | WEIGHT: 142.5 LBS | HEART RATE: 80 BPM | DIASTOLIC BLOOD PRESSURE: 78 MMHG | OXYGEN SATURATION: 94 % | BODY MASS INDEX: 26.91 KG/M2 | TEMPERATURE: 96.9 F

## 2021-06-30 DIAGNOSIS — D69.6 THROMBOCYTOPENIA (HCC): ICD-10-CM

## 2021-06-30 DIAGNOSIS — D69.3 ACUTE ITP (HCC): Primary | ICD-10-CM

## 2021-06-30 DIAGNOSIS — Z17.1 MALIGNANT NEOPLASM OF UPPER-INNER QUADRANT OF RIGHT BREAST IN FEMALE, ESTROGEN RECEPTOR NEGATIVE (HCC): ICD-10-CM

## 2021-06-30 DIAGNOSIS — C50.211 MALIGNANT NEOPLASM OF UPPER-INNER QUADRANT OF RIGHT BREAST IN FEMALE, ESTROGEN RECEPTOR NEGATIVE (HCC): ICD-10-CM

## 2021-06-30 LAB
BASOPHILS # BLD AUTO: 0.01 10*3/MM3 (ref 0–0.2)
BASOPHILS NFR BLD AUTO: 0.2 % (ref 0–1.5)
DEPRECATED RDW RBC AUTO: 64.1 FL (ref 37–54)
EOSINOPHIL # BLD AUTO: 0.01 10*3/MM3 (ref 0–0.4)
EOSINOPHIL NFR BLD AUTO: 0.2 % (ref 0.3–6.2)
ERYTHROCYTE [DISTWIDTH] IN BLOOD BY AUTOMATED COUNT: 17.7 % (ref 12.3–15.4)
HCT VFR BLD AUTO: 35.9 % (ref 34–46.6)
HGB BLD-MCNC: 12.1 G/DL (ref 12–15.9)
IMM GRANULOCYTES # BLD AUTO: 0.15 10*3/MM3 (ref 0–0.05)
IMM GRANULOCYTES NFR BLD AUTO: 2.5 % (ref 0–0.5)
LYMPHOCYTES # BLD AUTO: 0.92 10*3/MM3 (ref 0.7–3.1)
LYMPHOCYTES NFR BLD AUTO: 15.1 % (ref 19.6–45.3)
MCH RBC QN AUTO: 33.5 PG (ref 26.6–33)
MCHC RBC AUTO-ENTMCNC: 33.7 G/DL (ref 31.5–35.7)
MCV RBC AUTO: 99.4 FL (ref 79–97)
MONOCYTES # BLD AUTO: 0.95 10*3/MM3 (ref 0.1–0.9)
MONOCYTES NFR BLD AUTO: 15.6 % (ref 5–12)
NEUTROPHILS NFR BLD AUTO: 4.04 10*3/MM3 (ref 1.7–7)
NEUTROPHILS NFR BLD AUTO: 66.4 % (ref 42.7–76)
NRBC BLD AUTO-RTO: 0 /100 WBC (ref 0–0.2)
PLATELET # BLD AUTO: 83 10*3/MM3 (ref 140–450)
PLATELET # BLD AUTO: 83 10*3/MM3 (ref 140–450)
PLATELETS.RETICULATED NFR BLD AUTO: 12.7 % (ref 0.9–6.5)
PMV BLD AUTO: 11.2 FL (ref 6–12)
RBC # BLD AUTO: 3.61 10*6/MM3 (ref 3.77–5.28)
WBC # BLD AUTO: 6.08 10*3/MM3 (ref 3.4–10.8)

## 2021-06-30 PROCEDURE — 85025 COMPLETE CBC W/AUTO DIFF WBC: CPT | Performed by: INTERNAL MEDICINE

## 2021-06-30 PROCEDURE — 85055 RETICULATED PLATELET ASSAY: CPT | Performed by: INTERNAL MEDICINE

## 2021-06-30 PROCEDURE — 36415 COLL VENOUS BLD VENIPUNCTURE: CPT

## 2021-06-30 PROCEDURE — 99213 OFFICE O/P EST LOW 20 MIN: CPT | Performed by: NURSE PRACTITIONER

## 2021-06-30 NOTE — PROGRESS NOTES
REASON FOR FOLLOWUP:    1. Large right breast cancer.  Core needle biopsy reported invasive mammary carcinoma with focal lobular feature, grade 2.  ER negative, less than 1%; CA positive, moderate in staining, 5% to 10%. HER2 IHC 2+, FISH study positive 2.1.  Completed neoadjuvant therapy with Taxol Herceptin Perjeta.  Complete response, postmastectomy.  She is since remained in observation with no evidence of disease.    2.  Hospitalization 9/28/2021 through 10/1/2021 for acute colitis secondary to enteropathic E. coli    3.  Chronic ITP.  Currently on steroid taper                                                                                                           HISTORY OF PRESENT ILLNESS:     The patient is a 80 y.o. female with the above-mentioned street, who returns the office today for weekly follow-up and lab review.  She currently continues on 15 mg prednisone daily for her chronic ITP.  She continues to struggle with a tolerance of steroids with tremors, feeling jittery and agitation.  She denies signs or symptoms of bleeding.  She denies any new pain.  She does struggle with insomnia though this is somewhat improved following prednisone taper.       Past Medical History:   Diagnosis Date   • Alcoholism (CMS/[facility]) 1978    I haven't had a drink since then   • Anemia    • Breast cancer (CMS/[facility]) 05/03/2017    Right breast invasive mammary carcinoma with focal lobular features, grade 2, ER negative, less than 1% CA positive, HER-2 positive, stage IIIa disease (T3, N2, M0   • Breast cancer (CMS/[facility]) 10/20/2004    Left breast ductal carcinoma in-situ, predominately intermediate grade with focal comedonecrosis (high grade), solid and bribridform patterns involving approximately five core biopsy fragments   • Edema     Chronic lower extremity edema   • GERD (gastroesophageal reflux disease) 09/13/2011    Dr. Paul Negron   • GI (gastrointestinal bleed) 1997   • H/O jaundice    • Hernia     INCISONAL.  AROUND LEFT TRAM LAP SITE   • History of chemotherapy     2017 4 MONTHS IN 2017   • History of Clostridium difficile colitis     JAN 2018   • History of histoplasmosis    • History of infectious mononucleosis 1960   • History of migraine headaches    • History of pneumonia 12/27/2017    AS RESULT OF FLU. ENDED UP ON VENT IN [place]   • History of thrombocytopenia    • History of transfusion 1997   • History of vertigo    • Hx of colonic polyps 06/11/2009    Dr. Giles   • Hyperlipidemia    • Hypertension    • Meningioma (CMS/HCC)    • Neuropathy     HANDS AND FEET   • Osteoarthritis 09/13/2011    Dr. Jamil Miller   • Peptic ulceration    • Retina disorder     BLEED. FROM HISTOPLASMOSIS   • SBO (small bowel obstruction) (CMS/HCC)     due to hernia with surgical repair in 09/2011   • SOB (shortness of breath) on exertion    • Thyroid mass     RIGHT   Normal echocardiogram on 5/18/2017, LVEF 68%.    Past Surgical History:   Procedure Laterality Date   • APPENDECTOMY N/A 1960   • BLADDER REPAIR N/A 1980   • BREAST BIOPSY Left 10/20/2004    Mammotome incisional biopsy left breast, surgical specimen, left breast, localization clip placement, left breast, confirmatory diagnostic unilateral mammogram, left breast-Dr. Tyrese Alcala, [facility]   • BREAST BIOPSY Right 05/03/2017    PATH: INVASIVE MAMMARY CARCINOMA   • CHOLECYSTECTOMY N/A 1990   • COLONOSCOPY N/A 06/11/2009    Hemorrhoids, otherwise normal, repeat in 5 years-Dr. Noemí Purcell   • EYE SURGERY Right 2017    laser surgery for blood clot   • HYSTERECTOMY Bilateral 1980   • INGUINAL HERNIA REPAIR Right     DR ALESIA GAXIOLA   • MASTECTOMY Left 2004    Left breast mastecotmy with sentinel lymph node biospy-[facility]   • MASTECTOMY W/ SENTINEL NODE BIOPSY Right 11/7/2017    Procedure: RIGHT BREAST MASTECTOMY WITH SENTINEL NODE BIOPSY;  Surgeon: Christian Melo MD;  Location: [facility] OR;  Service:    • IL INSJ TUNNELED CVC W/O SUBQ PORT/ AGE 5 YR/> Left  2017    Procedure: INSERTION VENOUS ACCESS DEVICE;  Surgeon: Christian Melo MD;  Location: [facility] OR;  Service: General   • REDUCTION MAMMAPLASTY Right     to match Lt. TRAM flap   • SMALL INTESTINE SURGERY      INCARCRATED HERNIA   • THYROIDECTOMY Right 10/4/2018    Procedure: RT THYROID LOBECTOMY;  Surgeon: Julián Fofana MD;  Location: [facility] OR [facility];  Service: ENT   • TONSILLECTOMY Bilateral    • TRAM FLAP DELAYED Left     WITH MASTOPEXY   • UPPER GASTROINTESTINAL ENDOSCOPY N/A 2011    LA grade A esophagitis with no bleeding, large hiatus hernia (50cm), gastric ulcer-Dr.Laszlo Lezama   • US GUIDED FINE NEEDLE ASPIRATION  2018   Lico catheter placement on 2017 by Dr. Melo.     OB/GYN history: Menarche age 16, menopause at 1985. , 1 miscarriage. No birth control pill use. She did have post menopause hormonal supplementation.      HEMATOLOGIC/ONCOLOGIC HISTORY: The patient is a 80y.o. year old female whom we are consulted for a newly self discovered right breast mass, in 2017. Patient had ultrasound-guided biopsy on 5/3/2017 and confirmed to be invasive mammary carcinoma with focal lobular features, grade 2 [place] score 6/9. She is here for initial evaluation for management.      Patient is a 76-year-old  female who was seen here previously for chronic mild-to-moderate thrombocytopenia and mild anemia. Recently the patient reports she started having firmness of the right breast that started sometime in April or maybe even in March. She noticed firmness spread from about around the 12 o'clock position, gradually towards the upper lateral side and lower part of the right breast associated mild pain. The patient thought it was related to fibrocystic changes. However, the symptom was getting worse. Patient also reported dented nipple after she started having pain in the right breast. She denies discharge from the nipple. She called her primary care physician,  Dr. Martin, who ordered a mammogram study. This was done on 04/12/2017 with architectural distortion of the right breast centrally at 12 o'clock position and had scattered fibroglandular densities throughout the right breast.      The patient subsequently had right breast ultrasound examination on 04/26/2017. Discovered a large right breast mass measuring 5.8 x 3.5 x 3.9 cm. Patient subsequently had ultrasound-guided right breast biopsy on 05/03/2017. Pathology evaluation reported invasive mammary carcinoma with focal lobular feature. [place] score 6/9, overall grade 2. Sample was further sent to the [facility] for test. ER negative, less than 1%; AK positive, moderate in staining, 5% to 10%. HER2 IHC 2+, but FISH study positive 2.1.     She denies weight loss, she actually eats very well. No nausea vomiting. Patient does complain of insomnia, unable to sleep.      This patient has history of left breast DCIS back in 2007, had mastectomy at [facility]. No hormonal therapy afterwards according to patient. This patient also had a small meningioma, followed by Dr. Smith in [facility], with most recent MRI of the brain in March 2017, with 18 mm meningioma, and followed on an annual basis.     Her neutrophil count on 5/16/17 is normal at 2500, however, records showed starting from 05/2015 and in 09/2016, 01/2017 and 03/2017, all 4 laboratory studies showed mild neutropenia with ANC fluctuating between 1200 and 1600. Etiology is not clear.    Normal echocardiogram on 5/18/2017, LVEF 68%.      CT scan for chest abdomen and pelvis on 5/22/2017 and breast MRI examination on 5/22/2017 reported small right axillary lymph node suspicious for metastatic disease.  No remote metastatic lesion.  Patient has stage IIIa (T3 N2 M0) disease.      Patient will start neoadjuvant chemotherapy with Taxol weekly plus Herceptin weekly for total 12 weeks, and Perjeta every 3  weeks (3-week cycle) during chemotherapy.  Herceptin will be converted to every 3 weeks after chemotherapy finished.  Cycle 1 day 1 5/23/2017.   Status post neoadjuvant chemotherapy the patient was assessed with MRI showing a complete neoadjuvant response.  The patient was reviewed for surgery and questions concerning lymph node dissection-sentinel node evaluation-and need for radiation therapy postop were considered as well as use of additional Herceptin for the balance of the year as well as additional use of anti-hormonal therapy.  Surgery was scheduled November 07, 2017.  Patient next seen in office November 28 having undergone surgery on November 7 with results revealing no residual malignancy in either breast or associated sentinel lymph nodes.  Was elected to continue Herceptin when seen back in office November 20 having initiated Herceptin May 23, 2017.    Patient continued Herceptin with her last treatment given December 19, 2017.     Unfortunately she later experienced an accident while in [place] December 28 with prolonged hospitalization and prolonged ventilator management required.  Apparently she had at least one IV Herceptin therapy given while there and we were contacted March 20, 2018- Dr. Nuno.  Eventually the patient was able to return to [place] is seen back in office May 5 at which time we concluded that she completed Herceptin therapy as result of being off treatment for so long.  Plans were made for baseline scans.  Patient follow-up reexaminations May 8, 2019 with no evidence of recurrent malignancy.  This was again a circumstance when she was assessed October 29, 2019.  Patient seen July 7, 2020 without evidence of recurrent malignancy.  Recurrent GI symptoms noted with GI referral planned.  Patient hospitalized September 28-October 1, 2020 with enteropathic E. coli, acute on chronic thrombocytopenia.     Subsequent testing felt consistent with chronic ITP and trial of steroids initiated  "2021.     Patient on various prednisone doses with improvement of her platelet count over 100,000 with 6/15/2021.  MEDICATIONS: The current medication list was reviewed with the patient and updated in the EMR this date per the Medical Assistant. Medication dosages and frequencies were confirmed to be accurate.       ALLERGIES:    Allergies   Allergen Reactions   • Codeine Nausea And Vomiting   • Morphine Nausea And Vomiting     SOCIAL HISTORY:   Social History     Tobacco Use   • Smoking status: Former Smoker     Packs/day: 2.00     Years: 30.00     Pack years: 60.00     Types: Cigarettes     Start date: 1957     Quit date: 4/10/1987     Years since quittin.2   • Smokeless tobacco: Never Used   • Tobacco comment: I haven't had a cigarette in over 30 years   Vaping Use   • Vaping Use: Never used   Substance Use Topics   • Alcohol use: No     Comment: stopped, heavy in past   • Drug use: No     FAMILY HISTORY:  Family History   Problem Relation Age of Onset   • Heart disease Mother    • Aortic aneurysm Mother         abdominal   • Coronary artery disease Mother    • Hypertension Mother    • Miscarriages / Stillbirths Mother    • Diverticulitis Mother    • Heart disease Father    • Heart attack Father         acute   • Hypertension Father    • Early death Father    • Hearing loss Father    • Kidney disease Father    • Breast cancer Daughter 45   • Heart disease Brother    • Hypertension Brother    • Malig Hyperthermia Neg Hx       Objective:   Vitals:    21 1416   BP: 152/78   Pulse: 80   Resp: 16   Temp: 96.9 °F (36.1 °C)   TempSrc: Temporal   SpO2: 94%   Weight: 64.6 kg (142 lb 8 oz)   Height: 154.9 cm (60.98\")   PainSc: 0-No pain   ECOG 0      PHYSICAL EXAM:   GENERAL: Well-developed, well-nourished female, in no acute distress.   SKIN: Warm, dry without rashes, purpura or petechiae.  Left upper chest Lico catheter in place, no evidence of infection.    EYES: Pupils equal, round and " reactive to light. EOMs intact. Conjunctivae normal.  EARS: Hearing intact.  NOSE:  No excoriations or nasal discharge.  CHEST: Lungs clear to auscultation. Good airflow.   BREAST:Postop, Well healed right mastectomy site with no evidence of recurrent disease, no axillary adenopathy bilaterally.  Breast not examined today, 6/30/2021  CARDIAC: Regular rate and rhythm without murmurs. Normal S1,S2.  ABDOMEN: No distention, normal active bowel sounds,  no hepatosplenomegaly or masses.  EXTREMITIES: No clubbing, cyanosis or edema.  Bruising of the right leg from fall, healing well  NEUROLOGICAL: Cranial Nerves II-XII grossly intact. No focal neurological deficits.  PSYCHIATRIC: Alert and oriented, pleased about her results      RECENT LABS:  Results from last 7 days   Lab Units 06/30/21  1347   WBC 10*3/mm3 6.08   NEUTROS ABS 10*3/mm3 4.04   HEMOGLOBIN g/dL 12.1   HEMATOCRIT % 35.9   PLATELETS 10*3/mm3 83*  83*     Immature Platelet Fraction (06/30/2021 13:47)               Assessment/Plan     1. Large right breast cancer.  Core needle biopsy reported invasive mammary carcinoma with focal lobular feature, grade 2.  ER negative, less than 1%; SC positive, moderate in staining, 5% to 10%. HER2 IHC 2+, FISH study positive 2.1.   · CT scan for chest abdomen and pelvis and breast MRI examination reported right axillary lymph node suspicious for metastatic disease.  No remote metastatic lesion.   · Patient has stage IIIa (T3 N2 M0) disease.   · neoadjuvant chemotherapy on 5/23/2017 with Taxol weekly plus Herceptin weekly for total 12 weeks, and Perjata every 3 weeks during chemotherapy.  Herceptin will be converted to every 3 weeks after chemotherapy finished.    · Reaction to Herceptin C1D1.  Subsequently tolerated therapy with hydrocortisone as premedication.  · Potential cardiac strain developing, neuropathic symptoms persist, follow-up MRI and surgical assessment will be needed post initial chemotherapeutic phase  · MRI  August 17 with interval resolution of abnormal enhancement within breast and right axillary adenopathy, surgery scheduled November 7, Herceptin ongoing every 3 weeks  · Patient seen in office November 28, 2017 status post surgery with apparent complete response, Herceptin continued  · Herceptin given December 19, 2017 subsequent injury in [place] leading to prolonged stay, single treatment given through additional cancer Center  · May 01, 2018, no further Herceptin planned, baseline scans pursued posttreatment, post influenza syndrome, physical therapy planned  · She has since remained in observation with no evidence of recurrent disease    2.  Chronic thrombocytopenia  · Previously struggled with pancytopenia during chemotherapy  · Baseline platelets 80,000 and 90,000  · Acute worsening of thrombocytopenia April 2021 with platelet count of 44,000  · Placed on prednisone 20 mg twice daily with improvement in her platelet count  · She is struggled to tolerate prednisone with insomnia, agitation, tremors and feeling of jitteriness.  We therefore have rapidly tapered prednisone with stability/improvement of platelet count  · Today, 6/30/2021, continuing on prednisone 15 mg daily  · Platelets stable at 83,000.  We will further taper prednisone as outlined below    3.  Thyroid nodule  · Previous PET scan demonstrated abnormality within the right thyroid lobe  · Status post partial thyroidectomy                                                                          4.  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  Worsening reflux symptoms, GI referral planned.  She did not complain of this today.    6.  Hospitalization September 20-October 1 with enteropathic E. coli.  This responded to conservative therapy with plans for reassessment by Dr. Carlin with outpatient colonoscopy.      Plan:  1. Decrease prednisone to 10 mg daily x1 week, then 5 mg daily x1 week  2. Return in 2 weeks for CBC, IPF,  nurse practitioner evaluation  3. MD follow-up with Dr. Coburn in 4 weeks    Today's plan of care was reviewed with Dr. Coburn who is in agreement    Kallie Peng, RUDY  06/30/2021

## 2021-07-09 ENCOUNTER — HOSPITAL ENCOUNTER (OUTPATIENT)
Dept: ULTRASOUND IMAGING | Facility: HOSPITAL | Age: 81
Discharge: HOME | End: 2021-07-09
Admitting: OTOLARYNGOLOGY

## 2021-07-09 DIAGNOSIS — D50.9 IRON DEFICIENCY ANEMIA, UNSPECIFIED IRON DEFICIENCY ANEMIA TYPE: Primary | ICD-10-CM

## 2021-07-09 DIAGNOSIS — E04.1 THYROID NODULE: ICD-10-CM

## 2021-07-09 DIAGNOSIS — D72.818 OTHER DECREASED WHITE BLOOD CELL (WBC) COUNT: ICD-10-CM

## 2021-07-09 PROCEDURE — 76536 US EXAM OF HEAD AND NECK: CPT

## 2021-07-14 ENCOUNTER — OFFICE VISIT (OUTPATIENT)
Dept: ONCOLOGY | Facility: CLINIC | Age: 81
End: 2021-07-14

## 2021-07-14 ENCOUNTER — LAB (OUTPATIENT)
Dept: OTHER | Facility: HOSPITAL | Age: 81
End: 2021-07-14

## 2021-07-14 VITALS
TEMPERATURE: 97.3 F | HEIGHT: 62 IN | WEIGHT: 142.1 LBS | HEART RATE: 77 BPM | BODY MASS INDEX: 26.15 KG/M2 | DIASTOLIC BLOOD PRESSURE: 69 MMHG | SYSTOLIC BLOOD PRESSURE: 146 MMHG | OXYGEN SATURATION: 94 % | RESPIRATION RATE: 18 BRPM

## 2021-07-14 DIAGNOSIS — D50.9 IRON DEFICIENCY ANEMIA, UNSPECIFIED IRON DEFICIENCY ANEMIA TYPE: ICD-10-CM

## 2021-07-14 DIAGNOSIS — D69.6 THROMBOCYTOPENIA (HCC): Primary | ICD-10-CM

## 2021-07-14 DIAGNOSIS — D64.9 ANEMIA, UNSPECIFIED TYPE: ICD-10-CM

## 2021-07-14 DIAGNOSIS — D72.818 OTHER DECREASED WHITE BLOOD CELL (WBC) COUNT: ICD-10-CM

## 2021-07-14 LAB
ANISOCYTOSIS BLD QL: NORMAL
BASOPHILS # BLD AUTO: 0.02 10*3/MM3 (ref 0–0.2)
BASOPHILS NFR BLD AUTO: 0.3 % (ref 0–1.5)
DEPRECATED RDW RBC AUTO: 58 FL (ref 37–54)
EOSINOPHIL # BLD AUTO: 0.02 10*3/MM3 (ref 0–0.4)
EOSINOPHIL NFR BLD AUTO: 0.3 % (ref 0.3–6.2)
ERYTHROCYTE [DISTWIDTH] IN BLOOD BY AUTOMATED COUNT: 15.9 % (ref 12.3–15.4)
HCT VFR BLD AUTO: 30.3 % (ref 34–46.6)
HGB BLD-MCNC: 10.3 G/DL (ref 12–15.9)
IMM GRANULOCYTES # BLD AUTO: 0.15 10*3/MM3 (ref 0–0.05)
IMM GRANULOCYTES NFR BLD AUTO: 1.9 % (ref 0–0.5)
LYMPHOCYTES # BLD AUTO: 1.16 10*3/MM3 (ref 0.7–3.1)
LYMPHOCYTES NFR BLD AUTO: 14.8 % (ref 19.6–45.3)
MCH RBC QN AUTO: 33.6 PG (ref 26.6–33)
MCHC RBC AUTO-ENTMCNC: 34 G/DL (ref 31.5–35.7)
MCV RBC AUTO: 98.7 FL (ref 79–97)
MONOCYTES # BLD AUTO: 2.9 10*3/MM3 (ref 0.1–0.9)
MONOCYTES NFR BLD AUTO: 37 % (ref 5–12)
NEUTROPHILS NFR BLD AUTO: 3.59 10*3/MM3 (ref 1.7–7)
NEUTROPHILS NFR BLD AUTO: 45.7 % (ref 42.7–76)
NRBC BLD AUTO-RTO: 0 /100 WBC (ref 0–0.2)
PLAT MORPH BLD: NORMAL
PLATELET # BLD AUTO: 65 10*3/MM3 (ref 140–450)
PLATELET # BLD AUTO: 65 10*3/MM3 (ref 140–450)
PLATELETS.RETICULATED NFR BLD AUTO: 13.2 % (ref 0.9–6.5)
PMV BLD AUTO: 11.9 FL (ref 6–12)
RBC # BLD AUTO: 3.07 10*6/MM3 (ref 3.77–5.28)
SPHEROCYTES BLD QL SMEAR: NORMAL
WBC # BLD AUTO: 7.84 10*3/MM3 (ref 3.4–10.8)
WBC MORPH BLD: NORMAL

## 2021-07-14 PROCEDURE — 85007 BL SMEAR W/DIFF WBC COUNT: CPT | Performed by: INTERNAL MEDICINE

## 2021-07-14 PROCEDURE — 99214 OFFICE O/P EST MOD 30 MIN: CPT | Performed by: NURSE PRACTITIONER

## 2021-07-14 PROCEDURE — 36415 COLL VENOUS BLD VENIPUNCTURE: CPT

## 2021-07-14 PROCEDURE — 85055 RETICULATED PLATELET ASSAY: CPT | Performed by: INTERNAL MEDICINE

## 2021-07-14 PROCEDURE — 85025 COMPLETE CBC W/AUTO DIFF WBC: CPT | Performed by: INTERNAL MEDICINE

## 2021-07-14 RX ORDER — CEPHALEXIN 500 MG/1
CAPSULE ORAL
COMMUNITY
Start: 2021-07-13 | End: 2021-07-27 | Stop reason: SDDI

## 2021-07-14 NOTE — PROGRESS NOTES
REASON FOR FOLLOWUP:    1. Large right breast cancer.  Core needle biopsy reported invasive mammary carcinoma with focal lobular feature, grade 2.  ER negative, less than 1%; MT positive, moderate in staining, 5% to 10%. HER2 IHC 2+, FISH study positive 2.1.  Completed neoadjuvant therapy with Taxol Herceptin Perjeta.  Complete response, postmastectomy.  She is since remained in observation with no evidence of disease.    2.  Hospitalization 9/28/2021 through 10/1/2021 for acute colitis secondary to enteropathic E. coli    3.  Chronic ITP.  Currently on steroid taper                                                                                                           HISTORY OF PRESENT ILLNESS:     The patient is a 80 y.o. female with the above-mentioned history who is here today for lab review of her chronic ITP.  She took her last dose of prednisone 5 mg this morning.  She does report several incidents where she has bumped into things and had some bleeding.  She has multiple bandages on her lower extremities.  Otherwise she denies issues with nosebleeds, melena, or hematochezia.         Past Medical History:   Diagnosis Date   • Alcoholism (CMS/[facility]) 1978    I haven't had a drink since then   • Anemia    • Breast cancer (CMS/[facility]) 05/03/2017    Right breast invasive mammary carcinoma with focal lobular features, grade 2, ER negative, less than 1% MT positive, HER-2 positive, stage IIIa disease (T3, N2, M0   • Breast cancer (CMS/[facility]) 10/20/2004    Left breast ductal carcinoma in-situ, predominately intermediate grade with focal comedonecrosis (high grade), solid and bribridform patterns involving approximately five core biopsy fragments   • Edema     Chronic lower extremity edema   • GERD (gastroesophageal reflux disease) 09/13/2011    Dr. Paul Negron   • GI (gastrointestinal bleed) 1997   • H/O jaundice    • Hernia     INCISONAL. AROUND LEFT TRAM LAP SITE   • History of chemotherapy     2017 4 MONTHS IN 2017    • History of Clostridium difficile colitis     JAN 2018   • History of histoplasmosis    • History of infectious mononucleosis 1960   • History of migraine headaches    • History of pneumonia 12/27/2017    AS RESULT OF FLU. ENDED UP ON VENT IN [place]   • History of thrombocytopenia    • History of transfusion 1997   • History of vertigo    • Hx of colonic polyps 06/11/2009    Dr. Giles   • Hyperlipidemia    • Hypertension    • Meningioma (CMS/HCC)    • Neuropathy     HANDS AND FEET   • Osteoarthritis 09/13/2011    Dr. Jamil Miller   • Peptic ulceration    • Retina disorder     BLEED. FROM HISTOPLASMOSIS   • SBO (small bowel obstruction) (CMS/HCC)     due to hernia with surgical repair in 09/2011   • SOB (shortness of breath) on exertion    • Thyroid mass     RIGHT   Normal echocardiogram on 5/18/2017, LVEF 68%.    Past Surgical History:   Procedure Laterality Date   • APPENDECTOMY N/A 1960   • BLADDER REPAIR N/A 1980   • BREAST BIOPSY Left 10/20/2004    Mammotome incisional biopsy left breast, surgical specimen, left breast, localization clip placement, left breast, confirmatory diagnostic unilateral mammogram, left breast-Dr. Tyrese Alcala, [facility]   • BREAST BIOPSY Right 05/03/2017    PATH: INVASIVE MAMMARY CARCINOMA   • CHOLECYSTECTOMY N/A 1990   • COLONOSCOPY N/A 06/11/2009    Hemorrhoids, otherwise normal, repeat in 5 years-Dr. Noemí Purcell   • EYE SURGERY Right 2017    laser surgery for blood clot   • HYSTERECTOMY Bilateral 1980   • INGUINAL HERNIA REPAIR Right     DR ALESIA GAXIOLA   • MASTECTOMY Left 2004    Left breast mastecotmy with sentinel lymph node biospy-[facility]   • MASTECTOMY W/ SENTINEL NODE BIOPSY Right 11/7/2017    Procedure: RIGHT BREAST MASTECTOMY WITH SENTINEL NODE BIOPSY;  Surgeon: Christian Melo MD;  Location: [facility] OR;  Service:    • LA INSJ TUNNELED CVC W/O SUBQ PORT/ AGE 5 YR/> Left 5/22/2017    Procedure: INSERTION VENOUS ACCESS DEVICE;  Surgeon: Christian Melo  MD;  Location: [facility] OR;  Service: General   • REDUCTION MAMMAPLASTY Right     to match Lt. TRAM flap   • SMALL INTESTINE SURGERY      INCARCRATED HERNIA   • THYROIDECTOMY Right 10/4/2018    Procedure: RT THYROID LOBECTOMY;  Surgeon: Julián Fofana MD;  Location: [facility];  Service: ENT   • TONSILLECTOMY Bilateral    • TRAM FLAP DELAYED Left     WITH MASTOPEXY   • UPPER GASTROINTESTINAL ENDOSCOPY N/A 2011    LA grade A esophagitis with no bleeding, large hiatus hernia (50cm), gastric ulcer-Dr.Laszlo Lezama   • US GUIDED FINE NEEDLE ASPIRATION  2018   Lico catheter placement on 2017 by Dr. Melo.     OB/GYN history: Menarche age 16, menopause at 1985. , 1 miscarriage. No birth control pill use. She did have post menopause hormonal supplementation.      HEMATOLOGIC/ONCOLOGIC HISTORY: The patient is a 80y.o. year old female whom we are consulted for a newly self discovered right breast mass, in 2017. Patient had ultrasound-guided biopsy on 5/3/2017 and confirmed to be invasive mammary carcinoma with focal lobular features, grade 2 Genoa score 6/9. She is here for initial evaluation for management.      Patient is a 76-year-old  female who was seen here previously for chronic mild-to-moderate thrombocytopenia and mild anemia. Recently the patient reports she started having firmness of the right breast that started sometime in April or maybe even in March. She noticed firmness spread from about around the 12 o'clock position, gradually towards the upper lateral side and lower part of the right breast associated mild pain. The patient thought it was related to fibrocystic changes. However, the symptom was getting worse. Patient also reported dented nipple after she started having pain in the right breast. She denies discharge from the nipple. She called her primary care physician, Dr. Martin, who ordered a mammogram study. This was done on 2017 with  architectural distortion of the right breast centrally at 12 o'clock position and had scattered fibroglandular densities throughout the right breast.      The patient subsequently had right breast ultrasound examination on 04/26/2017. Discovered a large right breast mass measuring 5.8 x 3.5 x 3.9 cm. Patient subsequently had ultrasound-guided right breast biopsy on 05/03/2017. Pathology evaluation reported invasive mammary carcinoma with focal lobular feature. Minto score 6/9, overall grade 2. Sample was further sent to the [facility] for test. ER negative, less than 1%; AR positive, moderate in staining, 5% to 10%. HER2 IHC 2+, but FISH study positive 2.1.     She denies weight loss, she actually eats very well. No nausea vomiting. Patient does complain of insomnia, unable to sleep.      This patient has history of left breast DCIS back in 2007, had mastectomy at the [facility]. No hormonal therapy afterwards according to patient. This patient also had a small meningioma, followed by Dr. Smith in [facility], with most recent MRI of the brain in March 2017, with 18 mm meningioma, and followed on an annual basis.     Her neutrophil count on 5/16/17 is normal at 2500, however, records showed starting from 05/2015 and in 09/2016, 01/2017 and 03/2017, all 4 laboratory studies showed mild neutropenia with ANC fluctuating between 1200 and 1600. Etiology is not clear.    Normal echocardiogram on 5/18/2017, LVEF 68%.      CT scan for chest abdomen and pelvis on 5/22/2017 and breast MRI examination on 5/22/2017 reported small right axillary lymph node suspicious for metastatic disease.  No remote metastatic lesion.  Patient has stage IIIa (T3 N2 M0) disease.      Patient will start neoadjuvant chemotherapy with Taxol weekly plus Herceptin weekly for total 12 weeks, and Perjeta every 3 weeks (3-week cycle) during chemotherapy.  Herceptin will be converted to every  3 weeks after chemotherapy finished.  Cycle 1 day 1 5/23/2017.   Status post neoadjuvant chemotherapy the patient was assessed with MRI showing a complete neoadjuvant response.  The patient was reviewed for surgery and questions concerning lymph node dissection-sentinel node evaluation-and need for radiation therapy postop were considered as well as use of additional Herceptin for the balance of the year as well as additional use of anti-hormonal therapy.  Surgery was scheduled November 07, 2017.  Patient next seen in office November 28 having undergone surgery on November 7 with results revealing no residual malignancy in either breast or associated sentinel lymph nodes.  Was elected to continue Herceptin when seen back in office November 20 having initiated Herceptin May 23, 2017.    Patient continued Herceptin with her last treatment given December 19, 2017.     Unfortunately she later experienced an accident while in [place] December 28 with prolonged hospitalization and prolonged ventilator management required.  Apparently she had at least one IV Herceptin therapy given while there and we were contacted March 20, 2018- Dr. Nuno.  Eventually the patient was able to return to [place] is seen back in office May 5 at which time we concluded that she completed Herceptin therapy as result of being off treatment for so long.  Plans were made for baseline scans.  Patient follow-up reexaminations May 8, 2019 with no evidence of recurrent malignancy.  This was again a circumstance when she was assessed October 29, 2019.  Patient seen July 7, 2020 without evidence of recurrent malignancy.  Recurrent GI symptoms noted with GI referral planned.  Patient hospitalized September 28-October 1, 2020 with enteropathic E. coli, acute on chronic thrombocytopenia.     Subsequent testing felt consistent with chronic ITP and trial of steroids initiated April 14, 2021.     Patient on various prednisone doses with improvement of her  "platelet count over 100,000 with 6/15/2021.  MEDICATIONS: The current medication list was reviewed with the patient and updated in the EMR this date per the Medical Assistant. Medication dosages and frequencies were confirmed to be accurate.       ALLERGIES:    Allergies   Allergen Reactions   • Codeine Nausea And Vomiting   • Morphine Nausea And Vomiting     SOCIAL HISTORY:   Social History     Tobacco Use   • Smoking status: Former Smoker     Packs/day: 2.00     Years: 30.00     Pack years: 60.00     Types: Cigarettes     Start date: 1957     Quit date: 4/10/1987     Years since quittin.2   • Smokeless tobacco: Never Used   • Tobacco comment: I haven't had a cigarette in over 30 years   Vaping Use   • Vaping Use: Never used   Substance Use Topics   • Alcohol use: No     Comment: stopped, heavy in past   • Drug use: No     FAMILY HISTORY:  Family History   Problem Relation Age of Onset   • Heart disease Mother    • Aortic aneurysm Mother         abdominal   • Coronary artery disease Mother    • Hypertension Mother    • Miscarriages / Stillbirths Mother    • Diverticulitis Mother    • Heart disease Father    • Heart attack Father         acute   • Hypertension Father    • Early death Father    • Hearing loss Father    • Kidney disease Father    • Breast cancer Daughter 45   • Heart disease Brother    • Hypertension Brother    • Malig Hyperthermia Neg Hx       Objective:   Vitals:    21 1239   BP: 146/69   Pulse: 77   Resp: 18   Temp: 97.3 °F (36.3 °C)   TempSrc: Temporal   SpO2: 94%   Weight: 64.5 kg (142 lb 1.6 oz)   Height: 157.5 cm (62.01\")   PainSc:   6   PainLoc: Knee  Comment: Rt knee from fall   ECOG 0   Physical Exam  Constitutional:       General: She is not in acute distress.     Appearance: She is well-developed.   Pulmonary:      Effort: Pulmonary effort is normal. No respiratory distress.   Skin:     General: Skin is warm and dry.      Comments: Scattered ecchymosis on the upper and " lower extremities bilaterally   Neurological:      Mental Status: She is alert and oriented to person, place, and time.          RECENT LABS:  Results from last 7 days   Lab Units 07/14/21  1231   WBC 10*3/mm3 7.84   NEUTROS ABS 10*3/mm3 3.59   HEMOGLOBIN g/dL 10.3*   HEMATOCRIT % 30.3*   PLATELETS 10*3/mm3 65*  65*    Immature Platelet Fraction (07/14/2021 12:31)  CBC & Differential (07/14/2021 12:31)                 Assessment/Plan     1. Large right breast cancer.  Core needle biopsy reported invasive mammary carcinoma with focal lobular feature, grade 2.  ER negative, less than 1%; OH positive, moderate in staining, 5% to 10%. HER2 IHC 2+, FISH study positive 2.1.   · CT scan for chest abdomen and pelvis and breast MRI examination reported right axillary lymph node suspicious for metastatic disease.  No remote metastatic lesion.   · Patient has stage IIIa (T3 N2 M0) disease.   · neoadjuvant chemotherapy on 5/23/2017 with Taxol weekly plus Herceptin weekly for total 12 weeks, and Perjata every 3 weeks during chemotherapy.  Herceptin will be converted to every 3 weeks after chemotherapy finished.    · Reaction to Herceptin C1D1.  Subsequently tolerated therapy with hydrocortisone as premedication.  · Potential cardiac strain developing, neuropathic symptoms persist, follow-up MRI and surgical assessment will be needed post initial chemotherapeutic phase  · MRI August 17 with interval resolution of abnormal enhancement within breast and right axillary adenopathy, surgery scheduled November 7, Herceptin ongoing every 3 weeks  · Patient seen in office November 28, 2017 status post surgery with apparent complete response, Herceptin continued  · Herceptin given December 19, 2017 subsequent injury in [place] leading to prolonged stay, single treatment given through additional cancer Center  · May 01, 2018, no further Herceptin planned, baseline scans pursued posttreatment, post influenza syndrome, physical therapy  planned  · She has since remained in observation with no evidence of recurrent disease    2.  Chronic thrombocytopenia  · Previously struggled with pancytopenia during chemotherapy  · Baseline platelets 80,000 and 90,000  · Acute worsening of thrombocytopenia April 2021 with platelet count of 44,000  · Placed on prednisone 20 mg twice daily with improvement in her platelet count  · She is struggled to tolerate prednisone with insomnia, agitation, tremors and feeling of jitteriness.  We therefore have rapidly tapered prednisone with stability/improvement of platelet count  · Today, 6/30/2021, continuing on prednisone 15 mg daily  · 6/30/2021 plt stable at 83,000.  We will further taper prednisone to 10 mg daily x1 week, then 5 mg daily x1 week.  · 7/14/2021 took her last dose of prednisone 5 mg this morning.  Platelet count today 65,000.  I have reviewed with Dr. Coburn.  We will continue to closely monitor the patient and check a CBC with RN review in 1 week.    3.  Thyroid nodule  · Previous PET scan demonstrated abnormality within the right thyroid lobe  · Status post partial thyroidectomy                                                                          4.  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  Worsening reflux symptoms, GI referral planned.  She did not complain of this today.    6.  Hospitalization September 20-October 1 with enteropathic E. coli.  This responded to conservative therapy with plans for reassessment by Dr. Carlin with outpatient colonoscopy.    7.  Anemia: Hgb 7/14/2021 10.3. Denies bleeding other than from various bumps and scrapes that she has gotten.  We will continue to monitor.     Plan:  1. Return in 1 week for CBC, IPF, with RN review.  2. Return in 2 weeks for follow-up visit with Dr. Coburn with repeat CBC, IPF.  3. Call/return sooner should she develop any bleeding issues, or other concerns or problems.        Today's plan of care was reviewed with  Dr. Coburn, who is in agreement.    Irene Schuler, APRN  07/14/2021

## 2021-07-21 ENCOUNTER — LAB (OUTPATIENT)
Dept: OTHER | Facility: HOSPITAL | Age: 81
End: 2021-07-21

## 2021-07-21 ENCOUNTER — CLINICAL SUPPORT (OUTPATIENT)
Dept: ONCOLOGY | Facility: HOSPITAL | Age: 81
End: 2021-07-21

## 2021-07-21 VITALS
BODY MASS INDEX: 25.83 KG/M2 | OXYGEN SATURATION: 99 % | RESPIRATION RATE: 15 BRPM | SYSTOLIC BLOOD PRESSURE: 134 MMHG | HEART RATE: 80 BPM | DIASTOLIC BLOOD PRESSURE: 71 MMHG | HEIGHT: 61 IN | WEIGHT: 136.8 LBS | TEMPERATURE: 98 F

## 2021-07-21 DIAGNOSIS — D50.9 IRON DEFICIENCY ANEMIA, UNSPECIFIED IRON DEFICIENCY ANEMIA TYPE: ICD-10-CM

## 2021-07-21 DIAGNOSIS — D50.9 IRON DEFICIENCY ANEMIA, UNSPECIFIED IRON DEFICIENCY ANEMIA TYPE: Primary | ICD-10-CM

## 2021-07-21 LAB
BASOPHILS # BLD AUTO: 0.02 10*3/MM3 (ref 0–0.2)
BASOPHILS NFR BLD AUTO: 0.2 % (ref 0–1.5)
DEPRECATED RDW RBC AUTO: 52.7 FL (ref 37–54)
EOSINOPHIL # BLD AUTO: 0.02 10*3/MM3 (ref 0–0.4)
EOSINOPHIL NFR BLD AUTO: 0.2 % (ref 0.3–6.2)
ERYTHROCYTE [DISTWIDTH] IN BLOOD BY AUTOMATED COUNT: 15.1 % (ref 12.3–15.4)
HCT VFR BLD AUTO: 32.1 % (ref 34–46.6)
HGB BLD-MCNC: 10.6 G/DL (ref 12–15.9)
IMM GRANULOCYTES # BLD AUTO: 0.11 10*3/MM3 (ref 0–0.05)
IMM GRANULOCYTES NFR BLD AUTO: 1.4 % (ref 0–0.5)
LYMPHOCYTES # BLD AUTO: 1.6 10*3/MM3 (ref 0.7–3.1)
LYMPHOCYTES NFR BLD AUTO: 19.8 % (ref 19.6–45.3)
MCH RBC QN AUTO: 32 PG (ref 26.6–33)
MCHC RBC AUTO-ENTMCNC: 33 G/DL (ref 31.5–35.7)
MCV RBC AUTO: 97 FL (ref 79–97)
MONOCYTES # BLD AUTO: 2.21 10*3/MM3 (ref 0.1–0.9)
MONOCYTES NFR BLD AUTO: 27.4 % (ref 5–12)
NEUTROPHILS NFR BLD AUTO: 4.11 10*3/MM3 (ref 1.7–7)
NEUTROPHILS NFR BLD AUTO: 51 % (ref 42.7–76)
NRBC BLD AUTO-RTO: 0 /100 WBC (ref 0–0.2)
PLAT MORPH BLD: NORMAL
PLATELET # BLD AUTO: 67 10*3/MM3 (ref 140–450)
PMV BLD AUTO: 12.7 FL (ref 6–12)
RBC # BLD AUTO: 3.31 10*6/MM3 (ref 3.77–5.28)
RBC MORPH BLD: NORMAL
WBC # BLD AUTO: 8.07 10*3/MM3 (ref 3.4–10.8)
WBC MORPH BLD: NORMAL

## 2021-07-21 PROCEDURE — 85025 COMPLETE CBC W/AUTO DIFF WBC: CPT | Performed by: INTERNAL MEDICINE

## 2021-07-21 PROCEDURE — 85007 BL SMEAR W/DIFF WBC COUNT: CPT | Performed by: INTERNAL MEDICINE

## 2021-07-21 PROCEDURE — G0463 HOSPITAL OUTPT CLINIC VISIT: HCPCS

## 2021-07-21 PROCEDURE — 36415 COLL VENOUS BLD VENIPUNCTURE: CPT

## 2021-07-26 NOTE — PROGRESS NOTES
REASON FOR FOLLOWUP: Patient noticing increasing fatigue      1. Newly diagnosed large right breast cancer.  Core needle biopsy reported invasive mammary carcinoma with focal lobular feature, grade 2.  ER negative, less than 1%; AR positive, moderate in staining, 5% to 10%. HER2 IHC 2+, FISH study positive 2.1.   2.  CT scan for chest abdomen and pelvis and breast MRI examination reported right axillary lymph node suspicious for metastatic disease.  No remote metastatic lesion.  Patient has stage IIIa (T3 N2 M0) disease.   3.  Patient was started neoadjuvant chemotherapy on 5/23/2017 with Taxol weekly plus Herceptin weekly for total 12 weeks, and Perjata every 3 weeks during chemotherapy.  Herceptin will be converted to every 3 weeks after chemotherapy finished.    4.  Chronic moderate thrombocytopenia, mild anemia, and intermittent mild neutropenia etiologies are not clear.  5.  Reaction to Herceptin C1D1.  Subsequently tolerated therapy with hydrocortisone as premedication.  6.  Potential cardiac strain developing, neuropathic symptoms persist, follow-up MRI and surgical assessment will be needed post initial chemotherapeutic phase  7.  MRI August 17 with interval resolution of abnormal enhancement within breast and right axillary adenopathy, surgery scheduled November 7, Herceptin ongoing every 3 weeks  8.  Patient seen in office November 28, status post surgery with apparent complete response, Herceptin continued  9.  Herceptin given December 19, subsequent injury in [place] leading to prolonged stay, single treatment given through additional cancer Center  10.  Patient seen May 01, 2018, no further Herceptin planned, baseline scans pursued posttreatment, post influenza syndrome, physical therapy planned  11.  Patient seen June 13, 2018, excellent performance status, improving on physical therapy, equivocal CT  per thoracic adenopathy, follow-up PET/CT planned   12.  PET/CT with improvement,?  Thyroid  abnormality with ultrasound planned  13.  Patient seen October 3, partial thyroidectomy anticipated October 14-    14.  Patient seen May 14, 2019, scans negative for evidence of recurrence     15.  Patient seen 3/29/2019 clinically stable  16.  Patient reviewed July 7, ongoing GERD symptoms, GI referral planned.  17.  Hospitalization September 28-October 1-acute colitis due to enteropathic E. coli-residual thrombocytopenia  18.  Subsequent testing consistent with chronic ITP                                                                                                                                       HISTORY OF PRESENT ILLNESS:    The patient is a pleasant 80 y.o. with the above-mentioned history, who presents today in anticipation of cycle 4 of Herceptin, Perjeta and Taxol.  This is the first visit with this physician involving this patient's case.  We have reviewed her status today with her slowly developing neuropathic symptoms in her lower extremities but also involving her fingers recognized significantly and she plays the piano and keyboard.  This is becoming harder to do but she is still able to do so.  She also notices neuropathy in her feet but is able to walk and function in daily activities.  Additional to this is her continued grieving at the death of her  though she, fortunately, has an excellent support system.  She continues to experience nausea that is well relieved with Compazine. She denies oral mucositis.  No diarrhea no constipation no chest pain no dyspnea.  No lower extremity edema.    She did received 2 units of PRBC's on   7/8/2017 and remains hematologically stable.   She does have difficulty with sleep and Ambien 10 mg daily at bedtime seems help much better compared to 5 mg dose.  She does not need refills today.  In reviewing her case July 26 she is entering the fourth cycle of her treatment and recent echocardiogram is reviewed with she and her close friend revealing  EF of 66.7% though with global strain of -16%?  Suspicious for myopathic process.  Normal ventricular cavity size and wall thickness as well as contractility.  We have also discussed that she is responding to therapy and will need surgical assessment which may not as yet have been performed.  She has seen Dr. Melo for port placement and will ask him to review her likely just after she has completed his fourth cycle of therapy.  It is also apparent that she'll need a cardiac assessment as we continue subsequent Herceptin therapy perioperatively.      The patient underwent MRI of the breasts August 17 demonstrating interval resolution of abnormal enhancement within the right breast, resolution of right axillary adenopathy had also resolved.  The findings were consistent with a complete response to neoadjuvant chemotherapy.  The patient was seen in subsequent follow-up with Dr. Melo and was determined to proceed with surgery and ultimately sentinel node evaluation.  This is now scheduled November 7 and there are additional plans to consider radiation therapy postoperatively and we have also discussed the use of anti-hormonal therapy considering her mildly positive IA status.    The patient was able to proceed to surgery undergoing right breast mastectomy and sentinel lymph node biopsy November 07, 2017.  She did well postoperatively seeing Dr. Melo November 16.  Pathology revealed no residual malignancy in the breast and 3 negative sentinel lymph nodes.  A formal review of her report reveals treatment effect particularly in her largest lymph node with focal fibrosis and scar, right breast mastectomy fibrosis associated with a cavitary biopsy site and no invasive or in situ carcinoma.  The patient is now seen in our office though she went to the wrong location and the seen late in the day.  We discussed her findings and agree that she would continue Herceptin at this point but be reviewed formally again in 3  weeks.  She is also be seen by radiation therapy for their input.   The patient, evidently, was seen by radiation therapy but decided not to pursue it until her last Herceptin therapy here December 19.  Following this she visited her daughter in [place] and, unfortunately, developed influenza and pneumonia.  She had a prolonged hospitalization and was at many sites in recovery over a several month only to return to [place] in the last several weeks.  She did take 1 IV Herceptin dose while in [place] but as she is reviewed May 05, 2018 we have discussed that it makes no particular sense to continue or add on to her previous history by extending Herceptin any further 3-4 months after her last treatment.  She therefore has completed Herceptin.    The patient on to be tested post her treatment again baseline studies and CT of chest and pelvis were performed June 6 revealing interval increase in a few subcentimeter nodes in the left pectoral, axillary and mediastinal regions. These are all considerably small and of uncertain significance.  The patient's performance status remains excellent without any reduction and, in fact, has improved with physical therapy upon return.       Patient is next seen August 08, 2018, fortunately feeling well.  A follow-up PET/CT had revealed an interval decrease in the subcentimeter nodes in the left subpectoral axillary region as well as mediastinum.  There was no hypermetabolic lymphadenopathy.  There was intense focus within slightly enlarged right thyroid gland.  Patient indicates she never had any thyroid issues of which she is aware.  The patient was referred to ENT for assessment and the patient was seen by Dr. Fofana.  Ultimately a subtotal thyroidectomy is anticipated and now scheduled October 4.  The patient is willing to proceed.        The patient proceeded to a right thyroid lobectomy performed November 04, 2018.  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  There was no definitive evidence of trans-capsular or vascular invasion.    The patient is seen in follow-up November 28 doing well and we discussed the surgical treatment of this thyroid lesion.  She feels that all this went well.  She has additional cataract surgery at the end of November  scheduled also.  The patient did complete her testing and was asked to return approximately 6 months later with a follow-up CT chest, abdomen and pelvis that demonstrate no evidence of recurrent disease.  As she is seen back May 14 she, unfortunately, fell and contused her face, left knee and left hand.  This required an ER visit.  Is elected to follow her back in 6 months maintaining her port in the interval and she is seen October 29 again without indications of recurrent disease and relatively stable clinically.  She is seeing plastic surgery about reconstruction and otherwise continue her current medication list and following with her primary care regularly.  The patient is next seen July 7, 2020 indicating that she did not proceed to any  plastic surgery and with the COVID-19 epidemic she does not wish to have any elective procedures.  She has, however, having significant GI reflux symptoms that have worsened substantially last several months despite PPI therapy.    The patient had follow-up for meningioma September 11, 2020 unchanged from previous.        The patient is next reviewed October 27, 2020 having been hospitalized September 28 through October 1, 2020.  She had presented with fever and flank pain and was found to have acute colitis due to enteropathogenic E. coli.  Antibiotics were avoided secondary to history of C. difficile colitis and fortunately she did not want to improve albeit slowly.  She had evidence of acute on chronic thrombocytopenia with a platelet count dropping to close to 30,000 and slowly rebounding assessed October 6 at 64,000 and October 13 at 67,000.  Fortunately she is  feeling considerably better with her bowel function normalizing.  We had the patient return for ongoing testing documenting continued thrombocytopenia and IPF at 9.5 818 October 2020.  She seen back April 13, 2021 she has further thrombocytopenia with peripheral smear showing enlarged platelets.  Is felt this consistent with chronic ITP.  The patient was treated with prednisone 8.5 mg/kg and able to gradually taper last been seen through July 14, 2020 having dropped to 5 mg.  She discontinued the medication tested 7/21/2021 with H&H of 10.6 and 32.1, white count of 8070 and platelet count of 67,000.  She is next seen 7/27/2021.  She has been able to taper off of steroids altogether but recently injured her lower extremities on the table and another physician's office and was treated for potential cellulitis developing.  This included antibiotic treatment-ciprofloxacin-producing nausea which has been difficult to recover from.  She is now completed antibiotic therapy altogether approximately 2 days ago.           Past Medical History:   Diagnosis Date   • Alcoholism (CMS/[facility]) 1978    I haven't had a drink since then   • Anemia    • Breast cancer (CMS/[facility]) 05/03/2017    Right breast invasive mammary carcinoma with focal lobular features, grade 2, ER negative, less than 1% WA positive, HER-2 positive, stage IIIa disease (T3, N2, M0   • Breast cancer (CMS/[facility]) 10/20/2004    Left breast ductal carcinoma in-situ, predominately intermediate grade with focal comedonecrosis (high grade), solid and bribridform patterns involving approximately five core biopsy fragments   • Edema     Chronic lower extremity edema   • GERD (gastroesophageal reflux disease) 09/13/2011    Dr. Paul Negron   • GI (gastrointestinal bleed) 1997   • H/O jaundice    • Hernia     INCISONAL. AROUND LEFT TRAM LAP SITE   • History of chemotherapy     2017 4 MONTHS IN 2017   • History of Clostridium difficile colitis     JAN 2018   • History of  histoplasmosis    • History of infectious mononucleosis 1960   • History of migraine headaches    • History of pneumonia 12/27/2017    AS RESULT OF FLU. ENDED UP ON VENT IN [place]   • History of thrombocytopenia    • History of transfusion 1997   • History of vertigo    • Hx of colonic polyps 06/11/2009    Dr. Giles   • Hyperlipidemia    • Hypertension    • Meningioma (CMS/HCC)    • Neuropathy     HANDS AND FEET   • Osteoarthritis 09/13/2011    Dr. Jamil Miller   • Peptic ulceration    • Retina disorder     BLEED. FROM HISTOPLASMOSIS   • SBO (small bowel obstruction) (CMS/HCC)     due to hernia with surgical repair in 09/2011   • SOB (shortness of breath) on exertion    • Thyroid mass     RIGHT   Normal echocardiogram on 5/18/2017, LVEF 68%.    Past Surgical History:   Procedure Laterality Date   • APPENDECTOMY N/A 1960   • BLADDER REPAIR N/A 1980   • BREAST BIOPSY Left 10/20/2004    Mammotome incisional biopsy left breast, surgical specimen, left breast, localization clip placement, left breast, confirmatory diagnostic unilateral mammogram, left breast-Dr. Tyrese Alcala, [facility]   • BREAST BIOPSY Right 05/03/2017    PATH: INVASIVE MAMMARY CARCINOMA   • CHOLECYSTECTOMY N/A 1990   • COLONOSCOPY N/A 06/11/2009    Hemorrhoids, otherwise normal, repeat in 5 years-Dr. Noemí Purcell   • EYE SURGERY Right 2017    laser surgery for blood clot   • HYSTERECTOMY Bilateral 1980   • INGUINAL HERNIA REPAIR Right     DR ALESIA GAXIOLA   • MASTECTOMY Left 2004    Left breast mastecotmy with sentinel lymph node biospy-[facility]   • MASTECTOMY W/ SENTINEL NODE BIOPSY Right 11/7/2017    Procedure: RIGHT BREAST MASTECTOMY WITH SENTINEL NODE BIOPSY;  Surgeon: Christian Melo MD;  Location: [facility] OR;  Service:    • OH INSJ TUNNELED CVC W/O SUBQ PORT/ AGE 5 YR/> Left 5/22/2017    Procedure: INSERTION VENOUS ACCESS DEVICE;  Surgeon: Christian Melo MD;  Location: [facility] OR;  Service: General   • REDUCTION MAMMAPLASTY  Right     to match Lt. TRAM flap   • SMALL INTESTINE SURGERY      INCARCRATED HERNIA   • THYROIDECTOMY Right 10/4/2018    Procedure: RT THYROID LOBECTOMY;  Surgeon: Julián Fofana MD;  Location: [facility] OR [facility] [place] [facility];  Service: ENT   • TONSILLECTOMY Bilateral    • TRAM FLAP DELAYED Left     WITH MASTOPEXY   • UPPER GASTROINTESTINAL ENDOSCOPY N/A 2011    LA grade A esophagitis with no bleeding, large hiatus hernia (50cm), gastric ulcer-Dr.Laszlo Lezama   • US GUIDED FINE NEEDLE ASPIRATION  2018   Lico catheter placement on 2017 by Dr. Melo.     OB/GYN history: Menarche age 16, menopause at 1985. , 1 miscarriage. No birth control pill use. She did have post menopause hormonal supplementation.      HEMATOLOGIC/ONCOLOGIC HISTORY: The patient is a 80y.o. year old female whom we are consulted for a newly self discovered right breast mass, in 2017. Patient had ultrasound-guided biopsy on 5/3/2017 and confirmed to be invasive mammary carcinoma with focal lobular features, grade 2 Elen score 6/9. She is here for initial evaluation for management.      Patient is a 76-year-old  female who was seen here previously for chronic mild-to-moderate thrombocytopenia and mild anemia. Recently the patient reports she started having firmness of the right breast that started sometime in April or maybe even in March. She noticed firmness spread from about around the 12 o'clock position, gradually towards the upper lateral side and lower part of the right breast associated mild pain. The patient thought it was related to fibrocystic changes. However, the symptom was getting worse. Patient also reported dented nipple after she started having pain in the right breast. She denies discharge from the nipple. She called her primary care physician, Dr. Martin, who ordered a mammogram study. This was done on 2017 with architectural distortion of the right breast centrally at 12 o'clock position and  had scattered fibroglandular densities throughout the right breast.      The patient subsequently had right breast ultrasound examination on 04/26/2017. Discovered a large right breast mass measuring 5.8 x 3.5 x 3.9 cm. Patient subsequently had ultrasound-guided right breast biopsy on 05/03/2017. Pathology evaluation reported invasive mammary carcinoma with focal lobular feature. Grant score 6/9, overall grade 2. Sample was further sent to the [facility] for test. ER negative, less than 1%; IL positive, moderate in staining, 5% to 10%. HER2 IHC 2+, but FISH study positive 2.1.     She denies weight loss, she actually eats very well. No nausea vomiting. Patient does complain of insomnia, unable to sleep.      This patient has history of left breast DCIS back in 2007, had mastectomy at the [facility]. No hormonal therapy afterwards according to patient. This patient also had a small meningioma, followed by Dr. Smith in [facility], with most recent MRI of the brain in March 2017, with 18 mm meningioma, and followed on an annual basis.     Her neutrophil count on 5/16/17 is normal at 2500, however, records showed starting from 05/2015 and in 09/2016, 01/2017 and 03/2017, all 4 laboratory studies showed mild neutropenia with ANC fluctuating between 1200 and 1600. Etiology is not clear.    Normal echocardiogram on 5/18/2017, LVEF 68%.      CT scan for chest abdomen and pelvis on 5/22/2017 and breast MRI examination on 5/22/2017 reported small right axillary lymph node suspicious for metastatic disease.  No remote metastatic lesion.  Patient has stage IIIa (T3 N2 M0) disease.      Patient will start neoadjuvant chemotherapy with Taxol weekly plus Herceptin weekly for total 12 weeks, and Perjeta every 3 weeks (3-week cycle) during chemotherapy.  Herceptin will be converted to every 3 weeks after chemotherapy finished.  Cycle 1 day 1 5/23/2017.   Status post  neoadjuvant chemotherapy the patient was assessed with MRI showing a complete neoadjuvant response.  The patient was reviewed for surgery and questions concerning lymph node dissection-sentinel node evaluation-and need for radiation therapy postop were considered as well as use of additional Herceptin for the balance of the year as well as additional use of anti-hormonal therapy.  Surgery was scheduled November 07, 2017.  Patient next seen in office November 28 having undergone surgery on November 7 with results revealing no residual malignancy in either breast or associated sentinel lymph nodes.  Was elected to continue Herceptin when seen back in office November 20 having initiated Herceptin May 23, 2017.    Patient continued Herceptin with her last treatment given December 19, 2017.     Unfortunately she later experienced an accident while in [place] December 28 with prolonged hospitalization and prolonged ventilator management required.  Apparently she had at least one IV Herceptin therapy given while there and we were contacted March 20, 2018- Dr. Nuno.  Eventually the patient was able to return to [place] is seen back in office May 5 at which time we concluded that she completed Herceptin therapy as result of being off treatment for so long.  Plans were made for baseline scans.  Patient follow-up reexaminations May 8, 2019 with no evidence of recurrent malignancy.  This was again a circumstance when she was assessed October 29, 2019.  Patient seen July 7, 2020 without evidence of recurrent malignancy.  Recurrent GI symptoms noted with GI referral planned.  Patient hospitalized September 28-October 1, 2020 with enteropathic E. coli, acute on chronic thrombocytopenia.     Subsequent testing felt consistent with chronic ITP and trial of steroids initiated April 14, 2021.  She is able to taper off of prednisone altogether by mid July 2021.    MEDICATIONS: The current medication list was reviewed with the patient  and updated in the EMR this date per the Medical Assistant. Medication dosages and frequencies were confirmed to be accurate.       ALLERGIES:    Allergies   Allergen Reactions   • Codeine Nausea And Vomiting   • Morphine Nausea And Vomiting     SOCIAL HISTORY:   Social History     Tobacco Use   • Smoking status: Former Smoker     Packs/day: 2.00     Years: 30.00     Pack years: 60.00     Types: Cigarettes     Start date: 1957     Quit date: 4/10/1987     Years since quittin.3   • Smokeless tobacco: Never Used   • Tobacco comment: I haven't had a cigarette in over 30 years   Vaping Use   • Vaping Use: Never used   Substance Use Topics   • Alcohol use: No     Comment: stopped, heavy in past   • Drug use: No     FAMILY HISTORY:  Family History   Problem Relation Age of Onset   • Heart disease Mother    • Aortic aneurysm Mother         abdominal   • Coronary artery disease Mother    • Hypertension Mother    • Miscarriages / Stillbirths Mother    • Diverticulitis Mother    • Heart disease Father    • Heart attack Father         acute   • Hypertension Father    • Early death Father    • Hearing loss Father    • Kidney disease Father    • Breast cancer Daughter 45   • Heart disease Brother    • Hypertension Brother    • Malig Hyperthermia Neg Hx      I have reviewed the patient's medical history in detail and updated the computerized patient record.    REVIEW OF SYSTEMS:  GENERAL: See history of present illness.  Recent fall and sustained contusions, no change in appetite or weight;   No fevers, chills, sweats.   SKIN: Mild contusions anterior lower extremities  HEME/LYMPH: See HPI.   EYES: No vision changes or diplopia.   ENT: No tinnitus, hearing loss, gum bleeding, epistaxis, hoarseness or dysphagia.   RESPIRATORY: No cough, Has exertional dyspnea, No hemoptysis or wheezing.   CVS: No chest pain, palpitations, orthopnea, dyspnea on exertion or PND.   GI: Worsening reflux symptoms  : No lower tract  "obstructive symptoms, dysuria or hematuria.   MUSCULOSKELETAL: Chronic or joint pain, arthritis.  Has mild intermittent ankle swelling.   NEUROLOGICAL: See HPI  PSYCHIATRIC: See HPI     Objective:   Vitals:    07/27/21 1250   BP: 143/73   Pulse: 82   Resp: 16   Temp: 97.1 °F (36.2 °C)   TempSrc: Temporal   SpO2: 95%   Weight: 62.9 kg (138 lb 9.6 oz)   Height: 154.9 cm (60.98\")   PainSc: 0-No pain   ECOG 0      PHYSICAL EXAM:   GENERAL: Well-developed, well-nourished female, in no acute distress.   SKIN: Warm, dry without rashes, purpura or petechiae.  Left upper chest Lico catheter in place, no evidence of infection.  Contusions just lateral to the right eye, left hand and left knee  EYES: Pupils equal, round and reactive to light. EOMs intact. Conjunctivae normal.  EARS: Hearing intact.  NOSE:  No excoriations or nasal discharge.  MOUTH: Tongue is well-papillated; no stomatitis or ulcers. Lips normal.  THROAT: Oropharynx without lesions or exudates.  Status post partial thyroidectomy well-healing  LYMPHATICS: No cervical, supraclavicular, axillary adenopathy.  CHEST: Lungs clear to auscultation. Good airflow.   BREAST:Postop, Well healed right mastectomy site with no evidence of recurrent disease, no axillary adenopathy bilaterally.  CARDIAC: Regular rate and rhythm without murmurs. Normal S1,S2.  ABDOMEN: Slightly distended, normal active bowel sounds,  no hepatosplenomegaly or masses.  EXTREMITIES: Areas of contusion/small lacerations anterior lower extremities healing without additional inflammation.  NEUROLOGICAL: Cranial Nerves II-XII grossly intact. No focal neurological deficits.  PSYCHIATRIC: Alert and oriented, pleased about her results      RECENT LABS:  Lab Results   Component Value Date    WBC 9.57 07/27/2021    HGB 10.5 (L) 07/27/2021    HCT 31.1 (L) 07/27/2021    MCV 96.9 07/27/2021    PLT 90 (L) 07/27/2021     Lab Results   Component Value Date    NEUTROABS 5.19 07/27/2021     Lab Results "   Component Value Date    GLUCOSE 103 (H) 10/01/2020    BUN 18 12/07/2020    CREATININE 0.98 12/07/2020    EGFRIFNONA 55 (L) 12/07/2020    EGFRIFAFRI 66 12/07/2020    BCR 18.4 12/07/2020    K 4.4 12/07/2020    CO2 27.9 12/07/2020    CALCIUM 9.3 12/07/2020    PROTENTOTREF 7.0 12/07/2020    ALBUMIN 4.80 12/07/2020    LABIL2 2.2 12/07/2020    AST 35 (H) 12/07/2020    ALT 34 (H) 12/07/2020            Assessment/Plan   1. Large right breast cancer, locally advanced, stage IIIa (T3 N1/ 2 M0).  ER negative, less than 1%; LA positive, moderate in staining, 5% to 10%. HER2 IHC 2+, FISH study positive 2.1.  Physical examination showed a large mass, 7 cm x 7 cm initially which has decreased to approximately less than 4 cm ..  Patient  proceeded through 4 cycles ceptin,Perjeta and Taxol.   Her subsequent MRI examination showed complete response by imaging including right axillary lymphadenopathy.  We have continued Herceptin and that includes today October 20, 2017.  The case was discussed with Dr. Melo and plans were to proceed with mastectomy plus right axillary lymph node assessment via sentinel node evaluation. it was felt she likely require radiation therapy post procedure.  The patient was scheduled and proceeded to surgery November 7.  Her results, wonderfully, revealed no evidence of residual disease in the breast or associated sentinel lymph nodes. She was seen by radiation therapy and offered treatment did not pursue it.  Additionally, and somewhat complicating this story, she traveled to [place] and developed severe influenza, associated pneumonia and was hospitalized for several months only to return to [place] in the last several weeks now being seen back May 1.  There is no particular benefit from trying to add Herceptin back to her therapy now and is felt that she is completed Herceptin treatment.  She wishes consider reconstruction and baseline CT scans will be requested in 5 weeks with the patient  being seen in 6 weeks follow-up.The patient reviewed June 13, 2018 with the scans demonstrating very modest increase in left pectoral, axillary or mediastinal nodes.  These are of uncertain significance but do need follow-up in a PET/CT is planned in 7 weeks.  Her anemia will also be reviewed again with additional laboratory studies that visit.  She'll be seen in 8 weeks for general reassessment.    The patient's anemia workup was essentially negative and she is slowly improving when seen back August 8.  Her PET/CT, fortunately, demonstrates improvement though there is potential abnormality within the right thyroid lobe.  We discussed thyroid function testing and follow-up thyroid ultrasound.  She will be seen back in approximately 2 months as we maintain her port monthly flushes.   The patient was reviewed by ENT and ultimately was felt that a partial thyroidectomy was in order and is now scheduled October 4.  Patient's one to proceed.  We'll plan to see her back in approximately 6 weeks.      The patient is next seen November 28, 2015.  Her surgery went well with the findings as noted above.  She has follow-up with ENT in the next several weeks.  Additionally she has bilateral cataract surgery at the end of this month and into the beginning of next.  We discussed reassessment in general with follow-up scans and these were performed May 2019 which showed no evidence of recurrent malignancy.  Six-month follow-up planes with maintenance of her port every 6 weeks.  The patient is reassessed October 29, 2019 fortunately continue to do relatively well.  She has had no additional medical issues since last seen we will plan to see her back in 6 months again meeting report.  She does plan to see plastic surgery concerning reconstruction concerning her breast cancer history.  A copy this note will be sent to the plastic surgeon.     The patient is next seen July 7, 2020 without evidence of recurrence of malignancy.  We plan  6-month follow-up.  The patient is reviewed October 27, 2020 without evidence recurrent disease, repeat scans during recent hospitalization September 20-October 1 without evidence of recurrent malignancy.     Reevaluation April 13, 2021 - for recurrence.  If this continues of the case with patient's reassessed 7/27/2021.      2.  Previous fall with contusions now recovered.  Recent contusions lower extremities treated with antibiotic therapy producing nausea now discontinued                                                                  3.  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  Worsening reflux symptoms, GI referral planned    5.  Hospitalization September 20-October 1 with enteropathic E. coli.  This responded to conservative therapy with plans for reassessment by Dr. Carlin with outpatient colonoscopy.    6.  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  As this is assessed April 13 the patient's platelet count continues to decrease and we have discussed a trial of half milligram per kilogram prednisone-40 mg daily with weekly checks and adjustment as needed including rapid taper.     Patient tapered from prednisone through mid July 2021, stable to improved when assessed 7/27/2021.  No additional steroids planned      Plan:  *CBC, platelet count, IPF q. monthly x2    *NP assessment 3 months, CBC, IPF

## 2021-07-27 ENCOUNTER — OFFICE VISIT (OUTPATIENT)
Dept: ONCOLOGY | Facility: CLINIC | Age: 81
End: 2021-07-27

## 2021-07-27 ENCOUNTER — LAB (OUTPATIENT)
Dept: OTHER | Facility: HOSPITAL | Age: 81
End: 2021-07-27

## 2021-07-27 VITALS
WEIGHT: 138.6 LBS | HEART RATE: 82 BPM | DIASTOLIC BLOOD PRESSURE: 73 MMHG | OXYGEN SATURATION: 95 % | BODY MASS INDEX: 26.17 KG/M2 | RESPIRATION RATE: 16 BRPM | SYSTOLIC BLOOD PRESSURE: 143 MMHG | TEMPERATURE: 97.1 F | HEIGHT: 61 IN

## 2021-07-27 DIAGNOSIS — D69.6 THROMBOCYTOPENIA (HCC): ICD-10-CM

## 2021-07-27 DIAGNOSIS — D69.6 THROMBOCYTOPENIA (HCC): Primary | ICD-10-CM

## 2021-07-27 DIAGNOSIS — D50.9 IRON DEFICIENCY ANEMIA, UNSPECIFIED IRON DEFICIENCY ANEMIA TYPE: Primary | ICD-10-CM

## 2021-07-27 LAB
BASOPHILS # BLD AUTO: 0.02 10*3/MM3 (ref 0–0.2)
BASOPHILS NFR BLD AUTO: 0.2 % (ref 0–1.5)
DEPRECATED RDW RBC AUTO: 52.4 FL (ref 37–54)
EOSINOPHIL # BLD AUTO: 0.02 10*3/MM3 (ref 0–0.4)
EOSINOPHIL NFR BLD AUTO: 0.2 % (ref 0.3–6.2)
ERYTHROCYTE [DISTWIDTH] IN BLOOD BY AUTOMATED COUNT: 14.8 % (ref 12.3–15.4)
HCT VFR BLD AUTO: 31.1 % (ref 34–46.6)
HGB BLD-MCNC: 10.5 G/DL (ref 12–15.9)
HGB RETIC QN AUTO: 32.1 PG (ref 29.8–36.1)
IMM GRANULOCYTES # BLD AUTO: 0.17 10*3/MM3 (ref 0–0.05)
IMM GRANULOCYTES NFR BLD AUTO: 1.8 % (ref 0–0.5)
IMM RETICS NFR: 21.2 % (ref 3–15.8)
LYMPHOCYTES # BLD AUTO: 1.65 10*3/MM3 (ref 0.7–3.1)
LYMPHOCYTES NFR BLD AUTO: 17.2 % (ref 19.6–45.3)
MCH RBC QN AUTO: 32.7 PG (ref 26.6–33)
MCHC RBC AUTO-ENTMCNC: 33.8 G/DL (ref 31.5–35.7)
MCV RBC AUTO: 96.9 FL (ref 79–97)
MONOCYTES # BLD AUTO: 2.52 10*3/MM3 (ref 0.1–0.9)
MONOCYTES NFR BLD AUTO: 26.3 % (ref 5–12)
NEUTROPHILS NFR BLD AUTO: 5.19 10*3/MM3 (ref 1.7–7)
NEUTROPHILS NFR BLD AUTO: 54.3 % (ref 42.7–76)
NRBC BLD AUTO-RTO: 0 /100 WBC (ref 0–0.2)
PLAT MORPH BLD: NORMAL
PLATELET # BLD AUTO: 90 10*3/MM3 (ref 140–450)
PMV BLD AUTO: 12.2 FL (ref 6–12)
RBC # BLD AUTO: 3.21 10*6/MM3 (ref 3.77–5.28)
RBC MORPH BLD: NORMAL
RETICS # AUTO: 0.17 10*6/MM3 (ref 0.02–0.13)
RETICS/RBC NFR AUTO: 5.22 % (ref 0.7–1.9)
WBC # BLD AUTO: 9.57 10*3/MM3 (ref 3.4–10.8)
WBC MORPH BLD: NORMAL

## 2021-07-27 PROCEDURE — 85046 RETICYTE/HGB CONCENTRATE: CPT | Performed by: INTERNAL MEDICINE

## 2021-07-27 PROCEDURE — 85025 COMPLETE CBC W/AUTO DIFF WBC: CPT | Performed by: INTERNAL MEDICINE

## 2021-07-27 PROCEDURE — 85007 BL SMEAR W/DIFF WBC COUNT: CPT | Performed by: INTERNAL MEDICINE

## 2021-07-27 PROCEDURE — 36415 COLL VENOUS BLD VENIPUNCTURE: CPT

## 2021-07-27 PROCEDURE — 99214 OFFICE O/P EST MOD 30 MIN: CPT | Performed by: INTERNAL MEDICINE

## 2021-08-16 RX ORDER — LEVOTHYROXINE SODIUM 0.07 MG/1
TABLET ORAL
Qty: 90 TABLET | Refills: 0 | Status: SHIPPED | OUTPATIENT
Start: 2021-08-16 | End: 2021-11-23

## 2021-08-18 ENCOUNTER — TELEPHONE (OUTPATIENT)
Dept: ONCOLOGY | Facility: CLINIC | Age: 81
End: 2021-08-18

## 2021-08-18 NOTE — TELEPHONE ENCOUNTER
Pt asked to call back next week per Ann Moore RN. Dr. Coburn is out of the office this week. Pt states she is getting it anyway. I did not advise this. She made this decision on her own.

## 2021-08-18 NOTE — TELEPHONE ENCOUNTER
Caller: CESARIO    Relationship to patient: SELF    Best call back number: [phone]    Patient is needing: PHONE CALL FROM DR. REED. PT HAS QUESTIONS ABOUT COVID-19 VACCINE BOOSTER. 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) AND THEY ARE URGING HER TO GET THE BOOSTER. SHE WILL NOT BE ABLE TO WORK WITHOUT IT.     IF SHE DOES GET THE BOOSTER AND HER PLATELETS DROP AGAIN LIKE THEY DID AFTER THE 2ND VACCINE DOSE, WILL SHE BE ABLE TO GET IMMUNOGLOBULIN INSTEAD OF A STEROID? SHE DOES NOT WANT ANY MORE STEROIDS. SHE HAS CONCERNS ABOUT THE MODERNA BECAUSE THIS IS WHAT SHE HAD BEFORE.

## 2021-08-23 ENCOUNTER — TELEPHONE (OUTPATIENT)
Dept: ONCOLOGY | Facility: CLINIC | Age: 81
End: 2021-08-23

## 2021-08-23 DIAGNOSIS — T45.1X5A PERIPHERAL NEUROPATHY DUE TO CHEMOTHERAPY (HCC): ICD-10-CM

## 2021-08-23 DIAGNOSIS — G62.0 PERIPHERAL NEUROPATHY DUE TO CHEMOTHERAPY (HCC): ICD-10-CM

## 2021-08-23 NOTE — TELEPHONE ENCOUNTER
Pt asking about getting the covid booster. Per Dr. Coburn, pt okay to take the booster but he would like her to have a CBC checked about 2 weeks later. Pt will call the office to let us know when she gets this done so that we can schedule lab and RN review accordingly. Pt v/u.

## 2021-08-23 NOTE — TELEPHONE ENCOUNTER
Caller: Roxana Brizuela    Relationship: Self    Best call back number: [phone]    What is the best time to reach you: ANYTIME    Who are you requesting to speak with (clinical staff, provider,  specific staff member): DR. REED/NURSE    What was the call regarding: PATIENT STATES THAT SHE PREVIOUSLY HAD A REACTION TO SOMETHING THAT CAUSED HER PLATELETS TO DROP CAUSING HER TO BE ON STEROIDS FOR A TIME.  THIS EPISODE OCCURRED AFTER SHE RECEIVED HER ORIGINAL MADERNA VACCINE.    SHE IS NOW WANTING TO GET THE COVID BOOSTER BUT IS WONDERING IF DR. REED THINKS THAT IT WOULD HAVE ANY AFFECT ON HER PLATELET COUNT.     IN THE EVENT THAT THE BOOSTER IS APPROVED BY DR. REED AND HER PLATELETS DO DROP, PATIENT WOULD LIKE TO HAVE THE IMMUNO- GLOBULIN THERAPY INSTEAD OF STEROIDS.      Do you require a callback: YES-LEAVE VM IF NO ANSWER.

## 2021-08-24 ENCOUNTER — CLINICAL SUPPORT (OUTPATIENT)
Dept: ONCOLOGY | Facility: HOSPITAL | Age: 81
End: 2021-08-24

## 2021-08-24 ENCOUNTER — LAB (OUTPATIENT)
Dept: OTHER | Facility: HOSPITAL | Age: 81
End: 2021-08-24

## 2021-08-24 VITALS
BODY MASS INDEX: 24.66 KG/M2 | HEIGHT: 62 IN | DIASTOLIC BLOOD PRESSURE: 73 MMHG | WEIGHT: 134 LBS | TEMPERATURE: 96.8 F | SYSTOLIC BLOOD PRESSURE: 122 MMHG | OXYGEN SATURATION: 97 % | RESPIRATION RATE: 18 BRPM | HEART RATE: 84 BPM

## 2021-08-24 DIAGNOSIS — D69.6 THROMBOCYTOPENIA (HCC): ICD-10-CM

## 2021-08-24 LAB
BASOPHILS # BLD AUTO: 0.02 10*3/MM3 (ref 0–0.2)
BASOPHILS NFR BLD AUTO: 0.1 % (ref 0–1.5)
DEPRECATED RDW RBC AUTO: 48.4 FL (ref 37–54)
EOSINOPHIL # BLD AUTO: 0.14 10*3/MM3 (ref 0–0.4)
EOSINOPHIL NFR BLD AUTO: 0.9 % (ref 0.3–6.2)
ERYTHROCYTE [DISTWIDTH] IN BLOOD BY AUTOMATED COUNT: 14.3 % (ref 12.3–15.4)
HCT VFR BLD AUTO: 31 % (ref 34–46.6)
HGB BLD-MCNC: 10.1 G/DL (ref 12–15.9)
IMM GRANULOCYTES # BLD AUTO: 0.2 10*3/MM3 (ref 0–0.05)
IMM GRANULOCYTES NFR BLD AUTO: 1.3 % (ref 0–0.5)
LYMPHOCYTES # BLD AUTO: 2.11 10*3/MM3 (ref 0.7–3.1)
LYMPHOCYTES NFR BLD AUTO: 13.8 % (ref 19.6–45.3)
MCH RBC QN AUTO: 30.3 PG (ref 26.6–33)
MCHC RBC AUTO-ENTMCNC: 32.6 G/DL (ref 31.5–35.7)
MCV RBC AUTO: 93.1 FL (ref 79–97)
MONOCYTES # BLD AUTO: 5.89 10*3/MM3 (ref 0.1–0.9)
MONOCYTES NFR BLD AUTO: 38.4 % (ref 5–12)
NEUTROPHILS NFR BLD AUTO: 45.5 % (ref 42.7–76)
NEUTROPHILS NFR BLD AUTO: 6.98 10*3/MM3 (ref 1.7–7)
NRBC BLD AUTO-RTO: 0 /100 WBC (ref 0–0.2)
PLAT MORPH BLD: NORMAL
PLATELET # BLD AUTO: 68 10*3/MM3 (ref 140–450)
PLATELET # BLD AUTO: 68 10*3/MM3 (ref 140–450)
PLATELETS.RETICULATED NFR BLD AUTO: 14.4 % (ref 0.9–6.5)
PMV BLD AUTO: 12.8 FL (ref 6–12)
RBC # BLD AUTO: 3.33 10*6/MM3 (ref 3.77–5.28)
RBC MORPH BLD: NORMAL
WBC # BLD AUTO: 15.34 10*3/MM3 (ref 3.4–10.8)
WBC MORPH BLD: NORMAL

## 2021-08-24 PROCEDURE — 85007 BL SMEAR W/DIFF WBC COUNT: CPT | Performed by: INTERNAL MEDICINE

## 2021-08-24 PROCEDURE — 85025 COMPLETE CBC W/AUTO DIFF WBC: CPT | Performed by: INTERNAL MEDICINE

## 2021-08-24 PROCEDURE — 36415 COLL VENOUS BLD VENIPUNCTURE: CPT

## 2021-08-24 PROCEDURE — 85055 RETICULATED PLATELET ASSAY: CPT | Performed by: INTERNAL MEDICINE

## 2021-08-24 PROCEDURE — G0463 HOSPITAL OUTPT CLINIC VISIT: HCPCS

## 2021-08-24 NOTE — NURSING NOTE
Pt is here for lab with RN review.  CBC reviewed with pt, counts are stable for this pt at this time. Pt has no complaints.  Copy of labs given to pt and f/u appt reviewed. Pt is instructed to call the office with any concerns or new symptoms prior to next visit. Pt vu Dr. Coburn reviewed labs and was satisfied. Pt received her Booster Covid 19 vaccine.

## 2021-08-25 RX ORDER — GABAPENTIN 300 MG/1
CAPSULE ORAL
Qty: 180 CAPSULE | Refills: 1 | Status: SHIPPED | OUTPATIENT
Start: 2021-08-25 | End: 2021-10-25

## 2021-09-07 ENCOUNTER — LAB (OUTPATIENT)
Dept: OTHER | Facility: HOSPITAL | Age: 81
End: 2021-09-07

## 2021-09-07 ENCOUNTER — CLINICAL SUPPORT (OUTPATIENT)
Dept: ONCOLOGY | Facility: HOSPITAL | Age: 81
End: 2021-09-07

## 2021-09-07 DIAGNOSIS — D69.6 THROMBOCYTOPENIA (HCC): ICD-10-CM

## 2021-09-07 DIAGNOSIS — Z45.2 ENCOUNTER FOR FITTING AND ADJUSTMENT OF VASCULAR CATHETER: Primary | ICD-10-CM

## 2021-09-07 LAB
BASOPHILS # BLD AUTO: 0.01 10*3/MM3 (ref 0–0.2)
BASOPHILS NFR BLD AUTO: 0.2 % (ref 0–1.5)
DEPRECATED RDW RBC AUTO: 51 FL (ref 37–54)
EOSINOPHIL # BLD AUTO: 0.11 10*3/MM3 (ref 0–0.4)
EOSINOPHIL NFR BLD AUTO: 1.7 % (ref 0.3–6.2)
ERYTHROCYTE [DISTWIDTH] IN BLOOD BY AUTOMATED COUNT: 14.7 % (ref 12.3–15.4)
HCT VFR BLD AUTO: 30.6 % (ref 34–46.6)
HGB BLD-MCNC: 9.8 G/DL (ref 12–15.9)
HYPOCHROMIA BLD QL: NORMAL
IMM GRANULOCYTES # BLD AUTO: 0.23 10*3/MM3 (ref 0–0.05)
IMM GRANULOCYTES NFR BLD AUTO: 3.5 % (ref 0–0.5)
LYMPHOCYTES # BLD AUTO: 2.19 10*3/MM3 (ref 0.7–3.1)
LYMPHOCYTES NFR BLD AUTO: 33.6 % (ref 19.6–45.3)
MCH RBC QN AUTO: 30.4 PG (ref 26.6–33)
MCHC RBC AUTO-ENTMCNC: 32 G/DL (ref 31.5–35.7)
MCV RBC AUTO: 95 FL (ref 79–97)
MONOCYTES # BLD AUTO: 1.31 10*3/MM3 (ref 0.1–0.9)
MONOCYTES NFR BLD AUTO: 20.1 % (ref 5–12)
NEUTROPHILS NFR BLD AUTO: 2.67 10*3/MM3 (ref 1.7–7)
NEUTROPHILS NFR BLD AUTO: 40.9 % (ref 42.7–76)
NRBC BLD AUTO-RTO: 0 /100 WBC (ref 0–0.2)
PLAT MORPH BLD: NORMAL
PLATELET # BLD AUTO: 95 10*3/MM3 (ref 140–450)
PLATELET # BLD AUTO: 95 10*3/MM3 (ref 140–450)
PLATELETS.RETICULATED NFR BLD AUTO: 9 % (ref 0.9–6.5)
PMV BLD AUTO: 11.5 FL (ref 6–12)
RBC # BLD AUTO: 3.22 10*6/MM3 (ref 3.77–5.28)
WBC # BLD AUTO: 6.52 10*3/MM3 (ref 3.4–10.8)
WBC MORPH BLD: NORMAL

## 2021-09-07 PROCEDURE — 25010000002 HEPARIN LOCK FLUSH PER 10 UNITS: Performed by: INTERNAL MEDICINE

## 2021-09-07 PROCEDURE — 85025 COMPLETE CBC W/AUTO DIFF WBC: CPT | Performed by: INTERNAL MEDICINE

## 2021-09-07 PROCEDURE — G0463 HOSPITAL OUTPT CLINIC VISIT: HCPCS

## 2021-09-07 PROCEDURE — 85007 BL SMEAR W/DIFF WBC COUNT: CPT | Performed by: INTERNAL MEDICINE

## 2021-09-07 PROCEDURE — 85055 RETICULATED PLATELET ASSAY: CPT | Performed by: INTERNAL MEDICINE

## 2021-09-07 RX ORDER — SODIUM CHLORIDE 0.9 % (FLUSH) 0.9 %
10 SYRINGE (ML) INJECTION AS NEEDED
Status: CANCELLED | OUTPATIENT
Start: 2021-09-07

## 2021-09-07 RX ORDER — HEPARIN SODIUM (PORCINE) LOCK FLUSH IV SOLN 100 UNIT/ML 100 UNIT/ML
500 SOLUTION INTRAVENOUS AS NEEDED
Status: DISCONTINUED | OUTPATIENT
Start: 2021-09-07 | End: 2021-09-07 | Stop reason: HOSPADM

## 2021-09-07 RX ORDER — SODIUM CHLORIDE 0.9 % (FLUSH) 0.9 %
10 SYRINGE (ML) INJECTION AS NEEDED
Status: DISCONTINUED | OUTPATIENT
Start: 2021-09-07 | End: 2021-09-07 | Stop reason: HOSPADM

## 2021-09-07 RX ORDER — PROPRANOLOL HYDROCHLORIDE 10 MG/1
TABLET ORAL
Qty: 270 TABLET | Refills: 0 | Status: SHIPPED | OUTPATIENT
Start: 2021-09-07 | End: 2021-12-06

## 2021-09-07 RX ORDER — HEPARIN SODIUM (PORCINE) LOCK FLUSH IV SOLN 100 UNIT/ML 100 UNIT/ML
500 SOLUTION INTRAVENOUS AS NEEDED
Status: CANCELLED | OUTPATIENT
Start: 2021-09-07

## 2021-09-07 RX ADMIN — Medication 500 UNITS: at 13:29

## 2021-09-07 RX ADMIN — SODIUM CHLORIDE, PRESERVATIVE FREE 10 ML: 5 INJECTION INTRAVENOUS at 13:29

## 2021-09-09 NOTE — PLAN OF CARE
Goal Outcome Evaluation:  Plan of Care Reviewed With: patient  Progress: improving  Outcome Summary: PT to d/c home today.   NO DOSAGE CHANGE AND RECHECK LEVEL IN 3-4 WEEKS PER TIERA GARCIA, PAC. VERBAL ORDERS READ BACK AND VERIFIED BY TIERA GARCIA PAC. PATIENT AWARE VERBALIZED OK. PH,LPN

## 2021-09-21 ENCOUNTER — LAB (OUTPATIENT)
Dept: OTHER | Facility: HOSPITAL | Age: 81
End: 2021-09-21

## 2021-09-21 ENCOUNTER — CLINICAL SUPPORT (OUTPATIENT)
Dept: ONCOLOGY | Facility: HOSPITAL | Age: 81
End: 2021-09-21

## 2021-09-21 VITALS
TEMPERATURE: 96.9 F | HEART RATE: 88 BPM | OXYGEN SATURATION: 97 % | SYSTOLIC BLOOD PRESSURE: 134 MMHG | HEIGHT: 63 IN | DIASTOLIC BLOOD PRESSURE: 84 MMHG | WEIGHT: 134 LBS | BODY MASS INDEX: 23.74 KG/M2 | RESPIRATION RATE: 18 BRPM

## 2021-09-21 DIAGNOSIS — D69.6 THROMBOCYTOPENIA (HCC): ICD-10-CM

## 2021-09-21 LAB
BASOPHILS # BLD AUTO: 0 10*3/MM3 (ref 0–0.2)
BASOPHILS NFR BLD AUTO: 0 % (ref 0–1.5)
DEPRECATED RDW RBC AUTO: 50.2 FL (ref 37–54)
EOSINOPHIL # BLD AUTO: 0.06 10*3/MM3 (ref 0–0.4)
EOSINOPHIL NFR BLD AUTO: 1.2 % (ref 0.3–6.2)
ERYTHROCYTE [DISTWIDTH] IN BLOOD BY AUTOMATED COUNT: 15.2 % (ref 12.3–15.4)
HCT VFR BLD AUTO: 35.6 % (ref 34–46.6)
HGB BLD-MCNC: 11.7 G/DL (ref 12–15.9)
IMM GRANULOCYTES # BLD AUTO: 0.26 10*3/MM3 (ref 0–0.05)
IMM GRANULOCYTES NFR BLD AUTO: 5.3 % (ref 0–0.5)
LYMPHOCYTES # BLD AUTO: 1.79 10*3/MM3 (ref 0.7–3.1)
LYMPHOCYTES NFR BLD AUTO: 36.5 % (ref 19.6–45.3)
MCH RBC QN AUTO: 30.1 PG (ref 26.6–33)
MCHC RBC AUTO-ENTMCNC: 32.9 G/DL (ref 31.5–35.7)
MCV RBC AUTO: 91.5 FL (ref 79–97)
MONOCYTES # BLD AUTO: 1.35 10*3/MM3 (ref 0.1–0.9)
MONOCYTES NFR BLD AUTO: 27.6 % (ref 5–12)
NEUTROPHILS NFR BLD AUTO: 1.44 10*3/MM3 (ref 1.7–7)
NEUTROPHILS NFR BLD AUTO: 29.4 % (ref 42.7–76)
NRBC BLD AUTO-RTO: 0 /100 WBC (ref 0–0.2)
PLAT MORPH BLD: NORMAL
PLATELET # BLD AUTO: 58 10*3/MM3 (ref 140–450)
PLATELET # BLD AUTO: 58 10*3/MM3 (ref 140–450)
PLATELETS.RETICULATED NFR BLD AUTO: 13 % (ref 0.9–6.5)
PMV BLD AUTO: 12.7 FL (ref 6–12)
RBC # BLD AUTO: 3.89 10*6/MM3 (ref 3.77–5.28)
RBC MORPH BLD: NORMAL
WBC # BLD AUTO: 4.9 10*3/MM3 (ref 3.4–10.8)
WBC MORPH BLD: NORMAL

## 2021-09-21 PROCEDURE — 85007 BL SMEAR W/DIFF WBC COUNT: CPT | Performed by: INTERNAL MEDICINE

## 2021-09-21 PROCEDURE — G0463 HOSPITAL OUTPT CLINIC VISIT: HCPCS

## 2021-09-21 PROCEDURE — 85025 COMPLETE CBC W/AUTO DIFF WBC: CPT | Performed by: INTERNAL MEDICINE

## 2021-09-21 PROCEDURE — 36415 COLL VENOUS BLD VENIPUNCTURE: CPT

## 2021-09-21 PROCEDURE — 85055 RETICULATED PLATELET ASSAY: CPT | Performed by: INTERNAL MEDICINE

## 2021-09-21 NOTE — NURSING NOTE
Pt is here for lab with RN review.  CBC reviewed with pt, counts are stable for this pt at this time. Platelets have declined to 58 K from 95 K on last visit on 9-7.  Pt has no complaints, denies any active bleeding. Discussed CBC & IPF with Dr Coburn with no new orders and to keep F/U appt with him on 10-19 for re-assessment of platelets. Copy of labs given to pt and f/u appt reviewed. Pt is instructed to call the office with any concerns or new symptoms prior to next visit. Pt vu    Lab Results   Component Value Date    WBC 4.90 09/21/2021    HGB 11.7 (L) 09/21/2021    HCT 35.6 09/21/2021    MCV 91.5 09/21/2021    PLT 58 (L) 09/21/2021    PLT 58 (L) 09/21/2021

## 2021-10-15 DIAGNOSIS — D69.6 THROMBOCYTOPENIA (HCC): Primary | ICD-10-CM

## 2021-10-18 NOTE — PROGRESS NOTES
REASON FOR FOLLOWUP: Patient feeling well, no additional bleeding issues.      1. Newly diagnosed large right breast cancer.  Core needle biopsy reported invasive mammary carcinoma with focal lobular feature, grade 2.  ER negative, less than 1%; CT positive, moderate in staining, 5% to 10%. HER2 IHC 2+, FISH study positive 2.1.   2.  CT scan for chest abdomen and pelvis and breast MRI examination reported right axillary lymph node suspicious for metastatic disease.  No remote metastatic lesion.  Patient has stage IIIa (T3 N2 M0) disease.   3.  Patient was started neoadjuvant chemotherapy on 5/23/2017 with Taxol weekly plus Herceptin weekly for total 12 weeks, and Perjata every 3 weeks during chemotherapy.  Herceptin will be converted to every 3 weeks after chemotherapy finished.    4.  Chronic moderate thrombocytopenia, mild anemia, and intermittent mild neutropenia etiologies are not clear.  5.  Reaction to Herceptin C1D1.  Subsequently tolerated therapy with hydrocortisone as premedication.  6.  Potential cardiac strain developing, neuropathic symptoms persist, follow-up MRI and surgical assessment will be needed post initial chemotherapeutic phase  7.  MRI August 17 with interval resolution of abnormal enhancement within breast and right axillary adenopathy, surgery scheduled November 7, Herceptin ongoing every 3 weeks  8.  Patient seen in office November 28, status post surgery with apparent complete response, Herceptin continued  9.  Herceptin given December 19, subsequent injury in [place] leading to prolonged stay, single treatment given through additional cancer Center  10.  Patient seen May 01, 2018, no further Herceptin planned, baseline scans pursued posttreatment, post influenza syndrome, physical therapy planned  11.  Patient seen June 13, 2018, excellent performance status, improving on physical therapy, equivocal CT  per thoracic adenopathy, follow-up PET/CT planned   12.  PET/CT with improvement,?   Thyroid abnormality with ultrasound planned  13.  Patient seen October 3, partial thyroidectomy anticipated October 14-    14.  Patient seen May 14, 2019, scans negative for evidence of recurrence     15.  Patient seen 3/29/2019 clinically stable  16.  Patient reviewed July 7, ongoing GERD symptoms, GI referral planned.  17.  Hospitalization September 28-October 1-acute colitis due to enteropathic E. coli-residual thrombocytopenia  18.  Subsequent testing consistent with chronic ITP                                                                                                                                       HISTORY OF PRESENT ILLNESS:    The patient is a pleasant 80 y.o. with the above-mentioned history, who presents today in anticipation of cycle 4 of Herceptin, Perjeta and Taxol.  This is the first visit with this physician involving this patient's case.  We have reviewed her status today with her slowly developing neuropathic symptoms in her lower extremities but also involving her fingers recognized significantly and she plays the piano and keyboard.  This is becoming harder to do but she is still able to do so.  She also notices neuropathy in her feet but is able to walk and function in daily activities.  Additional to this is her continued grieving at the death of her  though she, fortunately, has an excellent support system.  She continues to experience nausea that is well relieved with Compazine. She denies oral mucositis.  No diarrhea no constipation no chest pain no dyspnea.  No lower extremity edema.    She did received 2 units of PRBC's on   7/8/2017 and remains hematologically stable.   She does have difficulty with sleep and Ambien 10 mg daily at bedtime seems help much better compared to 5 mg dose.  She does not need refills today.  In reviewing her case July 26 she is entering the fourth cycle of her treatment and recent echocardiogram is reviewed with she and her close friend  revealing EF of 66.7% though with global strain of -16%?  Suspicious for myopathic process.  Normal ventricular cavity size and wall thickness as well as contractility.  We have also discussed that she is responding to therapy and will need surgical assessment which may not as yet have been performed.  She has seen Dr. Melo for port placement and will ask him to review her likely just after she has completed his fourth cycle of therapy.  It is also apparent that she'll need a cardiac assessment as we continue subsequent Herceptin therapy perioperatively.      The patient underwent MRI of the breasts August 17 demonstrating interval resolution of abnormal enhancement within the right breast, resolution of right axillary adenopathy had also resolved.  The findings were consistent with a complete response to neoadjuvant chemotherapy.  The patient was seen in subsequent follow-up with Dr. Melo and was determined to proceed with surgery and ultimately sentinel node evaluation.  This is now scheduled November 7 and there are additional plans to consider radiation therapy postoperatively and we have also discussed the use of anti-hormonal therapy considering her mildly positive IN status.    The patient was able to proceed to surgery undergoing right breast mastectomy and sentinel lymph node biopsy November 07, 2017.  She did well postoperatively seeing Dr. Melo November 16.  Pathology revealed no residual malignancy in the breast and 3 negative sentinel lymph nodes.  A formal review of her report reveals treatment effect particularly in her largest lymph node with focal fibrosis and scar, right breast mastectomy fibrosis associated with a cavitary biopsy site and no invasive or in situ carcinoma.  The patient is now seen in our office though she went to the wrong location and the seen late in the day.  We discussed her findings and agree that she would continue Herceptin at this point but be reviewed formally  again in 3 weeks.  She is also be seen by radiation therapy for their input.   The patient, evidently, was seen by radiation therapy but decided not to pursue it until her last Herceptin therapy here December 19.  Following this she visited her daughter in [place] and, unfortunately, developed influenza and pneumonia.  She had a prolonged hospitalization and was at many sites in recovery over a several month only to return to [place] in the last several weeks.  She did take 1 IV Herceptin dose while in [place] but as she is reviewed May 05, 2018 we have discussed that it makes no particular sense to continue or add on to her previous history by extending Herceptin any further 3-4 months after her last treatment.  She therefore has completed Herceptin.    The patient on to be tested post her treatment again baseline studies and CT of chest and pelvis were performed June 6 revealing interval increase in a few subcentimeter nodes in the left pectoral, axillary and mediastinal regions. These are all considerably small and of uncertain significance.  The patient's performance status remains excellent without any reduction and, in fact, has improved with physical therapy upon return.       Patient is next seen August 08, 2018, fortunately feeling well.  A follow-up PET/CT had revealed an interval decrease in the subcentimeter nodes in the left subpectoral axillary region as well as mediastinum.  There was no hypermetabolic lymphadenopathy.  There was intense focus within slightly enlarged right thyroid gland.  Patient indicates she never had any thyroid issues of which she is aware.  The patient was referred to ENT for assessment and the patient was seen by Dr. Fofana.  Ultimately a subtotal thyroidectomy is anticipated and now scheduled October 4.  The patient is willing to proceed.        The patient proceeded to a right thyroid lobectomy performed November 04, 2018.  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  There was no definitive evidence of trans-capsular or vascular invasion.    The patient is seen in follow-up November 28 doing well and we discussed the surgical treatment of this thyroid lesion.  She feels that all this went well.  She has additional cataract surgery at the end of November  scheduled also.  The patient did complete her testing and was asked to return approximately 6 months later with a follow-up CT chest, abdomen and pelvis that demonstrate no evidence of recurrent disease.  As she is seen back May 14 she, unfortunately, fell and contused her face, left knee and left hand.  This required an ER visit.  Is elected to follow her back in 6 months maintaining her port in the interval and she is seen October 29 again without indications of recurrent disease and relatively stable clinically.  She is seeing plastic surgery about reconstruction and otherwise continue her current medication list and following with her primary care regularly.  The patient is next seen July 7, 2020 indicating that she did not proceed to any  plastic surgery and with the COVID-19 epidemic she does not wish to have any elective procedures.  She has, however, having significant GI reflux symptoms that have worsened substantially last several months despite PPI therapy.    The patient had follow-up for meningioma September 11, 2020 unchanged from previous.        The patient is next reviewed October 27, 2020 having been hospitalized September 28 through October 1, 2020.  She had presented with fever and flank pain and was found to have acute colitis due to enteropathogenic E. coli.  Antibiotics were avoided secondary to history of C. difficile colitis and fortunately she did not want to improve albeit slowly.  She had evidence of acute on chronic thrombocytopenia with a platelet count dropping to close to 30,000 and slowly rebounding assessed October 6 at 64,000 and October 13 at 67,000.  Fortunately  she is feeling considerably better with her bowel function normalizing.  We had the patient return for ongoing testing documenting continued thrombocytopenia and IPF at 9.5 818 October 2020.  She seen back April 13, 2021 she has further thrombocytopenia with peripheral smear showing enlarged platelets.  Is felt this consistent with chronic ITP.  The patient was treated with prednisone 8.5 mg/kg and able to gradually taper last been seen through July 14, 2020 having dropped to 5 mg.  She discontinued the medication tested 7/21/2021 with H&H of 10.6 and 32.1, white count of 8070 and platelet count of 67,000.  She is next seen 7/27/2021.  She has been able to taper off of steroids altogether but recently injured her lower extremities on the table and another physician's office and was treated for potential cellulitis developing.  This included antibiotic treatment-ciprofloxacin-producing nausea which has been difficult to recover from.  She is now completed antibiotic therapy altogether approximately 2 days ago.  The patient was able to maintain off of steroids and returns for follow-up with her platelet count remaining in the 50-60,000 range consistently associated with elevated IPF.  She has had no issues with additional hemorrhage fortunately.  She is seen 10/19/21 with an excellent performance status and again stable.           Past Medical History:   Diagnosis Date   • Alcoholism ([facility]) 1978    I haven't had a drink since then   • Anemia    • Breast cancer ([facility]) 05/03/2017    Right breast invasive mammary carcinoma with focal lobular features, grade 2, ER negative, less than 1% CT positive, HER-2 positive, stage IIIa disease (T3, N2, M0   • Breast cancer ([facility]) 10/20/2004    Left breast ductal carcinoma in-situ, predominately intermediate grade with focal comedonecrosis (high grade), solid and bribridform patterns involving approximately five core biopsy fragments   • Edema     Chronic lower extremity edema   • GERD  (gastroesophageal reflux disease) 09/13/2011    Dr. Paul Negron   • GI (gastrointestinal bleed) 1997   • H/O jaundice    • Hernia     INCISONAL. AROUND LEFT TRAM LAP SITE   • History of chemotherapy     2017 4 MONTHS IN 2017   • History of Clostridium difficile colitis     JAN 2018   • History of histoplasmosis    • History of infectious mononucleosis 1960   • History of migraine headaches    • History of pneumonia 12/27/2017    AS RESULT OF FLU. ENDED UP ON VENT IN [place]   • History of thrombocytopenia    • History of transfusion 1997   • History of vertigo    • Hx of colonic polyps 06/11/2009    Dr. Giles   • Hyperlipidemia    • Hypertension    • Meningioma (HCC)    • Neuropathy     HANDS AND FEET   • Osteoarthritis 09/13/2011    Dr. Jamil Miller   • Peptic ulceration    • Retina disorder     BLEED. FROM HISTOPLASMOSIS   • SBO (small bowel obstruction) (HCC)     due to hernia with surgical repair in 09/2011   • SOB (shortness of breath) on exertion    • Thyroid mass     RIGHT   Normal echocardiogram on 5/18/2017, LVEF 68%.    Past Surgical History:   Procedure Laterality Date   • APPENDECTOMY N/A 1960   • BLADDER REPAIR N/A 1980   • BREAST BIOPSY Left 10/20/2004    Mammotome incisional biopsy left breast, surgical specimen, left breast, localization clip placement, left breast, confirmatory diagnostic unilateral mammogram, left breast-Dr. Tyrese Alcala, [facility]   • BREAST BIOPSY Right 05/03/2017    PATH: INVASIVE MAMMARY CARCINOMA   • CHOLECYSTECTOMY N/A 1990   • COLONOSCOPY N/A 06/11/2009    Hemorrhoids, otherwise normal, repeat in 5 years-Dr. Noemí Purcell   • EYE SURGERY Right 2017    laser surgery for blood clot   • HYSTERECTOMY Bilateral 1980   • INGUINAL HERNIA REPAIR Right     DR ALESIA GAXIOLA   • MASTECTOMY Left 2004    Left breast mastecotmy with sentinel lymph node biospy-[facility]   • MASTECTOMY W/ SENTINEL NODE BIOPSY Right 11/7/2017    Procedure: RIGHT BREAST MASTECTOMY WITH SENTINEL NODE  BIOPSY;  Surgeon: Christian Melo MD;  Location: [facility] MAIN OR;  Service:    • GA INSJ TUNNELED CVC W/O SUBQ PORT/ AGE 5 YR/> Left 2017    Procedure: INSERTION VENOUS ACCESS DEVICE;  Surgeon: Christian Melo MD;  [facility];  Service: General   • REDUCTION MAMMAPLASTY Right     to match Lt. TRAM flap   • SMALL INTESTINE SURGERY      INCARCRATED HERNIA   • THYROIDECTOMY Right 10/4/2018    Procedure: RT THYROID LOBECTOMY;  Surgeon: Julián Fofana MD;  Location:  [facility];  Service: ENT   • TONSILLECTOMY Bilateral    • TRAM FLAP DELAYED Left     WITH MASTOPEXY   • UPPER GASTROINTESTINAL ENDOSCOPY N/A 2011    LA grade A esophagitis with no bleeding, large hiatus hernia (50cm), gastric ulcer-Dr.Laszlo Lezama   • US GUIDED FINE NEEDLE ASPIRATION  2018   Lico catheter placement on 2017 by Dr. Melo.     OB/GYN history: Menarche age 16, menopause at 1985. , 1 miscarriage. No birth control pill use. She did have post menopause hormonal supplementation.      HEMATOLOGIC/ONCOLOGIC HISTORY: The patient is a 80y.o. year old female whom we are consulted for a newly self discovered right breast mass, in 2017. Patient had ultrasound-guided biopsy on 5/3/2017 and confirmed to be invasive mammary carcinoma with focal lobular features, grade 2 Chicago score 6/9. She is here for initial evaluation for management.      Patient is a 76-year-old  female who was seen here previously for chronic mild-to-moderate thrombocytopenia and mild anemia. Recently the patient reports she started having firmness of the right breast that started sometime in April or maybe even in March. She noticed firmness spread from about around the 12 o'clock position, gradually towards the upper lateral side and lower part of the right breast associated mild pain. The patient thought it was related to fibrocystic changes. However, the symptom was getting worse. Patient also reported dented  nipple after she started having pain in the right breast. She denies discharge from the nipple. She called her primary care physician, Dr. Martin, who ordered a mammogram study. This was done on 04/12/2017 with architectural distortion of the right breast centrally at 12 o'clock position and had scattered fibroglandular densities throughout the right breast.      The patient subsequently had right breast ultrasound examination on 04/26/2017. Discovered a large right breast mass measuring 5.8 x 3.5 x 3.9 cm. Patient subsequently had ultrasound-guided right breast biopsy on 05/03/2017. Pathology evaluation reported invasive mammary carcinoma with focal lobular feature. Rattan score 6/9, overall grade 2. Sample was further sent to the [facility] for test. ER negative, less than 1%; ND positive, moderate in staining, 5% to 10%. HER2 IHC 2+, but FISH study positive 2.1.     She denies weight loss, she actually eats very well. No nausea vomiting. Patient does complain of insomnia, unable to sleep.      This patient has history of left breast DCIS back in 2007, had mastectomy at the [facility]. No hormonal therapy afterwards according to patient. This patient also had a small meningioma, followed by Dr. Smith in [facility], with most recent MRI of the brain in March 2017, with 18 mm meningioma, and followed on an annual basis.     Her neutrophil count on 5/16/17 is normal at 2500, however, records showed starting from 05/2015 and in 09/2016, 01/2017 and 03/2017, all 4 laboratory studies showed mild neutropenia with ANC fluctuating between 1200 and 1600. Etiology is not clear.    Normal echocardiogram on 5/18/2017, LVEF 68%.      CT scan for chest abdomen and pelvis on 5/22/2017 and breast MRI examination on 5/22/2017 reported small right axillary lymph node suspicious for metastatic disease.  No remote metastatic lesion.  Patient has stage IIIa (T3 N2 M0)  disease.      Patient will start neoadjuvant chemotherapy with Taxol weekly plus Herceptin weekly for total 12 weeks, and Perjeta every 3 weeks (3-week cycle) during chemotherapy.  Herceptin will be converted to every 3 weeks after chemotherapy finished.  Cycle 1 day 1 5/23/2017.   Status post neoadjuvant chemotherapy the patient was assessed with MRI showing a complete neoadjuvant response.  The patient was reviewed for surgery and questions concerning lymph node dissection-sentinel node evaluation-and need for radiation therapy postop were considered as well as use of additional Herceptin for the balance of the year as well as additional use of anti-hormonal therapy.  Surgery was scheduled November 07, 2017.  Patient next seen in office November 28 having undergone surgery on November 7 with results revealing no residual malignancy in either breast or associated sentinel lymph nodes.  Was elected to continue Herceptin when seen back in office November 20 having initiated Herceptin May 23, 2017.    Patient continued Herceptin with her last treatment given December 19, 2017.     Unfortunately she later experienced an accident while in [place] December 28 with prolonged hospitalization and prolonged ventilator management required.  Apparently she had at least one IV Herceptin therapy given while there and we were contacted March 20, 2018- Dr. Nuno.  Eventually the patient was able to return to [place] is seen back in office May 5 at which time we concluded that she completed Herceptin therapy as result of being off treatment for so long.  Plans were made for baseline scans.  Patient follow-up reexaminations May 8, 2019 with no evidence of recurrent malignancy.  This was again a circumstance when she was assessed October 29, 2019.  Patient seen July 7, 2020 without evidence of recurrent malignancy.  Recurrent GI symptoms noted with GI referral planned.  Patient hospitalized September 28-October 1, 2020 with  enteropathic E. coli, acute on chronic thrombocytopenia.     Subsequent testing felt consistent with chronic ITP and trial of steroids initiated 2021.  She is able to taper off of prednisone altogether by mid 2021.  Patient seen back in office 10/19/21 stable off steroids.      MEDICATIONS: The current medication list was reviewed with the patient and updated in the EMR this date per the Medical Assistant. Medication dosages and frequencies were confirmed to be accurate.       ALLERGIES:    Allergies   Allergen Reactions   • Codeine Nausea And Vomiting   • Morphine Nausea And Vomiting     SOCIAL HISTORY:   Social History     Tobacco Use   • Smoking status: Former Smoker     Packs/day: 2.00     Years: 30.00     Pack years: 60.00     Types: Cigarettes     Start date: 1957     Quit date: 4/10/1987     Years since quittin.5   • Smokeless tobacco: Never Used   • Tobacco comment: I haven't had a cigarette in over 30 years   Vaping Use   • Vaping Use: Never used   Substance Use Topics   • Alcohol use: No     Comment: stopped, heavy in past   • Drug use: No     FAMILY HISTORY:  Family History   Problem Relation Age of Onset   • Heart disease Mother    • Aortic aneurysm Mother         abdominal   • Coronary artery disease Mother    • Hypertension Mother    • Miscarriages / Stillbirths Mother    • Diverticulitis Mother    • Heart disease Father    • Heart attack Father         acute   • Hypertension Father    • Early death Father    • Hearing loss Father    • Kidney disease Father    • Breast cancer Daughter 45   • Heart disease Brother    • Hypertension Brother    • Malig Hyperthermia Neg Hx      I have reviewed the patient's medical history in detail and updated the computerized patient record.    REVIEW OF SYSTEMS:  GENERAL: See history of present illness.  Recent fall and sustained contusions, no change in appetite or weight;   No fevers, chills, sweats.   SKIN: Mild contusions anterior lower  "extremities  HEME/LYMPH: See HPI.   EYES: No vision changes or diplopia.   ENT: No tinnitus, hearing loss, gum bleeding, epistaxis, hoarseness or dysphagia.   RESPIRATORY: No cough, Has exertional dyspnea, No hemoptysis or wheezing.   CVS: No chest pain, palpitations, orthopnea, dyspnea on exertion or PND.   GI: Worsening reflux symptoms  : No lower tract obstructive symptoms, dysuria or hematuria.   MUSCULOSKELETAL: Chronic or joint pain, arthritis.  Has mild intermittent ankle swelling.   NEUROLOGICAL: See HPI  PSYCHIATRIC: See HPI     Objective:   Vitals:    10/19/21 1347   BP: 132/71   Pulse: 73   Resp: 16   Temp: 96.8 °F (36 °C)   TempSrc: Temporal   SpO2: 100%   Weight: 59.7 kg (131 lb 11.2 oz)   Height: 158.8 cm (62.52\")   PainSc: 0-No pain   ECOG 0      PHYSICAL EXAM:   GENERAL: Well-developed, well-nourished female, in no acute distress.   SKIN: Warm, dry without rashes, purpura or petechiae.  Left upper chest Lico catheter in place, no evidence of infection.  Contusions just lateral to the right eye, left hand and left knee  EYES: Pupils equal, round and reactive to light. EOMs intact. Conjunctivae normal.  EARS: Hearing intact.  NOSE:  No excoriations or nasal discharge.  MOUTH: Tongue is well-papillated; no stomatitis or ulcers. Lips normal.  THROAT: Oropharynx without lesions or exudates.  Status post partial thyroidectomy well-healing  LYMPHATICS: No cervical, supraclavicular, axillary adenopathy.  CHEST: Lungs clear to auscultation. Good airflow.   BREAST:Postop, Well healed right mastectomy site with no evidence of recurrent disease, no axillary adenopathy bilaterally.  CARDIAC: Regular rate and rhythm without murmurs. Normal S1,S2.  ABDOMEN: Slightly distended, normal active bowel sounds,  no hepatosplenomegaly or masses.  EXTREMITIES: Areas of contusion/small lacerations anterior lower extremities healing without additional inflammation.  NEUROLOGICAL: Cranial Nerves II-XII grossly intact. No " focal neurological deficits.  PSYCHIATRIC: Alert and oriented, pleased about her results      RECENT LABS:  Lab Results   Component Value Date    WBC 11.91 (H) 10/19/2021    HGB 11.0 (L) 10/19/2021    HCT 32.4 (L) 10/19/2021    MCV 88.0 10/19/2021    PLT 53 (L) 10/19/2021    PLT 53 (L) 10/19/2021     Lab Results   Component Value Date    NEUTROABS 6.00 10/19/2021     Lab Results   Component Value Date    GLUCOSE 103 (H) 10/01/2020    BUN 18 12/07/2020    CREATININE 0.98 12/07/2020    EGFRIFNONA 55 (L) 12/07/2020    EGFRIFAFRI 66 12/07/2020    BCR 18.4 12/07/2020    K 4.4 12/07/2020    CO2 27.9 12/07/2020    CALCIUM 9.3 12/07/2020    PROTENTOTREF 7.0 12/07/2020    ALBUMIN 4.80 12/07/2020    LABIL2 2.2 12/07/2020    AST 35 (H) 12/07/2020    ALT 34 (H) 12/07/2020            Assessment/Plan   1. Large right breast cancer, locally advanced, stage IIIa (T3 N1/ 2 M0).  ER negative, less than 1%; PA positive, moderate in staining, 5% to 10%. HER2 IHC 2+, FISH study positive 2.1.  Physical examination showed a large mass, 7 cm x 7 cm initially which has decreased to approximately less than 4 cm ..  Patient  proceeded through 4 cycles ceptin,Perjeta and Taxol.   Her subsequent MRI examination showed complete response by imaging including right axillary lymphadenopathy.  We have continued Herceptin and that includes today October 20, 2017.  The case was discussed with Dr. Melo and plans were to proceed with mastectomy plus right axillary lymph node assessment via sentinel node evaluation. it was felt she likely require radiation therapy post procedure.  The patient was scheduled and proceeded to surgery November 7.  Her results, wonderfully, revealed no evidence of residual disease in the breast or associated sentinel lymph nodes. She was seen by radiation therapy and offered treatment did not pursue it.  Additionally, and somewhat complicating this story, she traveled to [place] and developed severe influenza,  associated pneumonia and was hospitalized for several months only to return to [place] in the last several weeks now being seen back May 1.  There is no particular benefit from trying to add Herceptin back to her therapy now and is felt that she is completed Herceptin treatment.  She wishes consider reconstruction and baseline CT scans will be requested in 5 weeks with the patient being seen in 6 weeks follow-up.The patient reviewed June 13, 2018 with the scans demonstrating very modest increase in left pectoral, axillary or mediastinal nodes.  These are of uncertain significance but do need follow-up in a PET/CT is planned in 7 weeks.  Her anemia will also be reviewed again with additional laboratory studies that visit.  She'll be seen in 8 weeks for general reassessment.    The patient's anemia workup was essentially negative and she is slowly improving when seen back August 8.  Her PET/CT, fortunately, demonstrates improvement though there is potential abnormality within the right thyroid lobe.  We discussed thyroid function testing and follow-up thyroid ultrasound.  She will be seen back in approximately 2 months as we maintain her port monthly flushes.   The patient was reviewed by ENT and ultimately was felt that a partial thyroidectomy was in order and is now scheduled October 4.  Patient's one to proceed.  We'll plan to see her back in approximately 6 weeks.      The patient is next seen November 28, 2015.  Her surgery went well with the findings as noted above.  She has follow-up with ENT in the next several weeks.  Additionally she has bilateral cataract surgery at the end of this month and into the beginning of next.  We discussed reassessment in general with follow-up scans and these were performed May 2019 which showed no evidence of recurrent malignancy.  Six-month follow-up planes with maintenance of her port every 6 weeks.  The patient is reassessed October 29, 2019 fortunately continue to do  relatively well.  She has had no additional medical issues since last seen we will plan to see her back in 6 months again meeting report.  She does plan to see plastic surgery concerning reconstruction concerning her breast cancer history.  A copy this note will be sent to the plastic surgeon.     The patient is next seen July 7, 2020 without evidence of recurrence of malignancy.  We plan 6-month follow-up.  The patient is reviewed October 27, 2020 without evidence recurrent disease, repeat scans during recent hospitalization September 20-October 1 without evidence of recurrent malignancy.  Subsequent assessments again without evidence of recurrence including reexamination 10/19/21.    2.  Previous fall with contusions now recovered.  Recent contusions lower extremities treated with antibiotic therapy producing nausea now discontinued                                                                  3.  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  Worsening reflux symptoms, now stable    5.  Hospitalization September 20-October 1 with enteropathic E. coli.  This responded to conservative therapy with plans for reassessment by Dr. Carlin with outpatient colonoscopy.    6.  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  As this is assessed April 13 the patient's platelet count continues to decrease and we have discussed a trial of half milligram per kilogram prednisone-40 mg daily with weekly checks and adjustment as needed including rapid taper.     Patient tapered from prednisone through mid July 2021, stable to improved when assessed 7/27/2021.  No additional steroids planned.  Patient stable seen 10/19/21 with every 3 month assessments planned.      Plan:  *CBC, platelet count, IPF q. 3 months    *MD assessment 6 months, CBC, IPF

## 2021-10-19 ENCOUNTER — OFFICE VISIT (OUTPATIENT)
Dept: ONCOLOGY | Facility: CLINIC | Age: 81
End: 2021-10-19

## 2021-10-19 ENCOUNTER — LAB (OUTPATIENT)
Dept: OTHER | Facility: HOSPITAL | Age: 81
End: 2021-10-19

## 2021-10-19 VITALS
RESPIRATION RATE: 16 BRPM | TEMPERATURE: 96.8 F | WEIGHT: 131.7 LBS | HEART RATE: 73 BPM | BODY MASS INDEX: 23.34 KG/M2 | HEIGHT: 63 IN | DIASTOLIC BLOOD PRESSURE: 71 MMHG | SYSTOLIC BLOOD PRESSURE: 132 MMHG | OXYGEN SATURATION: 100 %

## 2021-10-19 DIAGNOSIS — D50.9 IRON DEFICIENCY ANEMIA, UNSPECIFIED IRON DEFICIENCY ANEMIA TYPE: Primary | ICD-10-CM

## 2021-10-19 DIAGNOSIS — D69.3 CHRONIC ITP (IDIOPATHIC THROMBOCYTOPENIA) (HCC): ICD-10-CM

## 2021-10-19 DIAGNOSIS — C50.911 RIGHT BREAST CANCER WITH T3 TUMOR, >5 CM IN GREATEST DIMENSION (HCC): ICD-10-CM

## 2021-10-19 DIAGNOSIS — D69.6 THROMBOCYTOPENIA (HCC): ICD-10-CM

## 2021-10-19 DIAGNOSIS — C50.211 MALIGNANT NEOPLASM OF UPPER-INNER QUADRANT OF RIGHT BREAST IN FEMALE, ESTROGEN RECEPTOR NEGATIVE (HCC): Primary | ICD-10-CM

## 2021-10-19 DIAGNOSIS — Z17.1 MALIGNANT NEOPLASM OF UPPER-INNER QUADRANT OF RIGHT BREAST IN FEMALE, ESTROGEN RECEPTOR NEGATIVE (HCC): Primary | ICD-10-CM

## 2021-10-19 LAB
BASOPHILS # BLD AUTO: 0.01 10*3/MM3 (ref 0–0.2)
BASOPHILS NFR BLD AUTO: 0.1 % (ref 0–1.5)
DEPRECATED RDW RBC AUTO: 49 FL (ref 37–54)
EOSINOPHIL # BLD AUTO: 0.1 10*3/MM3 (ref 0–0.4)
EOSINOPHIL NFR BLD AUTO: 0.8 % (ref 0.3–6.2)
ERYTHROCYTE [DISTWIDTH] IN BLOOD BY AUTOMATED COUNT: 15.1 % (ref 12.3–15.4)
HCT VFR BLD AUTO: 32.4 % (ref 34–46.6)
HGB BLD-MCNC: 11 G/DL (ref 12–15.9)
IMM GRANULOCYTES # BLD AUTO: 0.1 10*3/MM3 (ref 0–0.05)
IMM GRANULOCYTES NFR BLD AUTO: 0.8 % (ref 0–0.5)
LYMPHOCYTES # BLD AUTO: 2.15 10*3/MM3 (ref 0.7–3.1)
LYMPHOCYTES NFR BLD AUTO: 18.1 % (ref 19.6–45.3)
MCH RBC QN AUTO: 29.9 PG (ref 26.6–33)
MCHC RBC AUTO-ENTMCNC: 34 G/DL (ref 31.5–35.7)
MCV RBC AUTO: 88 FL (ref 79–97)
MONOCYTES # BLD AUTO: 3.55 10*3/MM3 (ref 0.1–0.9)
MONOCYTES NFR BLD AUTO: 29.8 % (ref 5–12)
NEUTROPHILS NFR BLD AUTO: 50.4 % (ref 42.7–76)
NEUTROPHILS NFR BLD AUTO: 6 10*3/MM3 (ref 1.7–7)
NRBC BLD AUTO-RTO: 0 /100 WBC (ref 0–0.2)
PLAT MORPH BLD: NORMAL
PLATELET # BLD AUTO: 53 10*3/MM3 (ref 140–450)
PLATELET # BLD AUTO: 53 10*3/MM3 (ref 140–450)
PLATELETS.RETICULATED NFR BLD AUTO: 12.2 % (ref 0.9–6.5)
PMV BLD AUTO: 12.1 FL (ref 6–12)
RBC # BLD AUTO: 3.68 10*6/MM3 (ref 3.77–5.28)
RBC MORPH BLD: NORMAL
WBC # BLD AUTO: 11.91 10*3/MM3 (ref 3.4–10.8)
WBC MORPH BLD: NORMAL

## 2021-10-19 PROCEDURE — 36415 COLL VENOUS BLD VENIPUNCTURE: CPT

## 2021-10-19 PROCEDURE — 99214 OFFICE O/P EST MOD 30 MIN: CPT | Performed by: INTERNAL MEDICINE

## 2021-10-19 PROCEDURE — 85025 COMPLETE CBC W/AUTO DIFF WBC: CPT | Performed by: INTERNAL MEDICINE

## 2021-10-19 PROCEDURE — 85055 RETICULATED PLATELET ASSAY: CPT | Performed by: INTERNAL MEDICINE

## 2021-10-19 PROCEDURE — 85007 BL SMEAR W/DIFF WBC COUNT: CPT | Performed by: INTERNAL MEDICINE

## 2021-10-25 DIAGNOSIS — G62.0 PERIPHERAL NEUROPATHY DUE TO CHEMOTHERAPY (HCC): ICD-10-CM

## 2021-10-25 DIAGNOSIS — T45.1X5A PERIPHERAL NEUROPATHY DUE TO CHEMOTHERAPY (HCC): ICD-10-CM

## 2021-10-25 RX ORDER — GABAPENTIN 300 MG/1
CAPSULE ORAL
Qty: 180 CAPSULE | Refills: 3 | Status: SHIPPED | OUTPATIENT
Start: 2021-10-25 | End: 2022-01-24 | Stop reason: SDUPTHER

## 2021-10-25 RX ORDER — PANTOPRAZOLE SODIUM 40 MG/1
TABLET, DELAYED RELEASE ORAL
Qty: 90 TABLET | Refills: 3 | Status: SHIPPED | OUTPATIENT
Start: 2021-10-25 | End: 2023-01-03

## 2021-11-03 RX ORDER — SIMVASTATIN 80 MG
TABLET ORAL
Qty: 90 TABLET | Refills: 0 | Status: SHIPPED | OUTPATIENT
Start: 2021-11-03 | End: 2022-04-25

## 2021-11-08 RX ORDER — HYDROCHLOROTHIAZIDE 25 MG/1
TABLET ORAL
Qty: 90 TABLET | Refills: 0 | Status: SHIPPED | OUTPATIENT
Start: 2021-11-08 | End: 2022-02-07

## 2021-11-17 ENCOUNTER — HOSPITAL ENCOUNTER (OUTPATIENT)
Dept: BONE DENSITY | Facility: HOSPITAL | Age: 81
Discharge: HOME | End: 2021-11-17
Admitting: INTERNAL MEDICINE

## 2021-11-17 PROCEDURE — 77080 DXA BONE DENSITY AXIAL: CPT

## 2021-11-23 RX ORDER — LEVOTHYROXINE SODIUM 75 UG/1
TABLET ORAL
Qty: 90 TABLET | Refills: 0 | Status: SHIPPED | OUTPATIENT
Start: 2021-11-23 | End: 2022-02-15

## 2021-12-06 RX ORDER — PROPRANOLOL HYDROCHLORIDE 10 MG/1
TABLET ORAL
Qty: 270 TABLET | Refills: 0 | Status: SHIPPED | OUTPATIENT
Start: 2021-12-06 | End: 2022-03-08

## 2021-12-09 DIAGNOSIS — E78.5 HYPERLIPIDEMIA, UNSPECIFIED HYPERLIPIDEMIA TYPE: Primary | ICD-10-CM

## 2021-12-09 DIAGNOSIS — R79.89 ELEVATED TSH: ICD-10-CM

## 2021-12-09 DIAGNOSIS — E89.0 POSTOPERATIVE HYPOTHYROIDISM: ICD-10-CM

## 2021-12-21 ENCOUNTER — TELEPHONE (OUTPATIENT)
Dept: FAMILY MEDICINE CLINIC | Facility: CLINIC | Age: 81
End: 2021-12-21

## 2021-12-21 LAB
ALBUMIN SERPL-MCNC: 3.7 G/DL (ref 3.7–4.7)
ALBUMIN/GLOB SERPL: 1.5 {RATIO} (ref 1.2–2.2)
ALP SERPL-CCNC: 56 IU/L (ref 44–121)
ALT SERPL-CCNC: 15 IU/L (ref 0–32)
AST SERPL-CCNC: 24 IU/L (ref 0–40)
BASOPHILS # BLD AUTO: 0 X10E3/UL (ref 0–0.2)
BASOPHILS NFR BLD AUTO: 0 %
BILIRUB SERPL-MCNC: 0.6 MG/DL (ref 0–1.2)
BUN SERPL-MCNC: 11 MG/DL (ref 8–27)
BUN/CREAT SERPL: 14 (ref 12–28)
CALCIUM SERPL-MCNC: 8.6 MG/DL (ref 8.7–10.3)
CHLORIDE SERPL-SCNC: 101 MMOL/L (ref 96–106)
CHOLEST SERPL-MCNC: 130 MG/DL (ref 100–199)
CO2 SERPL-SCNC: 22 MMOL/L (ref 20–29)
CREAT SERPL-MCNC: 0.8 MG/DL (ref 0.57–1)
EOSINOPHIL # BLD AUTO: 0.1 X10E3/UL (ref 0–0.4)
EOSINOPHIL NFR BLD AUTO: 1 %
ERYTHROCYTE [DISTWIDTH] IN BLOOD BY AUTOMATED COUNT: 15.2 % (ref 11.7–15.4)
GLOBULIN SER CALC-MCNC: 2.5 G/DL (ref 1.5–4.5)
GLUCOSE SERPL-MCNC: 95 MG/DL (ref 65–99)
HCT VFR BLD AUTO: 27.5 % (ref 34–46.6)
HDLC SERPL-MCNC: 34 MG/DL
HGB BLD-MCNC: 9 G/DL (ref 11.1–15.9)
IMMATURE CELLS: ABNORMAL
LDLC SERPL CALC-MCNC: 78 MG/DL (ref 0–99)
LDLC/HDLC SERPL: 2.3 RATIO (ref 0–3.2)
LYMPHOCYTES # BLD AUTO: 3.2 X10E3/UL (ref 0.7–3.1)
LYMPHOCYTES NFR BLD AUTO: 40 %
MCH RBC QN AUTO: 29.5 PG (ref 26.6–33)
MCHC RBC AUTO-ENTMCNC: 32.7 G/DL (ref 31.5–35.7)
MCV RBC AUTO: 90 FL (ref 79–97)
METAMYELOCYTES NFR BLD: 1 % (ref 0–0)
MONOCYTES # BLD AUTO: 0.6 X10E3/UL (ref 0.1–0.9)
MONOCYTES NFR BLD AUTO: 8 %
MORPHOLOGY BLD-IMP: ABNORMAL
MYELOCYTES NFR BLD: 4 % (ref 0–0)
NEUTROPHILS # BLD AUTO: 3.6 X10E3/UL (ref 1.4–7)
NEUTROPHILS NFR BLD AUTO: 46 %
PLATELET # BLD AUTO: 98 X10E3/UL (ref 150–450)
POTASSIUM SERPL-SCNC: 4.2 MMOL/L (ref 3.5–5.2)
PROT SERPL-MCNC: 6.2 G/DL (ref 6–8.5)
RBC # BLD AUTO: 3.05 X10E6/UL (ref 3.77–5.28)
SODIUM SERPL-SCNC: 138 MMOL/L (ref 134–144)
TRIGL SERPL-MCNC: 91 MG/DL (ref 0–149)
TSH SERPL DL<=0.005 MIU/L-ACNC: 2.26 UIU/ML (ref 0.45–4.5)
VLDLC SERPL CALC-MCNC: 18 MG/DL (ref 5–40)
WBC # BLD AUTO: 7.9 X10E3/UL (ref 3.4–10.8)

## 2021-12-21 NOTE — TELEPHONE ENCOUNTER
Caller: VIOLETTE    Relationship to patient: Lab    Best call back number:[phone]    Patient is needing:VIOLETTE CALLED AND SAID THE PLATLETS ARE  LOW 98,000. SHE SAID THEY MIGHT BE A LITTLE HIGHER BUT SHE WILL BE FAXING OVER THE REPORT AS WELL.

## 2022-01-10 RX ORDER — DICLOFENAC SODIUM 75 MG/1
TABLET, DELAYED RELEASE ORAL
Qty: 180 TABLET | Refills: 0 | OUTPATIENT
Start: 2022-01-10

## 2022-01-13 ENCOUNTER — OFFICE VISIT (OUTPATIENT)
Dept: FAMILY MEDICINE CLINIC | Facility: CLINIC | Age: 82
End: 2022-01-13

## 2022-01-13 VITALS
WEIGHT: 128.4 LBS | HEIGHT: 62 IN | HEART RATE: 93 BPM | OXYGEN SATURATION: 96 % | TEMPERATURE: 97.4 F | SYSTOLIC BLOOD PRESSURE: 142 MMHG | RESPIRATION RATE: 18 BRPM | BODY MASS INDEX: 23.63 KG/M2 | DIASTOLIC BLOOD PRESSURE: 76 MMHG

## 2022-01-13 DIAGNOSIS — I10 PRIMARY HYPERTENSION: Primary | ICD-10-CM

## 2022-01-13 DIAGNOSIS — L65.9 HAIR LOSS: ICD-10-CM

## 2022-01-13 DIAGNOSIS — T45.1X5A PERIPHERAL NEUROPATHY DUE TO CHEMOTHERAPY: ICD-10-CM

## 2022-01-13 DIAGNOSIS — D64.9 ANEMIA, UNSPECIFIED TYPE: ICD-10-CM

## 2022-01-13 DIAGNOSIS — E78.5 HYPERLIPIDEMIA, UNSPECIFIED HYPERLIPIDEMIA TYPE: ICD-10-CM

## 2022-01-13 DIAGNOSIS — G62.0 PERIPHERAL NEUROPATHY DUE TO CHEMOTHERAPY: ICD-10-CM

## 2022-01-13 DIAGNOSIS — M85.80 OSTEOPENIA, UNSPECIFIED LOCATION: ICD-10-CM

## 2022-01-13 PROCEDURE — 99214 OFFICE O/P EST MOD 30 MIN: CPT | Performed by: INTERNAL MEDICINE

## 2022-01-13 NOTE — PROGRESS NOTES
Subjective   Roxana Brizuela is a 81 y.o. female. Patient is here today for   Chief Complaint   Patient presents with   • Hyperlipidemia     6mo fu          Vitals:    01/13/22 1306   BP: 142/76   Pulse: 93   Resp: 18   Temp: 97.4 °F (36.3 °C)   SpO2: 96%     Body mass index is 23.48 kg/m².      The following portions of the patient's history were reviewed and updated as appropriate: allergies, current medications, past family history, past medical history, past social history, past surgical history and problem list.    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 Paul Negron   • GI (gastrointestinal bleed) 1997   • H/O jaundice    • Hernia     INCISONAL. AROUND LEFT TRAM LAP SITE   • History of chemotherapy     2017 4 MONTHS IN 2017   • History of Clostridium difficile colitis     JAN 2018   • History of histoplasmosis    • History of infectious mononucleosis 1960   • History of migraine headaches    • History of pneumonia 12/27/2017    AS RESULT OF FLU. ENDED UP ON VENT IN [place]   • History of thrombocytopenia    • History of transfusion 1997   • History of vertigo    • Hx of colonic polyps 06/11/2009    Dr. Giles   • Hyperlipidemia    • Hypertension    • Meningioma (HCC)    • Neuropathy     HANDS AND FEET   • Osteoarthritis 09/13/2011    Dr. Jamil Miller   • Peptic ulceration    • Retina disorder      BLEED. FROM HISTOPLASMOSIS   • SBO (small bowel obstruction) (HCC)     due to hernia with surgical repair in 2011   • SOB (shortness of breath) on exertion    • Thyroid mass     RIGHT      Allergies   Allergen Reactions   • Codeine Nausea And Vomiting   • Morphine Nausea And Vomiting      Social History     Socioeconomic History   • Marital status:      Spouse name: Mike   • Number of children: 2   • Years of education: College   Tobacco Use   • Smoking status: Former Smoker     Packs/day: 2.00     Years: 30.00     Pack years: 60.00     Types: Cigarettes     Start date: 1957     Quit date: 4/10/1987     Years since quittin.7   • Smokeless tobacco: Never Used   • Tobacco comment: I haven't had a cigarette in over 30 years   Vaping Use   • Vaping Use: Never used   Substance and Sexual Activity   • Alcohol use: No     Comment: stopped, heavy in past   • Drug use: No   • Sexual activity: Defer        Current Outpatient Medications:   •  Ascorbic Acid (VITAMIN C PO), Take 1,000 mg by mouth Daily., Disp: , Rfl:   •  calcium carbonate (OS-JUNAID) 600 MG tablet, Take 600 mg by mouth Daily., Disp: , Rfl:   •  cefdinir (OMNICEF) 300 MG capsule, Take 1 capsule by mouth 2 (Two) Times a Day., Disp: 14 capsule, Rfl: 0  •  diclofenac (VOLTAREN) 75 MG EC tablet, Take 1 tablet by mouth 2 (Two) Times a Day. Patient must be seen for future refills., Disp: 180 tablet, Rfl: 3  •  Euthyrox 75 MCG tablet, Take 1 tablet by mouth once daily, Disp: 90 tablet, Rfl: 0  •  gabapentin (NEURONTIN) 300 MG capsule, TAKE 2 CAPSULES BY MOUTH THREE TIMES DAILY, Disp: 180 capsule, Rfl: 3  •  hydroCHLOROthiazide (HYDRODIURIL) 25 MG tablet, Take 1 tablet by mouth once daily, Disp: 90 tablet, Rfl: 0  •  Lactobacillus (PROBIOTIC ACIDOPHILUS PO), Take 1 capsule by mouth Daily., Disp: , Rfl:   •  MELATONIN PO, Take 10 mg by mouth At Night As Needed., Disp: , Rfl:   •  ondansetron ODT (ZOFRAN-ODT) 4 MG disintegrating tablet, Place 1 tablet  on the tongue Every 8 (Eight) Hours As Needed for Nausea., Disp: 10 tablet, Rfl: 0  •  pantoprazole (PROTONIX) 40 MG EC tablet, Take 1 tablet by mouth once daily, Disp: 90 tablet, Rfl: 3  •  potassium chloride (KLOR-CON) 20 MEQ packet, Take 20 mEq by mouth Daily., Disp: , Rfl:   •  propranolol (INDERAL) 10 MG tablet, TAKE 1 TABLET BY MOUTH THREE TIMES DAILY, Disp: 270 tablet, Rfl: 0  •  simvastatin (ZOCOR) 80 MG tablet, TAKE 1 TABLET BY MOUTH AT BEDTIME, Disp: 90 tablet, Rfl: 0  •  trimethoprim-polymyxin b (POLYTRIM) 63590-7.1 UNIT/ML-% ophthalmic solution, INSTILL 1 DROP INTO RIGHT EYE 4 TIMES DAILY FOR 4 DAYS PRIOR TO INJECTION AND FOR 4 DAYS AFTER, Disp: , Rfl: 11  •  vitamin B-12 (CYANOCOBALAMIN) 1000 MCG tablet, Take 1,000 mcg by mouth Daily., Disp: , Rfl:   •  vitamin B-6 (PYRIDOXINE) 50 MG tablet, Take 50 mg by mouth Daily., Disp: , Rfl:      Objective   History of Present Illness Roxana is here for blood pressure check and lab follow-up.  She has hypertension, hyperlipidemia, hypothyroidism, anemia, osteopenia, reflux, peripheral neuropathy secondary to chemotherapy, and history of breast cancer.  She eats healthy and walks for exercise.  She is careful not to fall as she has the numbness due to neuropathy.  She complains of hair loss and wonders if it is her hypothyroidism.  Her weight has been stable.    Review of Systems   Constitutional: Positive for fatigue.   Respiratory: Negative.    Cardiovascular: Negative.    Gastrointestinal: Negative for abdominal pain and blood in stool.   Neurological: Negative.    Psychiatric/Behavioral: Negative.        Physical Exam  Vitals reviewed.   Constitutional:       Appearance: Normal appearance.   Cardiovascular:      Rate and Rhythm: Normal rate and regular rhythm.      Heart sounds: Normal heart sounds.      Comments: 120/70  Pulmonary:      Effort: Pulmonary effort is normal.      Breath sounds: Normal breath sounds.   Neurological:      Mental Status: She is  alert.   Psychiatric:         Mood and Affect: Mood normal.         Behavior: Behavior normal.         Thought Content: Thought content normal.         Judgment: Judgment normal.         ASSESSMENT blood pressure is normal when checked a second time today.  Thyroid-stimulating hormone level is normal.  Hemoglobin is 9 down from 11 in October.  Kidney and liver functions are normal.  HDL cholesterol is low.  LDL cholesterol is normal.  We will check iron studies and a stool Hemoccult.  Follow-up with hematology oncology as scheduled.     Problems Addressed this Visit        Cardiac and Vasculature    Hyperlipidemia    Hypertension - Primary       Hematology and Neoplasia    Anemia       Musculoskeletal and Injuries    Osteopenia       Neuro    Peripheral neuropathy due to chemotherapy (HCC)       Skin    Hair loss      Diagnoses       Codes Comments    Primary hypertension    -  Primary ICD-10-CM: I10  ICD-9-CM: 401.9     Hyperlipidemia, unspecified hyperlipidemia type     ICD-10-CM: E78.5  ICD-9-CM: 272.4     Anemia, unspecified type     ICD-10-CM: D64.9  ICD-9-CM: 285.9     Peripheral neuropathy due to chemotherapy (HCC)     ICD-10-CM: G62.0, T45.1X5A  ICD-9-CM: 357.7, E933.1     Hair loss     ICD-10-CM: L65.9  ICD-9-CM: 704.00     Osteopenia, unspecified location     ICD-10-CM: M85.80  ICD-9-CM: 733.90           PLAN  There are no Patient Instructions on file for this visit.    No follow-ups on file.

## 2022-01-14 LAB
25(OH)D3+25(OH)D2 SERPL-MCNC: 31.9 NG/ML (ref 30–100)
FERRITIN SERPL-MCNC: 48 NG/ML (ref 15–150)
IRON SATN MFR SERPL: 11 % (ref 15–55)
IRON SERPL-MCNC: 36 UG/DL (ref 27–139)
TIBC SERPL-MCNC: 323 UG/DL (ref 250–450)
UIBC SERPL-MCNC: 287 UG/DL (ref 118–369)
VIT B12 SERPL-MCNC: >2000 PG/ML (ref 232–1245)

## 2022-01-17 RX ORDER — DICLOFENAC SODIUM 75 MG/1
TABLET, DELAYED RELEASE ORAL
Qty: 180 TABLET | Refills: 0 | Status: SHIPPED | OUTPATIENT
Start: 2022-01-17 | End: 2022-04-18

## 2022-01-18 ENCOUNTER — APPOINTMENT (OUTPATIENT)
Dept: OTHER | Facility: HOSPITAL | Age: 82
End: 2022-01-18

## 2022-01-18 ENCOUNTER — CLINICAL SUPPORT (OUTPATIENT)
Dept: ONCOLOGY | Facility: HOSPITAL | Age: 82
End: 2022-01-18

## 2022-01-18 VITALS
RESPIRATION RATE: 18 BRPM | TEMPERATURE: 98.2 F | HEIGHT: 62 IN | BODY MASS INDEX: 23.55 KG/M2 | HEART RATE: 71 BPM | DIASTOLIC BLOOD PRESSURE: 78 MMHG | SYSTOLIC BLOOD PRESSURE: 128 MMHG | OXYGEN SATURATION: 99 % | WEIGHT: 128 LBS

## 2022-01-18 DIAGNOSIS — C50.911 RIGHT BREAST CANCER WITH T3 TUMOR, >5 CM IN GREATEST DIMENSION: ICD-10-CM

## 2022-01-18 DIAGNOSIS — D69.6 THROMBOCYTOPENIA: ICD-10-CM

## 2022-01-18 DIAGNOSIS — C50.211 MALIGNANT NEOPLASM OF UPPER-INNER QUADRANT OF RIGHT BREAST IN FEMALE, ESTROGEN RECEPTOR NEGATIVE: Primary | ICD-10-CM

## 2022-01-18 DIAGNOSIS — Z17.1 MALIGNANT NEOPLASM OF UPPER-INNER QUADRANT OF RIGHT BREAST IN FEMALE, ESTROGEN RECEPTOR NEGATIVE: Primary | ICD-10-CM

## 2022-01-18 DIAGNOSIS — D69.3 CHRONIC ITP (IDIOPATHIC THROMBOCYTOPENIA): ICD-10-CM

## 2022-01-18 DIAGNOSIS — Z45.2 ENCOUNTER FOR FITTING AND ADJUSTMENT OF VASCULAR CATHETER: ICD-10-CM

## 2022-01-18 LAB
DEPRECATED RDW RBC AUTO: 50.5 FL (ref 37–54)
EOSINOPHIL # BLD MANUAL: 0.21 10*3/MM3 (ref 0–0.4)
EOSINOPHIL NFR BLD MANUAL: 4 % (ref 0.3–6.2)
ERYTHROCYTE [DISTWIDTH] IN BLOOD BY AUTOMATED COUNT: 15 % (ref 12.3–15.4)
HCT VFR BLD AUTO: 33.6 % (ref 34–46.6)
HGB BLD-MCNC: 10.7 G/DL (ref 12–15.9)
LYMPHOCYTES # BLD MANUAL: 2.07 10*3/MM3 (ref 0.7–3.1)
LYMPHOCYTES NFR BLD MANUAL: 24 % (ref 5–12)
MCH RBC QN AUTO: 29.2 PG (ref 26.6–33)
MCHC RBC AUTO-ENTMCNC: 31.8 G/DL (ref 31.5–35.7)
MCV RBC AUTO: 91.6 FL (ref 79–97)
MONOCYTES # BLD: 1.27 10*3/MM3 (ref 0.1–0.9)
NEUTROPHILS # BLD AUTO: 1.75 10*3/MM3 (ref 1.7–7)
NEUTROPHILS NFR BLD MANUAL: 33 % (ref 42.7–76)
PLAT MORPH BLD: NORMAL
PLATELET # BLD AUTO: 95 10*3/MM3 (ref 140–450)
PLATELET # BLD AUTO: 95 10*3/MM3 (ref 140–450)
PLATELETS.RETICULATED NFR BLD AUTO: 10.4 % (ref 0.9–6.5)
PMV BLD AUTO: 11.4 FL (ref 6–12)
RBC # BLD AUTO: 3.67 10*6/MM3 (ref 3.77–5.28)
RBC MORPH BLD: NORMAL
SCAN SLIDE: NORMAL
VARIANT LYMPHS NFR BLD MANUAL: 39 % (ref 19.6–45.3)
WBC MORPH BLD: NORMAL
WBC NRBC COR # BLD: 5.31 10*3/MM3 (ref 3.4–10.8)

## 2022-01-18 PROCEDURE — 85007 BL SMEAR W/DIFF WBC COUNT: CPT | Performed by: INTERNAL MEDICINE

## 2022-01-18 PROCEDURE — 25010000002 HEPARIN LOCK FLUSH PER 10 UNITS: Performed by: INTERNAL MEDICINE

## 2022-01-18 PROCEDURE — 85025 COMPLETE CBC W/AUTO DIFF WBC: CPT | Performed by: INTERNAL MEDICINE

## 2022-01-18 PROCEDURE — 85055 RETICULATED PLATELET ASSAY: CPT | Performed by: INTERNAL MEDICINE

## 2022-01-18 PROCEDURE — 36591 DRAW BLOOD OFF VENOUS DEVICE: CPT

## 2022-01-18 RX ORDER — HEPARIN SODIUM (PORCINE) LOCK FLUSH IV SOLN 100 UNIT/ML 100 UNIT/ML
500 SOLUTION INTRAVENOUS AS NEEDED
Status: CANCELLED | OUTPATIENT
Start: 2022-01-18

## 2022-01-18 RX ORDER — SODIUM CHLORIDE 0.9 % (FLUSH) 0.9 %
10 SYRINGE (ML) INJECTION AS NEEDED
Status: CANCELLED | OUTPATIENT
Start: 2022-01-18

## 2022-01-18 RX ORDER — SODIUM CHLORIDE 0.9 % (FLUSH) 0.9 %
10 SYRINGE (ML) INJECTION AS NEEDED
Status: DISCONTINUED | OUTPATIENT
Start: 2022-01-18 | End: 2022-01-18 | Stop reason: HOSPADM

## 2022-01-18 RX ORDER — HEPARIN SODIUM (PORCINE) LOCK FLUSH IV SOLN 100 UNIT/ML 100 UNIT/ML
500 SOLUTION INTRAVENOUS AS NEEDED
Status: DISCONTINUED | OUTPATIENT
Start: 2022-01-18 | End: 2022-01-18 | Stop reason: HOSPADM

## 2022-01-18 RX ADMIN — Medication 500 UNITS: at 14:11

## 2022-01-18 RX ADMIN — Medication 10 ML: at 14:11

## 2022-01-18 NOTE — NURSING NOTE
Lab Results   Component Value Date    WBC 5.31 01/18/2022    HGB 10.7 (L) 01/18/2022    HCT 33.6 (L) 01/18/2022    MCV 91.6 01/18/2022    PLT 95 (L) 01/18/2022    PLT 95 (L) 01/18/2022     Pt is here for lab with RN review.  CBC reviewed with pt, counts are stable for this pt at this time. Pt has no complaints.  Copy of labs given to pt and f/u appt reviewed. Pt is instructed to call the office with any concerns or new symptoms prior to next visit. Pt vu

## 2022-01-24 DIAGNOSIS — T45.1X5A PERIPHERAL NEUROPATHY DUE TO CHEMOTHERAPY: ICD-10-CM

## 2022-01-24 DIAGNOSIS — G62.0 PERIPHERAL NEUROPATHY DUE TO CHEMOTHERAPY: ICD-10-CM

## 2022-01-24 NOTE — TELEPHONE ENCOUNTER
Caller: Roxana Brizuela    Relationship: Self    Best call back number: [phone] (H)    Requested Prescriptions:   Requested Prescriptions     Pending Prescriptions Disp Refills   • gabapentin (NEURONTIN) 300 MG capsule 180 capsule 3     Sig: Take 2 capsules by mouth 3 (Three) Times a Day.        Pharmacy where request should be sent: [facility] [address] [place] [address] - [phone]  - [phone]     Additional details provided by patient: PATIENT STATES SHE NEEDS THIS MEDICATION ASAP BECAUSE SHE CANNOT WALK WITHOUT OUT, PHARMACY TOLD PATIENT IT WAS DENIED, PLEASE ADVISE ASAP    Does the patient have less than a 3 day supply:  [x] Yes  [] No    Farrah Colin Rep   01/24/22 14:48 EST

## 2022-01-25 ENCOUNTER — TELEPHONE (OUTPATIENT)
Dept: FAMILY MEDICINE CLINIC | Facility: CLINIC | Age: 82
End: 2022-01-25

## 2022-01-25 RX ORDER — GABAPENTIN 300 MG/1
600 CAPSULE ORAL 3 TIMES DAILY
Qty: 180 CAPSULE | Refills: 3 | Status: SHIPPED | OUTPATIENT
Start: 2022-01-25 | End: 2022-05-24

## 2022-01-25 NOTE — TELEPHONE ENCOUNTER
PT CALLED AND SAID SHE IS NOT ABLE TO COMPLETE THE STOOL HEMOCCULT KIT YOU SENT HER HOME WITH BECAUSE IT'S JUST TO HARD FOR HER TO DO.     PT ASKING IF YOU WILL PUT A ORDER IN FOR HER TO HAVE A COLONOSCOPY.     PT'S # [phone]

## 2022-01-26 DIAGNOSIS — Z12.11 COLON CANCER SCREENING: Primary | ICD-10-CM

## 2022-02-07 RX ORDER — HYDROCHLOROTHIAZIDE 25 MG/1
TABLET ORAL
Qty: 90 TABLET | Refills: 0 | Status: SHIPPED | OUTPATIENT
Start: 2022-02-07 | End: 2022-05-18

## 2022-02-10 ENCOUNTER — PREP FOR SURGERY (OUTPATIENT)
Dept: SURGERY | Facility: SURGERY CENTER | Age: 82
End: 2022-02-10

## 2022-02-10 DIAGNOSIS — Z86.010 PERSONAL HISTORY OF COLONIC POLYPS: Primary | ICD-10-CM

## 2022-02-11 ENCOUNTER — TELEPHONE (OUTPATIENT)
Dept: SURGERY | Facility: CLINIC | Age: 82
End: 2022-02-11

## 2022-02-11 NOTE — TELEPHONE ENCOUNTER
Hub staff attempted to follow warm transfer process and was unsuccessful     Caller: Roxana Brizuela    Relationship to patient: Self    Best call back number: [phone]    Patient is needing: PT WOULD LIKE TO RESCHEDULE SURGERY, SHE STATES SHE RECEVIED 2/28, WOULD LIKE ANY OTHER DAY BUT Monday.

## 2022-02-15 ENCOUNTER — TELEPHONE (OUTPATIENT)
Dept: ONCOLOGY | Facility: OTHER | Age: 82
End: 2022-02-15

## 2022-02-15 ENCOUNTER — APPOINTMENT (OUTPATIENT)
Dept: ONCOLOGY | Facility: HOSPITAL | Age: 82
End: 2022-02-15

## 2022-02-15 PROBLEM — Z86.0100 PERSONAL HISTORY OF COLONIC POLYPS: Status: ACTIVE | Noted: 2022-02-15

## 2022-02-15 PROBLEM — Z86.010 PERSONAL HISTORY OF COLONIC POLYPS: Status: ACTIVE | Noted: 2022-02-15

## 2022-02-15 RX ORDER — LEVOTHYROXINE SODIUM 75 UG/1
TABLET ORAL
Qty: 90 TABLET | Refills: 0 | Status: SHIPPED | OUTPATIENT
Start: 2022-02-15 | End: 2022-05-18

## 2022-02-16 ENCOUNTER — INFUSION (OUTPATIENT)
Dept: ONCOLOGY | Facility: HOSPITAL | Age: 82
End: 2022-02-16

## 2022-02-16 DIAGNOSIS — Z45.2 ENCOUNTER FOR FITTING AND ADJUSTMENT OF VASCULAR CATHETER: Primary | ICD-10-CM

## 2022-02-16 PROCEDURE — 96523 IRRIG DRUG DELIVERY DEVICE: CPT

## 2022-02-16 PROCEDURE — 25010000002 HEPARIN LOCK FLUSH PER 10 UNITS: Performed by: INTERNAL MEDICINE

## 2022-02-16 RX ORDER — HEPARIN SODIUM (PORCINE) LOCK FLUSH IV SOLN 100 UNIT/ML 100 UNIT/ML
500 SOLUTION INTRAVENOUS AS NEEDED
Status: CANCELLED | OUTPATIENT
Start: 2022-02-16

## 2022-02-16 RX ORDER — SODIUM CHLORIDE 0.9 % (FLUSH) 0.9 %
10 SYRINGE (ML) INJECTION AS NEEDED
Status: CANCELLED | OUTPATIENT
Start: 2022-02-16

## 2022-02-16 RX ORDER — SODIUM CHLORIDE 0.9 % (FLUSH) 0.9 %
10 SYRINGE (ML) INJECTION AS NEEDED
Status: DISCONTINUED | OUTPATIENT
Start: 2022-02-16 | End: 2022-02-16 | Stop reason: HOSPADM

## 2022-02-16 RX ORDER — HEPARIN SODIUM (PORCINE) LOCK FLUSH IV SOLN 100 UNIT/ML 100 UNIT/ML
500 SOLUTION INTRAVENOUS AS NEEDED
Status: DISCONTINUED | OUTPATIENT
Start: 2022-02-16 | End: 2022-02-16 | Stop reason: HOSPADM

## 2022-02-16 RX ADMIN — Medication 10 ML: at 13:59

## 2022-02-16 RX ADMIN — Medication 500 UNITS: at 13:59

## 2022-03-08 RX ORDER — PROPRANOLOL HYDROCHLORIDE 10 MG/1
TABLET ORAL
Qty: 270 TABLET | Refills: 0 | Status: SHIPPED | OUTPATIENT
Start: 2022-03-08 | End: 2022-03-11

## 2022-03-11 RX ORDER — PROPRANOLOL HYDROCHLORIDE 10 MG/1
TABLET ORAL
Qty: 270 TABLET | Refills: 0 | Status: SHIPPED | OUTPATIENT
Start: 2022-03-11 | End: 2022-08-31

## 2022-03-15 ENCOUNTER — INFUSION (OUTPATIENT)
Dept: ONCOLOGY | Facility: HOSPITAL | Age: 82
End: 2022-03-15

## 2022-03-15 DIAGNOSIS — Z45.2 ENCOUNTER FOR FITTING AND ADJUSTMENT OF VASCULAR CATHETER: Primary | ICD-10-CM

## 2022-03-15 PROCEDURE — 25010000002 HEPARIN LOCK FLUSH PER 10 UNITS: Performed by: INTERNAL MEDICINE

## 2022-03-15 PROCEDURE — 96523 IRRIG DRUG DELIVERY DEVICE: CPT

## 2022-03-15 RX ORDER — SODIUM CHLORIDE 0.9 % (FLUSH) 0.9 %
10 SYRINGE (ML) INJECTION AS NEEDED
Status: CANCELLED | OUTPATIENT
Start: 2022-03-15

## 2022-03-15 RX ORDER — HEPARIN SODIUM (PORCINE) LOCK FLUSH IV SOLN 100 UNIT/ML 100 UNIT/ML
500 SOLUTION INTRAVENOUS AS NEEDED
Status: DISCONTINUED | OUTPATIENT
Start: 2022-03-15 | End: 2022-03-15 | Stop reason: HOSPADM

## 2022-03-15 RX ORDER — SODIUM CHLORIDE 0.9 % (FLUSH) 0.9 %
10 SYRINGE (ML) INJECTION AS NEEDED
Status: DISCONTINUED | OUTPATIENT
Start: 2022-03-15 | End: 2022-03-15 | Stop reason: HOSPADM

## 2022-03-15 RX ORDER — HEPARIN SODIUM (PORCINE) LOCK FLUSH IV SOLN 100 UNIT/ML 100 UNIT/ML
500 SOLUTION INTRAVENOUS AS NEEDED
Status: CANCELLED | OUTPATIENT
Start: 2022-03-15

## 2022-03-15 RX ADMIN — Medication 500 UNITS: at 14:12

## 2022-03-15 RX ADMIN — Medication 10 ML: at 14:12

## 2022-04-18 RX ORDER — DICLOFENAC SODIUM 75 MG/1
TABLET, DELAYED RELEASE ORAL
Qty: 180 TABLET | Refills: 0 | Status: SHIPPED | OUTPATIENT
Start: 2022-04-18 | End: 2022-04-25

## 2022-04-19 ENCOUNTER — LAB (OUTPATIENT)
Dept: OTHER | Facility: HOSPITAL | Age: 82
End: 2022-04-19

## 2022-04-19 ENCOUNTER — OFFICE VISIT (OUTPATIENT)
Dept: ONCOLOGY | Facility: CLINIC | Age: 82
End: 2022-04-19

## 2022-04-19 VITALS
HEART RATE: 77 BPM | TEMPERATURE: 96.6 F | OXYGEN SATURATION: 98 % | WEIGHT: 138.6 LBS | HEIGHT: 62 IN | DIASTOLIC BLOOD PRESSURE: 69 MMHG | SYSTOLIC BLOOD PRESSURE: 108 MMHG | BODY MASS INDEX: 25.51 KG/M2 | RESPIRATION RATE: 18 BRPM

## 2022-04-19 DIAGNOSIS — C50.511 MALIGNANT NEOPLASM OF LOWER-OUTER QUADRANT OF RIGHT FEMALE BREAST, UNSPECIFIED ESTROGEN RECEPTOR STATUS: ICD-10-CM

## 2022-04-19 DIAGNOSIS — C50.211 MALIGNANT NEOPLASM OF UPPER-INNER QUADRANT OF RIGHT BREAST IN FEMALE, ESTROGEN RECEPTOR NEGATIVE: ICD-10-CM

## 2022-04-19 DIAGNOSIS — C50.911 RIGHT BREAST CANCER WITH T3 TUMOR, >5 CM IN GREATEST DIMENSION: ICD-10-CM

## 2022-04-19 DIAGNOSIS — C50.911 MALIGNANT NEOPLASM OF RIGHT FEMALE BREAST, UNSPECIFIED ESTROGEN RECEPTOR STATUS, UNSPECIFIED SITE OF BREAST: Primary | ICD-10-CM

## 2022-04-19 DIAGNOSIS — Z17.1 MALIGNANT NEOPLASM OF UPPER-INNER QUADRANT OF RIGHT BREAST IN FEMALE, ESTROGEN RECEPTOR NEGATIVE: ICD-10-CM

## 2022-04-19 DIAGNOSIS — D69.6 THROMBOCYTOPENIA: ICD-10-CM

## 2022-04-19 DIAGNOSIS — D69.3 CHRONIC ITP (IDIOPATHIC THROMBOCYTOPENIA): ICD-10-CM

## 2022-04-19 LAB
ANISOCYTOSIS BLD QL: NORMAL
BASOPHILS # BLD AUTO: 0.02 10*3/MM3 (ref 0–0.2)
BASOPHILS NFR BLD AUTO: 0.3 % (ref 0–1.5)
DEPRECATED RDW RBC AUTO: 52.6 FL (ref 37–54)
EOSINOPHIL # BLD AUTO: 0.04 10*3/MM3 (ref 0–0.4)
EOSINOPHIL NFR BLD AUTO: 0.7 % (ref 0.3–6.2)
ERYTHROCYTE [DISTWIDTH] IN BLOOD BY AUTOMATED COUNT: 16.2 % (ref 12.3–15.4)
HCT VFR BLD AUTO: 32.5 % (ref 34–46.6)
HGB BLD-MCNC: 10.9 G/DL (ref 12–15.9)
IMM GRANULOCYTES # BLD AUTO: 0.26 10*3/MM3 (ref 0–0.05)
IMM GRANULOCYTES NFR BLD AUTO: 4.5 % (ref 0–0.5)
LYMPHOCYTES # BLD AUTO: 2.02 10*3/MM3 (ref 0.7–3.1)
LYMPHOCYTES NFR BLD AUTO: 35.1 % (ref 19.6–45.3)
MCH RBC QN AUTO: 29.9 PG (ref 26.6–33)
MCHC RBC AUTO-ENTMCNC: 33.5 G/DL (ref 31.5–35.7)
MCV RBC AUTO: 89.3 FL (ref 79–97)
MONOCYTES # BLD AUTO: 1.6 10*3/MM3 (ref 0.1–0.9)
MONOCYTES NFR BLD AUTO: 27.8 % (ref 5–12)
NEUTROPHILS NFR BLD AUTO: 1.82 10*3/MM3 (ref 1.7–7)
NEUTROPHILS NFR BLD AUTO: 31.6 % (ref 42.7–76)
NRBC BLD AUTO-RTO: 0.3 /100 WBC (ref 0–0.2)
PLAT MORPH BLD: NORMAL
PLATELET # BLD AUTO: 78 10*3/MM3 (ref 140–450)
PLATELET # BLD AUTO: 78 10*3/MM3 (ref 140–450)
PLATELETS.RETICULATED NFR BLD AUTO: 12 % (ref 0.9–6.5)
PMV BLD AUTO: 10.7 FL (ref 6–12)
POLYCHROMASIA BLD QL SMEAR: NORMAL
RBC # BLD AUTO: 3.64 10*6/MM3 (ref 3.77–5.28)
WBC MORPH BLD: NORMAL
WBC NRBC COR # BLD: 5.76 10*3/MM3 (ref 3.4–10.8)

## 2022-04-19 PROCEDURE — 85007 BL SMEAR W/DIFF WBC COUNT: CPT | Performed by: INTERNAL MEDICINE

## 2022-04-19 PROCEDURE — 85055 RETICULATED PLATELET ASSAY: CPT | Performed by: INTERNAL MEDICINE

## 2022-04-19 PROCEDURE — 85025 COMPLETE CBC W/AUTO DIFF WBC: CPT | Performed by: INTERNAL MEDICINE

## 2022-04-19 PROCEDURE — 36415 COLL VENOUS BLD VENIPUNCTURE: CPT

## 2022-04-19 PROCEDURE — 99213 OFFICE O/P EST LOW 20 MIN: CPT | Performed by: INTERNAL MEDICINE

## 2022-04-19 RX ORDER — HYDROCHLOROTHIAZIDE 25 MG/1
TABLET ORAL
COMMUNITY
Start: 2022-02-08 | End: 2022-05-18 | Stop reason: SDUPTHER

## 2022-04-19 RX ORDER — GABAPENTIN 300 MG/1
CAPSULE ORAL
COMMUNITY
Start: 2022-03-24 | End: 2023-01-09

## 2022-04-19 RX ORDER — CX-024414 0.2 MG/ML
INJECTION, SUSPENSION INTRAMUSCULAR
COMMUNITY
Start: 2022-01-11

## 2022-04-19 NOTE — PROGRESS NOTES
REASON FOR FOLLOWUP: Patient feeling well, no additional bleeding issues.      1. Newly diagnosed large right breast cancer.  Core needle biopsy reported invasive mammary carcinoma with focal lobular feature, grade 2.  ER negative, less than 1%; GA positive, moderate in staining, 5% to 10%. HER2 IHC 2+, FISH study positive 2.1.   2.  CT scan for chest abdomen and pelvis and breast MRI examination reported right axillary lymph node suspicious for metastatic disease.  No remote metastatic lesion.  Patient has stage IIIa (T3 N2 M0) disease.   3.  Patient was started neoadjuvant chemotherapy on 5/23/2017 with Taxol weekly plus Herceptin weekly for total 12 weeks, and Perjata every 3 weeks during chemotherapy.  Herceptin will be converted to every 3 weeks after chemotherapy finished.    4.  Chronic moderate thrombocytopenia, mild anemia, and intermittent mild neutropenia etiologies are not clear.  5.  Reaction to Herceptin C1D1.  Subsequently tolerated therapy with hydrocortisone as premedication.  6.  Potential cardiac strain developing, neuropathic symptoms persist, follow-up MRI and surgical assessment will be needed post initial chemotherapeutic phase  7.  MRI August 17 with interval resolution of abnormal enhancement within breast and right axillary adenopathy, surgery scheduled November 7, Herceptin ongoing every 3 weeks  8.  Patient seen in office November 28, status post surgery with apparent complete response, Herceptin continued  9.  Herceptin given December 19, subsequent injury in [place] leading to prolonged stay, single treatment given through additional cancer Center  10.  Patient seen May 01, 2018, no further Herceptin planned, baseline scans pursued posttreatment, post influenza syndrome, physical therapy planned  11.  Patient seen June 13, 2018, excellent performance status, improving on physical therapy, equivocal CT  per thoracic adenopathy, follow-up PET/CT planned   12.  PET/CT with improvement,?   Thyroid abnormality with ultrasound planned  13.  Patient seen October 3, partial thyroidectomy anticipated October 14-    14.  Patient seen May 14, 2019, scans negative for evidence of recurrence     15.  Patient seen 3/29/2019 clinically stable  16.  Patient reviewed July 7, ongoing GERD symptoms, GI referral planned.  17.  Hospitalization September 28-October 1-acute colitis due to enteropathic E. coli-residual thrombocytopenia  18.  Subsequent testing consistent with chronic ITP                                                                                                                                                                                                HISTORY OF PRESENT ILLNESS:    The patient is a pleasant 81 y.o. with the above-mentioned history, who presents today in anticipation of cycle 4 of Herceptin, Perjeta and Taxol.  This is the first visit with this physician involving this patient's case.  We have reviewed her status today with her slowly developing neuropathic symptoms in her lower extremities but also involving her fingers recognized significantly and she plays the piano and keyboard.  This is becoming harder to do but she is still able to do so.  She also notices neuropathy in her feet but is able to walk and function in daily activities.  Additional to this is her continued grieving at the death of her  though she, fortunately, has an excellent support system.  She continues to experience nausea that is well relieved with Compazine. She denies oral mucositis.  No diarrhea no constipation no chest pain no dyspnea.  No lower extremity edema.    She did received 2 units of PRBC's on   7/8/2017 and remains hematologically stable.   She does have difficulty with sleep and Ambien 10 mg daily at bedtime seems help much better compared to 5 mg dose.  She does not need refills today.  In reviewing her case July 26 she is entering the fourth cycle of her treatment and recent  echocardiogram is reviewed with she and her close friend revealing EF of 66.7% though with global strain of -16%?  Suspicious for myopathic process.  Normal ventricular cavity size and wall thickness as well as contractility.  We have also discussed that she is responding to therapy and will need surgical assessment which may not as yet have been performed.  She has seen Dr. Melo for port placement and will ask him to review her likely just after she has completed his fourth cycle of therapy.  It is also apparent that she'll need a cardiac assessment as we continue subsequent Herceptin therapy perioperatively.      The patient underwent MRI of the breasts August 17 demonstrating interval resolution of abnormal enhancement within the right breast, resolution of right axillary adenopathy had also resolved.  The findings were consistent with a complete response to neoadjuvant chemotherapy.  The patient was seen in subsequent follow-up with Dr. Melo and was determined to proceed with surgery and ultimately sentinel node evaluation.  This is now scheduled November 7 and there are additional plans to consider radiation therapy postoperatively and we have also discussed the use of anti-hormonal therapy considering her mildly positive MA status.    The patient was able to proceed to surgery undergoing right breast mastectomy and sentinel lymph node biopsy November 07, 2017.  She did well postoperatively seeing Dr. Melo November 16.  Pathology revealed no residual malignancy in the breast and 3 negative sentinel lymph nodes.  A formal review of her report reveals treatment effect particularly in her largest lymph node with focal fibrosis and scar, right breast mastectomy fibrosis associated with a cavitary biopsy site and no invasive or in situ carcinoma.  The patient is now seen in our office though she went to the wrong location and the seen late in the day.  We discussed her findings and agree that she would  continue Herceptin at this point but be reviewed formally again in 3 weeks.  She is also be seen by radiation therapy for their input.   The patient, evidently, was seen by radiation therapy but decided not to pursue it until her last Herceptin therapy here December 19.  Following this she visited her daughter in [place] and, unfortunately, developed influenza and pneumonia.  She had a prolonged hospitalization and was at many sites in recovery over a several month only to return to [place] in the last several weeks.  She did take 1 IV Herceptin dose while in [place] but as she is reviewed May 05, 2018 we have discussed that it makes no particular sense to continue or add on to her previous history by extending Herceptin any further 3-4 months after her last treatment.  She therefore has completed Herceptin.    The patient on to be tested post her treatment again baseline studies and CT of chest and pelvis were performed June 6 revealing interval increase in a few subcentimeter nodes in the left pectoral, axillary and mediastinal regions. These are all considerably small and of uncertain significance.  The patient's performance status remains excellent without any reduction and, in fact, has improved with physical therapy upon return.       Patient is next seen August 08, 2018, fortunately feeling well.  A follow-up PET/CT had revealed an interval decrease in the subcentimeter nodes in the left subpectoral axillary region as well as mediastinum.  There was no hypermetabolic lymphadenopathy.  There was intense focus within slightly enlarged right thyroid gland.  Patient indicates she never had any thyroid issues of which she is aware.  The patient was referred to ENT for assessment and the patient was seen by Dr. Fofana.  Ultimately a subtotal thyroidectomy is anticipated and now scheduled October 4.  The patient is willing to proceed.        The patient proceeded to a right thyroid lobectomy performed  November 04, 2018.  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  There was no definitive evidence of trans-capsular or vascular invasion.    The patient is seen in follow-up November 28 doing well and we discussed the surgical treatment of this thyroid lesion.  She feels that all this went well.  She has additional cataract surgery at the end of November  scheduled also.  The patient did complete her testing and was asked to return approximately 6 months later with a follow-up CT chest, abdomen and pelvis that demonstrate no evidence of recurrent disease.  As she is seen back May 14 she, unfortunately, fell and contused her face, left knee and left hand.  This required an ER visit.  Is elected to follow her back in 6 months maintaining her port in the interval and she is seen October 29 again without indications of recurrent disease and relatively stable clinically.  She is seeing plastic surgery about reconstruction and otherwise continue her current medication list and following with her primary care regularly.  The patient is next seen July 7, 2020 indicating that she did not proceed to any  plastic surgery and with the COVID-19 epidemic she does not wish to have any elective procedures.  She has, however, having significant GI reflux symptoms that have worsened substantially last several months despite PPI therapy.    The patient had follow-up for meningioma September 11, 2020 unchanged from previous.        The patient is next reviewed October 27, 2020 having been hospitalized September 28 through October 1, 2020.  She had presented with fever and flank pain and was found to have acute colitis due to enteropathogenic E. coli.  Antibiotics were avoided secondary to history of C. difficile colitis and fortunately she did not want to improve albeit slowly.  She had evidence of acute on chronic thrombocytopenia with a platelet count dropping to close to 30,000 and slowly rebounding assessed  October 6 at 64,000 and October 13 at 67,000.  Fortunately she is feeling considerably better with her bowel function normalizing.  We had the patient return for ongoing testing documenting continued thrombocytopenia and IPF at 9.5 818 October 2020.  She seen back April 13, 2021 she has further thrombocytopenia with peripheral smear showing enlarged platelets.  Is felt this consistent with chronic ITP.  The patient was treated with prednisone 8.5 mg/kg and able to gradually taper last been seen through July 14, 2020 having dropped to 5 mg.  She discontinued the medication tested 7/21/2021 with H&H of 10.6 and 32.1, white count of 8070 and platelet count of 67,000.  She is next seen 7/27/2021.  She has been able to taper off of steroids altogether but recently injured her lower extremities on the table and another physician's office and was treated for potential cellulitis developing.  This included antibiotic treatment-ciprofloxacin-producing nausea which has been difficult to recover from.  She is now completed antibiotic therapy altogether approximately 2 days ago.  The patient was able to maintain off of steroids and returns for follow-up with her platelet count remaining in the 50-60,000 range consistently associated with elevated IPF.  She has had no issues with additional hemorrhage fortunately.  She is seen 10/19/21 with an excellent performance status and again stable.    The patient returned for reassessment and is next evaluated 4/19/2022 with H&H of 10.9 and 32.5 with white count of 5760, platelet count of 78,000, ANC of 1820, platelet count of 78,000 and IPF of 12.0.  She is feeling well without any additional medical issues.             Past Medical History:   Diagnosis Date   • Alcoholism (HCC) 1978    I haven't had a drink since then   • Anemia    • Breast cancer (HCC) 05/03/2017    Right breast invasive mammary carcinoma with focal lobular features, grade 2, ER negative, less than 1% ID positive,  HER-2 positive, stage IIIa disease (T3, N2, M0   • Breast cancer (HCC) 10/20/2004    Left breast ductal carcinoma in-situ, predominately intermediate grade with focal comedonecrosis (high grade), solid and bribridform patterns involving approximately five core biopsy fragments   • Edema     Chronic lower extremity edema   • GERD (gastroesophageal reflux disease) 09/13/2011    Dr. Paul Negron   • GI (gastrointestinal bleed) 1997   • H/O jaundice    • Hernia     INCISONAL. AROUND LEFT TRAM LAP SITE   • History of chemotherapy     2017 4 MONTHS IN 2017   • History of Clostridium difficile colitis     JAN 2018   • History of histoplasmosis    • History of infectious mononucleosis 1960   • History of migraine headaches    • History of pneumonia 12/27/2017    AS RESULT OF FLU. ENDED UP ON VENT IN [place]   • History of thrombocytopenia    • History of transfusion 1997   • History of vertigo    • Hx of colonic polyps 06/11/2009    Dr. Giles   • Hyperlipidemia    • Hypertension    • Meningioma (HCC)    • Neuropathy     HANDS AND FEET   • Osteoarthritis 09/13/2011    Dr. Jamil Miller   • Peptic ulceration    • Retina disorder     BLEED. FROM HISTOPLASMOSIS   • SBO (small bowel obstruction) (HCC)     due to hernia with surgical repair in 09/2011   • SOB (shortness of breath) on exertion    • Thyroid mass     RIGHT   Normal echocardiogram on 5/18/2017, LVEF 68%.    Past Surgical History:   Procedure Laterality Date   • APPENDECTOMY N/A 1960   • BLADDER REPAIR N/A 1980   • BREAST BIOPSY Left 10/20/2004    Mammotome incisional biopsy left breast, surgical specimen, left breast, localization clip placement, left breast, confirmatory diagnostic unilateral mammogram, left breast-Dr. Tyrese Alcala, [facility]   • BREAST BIOPSY Right 05/03/2017    PATH: INVASIVE MAMMARY CARCINOMA   • CHOLECYSTECTOMY N/A 1990   • COLONOSCOPY N/A 06/11/2009    Hemorrhoids, otherwise normal, repeat in 5 years-Dr. Noemí Purcell   • EYE SURGERY Right      laser surgery for blood clot   • HYSTERECTOMY Bilateral    • INGUINAL HERNIA REPAIR Right     DR ALESIA GAXIOLA   • MASTECTOMY Left     Left breast mastecotmy with sentinel lymph node bios-[facility]   • MASTECTOMY W/ SENTINEL NODE BIOPSY Right 2017    Procedure: RIGHT BREAST MASTECTOMY WITH SENTINEL NODE BIOPSY;  Surgeon: Christian Melo MD;  Location: [facility] OR;  Service:    • FL INSJ TUNNELED CVC W/O SUBQ PORT/ AGE 5 YR/> Left 2017    Procedure: INSERTION VENOUS ACCESS DEVICE;  Surgeon: Christian Melo MD;  Location: [facility] OR;  Service: General   • REDUCTION MAMMAPLASTY Right     to match Lt. TRAM flap   • SMALL INTESTINE SURGERY      INCARCRATED HERNIA   • THYROIDECTOMY Right 10/4/2018    Procedure: RT THYROID LOBECTOMY;  Surgeon: Julián Fofana MD;  [place] [facility] [place] [facility] [place]  Service: ENT   • TONSILLECTOMY Bilateral    • TRAM FLAP DELAYED Left     WITH MASTOPEXY   • UPPER GASTROINTESTINAL ENDOSCOPY N/A 2011    LA grade A esophagitis with no bleeding, large hiatus hernia (50cm), gastric ulcer-Dr.Laszlo Lezama   • US GUIDED FINE NEEDLE ASPIRATION  2018   Lico catheter placement on 2017 by Dr. Melo.     OB/GYN history: Menarche age 16, menopause at . , 1 miscarriage. No birth control pill use. She did have post menopause hormonal supplementation.      HEMATOLOGIC/ONCOLOGIC HISTORY: The patient is a 80y.o. year old female whom we are consulted for a newly self discovered right breast mass, in 2017. Patient had ultrasound-guided biopsy on 5/3/2017 and confirmed to be invasive mammary carcinoma with focal lobular features, grade 2 Elen score 6/9. She is here for initial evaluation for management.      Patient is a 76-year-old  female who was seen here previously for chronic mild-to-moderate thrombocytopenia and mild anemia. Recently the patient reports she started having firmness of the right breast that started sometime in  April or maybe even in March. She noticed firmness spread from about around the 12 o'clock position, gradually towards the upper lateral side and lower part of the right breast associated mild pain. The patient thought it was related to fibrocystic changes. However, the symptom was getting worse. Patient also reported dented nipple after she started having pain in the right breast. She denies discharge from the nipple. She called her primary care physician, Dr. Martin, who ordered a mammogram study. This was done on 04/12/2017 with architectural distortion of the right breast centrally at 12 o'clock position and had scattered fibroglandular densities throughout the right breast.      The patient subsequently had right breast ultrasound examination on 04/26/2017. Discovered a large right breast mass measuring 5.8 x 3.5 x 3.9 cm. Patient subsequently had ultrasound-guided right breast biopsy on 05/03/2017. Pathology evaluation reported invasive mammary carcinoma with focal lobular feature. Elen score 6/9, overall grade 2. Sample was further sent to the [facility] for test. ER negative, less than 1%; GA positive, moderate in staining, 5% to 10%. HER2 IHC 2+, but FISH study positive 2.1.     She denies weight loss, she actually eats very well. No nausea vomiting. Patient does complain of insomnia, unable to sleep.      This patient has history of left breast DCIS back in 2007, had mastectomy at the [facility]. No hormonal therapy afterwards according to patient. This patient also had a small meningioma, followed by Dr. Smith in [facility], with most recent MRI of the brain in March 2017, with 18 mm meningioma, and followed on an annual basis.     Her neutrophil count on 5/16/17 is normal at 2500, however, records showed starting from 05/2015 and in 09/2016, 01/2017 and 03/2017, all 4 laboratory studies showed mild neutropenia with ANC fluctuating between  1200 and 1600. Etiology is not clear.    Normal echocardiogram on 5/18/2017, LVEF 68%.      CT scan for chest abdomen and pelvis on 5/22/2017 and breast MRI examination on 5/22/2017 reported small right axillary lymph node suspicious for metastatic disease.  No remote metastatic lesion.  Patient has stage IIIa (T3 N2 M0) disease.      Patient will start neoadjuvant chemotherapy with Taxol weekly plus Herceptin weekly for total 12 weeks, and Perjeta every 3 weeks (3-week cycle) during chemotherapy.  Herceptin will be converted to every 3 weeks after chemotherapy finished.  Cycle 1 day 1 5/23/2017.   Status post neoadjuvant chemotherapy the patient was assessed with MRI showing a complete neoadjuvant response.  The patient was reviewed for surgery and questions concerning lymph node dissection-sentinel node evaluation-and need for radiation therapy postop were considered as well as use of additional Herceptin for the balance of the year as well as additional use of anti-hormonal therapy.  Surgery was scheduled November 07, 2017.  Patient next seen in office November 28 having undergone surgery on November 7 with results revealing no residual malignancy in either breast or associated sentinel lymph nodes.  Was elected to continue Herceptin when seen back in office November 20 having initiated Herceptin May 23, 2017.    Patient continued Herceptin with her last treatment given December 19, 2017.     Unfortunately she later experienced an accident while in [place] December 28 with prolonged hospitalization and prolonged ventilator management required.  Apparently she had at least one IV Herceptin therapy given while there and we were contacted March 20, 2018- Dr. Nuno.  Eventually the patient was able to return to [place] is seen back in office May 5 at which time we concluded that she completed Herceptin therapy as result of being off treatment for so long.  Plans were made for baseline scans.  Patient follow-up  reexaminations May 8, 2019 with no evidence of recurrent malignancy.  This was again a circumstance when she was assessed 2019.  Patient seen 2020 without evidence of recurrent malignancy.  Recurrent GI symptoms noted with GI referral planned.  Patient hospitalized -2020 with enteropathic E. coli, acute on chronic thrombocytopenia.     Subsequent testing felt consistent with chronic ITP and trial of steroids initiated 2021.  She is able to taper off of prednisone altogether by mid 2021.  Patient seen back in office 10/19/21 stable off steroids.      MEDICATIONS: The current medication list was reviewed with the patient and updated in the EMR this date per the Medical Assistant. Medication dosages and frequencies were confirmed to be accurate.       ALLERGIES:    Allergies   Allergen Reactions   • Codeine Nausea And Vomiting   • Morphine Nausea And Vomiting     SOCIAL HISTORY:   Social History     Tobacco Use   • Smoking status: Former Smoker     Packs/day: 2.00     Years: 30.00     Pack years: 60.00     Types: Cigarettes     Start date: 1957     Quit date: 4/10/1987     Years since quittin.0   • Smokeless tobacco: Never Used   • Tobacco comment: I haven't had a cigarette in over 30 years   Vaping Use   • Vaping Use: Never used   Substance Use Topics   • Alcohol use: No     Comment: stopped, heavy in past   • Drug use: No     FAMILY HISTORY:  Family History   Problem Relation Age of Onset   • Heart disease Mother    • Aortic aneurysm Mother         abdominal   • Coronary artery disease Mother    • Hypertension Mother    • Miscarriages / Stillbirths Mother    • Diverticulitis Mother    • Heart disease Father    • Heart attack Father         acute   • Hypertension Father    • Early death Father    • Hearing loss Father    • Kidney disease Father    • Breast cancer Daughter 45   • Heart disease Brother    • Hypertension Brother    • Malig Hyperthermia  "Neg Hx      I have reviewed the patient's medical history in detail and updated the computerized patient record.    REVIEW OF SYSTEMS:  GENERAL: Normal performance status, no change in appetite or weight;   No fevers, chills, sweats.   SKIN: Mild contusions anterior lower extremities  HEME/LYMPH: See HPI.   EYES: No vision changes or diplopia.   ENT: No tinnitus, hearing loss, gum bleeding, epistaxis, hoarseness or dysphagia.   RESPIRATORY: No cough, Has exertional dyspnea, No hemoptysis or wheezing.   CVS: No chest pain, palpitations, orthopnea, dyspnea on exertion or PND.   GI: Worsening reflux symptoms  : No lower tract obstructive symptoms, dysuria or hematuria.   MUSCULOSKELETAL: Chronic or joint pain, arthritis.  Has mild intermittent ankle swelling.   NEUROLOGICAL: See HPI  PSYCHIATRIC: See HPI     Objective:   Vitals:    04/19/22 1347   BP: 108/69   Pulse: 77   Resp: 18   Temp: 96.6 °F (35.9 °C)   TempSrc: Temporal   SpO2: 98%   Weight: 62.9 kg (138 lb 9.6 oz)  Comment: pt stated WT   Height: 157.5 cm (62.01\")   PainSc:   5   PainLoc: Knee  Comment: pt stated right knee pain   ECOG 0      PHYSICAL EXAM:   GENERAL: Well-developed, well-nourished female, in no acute distress.   SKIN: Warm, dry without rashes, purpura or petechiae.  Left upper chest Lico catheter in place, no evidence of infection.  Contusions just lateral to the right eye, left hand and left knee  EYES: Pupils equal, round and reactive to light. EOMs intact. Conjunctivae normal.  EARS: Hearing intact.  NOSE:  No excoriations or nasal discharge.  MOUTH: Tongue is well-papillated; no stomatitis or ulcers. Lips normal.  THROAT: Oropharynx without lesions or exudates.  Status post partial thyroidectomy well-healing  LYMPHATICS: No cervical, supraclavicular, axillary adenopathy.  CHEST: Lungs clear to auscultation. Good airflow.   BREAST:Postop, Well healed right mastectomy site with no evidence of recurrent disease, no axillary adenopathy " bilaterally.  CARDIAC: Regular rate and rhythm without murmurs. Normal S1,S2.  ABDOMEN: Slightly distended, normal active bowel sounds,  no hepatosplenomegaly or masses.  EXTREMITIES: Areas of contusion/small lacerations anterior lower extremities healing without additional inflammation.  NEUROLOGICAL: Cranial Nerves II-XII grossly intact. No focal neurological deficits.  PSYCHIATRIC: Alert and oriented, pleased about her results      RECENT LABS:  Lab Results   Component Value Date    WBC 5.76 04/19/2022    HGB 10.9 (L) 04/19/2022    HCT 32.5 (L) 04/19/2022    MCV 89.3 04/19/2022    PLT 78 (L) 04/19/2022    PLT 78 (L) 04/19/2022     Lab Results   Component Value Date    NEUTROABS 1.82 04/19/2022     Lab Results   Component Value Date    GLUCOSE 95 12/20/2021    BUN 11 12/20/2021    CREATININE 0.80 12/20/2021    EGFRIFNONA 70 12/20/2021    EGFRIFAFRI 81 12/20/2021    BCR 14 12/20/2021    K 4.2 12/20/2021    CO2 22 12/20/2021    CALCIUM 8.6 (L) 12/20/2021    PROTENTOTREF 6.2 12/20/2021    ALBUMIN 3.7 12/20/2021    LABIL2 1.5 12/20/2021    AST 24 12/20/2021    ALT 15 12/20/2021            Assessment/Plan   1. Large right breast cancer, locally advanced, stage IIIa (T3 N1/ 2 M0).  ER negative, less than 1%; OK positive, moderate in staining, 5% to 10%. HER2 IHC 2+, FISH study positive 2.1.  Physical examination showed a large mass, 7 cm x 7 cm initially which has decreased to approximately less than 4 cm ..  Patient  proceeded through 4 cycles ceptin,Perjeta and Taxol.   Her subsequent MRI examination showed complete response by imaging including right axillary lymphadenopathy.  We have continued Herceptin and that includes today October 20, 2017.  The case was discussed with Dr. Melo and plans were to proceed with mastectomy plus right axillary lymph node assessment via sentinel node evaluation. it was felt she likely require radiation therapy post procedure.  The patient was scheduled and proceeded to surgery  November 7.  Her results, wonderfully, revealed no evidence of residual disease in the breast or associated sentinel lymph nodes. She was seen by radiation therapy and offered treatment did not pursue it.  Additionally, and somewhat complicating this story, she traveled to [place] and developed severe influenza, associated pneumonia and was hospitalized for several months only to return to [place] in the last several weeks now being seen back May 1.  There is no particular benefit from trying to add Herceptin back to her therapy now and is felt that she is completed Herceptin treatment.  She wishes consider reconstruction and baseline CT scans will be requested in 5 weeks with the patient being seen in 6 weeks follow-up.The patient reviewed June 13, 2018 with the scans demonstrating very modest increase in left pectoral, axillary or mediastinal nodes.  These are of uncertain significance but do need follow-up in a PET/CT is planned in 7 weeks.  Her anemia will also be reviewed again with additional laboratory studies that visit.  She'll be seen in 8 weeks for general reassessment.    The patient's anemia workup was essentially negative and she is slowly improving when seen back August 8.  Her PET/CT, fortunately, demonstrates improvement though there is potential abnormality within the right thyroid lobe.  We discussed thyroid function testing and follow-up thyroid ultrasound.  She will be seen back in approximately 2 months as we maintain her port monthly flushes.   The patient was reviewed by ENT and ultimately was felt that a partial thyroidectomy was in order and is now scheduled October 4.  Patient's one to proceed.  We'll plan to see her back in approximately 6 weeks.      The patient is next seen November 28, 2015.  Her surgery went well with the findings as noted above.  She has follow-up with ENT in the next several weeks.  Additionally she has bilateral cataract surgery at the end of this month and  into the beginning of next.  We discussed reassessment in general with follow-up scans and these were performed May 2019 which showed no evidence of recurrent malignancy.  Six-month follow-up planes with maintenance of her port every 6 weeks.  The patient is reassessed October 29, 2019 fortunately continue to do relatively well.  She has had no additional medical issues since last seen we will plan to see her back in 6 months again meeting report.  She does plan to see plastic surgery concerning reconstruction concerning her breast cancer history.  A copy this note will be sent to the plastic surgeon.     The patient is next seen July 7, 2020 without evidence of recurrence of malignancy.  We plan 6-month follow-up.  The patient is reviewed October 27, 2020 without evidence recurrent disease, repeat scans during recent hospitalization September 20-October 1 without evidence of recurrent malignancy.  Subsequent assessments again without evidence of recurrence including reexamination 4/19/2022    2.  Previous fall with contusions now recovered.  Recent contusions lower extremities treated with antibiotic therapy producing nausea now discontinued                                                                                                                                  3.  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  Worsening reflux symptoms, now stable    5.  Hospitalization September 20-October 1, 2020 with enteropathic E. coli.  This responded to conservative therapy and colonoscopy had been planned but we will not pursue at this point with resolution of symptoms.    6.  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  As this is assessed April 13 the patient's platelet count continues to decrease and we have discussed a trial of half milligram per kilogram prednisone-40 mg daily with weekly checks and adjustment as needed including rapid taper.      Patient tapered from prednisone through mid July 2021, stable to improved when assessed 7/27/2021.  No additional steroids planned.  Patient stable seen 10/19/21 with every 3 month assessments planned.  Substance conclusion felt to be consistent with chronic ITP      Plan:     *MD assessment 6 months, CBC, IPF

## 2022-04-25 RX ORDER — DICLOFENAC SODIUM 75 MG/1
75 TABLET, DELAYED RELEASE ORAL 2 TIMES DAILY
Qty: 180 TABLET | Refills: 0 | Status: SHIPPED | OUTPATIENT
Start: 2022-04-25 | End: 2022-11-29

## 2022-04-25 RX ORDER — SIMVASTATIN 80 MG
TABLET ORAL
Qty: 90 TABLET | Refills: 0 | Status: SHIPPED | OUTPATIENT
Start: 2022-04-25 | End: 2022-07-28

## 2022-05-18 RX ORDER — HYDROCHLOROTHIAZIDE 25 MG/1
TABLET ORAL
Qty: 90 TABLET | Refills: 0 | Status: SHIPPED | OUTPATIENT
Start: 2022-05-18 | End: 2022-08-09

## 2022-05-18 RX ORDER — LEVOTHYROXINE SODIUM 75 UG/1
TABLET ORAL
Qty: 90 TABLET | Refills: 0 | Status: SHIPPED | OUTPATIENT
Start: 2022-05-18 | End: 2022-08-09

## 2022-05-23 DIAGNOSIS — T45.1X5A PERIPHERAL NEUROPATHY DUE TO CHEMOTHERAPY: ICD-10-CM

## 2022-05-23 DIAGNOSIS — G62.0 PERIPHERAL NEUROPATHY DUE TO CHEMOTHERAPY: ICD-10-CM

## 2022-05-24 RX ORDER — GABAPENTIN 300 MG/1
CAPSULE ORAL
Qty: 180 CAPSULE | Refills: 1 | Status: SHIPPED | OUTPATIENT
Start: 2022-05-24 | End: 2022-07-28

## 2022-05-30 DIAGNOSIS — T45.1X5A PERIPHERAL NEUROPATHY DUE TO CHEMOTHERAPY: ICD-10-CM

## 2022-05-30 DIAGNOSIS — G62.0 PERIPHERAL NEUROPATHY DUE TO CHEMOTHERAPY: ICD-10-CM

## 2022-05-31 RX ORDER — GABAPENTIN 300 MG/1
CAPSULE ORAL
Qty: 180 CAPSULE | Refills: 0 | OUTPATIENT
Start: 2022-05-31

## 2022-06-02 ENCOUNTER — TRANSCRIBE ORDERS (OUTPATIENT)
Dept: ADMINISTRATIVE | Facility: HOSPITAL | Age: 82
End: 2022-06-02

## 2022-06-02 DIAGNOSIS — E04.1 THYROID NODULE: Primary | ICD-10-CM

## 2022-06-15 ENCOUNTER — APPOINTMENT (OUTPATIENT)
Dept: ULTRASOUND IMAGING | Facility: HOSPITAL | Age: 82
End: 2022-06-15

## 2022-06-22 ENCOUNTER — HOSPITAL ENCOUNTER (OUTPATIENT)
Dept: ULTRASOUND IMAGING | Facility: HOSPITAL | Age: 82
Discharge: HOME | End: 2022-06-22
Admitting: OTOLARYNGOLOGY

## 2022-06-22 DIAGNOSIS — E04.1 THYROID NODULE: ICD-10-CM

## 2022-06-22 PROCEDURE — 76536 US EXAM OF HEAD AND NECK: CPT

## 2022-06-27 DIAGNOSIS — I10 PRIMARY HYPERTENSION: Primary | ICD-10-CM

## 2022-06-27 DIAGNOSIS — M85.80 OSTEOPENIA, UNSPECIFIED LOCATION: ICD-10-CM

## 2022-06-27 DIAGNOSIS — E03.9 ACQUIRED HYPOTHYROIDISM: ICD-10-CM

## 2022-06-27 DIAGNOSIS — D64.9 ANEMIA, UNSPECIFIED TYPE: ICD-10-CM

## 2022-06-27 DIAGNOSIS — E78.5 HYPERLIPIDEMIA, UNSPECIFIED HYPERLIPIDEMIA TYPE: ICD-10-CM

## 2022-07-15 ENCOUNTER — TELEPHONE (OUTPATIENT)
Dept: ONCOLOGY | Facility: CLINIC | Age: 82
End: 2022-07-15

## 2022-07-15 LAB
ALBUMIN SERPL-MCNC: 4.3 G/DL (ref 3.6–4.6)
ALBUMIN/GLOB SERPL: 1.7 {RATIO} (ref 1.2–2.2)
ALP SERPL-CCNC: 70 IU/L (ref 44–121)
ALT SERPL-CCNC: 18 IU/L (ref 0–32)
AST SERPL-CCNC: 27 IU/L (ref 0–40)
BASOPHILS # BLD AUTO: 0 X10E3/UL (ref 0–0.2)
BASOPHILS NFR BLD AUTO: 0 %
BILIRUB SERPL-MCNC: 0.7 MG/DL (ref 0–1.2)
BUN SERPL-MCNC: 22 MG/DL (ref 8–27)
BUN/CREAT SERPL: 21 (ref 12–28)
CALCIUM SERPL-MCNC: 9 MG/DL (ref 8.7–10.3)
CHLORIDE SERPL-SCNC: 102 MMOL/L (ref 96–106)
CHOLEST SERPL-MCNC: 127 MG/DL (ref 100–199)
CO2 SERPL-SCNC: 23 MMOL/L (ref 20–29)
CREAT SERPL-MCNC: 1.06 MG/DL (ref 0.57–1)
EGFRCR SERPLBLD CKD-EPI 2021: 53 ML/MIN/1.73
EOSINOPHIL # BLD AUTO: 0.3 X10E3/UL (ref 0–0.4)
EOSINOPHIL NFR BLD AUTO: 4 %
ERYTHROCYTE [DISTWIDTH] IN BLOOD BY AUTOMATED COUNT: 15.2 % (ref 11.7–15.4)
GLOBULIN SER CALC-MCNC: 2.5 G/DL (ref 1.5–4.5)
GLUCOSE SERPL-MCNC: 101 MG/DL (ref 65–99)
HCT VFR BLD AUTO: 31.2 % (ref 34–46.6)
HDLC SERPL-MCNC: 35 MG/DL
HGB BLD-MCNC: 10.4 G/DL (ref 11.1–15.9)
IMMATURE CELLS: ABNORMAL
LDLC SERPL CALC-MCNC: 74 MG/DL (ref 0–99)
LDLC/HDLC SERPL: 2.1 RATIO (ref 0–3.2)
LYMPHOCYTES # BLD AUTO: 2.5 X10E3/UL (ref 0.7–3.1)
LYMPHOCYTES NFR BLD AUTO: 32 %
MCH RBC QN AUTO: 30.2 PG (ref 26.6–33)
MCHC RBC AUTO-ENTMCNC: 33.3 G/DL (ref 31.5–35.7)
MCV RBC AUTO: 91 FL (ref 79–97)
MONOCYTES # BLD AUTO: 1.6 X10E3/UL (ref 0.1–0.9)
MONOCYTES NFR BLD AUTO: 20 %
MORPHOLOGY BLD-IMP: ABNORMAL
MYELOCYTES NFR BLD: 2 % (ref 0–0)
NEUTROPHILS # BLD AUTO: 3.3 X10E3/UL (ref 1.4–7)
NEUTROPHILS NFR BLD AUTO: 42 %
PLATELET # BLD AUTO: 60 X10E3/UL (ref 150–450)
POTASSIUM SERPL-SCNC: 3.9 MMOL/L (ref 3.5–5.2)
PROT SERPL-MCNC: 6.8 G/DL (ref 6–8.5)
RBC # BLD AUTO: 3.44 X10E6/UL (ref 3.77–5.28)
SODIUM SERPL-SCNC: 139 MMOL/L (ref 134–144)
TRIGL SERPL-MCNC: 96 MG/DL (ref 0–149)
TSH SERPL DL<=0.005 MIU/L-ACNC: 2.56 UIU/ML (ref 0.45–4.5)
VLDLC SERPL CALC-MCNC: 18 MG/DL (ref 5–40)
WBC # BLD AUTO: 7.8 X10E3/UL (ref 3.4–10.8)

## 2022-07-18 NOTE — TELEPHONE ENCOUNTER
Contacted patient regarding notice of her low platelet count. She denies and rectal bleeding or nose bleeds, but does admit to shaving recently and needed 4 bandages to control the bleeding. She also reports her cat scratched her. She does not have any other symptoms to report. I will forward this onto Dr. Coburn, in case he'd like any interventions, and inform PCP of this.  
Informed patient of no additional interventions at this time. She verbalized understanding.  
na

## 2022-07-25 ENCOUNTER — OFFICE VISIT (OUTPATIENT)
Dept: FAMILY MEDICINE CLINIC | Facility: CLINIC | Age: 82
End: 2022-07-25

## 2022-07-25 VITALS
SYSTOLIC BLOOD PRESSURE: 118 MMHG | HEIGHT: 62 IN | BODY MASS INDEX: 26.31 KG/M2 | DIASTOLIC BLOOD PRESSURE: 58 MMHG | OXYGEN SATURATION: 99 % | TEMPERATURE: 97.7 F | HEART RATE: 76 BPM | WEIGHT: 143 LBS | RESPIRATION RATE: 18 BRPM

## 2022-07-25 DIAGNOSIS — T45.1X5A PERIPHERAL NEUROPATHY DUE TO CHEMOTHERAPY: ICD-10-CM

## 2022-07-25 DIAGNOSIS — G62.0 PERIPHERAL NEUROPATHY DUE TO CHEMOTHERAPY: ICD-10-CM

## 2022-07-25 DIAGNOSIS — D64.9 ANEMIA, UNSPECIFIED TYPE: ICD-10-CM

## 2022-07-25 DIAGNOSIS — E78.5 HYPERLIPIDEMIA, UNSPECIFIED HYPERLIPIDEMIA TYPE: ICD-10-CM

## 2022-07-25 DIAGNOSIS — I10 PRIMARY HYPERTENSION: Primary | ICD-10-CM

## 2022-07-25 DIAGNOSIS — E03.9 ACQUIRED HYPOTHYROIDISM: ICD-10-CM

## 2022-07-25 PROCEDURE — 99214 OFFICE O/P EST MOD 30 MIN: CPT | Performed by: INTERNAL MEDICINE

## 2022-07-25 RX ORDER — CX-024414 0.2 MG/ML
INJECTION, SUSPENSION INTRAMUSCULAR
COMMUNITY
Start: 2022-06-22

## 2022-07-25 NOTE — PROGRESS NOTES
Subjective   Roxana Brizuela is a 81 y.o. female. Patient is here today for   Chief Complaint   Patient presents with   • Follow-up     Pt here for 6 month f/u.          Vitals:    07/25/22 1303   BP: 118/58   Pulse: 76   Resp: 18   Temp: 97.7 °F (36.5 °C)   SpO2: 99%     Body mass index is 26.15 kg/m².      The following portions of the patient's history were reviewed and updated as appropriate: allergies, current medications, past family history, past medical history, past social history, past surgical history and problem list.    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 Paul Negron   • GI (gastrointestinal bleed) 1997   • H/O jaundice    • Hernia     INCISONAL. AROUND LEFT TRAM LAP SITE   • History of chemotherapy     2017 4 MONTHS IN 2017   • History of Clostridium difficile colitis     JAN 2018   • History of histoplasmosis    • History of infectious mononucleosis 1960   • History of migraine headaches    • History of pneumonia 12/27/2017    AS RESULT OF FLU. ENDED UP ON VENT IN [place]   • History of thrombocytopenia    • History of transfusion 1997   • History of vertigo    • Hx of colonic polyps 06/11/2009    Dr. Giles   • Hyperlipidemia    • Hypertension    • Meningioma (HCC)    • Neuropathy     HANDS AND FEET   • Osteoarthritis 09/13/2011    Dr. Jamil Miller   • Peptic ulceration    • Retina  disorder     BLEED. FROM HISTOPLASMOSIS   • SBO (small bowel obstruction) (HCC)     due to hernia with surgical repair in 2011   • SOB (shortness of breath) on exertion    • Thyroid mass     RIGHT      Allergies   Allergen Reactions   • Codeine Nausea And Vomiting   • Morphine Nausea And Vomiting      Social History     Socioeconomic History   • Marital status:      Spouse name: Mike   • Number of children: 2   • Years of education: College   Tobacco Use   • Smoking status: Former Smoker     Packs/day: 2.00     Years: 30.00     Pack years: 60.00     Types: Cigarettes     Start date: 1957     Quit date: 4/10/1987     Years since quittin.3   • Smokeless tobacco: Never Used   • Tobacco comment: I haven't had a cigarette in over 30 years   Vaping Use   • Vaping Use: Never used   Substance and Sexual Activity   • Alcohol use: No     Comment: stopped, heavy in past   • Drug use: No   • Sexual activity: Defer        Current Outpatient Medications:   •  Ascorbic Acid (VITAMIN C PO), Take 1,000 mg by mouth Daily., Disp: , Rfl:   •  calcium carbonate (OS-JUNAID) 600 MG tablet, Take 600 mg by mouth Daily., Disp: , Rfl:   •  cefdinir (OMNICEF) 300 MG capsule, Take 1 capsule by mouth 2 (Two) Times a Day., Disp: 14 capsule, Rfl: 0  •  diclofenac (VOLTAREN) 75 MG EC tablet, Take 1 tablet by mouth 2 (Two) Times a Day., Disp: 180 tablet, Rfl: 0  •  Euthyrox 75 MCG tablet, Take 1 tablet by mouth once daily, Disp: 90 tablet, Rfl: 0  •  gabapentin (NEURONTIN) 300 MG capsule, , Disp: , Rfl:   •  gabapentin (NEURONTIN) 300 MG capsule, TAKE 2 CAPSULES BY MOUTH THREE TIMES DAILY, Disp: 180 capsule, Rfl: 1  •  hydroCHLOROthiazide (HYDRODIURIL) 25 MG tablet, Take 1 tablet by mouth once daily, Disp: 90 tablet, Rfl: 0  •  Lactobacillus (PROBIOTIC ACIDOPHILUS PO), Take 1 capsule by mouth Daily., Disp: , Rfl:   •  MELATONIN PO, Take 10 mg by mouth At Night As Needed., Disp: , Rfl:   •  Moderna COVID-19 Vaccine 100 MCG/0.5ML  suspension, , Disp: , Rfl:   •  Moderna COVID-19 Vaccine 100 MCG/0.5ML suspension, , Disp: , Rfl:   •  ondansetron ODT (ZOFRAN-ODT) 4 MG disintegrating tablet, Place 1 tablet on the tongue Every 8 (Eight) Hours As Needed for Nausea., Disp: 10 tablet, Rfl: 0  •  pantoprazole (PROTONIX) 40 MG EC tablet, Take 1 tablet by mouth once daily, Disp: 90 tablet, Rfl: 3  •  potassium chloride (KLOR-CON) 20 MEQ packet, Take 20 mEq by mouth Daily., Disp: , Rfl:   •  propranolol (INDERAL) 10 MG tablet, TAKE 1 TABLET BY MOUTH THREE TIMES DAILY, Disp: 270 tablet, Rfl: 0  •  simvastatin (ZOCOR) 80 MG tablet, TAKE 1 TABLET BY MOUTH AT BEDTIME, Disp: 90 tablet, Rfl: 0  •  trimethoprim-polymyxin b (POLYTRIM) 29361-8.1 UNIT/ML-% ophthalmic solution, INSTILL 1 DROP INTO RIGHT EYE 4 TIMES DAILY FOR 4 DAYS PRIOR TO INJECTION AND FOR 4 DAYS AFTER, Disp: , Rfl: 11  •  vitamin B-12 (CYANOCOBALAMIN) 1000 MCG tablet, Take 1,000 mcg by mouth Daily., Disp: , Rfl:   •  vitamin B-6 (PYRIDOXINE) 50 MG tablet, Take 50 mg by mouth Daily., Disp: , Rfl:      Objective   History of Present Illness Roxana is is here for blood pressure check and lab follow-up.  She has hypertension, hyperlipidemia, poor thyroidism, history esophageal reflux, breast cancer, ITP, anemia.  She generally feels well.  She eats healthy and walks her dog for exercise.  She has gained weight in the last 6 months.  She is followed by oncology.    Review of Systems   Constitutional:        Weight gain 15 lbs   Respiratory: Negative for cough and shortness of breath.    Cardiovascular: Positive for leg swelling.   Gastrointestinal: Negative.    Genitourinary: Negative.    Neurological: Negative.    Psychiatric/Behavioral: Negative.        Physical Exam  Vitals reviewed.   Constitutional:       Appearance: Normal appearance.   Cardiovascular:      Rate and Rhythm: Normal rate and regular rhythm.      Heart sounds: Normal heart sounds.   Pulmonary:      Effort: Pulmonary effort is  normal.      Breath sounds: Normal breath sounds.   Musculoskeletal:      Right lower leg: Edema present.      Left lower leg: Edema present.   Neurological:      Mental Status: She is alert.   Psychiatric:         Mood and Affect: Mood normal.         Behavior: Behavior normal.         Thought Content: Thought content normal.         Judgment: Judgment normal.         ASSESSMENT blood pressure is well controlled.  HDL cholesterol is low.  LDL and triglycerides are normal.  Thyroid-stimulating hormone level is normal.  Serum creatinine is 1.06.  Liver functions are normal.  Hemoglobin is 10.4 and platelet count is 60.  Continue healthy heart diet, exercise, current medicines.     Problems Addressed this Visit        Cardiac and Vasculature    Hyperlipidemia    Hypertension - Primary       Endocrine and Metabolic    Hypothyroid       Hematology and Neoplasia    Anemia       Neuro    Peripheral neuropathy due to chemotherapy (HCC)      Diagnoses       Codes Comments    Primary hypertension    -  Primary ICD-10-CM: I10  ICD-9-CM: 401.9     Hyperlipidemia, unspecified hyperlipidemia type     ICD-10-CM: E78.5  ICD-9-CM: 272.4     Acquired hypothyroidism     ICD-10-CM: E03.9  ICD-9-CM: 244.9     Anemia, unspecified type     ICD-10-CM: D64.9  ICD-9-CM: 285.9     Peripheral neuropathy due to chemotherapy (HCC)     ICD-10-CM: G62.0, T45.1X5A  ICD-9-CM: 357.7, E933.1           PLAN  There are no Patient Instructions on file for this visit.    Return in about 6 months (around 1/25/2023), or Wellness visit, for labs.

## 2022-07-28 RX ORDER — GABAPENTIN 300 MG/1
CAPSULE ORAL
Qty: 180 CAPSULE | Refills: 1 | Status: SHIPPED | OUTPATIENT
Start: 2022-07-28 | End: 2022-09-19

## 2022-07-28 RX ORDER — SIMVASTATIN 80 MG
TABLET ORAL
Qty: 90 TABLET | Refills: 3 | Status: SHIPPED | OUTPATIENT
Start: 2022-07-28

## 2022-08-09 RX ORDER — HYDROCHLOROTHIAZIDE 25 MG/1
TABLET ORAL
Qty: 90 TABLET | Refills: 0 | Status: SHIPPED | OUTPATIENT
Start: 2022-08-09 | End: 2022-11-07

## 2022-08-09 RX ORDER — LEVOTHYROXINE SODIUM 75 UG/1
TABLET ORAL
Qty: 90 TABLET | Refills: 0 | Status: SHIPPED | OUTPATIENT
Start: 2022-08-09 | End: 2022-11-07

## 2022-08-31 RX ORDER — PROPRANOLOL HYDROCHLORIDE 10 MG/1
TABLET ORAL
Qty: 270 TABLET | Refills: 3 | Status: SHIPPED | OUTPATIENT
Start: 2022-08-31

## 2022-09-19 DIAGNOSIS — G62.0 PERIPHERAL NEUROPATHY DUE TO CHEMOTHERAPY: ICD-10-CM

## 2022-09-19 DIAGNOSIS — T45.1X5A PERIPHERAL NEUROPATHY DUE TO CHEMOTHERAPY: ICD-10-CM

## 2022-09-19 RX ORDER — GABAPENTIN 300 MG/1
CAPSULE ORAL
Qty: 180 CAPSULE | Refills: 1 | Status: SHIPPED | OUTPATIENT
Start: 2022-09-19 | End: 2022-11-21

## 2022-09-30 ENCOUNTER — TELEPHONE (OUTPATIENT)
Dept: ONCOLOGY | Facility: CLINIC | Age: 82
End: 2022-09-30

## 2022-09-30 DIAGNOSIS — C50.911 MALIGNANT NEOPLASM OF RIGHT FEMALE BREAST, UNSPECIFIED ESTROGEN RECEPTOR STATUS, UNSPECIFIED SITE OF BREAST: Primary | ICD-10-CM

## 2022-09-30 DIAGNOSIS — G62.9 NEUROPATHY: ICD-10-CM

## 2022-09-30 NOTE — TELEPHONE ENCOUNTER
Per Dr. Coburn, he recommends Dr. Palencia, podiatrist. Called pt and LVM informing her of the podiatrist. Referral placed and message sent to scheduling and medical records.

## 2022-09-30 NOTE — TELEPHONE ENCOUNTER
Caller: Roxana Brizuela    Relationship: Self    Best call back number: [phone]    What is the best time to reach you: ANYTIME    Who are you requesting to speak with (clinical staff, provider,  specific staff member): DR REED OR NURSE    What was the call regarding: PT WOULD LIKE DR REED TO REFER HER TO A PODIATRIST. PLEASE DO NOT REFER TO [facility]. SHE DID NOT LIKE THEM.     PLEASE CALL PT TO ADVISE. SHE HAS NEUROPATHY AND WOULD LIKE SOME HELP GROOMING HER FEET.     Do you require a callback: YES

## 2022-10-25 ENCOUNTER — APPOINTMENT (OUTPATIENT)
Dept: OTHER | Facility: HOSPITAL | Age: 82
End: 2022-10-25

## 2022-11-07 RX ORDER — HYDROCHLOROTHIAZIDE 25 MG/1
TABLET ORAL
Qty: 90 TABLET | Refills: 0 | Status: SHIPPED | OUTPATIENT
Start: 2022-11-07 | End: 2023-01-30

## 2022-11-07 RX ORDER — LEVOTHYROXINE SODIUM 0.07 MG/1
TABLET ORAL
Qty: 90 TABLET | Refills: 0 | Status: SHIPPED | OUTPATIENT
Start: 2022-11-07 | End: 2023-02-14

## 2022-11-08 ENCOUNTER — INFUSION (OUTPATIENT)
Dept: ONCOLOGY | Facility: HOSPITAL | Age: 82
End: 2022-11-08

## 2022-11-08 ENCOUNTER — OFFICE VISIT (OUTPATIENT)
Dept: ONCOLOGY | Facility: CLINIC | Age: 82
End: 2022-11-08

## 2022-11-08 ENCOUNTER — LAB (OUTPATIENT)
Dept: OTHER | Facility: HOSPITAL | Age: 82
End: 2022-11-08

## 2022-11-08 VITALS
DIASTOLIC BLOOD PRESSURE: 77 MMHG | RESPIRATION RATE: 18 BRPM | WEIGHT: 149.4 LBS | HEART RATE: 69 BPM | TEMPERATURE: 98.2 F | OXYGEN SATURATION: 100 % | BODY MASS INDEX: 27.49 KG/M2 | HEIGHT: 62 IN | SYSTOLIC BLOOD PRESSURE: 149 MMHG

## 2022-11-08 DIAGNOSIS — C50.911 MALIGNANT NEOPLASM OF RIGHT FEMALE BREAST, UNSPECIFIED ESTROGEN RECEPTOR STATUS, UNSPECIFIED SITE OF BREAST: Primary | ICD-10-CM

## 2022-11-08 DIAGNOSIS — C50.911 MALIGNANT NEOPLASM OF RIGHT FEMALE BREAST, UNSPECIFIED ESTROGEN RECEPTOR STATUS, UNSPECIFIED SITE OF BREAST: ICD-10-CM

## 2022-11-08 DIAGNOSIS — C50.511 MALIGNANT NEOPLASM OF LOWER-OUTER QUADRANT OF RIGHT FEMALE BREAST, UNSPECIFIED ESTROGEN RECEPTOR STATUS: ICD-10-CM

## 2022-11-08 DIAGNOSIS — D69.3 CHRONIC ITP (IDIOPATHIC THROMBOCYTOPENIA): ICD-10-CM

## 2022-11-08 DIAGNOSIS — Z45.2 ENCOUNTER FOR FITTING AND ADJUSTMENT OF VASCULAR CATHETER: Primary | ICD-10-CM

## 2022-11-08 LAB
BASOPHILS # BLD AUTO: 0.02 10*3/MM3 (ref 0–0.2)
BASOPHILS NFR BLD AUTO: 0.3 % (ref 0–1.5)
DEPRECATED RDW RBC AUTO: 54.1 FL (ref 37–54)
EOSINOPHIL # BLD AUTO: 0.01 10*3/MM3 (ref 0–0.4)
EOSINOPHIL NFR BLD AUTO: 0.1 % (ref 0.3–6.2)
ERYTHROCYTE [DISTWIDTH] IN BLOOD BY AUTOMATED COUNT: 16 % (ref 12.3–15.4)
HCT VFR BLD AUTO: 35.2 % (ref 34–46.6)
HGB BLD-MCNC: 11.5 G/DL (ref 12–15.9)
IMM GRANULOCYTES # BLD AUTO: 0.44 10*3/MM3 (ref 0–0.05)
IMM GRANULOCYTES NFR BLD AUTO: 6.3 % (ref 0–0.5)
LYMPHOCYTES # BLD AUTO: 2.39 10*3/MM3 (ref 0.7–3.1)
LYMPHOCYTES # BLD MANUAL: 2.58 10*3/MM3 (ref 0.7–3.1)
LYMPHOCYTES NFR BLD AUTO: 34.3 % (ref 19.6–45.3)
LYMPHOCYTES NFR BLD MANUAL: 25 % (ref 5–12)
MCH RBC QN AUTO: 30.4 PG (ref 26.6–33)
MCHC RBC AUTO-ENTMCNC: 32.7 G/DL (ref 31.5–35.7)
MCV RBC AUTO: 93.1 FL (ref 79–97)
METAMYELOCYTES NFR BLD MANUAL: 3 % (ref 0–0)
MONOCYTES # BLD AUTO: 2.12 10*3/MM3 (ref 0.1–0.9)
MONOCYTES # BLD: 1.74 10*3/MM3 (ref 0.1–0.9)
MONOCYTES NFR BLD AUTO: 30.4 % (ref 5–12)
MYELOCYTES NFR BLD MANUAL: 4 % (ref 0–0)
NEUTROPHILS # BLD AUTO: 2.16 10*3/MM3 (ref 1.7–7)
NEUTROPHILS NFR BLD AUTO: 1.99 10*3/MM3 (ref 1.7–7)
NEUTROPHILS NFR BLD AUTO: 28.6 % (ref 42.7–76)
NEUTROPHILS NFR BLD MANUAL: 31 % (ref 42.7–76)
NRBC BLD AUTO-RTO: 0 /100 WBC (ref 0–0.2)
PLAT MORPH BLD: NORMAL
PLATELET # BLD AUTO: 65 10*3/MM3 (ref 140–450)
PLATELET # BLD AUTO: 65 10*3/MM3 (ref 140–450)
PLATELETS.RETICULATED NFR BLD AUTO: 10.6 % (ref 0.9–6.5)
PMV BLD AUTO: 11.6 FL (ref 6–12)
RBC # BLD AUTO: 3.78 10*6/MM3 (ref 3.77–5.28)
RBC MORPH BLD: NORMAL
VARIANT LYMPHS NFR BLD MANUAL: 37 % (ref 19.6–45.3)
WBC MORPH BLD: NORMAL
WBC NRBC COR # BLD: 6.97 10*3/MM3 (ref 3.4–10.8)

## 2022-11-08 PROCEDURE — 85055 RETICULATED PLATELET ASSAY: CPT | Performed by: INTERNAL MEDICINE

## 2022-11-08 PROCEDURE — 25010000002 HEPARIN LOCK FLUSH PER 10 UNITS: Performed by: INTERNAL MEDICINE

## 2022-11-08 PROCEDURE — 85007 BL SMEAR W/DIFF WBC COUNT: CPT | Performed by: INTERNAL MEDICINE

## 2022-11-08 PROCEDURE — 96523 IRRIG DRUG DELIVERY DEVICE: CPT

## 2022-11-08 PROCEDURE — 99213 OFFICE O/P EST LOW 20 MIN: CPT | Performed by: INTERNAL MEDICINE

## 2022-11-08 PROCEDURE — 85025 COMPLETE CBC W/AUTO DIFF WBC: CPT | Performed by: INTERNAL MEDICINE

## 2022-11-08 RX ORDER — HEPARIN SODIUM (PORCINE) LOCK FLUSH IV SOLN 100 UNIT/ML 100 UNIT/ML
500 SOLUTION INTRAVENOUS AS NEEDED
Status: CANCELLED | OUTPATIENT
Start: 2022-11-08

## 2022-11-08 RX ORDER — HEPARIN SODIUM (PORCINE) LOCK FLUSH IV SOLN 100 UNIT/ML 100 UNIT/ML
500 SOLUTION INTRAVENOUS AS NEEDED
Status: DISCONTINUED | OUTPATIENT
Start: 2022-11-08 | End: 2022-11-08 | Stop reason: HOSPADM

## 2022-11-08 RX ORDER — SODIUM CHLORIDE 0.9 % (FLUSH) 0.9 %
10 SYRINGE (ML) INJECTION AS NEEDED
Status: CANCELLED | OUTPATIENT
Start: 2022-11-08

## 2022-11-08 RX ORDER — SODIUM CHLORIDE 0.9 % (FLUSH) 0.9 %
10 SYRINGE (ML) INJECTION AS NEEDED
Status: DISCONTINUED | OUTPATIENT
Start: 2022-11-08 | End: 2022-11-08 | Stop reason: HOSPADM

## 2022-11-08 RX ADMIN — Medication 500 UNITS: at 13:24

## 2022-11-08 RX ADMIN — Medication 10 ML: at 13:24

## 2022-11-08 NOTE — PROGRESS NOTES
REASON FOR FOLLOWUP: Patient feeling well, no additional bleeding issues, normal performance status      1. Newly diagnosed large right breast cancer.  Core needle biopsy reported invasive mammary carcinoma with focal lobular feature, grade 2.  ER negative, less than 1%; VA positive, moderate in staining, 5% to 10%. HER2 IHC 2+, FISH study positive 2.1.   2.  CT scan for chest abdomen and pelvis and breast MRI examination reported right axillary lymph node suspicious for metastatic disease.  No remote metastatic lesion.  Patient has stage IIIa (T3 N2 M0) disease.   3.  Patient was started neoadjuvant chemotherapy on 5/23/2017 with Taxol weekly plus Herceptin weekly for total 12 weeks, and Perjata every 3 weeks during chemotherapy.  Herceptin will be converted to every 3 weeks after chemotherapy finished.    4.  Chronic moderate thrombocytopenia, mild anemia, and intermittent mild neutropenia etiologies are not clear.  5.  Reaction to Herceptin C1D1.  Subsequently tolerated therapy with hydrocortisone as premedication.  6.  Potential cardiac strain developing, neuropathic symptoms persist, follow-up MRI and surgical assessment will be needed post initial chemotherapeutic phase  7.  MRI August 17 with interval resolution of abnormal enhancement within breast and right axillary adenopathy, surgery scheduled November 7, Herceptin ongoing every 3 weeks  8.  Patient seen in office November 28, status post surgery with apparent complete response, Herceptin continued  9.  Herceptin given December 19, subsequent injury in [place] leading to prolonged stay, single treatment given through additional cancer Center  10.  Patient seen May 01, 2018, no further Herceptin planned, baseline scans pursued posttreatment, post influenza syndrome, physical therapy planned  11.  Patient seen June 13, 2018, excellent performance status, improving on physical therapy, equivocal CT  per thoracic adenopathy, follow-up PET/CT planned   12.   PET/CT with improvement,?  Thyroid abnormality with ultrasound planned  13.  Patient seen October 3, partial thyroidectomy anticipated October 14-    14.  Patient seen May 14, 2019, scans negative for evidence of recurrence     15.  Patient seen 3/29/2019 clinically stable  16.  Patient reviewed July 7, ongoing GERD symptoms, GI referral planned.  17.  Hospitalization September 28-October 1-acute colitis due to enteropathic E. coli-residual thrombocytopenia  18.  Subsequent testing consistent with chronic ITP                                                                                                                                                                                                                               HISTORY OF PRESENT ILLNESS:    The patient is a pleasant 81 y.o. with the above-mentioned history, who presents today in anticipation of cycle 4 of Herceptin, Perjeta and Taxol.  This is the first visit with this physician involving this patient's case.  We have reviewed her status today with her slowly developing neuropathic symptoms in her lower extremities but also involving her fingers recognized significantly and she plays the piano and keyboard.  This is becoming harder to do but she is still able to do so.  She also notices neuropathy in her feet but is able to walk and function in daily activities.  Additional to this is her continued grieving at the death of her  though she, fortunately, has an excellent support system.  She continues to experience nausea that is well relieved with Compazine. She denies oral mucositis.  No diarrhea no constipation no chest pain no dyspnea.  No lower extremity edema.    She did received 2 units of PRBC's on   7/8/2017 and remains hematologically stable.   She does have difficulty with sleep and Ambien 10 mg daily at bedtime seems help much better compared to 5 mg dose.  She does not need refills today.  In reviewing her case July 26 she  is entering the fourth cycle of her treatment and recent echocardiogram is reviewed with she and her close friend revealing EF of 66.7% though with global strain of -16%?  Suspicious for myopathic process.  Normal ventricular cavity size and wall thickness as well as contractility.  We have also discussed that she is responding to therapy and will need surgical assessment which may not as yet have been performed.  She has seen Dr. Melo for port placement and will ask him to review her likely just after she has completed his fourth cycle of therapy.  It is also apparent that she'll need a cardiac assessment as we continue subsequent Herceptin therapy perioperatively.      The patient underwent MRI of the breasts August 17 demonstrating interval resolution of abnormal enhancement within the right breast, resolution of right axillary adenopathy had also resolved.  The findings were consistent with a complete response to neoadjuvant chemotherapy.  The patient was seen in subsequent follow-up with Dr. Melo and was determined to proceed with surgery and ultimately sentinel node evaluation.  This is now scheduled November 7 and there are additional plans to consider radiation therapy postoperatively and we have also discussed the use of anti-hormonal therapy considering her mildly positive WA status.    The patient was able to proceed to surgery undergoing right breast mastectomy and sentinel lymph node biopsy November 07, 2017.  She did well postoperatively seeing Dr. Melo November 16.  Pathology revealed no residual malignancy in the breast and 3 negative sentinel lymph nodes.  A formal review of her report reveals treatment effect particularly in her largest lymph node with focal fibrosis and scar, right breast mastectomy fibrosis associated with a cavitary biopsy site and no invasive or in situ carcinoma.  The patient is now seen in our office though she went to the wrong location and the seen late in the day.   We discussed her findings and agree that she would continue Herceptin at this point but be reviewed formally again in 3 weeks.  She is also be seen by radiation therapy for their input.   The patient, evidently, was seen by radiation therapy but decided not to pursue it until her last Herceptin therapy here December 19.  Following this she visited her daughter in [place] and, unfortunately, developed influenza and pneumonia.  She had a prolonged hospitalization and was at many sites in recovery over a several month only to return to [place] in the last several weeks.  She did take 1 IV Herceptin dose while in [place] but as she is reviewed May 05, 2018 we have discussed that it makes no particular sense to continue or add on to her previous history by extending Herceptin any further 3-4 months after her last treatment.  She therefore has completed Herceptin.    The patient on to be tested post her treatment again baseline studies and CT of chest and pelvis were performed June 6 revealing interval increase in a few subcentimeter nodes in the left pectoral, axillary and mediastinal regions. These are all considerably small and of uncertain significance.  The patient's performance status remains excellent without any reduction and, in fact, has improved with physical therapy upon return.       Patient is next seen August 08, 2018, fortunately feeling well.  A follow-up PET/CT had revealed an interval decrease in the subcentimeter nodes in the left subpectoral axillary region as well as mediastinum.  There was no hypermetabolic lymphadenopathy.  There was intense focus within slightly enlarged right thyroid gland.  Patient indicates she never had any thyroid issues of which she is aware.  The patient was referred to ENT for assessment and the patient was seen by Dr. Fofana.  Ultimately a subtotal thyroidectomy is anticipated and now scheduled October 4.  The patient is willing to proceed.        The patient  proceeded to a right thyroid lobectomy performed November 04, 2018.  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  There was no definitive evidence of trans-capsular or vascular invasion.    The patient is seen in follow-up November 28 doing well and we discussed the surgical treatment of this thyroid lesion.  She feels that all this went well.  She has additional cataract surgery at the end of November  scheduled also.  The patient did complete her testing and was asked to return approximately 6 months later with a follow-up CT chest, abdomen and pelvis that demonstrate no evidence of recurrent disease.  As she is seen back May 14 she, unfortunately, fell and contused her face, left knee and left hand.  This required an ER visit.  Is elected to follow her back in 6 months maintaining her port in the interval and she is seen October 29 again without indications of recurrent disease and relatively stable clinically.  She is seeing plastic surgery about reconstruction and otherwise continue her current medication list and following with her primary care regularly.  The patient is next seen July 7, 2020 indicating that she did not proceed to any  plastic surgery and with the COVID-19 epidemic she does not wish to have any elective procedures.  She has, however, having significant GI reflux symptoms that have worsened substantially last several months despite PPI therapy.    The patient had follow-up for meningioma September 11, 2020 unchanged from previous.        The patient is next reviewed October 27, 2020 having been hospitalized September 28 through October 1, 2020.  She had presented with fever and flank pain and was found to have acute colitis due to enteropathogenic E. coli.  Antibiotics were avoided secondary to history of C. difficile colitis and fortunately she did not want to improve albeit slowly.  She had evidence of acute on chronic thrombocytopenia with a platelet count dropping  to close to 30,000 and slowly rebounding assessed October 6 at 64,000 and October 13 at 67,000.  Fortunately she is feeling considerably better with her bowel function normalizing.  We had the patient return for ongoing testing documenting continued thrombocytopenia and IPF at 9.5 818 October 2020.  She seen back April 13, 2021 she has further thrombocytopenia with peripheral smear showing enlarged platelets.  Is felt this consistent with chronic ITP.  The patient was treated with prednisone 8.5 mg/kg and able to gradually taper last been seen through July 14, 2020 having dropped to 5 mg.  She discontinued the medication tested 7/21/2021 with H&H of 10.6 and 32.1, white count of 8070 and platelet count of 67,000.  She is next seen 7/27/2021.  She has been able to taper off of steroids altogether but recently injured her lower extremities on the table and another physician's office and was treated for potential cellulitis developing.  This included antibiotic treatment-ciprofloxacin-producing nausea which has been difficult to recover from.  She is now completed antibiotic therapy altogether approximately 2 days ago.  The patient was able to maintain off of steroids and returns for follow-up with her platelet count remaining in the 50-60,000 range consistently associated with elevated IPF.  She has had no issues with additional hemorrhage fortunately.  She is seen 10/19/21 with an excellent performance status and again stable.    The patient returned for reassessment and is next evaluated 4/19/2022 with H&H of 10.9 and 32.5 with white count of 5760, platelet count of 78,000, ANC of 1820, platelet count of 78,000 and IPF of 12.0.  She is feeling well without any additional medical issues.    We had the patient return for port maintenance and reassessment in 6 months.  She is reviewed 11/8/2022 stable clinically.         Past Medical History:   Diagnosis Date   • Alcoholism (HCC) 1978    I haven't had a drink since  then   • Anemia    • Breast cancer (HCC) 05/03/2017    Right breast invasive mammary carcinoma with focal lobular features, grade 2, ER negative, less than 1% AL positive, HER-2 positive, stage IIIa disease (T3, N2, M0   • Breast cancer (HCC) 10/20/2004    Left breast ductal carcinoma in-situ, predominately intermediate grade with focal comedonecrosis (high grade), solid and bribridform patterns involving approximately five core biopsy fragments   • Edema     Chronic lower extremity edema   • GERD (gastroesophageal reflux disease) 09/13/2011    Dr. Paul Negron   • GI (gastrointestinal bleed) 1997   • H/O jaundice    • Hernia     INCISONAL. AROUND LEFT TRAM LAP SITE   • History of chemotherapy     2017 4 MONTHS IN 2017   • History of Clostridium difficile colitis     JAN 2018   • History of histoplasmosis    • History of infectious mononucleosis 1960   • History of migraine headaches    • History of pneumonia 12/27/2017    AS RESULT OF FLU. ENDED UP ON VENT IN [place]   • History of thrombocytopenia    • History of transfusion 1997   • History of vertigo    • Hx of colonic polyps 06/11/2009    Dr. Giles   • Hyperlipidemia    • Hypertension    • Meningioma (HCC)    • Neuropathy     HANDS AND FEET   • Osteoarthritis 09/13/2011    Dr. Jamil Miller   • Peptic ulceration    • Retina disorder     BLEED. FROM HISTOPLASMOSIS   • SBO (small bowel obstruction) (HCC)     due to hernia with surgical repair in 09/2011   • SOB (shortness of breath) on exertion    • Thyroid mass     RIGHT   Normal echocardiogram on 5/18/2017, LVEF 68%.    Past Surgical History:   Procedure Laterality Date   • APPENDECTOMY N/A 1960   • BLADDER REPAIR N/A 1980   • BREAST BIOPSY Left 10/20/2004    Mammotome incisional biopsy left breast, surgical specimen, left breast, localization clip placement, left breast, confirmatory diagnostic unilateral mammogram, left breast-Dr. Tyrese Alcala, [facility]   • BREAST BIOPSY Right 05/03/2017    PATH: INVASIVE  MAMMARY CARCINOMA   • CHOLECYSTECTOMY N/A    • COLONOSCOPY N/A 2009    Hemorrhoids, otherwise normal, repeat in 5 years-Dr. Noemí Purcell   • EYE SURGERY Right     laser surgery for blood clot   • HYSTERECTOMY Bilateral    • INGUINAL HERNIA REPAIR Right     DR ALESIA GAXIOLA   • MASTECTOMY Left     Left breast mastecotmy with sentinel lymph node biospy-[facility]   • MASTECTOMY W/ SENTINEL NODE BIOPSY Right 2017    Procedure: RIGHT BREAST MASTECTOMY WITH SENTINEL NODE BIOPSY;  Surgeon: Christian Melo MD;  Location: [facility] OR;  Service:    • ID INSJ TUNNELED CVC W/O SUBQ PORT/ AGE 5 YR/> Left 2017    Procedure: INSERTION VENOUS ACCESS DEVICE;  Surgeon: Christian Melo MD;  Location: [facility] OR;  Service: General   • REDUCTION MAMMAPLASTY Right     to match Lt. TRAM flap   • SMALL INTESTINE SURGERY      INCARCRATED HERNIA   • THYROIDECTOMY Right 10/4/2018    Procedure: RT THYROID LOBECTOMY;  Surgeon: Julián Fofana MD;  [facility] OR [facility];  Service: ENT   • TONSILLECTOMY Bilateral    • TRAM FLAP DELAYED Left     WITH MASTOPEXY   • UPPER GASTROINTESTINAL ENDOSCOPY N/A 2011    LA grade A esophagitis with no bleeding, large hiatus hernia (50cm), gastric ulcer-Dr.Laszlo Lezama   • US GUIDED FINE NEEDLE ASPIRATION  2018   Lico catheter placement on 2017 by Dr. Melo.     OB/GYN history: Menarche age 16, menopause at 1985. , 1 miscarriage. No birth control pill use. She did have post menopause hormonal supplementation.      HEMATOLOGIC/ONCOLOGIC HISTORY: The patient is a 81y.o. year old female whom we are consulted for a newly self discovered right breast mass, in 2017. Patient had ultrasound-guided biopsy on 5/3/2017 and confirmed to be invasive mammary carcinoma with focal lobular features, grade 2 [place] score 6/9. She is here for initial evaluation for management.      Patient is a 76-year-old  female who was seen here  previously for chronic mild-to-moderate thrombocytopenia and mild anemia. Recently the patient reports she started having firmness of the right breast that started sometime in April or maybe even in March. She noticed firmness spread from about around the 12 o'clock position, gradually towards the upper lateral side and lower part of the right breast associated mild pain. The patient thought it was related to fibrocystic changes. However, the symptom was getting worse. Patient also reported dented nipple after she started having pain in the right breast. She denies discharge from the nipple. She called her primary care physician, Dr. Martin, who ordered a mammogram study. This was done on 04/12/2017 with architectural distortion of the right breast centrally at 12 o'clock position and had scattered fibroglandular densities throughout the right breast.      The patient subsequently had right breast ultrasound examination on 04/26/2017. Discovered a large right breast mass measuring 5.8 x 3.5 x 3.9 cm. Patient subsequently had ultrasound-guided right breast biopsy on 05/03/2017. Pathology evaluation reported invasive mammary carcinoma with focal lobular feature. Elen score 6/9, overall grade 2. Sample was further sent to the [facility] for test. ER negative, less than 1%; LA positive, moderate in staining, 5% to 10%. HER2 IHC 2+, but FISH study positive 2.1.     She denies weight loss, she actually eats very well. No nausea vomiting. Patient does complain of insomnia, unable to sleep.      This patient has history of left breast DCIS back in 2007, had mastectomy at the [facility]. No hormonal therapy afterwards according to patient. This patient also had a small meningioma, followed by Dr. Smith in [facility], with most recent MRI of the brain in March 2017, with 18 mm meningioma, and followed on an annual basis.     Her neutrophil count on 5/16/17 is  normal at 2500, however, records showed starting from 05/2015 and in 09/2016, 01/2017 and 03/2017, all 4 laboratory studies showed mild neutropenia with ANC fluctuating between 1200 and 1600. Etiology is not clear.    Normal echocardiogram on 5/18/2017, LVEF 68%.      CT scan for chest abdomen and pelvis on 5/22/2017 and breast MRI examination on 5/22/2017 reported small right axillary lymph node suspicious for metastatic disease.  No remote metastatic lesion.  Patient has stage IIIa (T3 N2 M0) disease.      Patient will start neoadjuvant chemotherapy with Taxol weekly plus Herceptin weekly for total 12 weeks, and Perjeta every 3 weeks (3-week cycle) during chemotherapy.  Herceptin will be converted to every 3 weeks after chemotherapy finished.  Cycle 1 day 1 5/23/2017.   Status post neoadjuvant chemotherapy the patient was assessed with MRI showing a complete neoadjuvant response.  The patient was reviewed for surgery and questions concerning lymph node dissection-sentinel node evaluation-and need for radiation therapy postop were considered as well as use of additional Herceptin for the balance of the year as well as additional use of anti-hormonal therapy.  Surgery was scheduled November 07, 2017.  Patient next seen in office November 28 having undergone surgery on November 7 with results revealing no residual malignancy in either breast or associated sentinel lymph nodes.  Was elected to continue Herceptin when seen back in office November 20 having initiated Herceptin May 23, 2017.    Patient continued Herceptin with her last treatment given December 19, 2017.     Unfortunately she later experienced an accident while in [place] December 28 with prolonged hospitalization and prolonged ventilator management required.  Apparently she had at least one IV Herceptin therapy given while there and we were contacted March 20, 2018- Dr. Nuno.  Eventually the patient was able to return to [place] is seen back in office  May 5 at which time we concluded that she completed Herceptin therapy as result of being off treatment for so long.  Plans were made for baseline scans.  Patient follow-up reexaminations May 8, 2019 with no evidence of recurrent malignancy.  This was again a circumstance when she was assessed 2019.  Patient seen 2020 without evidence of recurrent malignancy.  Recurrent GI symptoms noted with GI referral planned.  Patient hospitalized -2020 with enteropathic E. coli, acute on chronic thrombocytopenia.     Subsequent testing felt consistent with chronic ITP and trial of steroids initiated 2021.  She is able to taper off of prednisone altogether by mid 2021.  Patient seen back in office 10/19/21 stable off steroids.  Her subsequent assessments were consistent with chronic ITP without intervention required.      MEDICATIONS: The current medication list was reviewed with the patient and updated in the EMR this date per the Medical Assistant. Medication dosages and frequencies were confirmed to be accurate.       ALLERGIES:    Allergies   Allergen Reactions   • Codeine Nausea And Vomiting   • Morphine Nausea And Vomiting     SOCIAL HISTORY:   Social History     Tobacco Use   • Smoking status: Former     Packs/day: 2.00     Years: 30.00     Pack years: 60.00     Types: Cigarettes     Start date: 1957     Quit date: 4/10/1987     Years since quittin.6   • Smokeless tobacco: Never   • Tobacco comments:     I haven't had a cigarette in over 30 years   Vaping Use   • Vaping Use: Never used   Substance Use Topics   • Alcohol use: No     Comment: stopped, heavy in past   • Drug use: No     FAMILY HISTORY:  Family History   Problem Relation Age of Onset   • Heart disease Mother    • Aortic aneurysm Mother         abdominal   • Coronary artery disease Mother    • Hypertension Mother    • Miscarriages / Stillbirths Mother    • Diverticulitis Mother    • Heart  "disease Father    • Heart attack Father         acute   • Hypertension Father    • Early death Father    • Hearing loss Father    • Kidney disease Father    • Breast cancer Daughter 45   • Heart disease Brother    • Hypertension Brother    • Malig Hyperthermia Neg Hx      I have reviewed the patient's medical history in detail and updated the computerized patient record.    REVIEW OF SYSTEMS:  GENERAL: Normal performance status, no change in appetite or weight;   No fevers, chills, sweats.   SKIN: Mild contusions anterior lower extremities  HEME/LYMPH: See HPI.   EYES: No vision changes or diplopia.   ENT: No tinnitus, hearing loss, gum bleeding, epistaxis, hoarseness or dysphagia.   RESPIRATORY: No cough, Has exertional dyspnea, No hemoptysis or wheezing.   CVS: No chest pain, palpitations, orthopnea, dyspnea on exertion or PND.   GI: Worsening reflux symptoms  : No lower tract obstructive symptoms, dysuria or hematuria.   MUSCULOSKELETAL: Chronic or joint pain, arthritis.  Has mild intermittent ankle swelling.   NEUROLOGICAL: See HPI  PSYCHIATRIC: See HPI     Objective:   Vitals:    11/08/22 1336   BP: 149/77   Pulse: 69   Resp: 18   Temp: 98.2 °F (36.8 °C)   TempSrc: Temporal   SpO2: 100%   Weight: 67.8 kg (149 lb 6.4 oz)   Height: 157.5 cm (62.01\")   PainSc: 0-No pain   ECOG 0      PHYSICAL EXAM:   GENERAL: Well-developed, well-nourished female, in no acute distress.   SKIN: Warm, dry without rashes, purpura or petechiae.  Left upper chest Lico catheter in place, no evidence of infection.  Contusions just lateral to the right eye, left hand and left knee  EYES: Pupils equal, round and reactive to light. EOMs intact. Conjunctivae normal.  EARS: Hearing intact.  NOSE:  No excoriations or nasal discharge.  MOUTH: Tongue is well-papillated; no stomatitis or ulcers. Lips normal.  THROAT: Oropharynx without lesions or exudates.  Status post partial thyroidectomy well-healing  LYMPHATICS: No cervical, " supraclavicular, axillary adenopathy.  CHEST: Lungs clear to auscultation. Good airflow.   BREAST:Postop, Well healed right mastectomy site with no evidence of recurrent disease, no axillary adenopathy bilaterally.  CARDIAC: Regular rate and rhythm without murmurs. Normal S1,S2.  ABDOMEN: Slightly distended, normal active bowel sounds,  no hepatosplenomegaly or masses.  EXTREMITIES: Areas of contusion/small lacerations anterior lower extremities healing without additional inflammation.  NEUROLOGICAL: Cranial Nerves II-XII grossly intact. No focal neurological deficits.  PSYCHIATRIC: Alert and oriented, pleased about her results      RECENT LABS:  Lab Results   Component Value Date    WBC 6.97 11/08/2022    HGB 11.5 (L) 11/08/2022    HCT 35.2 11/08/2022    MCV 93.1 11/08/2022    PLT 65 (L) 11/08/2022    PLT 65 (L) 11/08/2022     Lab Results   Component Value Date    NEUTROABS 1.99 11/08/2022     Lab Results   Component Value Date    GLUCOSE 101 (H) 07/14/2022    BUN 22 07/14/2022    CREATININE 1.06 (H) 07/14/2022    EGFRIFNONA 70 12/20/2021    EGFRIFAFRI 81 12/20/2021    BCR 21 07/14/2022    K 3.9 07/14/2022    CO2 23 07/14/2022    CALCIUM 9.0 07/14/2022    PROTENTOTREF 6.8 07/14/2022    ALBUMIN 4.3 07/14/2022    LABIL2 1.7 07/14/2022    AST 27 07/14/2022    ALT 18 07/14/2022            Assessment/Plan   1. Large right breast cancer, locally advanced, stage IIIa (T3 N1/ 2 M0).  ER negative, less than 1%; CO positive, moderate in staining, 5% to 10%. HER2 IHC 2+, FISH study positive 2.1.  Physical examination showed a large mass, 7 cm x 7 cm initially which has decreased to approximately less than 4 cm ..  Patient  proceeded through 4 cycles ceptin,Perjeta and Taxol.   Her subsequent MRI examination showed complete response by imaging including right axillary lymphadenopathy.  We have continued Herceptin and that includes today October 20, 2017.  The case was discussed with Dr. Melo and plans were to proceed with  mastectomy plus right axillary lymph node assessment via sentinel node evaluation. it was felt she likely require radiation therapy post procedure.  The patient was scheduled and proceeded to surgery November 7.  Her results, wonderfully, revealed no evidence of residual disease in the breast or associated sentinel lymph nodes. She was seen by radiation therapy and offered treatment did not pursue it.  Additionally, and somewhat complicating this story, she traveled to [place] and developed severe influenza, associated pneumonia and was hospitalized for several months only to return to [place] in the last several weeks now being seen back May 1.  There is no particular benefit from trying to add Herceptin back to her therapy now and is felt that she is completed Herceptin treatment.  She wishes consider reconstruction and baseline CT scans will be requested in 5 weeks with the patient being seen in 6 weeks follow-up.The patient reviewed June 13, 2018 with the scans demonstrating very modest increase in left pectoral, axillary or mediastinal nodes.  These are of uncertain significance but do need follow-up in a PET/CT is planned in 7 weeks.  Her anemia will also be reviewed again with additional laboratory studies that visit.  She'll be seen in 8 weeks for general reassessment.    The patient's anemia workup was essentially negative and she is slowly improving when seen back August 8.  Her PET/CT, fortunately, demonstrates improvement though there is potential abnormality within the right thyroid lobe.  We discussed thyroid function testing and follow-up thyroid ultrasound.  She will be seen back in approximately 2 months as we maintain her port monthly flushes.   The patient was reviewed by ENT and ultimately was felt that a partial thyroidectomy was in order and is now scheduled October 4.  Patient's one to proceed.  We'll plan to see her back in approximately 6 weeks.      The patient is next seen November  28, 2015.  Her surgery went well with the findings as noted above.  She has follow-up with ENT in the next several weeks.  Additionally she has bilateral cataract surgery at the end of this month and into the beginning of next.  We discussed reassessment in general with follow-up scans and these were performed May 2019 which showed no evidence of recurrent malignancy.  Six-month follow-up planes with maintenance of her port every 6 weeks.  The patient is reassessed October 29, 2019 fortunately continue to do relatively well.  She has had no additional medical issues since last seen we will plan to see her back in 6 months again meeting report.  She does plan to see plastic surgery concerning reconstruction concerning her breast cancer history.  A copy this note will be sent to the plastic surgeon.     The patient is next seen July 7, 2020 without evidence of recurrence of malignancy.  We plan 6-month follow-up.  The patient is reviewed October 27, 2020 without evidence recurrent disease, repeat scans during recent hospitalization September 20-October 1 without evidence of recurrent malignancy.  Subsequent assessments again without evidence of recurrence including reexamination 11/8/2022        2.  Previous fall with contusions now recovered.  Recent contusions lower extremities treated with antibiotic therapy producing nausea now discontinued                                                                                                                                                                                                         3.  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  Worsening reflux symptoms, now stable    5.  Hospitalization September 20-October 1, 2020 with enteropathic E. coli.  This responded to conservative therapy and colonoscopy had been planned but we will not pursue at this point with resolution of symptoms.    6.  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  As this is assessed April 13 the patient's platelet count continues to decrease and we have discussed a trial of half milligram per kilogram prednisone-40 mg daily with weekly checks and adjustment as needed including rapid taper.     Patient tapered from prednisone through mid July 2021, stable to improved when assessed 7/27/2021.  No additional steroids planned.  Patient stable seen 10/19/21 with every 3 month assessments planned.  Substance conclusion felt to be consistent with chronic ITP      Plan:    *Every 6 month port flush     *MD assessment 6 months, CBC, IPF

## 2022-11-09 ENCOUNTER — TELEPHONE (OUTPATIENT)
Dept: ONCOLOGY | Facility: CLINIC | Age: 82
End: 2022-11-09

## 2022-11-09 NOTE — TELEPHONE ENCOUNTER
Per Dr. Coburn, call pt and let her know platelets were stable and no changes needed. Called and informed pt. She v/u.

## 2022-11-20 DIAGNOSIS — T45.1X5A PERIPHERAL NEUROPATHY DUE TO CHEMOTHERAPY: ICD-10-CM

## 2022-11-20 DIAGNOSIS — G62.0 PERIPHERAL NEUROPATHY DUE TO CHEMOTHERAPY: ICD-10-CM

## 2022-11-21 RX ORDER — GABAPENTIN 300 MG/1
CAPSULE ORAL
Qty: 180 CAPSULE | Refills: 0 | Status: SHIPPED | OUTPATIENT
Start: 2022-11-21 | End: 2022-12-20

## 2022-11-21 NOTE — TELEPHONE ENCOUNTER
Rx Refill Note  Requested Prescriptions     Pending Prescriptions Disp Refills   • gabapentin (NEURONTIN) 300 MG capsule [Pharmacy Med Name: Gabapentin 300 MG Oral Capsule] 180 capsule 0     Sig: TAKE 2 CAPSULES BY MOUTH THREE TIMES DAILY      Last office visit with prescribing clinician: 7/25/2022      Next office visit with prescribing clinician: 1/30/2023            Faith Guerra MA  11/21/22, 16:19 EST

## 2022-11-29 RX ORDER — DICLOFENAC SODIUM 75 MG/1
TABLET, DELAYED RELEASE ORAL
Qty: 180 TABLET | Refills: 0 | Status: SHIPPED | OUTPATIENT
Start: 2022-11-29

## 2022-12-18 DIAGNOSIS — T45.1X5A PERIPHERAL NEUROPATHY DUE TO CHEMOTHERAPY: ICD-10-CM

## 2022-12-18 DIAGNOSIS — G62.0 PERIPHERAL NEUROPATHY DUE TO CHEMOTHERAPY: ICD-10-CM

## 2022-12-19 ENCOUNTER — INFUSION (OUTPATIENT)
Dept: ONCOLOGY | Facility: HOSPITAL | Age: 82
End: 2022-12-19

## 2022-12-19 DIAGNOSIS — Z45.2 ENCOUNTER FOR FITTING AND ADJUSTMENT OF VASCULAR CATHETER: Primary | ICD-10-CM

## 2022-12-19 PROCEDURE — 96523 IRRIG DRUG DELIVERY DEVICE: CPT

## 2022-12-19 PROCEDURE — 25010000002 HEPARIN LOCK FLUSH PER 10 UNITS: Performed by: INTERNAL MEDICINE

## 2022-12-19 RX ORDER — HEPARIN SODIUM (PORCINE) LOCK FLUSH IV SOLN 100 UNIT/ML 100 UNIT/ML
500 SOLUTION INTRAVENOUS AS NEEDED
Status: CANCELLED | OUTPATIENT
Start: 2022-12-19

## 2022-12-19 RX ORDER — HEPARIN SODIUM (PORCINE) LOCK FLUSH IV SOLN 100 UNIT/ML 100 UNIT/ML
500 SOLUTION INTRAVENOUS AS NEEDED
Status: DISCONTINUED | OUTPATIENT
Start: 2022-12-19 | End: 2022-12-19 | Stop reason: HOSPADM

## 2022-12-19 RX ORDER — SODIUM CHLORIDE 0.9 % (FLUSH) 0.9 %
10 SYRINGE (ML) INJECTION AS NEEDED
Status: DISCONTINUED | OUTPATIENT
Start: 2022-12-19 | End: 2022-12-19 | Stop reason: HOSPADM

## 2022-12-19 RX ORDER — SODIUM CHLORIDE 0.9 % (FLUSH) 0.9 %
10 SYRINGE (ML) INJECTION AS NEEDED
Status: CANCELLED | OUTPATIENT
Start: 2022-12-19

## 2022-12-19 RX ADMIN — Medication 10 ML: at 13:30

## 2022-12-19 RX ADMIN — Medication 500 UNITS: at 13:30

## 2022-12-19 NOTE — TELEPHONE ENCOUNTER
Rx Refill Note  Requested Prescriptions     Pending Prescriptions Disp Refills   • gabapentin (NEURONTIN) 300 MG capsule [Pharmacy Med Name: Gabapentin 300 MG Oral Capsule] 180 capsule 0     Sig: TAKE 2 CAPSULES BY MOUTH THREE TIMES DAILY      Last office visit with prescribing clinician: 7/25/2022   Last telemedicine visit with prescribing clinician: 1/25/2023   Next office visit with prescribing clinician: 1/30/2023                         Would you like a call back once the refill request has been completed: [] Yes [] No    If the office needs to give you a call back, can they leave a voicemail: [] Yes [] No    Faith Guerra MA  12/19/22, 11:23 EST

## 2022-12-20 RX ORDER — GABAPENTIN 300 MG/1
CAPSULE ORAL
Qty: 180 CAPSULE | Refills: 1 | Status: SHIPPED | OUTPATIENT
Start: 2022-12-20 | End: 2023-02-20

## 2023-01-03 RX ORDER — PANTOPRAZOLE SODIUM 40 MG/1
TABLET, DELAYED RELEASE ORAL
Qty: 90 TABLET | Refills: 0 | Status: SHIPPED | OUTPATIENT
Start: 2023-01-03 | End: 2023-04-06

## 2023-01-18 ENCOUNTER — TELEPHONE (OUTPATIENT)
Dept: FAMILY MEDICINE CLINIC | Facility: CLINIC | Age: 83
End: 2023-01-18

## 2023-01-18 NOTE — TELEPHONE ENCOUNTER
Caller: Roxana Brizuela    Relationship: Self    Best call back number:     What is the best time to reach you:     Who are you requesting to speak with (clinical staff, provider,  specific staff member):     Do you know the name of the person who called:     What was the call regarding: PATIENT IS CALLING IN TO REQUEST A CALL BACK FROM DR AWAD OR STAFF AS SHE NEEDS TO DISCUSS SOME ISSUES.     Do you require a callback: YES

## 2023-01-20 NOTE — TELEPHONE ENCOUNTER
Called spoke with pt. She was calling letting us know she had a UTI and she is feeling better. She went to the urgent  care to get treated

## 2023-01-24 ENCOUNTER — TELEPHONE (OUTPATIENT)
Dept: FAMILY MEDICINE CLINIC | Facility: CLINIC | Age: 83
End: 2023-01-24

## 2023-01-24 DIAGNOSIS — R11.0 NAUSEA: ICD-10-CM

## 2023-01-24 RX ORDER — ONDANSETRON 4 MG/1
4 TABLET, ORALLY DISINTEGRATING ORAL EVERY 8 HOURS PRN
Qty: 20 TABLET | Refills: 1 | Status: SHIPPED | OUTPATIENT
Start: 2023-01-24

## 2023-01-24 NOTE — TELEPHONE ENCOUNTER
Caller: Roxana Brizuela    Relationship to patient: Self    Best call back number:[phone]     Patient is needing: PATIENT IS CALLING TO STATE SHE HAD TO CALL EMS YESTERDAY TO TAKE HER TO THE ER , WAS DIAGNOSED WITH ANOTHER UTI.  SHE STATES SHE WAS  TAKEN TO [facility].  SHE STATES THAT SHE IS REALLY NAUSEATED.  SHE STATES SHE WAS GIVEN ZOFRAN YESTERDAY BUT DOES NOT HAVE ANY MORE.  SHE IS WANTING TO KNOW IF DR. AWAD  WOULD BE WILLING TO SEND IN A PRESCRIPTION FOR THE NAUSEA.    [facility] [address] [place] [address] - [phone]  - [phone] FX  [phone]    PLEASE ADVISE.

## 2023-01-30 ENCOUNTER — INFUSION (OUTPATIENT)
Dept: ONCOLOGY | Facility: HOSPITAL | Age: 83
End: 2023-01-30
Payer: MEDICARE

## 2023-01-30 ENCOUNTER — OFFICE VISIT (OUTPATIENT)
Dept: FAMILY MEDICINE CLINIC | Facility: CLINIC | Age: 83
End: 2023-01-30
Payer: MEDICARE

## 2023-01-30 VITALS
SYSTOLIC BLOOD PRESSURE: 112 MMHG | BODY MASS INDEX: 27.75 KG/M2 | HEIGHT: 61 IN | OXYGEN SATURATION: 98 % | RESPIRATION RATE: 16 BRPM | TEMPERATURE: 97.5 F | HEART RATE: 64 BPM | DIASTOLIC BLOOD PRESSURE: 72 MMHG | WEIGHT: 147 LBS

## 2023-01-30 DIAGNOSIS — Z45.2 ENCOUNTER FOR FITTING AND ADJUSTMENT OF VASCULAR CATHETER: Primary | ICD-10-CM

## 2023-01-30 DIAGNOSIS — N39.0 URINARY TRACT INFECTION WITH HEMATURIA, SITE UNSPECIFIED: ICD-10-CM

## 2023-01-30 DIAGNOSIS — E03.9 ACQUIRED HYPOTHYROIDISM: ICD-10-CM

## 2023-01-30 DIAGNOSIS — R31.9 URINARY TRACT INFECTION WITH HEMATURIA, SITE UNSPECIFIED: ICD-10-CM

## 2023-01-30 DIAGNOSIS — E78.5 HYPERLIPIDEMIA, UNSPECIFIED HYPERLIPIDEMIA TYPE: ICD-10-CM

## 2023-01-30 DIAGNOSIS — I10 PRIMARY HYPERTENSION: Primary | ICD-10-CM

## 2023-01-30 DIAGNOSIS — D69.6 THROMBOCYTOPENIA: ICD-10-CM

## 2023-01-30 DIAGNOSIS — T45.1X5A PERIPHERAL NEUROPATHY DUE TO CHEMOTHERAPY: ICD-10-CM

## 2023-01-30 DIAGNOSIS — M85.80 OSTEOPENIA, UNSPECIFIED LOCATION: ICD-10-CM

## 2023-01-30 DIAGNOSIS — G62.0 PERIPHERAL NEUROPATHY DUE TO CHEMOTHERAPY: ICD-10-CM

## 2023-01-30 PROCEDURE — 96160 PT-FOCUSED HLTH RISK ASSMT: CPT | Performed by: INTERNAL MEDICINE

## 2023-01-30 PROCEDURE — G0439 PPPS, SUBSEQ VISIT: HCPCS | Performed by: INTERNAL MEDICINE

## 2023-01-30 PROCEDURE — 1159F MED LIST DOCD IN RCRD: CPT | Performed by: INTERNAL MEDICINE

## 2023-01-30 PROCEDURE — 25010000002 HEPARIN LOCK FLUSH PER 10 UNITS: Performed by: NURSE PRACTITIONER

## 2023-01-30 PROCEDURE — 1170F FXNL STATUS ASSESSED: CPT | Performed by: INTERNAL MEDICINE

## 2023-01-30 PROCEDURE — 96523 IRRIG DRUG DELIVERY DEVICE: CPT

## 2023-01-30 RX ORDER — CEFDINIR 300 MG/1
1 CAPSULE ORAL EVERY 12 HOURS SCHEDULED
COMMUNITY
Start: 2023-01-22

## 2023-01-30 RX ORDER — HEPARIN SODIUM (PORCINE) LOCK FLUSH IV SOLN 100 UNIT/ML 100 UNIT/ML
500 SOLUTION INTRAVENOUS AS NEEDED
Status: DISCONTINUED | OUTPATIENT
Start: 2023-01-30 | End: 2023-01-30 | Stop reason: HOSPADM

## 2023-01-30 RX ORDER — HYDROCHLOROTHIAZIDE 25 MG/1
TABLET ORAL
Qty: 90 TABLET | Refills: 0 | Status: SHIPPED | OUTPATIENT
Start: 2023-01-30 | End: 2023-02-14

## 2023-01-30 RX ORDER — SODIUM CHLORIDE 0.9 % (FLUSH) 0.9 %
10 SYRINGE (ML) INJECTION AS NEEDED
Status: DISCONTINUED | OUTPATIENT
Start: 2023-01-30 | End: 2023-01-30 | Stop reason: HOSPADM

## 2023-01-30 RX ADMIN — Medication 10 ML: at 13:15

## 2023-01-30 RX ADMIN — Medication 500 UNITS: at 13:16

## 2023-01-30 NOTE — PROGRESS NOTES
The ABCs of the Annual Wellness Visit  Subsequent Medicare Wellness Visit    Subjective      Roxana Brizuela is a 82 y.o. female who presents for a Subsequent Medicare Wellness Visit and emergency room follow-up.  She was evaluated at urgent care on January 9 and diagnosed with cystitis.  Cultures grew E. coli.  She was given cefdinir.  Felt worse and went to [facility] emergency room on January 22.  White blood cell count was slightly elevated.  Culture grew E. coli.  She currently has no symptoms.    The following portions of the patient's history were reviewed and   updated as appropriate: allergies, current medications, past family history, past medical history, past social history, past surgical history and problem list.    Compared to one year ago, the patient feels her physical   health is better.    Compared to one year ago, the patient feels her mental   health is the same.    Recent Hospitalizations:  She was not admitted to the hospital during the last year.       Current Medical Providers:  Patient Care Team:  Brandt Martin MD as PCP - General  Brandt Martin MD as PCP - Family Medicine  Jamal Patel MD PhD as Consulting Physician (Hematology and Oncology)  Brandt Martin MD as Referring Physician (Internal Medicine)  Constance Elliott MD as Consulting Physician (Hematology and Oncology)  Oneil Coburn MD as Consulting Physician (Hematology and Oncology)    Outpatient Medications Prior to Visit   Medication Sig Dispense Refill   • Ascorbic Acid (VITAMIN C PO) Take 1,000 mg by mouth Daily.     • cefdinir (OMNICEF) 300 MG capsule Take 1 capsule by mouth Every 12 (Twelve) Hours.     • diclofenac (VOLTAREN) 75 MG EC tablet Take 1 tablet by mouth twice daily 180 tablet 0   • gabapentin (NEURONTIN) 300 MG capsule TAKE 2 CAPSULES BY MOUTH THREE TIMES DAILY 180 capsule 1   • hydroCHLOROthiazide (HYDRODIURIL) 25 MG tablet Take 1 tablet by mouth once daily 90 tablet 0   • Lactobacillus  (PROBIOTIC ACIDOPHILUS PO) Take 1 capsule by mouth Daily.     • levothyroxine (SYNTHROID, LEVOTHROID) 75 MCG tablet Take 1 tablet by mouth once daily 90 tablet 0   • MELATONIN PO Take 10 mg by mouth At Night As Needed.     • ondansetron ODT (ZOFRAN-ODT) 4 MG disintegrating tablet Place 1 tablet on the tongue Every 8 (Eight) Hours As Needed for Nausea. 20 tablet 1   • pantoprazole (PROTONIX) 40 MG EC tablet Take 1 tablet by mouth once daily 90 tablet 0   • potassium chloride (KLOR-CON) 20 MEQ packet Take 20 mEq by mouth Daily.     • propranolol (INDERAL) 10 MG tablet TAKE 1 TABLET BY MOUTH THREE TIMES DAILY 270 tablet 3   • simvastatin (ZOCOR) 80 MG tablet TAKE 1 TABLET BY MOUTH AT BEDTIME 90 tablet 3   • trimethoprim-polymyxin b (POLYTRIM) 36357-0.1 UNIT/ML-% ophthalmic solution INSTILL 1 DROP INTO RIGHT EYE 4 TIMES DAILY FOR 4 DAYS PRIOR TO INJECTION AND FOR 4 DAYS AFTER  11   • vitamin B-12 (CYANOCOBALAMIN) 1000 MCG tablet Take 1,000 mcg by mouth Daily.     • vitamin B-6 (PYRIDOXINE) 50 MG tablet Take 50 mg by mouth Daily.     • Moderna COVID-19 Vaccine 100 MCG/0.5ML suspension      • Moderna COVID-19 Vaccine 100 MCG/0.5ML suspension      • nitrofurantoin, macrocrystal-monohydrate, (MACROBID) 100 MG capsule Take 1 capsule by mouth 2 (Two) Times a Day. 14 capsule 0     No facility-administered medications prior to visit.       No opioid medication identified on active medication list. I have reviewed chart for other potential  high risk medication/s and harmful drug interactions in the elderly.          Aspirin is not on active medication list.  Aspirin use is not indicated based on review of current medical condition/s. Risk of harm outweighs potential benefits.  .    Patient Active Problem List   Diagnosis   • Iron deficiency anemia   • Hyperlipidemia   • Hypertension   • Leukopenia   • Dyspnea on exertion   • Laceration   • Meningioma (HCC)   • Thrombocytopenia (HCC)   • Malignant neoplasm of right female breast  "(HCC)   • Drug induced insomnia (HCC)   • Hypersensitivity reaction   • Hypokalemia   • Dehydration   • History of cardiac arrhythmia   • Dysuria   • Peripheral neuropathy due to chemotherapy (HCC)   • Chemotherapy-induced nausea   • Acute cystitis with hematuria   • Nausea   • Anemia   • Malignant neoplasm of lower-outer quadrant of right female breast (HCC)   • Encounter for fitting and adjustment of vascular catheter   • Right breast cancer with T3 tumor, >5 cm in greatest dimension (HCC)   • Cancer of right female breast (HCC)   • Post-influenza syndrome   • Hypothyroid   • Pain of left hip joint   • Hurthle cell adenoma   • Elevated TSH   • Postoperative hypothyroidism   • Fatigue   • Gastroesophageal reflux disease   • Other constipation   • Incontinence of feces with fecal urgency   • Acute colitis due to enteropathogenic Escherichia coli infection   • Nonintractable migraine   • Chronic ITP (idiopathic thrombocytopenia) (HCC)   • Hair loss   • Osteopenia   • Personal history of colonic polyps     Advance Care Planning  Advance Directive is not on file.  ACP discussion was held with the patient during this visit. Patient has an advance directive (not in EMR), copy requested.     Objective    Vitals:    01/30/23 1127   BP: 112/72   BP Location: Left arm   Patient Position: Sitting   Cuff Size: Adult   Pulse: 64   Resp: 16   Temp: 97.5 °F (36.4 °C)   TempSrc: Temporal   SpO2: 98%   Weight: 66.7 kg (147 lb)   Height: 154.9 cm (60.98\")     Estimated body mass index is 27.79 kg/m² as calculated from the following:    Height as of this encounter: 154.9 cm (60.98\").    Weight as of this encounter: 66.7 kg (147 lb).    BMI is >= 25 and <30. (Overweight) The following options were offered after discussion;: exercise counseling/recommendations and nutrition counseling/recommendations      Does the patient have evidence of cognitive impairment?   No            HEALTH RISK ASSESSMENT    Smoking Status:  Social History "     Tobacco Use   Smoking Status Former   • Packs/day: 2.00   • Years: 30.00   • Pack years: 60.00   • Types: Cigarettes   • Start date: 1957   • Quit date: 4/10/1987   • Years since quittin.8   Smokeless Tobacco Never   Tobacco Comments    I haven't had a cigarette in over 30 years     Alcohol Consumption:  Social History     Substance and Sexual Activity   Alcohol Use No    Comment: stopped, heavy in past     Fall Risk Screen:    STEADI Fall Risk Assessment was completed, and patient is at MODERATE risk for falls. Assessment completed on:2023    Depression Screening:  PHQ-2/PHQ-9 Depression Screening 2022   Little Interest or Pleasure in Doing Things 0-->not at all   Feeling Down, Depressed or Hopeless 0-->not at all   PHQ-9: Brief Depression Severity Measure Score 0       Health Habits and Functional and Cognitive Screening:  Functional & Cognitive Status 2023   Do you have difficulty preparing food and eating? No   Do you have difficulty bathing yourself, getting dressed or grooming yourself? No   Do you have difficulty using the toilet? Yes   Do you have difficulty moving around from place to place? No   Do you have trouble with steps or getting out of a bed or a chair? No   Current Diet Well Balanced Diet   Dental Exam Up to date   Eye Exam Up to date   Exercise (times per week) 0 times per week   Current Exercises Include No Regular Exercise   Current Exercise Activities Include -   Do you need help using the phone?  No   Are you deaf or do you have serious difficulty hearing?  Yes   Do you need help with transportation? No   Do you need help shopping? No   Do you need help preparing meals?  No   Do you need help with housework?  No   Do you need help with laundry? No   Do you need help taking your medications? No   Do you need help managing money? No   Do you ever drive or ride in a car without wearing a seat belt? No   Have you felt unusual stress, anger or loneliness in the last  month? No   Who do you live with? Alone   If you need help, do you have trouble finding someone available to you? No   Have you been bothered in the last four weeks by sexual problems? -   Do you have difficulty concentrating, remembering or making decisions? Yes       Age-appropriate Screening Schedule:  Refer to the list below for future screening recommendations based on patient's age, sex and/or medical conditions. Orders for these recommended tests are listed in the plan section. The patient has been provided with a written plan.    Health Maintenance   Topic Date Due   • LIPID PANEL  07/14/2023   • DXA SCAN  11/17/2023   • TDAP/TD VACCINES (2 - Td or Tdap) 02/03/2028   • INFLUENZA VACCINE  Completed   • ZOSTER VACCINE  Completed   • MAMMOGRAM  Discontinued   • HEMOGLOBIN A1C  Discontinued   • DIABETIC EYE EXAM  Discontinued   • URINE MICROALBUMIN  Discontinued                CMS Preventative Services Quick Reference  Risk Factors Identified During Encounter:    None Identified    The above risks/problems have been discussed with the patient.  Pertinent information has been shared with the patient in the After Visit Summary.    Diagnoses and all orders for this visit:    1. Primary hypertension (Primary)    2. Hyperlipidemia, unspecified hyperlipidemia type    3. Thrombocytopenia (HCC)    4. Acquired hypothyroidism    5. Osteopenia, unspecified location    6. Peripheral neuropathy due to chemotherapy (HCC)    7. Urinary tract infection with hematuria, site unspecified  -     Urinalysis With Culture If Indicated -    Will recheck a urinalysis today.    Follow Up:   Next Medicare Wellness visit to be scheduled in 1 year.  We will follow-up in 6 months and recheck a thyroid-stimulating hormone level and lipid panel.    An After Visit Summary and PPPS were made available to the patient.

## 2023-02-09 ENCOUNTER — OFFICE VISIT (OUTPATIENT)
Dept: FAMILY MEDICINE CLINIC | Facility: CLINIC | Age: 83
End: 2023-02-09
Payer: MEDICARE

## 2023-02-09 VITALS
SYSTOLIC BLOOD PRESSURE: 118 MMHG | RESPIRATION RATE: 16 BRPM | HEIGHT: 61 IN | TEMPERATURE: 98.4 F | BODY MASS INDEX: 28.13 KG/M2 | OXYGEN SATURATION: 98 % | DIASTOLIC BLOOD PRESSURE: 60 MMHG | WEIGHT: 149 LBS | HEART RATE: 67 BPM

## 2023-02-09 DIAGNOSIS — R30.0 DYSURIA: Primary | ICD-10-CM

## 2023-02-09 LAB
BILIRUB BLD-MCNC: NEGATIVE MG/DL
CLARITY, POC: ABNORMAL
COLOR UR: YELLOW
EXPIRATION DATE: ABNORMAL
GLUCOSE UR STRIP-MCNC: NEGATIVE MG/DL
KETONES UR QL: NEGATIVE
LEUKOCYTE EST, POC: ABNORMAL
Lab: ABNORMAL
NITRITE UR-MCNC: NEGATIVE MG/ML
PH UR: 6.5 [PH] (ref 5–8)
PROT UR STRIP-MCNC: NEGATIVE MG/DL
RBC # UR STRIP: ABNORMAL /UL
SP GR UR: 1.01 (ref 1–1.03)
UROBILINOGEN UR QL: ABNORMAL

## 2023-02-09 PROCEDURE — 99213 OFFICE O/P EST LOW 20 MIN: CPT | Performed by: INTERNAL MEDICINE

## 2023-02-09 PROCEDURE — 81003 URINALYSIS AUTO W/O SCOPE: CPT | Performed by: INTERNAL MEDICINE

## 2023-02-09 RX ORDER — PROMETHAZINE HYDROCHLORIDE 25 MG/1
25 TABLET ORAL EVERY 6 HOURS PRN
Qty: 28 TABLET | Refills: 1 | Status: SHIPPED | OUTPATIENT
Start: 2023-02-09

## 2023-02-09 RX ORDER — PHENAZOPYRIDINE HYDROCHLORIDE 200 MG/1
200 TABLET, FILM COATED ORAL 3 TIMES DAILY PRN
Qty: 21 TABLET | Refills: 1 | Status: SHIPPED | OUTPATIENT
Start: 2023-02-09

## 2023-02-09 RX ORDER — CIPROFLOXACIN 250 MG/1
250 TABLET, FILM COATED ORAL 2 TIMES DAILY
Qty: 14 TABLET | Refills: 1 | Status: SHIPPED | OUTPATIENT
Start: 2023-02-09

## 2023-02-09 NOTE — PROGRESS NOTES
Subjective   Roxana Brizuela is a 82 y.o. female. Patient is here today for   Chief Complaint   Patient presents with   • Urinary Tract Infection          Vitals:    02/09/23 1122   BP: 118/60   Pulse: 67   Resp: 16   Temp: 98.4 °F (36.9 °C)   SpO2: 98%     Body mass index is 28.17 kg/m².    The following portions of the patient's history were reviewed and updated as appropriate: allergies, current medications, past family history, past medical history, past social history, past surgical history and problem list.    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 Paul Negron   • GI (gastrointestinal bleed) 1997   • H/O jaundice    • Hernia     INCISONAL. AROUND LEFT TRAM LAP SITE   • History of chemotherapy     2017 4 MONTHS IN 2017   • History of Clostridium difficile colitis     JAN 2018   • History of histoplasmosis    • History of infectious mononucleosis 1960   • History of migraine headaches    • History of pneumonia 12/27/2017    AS RESULT OF FLU. ENDED UP ON VENT IN [place]   • History of thrombocytopenia    • History of transfusion 1997   • History of vertigo    • Hx of colonic polyps 06/11/2009    Dr. Giles   • Hyperlipidemia    • Hypertension    • Meningioma (HCC)    • Neuropathy     HANDS AND FEET   • Osteoarthritis 09/13/2011    Dr. Jamil Miller   • Peptic ulceration    • Retina disorder     BLEED.  FROM HISTOPLASMOSIS   • SBO (small bowel obstruction) (HCC)     due to hernia with surgical repair in 2011   • SOB (shortness of breath) on exertion    • Thyroid mass     RIGHT      Allergies   Allergen Reactions   • Codeine Nausea And Vomiting   • Morphine Nausea And Vomiting      Social History     Socioeconomic History   • Marital status:      Spouse name: Mike   • Number of children: 2   • Years of education: College   Tobacco Use   • Smoking status: Former     Packs/day: 2.00     Years: 30.00     Pack years: 60.00     Types: Cigarettes     Start date: 1957     Quit date: 4/10/1987     Years since quittin.8   • Smokeless tobacco: Never   • Tobacco comments:     I haven't had a cigarette in over 30 years   Vaping Use   • Vaping Use: Never used   Substance and Sexual Activity   • Alcohol use: No     Comment: stopped, heavy in past   • Drug use: No   • Sexual activity: Defer        Current Outpatient Medications:   •  Ascorbic Acid (VITAMIN C PO), Take 1,000 mg by mouth Daily., Disp: , Rfl:   •  cefdinir (OMNICEF) 300 MG capsule, Take 1 capsule by mouth Every 12 (Twelve) Hours., Disp: , Rfl:   •  ciprofloxacin (Cipro) 250 MG tablet, Take 1 tablet by mouth 2 (Two) Times a Day., Disp: 14 tablet, Rfl: 1  •  diclofenac (VOLTAREN) 75 MG EC tablet, Take 1 tablet by mouth twice daily, Disp: 180 tablet, Rfl: 0  •  gabapentin (NEURONTIN) 300 MG capsule, TAKE 2 CAPSULES BY MOUTH THREE TIMES DAILY, Disp: 180 capsule, Rfl: 1  •  hydroCHLOROthiazide (HYDRODIURIL) 25 MG tablet, Take 1 tablet by mouth once daily, Disp: 90 tablet, Rfl: 0  •  Lactobacillus (PROBIOTIC ACIDOPHILUS PO), Take 1 capsule by mouth Daily., Disp: , Rfl:   •  levothyroxine (SYNTHROID, LEVOTHROID) 75 MCG tablet, Take 1 tablet by mouth once daily, Disp: 90 tablet, Rfl: 0  •  MELATONIN PO, Take 10 mg by mouth At Night As Needed., Disp: , Rfl:   •  Moderna COVID-19 Vaccine 100 MCG/0.5ML suspension, , Disp: , Rfl:   •  Moderna COVID-19  Vaccine 100 MCG/0.5ML suspension, , Disp: , Rfl:   •  nitrofurantoin, macrocrystal-monohydrate, (MACROBID) 100 MG capsule, Take 1 capsule by mouth 2 (Two) Times a Day., Disp: 14 capsule, Rfl: 0  •  ondansetron ODT (ZOFRAN-ODT) 4 MG disintegrating tablet, Place 1 tablet on the tongue Every 8 (Eight) Hours As Needed for Nausea., Disp: 20 tablet, Rfl: 1  •  pantoprazole (PROTONIX) 40 MG EC tablet, Take 1 tablet by mouth once daily, Disp: 90 tablet, Rfl: 0  •  phenazopyridine (Pyridium) 200 MG tablet, Take 1 tablet by mouth 3 (Three) Times a Day As Needed for Bladder Spasms., Disp: 21 tablet, Rfl: 1  •  potassium chloride (KLOR-CON) 20 MEQ packet, Take 20 mEq by mouth Daily., Disp: , Rfl:   •  promethazine (PHENERGAN) 25 MG tablet, Take 1 tablet by mouth Every 6 (Six) Hours As Needed for Nausea or Vomiting., Disp: 28 tablet, Rfl: 1  •  propranolol (INDERAL) 10 MG tablet, TAKE 1 TABLET BY MOUTH THREE TIMES DAILY, Disp: 270 tablet, Rfl: 3  •  simvastatin (ZOCOR) 80 MG tablet, TAKE 1 TABLET BY MOUTH AT BEDTIME, Disp: 90 tablet, Rfl: 3  •  trimethoprim-polymyxin b (POLYTRIM) 15701-2.1 UNIT/ML-% ophthalmic solution, INSTILL 1 DROP INTO RIGHT EYE 4 TIMES DAILY FOR 4 DAYS PRIOR TO INJECTION AND FOR 4 DAYS AFTER, Disp: , Rfl: 11  •  vitamin B-12 (CYANOCOBALAMIN) 1000 MCG tablet, Take 1,000 mcg by mouth Daily., Disp: , Rfl:   •  vitamin B-6 (PYRIDOXINE) 50 MG tablet, Take 50 mg by mouth Daily., Disp: , Rfl:      Objective     History of Present Illness Roxana continues to have dysuria, urgency, and frequency.  She was diagnosed with cystitis by urgent care on January 8.  Culture grew E. coli.  She initially took nitrofurantoin which caused nausea.  She subsequently was evaluated at [facility] in [place] with persistent symptoms.  Urinalysis showed infection.  Culture was ordered but not done.  She was treated with Omnicef.  She felt well up until yesterday when her symptoms came back.  She continues to have some nausea  and request a prescription for Phenergan.  She has been taking Zofran.  She denies any fever, chills, flank pain.    Review of Systems   Constitutional: Negative for chills and fever.   Gastrointestinal: Positive for nausea.        Suprapubic pain   Genitourinary: Positive for dysuria, frequency and urgency. Negative for flank pain.   Neurological: Negative.    Psychiatric/Behavioral: Negative.        Physical Exam  Vitals reviewed.   Constitutional:       Appearance: Normal appearance.   Abdominal:      Tenderness: There is no right CVA tenderness or left CVA tenderness.   Neurological:      Mental Status: She is alert.   Psychiatric:         Mood and Affect: Mood normal.         Behavior: Behavior normal.         Thought Content: Thought content normal.         Judgment: Judgment normal.         ASSESSMENT urinalysis today shows leukocytes.  Will do a urine culture.  Start Cipro 250 mg twice a day and other meds as prescribed.  Continue to drink a lot of fluids.     Problems Addressed this Visit        Genitourinary and Reproductive     Dysuria - Primary    Relevant Orders    POC Urinalysis Dipstick, Automated (Completed)    Urine Culture - Urine, Urine, Clean Catch   Diagnoses       Codes Comments    Dysuria    -  Primary ICD-10-CM: R30.0  ICD-9-CM: 788.1           PLAN  There are no Patient Instructions on file for this visit.  No follow-ups on file.

## 2023-02-12 LAB
BACTERIA UR CULT: ABNORMAL
BACTERIA UR CULT: ABNORMAL
OTHER ANTIBIOTIC SUSC ISLT: ABNORMAL

## 2023-02-14 RX ORDER — HYDROCHLOROTHIAZIDE 25 MG/1
TABLET ORAL
Qty: 90 TABLET | Refills: 0 | Status: SHIPPED | OUTPATIENT
Start: 2023-02-14

## 2023-02-14 RX ORDER — LEVOTHYROXINE SODIUM 0.07 MG/1
TABLET ORAL
Qty: 90 TABLET | Refills: 0 | Status: SHIPPED | OUTPATIENT
Start: 2023-02-14

## 2023-02-18 DIAGNOSIS — T45.1X5A PERIPHERAL NEUROPATHY DUE TO CHEMOTHERAPY: ICD-10-CM

## 2023-02-18 DIAGNOSIS — G62.0 PERIPHERAL NEUROPATHY DUE TO CHEMOTHERAPY: ICD-10-CM

## 2023-02-20 RX ORDER — GABAPENTIN 300 MG/1
CAPSULE ORAL
Qty: 180 CAPSULE | Refills: 1 | Status: SHIPPED | OUTPATIENT
Start: 2023-02-20

## 2023-02-20 NOTE — TELEPHONE ENCOUNTER
Rx Refill Note  Requested Prescriptions     Pending Prescriptions Disp Refills   • gabapentin (NEURONTIN) 300 MG capsule [Pharmacy Med Name: Gabapentin 300 MG Oral Capsule] 180 capsule 0     Sig: TAKE 2 CAPSULES BY MOUTH THREE TIMES DAILY      Last office visit with prescribing clinician: 2/9/2023   Last telemedicine visit with prescribing clinician: 7/24/2023   Next office visit with prescribing clinician: 7/31/2023                         Would you like a call back once the refill request has been completed: [] Yes [] No    If the office needs to give you a call back, can they leave a voicemail: [] Yes [] No    Faith Guerra MA  02/20/23, 15:52 EST

## 2023-03-06 NOTE — TELEPHONE ENCOUNTER
"  Assessment & Plan     Essential hypertension: Blood pressure today in office was 138/92 on recheck. She has a blood pressure cuff, will start monitoring this again at home. Goals are less than 140/90; greater than 100/60. She continues on Atenolol only.    Closed fracture of multiple ribs of left side with routine healing, subsequent encounter: Left ribs 8-9, healing. Stable.     Age-related osteoporosis with current pathological fracture with routine healing, subsequent encounter: Known osteoporosis, she refused treatment prior. Last bone density testing was done 11/23/2022. Referral placed to [facility] per her request.   - Orthopedic  Referral    Adrenal adenoma, left/Adrenal adenoma, right: Incidental finding on abd/pelvis CT. She does have osteoporosis. Will repeat a scan to ensure stability in three months. She does have an Endocrinology consult in place for June.   - CT Abdomen Pelvis w Contrast    Moderate episode of recurrent major depressive disorder (H): She continues on sertraline. Stable.     Anxiety: She continues on sertraline. Stable.     Hyperlipidemia LDL goal <100: She will start working on diet and exercise. Recheck lipids in 3 months.       BMI:   Estimated body mass index is 30.32 kg/m  as calculated from the following:    Height as of this encounter: 1.664 m (5' 5.5\").    Weight as of this encounter: 83.9 kg (185 lb).   Weight management plan: Discussed healthy diet and exercise guidelines    Return in about 3 months (around 6/6/2023) for Follow up, Routine preventive.    Larisa Larson CNP  [facility]    Melinda Lunsford is a 61 year old presenting for the following health issues:  Follow Up (Fell on ice Feb 7 and hurt left rib cage, [place] on 2/14)      History of Present Illness       Reason for visit:  Fall on ice    She eats 0-1 servings of fruits and vegetables daily.She consumes 0 sweetened beverage(s) daily.She exercises with enough " I called Ms. Brizuela 2x today. Once at about 2:30pm and again at 5:40pm.  Both times I spoke with Mr. Brizuela's caregiver.  An [place] RN had called me and asked me to call the patient.  Her  is becoming more ill and she is also receiving treatment and is feeling overwhelmed. The patient was in the [place] office getting a transfusion today, had not returned by 5:40.  The caregiver was scheduled to leave at 4:00 but was staying until the patient returned, had heard from her.  I left a message for Ms. Brizuela to call us next week., if that would be helpful to her.   "effort to increase her heart rate 60 or more minutes per day.  She exercises with enough effort to increase her heart rate 7 days per week.   She is taking medications regularly.    Today's PHQ-9         PHQ-9 Total Score: 0    PHQ-9 Q9 Thoughts of better off dead/self-harm past 2 weeks :   Not at all    How difficult have these problems made it for you to do your work, take care of things at home, or get along with other people: Not difficult at all     The patient presents today for follow up of her left rib fractures. She fell while getting out of her car on the ice on 02/07/2023, she returned to work on 02/08/2023.    She had a coughing spell with worsening rib pain, so she was seen at the [facility] ER on 02/14/23 and her CT of her thoracic spine showed mildly displaced rib fractures 8-9. She reports that her pain has resolved, and she has had no further issues with this.    She did have three teeth pulled recently.    In the ER, on her ABD and pelvis CT two adrenal adenomas were found as follows:  Stable 4.0 x 2.3 cm left adrenal adenoma and probable 7 mm right adrenal adenoma.    Will recheck a CT in three months to confirm stability of these.    She does have known osteoporosis, she would like to see Patric Govea PA-C through [facility] for this, will send a referral.    Will keep the Endocrinology consult in place in June for her adrenals, incase there is change with the updated scan. She is asymptomatic.    She does have high cholesterol. Will start her on the cardiometabolic lifestyle, get her exercising, and recheck her labs in three months with her physical.      Review of Systems   Constitutional, HEENT, cardiovascular, pulmonary, GI, , musculoskeletal, neuro, skin, endocrine and psych systems are negative, except as otherwise noted.      Objective    BP (!) 138/92   Pulse 68   Temp 97.1  F (36.2  C)   Resp 16   Ht 1.664 m (5' 5.5\")   Wt 83.9 kg (185 lb)   SpO2 98%   BMI 30.32 kg/m    Body mass " index is 30.32 kg/m .  Physical Exam   GENERAL: healthy, alert and no distress  EYES: Eyes grossly normal to inspection, PERRL and conjunctivae and sclerae normal  RESP: lungs clear to auscultation - no rales, rhonchi or wheezes  CV: regular rate and rhythm, normal S1 S2, no S3 or S4, no murmur, click or rub, no peripheral edema and peripheral pulses strong  MS: no gross musculoskeletal defects noted, no edema  SKIN: no suspicious lesions or rashes  NEURO: Normal strength and tone, mentation intact and speech normal  PSYCH: mentation appears normal, affect normal/bright

## 2023-03-13 ENCOUNTER — INFUSION (OUTPATIENT)
Dept: ONCOLOGY | Facility: HOSPITAL | Age: 83
End: 2023-03-13
Payer: MEDICARE

## 2023-03-13 DIAGNOSIS — Z45.2 ENCOUNTER FOR FITTING AND ADJUSTMENT OF VASCULAR CATHETER: Primary | ICD-10-CM

## 2023-03-13 PROCEDURE — 96523 IRRIG DRUG DELIVERY DEVICE: CPT

## 2023-03-13 PROCEDURE — 25010000002 HEPARIN LOCK FLUSH PER 10 UNITS: Performed by: NURSE PRACTITIONER

## 2023-03-13 RX ORDER — HEPARIN SODIUM (PORCINE) LOCK FLUSH IV SOLN 100 UNIT/ML 100 UNIT/ML
500 SOLUTION INTRAVENOUS AS NEEDED
Status: DISCONTINUED | OUTPATIENT
Start: 2023-03-13 | End: 2023-03-13 | Stop reason: HOSPADM

## 2023-03-13 RX ORDER — SODIUM CHLORIDE 0.9 % (FLUSH) 0.9 %
10 SYRINGE (ML) INJECTION AS NEEDED
Status: DISCONTINUED | OUTPATIENT
Start: 2023-03-13 | End: 2023-03-13 | Stop reason: HOSPADM

## 2023-03-13 RX ADMIN — Medication 500 UNITS: at 13:08

## 2023-03-13 RX ADMIN — Medication 10 ML: at 13:08

## 2023-03-27 NOTE — TELEPHONE ENCOUNTER
Chest x-ray showed scars in left lower lung, this is chronic  It shows osteoarthritis in both shoulders  Otherwise normal for the age Patient called today with her drain totals  11/22: 50 cc  11/23: 40 cc  11/24: 60 cc  11/25: 40 cc  11/26: 40cc  So far today 10 cc    Per Dr. Melo, keep the drain in. If the totals are not down by 12/4/17, we will d/c the CHRISTINA drain regardless. Informed the patient of this information. She will call back this Wednesday 11/29/17 to give me her drain totals again.

## 2023-04-06 RX ORDER — PANTOPRAZOLE SODIUM 40 MG/1
TABLET, DELAYED RELEASE ORAL
Qty: 90 TABLET | Refills: 0 | Status: SHIPPED | OUTPATIENT
Start: 2023-04-06

## 2023-04-24 ENCOUNTER — INFUSION (OUTPATIENT)
Dept: ONCOLOGY | Facility: HOSPITAL | Age: 83
End: 2023-04-24
Payer: MEDICARE

## 2023-04-24 DIAGNOSIS — Z45.2 ENCOUNTER FOR FITTING AND ADJUSTMENT OF VASCULAR CATHETER: Primary | ICD-10-CM

## 2023-04-24 PROCEDURE — 25010000002 HEPARIN LOCK FLUSH PER 10 UNITS: Performed by: INTERNAL MEDICINE

## 2023-04-24 PROCEDURE — 96523 IRRIG DRUG DELIVERY DEVICE: CPT

## 2023-04-24 RX ORDER — SODIUM CHLORIDE 0.9 % (FLUSH) 0.9 %
10 SYRINGE (ML) INJECTION AS NEEDED
Status: DISCONTINUED | OUTPATIENT
Start: 2023-04-24 | End: 2023-04-24 | Stop reason: HOSPADM

## 2023-04-24 RX ORDER — HEPARIN SODIUM (PORCINE) LOCK FLUSH IV SOLN 100 UNIT/ML 100 UNIT/ML
500 SOLUTION INTRAVENOUS AS NEEDED
Status: DISCONTINUED | OUTPATIENT
Start: 2023-04-24 | End: 2023-04-24 | Stop reason: HOSPADM

## 2023-04-24 RX ADMIN — Medication 10 ML: at 12:54

## 2023-04-24 RX ADMIN — Medication 500 UNITS: at 12:55

## 2023-04-30 DIAGNOSIS — T45.1X5A PERIPHERAL NEUROPATHY DUE TO CHEMOTHERAPY: ICD-10-CM

## 2023-04-30 DIAGNOSIS — G62.0 PERIPHERAL NEUROPATHY DUE TO CHEMOTHERAPY: ICD-10-CM

## 2023-05-01 DIAGNOSIS — C50.911 MALIGNANT NEOPLASM OF RIGHT FEMALE BREAST, UNSPECIFIED ESTROGEN RECEPTOR STATUS, UNSPECIFIED SITE OF BREAST: Primary | ICD-10-CM

## 2023-05-01 RX ORDER — HYDROCHLOROTHIAZIDE 25 MG/1
TABLET ORAL
Qty: 90 TABLET | Refills: 0 | Status: SHIPPED | OUTPATIENT
Start: 2023-05-01

## 2023-05-01 RX ORDER — GABAPENTIN 300 MG/1
CAPSULE ORAL
Qty: 180 CAPSULE | Refills: 5 | Status: SHIPPED | OUTPATIENT
Start: 2023-05-01

## 2023-05-01 NOTE — PROGRESS NOTES
REASON FOR FOLLOWUP: Patient feeling well, no additional bleeding issues, normal performance status      1. Newly diagnosed large right breast cancer.  Core needle biopsy reported invasive mammary carcinoma with focal lobular feature, grade 2.  ER negative, less than 1%; OH positive, moderate in staining, 5% to 10%. HER2 IHC 2+, FISH study positive 2.1.   2.  CT scan for chest abdomen and pelvis and breast MRI examination reported right axillary lymph node suspicious for metastatic disease.  No remote metastatic lesion.  Patient has stage IIIa (T3 N2 M0) disease.   3.  Patient was started neoadjuvant chemotherapy on 5/23/2017 with Taxol weekly plus Herceptin weekly for total 12 weeks, and Perjata every 3 weeks during chemotherapy.  Herceptin will be converted to every 3 weeks after chemotherapy finished.    4.  Chronic moderate thrombocytopenia, mild anemia, and intermittent mild neutropenia etiologies are not clear.  5.  Reaction to Herceptin C1D1.  Subsequently tolerated therapy with hydrocortisone as premedication.  6.  Potential cardiac strain developing, neuropathic symptoms persist, follow-up MRI and surgical assessment will be needed post initial chemotherapeutic phase  7.  MRI August 17 with interval resolution of abnormal enhancement within breast and right axillary adenopathy, surgery scheduled November 7, Herceptin ongoing every 3 weeks  8.  Patient seen in office November 28, status post surgery with apparent complete response, Herceptin continued  9.  Herceptin given December 19, subsequent injury in [place] leading to prolonged stay, single treatment given through additional cancer Center  10.  Patient seen May 01, 2018, no further Herceptin planned, baseline scans pursued posttreatment, post influenza syndrome, physical therapy planned  11.  Patient seen June 13, 2018, excellent performance status, improving on physical therapy, equivocal CT  per thoracic adenopathy, follow-up PET/CT planned   12.   PET/CT with improvement,?  Thyroid abnormality with ultrasound planned  13.  Patient seen October 3, partial thyroidectomy anticipated October 14-    14.  Patient seen May 14, 2019, scans negative for evidence of recurrence     15.  Patient seen 3/29/2019 clinically stable  16.  Patient reviewed July 7, ongoing GERD symptoms, GI referral planned.  17.  Hospitalization September 28-October 1-acute colitis due to enteropathic E. coli-residual thrombocytopenia  18.  Subsequent testing consistent with chronic ITP                                                                                                                                                                                                                               HISTORY OF PRESENT ILLNESS:    The patient is a pleasant 82 y.o. with the above-mentioned history, who presents today in anticipation of cycle 4 of Herceptin, Perjeta and Taxol.  This is the first visit with this physician involving this patient's case.  We have reviewed her status today with her slowly developing neuropathic symptoms in her lower extremities but also involving her fingers recognized significantly and she plays the piano and keyboard.  This is becoming harder to do but she is still able to do so.  She also notices neuropathy in her feet but is able to walk and function in daily activities.  Additional to this is her continued grieving at the death of her  though she, fortunately, has an excellent support system.  She continues to experience nausea that is well relieved with Compazine. She denies oral mucositis.  No diarrhea no constipation no chest pain no dyspnea.  No lower extremity edema.    She did received 2 units of PRBC's on   7/8/2017 and remains hematologically stable.   She does have difficulty with sleep and Ambien 10 mg daily at bedtime seems help much better compared to 5 mg dose.  She does not need refills today.  In reviewing her case July 26 she  is entering the fourth cycle of her treatment and recent echocardiogram is reviewed with she and her close friend revealing EF of 66.7% though with global strain of -16%?  Suspicious for myopathic process.  Normal ventricular cavity size and wall thickness as well as contractility.  We have also discussed that she is responding to therapy and will need surgical assessment which may not as yet have been performed.  She has seen Dr. Melo for port placement and will ask him to review her likely just after she has completed his fourth cycle of therapy.  It is also apparent that she'll need a cardiac assessment as we continue subsequent Herceptin therapy perioperatively.      The patient underwent MRI of the breasts August 17 demonstrating interval resolution of abnormal enhancement within the right breast, resolution of right axillary adenopathy had also resolved.  The findings were consistent with a complete response to neoadjuvant chemotherapy.  The patient was seen in subsequent follow-up with Dr. Melo and was determined to proceed with surgery and ultimately sentinel node evaluation.  This is now scheduled November 7 and there are additional plans to consider radiation therapy postoperatively and we have also discussed the use of anti-hormonal therapy considering her mildly positive TX status.    The patient was able to proceed to surgery undergoing right breast mastectomy and sentinel lymph node biopsy November 07, 2017.  She did well postoperatively seeing Dr. Melo November 16.  Pathology revealed no residual malignancy in the breast and 3 negative sentinel lymph nodes.  A formal review of her report reveals treatment effect particularly in her largest lymph node with focal fibrosis and scar, right breast mastectomy fibrosis associated with a cavitary biopsy site and no invasive or in situ carcinoma.  The patient is now seen in our office though she went to the wrong location and the seen late in the day.   We discussed her findings and agree that she would continue Herceptin at this point but be reviewed formally again in 3 weeks.  She is also be seen by radiation therapy for their input.   The patient, evidently, was seen by radiation therapy but decided not to pursue it until her last Herceptin therapy here December 19.  Following this she visited her daughter in [place] and, unfortunately, developed influenza and pneumonia.  She had a prolonged hospitalization and was at many sites in recovery over a several month only to return to [place] in the last several weeks.  She did take 1 IV Herceptin dose while in [place] but as she is reviewed May 05, 2018 we have discussed that it makes no particular sense to continue or add on to her previous history by extending Herceptin any further 3-4 months after her last treatment.  She therefore has completed Herceptin.    The patient on to be tested post her treatment again baseline studies and CT of chest and pelvis were performed June 6 revealing interval increase in a few subcentimeter nodes in the left pectoral, axillary and mediastinal regions. These are all considerably small and of uncertain significance.  The patient's performance status remains excellent without any reduction and, in fact, has improved with physical therapy upon return.       Patient is next seen August 08, 2018, fortunately feeling well.  A follow-up PET/CT had revealed an interval decrease in the subcentimeter nodes in the left subpectoral axillary region as well as mediastinum.  There was no hypermetabolic lymphadenopathy.  There was intense focus within slightly enlarged right thyroid gland.  Patient indicates she never had any thyroid issues of which she is aware.  The patient was referred to ENT for assessment and the patient was seen by Dr. Fofana.  Ultimately a subtotal thyroidectomy is anticipated and now scheduled October 4.  The patient is willing to proceed.        The patient  proceeded to a right thyroid lobectomy performed November 04, 2018.  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  There was no definitive evidence of trans-capsular or vascular invasion.    The patient is seen in follow-up November 28 doing well and we discussed the surgical treatment of this thyroid lesion.  She feels that all this went well.  She has additional cataract surgery at the end of November  scheduled also.  The patient did complete her testing and was asked to return approximately 6 months later with a follow-up CT chest, abdomen and pelvis that demonstrate no evidence of recurrent disease.  As she is seen back May 14 she, unfortunately, fell and contused her face, left knee and left hand.  This required an ER visit.  Is elected to follow her back in 6 months maintaining her port in the interval and she is seen October 29 again without indications of recurrent disease and relatively stable clinically.  She is seeing plastic surgery about reconstruction and otherwise continue her current medication list and following with her primary care regularly.  The patient is next seen July 7, 2020 indicating that she did not proceed to any  plastic surgery and with the COVID-19 epidemic she does not wish to have any elective procedures.  She has, however, having significant GI reflux symptoms that have worsened substantially last several months despite PPI therapy.    The patient had follow-up for meningioma September 11, 2020 unchanged from previous.        The patient is next reviewed October 27, 2020 having been hospitalized September 28 through October 1, 2020.  She had presented with fever and flank pain and was found to have acute colitis due to enteropathogenic E. coli.  Antibiotics were avoided secondary to history of C. difficile colitis and fortunately she did not want to improve albeit slowly.  She had evidence of acute on chronic thrombocytopenia with a platelet count dropping  to close to 30,000 and slowly rebounding assessed October 6 at 64,000 and October 13 at 67,000.  Fortunately she is feeling considerably better with her bowel function normalizing.  We had the patient return for ongoing testing documenting continued thrombocytopenia and IPF at 9.5 818 October 2020.  She seen back April 13, 2021 she has further thrombocytopenia with peripheral smear showing enlarged platelets.  Is felt this consistent with chronic ITP.  The patient was treated with prednisone 8.5 mg/kg and able to gradually taper last been seen through July 14, 2020 having dropped to 5 mg.  She discontinued the medication tested 7/21/2021 with H&H of 10.6 and 32.1, white count of 8070 and platelet count of 67,000.  She is next seen 7/27/2021.  She has been able to taper off of steroids altogether but recently injured her lower extremities on the table and another physician's office and was treated for potential cellulitis developing.  This included antibiotic treatment-ciprofloxacin-producing nausea which has been difficult to recover from.  She is now completed antibiotic therapy altogether approximately 2 days ago.  The patient was able to maintain off of steroids and returns for follow-up with her platelet count remaining in the 50-60,000 range consistently associated with elevated IPF.  She has had no issues with additional hemorrhage fortunately.  She is seen 10/19/21 with an excellent performance status and again stable.    The patient returned for reassessment and is next evaluated 4/19/2022 with H&H of 10.9 and 32.5 with white count of 5760, platelet count of 78,000, ANC of 1820, platelet count of 78,000 and IPF of 12.0.  She is feeling well without any additional medical issues.    We had the patient return for port maintenance and reassessment in 6 months.  She is reviewed 11/8/2022 stable clinically.    The patient is next reviewed 5/2/2023.  Clinically she has done quite well and, in fact, indicates that  she has had a subsequent COVID-19 vaccination that was given (according to records) 4/26/2023.  She had no complications but is noted to have a slightly reduced platelet count today in the setting of known chronic ITP.         Past Medical History:   Diagnosis Date   • Alcoholism 1978    I haven't had a drink since then   • Anemia    • Breast cancer 05/03/2017    Right breast invasive mammary carcinoma with focal lobular features, grade 2, ER negative, less than 1% NE positive, HER-2 positive, stage IIIa disease (T3, N2, M0   • Breast cancer 10/20/2004    Left breast ductal carcinoma in-situ, predominately intermediate grade with focal comedonecrosis (high grade), solid and bribridform patterns involving approximately five core biopsy fragments   • Edema     Chronic lower extremity edema   • GERD (gastroesophageal reflux disease) 09/13/2011    Dr. Paul Negron   • GI (gastrointestinal bleed) 1997   • H/O jaundice    • Hernia     INCISONAL. AROUND LEFT TRAM LAP SITE   • History of chemotherapy     2017 4 MONTHS IN 2017   • History of Clostridium difficile colitis     JAN 2018   • History of histoplasmosis    • History of infectious mononucleosis 1960   • History of migraine headaches    • History of pneumonia 12/27/2017    AS RESULT OF FLU. ENDED UP ON VENT IN [place]   • History of thrombocytopenia    • History of transfusion 1997   • History of vertigo    • Hx of colonic polyps 06/11/2009    Dr. Giles   • Hyperlipidemia    • Hypertension    • Meningioma    • Neuropathy     HANDS AND FEET   • Osteoarthritis 09/13/2011    Dr. Jamil Miller   • Peptic ulceration    • Retina disorder     BLEED. FROM HISTOPLASMOSIS   • SBO (small bowel obstruction)     due to hernia with surgical repair in 09/2011   • SOB (shortness of breath) on exertion    • Thyroid mass     RIGHT   Normal echocardiogram on 5/18/2017, LVEF 68%.    Past Surgical History:   Procedure Laterality Date   • APPENDECTOMY N/A 1960   • BLADDER REPAIR  N/A    • BREAST BIOPSY Left 10/20/2004    Mammotome incisional biopsy left breast, surgical specimen, left breast, localization clip placement, left breast, confirmatory diagnostic unilateral mammogram, left breast-Dr. Tyrese Alcala, [facility]   • BREAST BIOPSY Right 2017    PATH: INVASIVE MAMMARY CARCINOMA   • CHOLECYSTECTOMY N/A    • COLONOSCOPY N/A 2009    Hemorrhoids, otherwise normal, repeat in 5 years-Dr. Noemí Purcell   • EYE SURGERY Right     laser surgery for blood clot   • HYSTERECTOMY Bilateral    • INGUINAL HERNIA REPAIR Right     DR ALESIA GAXIOLA   • MASTECTOMY Left     Left breast mastecotmy with sentinel lymph node biospy-[facility]   • MASTECTOMY W/ SENTINEL NODE BIOPSY Right 2017    Procedure: RIGHT BREAST MASTECTOMY WITH SENTINEL NODE BIOPSY;  Surgeon: Christian Melo MD;  Location: [facility] OR;  Service:    • MO INSJ TUNNELED CVC W/O SUBQ PORT/ AGE 5 YR/> Left 2017    Procedure: INSERTION VENOUS ACCESS DEVICE;  Surgeon: Christian Melo MD;  Location: [facility] OR;  Service: General   • REDUCTION MAMMAPLASTY Right     to match Lt. TRAM flap   • SMALL INTESTINE SURGERY      INCARCRATED HERNIA   • THYROIDECTOMY Right 10/4/2018    Procedure: RT THYROID LOBECTOMY;  Surgeon: Julián Fofana MD;  Location: [facility];  Service: ENT   • TONSILLECTOMY Bilateral    • TRAM FLAP DELAYED Left     WITH MASTOPEXY   • UPPER GASTROINTESTINAL ENDOSCOPY N/A 2011    LA grade A esophagitis with no bleeding, large hiatus hernia (50cm), gastric ulcer-Dr.Laszlo Lezama   • US GUIDED FINE NEEDLE ASPIRATION  2018   Lico catheter placement on 2017 by Dr. Melo.     OB/GYN history: Menarche age 16, menopause at 1985. , 1 miscarriage. No birth control pill use. She did have post menopause hormonal supplementation.      HEMATOLOGIC/ONCOLOGIC HISTORY: The patient is a 81y.o. year old female whom we are consulted for a newly self discovered right breast  mass, in April 2017. Patient had ultrasound-guided biopsy on 5/3/2017 and confirmed to be invasive mammary carcinoma with focal lobular features, grade 2 [place] score 6/9. She is here for initial evaluation for management.      Patient is a 76-year-old  female who was seen here previously for chronic mild-to-moderate thrombocytopenia and mild anemia. Recently the patient reports she started having firmness of the right breast that started sometime in April or maybe even in March. She noticed firmness spread from about around the 12 o'clock position, gradually towards the upper lateral side and lower part of the right breast associated mild pain. The patient thought it was related to fibrocystic changes. However, the symptom was getting worse. Patient also reported dented nipple after she started having pain in the right breast. She denies discharge from the nipple. She called her primary care physician, Dr. Martin, who ordered a mammogram study. This was done on 04/12/2017 with architectural distortion of the right breast centrally at 12 o'clock position and had scattered fibroglandular densities throughout the right breast.      The patient subsequently had right breast ultrasound examination on 04/26/2017. Discovered a large right breast mass measuring 5.8 x 3.5 x 3.9 cm. Patient subsequently had ultrasound-guided right breast biopsy on 05/03/2017. Pathology evaluation reported invasive mammary carcinoma with focal lobular feature. [place] score 6/9, overall grade 2. Sample was further sent to the [facility] for test. ER negative, less than 1%; UT positive, moderate in staining, 5% to 10%. HER2 IHC 2+, but FISH study positive 2.1.     She denies weight loss, she actually eats very well. No nausea vomiting. Patient does complain of insomnia, unable to sleep.      This patient has history of left breast DCIS back in 2007, had mastectomy at the [facility]  [place]. No hormonal therapy afterwards according to patient. This patient also had a small meningioma, followed by Dr. Smith in [facility], with most recent MRI of the brain in March 2017, with 18 mm meningioma, and followed on an annual basis.     Her neutrophil count on 5/16/17 is normal at 2500, however, records showed starting from 05/2015 and in 09/2016, 01/2017 and 03/2017, all 4 laboratory studies showed mild neutropenia with ANC fluctuating between 1200 and 1600. Etiology is not clear.    Normal echocardiogram on 5/18/2017, LVEF 68%.      CT scan for chest abdomen and pelvis on 5/22/2017 and breast MRI examination on 5/22/2017 reported small right axillary lymph node suspicious for metastatic disease.  No remote metastatic lesion.  Patient has stage IIIa (T3 N2 M0) disease.      Patient will start neoadjuvant chemotherapy with Taxol weekly plus Herceptin weekly for total 12 weeks, and Perjeta every 3 weeks (3-week cycle) during chemotherapy.  Herceptin will be converted to every 3 weeks after chemotherapy finished.  Cycle 1 day 1 5/23/2017.   Status post neoadjuvant chemotherapy the patient was assessed with MRI showing a complete neoadjuvant response.  The patient was reviewed for surgery and questions concerning lymph node dissection-sentinel node evaluation-and need for radiation therapy postop were considered as well as use of additional Herceptin for the balance of the year as well as additional use of anti-hormonal therapy.  Surgery was scheduled November 07, 2017.  Patient next seen in office November 28 having undergone surgery on November 7 with results revealing no residual malignancy in either breast or associated sentinel lymph nodes.  Was elected to continue Herceptin when seen back in office November 20 having initiated Herceptin May 23, 2017.    Patient continued Herceptin with her last treatment given December 19, 2017.     Unfortunately she later experienced an accident while in  [place]  with prolonged hospitalization and prolonged ventilator management required.  Apparently she had at least one IV Herceptin therapy given while there and we were contacted 2018- Dr. Nuno.  Eventually the patient was able to return to [place] is seen back in office May 5 at which time we concluded that she completed Herceptin therapy as result of being off treatment for so long.  Plans were made for baseline scans.  Patient follow-up reexaminations May 8, 2019 with no evidence of recurrent malignancy.  This was again a circumstance when she was assessed 2019.  Patient seen 2020 without evidence of recurrent malignancy.  Recurrent GI symptoms noted with GI referral planned.  Patient hospitalized -2020 with enteropathic E. coli, acute on chronic thrombocytopenia.     Subsequent testing felt consistent with chronic ITP and trial of steroids initiated 2021.  She is able to taper off of prednisone altogether by mid 2021.  Patient seen back in office 10/19/21 stable off steroids.  Her subsequent assessments were consistent with chronic ITP without intervention required.      MEDICATIONS: The current medication list was reviewed with the patient and updated in the EMR this date per the Medical Assistant. Medication dosages and frequencies were confirmed to be accurate.       ALLERGIES:    Allergies   Allergen Reactions   • Codeine Nausea And Vomiting   • Morphine Nausea And Vomiting     SOCIAL HISTORY:   Social History     Tobacco Use   • Smoking status: Former     Packs/day: 2.00     Years: 30.00     Pack years: 60.00     Types: Cigarettes     Start date: 1957     Quit date: 4/10/1987     Years since quittin.0   • Smokeless tobacco: Never   • Tobacco comments:     I haven't had a cigarette in over 30 years   Vaping Use   • Vaping Use: Never used   Substance Use Topics   • Alcohol use: No     Comment: stopped, heavy in past  "  • Drug use: No     FAMILY HISTORY:  Family History   Problem Relation Age of Onset   • Heart disease Mother    • Aortic aneurysm Mother         abdominal   • Coronary artery disease Mother    • Hypertension Mother    • Miscarriages / Stillbirths Mother    • Diverticulitis Mother    • Heart disease Father    • Heart attack Father         acute   • Hypertension Father    • Early death Father    • Hearing loss Father    • Kidney disease Father    • Breast cancer Daughter 45   • Heart disease Brother    • Hypertension Brother    • Malig Hyperthermia Neg Hx      I have reviewed the patient's medical history in detail and updated the computerized patient record.    REVIEW OF SYSTEMS:  GENERAL: Normal performance status, no change in appetite or weight;   No fevers, chills, sweats.   SKIN: Mild contusions anterior lower extremities  HEME/LYMPH: See HPI.   EYES: No vision changes or diplopia.   ENT: No tinnitus, hearing loss, gum bleeding, epistaxis, hoarseness or dysphagia.   RESPIRATORY: No cough, Has exertional dyspnea, No hemoptysis or wheezing.   CVS: No chest pain, palpitations, orthopnea, dyspnea on exertion or PND.   GI: Worsening reflux symptoms  : No lower tract obstructive symptoms, dysuria or hematuria.   MUSCULOSKELETAL: Chronic or joint pain, arthritis.  Has mild intermittent ankle swelling.   NEUROLOGICAL: See HPI  PSYCHIATRIC: See HPI     Objective:   Vitals:    05/02/23 1332   BP: 147/77   Pulse: 71   Resp: 16   Temp: 97.8 °F (36.6 °C)   TempSrc: Temporal   SpO2: 98%   Weight: 68.8 kg (151 lb 11.2 oz)   Height: 154.9 cm (60.98\")   PainSc:   4   PainLoc: Leg   ECOG 0      PHYSICAL EXAM:   GENERAL: Well-developed, well-nourished female, in no acute distress.   SKIN: Warm, dry without rashes, purpura or petechiae.  Left upper chest Lico catheter in place, no evidence of infection.  Contusions just lateral to the right eye, left hand and left knee  EYES: Pupils equal, round and reactive to light. EOMs " intact. Conjunctivae normal.  EARS: Hearing intact.  NOSE:  No excoriations or nasal discharge.  MOUTH: Tongue is well-papillated; no stomatitis or ulcers. Lips normal.  THROAT: Oropharynx without lesions or exudates.  Status post partial thyroidectomy well-healing  LYMPHATICS: No cervical, supraclavicular, axillary adenopathy.  CHEST: Lungs clear to auscultation. Good airflow.   BREAST:Postop, Well healed right mastectomy site with no evidence of recurrent disease, no axillary adenopathy bilaterally.  CARDIAC: Regular rate and rhythm without murmurs. Normal S1,S2.  ABDOMEN: Slightly distended, normal active bowel sounds,  no hepatosplenomegaly or masses.  EXTREMITIES: Areas of contusion/small lacerations anterior lower extremities healing without additional inflammation.  NEUROLOGICAL: Cranial Nerves II-XII grossly intact. No focal neurological deficits.  PSYCHIATRIC: Alert and oriented, pleased about her results      RECENT LABS:  Lab Results   Component Value Date    WBC 6.47 05/02/2023    HGB 11.0 (L) 05/02/2023    HCT 31.3 (L) 05/02/2023    MCV 90.5 05/02/2023    PLT 55 (L) 05/02/2023     Lab Results   Component Value Date    NEUTROABS 1.96 05/02/2023     Lab Results   Component Value Date    GLUCOSE 101 (H) 07/14/2022    BUN 22 07/14/2022    CREATININE 1.06 (H) 07/14/2022    EGFRIFNONA 70 12/20/2021    EGFRIFAFRI 81 12/20/2021    BCR 21 07/14/2022    K 3.9 07/14/2022    CO2 23 07/14/2022    CALCIUM 9.0 07/14/2022    PROTENTOTREF 6.8 07/14/2022    ALBUMIN 4.3 07/14/2022    LABIL2 1.7 07/14/2022    AST 27 07/14/2022    ALT 18 07/14/2022            Assessment/Plan   1. Large right breast cancer, locally advanced, stage IIIa (T3 N1/ 2 M0).  ER negative, less than 1%; AZ positive, moderate in staining, 5% to 10%. HER2 IHC 2+, FISH study positive 2.1.  Physical examination showed a large mass, 7 cm x 7 cm initially which has decreased to approximately less than 4 cm ..  Patient  proceeded through 4 cycles  ceptin,Perjeta and Taxol.   Her subsequent MRI examination showed complete response by imaging including right axillary lymphadenopathy.  We have continued Herceptin and that includes today October 20, 2017.  The case was discussed with Dr. Melo and plans were to proceed with mastectomy plus right axillary lymph node assessment via sentinel node evaluation. it was felt she likely require radiation therapy post procedure.  The patient was scheduled and proceeded to surgery November 7.  Her results, wonderfully, revealed no evidence of residual disease in the breast or associated sentinel lymph nodes. She was seen by radiation therapy and offered treatment did not pursue it.  Additionally, and somewhat complicating this story, she traveled to [place] and developed severe influenza, associated pneumonia and was hospitalized for several months only to return to [place] in the last several weeks now being seen back May 1.  There is no particular benefit from trying to add Herceptin back to her therapy now and is felt that she is completed Herceptin treatment.  She wishes consider reconstruction and baseline CT scans will be requested in 5 weeks with the patient being seen in 6 weeks follow-up.The patient reviewed June 13, 2018 with the scans demonstrating very modest increase in left pectoral, axillary or mediastinal nodes.  These are of uncertain significance but do need follow-up in a PET/CT is planned in 7 weeks.  Her anemia will also be reviewed again with additional laboratory studies that visit.  She'll be seen in 8 weeks for general reassessment.    The patient's anemia workup was essentially negative and she is slowly improving when seen back August 8.  Her PET/CT, fortunately, demonstrates improvement though there is potential abnormality within the right thyroid lobe.  We discussed thyroid function testing and follow-up thyroid ultrasound.  She will be seen back in approximately 2 months as we maintain  her port monthly flushes.   The patient was reviewed by ENT and ultimately was felt that a partial thyroidectomy was in order and is now scheduled October 4.  Patient's one to proceed.  We'll plan to see her back in approximately 6 weeks.      The patient is next seen November 28, 2015.  Her surgery went well with the findings as noted above.  She has follow-up with ENT in the next several weeks.  Additionally she has bilateral cataract surgery at the end of this month and into the beginning of next.  We discussed reassessment in general with follow-up scans and these were performed May 2019 which showed no evidence of recurrent malignancy.  Six-month follow-up planes with maintenance of her port every 6 weeks.  The patient is reassessed October 29, 2019 fortunately continue to do relatively well.  She has had no additional medical issues since last seen we will plan to see her back in 6 months again meeting report.  She does plan to see plastic surgery concerning reconstruction concerning her breast cancer history.  A copy this note will be sent to the plastic surgeon.     The patient is next seen July 7, 2020 without evidence of recurrence of malignancy.  We plan 6-month follow-up.  The patient is reviewed October 27, 2020 without evidence recurrent disease, repeat scans during recent hospitalization September 20-October 1 without evidence of recurrent malignancy.  Subsequent assessments again without evidence of recurrence including reexamination 11/8/2022        2.  Previous fall with contusions now recovered.  Recent contusions lower extremities treated with antibiotic therapy producing nausea now discontinued                                                                                                                                                    3.  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  Worsening reflux symptoms, now stable    5.  Hospitalization September 20-October 1,  2020 with enteropathic E. coli.  This responded to conservative therapy and colonoscopy had been planned but we will not pursue at this point with resolution of symptoms.    6.  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  As this is assessed April 13 the patient's platelet count continues to decrease and we have discussed a trial of half milligram per kilogram prednisone-40 mg daily with weekly checks and adjustment as needed including rapid taper.     Patient tapered from prednisone through mid July 2021, stable to improved when assessed 7/27/2021.  No additional steroids planned.  Patient stable seen 10/19/21 with every 3 month assessments planned.  Subsequent conclusion felt to be consistent with chronic ITP  Patient next assessed 5/2/2023 with platelet count of 55,000, IPF pending.  This would still be adequate with no intervention necessary but we will plan follow-up.      Plan:    *Every 6 week port flush    *3-month CBC RN evaluation     *MD assessment 6 months, CBC, IPF, CMP

## 2023-05-01 NOTE — TELEPHONE ENCOUNTER
Rx Refill Note  Requested Prescriptions     Pending Prescriptions Disp Refills   • gabapentin (NEURONTIN) 300 MG capsule [Pharmacy Med Name: Gabapentin 300 MG Oral Capsule] 180 capsule 0     Sig: TAKE 2 CAPSULES BY MOUTH THREE TIMES DAILY      Last office visit with prescribing clinician: 2/9/2023   Last telemedicine visit with prescribing clinician: 5/1/2023   Next office visit with prescribing clinician: 5/1/2023                         Would you like a call back once the refill request has been completed: [] Yes [] No    If the office needs to give you a call back, can they leave a voicemail: [] Yes [] No    Faith Guerra MA  05/01/23, 12:04 EDT

## 2023-05-02 ENCOUNTER — OFFICE VISIT (OUTPATIENT)
Dept: ONCOLOGY | Facility: CLINIC | Age: 83
End: 2023-05-02
Payer: MEDICARE

## 2023-05-02 ENCOUNTER — LAB (OUTPATIENT)
Dept: OTHER | Facility: HOSPITAL | Age: 83
End: 2023-05-02
Payer: MEDICARE

## 2023-05-02 VITALS
DIASTOLIC BLOOD PRESSURE: 77 MMHG | RESPIRATION RATE: 16 BRPM | SYSTOLIC BLOOD PRESSURE: 147 MMHG | HEART RATE: 71 BPM | TEMPERATURE: 97.8 F | BODY MASS INDEX: 28.64 KG/M2 | OXYGEN SATURATION: 98 % | HEIGHT: 61 IN | WEIGHT: 151.7 LBS

## 2023-05-02 DIAGNOSIS — C50.911 MALIGNANT NEOPLASM OF RIGHT FEMALE BREAST, UNSPECIFIED ESTROGEN RECEPTOR STATUS, UNSPECIFIED SITE OF BREAST: ICD-10-CM

## 2023-05-02 DIAGNOSIS — D69.3 CHRONIC ITP (IDIOPATHIC THROMBOCYTOPENIA): Primary | ICD-10-CM

## 2023-05-02 LAB
BASOPHILS # BLD AUTO: 0.01 10*3/MM3 (ref 0–0.2)
BASOPHILS NFR BLD AUTO: 0.2 % (ref 0–1.5)
DEPRECATED RDW RBC AUTO: 52.1 FL (ref 37–54)
EOSINOPHIL # BLD AUTO: 0.01 10*3/MM3 (ref 0–0.4)
EOSINOPHIL NFR BLD AUTO: 0.2 % (ref 0.3–6.2)
ERYTHROCYTE [DISTWIDTH] IN BLOOD BY AUTOMATED COUNT: 16.1 % (ref 12.3–15.4)
HCT VFR BLD AUTO: 31.3 % (ref 34–46.6)
HGB BLD-MCNC: 11 G/DL (ref 12–15.9)
IMM GRANULOCYTES # BLD AUTO: 0.35 10*3/MM3 (ref 0–0.05)
IMM GRANULOCYTES NFR BLD AUTO: 5.4 % (ref 0–0.5)
LYMPHOCYTES # BLD AUTO: 2.21 10*3/MM3 (ref 0.7–3.1)
LYMPHOCYTES NFR BLD AUTO: 34.2 % (ref 19.6–45.3)
MCH RBC QN AUTO: 31.8 PG (ref 26.6–33)
MCHC RBC AUTO-ENTMCNC: 35.1 G/DL (ref 31.5–35.7)
MCV RBC AUTO: 90.5 FL (ref 79–97)
MONOCYTES # BLD AUTO: 1.93 10*3/MM3 (ref 0.1–0.9)
MONOCYTES NFR BLD AUTO: 29.8 % (ref 5–12)
NEUTROPHILS NFR BLD AUTO: 1.96 10*3/MM3 (ref 1.7–7)
NEUTROPHILS NFR BLD AUTO: 30.2 % (ref 42.7–76)
NRBC BLD AUTO-RTO: 0.3 /100 WBC (ref 0–0.2)
PLAT MORPH BLD: NORMAL
PLATELET # BLD AUTO: 55 10*3/MM3 (ref 140–450)
PMV BLD AUTO: 11.3 FL (ref 6–12)
RBC # BLD AUTO: 3.46 10*6/MM3 (ref 3.77–5.28)
RBC MORPH BLD: NORMAL
WBC MORPH BLD: NORMAL
WBC NRBC COR # BLD: 6.47 10*3/MM3 (ref 3.4–10.8)

## 2023-05-02 PROCEDURE — 3077F SYST BP >= 140 MM HG: CPT | Performed by: INTERNAL MEDICINE

## 2023-05-02 PROCEDURE — 36415 COLL VENOUS BLD VENIPUNCTURE: CPT

## 2023-05-02 PROCEDURE — 99213 OFFICE O/P EST LOW 20 MIN: CPT | Performed by: INTERNAL MEDICINE

## 2023-05-02 PROCEDURE — 85025 COMPLETE CBC W/AUTO DIFF WBC: CPT | Performed by: INTERNAL MEDICINE

## 2023-05-02 PROCEDURE — 1125F AMNT PAIN NOTED PAIN PRSNT: CPT | Performed by: INTERNAL MEDICINE

## 2023-05-02 PROCEDURE — 3078F DIAST BP <80 MM HG: CPT | Performed by: INTERNAL MEDICINE

## 2023-05-02 PROCEDURE — 85007 BL SMEAR W/DIFF WBC COUNT: CPT | Performed by: INTERNAL MEDICINE

## 2023-05-03 ENCOUNTER — TELEPHONE (OUTPATIENT)
Dept: FAMILY MEDICINE CLINIC | Facility: CLINIC | Age: 83
End: 2023-05-03

## 2023-05-03 ENCOUNTER — TELEPHONE (OUTPATIENT)
Dept: ONCOLOGY | Facility: CLINIC | Age: 83
End: 2023-05-03
Payer: MEDICARE

## 2023-05-03 ENCOUNTER — DOCUMENTATION (OUTPATIENT)
Dept: ONCOLOGY | Facility: HOSPITAL | Age: 83
End: 2023-05-03
Payer: MEDICARE

## 2023-05-03 NOTE — TELEPHONE ENCOUNTER
REQUEST FOR APPOINTMENT OR APPOINTMENT CHANGE    What is the appointment for: Follow Up with Dr. Ahumada    Reason for appointment request or change: Patient cancelled appointment - states it is too far for her to drive.    If rescheduling, when was the original appointment: Did not reschedule    Requested day that works best: N/A     Additional notes: Patient returning call from [facility] about appointment that was scheduled for 5/12/23 with Dr. Ahumada - she stated there must be some confusion, she cannot drive this far, she lives in [place], [place] - she did request to Dr. Coburn in [place] about getting treatments and appointments closer to home, but not [place] as it is too far for her to drive. She is requesting someone to call her about this matter.

## 2023-05-03 NOTE — TELEPHONE ENCOUNTER
Called pt to reschedule Dr Ahumada appt we moved her from 05-03-23 to 5-12-23 due to not having all the records

## 2023-05-03 NOTE — TELEPHONE ENCOUNTER
Caller: Roxana Brizuela    Relationship to patient: Self    Best call back number: [phone]    Chief complaint: PATIENT WOULD PREFER NOT TO MOVE TO THE NEW OFFICE    Type of visit: LABS    Additional notes:ATTEMPTED A WARM TRANSFER, UNSUCCESSFUL.    PATIENT REQUESTING TO CANCEL THE LAB APPT FOR July 24

## 2023-05-04 NOTE — TELEPHONE ENCOUNTER
Called and spoke with pt regarding appointment clarification. Explained to pt that referral was sent to us by mistake. Apologized to pt for any misunderstanding, v/u obtained.

## 2023-05-08 ENCOUNTER — OFFICE VISIT (OUTPATIENT)
Dept: FAMILY MEDICINE CLINIC | Facility: CLINIC | Age: 83
End: 2023-05-08
Payer: MEDICARE

## 2023-05-08 VITALS
OXYGEN SATURATION: 97 % | WEIGHT: 152 LBS | TEMPERATURE: 97.3 F | BODY MASS INDEX: 28.7 KG/M2 | HEART RATE: 70 BPM | HEIGHT: 61 IN | SYSTOLIC BLOOD PRESSURE: 114 MMHG | DIASTOLIC BLOOD PRESSURE: 58 MMHG

## 2023-05-08 DIAGNOSIS — E55.9 VITAMIN D DEFICIENCY: ICD-10-CM

## 2023-05-08 DIAGNOSIS — G47.00 INSOMNIA, UNSPECIFIED TYPE: ICD-10-CM

## 2023-05-08 DIAGNOSIS — T45.1X5A PERIPHERAL NEUROPATHY DUE TO CHEMOTHERAPY: ICD-10-CM

## 2023-05-08 DIAGNOSIS — E56.9 VITAMIN DEFICIENCY: ICD-10-CM

## 2023-05-08 DIAGNOSIS — D64.9 ANEMIA, UNSPECIFIED TYPE: ICD-10-CM

## 2023-05-08 DIAGNOSIS — G43.809 OTHER MIGRAINE WITHOUT STATUS MIGRAINOSUS, NOT INTRACTABLE: ICD-10-CM

## 2023-05-08 DIAGNOSIS — J30.9 ALLERGIC RHINITIS, UNSPECIFIED SEASONALITY, UNSPECIFIED TRIGGER: ICD-10-CM

## 2023-05-08 DIAGNOSIS — M62.838 NECK MUSCLE SPASM: Primary | ICD-10-CM

## 2023-05-08 DIAGNOSIS — E03.9 ACQUIRED HYPOTHYROIDISM: ICD-10-CM

## 2023-05-08 DIAGNOSIS — Z76.89 ENCOUNTER TO ESTABLISH CARE: ICD-10-CM

## 2023-05-08 DIAGNOSIS — G62.0 PERIPHERAL NEUROPATHY DUE TO CHEMOTHERAPY: ICD-10-CM

## 2023-05-08 DIAGNOSIS — K21.9 GASTROESOPHAGEAL REFLUX DISEASE, UNSPECIFIED WHETHER ESOPHAGITIS PRESENT: ICD-10-CM

## 2023-05-08 DIAGNOSIS — R60.9 DEPENDENT EDEMA: ICD-10-CM

## 2023-05-08 RX ORDER — CYCLOBENZAPRINE HCL 5 MG
5 TABLET ORAL 3 TIMES DAILY PRN
Qty: 90 TABLET | Refills: 1 | Status: SHIPPED | OUTPATIENT
Start: 2023-05-08

## 2023-05-08 RX ORDER — OXYMETAZOLINE HYDROCHLORIDE 0.05 G/100ML
2 SPRAY NASAL 2 TIMES DAILY
COMMUNITY

## 2023-05-08 NOTE — PROGRESS NOTES
Chief Complaint  Establish Care, Med Refill (Gabapentin, Zofran, Propranolol), and Peripheral Neuropathy    Subjective        Roxana Brizuela presents to [facility]  History of Present Illness  Roxana is a new patient presenting to establish care, neck pain, and neuropathy.  She has HTN, HLD, thrombocytopenia, hypothyroidism, osteopenia, peripheral neuropathy, and a history of breast cancer s/p mastectomy.  PCP was Brandt Martin MD, Family Medicine.    She also follows with Oneil Coburn MD, heme/onc and has followed with Jamal Patel MD PhD, Heme/onc and has followe with Constance Elliott MD, heme/onc.    ROS: Denies headaches, changes in vision, changes in hearing, sore throat, shortness of breath, palpitations, vomiting/constipation/diarrhea, abdominal pain, blood in urine or stool, leg swelling.  Occasional nausea.    Neck pain: Reports right-sided neck pain that she thinks is related to bending over to watch the organ keys when she is playing.  Has been bothering her for several weeks.  She reports pain when touching the skin of her neck as well as a deeper muscle pain associated with movement.  She uses an over-the-counter gel which helps some with the pain.  She does take Voltaren nightly as well.    Neuropathy: Started after chemotherapy several years ago.  Began in distal extremities.  Leg neuropathy initially up to mid lower legs, now midway between knees and thighs after beginning to walk about 5 miles daily during the past month.  Causes an uncomfortable sensation associated with loss of sensation.  Not really directly painful.  Takes diclofenac at night for neuropathy/cramping/arthritic pain.    Lower extremity dependent edema: Reports good management with hydrochlorothiazide.    Insomnia: Reports good management with melatonin.    Vitamin deficiencies: Takes vitamin B12, paroxetine, potassium supplement, vitamin D supplement.    Allergic rhinitis: Uses Vicks  "sinus nasal spray twice daily.    Migraines: Managed well on Propanolol    Acid reflux/nausea/constipation: Managed with Protonix and Zofran, occasionally Phenergan.  Probiotic for constipation.    Hyperlipidemia: Zocor    Maclular degeneration: Follows with ophthalmology.  Next appointment in about 4 weeks, reports symptoms managed with Polytrim.      FH:     Medical: Parents with heart disease.    Siblings: brother with heart disease    Children: 2 daughters, 1 hx breast cancer, 1 with bipolar    Hysterectomy, still has ovaries.  Never positive PAP.  Not sexually activity, no concerns of STIs today, declines testing.    SH    Nicotine: Quit 1987, 60PY history.    Illicit drugs: never    EtOH: none    Home: Lives at home with dog and cat, feels safe, neighbor checks on her regularly    Work:  Plays organ for [employer]    Exercise, Diet: Walking ~5mi/day.  Snacks, few vegetables/fruits.    Preventative:     Mammogram: Discuss at future visit    Colonoscopy: Unsure    Dexa: Discuss at future visit    Vaccinations: Discuss at future visit    Eye: Discuss at future visit    Dental: Discuss at future visit    Objective   Vital Signs:  /58 (BP Location: Left arm, Patient Position: Sitting, Cuff Size: Adult)   Pulse 70   Temp 97.3 °F (36.3 °C) (Temporal)   Ht 154.9 cm (61\")   Wt 68.9 kg (152 lb)   SpO2 97%   BMI 28.72 kg/m²   Estimated body mass index is 28.72 kg/m² as calculated from the following:    Height as of this encounter: 154.9 cm (61\").    Weight as of this encounter: 68.9 kg (152 lb).             Physical Exam  Vitals and nursing note reviewed.   Constitutional:       General: She is not in acute distress.     Appearance: Normal appearance.   HENT:      Head: Normocephalic and atraumatic.   Eyes:      Extraocular Movements: Extraocular movements intact.      Conjunctiva/sclera: Conjunctivae normal.   Neck:      Comments: Right posterior lateral neck tenderness to palpation, muscle " fullness.  Cardiovascular:      Rate and Rhythm: Normal rate and regular rhythm.      Pulses: Normal pulses.      Heart sounds: Normal heart sounds. No murmur heard.    No friction rub. No gallop.   Pulmonary:      Effort: Pulmonary effort is normal.      Breath sounds: Normal breath sounds. No wheezing, rhonchi or rales.   Abdominal:      General: Bowel sounds are normal. There is no distension.      Palpations: Abdomen is soft.      Tenderness: There is abdominal tenderness (LLQ TTP). There is no right CVA tenderness, left CVA tenderness or guarding.   Musculoskeletal:      Cervical back: Normal range of motion.      Right lower leg: No edema.      Left lower leg: No edema.   Skin:     General: Skin is warm and dry.      Capillary Refill: Capillary refill takes less than 2 seconds.   Neurological:      General: No focal deficit present.      Mental Status: She is alert. Mental status is at baseline.   Psychiatric:         Mood and Affect: Mood normal.         Behavior: Behavior normal.        Result Review :                   Assessment and Plan   Diagnoses and all orders for this visit:    1. Neck muscle spasm (Primary)  -     cyclobenzaprine (FLEXERIL) 5 MG tablet; Take 1 tablet by mouth 3 (Three) Times a Day As Needed for Muscle Spasms (Start with a dose at night to see how you do with it.).  Dispense: 90 tablet; Refill: 1  -     Ambulatory Referral to Physical Therapy Evaluate and treat    2. Peripheral neuropathy due to chemotherapy  -     CBC & Differential  -     Comprehensive Metabolic Panel  -     Anemia Profile B ([facility])  -     TSH  -     T4, free  -     Vitamin D 25 hydroxy  -     Vitamin B12  -     Folate  -     Vitamin B6    3. Anemia, unspecified type  -     CBC & Differential  -     Comprehensive Metabolic Panel  -     Anemia Profile B ([facility])  -     TSH  -     T4, free    4. Acquired hypothyroidism  -     CBC & Differential  -     Comprehensive Metabolic Panel  -     Anemia Profile B  ([facility])  -     TSH  -     T4, free  -     levothyroxine (Synthroid) 50 MCG tablet; Take 1 tablet by mouth Daily.  Dispense: 90 tablet; Refill: 3    5. Dependent edema  -     CBC & Differential  -     Comprehensive Metabolic Panel  -     Anemia Profile B ([facility])  -     TSH  -     T4, free    6. Insomnia, unspecified type    7. Vitamin deficiency  -     Vitamin D 25 hydroxy  -     Vitamin B12  -     Folate  -     Vitamin B6    8. Vitamin D deficiency  -     Vitamin D 25 hydroxy    9. Allergic rhinitis, unspecified seasonality, unspecified trigger    10. Other migraine without status migrainosus, not intractable    11. Gastroesophageal reflux disease, unspecified whether esophagitis present    12. Encounter to establish care  -     CBC & Differential  -     Comprehensive Metabolic Panel  -     Anemia Profile B ([facility])  -     TSH  -     T4, free    Other orders  -     Immature Cells    Roxana is a new patient presenting to establish care, neck pain, and neuropathy.  She has HTN, HLD, thrombocytopenia, hypothyroidism, osteopenia, peripheral neuropathy, and a history of breast cancer s/p mastectomy.  PCP was Brandt Martin MD, Family Medicine.    She also follows with Oneil Coburn MD, heme/onc.    Neck pain: Reports right-sided neck pain that she thinks is related to bending over to watch the organ keys when she is playing.  Has been bothering her for several weeks.  She reports pain when touching the skin of her neck as well as a deeper muscle pain associated with movement.  She uses an over-the-counter gel which helps some with the pain.  She does take Voltaren nightly as well.  Discussed trying to back off on the Voltaren soon due to acid reflux symptoms, however, she should likely continue it during this acute neck pain flare.  Also adding muscle relaxer.  Patient agreeable to referral to physical therapy.  Referral placed.    Neuropathy: Started after chemotherapy several years ago.  Began in distal  extremities.  Leg neuropathy initially up to mid lower legs, now midway between knees and thighs after beginning to walk about 5 miles daily during the past month.  Causes an uncomfortable sensation associated with loss of sensation.  Not really directly painful.  Takes diclofenac at night for neuropathy/cramping/arthritic pain.  She reports improvement with her symptoms on gabapentin, but is hesitant to increase dose because she already feels she has difficulty with word finding which she is afraid is related to this medication.  Continuing current gabapentin dose at this time since starting a muscle relaxer today for her neck pain.  Checking blood counts, electrolytes, iron levels and vitamin levels to rule out easily treatable causes for her symptoms.    Lower extremity dependent edema: Reports good management with hydrochlorothiazide.  No lower extremity edema on exam today.  BP is also good in clinic today.  We will continue HCTZ.  Checking electrolytes.    Insomnia: Reports good management with melatonin.  Continuing.    Vitamin deficiencies: Takes vitamin B12, paroxetine, potassium supplement, vitamin D supplement.  Checking vitamin levels and electrolytes today.    Allergic rhinitis: Uses Vicks sinus nasal spray twice daily.  Encouraged her to discontinue use due to rebound congestion.  Instructed her on the use of Flonase/Sudafed/saline nasal spray to help with transition.    Migraines: Managed well on Propanolol.  Reports she has tried discontinuing in the past and headaches return.  Review of vital signs in clinic today indicate propanolol is still acceptable for patient to take for migraine management.  Continuing.    Acid reflux/nausea/constipation: Managed with Protonix and Zofran, occasionally Phenergan.  Probiotic for constipation.  We will try to diclofenac use in the future to help with symptoms.  Encouraged patient to avoid NSAIDs as much as possible.    Hyperlipidemia: Reports taking Zocor daily.   Checking lipids today.    Maclular degeneration: Follows with ophthalmology.  Next appointment in about 4 weeks, reports symptoms managed with Polytrim.      Preventative: Discussed screenings and vaccines at future visit.  Reports regular eye exams due to macular degeneration.  Discussed dental exams at future visit.  Encourage continued walking, improvement in diet to add more vegetables, reduce snacks.  Encouraged continued social activity and engagement with others.    05/10/23  5/8/2023 labs showing elevated T4.  Adjusting Synthroid dose.         Follow Up   Return in about 1 month (around 6/8/2023) for Neck pain, neuropathy, check colon cancer screen, vaccines.  Patient was given instructions and counseling regarding her condition or for health maintenance advice. Please see specific information pulled into the AVS if appropriate.

## 2023-05-09 LAB
ALBUMIN SERPL-MCNC: 5.1 G/DL (ref 3.6–4.6)
ALBUMIN/GLOB SERPL: 2.1 {RATIO} (ref 1.2–2.2)
ALP SERPL-CCNC: 55 IU/L (ref 44–121)
ALT SERPL-CCNC: 17 IU/L (ref 0–32)
AST SERPL-CCNC: 27 IU/L (ref 0–40)
BASOPHILS # BLD AUTO: 0 X10E3/UL (ref 0–0.2)
BASOPHILS NFR BLD AUTO: 0 %
BILIRUB SERPL-MCNC: 0.7 MG/DL (ref 0–1.2)
BUN SERPL-MCNC: 14 MG/DL (ref 8–27)
BUN/CREAT SERPL: 15 (ref 12–28)
CALCIUM SERPL-MCNC: 9.7 MG/DL (ref 8.7–10.3)
CHLORIDE SERPL-SCNC: 101 MMOL/L (ref 96–106)
CO2 SERPL-SCNC: 25 MMOL/L (ref 20–29)
CREAT SERPL-MCNC: 0.91 MG/DL (ref 0.57–1)
EGFRCR SERPLBLD CKD-EPI 2021: 63 ML/MIN/1.73
EOSINOPHIL # BLD AUTO: 0.1 X10E3/UL (ref 0–0.4)
EOSINOPHIL NFR BLD AUTO: 2 %
ERYTHROCYTE [DISTWIDTH] IN BLOOD BY AUTOMATED COUNT: 15.8 % (ref 11.7–15.4)
FERRITIN SERPL-MCNC: 54 NG/ML (ref 15–150)
FOLATE SERPL-MCNC: 16.9 NG/ML
GLOBULIN SER CALC-MCNC: 2.4 G/DL (ref 1.5–4.5)
GLUCOSE SERPL-MCNC: 100 MG/DL (ref 70–99)
HCT VFR BLD AUTO: 34.9 % (ref 34–46.6)
HGB BLD-MCNC: 11.6 G/DL (ref 11.1–15.9)
IMMATURE CELLS: ABNORMAL
IRON SATN MFR SERPL: 34 % (ref 15–55)
IRON SERPL-MCNC: 114 UG/DL (ref 27–139)
LYMPHOCYTES # BLD AUTO: 2.6 X10E3/UL (ref 0.7–3.1)
LYMPHOCYTES NFR BLD AUTO: 42 %
MCH RBC QN AUTO: 31.4 PG (ref 26.6–33)
MCHC RBC AUTO-ENTMCNC: 33.2 G/DL (ref 31.5–35.7)
MCV RBC AUTO: 94 FL (ref 79–97)
METAMYELOCYTES NFR BLD: 2 % (ref 0–0)
MONOCYTES # BLD AUTO: 0.9 X10E3/UL (ref 0.1–0.9)
MONOCYTES NFR BLD AUTO: 14 %
MORPHOLOGY BLD-IMP: ABNORMAL
MYELOCYTES NFR BLD: 3 % (ref 0–0)
NEUTROPHILS # BLD AUTO: 2.3 X10E3/UL (ref 1.4–7)
NEUTROPHILS NFR BLD AUTO: 37 %
NRBC BLD AUTO-RTO: 1 % (ref 0–0)
PLATELET # BLD AUTO: 69 X10E3/UL (ref 150–450)
POTASSIUM SERPL-SCNC: 3.7 MMOL/L (ref 3.5–5.2)
PROT SERPL-MCNC: 7.5 G/DL (ref 6–8.5)
RBC # BLD AUTO: 3.7 X10E6/UL (ref 3.77–5.28)
RETICS/RBC NFR AUTO: 3.6 % (ref 0.6–2.6)
SODIUM SERPL-SCNC: 141 MMOL/L (ref 134–144)
T4 FREE SERPL-MCNC: 1.94 NG/DL (ref 0.82–1.77)
TIBC SERPL-MCNC: 339 UG/DL (ref 250–450)
TSH SERPL DL<=0.005 MIU/L-ACNC: 1.64 UIU/ML (ref 0.45–4.5)
UIBC SERPL-MCNC: 225 UG/DL (ref 118–369)
VIT B12 SERPL-MCNC: 1051 PG/ML (ref 232–1245)
WBC # BLD AUTO: 6.2 X10E3/UL (ref 3.4–10.8)

## 2023-05-10 RX ORDER — LEVOTHYROXINE SODIUM 0.05 MG/1
50 TABLET ORAL DAILY
Qty: 90 TABLET | Refills: 3 | Status: SHIPPED | OUTPATIENT
Start: 2023-05-10

## 2023-05-10 NOTE — PROGRESS NOTES
Called and left VM message of normal results and dose change in her Levothyroxine.  Advised to call if she has any questions.

## 2023-05-11 LAB
25(OH)D3+25(OH)D2 SERPL-MCNC: 74.4 NG/ML (ref 30–100)
FOLATE SERPL-MCNC: 15.6 NG/ML
PYRIDOXAL PHOS SERPL-MCNC: 123.1 UG/L (ref 3.4–65.2)
VIT B12 SERPL-MCNC: 959 PG/ML (ref 232–1245)

## 2023-05-16 ENCOUNTER — PATIENT ROUNDING (BHMG ONLY) (OUTPATIENT)
Dept: FAMILY MEDICINE CLINIC | Facility: CLINIC | Age: 83
End: 2023-05-16
Payer: MEDICARE

## 2023-05-16 NOTE — PROGRESS NOTES
Sent Patient following in [employer] Message:  My name is Tam Carter      I am the Practice Manager with   [facility] [place]  [address], [address]  [place] [address] [phone].      I am messaging to officially welcome you to our practice and ask about your recent visit.     How did you hear about our practice/providers?    Tell me about your visit with us. What things went well?         We're always looking for ways to make our patients' experiences even better. Do you have recommendations on ways we may improve?       Overall were you satisfied with your first visit to our practice?        Is there anything else I can do for you?     You may receive a survey from [employer] via text or email to provide feedback about your visit.  We ask that you please take a few minutes to complete the survey and let us know how we are doing.  If for any reason you feel unable to give us the highest rating please let me know.      Thank you, and have a great day.

## 2023-05-23 NOTE — PROGRESS NOTES
Postoperative visit    Returns today indicating that she was told there might be an infection around her right mastectomy site.  She has not noticed any erythema or tenderness and has minimal serous drainage from the central portion of the wound.  On examination the incision is healed well except for the very central portion of the incision there is a small punctate opening with minimal serous drainage.  There is no surrounding erythema, induration, or fluctuance.    I do not see any significant evidence of infection and suspect that she is just draining a small seroma cavity.  I've asked her to return in 2-3 weeks to recheck the area, sooner if any concerning signs appear.  Of note, she has elected to not pursue chest wall radiation    Negative

## 2023-06-08 ENCOUNTER — TELEPHONE (OUTPATIENT)
Dept: ONCOLOGY | Facility: CLINIC | Age: 83
End: 2023-06-08
Payer: MEDICARE

## 2023-06-08 ENCOUNTER — TELEPHONE (OUTPATIENT)
Dept: FAMILY MEDICINE CLINIC | Facility: CLINIC | Age: 83
End: 2023-06-08

## 2023-06-08 ENCOUNTER — OFFICE VISIT (OUTPATIENT)
Dept: FAMILY MEDICINE CLINIC | Facility: CLINIC | Age: 83
End: 2023-06-08
Payer: MEDICARE

## 2023-06-08 VITALS
HEART RATE: 72 BPM | HEIGHT: 61 IN | TEMPERATURE: 97.3 F | WEIGHT: 148 LBS | DIASTOLIC BLOOD PRESSURE: 76 MMHG | OXYGEN SATURATION: 96 % | BODY MASS INDEX: 27.94 KG/M2 | SYSTOLIC BLOOD PRESSURE: 118 MMHG

## 2023-06-08 DIAGNOSIS — M25.552 PAIN OF LEFT HIP JOINT: Primary | ICD-10-CM

## 2023-06-08 DIAGNOSIS — E03.9 ACQUIRED HYPOTHYROIDISM: ICD-10-CM

## 2023-06-08 DIAGNOSIS — M62.838 NECK MUSCLE SPASM: ICD-10-CM

## 2023-06-08 NOTE — TELEPHONE ENCOUNTER
Caller: Roxana Brizuela    Relationship: Self    Best call back number: [phone]     What was the call regarding: PATIENT STATED SHE WAS CALLING BACK TO LET DR BEAN KNOW THAT SHE TAKES 50 MG OF HER THYROID MEDICATION.

## 2023-06-08 NOTE — TELEPHONE ENCOUNTER
Caller: Roxana Brizuela    Relationship: Self    Best call back number:[phone]    What is the best time to reach you: ANY.LEAVE VM    Who are you requesting to speak with (clinical staff, provider,  specific staff member): SCHEDULING        What was the call regarding:PATIENT CALLED TO R/S APPTS FOR PORT FLUSH, LABS, AND NURSE REVIEW ON 7/25/23. SHE HAS ANOTHER APPT THAT DAY. SHE NEEDS ANOTHER APPOINTMENT AT [facility] LOCATION. SHE CAN AUGUST 1ST.    Is it okay if the provider responds through MyChart: NO

## 2023-06-08 NOTE — PROGRESS NOTES
"Chief Complaint  Neck Pain, Peripheral Neuropathy, and Follow-up (Colon Cancer Screening, Vaccines)    Subjective        Roxana Brizuela presents to [facility]  History of Present Illness  Roxana is an established patient presenting for follow-up for neck pain, peripheral neuropathy.  She reports significant improvement in her neck pain and increased length of time that she is able to play organ pain-free since starting physical therapy at week ago.  He has been doing PT 3 times a week.    Today, she reports continuing left hip pain and right knee pain.  She reports symptoms are worse first thing in the morning till she starts moving.  She is active throughout the day, walking her dog several times a day.  She reports getting 4 miles of walking and daily.  She also has pain in her hip from walking.  She gets relief with Flexeril but it makes her drowsy.    Objective   Vital Signs:  /76 (BP Location: Left arm, Patient Position: Sitting, Cuff Size: Adult)   Pulse 72   Temp 97.3 °F (36.3 °C) (Temporal)   Ht 154.9 cm (61\")   Wt 67.1 kg (148 lb)   SpO2 96%   BMI 27.96 kg/m²   Estimated body mass index is 27.96 kg/m² as calculated from the following:    Height as of this encounter: 154.9 cm (61\").    Weight as of this encounter: 67.1 kg (148 lb).               Physical Exam  Vitals and nursing note reviewed.   Constitutional:       General: She is not in acute distress.     Appearance: Normal appearance.   HENT:      Head: Normocephalic and atraumatic.   Eyes:      Extraocular Movements: Extraocular movements intact.      Comments: Small L conjunctival hemorrhage   Cardiovascular:      Rate and Rhythm: Normal rate and regular rhythm.      Heart sounds: Normal heart sounds. No murmur heard.    No friction rub. No gallop.   Pulmonary:      Effort: Pulmonary effort is normal.      Breath sounds: Normal breath sounds. No wheezing, rhonchi or rales.   Musculoskeletal:      Right " lower leg: No edema.      Left lower leg: No edema.      Comments: L hip TTP.  L leg raise negative.  L hip ROM normal.  No SI joint tenderness.   Skin:     General: Skin is warm and dry.   Neurological:      General: No focal deficit present.      Mental Status: She is alert. Mental status is at baseline.   Psychiatric:         Mood and Affect: Mood normal.         Behavior: Behavior normal.        Result Review :                   Assessment and Plan   Diagnoses and all orders for this visit:    1. Pain of left hip joint (Primary)    2. Neck muscle spasm    3. Acquired hypothyroidism    Roxana is an established patient presenting for follow-up for neck pain, peripheral neuropathy.  Significant improvement in her neck pain since starting PT, but still not at goal.  She will be continuing PT with additional sessions.  At this point, she will also discuss a plan with PT for her hip pain.  We will consider hip injection at future visit if symptoms are not improving with PT and muscle relaxer which is being continued.  We will likely have to wait for completion of PT on her neck before formally starting PT for her hip.  Further management at future visit.  Encourage continued daily exercise.    Patient was taking Synthroid with other pills with breakfast.  Encouraged her to take her Synthroid pill as soon as she got up in the mornings with water, ideally 1 hour before other medications or breakfast.  She indicated she would be able to do that.  Review of 5/8/2023 labs showed normal TSH with free T4 continuing to rise.  We will recheck thyroid hormone with next labs and continue current dosage at this time.       Follow Up   Return in about 4 months (around 10/10/2023) for Follow-up, check thyroid since dose change.  Patient was given instructions and counseling regarding her condition or for health maintenance advice. Please see specific information pulled into the AVS if appropriate.

## 2023-06-13 ENCOUNTER — INFUSION (OUTPATIENT)
Dept: ONCOLOGY | Facility: HOSPITAL | Age: 83
End: 2023-06-13
Payer: MEDICARE

## 2023-06-13 DIAGNOSIS — Z45.2 ENCOUNTER FOR FITTING AND ADJUSTMENT OF VASCULAR CATHETER: Primary | ICD-10-CM

## 2023-06-13 PROCEDURE — 96523 IRRIG DRUG DELIVERY DEVICE: CPT

## 2023-06-13 PROCEDURE — 25010000002 HEPARIN LOCK FLUSH PER 10 UNITS: Performed by: NURSE PRACTITIONER

## 2023-06-13 RX ORDER — HEPARIN SODIUM (PORCINE) LOCK FLUSH IV SOLN 100 UNIT/ML 100 UNIT/ML
500 SOLUTION INTRAVENOUS AS NEEDED
Status: DISCONTINUED | OUTPATIENT
Start: 2023-06-13 | End: 2023-06-13 | Stop reason: HOSPADM

## 2023-06-13 RX ORDER — SODIUM CHLORIDE 0.9 % (FLUSH) 0.9 %
10 SYRINGE (ML) INJECTION AS NEEDED
Status: DISCONTINUED | OUTPATIENT
Start: 2023-06-13 | End: 2023-06-13 | Stop reason: HOSPADM

## 2023-06-13 RX ADMIN — Medication 10 ML: at 13:07

## 2023-06-13 RX ADMIN — Medication 500 UNITS: at 13:07

## 2023-07-27 ENCOUNTER — INFUSION (OUTPATIENT)
Dept: ONCOLOGY | Facility: HOSPITAL | Age: 83
End: 2023-07-27
Payer: MEDICARE

## 2023-07-27 ENCOUNTER — CLINICAL SUPPORT (OUTPATIENT)
Dept: ONCOLOGY | Facility: HOSPITAL | Age: 83
End: 2023-07-27
Payer: MEDICARE

## 2023-07-27 VITALS — BODY MASS INDEX: 28.08 KG/M2 | HEIGHT: 61 IN | RESPIRATION RATE: 15 BRPM | TEMPERATURE: 97.9 F

## 2023-07-27 DIAGNOSIS — Z45.2 ENCOUNTER FOR FITTING AND ADJUSTMENT OF VASCULAR CATHETER: Primary | ICD-10-CM

## 2023-07-27 DIAGNOSIS — D69.3 CHRONIC ITP (IDIOPATHIC THROMBOCYTOPENIA): ICD-10-CM

## 2023-07-27 LAB
ANISOCYTOSIS BLD QL: ABNORMAL
DEPRECATED RDW RBC AUTO: 55.1 FL (ref 37–54)
EOSINOPHIL # BLD MANUAL: 0.41 10*3/MM3 (ref 0–0.4)
EOSINOPHIL NFR BLD MANUAL: 5 % (ref 0.3–6.2)
ERYTHROCYTE [DISTWIDTH] IN BLOOD BY AUTOMATED COUNT: 16.3 % (ref 12.3–15.4)
HCT VFR BLD AUTO: 34.1 % (ref 34–46.6)
HGB BLD-MCNC: 11.3 G/DL (ref 12–15.9)
LYMPHOCYTES # BLD MANUAL: 2.22 10*3/MM3 (ref 0.7–3.1)
LYMPHOCYTES NFR BLD MANUAL: 38 % (ref 5–12)
MCH RBC QN AUTO: 30.7 PG (ref 26.6–33)
MCHC RBC AUTO-ENTMCNC: 33.1 G/DL (ref 31.5–35.7)
MCV RBC AUTO: 92.7 FL (ref 79–97)
MONOCYTES # BLD: 3.13 10*3/MM3 (ref 0.1–0.9)
NEUTROPHILS # BLD AUTO: 2.47 10*3/MM3 (ref 1.7–7)
NEUTROPHILS NFR BLD MANUAL: 30 % (ref 42.7–76)
PLAT MORPH BLD: NORMAL
PLATELET # BLD AUTO: 63 10*3/MM3 (ref 140–450)
PMV BLD AUTO: 11.4 FL (ref 6–12)
RBC # BLD AUTO: 3.68 10*6/MM3 (ref 3.77–5.28)
VARIANT LYMPHS NFR BLD MANUAL: 27 % (ref 19.6–45.3)
WBC MORPH BLD: NORMAL
WBC NRBC COR # BLD: 8.24 10*3/MM3 (ref 3.4–10.8)

## 2023-07-27 PROCEDURE — 96523 IRRIG DRUG DELIVERY DEVICE: CPT

## 2023-07-27 PROCEDURE — 85025 COMPLETE CBC W/AUTO DIFF WBC: CPT | Performed by: INTERNAL MEDICINE

## 2023-07-27 PROCEDURE — 25010000002 HEPARIN LOCK FLUSH PER 10 UNITS: Performed by: NURSE PRACTITIONER

## 2023-07-27 PROCEDURE — 85007 BL SMEAR W/DIFF WBC COUNT: CPT | Performed by: INTERNAL MEDICINE

## 2023-07-27 RX ORDER — SODIUM CHLORIDE 0.9 % (FLUSH) 0.9 %
10 SYRINGE (ML) INJECTION AS NEEDED
Status: DISCONTINUED | OUTPATIENT
Start: 2023-07-27 | End: 2023-07-27 | Stop reason: HOSPADM

## 2023-07-27 RX ORDER — HEPARIN SODIUM (PORCINE) LOCK FLUSH IV SOLN 100 UNIT/ML 100 UNIT/ML
500 SOLUTION INTRAVENOUS AS NEEDED
Status: DISCONTINUED | OUTPATIENT
Start: 2023-07-27 | End: 2023-07-27 | Stop reason: HOSPADM

## 2023-07-27 RX ADMIN — Medication 10 ML: at 12:09

## 2023-07-27 RX ADMIN — Medication 500 UNITS: at 12:09

## 2023-07-27 NOTE — NURSING NOTE
Lab Results   Component Value Date    WBC 8.24 07/27/2023    HGB 11.3 (L) 07/27/2023    HCT 34.1 07/27/2023    MCV 92.7 07/27/2023    PLT 63 (L) 07/27/2023     Patient is here for lab review with RN.  CBC reviewed, counts are stable for this patient at this time. Patient has no complaints. Copy of labs given to patient and follow up appointment reviewed. Patient is instructed to call the office with any concerns or new symptoms prior to next visit. Patient verbalized understanding and discharged in stable condition.

## 2023-08-14 RX ORDER — SIMVASTATIN 80 MG
TABLET ORAL
Qty: 90 TABLET | Refills: 3 | Status: SHIPPED | OUTPATIENT
Start: 2023-08-14

## 2023-08-28 ENCOUNTER — TELEPHONE (OUTPATIENT)
Dept: FAMILY MEDICINE CLINIC | Facility: CLINIC | Age: 83
End: 2023-08-28
Payer: MEDICARE

## 2023-08-28 DIAGNOSIS — U07.1 COVID-19 VIRUS INFECTION: Primary | ICD-10-CM

## 2023-08-28 NOTE — TELEPHONE ENCOUNTER
Caller: Roxana Brizuela    Relationship to patient: Self    Best call back number: [phone]     Date of exposure: UNKNOWN     Date of positive COVID19 test: HOME TEST 08/27/23 / SYMPTOMS STARTED SATURDAY     COVID19 symptoms: BODY ACHES / CONGESTION     Date of initial quarantine:     Additional information or concerns: PATIENT CALLING WANTING TO KNOW IF SHE CAN GET A PRESCRIPTION FOR PAXLOVID     What is the patients preferred pharmacy: [facility] [address] [place] [address] - [phone] [facility] [phone]  [phone]

## 2023-08-28 NOTE — TELEPHONE ENCOUNTER
Please advise of getting Paxlovid.  Per message, her symptoms started Saturday.  Home [place] test was positive on 8/27/2023.    Aneta    08/28/23 09:35    Talked with patient on phone.  Symptoms of nasal congestion, mild cough, feeling achy.  Symptoms began 2 days ago, tested positive for COVID yesterday.  Not eating, not interested in eating.  She is drinking some but likely not enough, at baseline.  Denies shortness of breath.  Walked the dog today.    She took her vitamin C this morning but takes Zocor at night, so she has not taken it today.  Advised her to hold these 2 medications until she completes a course of Paxlovid.  Advised her to try to get plenty of fluids, if she develops difficulty breathing, high fevers, condition worsens, get right to ER.  Prescription for Paxlovid sent.

## 2023-09-01 ENCOUNTER — TELEPHONE (OUTPATIENT)
Dept: FAMILY MEDICINE CLINIC | Facility: CLINIC | Age: 83
End: 2023-09-01
Payer: MEDICARE

## 2023-09-01 NOTE — TELEPHONE ENCOUNTER
Spoke to pt at length and advised her that she needs to just treat her symptoms with OTC medications.  She should quarantine for 5 days, after 5 days if she is still having symptoms she should quarantine for another 5 days for  a total of 10 days.  Pt advised that if she starts having trouble breathing she needs to go to the ER

## 2023-09-01 NOTE — TELEPHONE ENCOUNTER
Caller: Roxana Brizuela    Relationship to patient: Self    Best call back number: [phone]     Date of exposure:     Date of positive COVID19 test: 8/27/23    Date if possible COVID19 exposure:     COVID19 symptoms: COUGH, FATIGUE    Date of initial quarantine: 8/27/23    Additional information or concerns: PATIENT STATES THAT SHE IS STILL TESTING POSITIVE AS OF TODAY 9/1/23 AND WOULD LIKE A CALLBACK TO DISCUSS NEXT STEPS.    What is the patients preferred pharmacy: [address] - [phone] [facility] [phone] [facility]      PLEASE ADVISE.

## 2023-09-06 ENCOUNTER — TELEPHONE (OUTPATIENT)
Dept: FAMILY MEDICINE CLINIC | Facility: CLINIC | Age: 83
End: 2023-09-06

## 2023-09-06 NOTE — TELEPHONE ENCOUNTER
Caller: Roxana Brizuela    Relationship to patient: Self    Best call back number:     Patient is needing: PATIENT STATES THAT IT HAS NOW BEEN 11 DAYS SINCE SHE WAS DIAGNOSED WITH COVID.   SHE STATES SHE STARTED HAVING SYMPTOMS AND TESTED ON 8/27/23.     SHE STATES SHE CONTINUES TO TEST POSITIVE AND HAVE SYMPTOMS.   SHE STATES HER RIBS HURT FROM COUGHING.   SHE IS SICK TO HER STOMACH, HEADACHE, ACHING ALL OVER, ESPECIALLY IN THE MORNINGS.   SHE HAS ISOLATED THE PAST 11 DAYS.     NO FEVER.       PATIENT WOULD LIKE A CALLBACK FROM DR. BEAN TO ADVISE WHAT DOES SHE DO IF SHE CONTINUES TESTING POSITIVE.   SHE WANTS TO KNOW IF SHE TRULY IS POSITIVE.   SHE WANTS TO KNOW IF SHE SHOULD CONTINUE TO QUARANTINE.

## 2023-09-06 NOTE — TELEPHONE ENCOUNTER
Talked with patient on phone.  Overall, she is doing much better, now 11 days out from first diagnosis.  She is frustrated that she still testing positive.  Informed her that she could return to her regular , although she she should continue to wear a mask until she has 2 negative COVID tests 48 hours apart.  She reports symptoms are worse in the morning with morning fatigue and coughing up a lot of nonbloody mucus.  Painful to breathe due to muscle pain in her ribs from coughing.  She is drinking plenty but still eating less.  Walking the dog daily which she feels like helps her symptoms.  She is practicing her keyboard regularly.  Recommended mucus to help with congestion.  Discussed worsening symptoms that may require antibiotic or follow-up with our clinic or ER.  Patient voiced understanding.

## 2023-09-07 RX ORDER — PROPRANOLOL HYDROCHLORIDE 10 MG/1
TABLET ORAL
Qty: 270 TABLET | Refills: 3 | Status: SHIPPED | OUTPATIENT
Start: 2023-09-07

## 2023-09-12 ENCOUNTER — INFUSION (OUTPATIENT)
Dept: ONCOLOGY | Facility: HOSPITAL | Age: 83
End: 2023-09-12
Payer: MEDICARE

## 2023-09-12 DIAGNOSIS — Z45.2 ENCOUNTER FOR FITTING AND ADJUSTMENT OF VASCULAR CATHETER: Primary | ICD-10-CM

## 2023-09-12 PROCEDURE — 25010000002 HEPARIN LOCK FLUSH PER 10 UNITS: Performed by: INTERNAL MEDICINE

## 2023-09-12 PROCEDURE — 96523 IRRIG DRUG DELIVERY DEVICE: CPT

## 2023-09-12 RX ORDER — SODIUM CHLORIDE 0.9 % (FLUSH) 0.9 %
10 SYRINGE (ML) INJECTION AS NEEDED
OUTPATIENT
Start: 2023-09-12

## 2023-09-12 RX ORDER — HEPARIN SODIUM (PORCINE) LOCK FLUSH IV SOLN 100 UNIT/ML 100 UNIT/ML
500 SOLUTION INTRAVENOUS AS NEEDED
OUTPATIENT
Start: 2023-09-12

## 2023-09-12 RX ORDER — HEPARIN SODIUM (PORCINE) LOCK FLUSH IV SOLN 100 UNIT/ML 100 UNIT/ML
500 SOLUTION INTRAVENOUS AS NEEDED
Status: DISCONTINUED | OUTPATIENT
Start: 2023-09-12 | End: 2023-09-12 | Stop reason: HOSPADM

## 2023-09-12 RX ORDER — SODIUM CHLORIDE 0.9 % (FLUSH) 0.9 %
10 SYRINGE (ML) INJECTION AS NEEDED
Status: DISCONTINUED | OUTPATIENT
Start: 2023-09-12 | End: 2023-09-12 | Stop reason: HOSPADM

## 2023-09-12 RX ADMIN — Medication 500 UNITS: at 13:49

## 2023-09-12 RX ADMIN — Medication 10 ML: at 13:49

## 2023-09-26 RX ORDER — HYDROCHLOROTHIAZIDE 25 MG/1
TABLET ORAL
Qty: 90 TABLET | Refills: 0 | Status: SHIPPED | OUTPATIENT
Start: 2023-09-26

## 2023-10-09 ENCOUNTER — OFFICE VISIT (OUTPATIENT)
Dept: FAMILY MEDICINE CLINIC | Facility: CLINIC | Age: 83
End: 2023-10-09
Payer: MEDICARE

## 2023-10-09 VITALS
BODY MASS INDEX: 26.24 KG/M2 | TEMPERATURE: 97.1 F | HEART RATE: 79 BPM | DIASTOLIC BLOOD PRESSURE: 68 MMHG | OXYGEN SATURATION: 97 % | WEIGHT: 139 LBS | HEIGHT: 61 IN | SYSTOLIC BLOOD PRESSURE: 128 MMHG

## 2023-10-09 DIAGNOSIS — E87.6 HYPOKALEMIA: ICD-10-CM

## 2023-10-09 DIAGNOSIS — E03.9 ACQUIRED HYPOTHYROIDISM: ICD-10-CM

## 2023-10-09 DIAGNOSIS — E78.5 HYPERLIPIDEMIA, UNSPECIFIED HYPERLIPIDEMIA TYPE: ICD-10-CM

## 2023-10-09 DIAGNOSIS — R53.83 FATIGUE, UNSPECIFIED TYPE: Primary | ICD-10-CM

## 2023-10-09 DIAGNOSIS — D50.9 IRON DEFICIENCY ANEMIA, UNSPECIFIED IRON DEFICIENCY ANEMIA TYPE: ICD-10-CM

## 2023-10-09 DIAGNOSIS — I10 PRIMARY HYPERTENSION: ICD-10-CM

## 2023-10-09 DIAGNOSIS — F10.11 HISTORY OF ALCOHOL ABUSE: ICD-10-CM

## 2023-10-09 DIAGNOSIS — L98.9 LESION OF NECK: ICD-10-CM

## 2023-10-09 DIAGNOSIS — Z23 NEED FOR INFLUENZA VACCINATION: ICD-10-CM

## 2023-10-09 DIAGNOSIS — H81.11 BENIGN PAROXYSMAL POSITIONAL VERTIGO OF RIGHT EAR: ICD-10-CM

## 2023-10-09 DIAGNOSIS — G62.0 PERIPHERAL NEUROPATHY DUE TO CHEMOTHERAPY: ICD-10-CM

## 2023-10-09 DIAGNOSIS — R11.0 NAUSEA: ICD-10-CM

## 2023-10-09 DIAGNOSIS — E55.9 VITAMIN D DEFICIENCY: ICD-10-CM

## 2023-10-09 DIAGNOSIS — T45.1X5A PERIPHERAL NEUROPATHY DUE TO CHEMOTHERAPY: ICD-10-CM

## 2023-10-09 PROCEDURE — 3074F SYST BP LT 130 MM HG: CPT | Performed by: STUDENT IN AN ORGANIZED HEALTH CARE EDUCATION/TRAINING PROGRAM

## 2023-10-09 PROCEDURE — G0008 ADMIN INFLUENZA VIRUS VAC: HCPCS | Performed by: STUDENT IN AN ORGANIZED HEALTH CARE EDUCATION/TRAINING PROGRAM

## 2023-10-09 PROCEDURE — 3078F DIAST BP <80 MM HG: CPT | Performed by: STUDENT IN AN ORGANIZED HEALTH CARE EDUCATION/TRAINING PROGRAM

## 2023-10-09 PROCEDURE — 99214 OFFICE O/P EST MOD 30 MIN: CPT | Performed by: STUDENT IN AN ORGANIZED HEALTH CARE EDUCATION/TRAINING PROGRAM

## 2023-10-09 PROCEDURE — 90662 IIV NO PRSV INCREASED AG IM: CPT | Performed by: STUDENT IN AN ORGANIZED HEALTH CARE EDUCATION/TRAINING PROGRAM

## 2023-10-09 NOTE — PROGRESS NOTES
"Chief Complaint  Hypothyroidism    Subjective        Roxana Brizuela presents to [facility]  History of Present Illness  Roxana is an established patient presenting for general follow-up and management of hypothyroidism.  She has HTN, HLD, thrombocytopenia, hypothyroidism, osteopenia, peripheral neuropathy, and a history of breast cancer s/p mastectomy and still has her chemotherapy port.  She also follows with Oneil Coburn MD, heme/onc and has followed with Jamal Patel MD PhD, Heme/onc and Constance Elliott MD, heme/onc.    Fatigue/iron deficiency anemia history: Has been symptomatic since diagnosis with COVID about 5 weeks ago.  Most symptoms have resolved, however, she still has fatigue.    Nausea: Longstanding issue.  Managed somewhat with Zofran and occasional Phenergan.  Also takes Protonix for acid reflux symptoms.    Neck lesion: Has been keeping this covered but noted ill fitting bandage today.  Patient reports it has been there for some time and occasionally gets scraped.  She has not had it evaluated previously.    She reports compliance with medications for her other chronic conditions.    Objective   Vital Signs:  /68 (BP Location: Left arm, Patient Position: Sitting, Cuff Size: Adult)   Pulse 79   Temp 97.1 øF (36.2 øC) (Temporal)   Ht 154.9 cm (61\")   Wt 63 kg (139 lb)   SpO2 97%   BMI 26.26 kg/mý   Estimated body mass index is 26.26 kg/mý as calculated from the following:    Height as of this encounter: 154.9 cm (61\").    Weight as of this encounter: 63 kg (139 lb).               Physical Exam  Vitals reviewed.   Constitutional:       General: She is not in acute distress.     Appearance: Normal appearance.   HENT:      Head: Normocephalic and atraumatic.   Eyes:      Extraocular Movements: Extraocular movements intact.      Conjunctiva/sclera: Conjunctivae normal.   Neck:        Comments: 2 cm agustin, 2 cm tall, lesion left anterior neck with " superficial crusting on surface.  Cardiovascular:      Rate and Rhythm: Normal rate and regular rhythm.      Heart sounds: Normal heart sounds. No murmur heard.     No friction rub. No gallop.   Pulmonary:      Effort: Pulmonary effort is normal.      Breath sounds: Normal breath sounds. No wheezing, rhonchi or rales.   Skin:         Neurological:      General: No focal deficit present.      Mental Status: She is alert. Mental status is at baseline.   Psychiatric:         Mood and Affect: Mood normal.         Behavior: Behavior normal.        Result Review :                   Assessment and Plan   Diagnoses and all orders for this visit:    1. Fatigue, unspecified type (Primary)  -     Anemia Profile B ([facility])    2. Nausea    3. Lesion of neck  -     Ambulatory Referral to Dermatology    4. Primary hypertension  -     Comprehensive metabolic panel    5. Hyperlipidemia, unspecified hyperlipidemia type  -     Comprehensive metabolic panel  -     Lipid panel    6. Acquired hypothyroidism  -     TSH  -     T4, free    7. Hypokalemia    8. Iron deficiency anemia, unspecified iron deficiency anemia type  -     Anemia Profile B ([facility])    9. Peripheral neuropathy due to chemotherapy    10. Benign paroxysmal positional vertigo of right ear    11. Vitamin D deficiency  -     Vitamin D,25-Hydroxy    12. History of alcohol abuse  -     Vitamin B12; Future  -     Vitamin B1, Whole Blood; Future    13. Need for influenza vaccination  -     Fluzone High-Dose 65+yrs ([id number])    Fatigue/iron deficiency anemia history: There may be a component of COVID that is still involved in her fatigue, but concerned that it is likely more related to her chronic anemia.  No lab work for over 2 months.  We will recheck anemia labs.    Nausea: Continue current Zofran, Phenergan, Protonix.    Neck lesion: Lesion is concerning for large BCC.  Urgent referral to dermatology.    HTN/HLD/hypothyroidism/hypokalemia: Continue current propranolol,  HCTZ, statin, Synthroid.  Recheck labs.    Peripheral neuropathy: Reports well managed with gabapentin but it makes her sleepy.  Continuing.    BPPV: Overall improved but continues to have occasional symptoms.  She reports physical therapist indicated she could return at any time for additional treatments.  Informed patient she can contact us if she becomes symptomatic again and we can work with her on management.    History of alcohol abuse and vitamin D deficiency: Check B1, B6, B12, vitamin D levels.         Follow Up   Return in about 4 months (around 1/30/2024) for Medicare Wellness.  Patient was given instructions and counseling regarding her condition or for health maintenance advice. Please see specific information pulled into the AVS if appropriate.

## 2023-10-10 LAB
25(OH)D3+25(OH)D2 SERPL-MCNC: 52.4 NG/ML (ref 30–100)
ALBUMIN SERPL-MCNC: 4.4 G/DL (ref 3.5–5.2)
ALBUMIN/GLOB SERPL: 1.6 G/DL
ALP SERPL-CCNC: 61 U/L (ref 39–117)
ALT SERPL-CCNC: 16 U/L (ref 1–33)
AST SERPL-CCNC: 25 U/L (ref 1–32)
BILIRUB SERPL-MCNC: 0.6 MG/DL (ref 0–1.2)
BUN SERPL-MCNC: 13 MG/DL (ref 8–23)
BUN/CREAT SERPL: 16.5 (ref 7–25)
CALCIUM SERPL-MCNC: 10 MG/DL (ref 8.6–10.5)
CHLORIDE SERPL-SCNC: 100 MMOL/L (ref 98–107)
CHOLEST SERPL-MCNC: 126 MG/DL (ref 0–200)
CO2 SERPL-SCNC: 30 MMOL/L (ref 22–29)
CREAT SERPL-MCNC: 0.79 MG/DL (ref 0.57–1)
DIFFERENTIAL COMMENT: ABNORMAL
EGFRCR SERPLBLD CKD-EPI 2021: 74.8 ML/MIN/1.73
EOSINOPHIL # BLD MANUAL: 0.12 10*3/MM3 (ref 0–0.4)
EOSINOPHIL NFR BLD MANUAL: 2 % (ref 0.3–6.2)
ERYTHROCYTE [DISTWIDTH] IN BLOOD BY AUTOMATED COUNT: 16.9 % (ref 12.3–15.4)
FERRITIN SERPL-MCNC: 110 NG/ML (ref 13–150)
FOLATE SERPL-MCNC: 11.1 NG/ML (ref 4.78–24.2)
GLOBULIN SER CALC-MCNC: 2.7 GM/DL
GLUCOSE SERPL-MCNC: 97 MG/DL (ref 65–99)
HCT VFR BLD AUTO: 31.5 % (ref 34–46.6)
HDLC SERPL-MCNC: 35 MG/DL (ref 40–60)
HGB BLD-MCNC: 10.8 G/DL (ref 12–15.9)
IRON SATN MFR SERPL: 23 % (ref 20–50)
IRON SERPL-MCNC: 73 MCG/DL (ref 37–145)
LDLC SERPL CALC-MCNC: 69 MG/DL (ref 0–100)
LYMPHOCYTES # BLD MANUAL: 1.74 10*3/MM3 (ref 0.7–3.1)
LYMPHOCYTES NFR BLD MANUAL: 29.6 % (ref 19.6–45.3)
MCH RBC QN AUTO: 30.8 PG (ref 26.6–33)
MCHC RBC AUTO-ENTMCNC: 34.3 G/DL (ref 31.5–35.7)
MCV RBC AUTO: 89.7 FL (ref 79–97)
MONOCYTES # BLD MANUAL: 1.2 10*3/MM3 (ref 0.1–0.9)
MONOCYTES NFR BLD MANUAL: 20.4 % (ref 5–12)
NEUTROPHILS # BLD MANUAL: 2.82 10*3/MM3 (ref 1.7–7)
NEUTROPHILS NFR BLD MANUAL: 48 % (ref 42.7–76)
PLATELET # BLD AUTO: 60 10*3/MM3 (ref 140–450)
PLATELET BLD QL SMEAR: ABNORMAL
POTASSIUM SERPL-SCNC: 3.9 MMOL/L (ref 3.5–5.2)
PROT SERPL-MCNC: 7.1 G/DL (ref 6–8.5)
RBC # BLD AUTO: 3.51 10*6/MM3 (ref 3.77–5.28)
RBC MORPH BLD: ABNORMAL
RETICS/RBC NFR AUTO: 3.07 % (ref 0.7–1.9)
SODIUM SERPL-SCNC: 141 MMOL/L (ref 136–145)
T4 FREE SERPL-MCNC: 1.6 NG/DL (ref 0.93–1.7)
TIBC SERPL-MCNC: 322 MCG/DL
TRIGL SERPL-MCNC: 121 MG/DL (ref 0–150)
TSH SERPL DL<=0.005 MIU/L-ACNC: 2.92 UIU/ML (ref 0.27–4.2)
UIBC SERPL-MCNC: 249 MCG/DL (ref 112–346)
VIT B12 SERPL-MCNC: 1149 PG/ML (ref 211–946)
VLDLC SERPL CALC-MCNC: 22 MG/DL (ref 5–40)
WBC # BLD AUTO: 5.87 10*3/MM3 (ref 3.4–10.8)

## 2023-10-16 NOTE — PROGRESS NOTES
REASON FOR FOLLOWUP: Patient feeling well, no additional bleeding issues, normal performance status      1. Newly diagnosed large right breast cancer.  Core needle biopsy reported invasive mammary carcinoma with focal lobular feature, grade 2.  ER negative, less than 1%; SC positive, moderate in staining, 5% to 10%. HER2 IHC 2+, FISH study positive 2.1.   2.  CT scan for chest abdomen and pelvis and breast MRI examination reported right axillary lymph node suspicious for metastatic disease.  No remote metastatic lesion.  Patient has stage IIIa (T3 N2 M0) disease.   3.  Patient was started neoadjuvant chemotherapy on 5/23/2017 with Taxol weekly plus Herceptin weekly for total 12 weeks, and Perjata every 3 weeks during chemotherapy.  Herceptin will be converted to every 3 weeks after chemotherapy finished.    4.  Chronic moderate thrombocytopenia, mild anemia, and intermittent mild neutropenia etiologies are not clear.  5.  Reaction to Herceptin C1D1.  Subsequently tolerated therapy with hydrocortisone as premedication.  6.  Potential cardiac strain developing, neuropathic symptoms persist, follow-up MRI and surgical assessment will be needed post initial chemotherapeutic phase  7.  MRI August 17 with interval resolution of abnormal enhancement within breast and right axillary adenopathy, surgery scheduled November 7, Herceptin ongoing every 3 weeks  8.  Patient seen in office November 28, status post surgery with apparent complete response, Herceptin continued  9.  Herceptin given December 19, subsequent injury in [place] leading to prolonged stay, single treatment given through additional cancer Center  10.  Patient seen May 01, 2018, no further Herceptin planned, baseline scans pursued posttreatment, post influenza syndrome, physical therapy planned  11.  Patient seen June 13, 2018, excellent performance status, improving on physical therapy, equivocal CT  per thoracic adenopathy, follow-up PET/CT planned   12.   PET/CT with improvement,?  Thyroid abnormality with ultrasound planned  13.  Patient seen October 3, partial thyroidectomy anticipated October 14-    14.  Patient seen May 14, 2019, scans negative for evidence of recurrence     15.  Patient seen 3/29/2019 clinically stable  16.  Patient reviewed July 7, ongoing GERD symptoms, GI referral planned.  17.  Hospitalization September 28-October 1-acute colitis due to enteropathic E. coli-residual thrombocytopenia  18.  Subsequent testing consistent with chronic ITP                                                                                                                                                                                                                               HISTORY OF PRESENT ILLNESS:    The patient is a pleasant 82 y.o. with the above-mentioned history, who presents today in anticipation of cycle 4 of Herceptin, Perjeta and Taxol.  This is the first visit with this physician involving this patient's case.  We have reviewed her status today with her slowly developing neuropathic symptoms in her lower extremities but also involving her fingers recognized significantly and she plays the piano and keyboard.  This is becoming harder to do but she is still able to do so.  She also notices neuropathy in her feet but is able to walk and function in daily activities.  Additional to this is her continued grieving at the death of her  though she, fortunately, has an excellent support system.  She continues to experience nausea that is well relieved with Compazine. She denies oral mucositis.  No diarrhea no constipation no chest pain no dyspnea.  No lower extremity edema.    She did received 2 units of PRBC's on   7/8/2017 and remains hematologically stable.   She does have difficulty with sleep and Ambien 10 mg daily at bedtime seems help much better compared to 5 mg dose.  She does not need refills today.  In reviewing her case July 26 she  is entering the fourth cycle of her treatment and recent echocardiogram is reviewed with she and her close friend revealing EF of 66.7% though with global strain of -16%?  Suspicious for myopathic process.  Normal ventricular cavity size and wall thickness as well as contractility.  We have also discussed that she is responding to therapy and will need surgical assessment which may not as yet have been performed.  She has seen Dr. Melo for port placement and will ask him to review her likely just after she has completed his fourth cycle of therapy.  It is also apparent that she'll need a cardiac assessment as we continue subsequent Herceptin therapy perioperatively.      The patient underwent MRI of the breasts August 17 demonstrating interval resolution of abnormal enhancement within the right breast, resolution of right axillary adenopathy had also resolved.  The findings were consistent with a complete response to neoadjuvant chemotherapy.  The patient was seen in subsequent follow-up with Dr. Melo and was determined to proceed with surgery and ultimately sentinel node evaluation.  This is now scheduled November 7 and there are additional plans to consider radiation therapy postoperatively and we have also discussed the use of anti-hormonal therapy considering her mildly positive VA status.    The patient was able to proceed to surgery undergoing right breast mastectomy and sentinel lymph node biopsy November 07, 2017.  She did well postoperatively seeing Dr. Melo November 16.  Pathology revealed no residual malignancy in the breast and 3 negative sentinel lymph nodes.  A formal review of her report reveals treatment effect particularly in her largest lymph node with focal fibrosis and scar, right breast mastectomy fibrosis associated with a cavitary biopsy site and no invasive or in situ carcinoma.  The patient is now seen in our office though she went to the wrong location and the seen late in the day.   We discussed her findings and agree that she would continue Herceptin at this point but be reviewed formally again in 3 weeks.  She is also be seen by radiation therapy for their input.   The patient, evidently, was seen by radiation therapy but decided not to pursue it until her last Herceptin therapy here December 19.  Following this she visited her daughter in [place] and, unfortunately, developed influenza and pneumonia.  She had a prolonged hospitalization and was at many sites in recovery over a several month only to return to [place] in the last several weeks.  She did take 1 IV Herceptin dose while in [place] but as she is reviewed May 05, 2018 we have discussed that it makes no particular sense to continue or add on to her previous history by extending Herceptin any further 3-4 months after her last treatment.  She therefore has completed Herceptin.    The patient on to be tested post her treatment again baseline studies and CT of chest and pelvis were performed June 6 revealing interval increase in a few subcentimeter nodes in the left pectoral, axillary and mediastinal regions. These are all considerably small and of uncertain significance.  The patient's performance status remains excellent without any reduction and, in fact, has improved with physical therapy upon return.       Patient is next seen August 08, 2018, fortunately feeling well.  A follow-up PET/CT had revealed an interval decrease in the subcentimeter nodes in the left subpectoral axillary region as well as mediastinum.  There was no hypermetabolic lymphadenopathy.  There was intense focus within slightly enlarged right thyroid gland.  Patient indicates she never had any thyroid issues of which she is aware.  The patient was referred to ENT for assessment and the patient was seen by Dr. Fofana.  Ultimately a subtotal thyroidectomy is anticipated and now scheduled October 4.  The patient is willing to proceed.        The patient  proceeded to a right thyroid lobectomy performed November 04, 2018.  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  There was no definitive evidence of trans-capsular or vascular invasion.    The patient is seen in follow-up November 28 doing well and we discussed the surgical treatment of this thyroid lesion.  She feels that all this went well.  She has additional cataract surgery at the end of November  scheduled also.  The patient did complete her testing and was asked to return approximately 6 months later with a follow-up CT chest, abdomen and pelvis that demonstrate no evidence of recurrent disease.  As she is seen back May 14 she, unfortunately, fell and contused her face, left knee and left hand.  This required an ER visit.  Is elected to follow her back in 6 months maintaining her port in the interval and she is seen October 29 again without indications of recurrent disease and relatively stable clinically.  She is seeing plastic surgery about reconstruction and otherwise continue her current medication list and following with her primary care regularly.  The patient is next seen July 7, 2020 indicating that she did not proceed to any  plastic surgery and with the COVID-19 epidemic she does not wish to have any elective procedures.  She has, however, having significant GI reflux symptoms that have worsened substantially last several months despite PPI therapy.    The patient had follow-up for meningioma September 11, 2020 unchanged from previous.        The patient is next reviewed October 27, 2020 having been hospitalized September 28 through October 1, 2020.  She had presented with fever and flank pain and was found to have acute colitis due to enteropathogenic E. coli.  Antibiotics were avoided secondary to history of C. difficile colitis and fortunately she did not want to improve albeit slowly.  She had evidence of acute on chronic thrombocytopenia with a platelet count dropping  to close to 30,000 and slowly rebounding assessed October 6 at 64,000 and October 13 at 67,000.  Fortunately she is feeling considerably better with her bowel function normalizing.  We had the patient return for ongoing testing documenting continued thrombocytopenia and IPF at 9.5 818 October 2020.  She seen back April 13, 2021 she has further thrombocytopenia with peripheral smear showing enlarged platelets.  Is felt this consistent with chronic ITP.  The patient was treated with prednisone 8.5 mg/kg and able to gradually taper last been seen through July 14, 2020 having dropped to 5 mg.  She discontinued the medication tested 7/21/2021 with H&H of 10.6 and 32.1, white count of 8070 and platelet count of 67,000.  She is next seen 7/27/2021.  She has been able to taper off of steroids altogether but recently injured her lower extremities on the table and another physician's office and was treated for potential cellulitis developing.  This included antibiotic treatment-ciprofloxacin-producing nausea which has been difficult to recover from.  She is now completed antibiotic therapy altogether approximately 2 days ago.  The patient was able to maintain off of steroids and returns for follow-up with her platelet count remaining in the 50-60,000 range consistently associated with elevated IPF.  She has had no issues with additional hemorrhage fortunately.  She is seen 10/19/21 with an excellent performance status and again stable.    The patient returned for reassessment and is next evaluated 4/19/2022 with H&H of 10.9 and 32.5 with white count of 5760, platelet count of 78,000, ANC of 1820, platelet count of 78,000 and IPF of 12.0.  She is feeling well without any additional medical issues.    We had the patient return for port maintenance and reassessment in 6 months.  She is reviewed 11/8/2022 stable clinically.    The patient is next reviewed 5/2/2023.  Clinically she has done quite well and, in fact, indicates that  she has had a subsequent COVID-19 vaccination that was given (according to records) 4/26/2023.  She had no complications but is noted to have a slightly reduced platelet count today in the setting of known chronic ITP.       We asked the patient return for follow-up and she is evaluated 10/17/2023.  She has a primary care follow-up with worsening fatigue anemia profile was otherwise unremarkable 10/9/2023.  Platelet count of 60,000.  The patient has many other questions including concerning upper GI symptoms and the need for possible endoscopy, the skin tag that needs to be removed from the lower neck, upper chest and the PowerPort is no longer functional.  A general surgery consultation will be requested.      Past Medical History:   Diagnosis Date    Alcoholism 1978    I haven't had a drink since then    Anemia     Breast cancer 05/03/2017    Right breast invasive mammary carcinoma with focal lobular features, grade 2, ER negative, less than 1% AL positive, HER-2 positive, stage IIIa disease (T3, N2, M0    Breast cancer 10/20/2004    Left breast ductal carcinoma in-situ, predominately intermediate grade with focal comedonecrosis (high grade), solid and bribridform patterns involving approximately five core biopsy fragments    Edema     Chronic lower extremity edema    GERD (gastroesophageal reflux disease) 09/13/2011    Dr. Paul Negron    GI (gastrointestinal bleed) 1997    H/O jaundice     Hernia     INCISONAL. AROUND LEFT TRAM LAP SITE    History of chemotherapy     2017 4 MONTHS IN 2017    History of Clostridium difficile colitis     JAN 2018    History of histoplasmosis     History of infectious mononucleosis 1960    History of migraine headaches     History of pneumonia 12/27/2017    AS RESULT OF FLU. ENDED UP ON VENT IN [place]    History of thrombocytopenia     History of transfusion 1997    History of vertigo     Hx of colonic polyps 06/11/2009    Dr. Giles    Hyperlipidemia     Hypertension      Meningioma     Neuropathy     HANDS AND FEET    Osteoarthritis 09/13/2011    Dr. Jamil Miller    Peptic ulceration     Retina disorder     BLEED. FROM HISTOPLASMOSIS    SBO (small bowel obstruction)     due to hernia with surgical repair in 09/2011    SOB (shortness of breath) on exertion     Thyroid mass     RIGHT   Normal echocardiogram on 5/18/2017, LVEF 68%.    Past Surgical History:   Procedure Laterality Date    APPENDECTOMY N/A 1960    BLADDER REPAIR N/A 1980    BREAST BIOPSY Left 10/20/2004    Mammotome incisional biopsy left breast, surgical specimen, left breast, localization clip placement, left breast, confirmatory diagnostic unilateral mammogram, left breast-Dr. Tyrese Alcala, [facility]    BREAST BIOPSY Right 05/03/2017    PATH: INVASIVE MAMMARY CARCINOMA    CHOLECYSTECTOMY N/A 1990    COLONOSCOPY N/A 06/11/2009    Hemorrhoids, otherwise normal, repeat in 5 years-Dr. Noemí Purcell    EYE SURGERY Right 2017    laser surgery for blood clot    HYSTERECTOMY Bilateral 1980    INGUINAL HERNIA REPAIR Right     DR ALESIA GAXIOLA    MASTECTOMY Left 2004    Left breast mastecotmy with sentinel lymph node biospy-[facility]    MASTECTOMY W/ SENTINEL NODE BIOPSY Right 11/7/2017    Procedure: RIGHT BREAST MASTECTOMY WITH SENTINEL NODE BIOPSY;  Surgeon: Christian Melo MD;  Location: [facility] OR;  Service:     OK INSJ TUNNELED CVC W/O SUBQ PORT/ AGE 5 YR/> Left 5/22/2017    Procedure: INSERTION VENOUS ACCESS DEVICE;  Surgeon: Christian Melo MD;  Location: [facility] OR;  Service: General    REDUCTION MAMMAPLASTY Right     to match Lt. TRAM flap    SMALL INTESTINE SURGERY      INCARCRATED HERNIA    THYROIDECTOMY Right 10/4/2018    Procedure: RT THYROID LOBECTOMY;  Surgeon: Julián Fofana MD;  Location: [facility];  Service: ENT    TONSILLECTOMY Bilateral     TRAM FLAP DELAYED Left     WITH MASTOPEXY    UPPER GASTROINTESTINAL ENDOSCOPY N/A 09/12/2011    LA grade A esophagitis with no bleeding, large hiatus  hernia (50cm), gastric ulcer-Dr.Laszlo Lezama    US GUIDED FINE NEEDLE ASPIRATION  2018   Lico catheter placement on 2017 by Dr. Melo.     OB/GYN history: Menarche age 16, menopause at 1985. , 1 miscarriage. No birth control pill use. She did have post menopause hormonal supplementation.      HEMATOLOGIC/ONCOLOGIC HISTORY: The patient is a 81y.o. year old female whom we are consulted for a newly self discovered right breast mass, in 2017. Patient had ultrasound-guided biopsy on 5/3/2017 and confirmed to be invasive mammary carcinoma with focal lobular features, grade 2 Panama score 6/9. She is here for initial evaluation for management.      Patient is a 76-year-old  female who was seen here previously for chronic mild-to-moderate thrombocytopenia and mild anemia. Recently the patient reports she started having firmness of the right breast that started sometime in April or maybe even in March. She noticed firmness spread from about around the 12 o'clock position, gradually towards the upper lateral side and lower part of the right breast associated mild pain. The patient thought it was related to fibrocystic changes. However, the symptom was getting worse. Patient also reported dented nipple after she started having pain in the right breast. She denies discharge from the nipple. She called her primary care physician, Dr. Martin, who ordered a mammogram study. This was done on 2017 with architectural distortion of the right breast centrally at 12 o'clock position and had scattered fibroglandular densities throughout the right breast.      The patient subsequently had right breast ultrasound examination on 2017. Discovered a large right breast mass measuring 5.8 x 3.5 x 3.9 cm. Patient subsequently had ultrasound-guided right breast biopsy on 2017. Pathology evaluation reported invasive mammary carcinoma with focal lobular feature. [place] score 6/9, overall  grade 2. Sample was further sent to the [facility] for test. ER negative, less than 1%; RI positive, moderate in staining, 5% to 10%. HER2 IHC 2+, but FISH study positive 2.1.     She denies weight loss, she actually eats very well. No nausea vomiting. Patient does complain of insomnia, unable to sleep.      This patient has history of left breast DCIS back in 2007, had mastectomy at the [facility]. No hormonal therapy afterwards according to patient. This patient also had a small meningioma, followed by Dr. Smith in [facility], with most recent MRI of the brain in March 2017, with 18 mm meningioma, and followed on an annual basis.     Her neutrophil count on 5/16/17 is normal at 2500, however, records showed starting from 05/2015 and in 09/2016, 01/2017 and 03/2017, all 4 laboratory studies showed mild neutropenia with ANC fluctuating between 1200 and 1600. Etiology is not clear.    Normal echocardiogram on 5/18/2017, LVEF 68%.      CT scan for chest abdomen and pelvis on 5/22/2017 and breast MRI examination on 5/22/2017 reported small right axillary lymph node suspicious for metastatic disease.  No remote metastatic lesion.  Patient has stage IIIa (T3 N2 M0) disease.      Patient will start neoadjuvant chemotherapy with Taxol weekly plus Herceptin weekly for total 12 weeks, and Perjeta every 3 weeks (3-week cycle) during chemotherapy.  Herceptin will be converted to every 3 weeks after chemotherapy finished.  Cycle 1 day 1 5/23/2017.   Status post neoadjuvant chemotherapy the patient was assessed with MRI showing a complete neoadjuvant response.  The patient was reviewed for surgery and questions concerning lymph node dissection-sentinel node evaluation-and need for radiation therapy postop were considered as well as use of additional Herceptin for the balance of the year as well as additional use of anti-hormonal therapy.  Surgery was scheduled November  07, 2017.  Patient next seen in office November 28 having undergone surgery on November 7 with results revealing no residual malignancy in either breast or associated sentinel lymph nodes.  Was elected to continue Herceptin when seen back in office November 20 having initiated Herceptin May 23, 2017.    Patient continued Herceptin with her last treatment given December 19, 2017.     Unfortunately she later experienced an accident while in [place] December 28 with prolonged hospitalization and prolonged ventilator management required.  Apparently she had at least one IV Herceptin therapy given while there and we were contacted March 20, 2018- Dr. Nuno.  Eventually the patient was able to return to [place] is seen back in office May 5 at which time we concluded that she completed Herceptin therapy as result of being off treatment for so long.  Plans were made for baseline scans.  Patient follow-up reexaminations May 8, 2019 with no evidence of recurrent malignancy.  This was again a circumstance when she was assessed October 29, 2019.  Patient seen July 7, 2020 without evidence of recurrent malignancy.  Recurrent GI symptoms noted with GI referral planned.  Patient hospitalized September 28-October 1, 2020 with enteropathic E. coli, acute on chronic thrombocytopenia.     Subsequent testing felt consistent with chronic ITP and trial of steroids initiated April 14, 2021.  She is able to taper off of prednisone altogether by mid July 2021.  Patient seen back in office 10/19/21 stable off steroids.  Her subsequent assessments were consistent with chronic ITP without intervention required.      MEDICATIONS: The current medication list was reviewed with the patient and updated in the EMR this date per the Medical Assistant. Medication dosages and frequencies were confirmed to be accurate.       ALLERGIES:    Allergies   Allergen Reactions    Codeine Nausea And Vomiting    Morphine Nausea And Vomiting     SOCIAL HISTORY:    Social History     Tobacco Use    Smoking status: Former     Packs/day: 2.00     Years: 30.00     Additional pack years: 0.00     Total pack years: 60.00     Types: Cigarettes     Start date: 1957     Quit date: 4/10/1987     Years since quittin.5    Smokeless tobacco: Never    Tobacco comments:     I haven't had a cigarette in over 30 years   Vaping Use    Vaping Use: Never used   Substance Use Topics    Alcohol use: No     Comment: stopped, heavy in past    Drug use: No     FAMILY HISTORY:  Family History   Problem Relation Age of Onset    Heart disease Mother     Aortic aneurysm Mother         abdominal    Coronary artery disease Mother     Hypertension Mother     Miscarriages / Stillbirths Mother     Diverticulitis Mother     Heart disease Father     Heart attack Father         acute    Hypertension Father     Early death Father     Hearing loss Father     Kidney disease Father     Breast cancer Daughter 45    Heart disease Brother     Hypertension Brother     Malig Hyperthermia Neg Hx      I have reviewed the patient's medical history in detail and updated the computerized patient record.    REVIEW OF SYSTEMS:  GENERAL: Normal performance status, no change in appetite or weight;   No fevers, chills, sweats.   SKIN: Mild contusions anterior lower extremities  HEME/LYMPH: See HPI.   EYES: No vision changes or diplopia.   ENT: No tinnitus, hearing loss, gum bleeding, epistaxis, hoarseness or dysphagia.   RESPIRATORY: No cough, Has exertional dyspnea, No hemoptysis or wheezing.   CVS: No chest pain, palpitations, orthopnea, dyspnea on exertion or PND.   GI: Worsening reflux symptoms  : No lower tract obstructive symptoms, dysuria or hematuria.   MUSCULOSKELETAL: Chronic or joint pain, arthritis.  Has mild intermittent ankle swelling.   NEUROLOGICAL: See HPI  PSYCHIATRIC: See HPI     Objective:   Vitals:    10/17/23 1433   BP: 129/62   Pulse: 68   Resp: 17   Temp: 96.8 °F (36 °C)   TempSrc: Temporal  "  SpO2: 96%   Weight: 62.6 kg (138 lb)   Height: 154.9 cm (60.98\")   PainSc: 0-No pain     ECOG 0      PHYSICAL EXAM:   GENERAL: Well-developed, well-nourished female, in no acute distress.   SKIN: Warm, dry without rashes, purpura or petechiae.  Left upper chest Lico catheter in place, no evidence of infection.  Skin tag lower neck anteriorly.  EYES: Pupils equal, round and reactive to light. EOMs intact. Conjunctivae normal.  EARS: Hearing intact.  NOSE:  No excoriations or nasal discharge.  MOUTH: Tongue is well-papillated; no stomatitis or ulcers. Lips normal.  THROAT: Oropharynx without lesions or exudates.  Status post partial thyroidectomy well-healing  LYMPHATICS: No cervical, supraclavicular, axillary adenopathy.  CHEST: Lungs clear to auscultation. Good airflow.   BREAST:Postop, Well healed right mastectomy site with no evidence of recurrent disease, no axillary adenopathy bilaterally.  CARDIAC: Regular rate and rhythm without murmurs. Normal S1,S2.  ABDOMEN: Slightly distended, normal active bowel sounds,  no hepatosplenomegaly or masses.  EXTREMITIES: Areas of contusion/small lacerations anterior lower extremities healing without additional inflammation.  NEUROLOGICAL: Cranial Nerves II-XII grossly intact. No focal neurological deficits.  PSYCHIATRIC: Alert and oriented      RECENT LABS:  Lab Results   Component Value Date    WBC 5.91 10/17/2023    HGB 9.9 (L) 10/17/2023    HCT 31.1 (L) 10/17/2023    MCV 90.9 10/17/2023    PLT 73 (L) 10/17/2023    PLT 73 (L) 10/17/2023     Lab Results   Component Value Date    NEUTROABS 2.82 10/09/2023     Lab Results   Component Value Date    GLUCOSE 97 10/09/2023    BUN 13 10/09/2023    CREATININE 0.79 10/09/2023    EGFRIFNONA 70 12/20/2021    EGFRIFAFRI 81 12/20/2021    BCR 16.5 10/09/2023    K 3.9 10/09/2023    CO2 30.0 (H) 10/09/2023    CALCIUM 10.0 10/09/2023    PROTENTOTREF 7.1 10/09/2023    ALBUMIN 4.4 10/09/2023    LABIL2 1.6 10/09/2023    AST 25 10/09/2023    " ALT 16 10/09/2023            Assessment/Plan   1. Large right breast cancer, locally advanced, stage IIIa (T3 N1/ 2 M0).  ER negative, less than 1%; IL positive, moderate in staining, 5% to 10%. HER2 IHC 2+, FISH study positive 2.1.  Physical examination showed a large mass, 7 cm x 7 cm initially which has decreased to approximately less than 4 cm ..  Patient  proceeded through 4 cycles ceptin,Perjeta and Taxol.   Her subsequent MRI examination showed complete response by imaging including right axillary lymphadenopathy.  We have continued Herceptin and that includes today October 20, 2017.  The case was discussed with Dr. Melo and plans were to proceed with mastectomy plus right axillary lymph node assessment via sentinel node evaluation. it was felt she likely require radiation therapy post procedure.  The patient was scheduled and proceeded to surgery November 7.  Her results, wonderfully, revealed no evidence of residual disease in the breast or associated sentinel lymph nodes. She was seen by radiation therapy and offered treatment did not pursue it.  Additionally, and somewhat complicating this story, she traveled to [place] and developed severe influenza, associated pneumonia and was hospitalized for several months only to return to [place] in the last several weeks now being seen back May 1.  There is no particular benefit from trying to add Herceptin back to her therapy now and is felt that she is completed Herceptin treatment.  She wishes consider reconstruction and baseline CT scans will be requested in 5 weeks with the patient being seen in 6 weeks follow-up.The patient reviewed June 13, 2018 with the scans demonstrating very modest increase in left pectoral, axillary or mediastinal nodes.  These are of uncertain significance but do need follow-up in a PET/CT is planned in 7 weeks.  Her anemia will also be reviewed again with additional laboratory studies that visit.  She'll be seen in 8 weeks  for general reassessment.    The patient's anemia workup was essentially negative and she is slowly improving when seen back August 8.  Her PET/CT, fortunately, demonstrates improvement though there is potential abnormality within the right thyroid lobe.  We discussed thyroid function testing and follow-up thyroid ultrasound.  She will be seen back in approximately 2 months as we maintain her port monthly flushes.   The patient was reviewed by ENT and ultimately was felt that a partial thyroidectomy was in order and is now scheduled October 4.  Patient's one to proceed.  We'll plan to see her back in approximately 6 weeks.      The patient is next seen November 28, 2015.  Her surgery went well with the findings as noted above.  She has follow-up with ENT in the next several weeks.  Additionally she has bilateral cataract surgery at the end of this month and into the beginning of next.  We discussed reassessment in general with follow-up scans and these were performed May 2019 which showed no evidence of recurrent malignancy.  Six-month follow-up planes with maintenance of her port every 6 weeks.  The patient is reassessed October 29, 2019 fortunately continue to do relatively well.  She has had no additional medical issues since last seen we will plan to see her back in 6 months again meeting report.  She does plan to see plastic surgery concerning reconstruction concerning her breast cancer history.  A copy this note will be sent to the plastic surgeon.     The patient is next seen July 7, 2020 without evidence of recurrence of malignancy.  We plan 6-month follow-up.  The patient is reviewed October 27, 2020 without evidence recurrent disease, repeat scans during recent hospitalization September 20-October 1 without evidence of recurrent malignancy.  Subsequent assessments again without evidence of recurrence including reexamination 11/8/2022 and subsequently including 10/17/2023.        2.  Previous fall with  contusions now recovered.  Recent contusions lower extremities treated with antibiotic therapy producing nausea now discontinued                                                                                                                                                    3.  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  This has been treated with B vitamins including B12 and B6.  These were not given for her history of previous EtOH use.    4.  Worsening reflux symptoms, now stable    5.  Hospitalization September 20-October 1, 2020 with enteropathic E. coli.  This responded to conservative therapy and colonoscopy had been planned but we will not pursue at this point with resolution of symptoms.    6.  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  As this is assessed April 13 the patient's platelet count continues to decrease and we have discussed a trial of half milligram per kilogram prednisone-40 mg daily with weekly checks and adjustment as needed including rapid taper.     Patient tapered from prednisone through mid July 2021, stable to improved when assessed 7/27/2021.  No additional steroids planned.  Patient stable seen 10/19/21 with every 3 month assessments planned.  Subsequent conclusion felt to be consistent with chronic ITP  Patient next assessed 5/2/2023 with platelet count of 55,000, IPF pending.  This would still be adequate with no intervention necessary but we will plan follow-up.  Subsequent platelet count between 60 and 80,000 documented.    Plan:    *Every 6 week port flush though we will asked Dr. Melo see the patient for PowerPort removal, removal of a skin tag that she finds irritating involving her lower anterior neck and possibly EGD considering her GI symptoms.    *Every 6 week port flush CBC, IPF    *18 weeks NP or MD

## 2023-10-17 ENCOUNTER — INFUSION (OUTPATIENT)
Dept: ONCOLOGY | Facility: HOSPITAL | Age: 83
End: 2023-10-17
Payer: MEDICARE

## 2023-10-17 ENCOUNTER — OFFICE VISIT (OUTPATIENT)
Dept: ONCOLOGY | Facility: CLINIC | Age: 83
End: 2023-10-17
Payer: MEDICARE

## 2023-10-17 VITALS
HEART RATE: 68 BPM | RESPIRATION RATE: 17 BRPM | TEMPERATURE: 96.8 F | HEIGHT: 61 IN | WEIGHT: 138 LBS | DIASTOLIC BLOOD PRESSURE: 62 MMHG | OXYGEN SATURATION: 96 % | BODY MASS INDEX: 26.06 KG/M2 | SYSTOLIC BLOOD PRESSURE: 129 MMHG

## 2023-10-17 DIAGNOSIS — C50.911 MALIGNANT NEOPLASM OF RIGHT FEMALE BREAST, UNSPECIFIED ESTROGEN RECEPTOR STATUS, UNSPECIFIED SITE OF BREAST: Primary | ICD-10-CM

## 2023-10-17 DIAGNOSIS — D69.3 CHRONIC ITP (IDIOPATHIC THROMBOCYTOPENIA): ICD-10-CM

## 2023-10-17 DIAGNOSIS — Z45.2 ENCOUNTER FOR FITTING AND ADJUSTMENT OF VASCULAR CATHETER: Primary | ICD-10-CM

## 2023-10-17 LAB
ALBUMIN SERPL-MCNC: 4.2 G/DL (ref 3.5–5.2)
ALBUMIN/GLOB SERPL: 1.5 G/DL
ALP SERPL-CCNC: 60 U/L (ref 39–117)
ALT SERPL W P-5'-P-CCNC: 14 U/L (ref 1–33)
ANION GAP SERPL CALCULATED.3IONS-SCNC: 9.9 MMOL/L (ref 5–15)
AST SERPL-CCNC: 30 U/L (ref 1–32)
BILIRUB SERPL-MCNC: 0.4 MG/DL (ref 0–1.2)
BUN SERPL-MCNC: 12 MG/DL (ref 8–23)
BUN/CREAT SERPL: 14.6 (ref 7–25)
CALCIUM SPEC-SCNC: 9.4 MG/DL (ref 8.6–10.5)
CHLORIDE SERPL-SCNC: 101 MMOL/L (ref 98–107)
CO2 SERPL-SCNC: 29.1 MMOL/L (ref 22–29)
CREAT SERPL-MCNC: 0.82 MG/DL (ref 0.57–1)
DEPRECATED RDW RBC AUTO: 57.7 FL (ref 37–54)
EGFRCR SERPLBLD CKD-EPI 2021: 71.5 ML/MIN/1.73
EOSINOPHIL # BLD MANUAL: 0.18 10*3/MM3 (ref 0–0.4)
EOSINOPHIL NFR BLD MANUAL: 3 % (ref 0.3–6.2)
ERYTHROCYTE [DISTWIDTH] IN BLOOD BY AUTOMATED COUNT: 17.4 % (ref 12.3–15.4)
GLOBULIN UR ELPH-MCNC: 2.8 GM/DL
GLUCOSE SERPL-MCNC: 102 MG/DL (ref 65–99)
HCT VFR BLD AUTO: 31.1 % (ref 34–46.6)
HGB BLD-MCNC: 9.9 G/DL (ref 12–15.9)
LYMPHOCYTES # BLD MANUAL: 1.48 10*3/MM3 (ref 0.7–3.1)
LYMPHOCYTES NFR BLD MANUAL: 20 % (ref 5–12)
MCH RBC QN AUTO: 28.9 PG (ref 26.6–33)
MCHC RBC AUTO-ENTMCNC: 31.8 G/DL (ref 31.5–35.7)
MCV RBC AUTO: 90.9 FL (ref 79–97)
METAMYELOCYTES NFR BLD MANUAL: 3 % (ref 0–0)
MONOCYTES # BLD: 1.18 10*3/MM3 (ref 0.1–0.9)
MYELOCYTES NFR BLD MANUAL: 1 % (ref 0–0)
NEUTROPHILS # BLD AUTO: 2.84 10*3/MM3 (ref 1.7–7)
NEUTROPHILS NFR BLD MANUAL: 48 % (ref 42.7–76)
PLAT MORPH BLD: NORMAL
PLATELET # BLD AUTO: 73 10*3/MM3 (ref 140–450)
PLATELET # BLD AUTO: 73 10*3/MM3 (ref 140–450)
PLATELETS.RETICULATED NFR BLD AUTO: 9.8 % (ref 0.9–6.5)
PMV BLD AUTO: 10.9 FL (ref 6–12)
POTASSIUM SERPL-SCNC: 3.1 MMOL/L (ref 3.5–5.2)
PROT SERPL-MCNC: 7 G/DL (ref 6–8.5)
RBC # BLD AUTO: 3.42 10*6/MM3 (ref 3.77–5.28)
RBC MORPH BLD: NORMAL
SODIUM SERPL-SCNC: 140 MMOL/L (ref 136–145)
VARIANT LYMPHS NFR BLD MANUAL: 25 % (ref 19.6–45.3)
VIT B12 BLD-MCNC: 1139 PG/ML (ref 211–946)
WBC MORPH BLD: NORMAL
WBC NRBC COR # BLD: 5.91 10*3/MM3 (ref 3.4–10.8)

## 2023-10-17 PROCEDURE — 36591 DRAW BLOOD OFF VENOUS DEVICE: CPT

## 2023-10-17 PROCEDURE — 82607 VITAMIN B-12: CPT

## 2023-10-17 PROCEDURE — 25010000002 HEPARIN LOCK FLUSH PER 10 UNITS: Performed by: INTERNAL MEDICINE

## 2023-10-17 PROCEDURE — 3078F DIAST BP <80 MM HG: CPT | Performed by: INTERNAL MEDICINE

## 2023-10-17 PROCEDURE — 99214 OFFICE O/P EST MOD 30 MIN: CPT | Performed by: INTERNAL MEDICINE

## 2023-10-17 PROCEDURE — 3074F SYST BP LT 130 MM HG: CPT | Performed by: INTERNAL MEDICINE

## 2023-10-17 PROCEDURE — 85025 COMPLETE CBC W/AUTO DIFF WBC: CPT | Performed by: INTERNAL MEDICINE

## 2023-10-17 PROCEDURE — 1126F AMNT PAIN NOTED NONE PRSNT: CPT | Performed by: INTERNAL MEDICINE

## 2023-10-17 PROCEDURE — 85007 BL SMEAR W/DIFF WBC COUNT: CPT | Performed by: INTERNAL MEDICINE

## 2023-10-17 PROCEDURE — 85055 RETICULATED PLATELET ASSAY: CPT | Performed by: INTERNAL MEDICINE

## 2023-10-17 PROCEDURE — 84425 ASSAY OF VITAMIN B-1: CPT | Performed by: STUDENT IN AN ORGANIZED HEALTH CARE EDUCATION/TRAINING PROGRAM

## 2023-10-17 PROCEDURE — 80053 COMPREHEN METABOLIC PANEL: CPT | Performed by: INTERNAL MEDICINE

## 2023-10-17 RX ORDER — HEPARIN SODIUM (PORCINE) LOCK FLUSH IV SOLN 100 UNIT/ML 100 UNIT/ML
500 SOLUTION INTRAVENOUS AS NEEDED
OUTPATIENT
Start: 2023-10-17

## 2023-10-17 RX ORDER — HEPARIN SODIUM (PORCINE) LOCK FLUSH IV SOLN 100 UNIT/ML 100 UNIT/ML
500 SOLUTION INTRAVENOUS AS NEEDED
Status: DISCONTINUED | OUTPATIENT
Start: 2023-10-17 | End: 2023-10-17 | Stop reason: HOSPADM

## 2023-10-17 RX ORDER — SODIUM CHLORIDE 0.9 % (FLUSH) 0.9 %
10 SYRINGE (ML) INJECTION AS NEEDED
OUTPATIENT
Start: 2023-10-17

## 2023-10-17 RX ORDER — SODIUM CHLORIDE 0.9 % (FLUSH) 0.9 %
10 SYRINGE (ML) INJECTION AS NEEDED
Status: DISCONTINUED | OUTPATIENT
Start: 2023-10-17 | End: 2023-10-17 | Stop reason: HOSPADM

## 2023-10-17 RX ADMIN — HEPARIN 500 UNITS: 100 SYRINGE at 14:25

## 2023-10-17 RX ADMIN — Medication 10 ML: at 14:25

## 2023-10-17 NOTE — LETTER
October 17, 2023     Nicanor Madrid MD  [address]  [address]  [facility] [address]    Patient: Roxana Brizuela   YOB: 1940   Date of Visit: 10/17/2023     Dear Nicanor Madrid MD:       Thank you for referring Roxana Brizuela to me for evaluation. Below are the relevant portions of my assessment and plan of care.    If you have questions, please do not hesitate to call me. I look forward to following Roxana along with you.         Sincerely,        Oneil Coburn MD        CC: MD Irish Muñoz Michael D., MD  10/17/23 1055  Sign when Signing Visit  REASON FOR FOLLOWUP: Patient feeling well, no additional bleeding issues, normal performance status      1. Newly diagnosed large right breast cancer.  Core needle biopsy reported invasive mammary carcinoma with focal lobular feature, grade 2.  ER negative, less than 1%; W[place] positive, moderate in staining, 5% to 10%. HER2 IHC 2+, FISH study positive 2.1.   2.  CT scan for chest abdomen and pelvis and breast MRI examination reported right axillary lymph node suspicious for metastatic disease.  No remote metastatic lesion.  Patient has stage IIIa (T3 N2 M0) disease.   3.  Patient was started neoadjuvant chemotherapy on 5/23/2017 with Taxol weekly plus Herceptin weekly for total 12 weeks, and Perjata every 3 weeks during chemotherapy.  Herceptin will be converted to every 3 weeks after chemotherapy finished.    4.  Chronic moderate thrombocytopenia, mild anemia, and intermittent mild neutropenia etiologies are not clear.  5.  Reaction to Herceptin C1D1.  Subsequently tolerated therapy with hydrocortisone as premedication.  6.  Potential cardiac strain developing, neuropathic symptoms persist, follow-up MRI and surgical assessment will be needed post initial chemotherapeutic phase  7.  MRI August 17 with interval resolution of abnormal enhancement within breast and right axillary adenopathy, surgery scheduled November 7,  Herceptin ongoing every 3 weeks  8.  Patient seen in office November 28, status post surgery with apparent complete response, Herceptin continued  9.  Herceptin given December 19, subsequent injury in [place] leading to prolonged stay, single treatment given through additional cancer Center  10.  Patient seen May 01, 2018, no further Herceptin planned, baseline scans pursued posttreatment, post influenza syndrome, physical therapy planned  11.  Patient seen June 13, 2018, excellent performance status, improving on physical therapy, equivocal CT  per thoracic adenopathy, follow-up PET/CT planned   12.  PET/CT with improvement,?  Thyroid abnormality with ultrasound planned  13.  Patient seen October 3, partial thyroidectomy anticipated October 14-    14.  Patient seen May 14, 2019, scans negative for evidence of recurrence     15.  Patient seen 3/29/2019 clinically stable  16.  Patient reviewed July 7, ongoing GERD symptoms, GI referral planned.  17.  Hospitalization September 28-October 1-acute colitis due to enteropathic E. coli-residual thrombocytopenia  18.  Subsequent testing consistent with chronic ITP                                                                                                                                                                                                                               HISTORY OF PRESENT ILLNESS:    The patient is a pleasant 82 y.o. with the above-mentioned history, who presents today in anticipation of cycle 4 of Herceptin, Perjeta and Taxol.  This is the first visit with this physician involving this patient's case.  We have reviewed her status today with her slowly developing neuropathic symptoms in her lower extremities but also involving her fingers recognized significantly and she plays the piano and keyboard.  This is becoming harder to do but she is still able to do so.  She also notices neuropathy in her feet but is able to walk and function  in daily activities.  Additional to this is her continued grieving at the death of her  though she, fortunately, has an excellent support system.  She continues to experience nausea that is well relieved with Compazine. She denies oral mucositis.  No diarrhea no constipation no chest pain no dyspnea.  No lower extremity edema.    She did received 2 units of PRBC's on   7/8/2017 and remains hematologically stable.   She does have difficulty with sleep and Ambien 10 mg daily at bedtime seems help much better compared to 5 mg dose.  She does not need refills today.  In reviewing her case July 26 she is entering the fourth cycle of her treatment and recent echocardiogram is reviewed with she and her close friend revealing EF of 66.7% though with global strain of -16%?  Suspicious for myopathic process.  Normal ventricular cavity size and wall thickness as well as contractility.  We have also discussed that she is responding to therapy and will need surgical assessment which may not as yet have been performed.  She has seen Dr. Melo for port placement and will ask him to review her likely just after she has completed his fourth cycle of therapy.  It is also apparent that she'll need a cardiac assessment as we continue subsequent Herceptin therapy perioperatively.      The patient underwent MRI of the breasts August 17 demonstrating interval resolution of abnormal enhancement within the right breast, resolution of right axillary adenopathy had also resolved.  The findings were consistent with a complete response to neoadjuvant chemotherapy.  The patient was seen in subsequent follow-up with Dr. Melo and was determined to proceed with surgery and ultimately sentinel node evaluation.  This is now scheduled November 7 and there are additional plans to consider radiation therapy postoperatively and we have also discussed the use of anti-hormonal therapy considering her mildly positive AR status.    The patient  was able to proceed to surgery undergoing right breast mastectomy and sentinel lymph node biopsy November 07, 2017.  She did well postoperatively seeing Dr. Melo November 16.  Pathology revealed no residual malignancy in the breast and 3 negative sentinel lymph nodes.  A formal review of her report reveals treatment effect particularly in her largest lymph node with focal fibrosis and scar, right breast mastectomy fibrosis associated with a cavitary biopsy site and no invasive or in situ carcinoma.  The patient is now seen in our office though she went to the wrong location and the seen late in the day.  We discussed her findings and agree that she would continue Herceptin at this point but be reviewed formally again in 3 weeks.  She is also be seen by radiation therapy for their input.   The patient, evidently, was seen by radiation therapy but decided not to pursue it until her last Herceptin therapy here December 19.  Following this she visited her daughter in [place] and, unfortunately, developed influenza and pneumonia.  She had a prolonged hospitalization and was at many sites in recovery over a several month only to return to [place] in the last several weeks.  She did take 1 IV Herceptin dose while in [place] but as she is reviewed May 05, 2018 we have discussed that it makes no particular sense to continue or add on to her previous history by extending Herceptin any further 3-4 months after her last treatment.  She therefore has completed Herceptin.    The patient on to be tested post her treatment again baseline studies and CT of chest and pelvis were performed June 6 revealing interval increase in a few subcentimeter nodes in the left pectoral, axillary and mediastinal regions. These are all considerably small and of uncertain significance.  The patient's performance status remains excellent without any reduction and, in fact, has improved with physical therapy upon return.       Patient is  next seen August 08, 2018, fortunately feeling well.  A follow-up PET/CT had revealed an interval decrease in the subcentimeter nodes in the left subpectoral axillary region as well as mediastinum.  There was no hypermetabolic lymphadenopathy.  There was intense focus within slightly enlarged right thyroid gland.  Patient indicates she never had any thyroid issues of which she is aware.  The patient was referred to ENT for assessment and the patient was seen by Dr. Fofana.  Ultimately a subtotal thyroidectomy is anticipated and now scheduled October 4.  The patient is willing to proceed.        The patient proceeded to a right thyroid lobectomy performed November 04, 2018.  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  There was no definitive evidence of trans-capsular or vascular invasion.    The patient is seen in follow-up November 28 doing well and we discussed the surgical treatment of this thyroid lesion.  She feels that all this went well.  She has additional cataract surgery at the end of November  scheduled also.  The patient did complete her testing and was asked to return approximately 6 months later with a follow-up CT chest, abdomen and pelvis that demonstrate no evidence of recurrent disease.  As she is seen back May 14 she, unfortunately, fell and contused her face, left knee and left hand.  This required an ER visit.  Is elected to follow her back in 6 months maintaining her port in the interval and she is seen October 29 again without indications of recurrent disease and relatively stable clinically.  She is seeing plastic surgery about reconstruction and otherwise continue her current medication list and following with her primary care regularly.  The patient is next seen July 7, 2020 indicating that she did not proceed to any  plastic surgery and with the COVID-19 epidemic she does not wish to have any elective procedures.  She has, however, having significant GI reflux  symptoms that have worsened substantially last several months despite PPI therapy.    The patient had follow-up for meningioma September 11, 2020 unchanged from previous.        The patient is next reviewed October 27, 2020 having been hospitalized September 28 through October 1, 2020.  She had presented with fever and flank pain and was found to have acute colitis due to enteropathogenic E. coli.  Antibiotics were avoided secondary to history of C. difficile colitis and fortunately she did not want to improve albeit slowly.  She had evidence of acute on chronic thrombocytopenia with a platelet count dropping to close to 30,000 and slowly rebounding assessed October 6 at 64,000 and October 13 at 67,000.  Fortunately she is feeling considerably better with her bowel function normalizing.  We had the patient return for ongoing testing documenting continued thrombocytopenia and IPF at 9.5 818 October 2020.  She seen back April 13, 2021 she has further thrombocytopenia with peripheral smear showing enlarged platelets.  Is felt this consistent with chronic ITP.  The patient was treated with prednisone 8.5 mg/kg and able to gradually taper last been seen through July 14, 2020 having dropped to 5 mg.  She discontinued the medication tested 7/21/2021 with H&H of 10.6 and 32.1, white count of 8070 and platelet count of 67,000.  She is next seen 7/27/2021.  She has been able to taper off of steroids altogether but recently injured her lower extremities on the table and another physician's office and was treated for potential cellulitis developing.  This included antibiotic treatment-ciprofloxacin-producing nausea which has been difficult to recover from.  She is now completed antibiotic therapy altogether approximately 2 days ago.  The patient was able to maintain off of steroids and returns for follow-up with her platelet count remaining in the 50-60,000 range consistently associated with elevated IPF.  She has had no issues  with additional hemorrhage fortunately.  She is seen 10/19/21 with an excellent performance status and again stable.    The patient returned for reassessment and is next evaluated 4/19/2022 with H&H of 10.9 and 32.5 with white count of 5760, platelet count of 78,000, ANC of 1820, platelet count of 78,000 and IPF of 12.0.  She is feeling well without any additional medical issues.    We had the patient return for port maintenance and reassessment in 6 months.  She is reviewed 11/8/2022 stable clinically.    The patient is next reviewed 5/2/2023.  Clinically she has done quite well and, in fact, indicates that she has had a subsequent COVID-19 vaccination that was given (according to records) 4/26/2023.  She had no complications but is noted to have a slightly reduced platelet count today in the setting of known chronic ITP.       We asked the patient return for follow-up and she is evaluated 10/17/2023.  She has a primary care follow-up with worsening fatigue anemia profile was otherwise unremarkable 10/9/2023.  Platelet count of 60,000.  The patient has many other questions including concerning upper GI symptoms and the need for possible endoscopy, the skin tag that needs to be removed from the lower neck, upper chest and the PowerPort is no longer functional.  A general surgery consultation will be requested.      Past Medical History:   Diagnosis Date   • Alcoholism 1978    I haven't had a drink since then   • Anemia    • Breast cancer 05/03/2017    Right breast invasive mammary carcinoma with focal lobular features, grade 2, ER negative, less than 1% NE positive, HER-2 positive, stage IIIa disease (T3, N2, M0   • Breast cancer 10/20/2004    Left breast ductal carcinoma in-situ, predominately intermediate grade with focal comedonecrosis (high grade), solid and bribridform patterns involving approximately five core biopsy fragments   • Edema     Chronic lower extremity edema   • GERD (gastroesophageal reflux  disease) 09/13/2011    Dr. Paul Negron   • GI (gastrointestinal bleed) 1997   • H/O jaundice    • Hernia     INCISONAL. AROUND LEFT TRAM LAP SITE   • History of chemotherapy     2017 4 MONTHS IN 2017   • History of Clostridium difficile colitis     JAN 2018   • History of histoplasmosis    • History of infectious mononucleosis 1960   • History of migraine headaches    • History of pneumonia 12/27/2017    AS RESULT OF FLU. ENDED UP ON VENT IN [place]   • History of thrombocytopenia    • History of transfusion 1997   • History of vertigo    • Hx of colonic polyps 06/11/2009    Dr. Giles   • Hyperlipidemia    • Hypertension    • Meningioma    • Neuropathy     HANDS AND FEET   • Osteoarthritis 09/13/2011    Dr. Jamil Miller   • Peptic ulceration    • Retina disorder     BLEED. FROM HISTOPLASMOSIS   • SBO (small bowel obstruction)     due to hernia with surgical repair in 09/2011   • SOB (shortness of breath) on exertion    • Thyroid mass     RIGHT   Normal echocardiogram on 5/18/2017, LVEF 68%.    Past Surgical History:   Procedure Laterality Date   • APPENDECTOMY N/A 1960   • BLADDER REPAIR N/A 1980   • BREAST BIOPSY Left 10/20/2004    Mammotome incisional biopsy left breast, surgical specimen, left breast, localization clip placement, left breast, confirmatory diagnostic unilateral mammogram, left breast-Dr. Tyrese Alcala, [facility]   • BREAST BIOPSY Right 05/03/2017    PATH: INVASIVE MAMMARY CARCINOMA   • CHOLECYSTECTOMY N/A 1990   • COLONOSCOPY N/A 06/11/2009    Hemorrhoids, otherwise normal, repeat in 5 years-Dr. Noemí Purcell   • EYE SURGERY Right 2017    laser surgery for blood clot   • HYSTERECTOMY Bilateral 1980   • INGUINAL HERNIA REPAIR Right     DR ALESIA GAXIOLA   • MASTECTOMY Left 2004    Left breast mastecotmy with sentinel lymph node bios-[facility]   • MASTECTOMY W/ SENTINEL NODE BIOPSY Right 11/7/2017    Procedure: RIGHT BREAST MASTECTOMY WITH SENTINEL NODE BIOPSY;  Surgeon: Christian Melo MD;   [facility];  Service:    • MS INSJ TUNNELED CVC W/O SUBQ PORT/ AGE 5 YR/> Left 2017    Procedure: INSERTION VENOUS ACCESS DEVICE;  Surgeon: Christian Melo MD;  Location: [facility] MAIN OR;  Service: General   • REDUCTION MAMMAPLASTY Right     to match Lt. TRAM flap   • SMALL INTESTINE SURGERY      INCARCRATED HERNIA   • THYROIDECTOMY Right 10/4/2018    Procedure: RT THYROID LOBECTOMY;  Surgeon: Julián Fofana MD;  [facility];  Service: ENT   • TONSILLECTOMY Bilateral    • TRAM FLAP DELAYED Left     WITH MASTOPEXY   • UPPER GASTROINTESTINAL ENDOSCOPY N/A 2011    LA grade A esophagitis with no bleeding, large hiatus hernia (50cm), gastric ulcer-Dr.Laszlo Lezama   • US GUIDED FINE NEEDLE ASPIRATION  2018   Lico catheter placement on 2017 by Dr. Melo.     OB/GYN history: Menarche age 16, menopause at 1985. , 1 miscarriage. No birth control pill use. She did have post menopause hormonal supplementation.      HEMATOLOGIC/ONCOLOGIC HISTORY: The patient is a 81y.o. year old female whom we are consulted for a newly self discovered right breast mass, in 2017. Patient had ultrasound-guided biopsy on 5/3/2017 and confirmed to be invasive mammary carcinoma with focal lobular features, grade 2 [place] score 6/9. She is here for initial evaluation for management.      Patient is a 76-year-old  female who was seen here previously for chronic mild-to-moderate thrombocytopenia and mild anemia. Recently the patient reports she started having firmness of the right breast that started sometime in April or maybe even in March. She noticed firmness spread from about around the 12 o'clock position, gradually towards the upper lateral side and lower part of the right breast associated mild pain. The patient thought it was related to fibrocystic changes. However, the symptom was getting worse. Patient also reported dented nipple after she started having pain in  the right breast. She denies discharge from the nipple. She called her primary care physician, Dr. Martin, who ordered a mammogram study. This was done on 04/12/2017 with architectural distortion of the right breast centrally at 12 o'clock position and had scattered fibroglandular densities throughout the right breast.      The patient subsequently had right breast ultrasound examination on 04/26/2017. Discovered a large right breast mass measuring 5.8 x 3.5 x 3.9 cm. Patient subsequently had ultrasound-guided right breast biopsy on 05/03/2017. Pathology evaluation reported invasive mammary carcinoma with focal lobular feature. [place] score 6/9, overall grade 2. Sample was further sent to the [facility] for test. ER negative, less than 1%; W[place] positive, moderate in staining, 5% to 10%. HER2 IHC 2+, but FISH study positive 2.1.     She denies weight loss, she actually eats very well. No nausea vomiting. Patient does complain of insomnia, unable to sleep.      This patient has history of left breast DCIS back in 2007, had mastectomy at the [facility]. No hormonal therapy afterwards according to patient. This patient also had a small meningioma, followed by Dr. Smith in [facility], with most recent MRI of the brain in March 2017, with 18 mm meningioma, and followed on an annual basis.     Her neutrophil count on 5/16/17 is normal at 2500, however, records showed starting from 05/2015 and in 09/2016, 01/2017 and 03/2017, all 4 laboratory studies showed mild neutropenia with ANC fluctuating between 1200 and 1600. Etiology is not clear.    Normal echocardiogram on 5/18/2017, LVEF 68%.      CT scan for chest abdomen and pelvis on 5/22/2017 and breast MRI examination on 5/22/2017 reported small right axillary lymph node suspicious for metastatic disease.  No remote metastatic lesion.  Patient has stage IIIa (T3 N2 M0) disease.      Patient will start neoadjuvant  chemotherapy with Taxol weekly plus Herceptin weekly for total 12 weeks, and Perjeta every 3 weeks (3-week cycle) during chemotherapy.  Herceptin will be converted to every 3 weeks after chemotherapy finished.  Cycle 1 day 1 5/23/2017.   Status post neoadjuvant chemotherapy the patient was assessed with MRI showing a complete neoadjuvant response.  The patient was reviewed for surgery and questions concerning lymph node dissection-sentinel node evaluation-and need for radiation therapy postop were considered as well as use of additional Herceptin for the balance of the year as well as additional use of anti-hormonal therapy.  Surgery was scheduled November 07, 2017.  Patient next seen in office November 28 having undergone surgery on November 7 with results revealing no residual malignancy in either breast or associated sentinel lymph nodes.  Was elected to continue Herceptin when seen back in office November 20 having initiated Herceptin May 23, 2017.    Patient continued Herceptin with her last treatment given December 19, 2017.     Unfortunately she later experienced an accident while in [place] December 28 with prolonged hospitalization and prolonged ventilator management required.  Apparently she had at least one IV Herceptin therapy given while there and we were contacted March 20, 2018- Dr. Nuno.  Eventually the patient was able to return to [place] is seen back in office May 5 at which time we concluded that she completed Herceptin therapy as result of being off treatment for so long.  Plans were made for baseline scans.  Patient follow-up reexaminations May 8, 2019 with no evidence of recurrent malignancy.  This was again a circumstance when she was assessed October 29, 2019.  Patient seen July 7, 2020 without evidence of recurrent malignancy.  Recurrent GI symptoms noted with GI referral planned.  Patient hospitalized September 28-October 1, 2020 with enteropathic E. coli, acute on chronic  thrombocytopenia.     Subsequent testing felt consistent with chronic ITP and trial of steroids initiated 2021.  She is able to taper off of prednisone altogether by mid 2021.  Patient seen back in office 10/19/21 stable off steroids.  Her subsequent assessments were consistent with chronic ITP without intervention required.      MEDICATIONS: The current medication list was reviewed with the patient and updated in the EMR this date per the Medical Assistant. Medication dosages and frequencies were confirmed to be accurate.       ALLERGIES:    Allergies   Allergen Reactions   • Codeine Nausea And Vomiting   • Morphine Nausea And Vomiting     SOCIAL HISTORY:   Social History     Tobacco Use   • Smoking status: Former     Packs/day: 2.00     Years: 30.00     Additional pack years: 0.00     Total pack years: 60.00     Types: Cigarettes     Start date: 1957     Quit date: 4/10/1987     Years since quittin.5   • Smokeless tobacco: Never   • Tobacco comments:     I haven't had a cigarette in over 30 years   Vaping Use   • Vaping Use: Never used   Substance Use Topics   • Alcohol use: No     Comment: stopped, heavy in past   • Drug use: No     FAMILY HISTORY:  Family History   Problem Relation Age of Onset   • Heart disease Mother    • Aortic aneurysm Mother         abdominal   • Coronary artery disease Mother    • Hypertension Mother    • Miscarriages / Stillbirths Mother    • Diverticulitis Mother    • Heart disease Father    • Heart attack Father         acute   • Hypertension Father    • Early death Father    • Hearing loss Father    • Kidney disease Father    • Breast cancer Daughter 45   • Heart disease Brother    • Hypertension Brother    • Malig Hyperthermia Neg Hx      I have reviewed the patient's medical history in detail and updated the computerized patient record.    REVIEW OF SYSTEMS:  GENERAL: Normal performance status, no change in appetite or weight;   No fevers, chills, sweats.  "  SKIN: Mild contusions anterior lower extremities  HEME/LYMPH: See HPI.   EYES: No vision changes or diplopia.   ENT: No tinnitus, hearing loss, gum bleeding, epistaxis, hoarseness or dysphagia.   RESPIRATORY: No cough, Has exertional dyspnea, No hemoptysis or wheezing.   CVS: No chest pain, palpitations, orthopnea, dyspnea on exertion or PND.   GI: Worsening reflux symptoms  : No lower tract obstructive symptoms, dysuria or hematuria.   MUSCULOSKELETAL: Chronic or joint pain, arthritis.  Has mild intermittent ankle swelling.   NEUROLOGICAL: See HPI  PSYCHIATRIC: See HPI     Objective:   Vitals:    10/17/23 1433   BP: 129/62   Pulse: 68   Resp: 17   Temp: 96.8 °F (36 °C)   TempSrc: Temporal   SpO2: 96%   Weight: 62.6 kg (138 lb)   Height: 154.9 cm (60.98\")   PainSc: 0-No pain     ECOG 0      PHYSICAL EXAM:   GENERAL: Well-developed, well-nourished female, in no acute distress.   SKIN: Warm, dry without rashes, purpura or petechiae.  Left upper chest Lico catheter in place, no evidence of infection.  Skin tag lower neck anteriorly.  EYES: Pupils equal, round and reactive to light. EOMs intact. Conjunctivae normal.  EARS: Hearing intact.  NOSE:  No excoriations or nasal discharge.  MOUTH: Tongue is well-papillated; no stomatitis or ulcers. Lips normal.  THROAT: Oropharynx without lesions or exudates.  Status post partial thyroidectomy well-healing  LYMPHATICS: No cervical, supraclavicular, axillary adenopathy.  CHEST: Lungs clear to auscultation. Good airflow.   BREAST:Postop, Well healed right mastectomy site with no evidence of recurrent disease, no axillary adenopathy bilaterally.  CARDIAC: Regular rate and rhythm without murmurs. Normal S1,S2.  ABDOMEN: Slightly distended, normal active bowel sounds,  no hepatosplenomegaly or masses.  EXTREMITIES: Areas of contusion/small lacerations anterior lower extremities healing without additional inflammation.  NEUROLOGICAL: Cranial Nerves II-XII grossly intact. No " focal neurological deficits.  PSYCHIATRIC: Alert and oriented      RECENT LABS:  Lab Results   Component Value Date    WBC 5.91 10/17/2023    HGB 9.9 (L) 10/17/2023    HCT 31.1 (L) 10/17/2023    MCV 90.9 10/17/2023    PLT 73 (L) 10/17/2023    PLT 73 (L) 10/17/2023     Lab Results   Component Value Date    NEUTROABS 2.82 10/09/2023     Lab Results   Component Value Date    GLUCOSE 97 10/09/2023    BUN 13 10/09/2023    CREATININE 0.79 10/09/2023    EGFRIFNONA 70 12/20/2021    EGFRIFAFRI 81 12/20/2021    BCR 16.5 10/09/2023    K 3.9 10/09/2023    CO2 30.0 (H) 10/09/2023    CALCIUM 10.0 10/09/2023    PROTENTOTREF 7.1 10/09/2023    ALBUMIN 4.4 10/09/2023    LABIL2 1.6 10/09/2023    AST 25 10/09/2023    ALT 16 10/09/2023            Assessment/Plan   1. Large right breast cancer, locally advanced, stage IIIa (T3 N1/ 2 M0).  ER negative, less than 1%; NC positive, moderate in staining, 5% to 10%. HER2 IHC 2+, FISH study positive 2.1.  Physical examination showed a large mass, 7 cm x 7 cm initially which has decreased to approximately less than 4 cm ..  Patient  proceeded through 4 cycles ceptin,Perjeta and Taxol.   Her subsequent MRI examination showed complete response by imaging including right axillary lymphadenopathy.  We have continued Herceptin and that includes today October 20, 2017.  The case was discussed with Dr. Melo and plans were to proceed with mastectomy plus right axillary lymph node assessment via sentinel node evaluation. it was felt she likely require radiation therapy post procedure.  The patient was scheduled and proceeded to surgery November 7.  Her results, wonderfully, revealed no evidence of residual disease in the breast or associated sentinel lymph nodes. She was seen by radiation therapy and offered treatment did not pursue it.  Additionally, and somewhat complicating this story, she traveled to [place] and developed severe influenza, associated pneumonia and was hospitalized for several  months only to return to [place] in the last several weeks now being seen back May 1.  There is no particular benefit from trying to add Herceptin back to her therapy now and is felt that she is completed Herceptin treatment.  She wishes consider reconstruction and baseline CT scans will be requested in 5 weeks with the patient being seen in 6 weeks follow-up.The patient reviewed June 13, 2018 with the scans demonstrating very modest increase in left pectoral, axillary or mediastinal nodes.  These are of uncertain significance but do need follow-up in a PET/CT is planned in 7 weeks.  Her anemia will also be reviewed again with additional laboratory studies that visit.  She'll be seen in 8 weeks for general reassessment.    The patient's anemia workup was essentially negative and she is slowly improving when seen back August 8.  Her PET/CT, fortunately, demonstrates improvement though there is potential abnormality within the right thyroid lobe.  We discussed thyroid function testing and follow-up thyroid ultrasound.  She will be seen back in approximately 2 months as we maintain her port monthly flushes.   The patient was reviewed by ENT and ultimately was felt that a partial thyroidectomy was in order and is now scheduled October 4.  Patient's one to proceed.  We'll plan to see her back in approximately 6 weeks.      The patient is next seen November 28, 2015.  Her surgery went well with the findings as noted above.  She has follow-up with ENT in the next several weeks.  Additionally she has bilateral cataract surgery at the end of this month and into the beginning of next.  We discussed reassessment in general with follow-up scans and these were performed May 2019 which showed no evidence of recurrent malignancy.  Six-month follow-up planes with maintenance of her port every 6 weeks.  The patient is reassessed October 29, 2019 fortunately continue to do relatively well.  She has had no additional medical issues  since last seen we will plan to see her back in 6 months again meeting report.  She does plan to see plastic surgery concerning reconstruction concerning her breast cancer history.  A copy this note will be sent to the plastic surgeon.     The patient is next seen July 7, 2020 without evidence of recurrence of malignancy.  We plan 6-month follow-up.  The patient is reviewed October 27, 2020 without evidence recurrent disease, repeat scans during recent hospitalization September 20-October 1 without evidence of recurrent malignancy.  Subsequent assessments again without evidence of recurrence including reexamination 11/8/2022 and subsequently including 10/17/2023.        2.  Previous fall with contusions now recovered.  Recent contusions lower extremities treated with antibiotic therapy producing nausea now discontinued                                                                                                                                                    3.  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  This has been treated with B vitamins including B12 and B6.  These were not given for her history of previous EtOH use.    4.  Worsening reflux symptoms, now stable    5.  Hospitalization September 20-October 1, 2020 with enteropathic E. coli.  This responded to conservative therapy and colonoscopy had been planned but we will not pursue at this point with resolution of symptoms.    6.  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  As this is assessed April 13 the patient's platelet count continues to decrease and we have discussed a trial of half milligram per kilogram prednisone-40 mg daily with weekly checks and adjustment as needed including rapid taper.     Patient tapered from prednisone through mid July 2021, stable to improved when assessed 7/27/2021.  No additional steroids planned.  Patient stable seen 10/19/21 with every 3 month assessments  planned.  Subsequent conclusion felt to be consistent with chronic ITP  Patient next assessed 5/2/2023 with platelet count of 55,000, IPF pending.  This would still be adequate with no intervention necessary but we will plan follow-up.  Subsequent platelet count between 60 and 80,000 documented.    Plan:    *Every 6 week port flush though we will asked Dr. Melo see the patient for PowerPort removal, removal of a skin tag that she finds irritating involving her lower anterior neck and possibly EGD considering her GI symptoms.    *Every 6 week port flush CBC, IPF    *18 weeks NP or MD

## 2023-10-18 ENCOUNTER — PREP FOR SURGERY (OUTPATIENT)
Dept: OTHER | Facility: HOSPITAL | Age: 83
End: 2023-10-18
Payer: MEDICARE

## 2023-10-18 ENCOUNTER — TELEPHONE (OUTPATIENT)
Dept: SURGERY | Facility: CLINIC | Age: 83
End: 2023-10-18
Payer: MEDICARE

## 2023-10-18 DIAGNOSIS — C50.911 MALIGNANT NEOPLASM OF RIGHT FEMALE BREAST, UNSPECIFIED ESTROGEN RECEPTOR STATUS, UNSPECIFIED SITE OF BREAST: Primary | ICD-10-CM

## 2023-10-19 ENCOUNTER — TELEPHONE (OUTPATIENT)
Dept: FAMILY MEDICINE CLINIC | Facility: CLINIC | Age: 83
End: 2023-10-19

## 2023-10-19 DIAGNOSIS — E87.6 HYPOKALEMIA: Primary | ICD-10-CM

## 2023-10-19 NOTE — TELEPHONE ENCOUNTER
HUB RELAY:    Patient needs labs done around 11/1/2023. They are NOT fasting labs.      Once the labs are scheduled, please make an appointment with Dr. Madrid for one week after that for HTN.    Lab orders are in her chart.      I have tried calling but her VM box is full and we cannot leave a messages.      BMP for hypokalemia and schedule her for blood work in about 2 weeks.  Also a visit with me for HTN in about 3 weeks.  Thank you.)

## 2023-10-20 ENCOUNTER — TELEPHONE (OUTPATIENT)
Dept: ONCOLOGY | Facility: CLINIC | Age: 83
End: 2023-10-20
Payer: MEDICARE

## 2023-10-20 NOTE — TELEPHONE ENCOUNTER
Caller: Roxana Brizuela    Relationship: Self    Best call back number: [phone]     What is the best time to reach you: ANYTIME    Who are you requesting to speak with (clinical staff, provider,  specific staff member): CLINICAL    What was the call regarding: PT IS CALLING TO CHECK ON REFERRAL. PT HAS NOT HEARD ANYTHING BACK FROM DR FRANCOIS AND IS NEEDING TO FIGURE OUT WHEN HER APPT IS SO THAT SHE CAN MAKE ARRANGEMENTS.    Is it okay if the provider responds through [employer]: NO - PLEASE CALL BACK TO ADVISE.

## 2023-10-20 NOTE — TELEPHONE ENCOUNTER
Called placed to Dr. Melo's office. They said surgery scheduling has tried to call her but cannot get a hold of pt. They gave me a direct line for surgery scheduling. Called pt to inform her of this. No answer. M with surgery scheduling line [phone] for pt to call and schedule.       Attempted to call pt again, no answer.

## 2023-10-25 ENCOUNTER — TELEPHONE (OUTPATIENT)
Dept: FAMILY MEDICINE CLINIC | Facility: CLINIC | Age: 83
End: 2023-10-25
Payer: MEDICARE

## 2023-10-25 DIAGNOSIS — T45.1X5A PERIPHERAL NEUROPATHY DUE TO CHEMOTHERAPY: ICD-10-CM

## 2023-10-25 DIAGNOSIS — G62.0 PERIPHERAL NEUROPATHY DUE TO CHEMOTHERAPY: ICD-10-CM

## 2023-10-25 NOTE — TELEPHONE ENCOUNTER
Dr Mercy Brizuela has requested to [facility] pharmacy a refill for Pantoprazole 40 MG.  Please advise

## 2023-10-25 NOTE — TELEPHONE ENCOUNTER
Called left message for patient to call back to set up appointment with Dr. Jimenez advised patient that [employer] is out of network and would need to contact her [employer] insurance to check her out of network price to see Dr. Martin

## 2023-10-25 NOTE — TELEPHONE ENCOUNTER
Caller: Roxana Brizuela    Relationship: Self    Best call back number:     [phone] (Mobile)       Who is your current provider: GENEVA    Who would you like your new provider to be: DELMI     What are your reasons for transferring care: PATIENT STATES THAT SHE SWITCHED TO DR. BEAN IN [place] BECAUSE IT WAS CLOSER TO HER. SHE FEELS LIKE HER HISTORY IS TOO COMPLICATED FOR DR. BEAN. SHE WANTS TO COME BACK TO DR. AWAD TO BE SEEN AGAIN.     Additional notes: PLEASE CONTACT PATIENT, I ADVISED THAT DR. AWAD IS NOT TAKING NEW PATIENTS.

## 2023-10-26 RX ORDER — PANTOPRAZOLE SODIUM 40 MG/1
40 TABLET, DELAYED RELEASE ORAL DAILY
Qty: 90 TABLET | Refills: 3 | Status: SHIPPED | OUTPATIENT
Start: 2023-10-26

## 2023-10-26 RX ORDER — GABAPENTIN 300 MG/1
600 CAPSULE ORAL 3 TIMES DAILY
Qty: 180 CAPSULE | Refills: 5 | Status: SHIPPED | OUTPATIENT
Start: 2023-10-26

## 2023-11-09 ENCOUNTER — HOSPITAL ENCOUNTER (OUTPATIENT)
Facility: HOSPITAL | Age: 83
Setting detail: HOSPITAL OUTPATIENT SURGERY
End: 2023-11-09
Attending: SURGERY | Admitting: SURGERY
Payer: MEDICARE

## 2023-11-09 ENCOUNTER — OFFICE VISIT (OUTPATIENT)
Dept: SURGERY | Facility: CLINIC | Age: 83
End: 2023-11-09
Payer: MEDICARE

## 2023-11-09 VITALS
SYSTOLIC BLOOD PRESSURE: 128 MMHG | WEIGHT: 141 LBS | DIASTOLIC BLOOD PRESSURE: 80 MMHG | HEIGHT: 61 IN | BODY MASS INDEX: 26.62 KG/M2

## 2023-11-09 DIAGNOSIS — L85.8 CUTANEOUS HORN: Primary | ICD-10-CM

## 2023-11-09 DIAGNOSIS — R63.0 DECREASED APPETITE: ICD-10-CM

## 2023-11-09 DIAGNOSIS — R11.0 NAUSEA: ICD-10-CM

## 2023-11-09 DIAGNOSIS — R63.4 WEIGHT LOSS: ICD-10-CM

## 2023-11-09 DIAGNOSIS — Z12.11 SCREENING FOR MALIGNANT NEOPLASM OF COLON: ICD-10-CM

## 2023-11-09 DIAGNOSIS — L85.8 CUTANEOUS HORN: ICD-10-CM

## 2023-11-09 DIAGNOSIS — Z95.828 PORT-A-CATH IN PLACE: ICD-10-CM

## 2023-11-11 NOTE — PROGRESS NOTES
ASSESSMENT/PLAN:      82 year old female with:    (1) Cutaneous horn on neck:  - Discussed proceeding with excision. Reviewed the nature of the procedure, benefits and risks, including but not limited to bleeding and infection.     (2) Removal of PowerPort:  - Left subclavian PowerPort placed in 05/2017 by Dr. Melo for right breast cancer. She has completed therapy and is no longer needing her port. Discussed proceeding with port removal. Reviewed the nature of the procedure, benefits and risks, including but not limited to bleeding and infection.     (3) Colon cancer screening:  - She is due for a routine colonoscopy. Reviewed the nature of the procedure, benefits and risks, including but not limited to bleeding, infection, bowel perforation and the need for subsequent procedures. Reviewed the need for a bowel preparation the day prior.     (4) Nausea, acid reflux, anorexia, weight loss:  - Discussed proceeding with EGD at the time of her colonoscopy. Reviewed the nature of the procedure and the benefits and risks.    Dr. Melo and I discussed with her and recommended she proceed with excision of neck cutaneous horn, port removal, EGD, and colonoscopy at the same time under MAC. She wishes to proceed. Orders placed.     CC:     Skin tag on neck, port removal, EGD and colonoscopy     HPI:    82 year old female with multiple complaints.   She has a skin lesion on her anterior mid to lower neck that causes her discomfort. She has been keeping it covered with a bandage. She would like to have this removed.   She has a port in place that is no longer functional. She is no longer requiring its use after her treatment for breast cancer. She would like to have this removed.   She reports she is due for a colonoscopy and would like to proceed with one. Denies any recent change in bowel habits. Denies any melena or hematochezia. Reports personal history of polyps. Denies any family history of colon cancer.   She  complains of daily nausea which does improve with Zofran. She also reports notable acid reflux, although her Pantoprazole does help. Denies any abdominal pain or vomiting. She has had a decreased appetite and 7# weight loss. She was noted to have anemia on her latest labs.     ENDOSCOPY:   Colonoscopy 2009 Dr. Purcell: normal colon, hemorrhoids, 5 year surveillance   EGD 2011 Dr. Lezama: reflux esophagitis, hiatal hernia, gastric ulcer with clean base    RADIOLOGY:   No recent pertinent imaging     LABS:    10/17/2023 RBC 3.42, Hgb 9.9, Hct 31.1, MCV 90.0, Platelets 73    PAST MEDICAL HISTORY:    Hyperlipidemia  Hypertension  ITP  Vertigo  Hypothyroidism  Vitamin D deficiency  GERD  Fecal incontinence  Breast cancer  Iron deficiency anemia  History of C. difficile colitis  Histoplasmosis  Meningioma    SOCIAL HISTORY:   Denies tobacco use, former smoker quit 1987  Denies alcohol use    FAMILY HISTORY:    Colorectal cancer: Negative  Breast cancer: daughter     PREVIOUS ABDOMINAL SURGERY:  Cholecystectomy  Hysterectomy  Bladder repair  Appendectomy  Right inguinal hernia repair  Incarcerated hernia repair    OTHER SURGERY:  Right thyroid lobectomy  Right breast mastectomy with sentinel lymph node biopsy  Port placement  Left breast mastectomy with sentinel lymph node biopsy  TRAM flap  Tonsillectomy    ALLERGIES:   Codeine - nausea and vomiting  Morphine - nausea and vomiting     MEDICATIONS:   Ascorbic acid  Diclofenac  Gabapentin  Hydrochlorothiazide  Lactobacillus  Levothyroxine  Melatonin  Ondansetron  Pantoprazole  Potassium chloride  Promethazine  Propranolol  Simvastatin  Vitamin B12  Vitamin B6  Vitamin D    ROS:    No cough, chest pain, shortness of air.  All other systems reviewed and negative other than presenting complaints.    PHYSICAL EXAM:   Constitutional: Well-developed, well-nourished, no acute distress  Vital signs:   Ht: 154.9cm (60.98)  Wt: 64kg (141lb)  BMI: 26.66  Eyes: Conjunctiva normal, sclera  nonicteric  ENMT: Hearing grossly normal, oral mucosa moist  Respiratory: Normal inspiratory effort  Cardiovascular: Regular rate, no peripheral edema, no jugular venous distention  Gastrointestinal: Soft, nontender  Skin:  Warm, dry, no rash on visualized skin surfaces; on the anterior neck - protruding cutaneous horn   Musculoskeletal: Symmetric strength, normal gait  Psychiatric: Alert and oriented ×3, normal affect     Ruby Gilliland PA-C    [facility] - General Surgery   [address], [address]  [place] [address]    [address], [address]  [place] [address]    Office: [phone]  Fax: [phone]

## 2023-11-21 ENCOUNTER — TELEPHONE (OUTPATIENT)
Dept: ONCOLOGY | Facility: CLINIC | Age: 83
End: 2023-11-21
Payer: MEDICARE

## 2023-11-21 NOTE — TELEPHONE ENCOUNTER
Caller: Roxana Brizuela    Relationship to patient: Self    Best call back number: [phone]     Chief complaint: PT WOULD LIKE TO CANCEL HER APPT FOR 11/28/23, 01/09/24 PT WILL BE HAVING A COLONOSCOPY, ENDOSCOPY AND HER PORT REMOVED ON 11/29/23     Type of visit: PORT FLUSH AND LABS    Requested date: PT IS THINK THAT SHE WILL NOT NEED TO R/S THOSE PORT APPTS  DUE TO HAVING THE PORT REMOVED.     Additional notes: PT IS NOT SURE IF SHE WILL NEED TO STILL HAVE LABS THOUGH? IF SO THE PT WILL NEED THE 11/28/23 APPT R/S?    HAVE THE 01/09/24 JUST LABS AND JUST WILL NOT HAVE THE PORT FLUSH?    PLEASE CALL THE PT TO ADVISE OF APPT CHANGES.

## 2023-11-22 ENCOUNTER — PRE-ADMISSION TESTING (OUTPATIENT)
Dept: PREADMISSION TESTING | Facility: HOSPITAL | Age: 83
End: 2023-11-22
Payer: MEDICARE

## 2023-11-22 VITALS
BODY MASS INDEX: 26.17 KG/M2 | SYSTOLIC BLOOD PRESSURE: 143 MMHG | HEIGHT: 61 IN | TEMPERATURE: 97.4 F | OXYGEN SATURATION: 96 % | HEART RATE: 85 BPM | WEIGHT: 138.6 LBS | RESPIRATION RATE: 18 BRPM | DIASTOLIC BLOOD PRESSURE: 70 MMHG

## 2023-11-22 LAB
ANION GAP SERPL CALCULATED.3IONS-SCNC: 13 MMOL/L (ref 5–15)
BUN SERPL-MCNC: 16 MG/DL (ref 8–23)
BUN/CREAT SERPL: 20.3 (ref 7–25)
CALCIUM SPEC-SCNC: 9.6 MG/DL (ref 8.6–10.5)
CHLORIDE SERPL-SCNC: 95 MMOL/L (ref 98–107)
CO2 SERPL-SCNC: 26 MMOL/L (ref 22–29)
CREAT SERPL-MCNC: 0.79 MG/DL (ref 0.57–1)
DEPRECATED RDW RBC AUTO: 52.2 FL (ref 37–54)
EGFRCR SERPLBLD CKD-EPI 2021: 74.8 ML/MIN/1.73
ERYTHROCYTE [DISTWIDTH] IN BLOOD BY AUTOMATED COUNT: 16.6 % (ref 12.3–15.4)
GLUCOSE SERPL-MCNC: 105 MG/DL (ref 65–99)
HCT VFR BLD AUTO: 30 % (ref 34–46.6)
HGB BLD-MCNC: 10.4 G/DL (ref 12–15.9)
MCH RBC QN AUTO: 30.4 PG (ref 26.6–33)
MCHC RBC AUTO-ENTMCNC: 34.7 G/DL (ref 31.5–35.7)
MCV RBC AUTO: 87.7 FL (ref 79–97)
PLATELET # BLD AUTO: 63 10*3/MM3 (ref 140–450)
PMV BLD AUTO: 11.6 FL (ref 6–12)
POTASSIUM SERPL-SCNC: 3.1 MMOL/L (ref 3.5–5.2)
QT INTERVAL: 408 MS
QTC INTERVAL: 444 MS
RBC # BLD AUTO: 3.42 10*6/MM3 (ref 3.77–5.28)
SODIUM SERPL-SCNC: 134 MMOL/L (ref 136–145)
WBC NRBC COR # BLD AUTO: 7.62 10*3/MM3 (ref 3.4–10.8)

## 2023-11-22 PROCEDURE — 93005 ELECTROCARDIOGRAM TRACING: CPT

## 2023-11-22 PROCEDURE — 85027 COMPLETE CBC AUTOMATED: CPT

## 2023-11-22 PROCEDURE — 80048 BASIC METABOLIC PNL TOTAL CA: CPT

## 2023-11-22 NOTE — DISCHARGE INSTRUCTIONS
Take the following medications the morning of surgery:  GABAPENTIN, ALBUTEROL, LEVOTHYROXINE, PROPRANOLOL    Arrive to hospital on your day of surgery at 1:30 PM.      If you are on prescription narcotic pain medication to control your pain you may also take that medication the morning of surgery.    General Instructions:    Follow your surgeons instructions regarding when to stop solid foods and when to stop liquids.   Verify with your surgeon if you are to complete a bowel prep and when to do so.  Patients who avoid smoking, chewing tobacco and alcohol for 4 weeks prior to surgery have a reduced risk of post-operative complications.  Quit smoking as many days before surgery as you can.  Do not smoke, use chewing tobacco or drink alcohol the day of surgery.   If applicable bring your C-PAP/ BI-PAP machine in with you to preop day of surgery.  Bring any papers given to you in the doctor’s office.  Wear clean comfortable clothes.  Do not wear contact lenses, false eyelashes or make-up.  Bring a case for your glasses.   Bring crutches or walker if applicable.  Remove all piercings.  Leave jewelry and any other valuables at home.  Hair extensions with metal clips must be removed prior to surgery.  The Pre-Admission Testing nurse will instruct you to bring medications if unable to obtain an accurate list in Pre-Admission Testing.        If you were given a blood bank ID arm band remember to bring it with you the day of surgery.    Preventing a Surgical Site Infection:  For 2 to 3 days before surgery, avoid shaving with a razor because the razor can irritate skin and make it easier to develop an infection.    Any areas of open skin can increase the risk of a post-operative wound infection by allowing bacteria to enter and travel throughout the body.  Notify your surgeon if you have any skin wounds / rashes even if it is not near the expected surgical site.  The area will need assessed to determine if surgery should be  delayed until it is healed.  The night prior to surgery shower using a fresh bar of anti-bacterial soap (such as Dial) and clean washcloth.  Sleep in a clean bed with clean clothing.  Do not allow pets to sleep with you.  Shower on the morning of surgery using a fresh bar of anti-bacterial soap (such as Dial) and clean washcloth.  Dry with a clean towel and dress in clean clothing.  Ask your surgeon if you will be receiving antibiotics prior to surgery.  Make sure you, your family, and all healthcare providers clean their hands with soap and water or an alcohol based hand  before caring for you or your wound.    Day of surgery:  Your arrival time is approximately two hours before your scheduled surgery time.  Upon arrival, a Pre-op nurse and Anesthesiologist will review your health history, obtain vital signs, and answer questions you may have.  The only belongings needed at this time will be a list of your home medications and if applicable your C-PAP/BI-PAP machine.  A Pre-op nurse will start an IV and you may receive medication in preparation for surgery, including something to help you relax.     Please be aware that surgery does come with discomfort.  We want to make every effort to control your discomfort so please discuss any uncontrolled symptoms with your nurse.   Your doctor will most likely have prescribed pain medications.      If you are going home after surgery you will receive individualized written care instructions before being discharged.  A responsible adult must drive you to and from the hospital on the day of your surgery and stay with you for 24 hours.  Discharge prescriptions can be filled by the hospital pharmacy during regular pharmacy hours.  If you are having surgery late in the day/evening your prescription may be e-prescribed to your pharmacy.  Please verify your pharmacy hours or chose a 24 hour pharmacy to avoid not having access to your prescription because your pharmacy has  closed for the day.    If you are staying overnight following surgery, you will be transported to your hospital room following the recovery period.  [facility] has all private rooms.    If you have any questions please call Pre-Admission Testing at [phone].  Deductibles and co-payments are collected on the day of service. Please be prepared to pay the required co-pay, deductible or deposit on the day of service as defined by your plan.    Call your surgeon immediately if you experience any of the following symptoms:  Sore Throat  Shortness of Breath or difficulty breathing  Cough  Chills  Body soreness or muscle pain  Headache  Fever  New loss of taste or smell  Do not arrive for your surgery ill.  Your procedure will need to be rescheduled to another time.  You will need to call your physician before the day of surgery to avoid any unnecessary exposure to hospital staff as well as other patients.    CHLORHEXIDINE CLOTH INSTRUCTIONS  The morning of surgery follow these instructions using the Chlorhexidine cloths you've been given.  These steps reduce bacteria on the body.  Do not use the cloths near your eyes, ears mouth, genitalia or on open wounds.  Throw the cloths away after use but do not try to flush them down a toilet.      Open and remove one cloth at a time from the package.    Leave the cloth unfolded and begin the bathing.  Massage the skin with the cloths using gentle pressure to remove bacteria.  Do not scrub harshly.   Follow the steps below with one 2% CHG cloth per area (6 total cloths).  One cloth for neck, shoulders and chest.  One cloth for both arms, hands, fingers and underarms (do underarms last).  One cloth for the abdomen followed by groin.  One cloth for right leg and foot including between the toes.  One cloth for left leg and foot including between the toes.  The last cloth is to be used for the back of the neck, back and buttocks.    Allow the CHG to air dry 3 minutes  on the skin which will give it time to work and decrease the chance of irritation.  The skin may feel sticky until it is dry.  Do not rinse with water or any other liquid or you will lose the beneficial effects of the CHG.  If mild skin irritation occurs, do rinse the skin to remove the CHG.  Report this to the nurse at time of admission.  Do not apply lotions, creams, ointments, deodorants or perfumes after using the clothes. Dress in clean clothes before coming to the hospital.

## 2023-11-27 ENCOUNTER — TELEPHONE (OUTPATIENT)
Dept: SURGERY | Facility: CLINIC | Age: 83
End: 2023-11-27
Payer: MEDICARE

## 2023-11-27 RX ORDER — POTASSIUM CHLORIDE 20 MEQ/1
20 TABLET, EXTENDED RELEASE ORAL DAILY
Qty: 30 TABLET | Refills: 11 | Status: SHIPPED | OUTPATIENT
Start: 2023-11-27 | End: 2024-11-26

## 2023-11-27 NOTE — TELEPHONE ENCOUNTER
Called and reviewed her PAT labs. Potassium 3.1.   She has been taking over-the-counter potassium.  Recommend she begin taking potassium chloride 20 meq daily. She has a follow-up with her PCP next week, recommend she discuss this at that time regarding additional supplementation and rechecking labs.    Ruby Gilliland PA-C

## 2023-12-01 ENCOUNTER — TELEPHONE (OUTPATIENT)
Dept: FAMILY MEDICINE CLINIC | Facility: CLINIC | Age: 83
End: 2023-12-01
Payer: MEDICARE

## 2023-12-01 NOTE — TELEPHONE ENCOUNTER
DELETE AFTER REVIEWING: Telephone encounter to be sent to the clinical pool.    Caller: GelacioRoxana mallory    Relationship: Self    Best call back number: [phone]     What form or medical record are you requesting:      Who is requesting this form or medical record from you:      How would you like to receive the form or medical records (pick-up, mail, fax):    If fax, what is the fax number:     If mail, what is the address:    If pick-up, provide patient with address and location details    Timeframe paperwork needed:      Additional notes:  PATIENT CALLED SHE IS CURRENTLY IN  OF [facility] AND SHE IS NEEDING A LIST/COPY OF HER MEDICATIONS SENT TO THE HOSPITAL DUE TO SHE LEFT HER MEDICATIONS AT HER HOME AND NOT ABLE TO GO PICK THEM UP.  THE AMBULANCE TOOK HER TO THE HOSPITAL AND SHE WANTS TO KNOW IF OFFICE CAN SEND THEM COPY OF HER MEDICATIONS LIST AS SOON AS POSSIBLE.

## 2023-12-04 ENCOUNTER — TELEPHONE (OUTPATIENT)
Dept: FAMILY MEDICINE CLINIC | Facility: CLINIC | Age: 83
End: 2023-12-04

## 2023-12-04 NOTE — TELEPHONE ENCOUNTER
UNABLE TO WARM TRANSFER    Caller: Roxana Brizuela    Relationship to patient: Self    Best call back number: [phone]     Chief complaint: NEED HOSPITAL FOLLOW UP VISIT.  DISCHARGED FROM [facility] 12-3-23    Type of visit: HOSPITAL FOLLOW UP    Requested date:WITHIN  7 DAYS    If rescheduling, when is the original appointment: NA    Additional notes:UNABLE TO FIND SLOT WITHIN 7 DAYS

## 2023-12-05 ENCOUNTER — TELEPHONE (OUTPATIENT)
Dept: FAMILY MEDICINE CLINIC | Facility: CLINIC | Age: 83
End: 2023-12-05

## 2023-12-05 NOTE — TELEPHONE ENCOUNTER
Hub staff attempted to follow warm transfer process and was unsuccessful     Caller: Roxana Brizuela    Relationship to patient: Self    Best call back number: [phone]      Patient is needing: PLEASE CALL PATIENT TO ASSIST WITH SCHEDULING HOSPITAL FOLLOW UP WITH DR. ANGÉLICA AWAD.  PATIENT STATES THAT SHE WAS DISCHARGED ON 12/2/23 FROM [facility].    HUB UNABLE TO SCHEDULE DUE TO NO AVAILABILITY WITH DR. AWAD WITHIN 7 DAY WORKFLOW.    PLEASE ADVISE.

## 2023-12-22 ENCOUNTER — OFFICE VISIT (OUTPATIENT)
Dept: FAMILY MEDICINE CLINIC | Facility: CLINIC | Age: 83
End: 2023-12-22
Payer: MEDICARE

## 2023-12-22 VITALS
WEIGHT: 138 LBS | HEIGHT: 61 IN | BODY MASS INDEX: 26.06 KG/M2 | DIASTOLIC BLOOD PRESSURE: 60 MMHG | HEART RATE: 64 BPM | OXYGEN SATURATION: 98 % | SYSTOLIC BLOOD PRESSURE: 108 MMHG

## 2023-12-22 DIAGNOSIS — J10.1 INFLUENZA A: ICD-10-CM

## 2023-12-22 DIAGNOSIS — Z09 HOSPITAL DISCHARGE FOLLOW-UP: Primary | ICD-10-CM

## 2023-12-22 DIAGNOSIS — D69.6 THROMBOCYTOPENIA: ICD-10-CM

## 2023-12-22 DIAGNOSIS — E87.6 HYPOKALEMIA: ICD-10-CM

## 2023-12-22 NOTE — PROGRESS NOTES
Subjective   Roxana Brizuela is a 82 y.o. female. Patient is here today for   Chief Complaint   Patient presents with    Hospital Follow Up Visit       (Not on file)-  Risk for Readmission (LACE) No data recorded         Vitals:    12/22/23 1010   BP: 108/60   Pulse: 64   SpO2: 98%     The following portions of the patient's history were reviewed and updated as appropriate: allergies, current medications, past family history, past medical history, past social history, past surgical history and problem list.    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 Paul Negron    GI (gastrointestinal bleed) 1997    H/O jaundice     Hernia     INCISONAL. AROUND LEFT TRAM LAP SITE    History of alcoholism     NONE SINCE 1978    History of chemotherapy     2017 4 MONTHS IN 2017    History of Clostridium difficile colitis     JAN 2018    History of histoplasmosis     History of infectious mononucleosis 1960    History of migraine headaches     History of pneumonia 12/27/2017    AS RESULT OF FLU. ENDED UP ON VENT IN [place]    History of thrombocytopenia     History of transfusion 1997    History of vertigo     Hx of colonic polyps 06/11/2009    Dr. Giles    Hyperlipidemia     Hypertension     Hypothyroidism     PARTIAL THYROIDECTOMY    Macular degeneration     Meningioma     Neuropathy     HANDS AND FEET    Osteoarthritis 09/13/2011    Dr. Jamil Miller    Peptic ulceration      Retina disorder     BLEED. FROM HISTOPLASMOSIS    SBO (small bowel obstruction)     due to hernia with surgical repair in 2011    SOB (shortness of breath) on exertion     Thyroid mass     RIGHT      Allergies   Allergen Reactions    Codeine Nausea And Vomiting    Morphine Nausea And Vomiting      Social History     Socioeconomic History    Marital status:      Spouse name: Mike    Number of children: 2    Years of education: College   Tobacco Use    Smoking status: Former     Packs/day: 2.00     Years: 30.00     Additional pack years: 0.00     Total pack years: 60.00     Types: Cigarettes     Start date: 1957     Quit date: 4/10/1987     Years since quittin.7    Smokeless tobacco: Never    Tobacco comments:     I haven't had a cigarette in over 30 years   Vaping Use    Vaping Use: Never used   Substance and Sexual Activity    Alcohol use: No     Comment: stopped , heavy in past    Drug use: No    Sexual activity: Never        Current Outpatient Medications:     albuterol sulfate  (90 Base) MCG/ACT inhaler, Inhale 2 puffs Every 4 (Four) Hours As Needed for Wheezing., Disp: 18 g, Rfl: 0    Ascorbic Acid (VITAMIN C PO), Take 1,000 mg by mouth Daily., Disp: , Rfl:     diclofenac (VOLTAREN) 75 MG EC tablet, Take 1 tablet by mouth twice daily (Patient taking differently: Take 1 tablet by mouth Daily. PT TO HOLD FOR SURGERY), Disp: 180 tablet, Rfl: 0    gabapentin (NEURONTIN) 300 MG capsule, Take 2 capsules by mouth 3 (Three) Times a Day., Disp: 180 capsule, Rfl: 5    hydroCHLOROthiazide (HYDRODIURIL) 25 MG tablet, Take 1 tablet by mouth once daily (Patient taking differently: Take 1 tablet by mouth Daily.), Disp: 90 tablet, Rfl: 0    Lactobacillus (PROBIOTIC ACIDOPHILUS PO), Take 1 capsule by mouth Daily., Disp: , Rfl:     levothyroxine (Synthroid) 50 MCG tablet, Take 1 tablet by mouth Daily., Disp: 90 tablet, Rfl: 3    MELATONIN PO, Take 10 mg by mouth At Night As Needed., Disp: , Rfl:      ondansetron ODT (ZOFRAN-ODT) 4 MG disintegrating tablet, Place 1 tablet on the tongue Every 8 (Eight) Hours As Needed for Nausea., Disp: 60 tablet, Rfl: 1    pantoprazole (PROTONIX) 40 MG EC tablet, Take 1 tablet by mouth Daily., Disp: 90 tablet, Rfl: 3    potassium chloride (K-DUR,KLOR-CON) 20 MEQ CR tablet, Take 1 tablet by mouth Daily., Disp: 30 tablet, Rfl: 11    promethazine (PHENERGAN) 25 MG tablet, Take 1 tablet by mouth Every 6 (Six) Hours As Needed for Nausea or Vomiting., Disp: 28 tablet, Rfl: 1    promethazine-dextromethorphan (PROMETHAZINE-DM) 6.25-15 MG/5ML syrup, Take 5 mL by mouth 4 (Four) Times a Day As Needed for Cough., Disp: 118 mL, Rfl: 0    propranolol (INDERAL) 10 MG tablet, TAKE 1 TABLET BY MOUTH THREE TIMES DAILY (Patient taking differently: Take 1 tablet by mouth 3 (Three) Times a Day. TAKE 1 TABLET BY MOUTH THREE TIMES DAILY), Disp: 270 tablet, Rfl: 3    simvastatin (ZOCOR) 80 MG tablet, TAKE 1 TABLET BY MOUTH AT BEDTIME (Patient taking differently: Take 1 tablet by mouth Every Night.), Disp: 90 tablet, Rfl: 3    vitamin B-12 (CYANOCOBALAMIN) 1000 MCG tablet, Take 1 tablet by mouth Daily., Disp: , Rfl:     vitamin B-6 (PYRIDOXINE) 50 MG tablet, Take 1 tablet by mouth Daily., Disp: , Rfl:     VITAMIN D, CHOLECALCIFEROL, PO, Take 1 tablet by mouth Daily., Disp: , Rfl:      Objective     History of Present Illness Roxana is here for hospital discharge follow-up.  She was admitted to [facility] on December 1 and tested positive for flu A.  She had COVID 2 weeks prior via home test.  She tested negative while in the hospital.  She responded well to supportive care.  She did have hypokalemia with potassium of 2.7 and got IV potassium.  She has oral potassium but has not been taking it.  She also had thrombocytopenia [place] 85611 She does have a history of breast cancer and peripheral neuropathy secondary to chemotherapy and is followed by medical  oncology.    Review of Systems   Constitutional: Negative.    Respiratory:  Negative for cough and shortness of breath.    Cardiovascular:  Negative for leg swelling.   Neurological:  Negative for light-headedness.       Physical Exam  Vitals reviewed.   Constitutional:       Appearance: Normal appearance.   HENT:      Mouth/Throat:      Mouth: Mucous membranes are dry.   Cardiovascular:      Rate and Rhythm: Normal rate and regular rhythm.      Heart sounds: Normal heart sounds.      Comments: Systolic blood pressure 110 sitting and 120 standing  Pulmonary:      Effort: Pulmonary effort is normal.      Breath sounds: Normal breath sounds.   Musculoskeletal:      Right lower leg: No edema.      Left lower leg: No edema.   Neurological:      Mental Status: She is alert.   Psychiatric:         Mood and Affect: Mood normal.         Behavior: Behavior normal.         Thought Content: Thought content normal.         Judgment: Judgment normal.         ASSESSMENT reviewed and discussed hospital records as well as hospital discharge medication list.  You need to continue oral potassium.  Will recheck labs today and make recommendations as to follow-up after review of labs.     Problems Addressed this Visit          Coag and Thromboembolic    Thrombocytopenia       Genitourinary and Reproductive     Hypokalemia       Health Encounters    Hospital discharge follow-up - Primary       Infectious Diseases    Influenza A     Diagnoses         Codes Comments    Hospital discharge follow-up    -  Primary ICD-10-CM: Z09  ICD-9-CM: V67.59     Influenza A     ICD-10-CM: J10.1  ICD-9-CM: 487.1     Hypokalemia     ICD-10-CM: E87.6  ICD-9-CM: 276.8     Thrombocytopenia     ICD-10-CM: D69.6  ICD-9-CM: 287.5             Current outpatient and discharge medications have been reconciled for the patient.  Reviewed by: Brandt Martin MD      PLAN    There are no Patient Instructions on file for this visit.  No follow-ups on file.

## 2023-12-23 LAB
ALBUMIN SERPL-MCNC: 4.5 G/DL (ref 3.5–5.2)
ALBUMIN/GLOB SERPL: 1.9 G/DL
ALP SERPL-CCNC: 53 U/L (ref 39–117)
ALT SERPL-CCNC: 13 U/L (ref 1–33)
AST SERPL-CCNC: 20 U/L (ref 1–32)
BASOPHILS # BLD AUTO: ABNORMAL 10*3/UL
BILIRUB SERPL-MCNC: 0.7 MG/DL (ref 0–1.2)
BUN SERPL-MCNC: 13 MG/DL (ref 8–23)
BUN/CREAT SERPL: 15.5 (ref 7–25)
CALCIUM SERPL-MCNC: 9.2 MG/DL (ref 8.6–10.5)
CHLORIDE SERPL-SCNC: 99 MMOL/L (ref 98–107)
CO2 SERPL-SCNC: 24.4 MMOL/L (ref 22–29)
CREAT SERPL-MCNC: 0.84 MG/DL (ref 0.57–1)
DIFFERENTIAL COMMENT: ABNORMAL
EGFRCR SERPLBLD CKD-EPI 2021: 69.5 ML/MIN/1.73
EOSINOPHIL # BLD AUTO: ABNORMAL 10*3/UL
EOSINOPHIL NFR BLD AUTO: ABNORMAL %
ERYTHROCYTE [DISTWIDTH] IN BLOOD BY AUTOMATED COUNT: 17.4 % (ref 12.3–15.4)
GLOBULIN SER CALC-MCNC: 2.4 GM/DL
GLUCOSE SERPL-MCNC: 96 MG/DL (ref 65–99)
HCT VFR BLD AUTO: 31.7 % (ref 34–46.6)
HGB BLD-MCNC: 10.4 G/DL (ref 12–15.9)
LYMPHOCYTES # BLD AUTO: ABNORMAL 10*3/UL
LYMPHOCYTES # BLD MANUAL: 1.84 10*3/MM3 (ref 0.7–3.1)
LYMPHOCYTES NFR BLD AUTO: ABNORMAL %
LYMPHOCYTES NFR BLD MANUAL: 23.1 % (ref 19.6–45.3)
MCH RBC QN AUTO: 28.7 PG (ref 26.6–33)
MCHC RBC AUTO-ENTMCNC: 32.8 G/DL (ref 31.5–35.7)
MCV RBC AUTO: 87.6 FL (ref 79–97)
MONOCYTES # BLD MANUAL: 1.54 10*3/MM3 (ref 0.1–0.9)
MONOCYTES NFR BLD AUTO: ABNORMAL %
MONOCYTES NFR BLD MANUAL: 23.1 % (ref 5–12)
NEUTROPHILS # BLD MANUAL: 3.09 10*3/MM3 (ref 1.7–7)
NEUTROPHILS NFR BLD AUTO: ABNORMAL %
NEUTROPHILS NFR BLD MANUAL: 46.2 % (ref 42.7–76)
PLATELET # BLD AUTO: 65 10*3/MM3 (ref 140–450)
PLATELET BLD QL SMEAR: ABNORMAL
POTASSIUM SERPL-SCNC: 4 MMOL/L (ref 3.5–5.2)
PROT SERPL-MCNC: 6.9 G/DL (ref 6–8.5)
RBC # BLD AUTO: 3.62 10*6/MM3 (ref 3.77–5.28)
RBC MORPH BLD: ABNORMAL
SODIUM SERPL-SCNC: 136 MMOL/L (ref 136–145)
WBC # BLD AUTO: 6.68 10*3/MM3 (ref 3.4–10.8)

## 2024-01-22 RX ORDER — HYDROCHLOROTHIAZIDE 25 MG/1
TABLET ORAL
Qty: 90 TABLET | Refills: 0 | Status: SHIPPED | OUTPATIENT
Start: 2024-01-22

## 2024-02-20 ENCOUNTER — LAB (OUTPATIENT)
Dept: OTHER | Facility: HOSPITAL | Age: 84
End: 2024-02-20
Payer: MEDICARE

## 2024-02-20 ENCOUNTER — OFFICE VISIT (OUTPATIENT)
Dept: ONCOLOGY | Facility: CLINIC | Age: 84
End: 2024-02-20
Payer: MEDICARE

## 2024-02-20 VITALS
BODY MASS INDEX: 26.64 KG/M2 | HEIGHT: 61 IN | HEART RATE: 67 BPM | DIASTOLIC BLOOD PRESSURE: 76 MMHG | WEIGHT: 141.1 LBS | OXYGEN SATURATION: 99 % | TEMPERATURE: 98.2 F | RESPIRATION RATE: 16 BRPM | SYSTOLIC BLOOD PRESSURE: 124 MMHG

## 2024-02-20 DIAGNOSIS — D69.3 CHRONIC ITP (IDIOPATHIC THROMBOCYTOPENIA): Primary | ICD-10-CM

## 2024-02-20 DIAGNOSIS — D69.3 CHRONIC ITP (IDIOPATHIC THROMBOCYTOPENIA): ICD-10-CM

## 2024-02-20 DIAGNOSIS — C50.911 MALIGNANT NEOPLASM OF RIGHT FEMALE BREAST, UNSPECIFIED ESTROGEN RECEPTOR STATUS, UNSPECIFIED SITE OF BREAST: ICD-10-CM

## 2024-02-20 LAB
DEPRECATED RDW RBC AUTO: 58.8 FL (ref 37–54)
ERYTHROCYTE [DISTWIDTH] IN BLOOD BY AUTOMATED COUNT: 17.4 % (ref 12.3–15.4)
HCT VFR BLD AUTO: 36.8 % (ref 34–46.6)
HGB BLD-MCNC: 11.6 G/DL (ref 12–15.9)
LYMPHOCYTES # BLD MANUAL: 1.79 10*3/MM3 (ref 0.7–3.1)
LYMPHOCYTES NFR BLD MANUAL: 27 % (ref 5–12)
MCH RBC QN AUTO: 29.7 PG (ref 26.6–33)
MCHC RBC AUTO-ENTMCNC: 31.5 G/DL (ref 31.5–35.7)
MCV RBC AUTO: 94.1 FL (ref 79–97)
MONOCYTES # BLD: 1.56 10*3/MM3 (ref 0.1–0.9)
NEUTROPHILS # BLD AUTO: 2.43 10*3/MM3 (ref 1.7–7)
NEUTROPHILS NFR BLD MANUAL: 42 % (ref 42.7–76)
PLAT MORPH BLD: NORMAL
PLATELET # BLD AUTO: 59 10*3/MM3 (ref 140–450)
PLATELET # BLD AUTO: 59 10*3/MM3 (ref 140–450)
PLATELETS.RETICULATED NFR BLD AUTO: 11.2 % (ref 0.9–6.5)
PMV BLD AUTO: 10 FL (ref 6–12)
RBC # BLD AUTO: 3.91 10*6/MM3 (ref 3.77–5.28)
RBC MORPH BLD: NORMAL
VARIANT LYMPHS NFR BLD MANUAL: 31 % (ref 19.6–45.3)
WBC MORPH BLD: NORMAL
WBC NRBC COR # BLD AUTO: 5.79 10*3/MM3 (ref 3.4–10.8)

## 2024-02-20 PROCEDURE — 85055 RETICULATED PLATELET ASSAY: CPT | Performed by: INTERNAL MEDICINE

## 2024-02-20 PROCEDURE — 36415 COLL VENOUS BLD VENIPUNCTURE: CPT

## 2024-02-20 PROCEDURE — 85007 BL SMEAR W/DIFF WBC COUNT: CPT | Performed by: INTERNAL MEDICINE

## 2024-02-20 PROCEDURE — 85025 COMPLETE CBC W/AUTO DIFF WBC: CPT | Performed by: INTERNAL MEDICINE

## 2024-02-20 RX ORDER — OFLOXACIN 3 MG/ML
SOLUTION AURICULAR (OTIC)
COMMUNITY
Start: 2024-02-06

## 2024-02-20 NOTE — PROGRESS NOTES
REASON FOR FOLLOWUP: Patient feeling well, no additional bleeding issues, normal performance status      1. Newly diagnosed large right breast cancer.  Core needle biopsy reported invasive mammary carcinoma with focal lobular feature, grade 2.  ER negative, less than 1%; IA positive, moderate in staining, 5% to 10%. HER2 IHC 2+, FISH study positive 2.1.   2.  CT scan for chest abdomen and pelvis and breast MRI examination reported right axillary lymph node suspicious for metastatic disease.  No remote metastatic lesion.  Patient has stage IIIa (T3 N2 M0) disease.   3.  Patient was started neoadjuvant chemotherapy on 5/23/2017 with Taxol weekly plus Herceptin weekly for total 12 weeks, and Perjata every 3 weeks during chemotherapy.  Herceptin will be converted to every 3 weeks after chemotherapy finished.    4.  Chronic moderate thrombocytopenia, mild anemia, and intermittent mild neutropenia etiologies are not clear.  5.  Reaction to Herceptin C1D1.  Subsequently tolerated therapy with hydrocortisone as premedication.  6.  Potential cardiac strain developing, neuropathic symptoms persist, follow-up MRI and surgical assessment will be needed post initial chemotherapeutic phase  7.  MRI August 17 with interval resolution of abnormal enhancement within breast and right axillary adenopathy, surgery scheduled November 7, Herceptin ongoing every 3 weeks  8.  Patient seen in office November 28, status post surgery with apparent complete response, Herceptin continued  9.  Herceptin given December 19, subsequent injury in [place] leading to prolonged stay, single treatment given through additional cancer Center  10.  Patient seen May 01, 2018, no further Herceptin planned, baseline scans pursued posttreatment, post influenza syndrome, physical therapy planned  11.  Patient seen June 13, 2018, excellent performance status, improving on physical therapy, equivocal CT  per thoracic adenopathy, follow-up PET/CT planned   12.   PET/CT with improvement,?  Thyroid abnormality with ultrasound planned  13.  Patient seen October 3, partial thyroidectomy anticipated October 14-    14.  Patient seen May 14, 2019, scans negative for evidence of recurrence     15.  Patient seen 3/29/2019 clinically stable  16.  Patient reviewed July 7, ongoing GERD symptoms, GI referral planned.  17.  Hospitalization September 28-October 1-acute colitis due to enteropathic E. coli-residual thrombocytopenia  18.  Subsequent testing consistent with chronic ITP                                                                                                                                                                                                                               HISTORY OF PRESENT ILLNESS:    The patient is a pleasant 83 y.o. with the above-mentioned history, who presents today in anticipation of cycle 4 of Herceptin, Perjeta and Taxol.  This is the first visit with this physician involving this patient's case.  We have reviewed her status today with her slowly developing neuropathic symptoms in her lower extremities but also involving her fingers recognized significantly and she plays the piano and keyboard.  This is becoming harder to do but she is still able to do so.  She also notices neuropathy in her feet but is able to walk and function in daily activities.  Additional to this is her continued grieving at the death of her  though she, fortunately, has an excellent support system.  She continues to experience nausea that is well relieved with Compazine. She denies oral mucositis.  No diarrhea no constipation no chest pain no dyspnea.  No lower extremity edema.    She did received 2 units of PRBC's on   7/8/2017 and remains hematologically stable.   She does have difficulty with sleep and Ambien 10 mg daily at bedtime seems help much better compared to 5 mg dose.  She does not need refills today.  In reviewing her case July 26 she  is entering the fourth cycle of her treatment and recent echocardiogram is reviewed with she and her close friend revealing EF of 66.7% though with global strain of -16%?  Suspicious for myopathic process.  Normal ventricular cavity size and wall thickness as well as contractility.  We have also discussed that she is responding to therapy and will need surgical assessment which may not as yet have been performed.  She has seen Dr. Melo for port placement and will ask him to review her likely just after she has completed his fourth cycle of therapy.  It is also apparent that she'll need a cardiac assessment as we continue subsequent Herceptin therapy perioperatively.      The patient underwent MRI of the breasts August 17 demonstrating interval resolution of abnormal enhancement within the right breast, resolution of right axillary adenopathy had also resolved.  The findings were consistent with a complete response to neoadjuvant chemotherapy.  The patient was seen in subsequent follow-up with Dr. Melo and was determined to proceed with surgery and ultimately sentinel node evaluation.  This is now scheduled November 7 and there are additional plans to consider radiation therapy postoperatively and we have also discussed the use of anti-hormonal therapy considering her mildly positive MT status.    The patient was able to proceed to surgery undergoing right breast mastectomy and sentinel lymph node biopsy November 07, 2017.  She did well postoperatively seeing Dr. Melo November 16.  Pathology revealed no residual malignancy in the breast and 3 negative sentinel lymph nodes.  A formal review of her report reveals treatment effect particularly in her largest lymph node with focal fibrosis and scar, right breast mastectomy fibrosis associated with a cavitary biopsy site and no invasive or in situ carcinoma.  The patient is now seen in our office though she went to the wrong location and the seen late in the day.   We discussed her findings and agree that she would continue Herceptin at this point but be reviewed formally again in 3 weeks.  She is also be seen by radiation therapy for their input.   The patient, evidently, was seen by radiation therapy but decided not to pursue it until her last Herceptin therapy here December 19.  Following this she visited her daughter in [place] and, unfortunately, developed influenza and pneumonia.  She had a prolonged hospitalization and was at many sites in recovery over a several month only to return to [place] in the last several weeks.  She did take 1 IV Herceptin dose while in [place] but as she is reviewed May 05, 2018 we have discussed that it makes no particular sense to continue or add on to her previous history by extending Herceptin any further 3-4 months after her last treatment.  She therefore has completed Herceptin.    The patient on to be tested post her treatment again baseline studies and CT of chest and pelvis were performed June 6 revealing interval increase in a few subcentimeter nodes in the left pectoral, axillary and mediastinal regions. These are all considerably small and of uncertain significance.  The patient's performance status remains excellent without any reduction and, in fact, has improved with physical therapy upon return.       Patient is next seen August 08, 2018, fortunately feeling well.  A follow-up PET/CT had revealed an interval decrease in the subcentimeter nodes in the left subpectoral axillary region as well as mediastinum.  There was no hypermetabolic lymphadenopathy.  There was intense focus within slightly enlarged right thyroid gland.  Patient indicates she never had any thyroid issues of which she is aware.  The patient was referred to ENT for assessment and the patient was seen by Dr. Fofana.  Ultimately a subtotal thyroidectomy is anticipated and now scheduled October 4.  The patient is willing to proceed.        The patient  proceeded to a right thyroid lobectomy performed November 04, 2018.  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  There was no definitive evidence of trans-capsular or vascular invasion.    The patient is seen in follow-up November 28 doing well and we discussed the surgical treatment of this thyroid lesion.  She feels that all this went well.  She has additional cataract surgery at the end of November  scheduled also.  The patient did complete her testing and was asked to return approximately 6 months later with a follow-up CT chest, abdomen and pelvis that demonstrate no evidence of recurrent disease.  As she is seen back May 14 she, unfortunately, fell and contused her face, left knee and left hand.  This required an ER visit.  Is elected to follow her back in 6 months maintaining her port in the interval and she is seen October 29 again without indications of recurrent disease and relatively stable clinically.  She is seeing plastic surgery about reconstruction and otherwise continue her current medication list and following with her primary care regularly.  The patient is next seen July 7, 2020 indicating that she did not proceed to any  plastic surgery and with the COVID-19 epidemic she does not wish to have any elective procedures.  She has, however, having significant GI reflux symptoms that have worsened substantially last several months despite PPI therapy.    The patient had follow-up for meningioma September 11, 2020 unchanged from previous.        The patient is next reviewed October 27, 2020 having been hospitalized September 28 through October 1, 2020.  She had presented with fever and flank pain and was found to have acute colitis due to enteropathogenic E. coli.  Antibiotics were avoided secondary to history of C. difficile colitis and fortunately she did not want to improve albeit slowly.  She had evidence of acute on chronic thrombocytopenia with a platelet count dropping  to close to 30,000 and slowly rebounding assessed October 6 at 64,000 and October 13 at 67,000.  Fortunately she is feeling considerably better with her bowel function normalizing.  We had the patient return for ongoing testing documenting continued thrombocytopenia and IPF at 9.5 818 October 2020.  She seen back April 13, 2021 she has further thrombocytopenia with peripheral smear showing enlarged platelets.  Is felt this consistent with chronic ITP.  The patient was treated with prednisone 8.5 mg/kg and able to gradually taper last been seen through July 14, 2020 having dropped to 5 mg.  She discontinued the medication tested 7/21/2021 with H&H of 10.6 and 32.1, white count of 8070 and platelet count of 67,000.  She is next seen 7/27/2021.  She has been able to taper off of steroids altogether but recently injured her lower extremities on the table and another physician's office and was treated for potential cellulitis developing.  This included antibiotic treatment-ciprofloxacin-producing nausea which has been difficult to recover from.  She is now completed antibiotic therapy altogether approximately 2 days ago.  The patient was able to maintain off of steroids and returns for follow-up with her platelet count remaining in the 50-60,000 range consistently associated with elevated IPF.  She has had no issues with additional hemorrhage fortunately.  She is seen 10/19/21 with an excellent performance status and again stable.    The patient returned for reassessment and is next evaluated 4/19/2022 with H&H of 10.9 and 32.5 with white count of 5760, platelet count of 78,000, ANC of 1820, platelet count of 78,000 and IPF of 12.0.  She is feeling well without any additional medical issues.    We had the patient return for port maintenance and reassessment in 6 months.  She is reviewed 11/8/2022 stable clinically.    The patient is next reviewed 5/2/2023.  Clinically she has done quite well and, in fact, indicates that  she has had a subsequent COVID-19 vaccination that was given (according to records) 4/26/2023.  She had no complications but is noted to have a slightly reduced platelet count today in the setting of known chronic ITP.       We asked the patient return for follow-up and she is evaluated 10/17/2023.  She has a primary care follow-up with worsening fatigue anemia profile was otherwise unremarkable 10/9/2023.  Platelet count of 60,000.  The patient has many other questions including concerning upper GI symptoms and the need for possible endoscopy, the skin tag that needs to be removed from the lower neck, upper chest and the PowerPort is no longer functional.  A general surgery consultation will be requested.    The patient is next evaluated 2/20/2024.  She was scheduled to have her Mediport removed November 2023, however had to cancel this appointment due to having the flu.  She has not yet scheduled her appointment.  She also canceled her EGD and colonoscopy, and currently does not plan to reschedule these.  She has been feeling well.  She continues to walk 4 miles daily.  She does feel that her neuropathy is slightly worsened, and continues on gabapentin managed by her primary care provider.  Eating and drinking well.  Bowels moving regularly.  Denies signs or symptoms of bleeding.      Past Medical History:   Diagnosis Date    Anemia     Breast cancer 05/03/2017    Right breast invasive mammary carcinoma with focal lobular features, grade 2, ER negative, less than 1% AR positive, HER-2 positive, stage IIIa disease (T3, N2, M0    Breast cancer 10/20/2004    Left breast ductal carcinoma in-situ, predominately intermediate grade with focal comedonecrosis (high grade), solid and bribridform patterns involving approximately five core biopsy fragments    Colon polyps 2016    Edema     Chronic lower extremity edema    GERD (gastroesophageal reflux disease) 09/13/2011    Dr. Paul Negron    GI (gastrointestinal bleed)  1997    H/O jaundice     Hernia     INCISONAL. AROUND LEFT TRAM LAP SITE    History of alcoholism     NONE SINCE 1978    History of chemotherapy     2017 4 MONTHS IN 2017    History of Clostridium difficile colitis     JAN 2018    History of histoplasmosis     History of infectious mononucleosis 1960    History of migraine headaches     History of pneumonia 12/27/2017    AS RESULT OF FLU. ENDED UP ON VENT IN [place]    History of thrombocytopenia     History of transfusion 1997    History of vertigo     Hx of colonic polyps 06/11/2009    Dr. Giles    Hyperlipidemia     Hypertension     Hypothyroidism     PARTIAL THYROIDECTOMY    Macular degeneration     Meningioma     Neuropathy     HANDS AND FEET    Osteoarthritis 09/13/2011    Dr. Jamil Miller    Peptic ulceration     Retina disorder     BLEED. FROM HISTOPLASMOSIS    SBO (small bowel obstruction)     due to hernia with surgical repair in 09/2011    SOB (shortness of breath) on exertion     Thyroid mass     RIGHT   Normal echocardiogram on 5/18/2017, LVEF 68%.    Past Surgical History:   Procedure Laterality Date    APPENDECTOMY N/A 1960    BLADDER REPAIR N/A 1980    BREAST BIOPSY Left 10/20/2004    Mammotome incisional biopsy left breast, surgical specimen, left breast, localization clip placement, left breast, confirmatory diagnostic unilateral mammogram, left breast-Dr. Tyrese Alcala, [facility]    BREAST BIOPSY Right 05/03/2017    PATH: INVASIVE MAMMARY CARCINOMA    CATARACT EXTRACTION Bilateral     CHOLECYSTECTOMY N/A 1990    COLONOSCOPY N/A 06/11/2009    Hemorrhoids, otherwise normal, repeat in 5 years-Dr. Noemí Purcell    COLONOSCOPY  2016    [facility]    EYE SURGERY Right 2017    laser surgery for blood clot    HYSTERECTOMY Bilateral 1980    INGUINAL HERNIA REPAIR Right     DR ALESIA GAXIOLA    MASTECTOMY Left 2004    Left breast mastecotmy with sentinel lymph node biospy-[facility]    MASTECTOMY W/ SENTINEL NODE BIOPSY Right 11/07/2017    Procedure:  RIGHT BREAST MASTECTOMY WITH SENTINEL NODE BIOPSY;  Surgeon: Christian Melo MD;  Location: [facility] MAIN OR;  Service:     [place] INSJ TUNNELED CVC W/O SUBQ PORT/ AGE 5 YR/> Left 2017    Procedure: INSERTION VENOUS ACCESS DEVICE;  Surgeon: Christian Melo MD;  [facility];  Service: General    REDUCTION MAMMAPLASTY Right     to match Lt. TRAM flap    SMALL INTESTINE SURGERY      INCARCRATED HERNIA    THYROIDECTOMY Right 10/04/2018    Procedure: RT THYROID LOBECTOMY;  Surgeon: Julián Fofana MD;  Location:  [facility];  Service: ENT    TONSILLECTOMY Bilateral     TRAM FLAP DELAYED Left     WITH MASTOPEXY    UPPER GASTROINTESTINAL ENDOSCOPY N/A 2011    LA grade A esophagitis with no bleeding, large hiatus hernia (50cm), gastric ulcer-Dr.Laszlo Lezama    US GUIDED FINE NEEDLE ASPIRATION  2018   Lico catheter placement on 2017 by Dr. Melo.     OB/GYN history: Menarche age 16, menopause at 1985. , 1 miscarriage. No birth control pill use. She did have post menopause hormonal supplementation.      HEMATOLOGIC/ONCOLOGIC HISTORY: The patient is a 81y.o. year old female whom we are consulted for a newly self discovered right breast mass, in 2017. Patient had ultrasound-guided biopsy on 5/3/2017 and confirmed to be invasive mammary carcinoma with focal lobular features, grade 2 Elen score 6/9. She is here for initial evaluation for management.      Patient is a 76-year-old  female who was seen here previously for chronic mild-to-moderate thrombocytopenia and mild anemia. Recently the patient reports she started having firmness of the right breast that started sometime in April or maybe even in March. She noticed firmness spread from about around the 12 o'clock position, gradually towards the upper lateral side and lower part of the right breast associated mild pain. The patient thought it was related to fibrocystic changes. However, the symptom was getting  worse. Patient also reported dented nipple after she started having pain in the right breast. She denies discharge from the nipple. She called her primary care physician, Dr. Martin, who ordered a mammogram study. This was done on 04/12/2017 with architectural distortion of the right breast centrally at 12 o'clock position and had scattered fibroglandular densities throughout the right breast.      The patient subsequently had right breast ultrasound examination on 04/26/2017. Discovered a large right breast mass measuring 5.8 x 3.5 x 3.9 cm. Patient subsequently had ultrasound-guided right breast biopsy on 05/03/2017. Pathology evaluation reported invasive mammary carcinoma with focal lobular feature. Elen score 6/9, overall grade 2. Sample was further sent to the [facility] for test. ER negative, less than 1%; LA positive, moderate in staining, 5% to 10%. HER2 IHC 2+, but FISH study positive 2.1.     She denies weight loss, she actually eats very well. No nausea vomiting. Patient does complain of insomnia, unable to sleep.      This patient has history of left breast DCIS back in 2007, had mastectomy at the [facility]. No hormonal therapy afterwards according to patient. This patient also had a small meningioma, followed by Dr. Smith in [place], with most recent MRI of the brain in March 2017, with 18 mm meningioma, and followed on an annual basis.     Her neutrophil count on 5/16/17 is normal at 2500, however, records showed starting from 05/2015 and in 09/2016, 01/2017 and 03/2017, all 4 laboratory studies showed mild neutropenia with ANC fluctuating between 1200 and 1600. Etiology is not clear.    Normal echocardiogram on 5/18/2017, LVEF 68%.      CT scan for chest abdomen and pelvis on 5/22/2017 and breast MRI examination on 5/22/2017 reported small right axillary lymph node suspicious for metastatic disease.  No remote metastatic lesion.   Patient has stage IIIa (T3 N2 M0) disease.      Patient will start neoadjuvant chemotherapy with Taxol weekly plus Herceptin weekly for total 12 weeks, and Perjeta every 3 weeks (3-week cycle) during chemotherapy.  Herceptin will be converted to every 3 weeks after chemotherapy finished.  Cycle 1 day 1 5/23/2017.   Status post neoadjuvant chemotherapy the patient was assessed with MRI showing a complete neoadjuvant response.  The patient was reviewed for surgery and questions concerning lymph node dissection-sentinel node evaluation-and need for radiation therapy postop were considered as well as use of additional Herceptin for the balance of the year as well as additional use of anti-hormonal therapy.  Surgery was scheduled November 07, 2017.  Patient next seen in office November 28 having undergone surgery on November 7 with results revealing no residual malignancy in either breast or associated sentinel lymph nodes.  Was elected to continue Herceptin when seen back in office November 20 having initiated Herceptin May 23, 2017.    Patient continued Herceptin with her last treatment given December 19, 2017.     Unfortunately she later experienced an accident while in [place] December 28 with prolonged hospitalization and prolonged ventilator management required.  Apparently she had at least one IV Herceptin therapy given while there and we were contacted March 20, 2018- Dr. Nuno.  Eventually the patient was able to return to [place] is seen back in office May 5 at which time we concluded that she completed Herceptin therapy as result of being off treatment for so long.  Plans were made for baseline scans.  Patient follow-up reexaminations May 8, 2019 with no evidence of recurrent malignancy.  This was again a circumstance when she was assessed October 29, 2019.  Patient seen July 7, 2020 without evidence of recurrent malignancy.  Recurrent GI symptoms noted with GI referral planned.  Patient hospitalized  -2020 with enteropathic E. coli, acute on chronic thrombocytopenia.     Subsequent testing felt consistent with chronic ITP and trial of steroids initiated 2021.  She is able to taper off of prednisone altogether by mid 2021.  Patient seen back in office 10/19/21 stable off steroids.  Her subsequent assessments were consistent with chronic ITP without intervention required.      MEDICATIONS: The current medication list was reviewed with the patient and updated in the EMR this date per the Medical Assistant. Medication dosages and frequencies were confirmed to be accurate.       ALLERGIES:    Allergies   Allergen Reactions    Codeine Nausea And Vomiting    Morphine Nausea And Vomiting     SOCIAL HISTORY:   Social History     Tobacco Use    Smoking status: Former     Packs/day: 2.00     Years: 30.00     Additional pack years: 0.00     Total pack years: 60.00     Types: Cigarettes     Start date: 1957     Quit date: 4/10/1987     Years since quittin.8    Smokeless tobacco: Never    Tobacco comments:     I haven't had a cigarette in over 30 years   Vaping Use    Vaping Use: Never used   Substance Use Topics    Alcohol use: No     Comment: stopped , heavy in past    Drug use: No     FAMILY HISTORY:  Family History   Problem Relation Age of Onset    Heart disease Mother     Aortic aneurysm Mother         abdominal    Coronary artery disease Mother     Hypertension Mother     Miscarriages / Stillbirths Mother     Diverticulitis Mother     Heart disease Father     Heart attack Father         acute    Hypertension Father     Early death Father     Hearing loss Father     Kidney disease Father     Breast cancer Daughter 45    Heart disease Brother     Hypertension Brother     Malig Hyperthermia Neg Hx      I have reviewed the patient's medical history in detail and updated the computerized patient record.    REVIEW OF SYSTEMS:  Review of systems as mentioned in the HPI.    "  Objective:   Vitals:    02/20/24 1252   BP: 124/76   Pulse: 67   Resp: 16   Temp: 98.2 °F (36.8 °C)   TempSrc: Temporal   SpO2: 99%   Weight: 64 kg (141 lb 1.6 oz)   Height: 154 cm (60.63\")   PainSc:   4   PainLoc: Generalized  Comment: KNEE AND LEGS       ECOG 0      PHYSICAL EXAM:   GENERAL: Well-developed, well-nourished female, in no acute distress.   SKIN: Warm, dry without rashes, purpura or petechiae.  Left upper chest Lico catheter in place, no evidence of infection.  Skin tag lower neck anteriorly.  EYES: Pupils equal, round and reactive to light. EOMs intact. Conjunctivae normal.  EARS: Hearing intact.  NOSE:  No excoriations or nasal discharge.  MOUTH: Tongue is well-papillated; no stomatitis or ulcers. Lips normal.  THROAT: Oropharynx without lesions or exudates.  Status post partial thyroidectomy well-healing  LYMPHATICS: No cervical, supraclavicular, axillary adenopathy.  CHEST: Lungs clear to auscultation. Good airflow.   BREAST:Postop, Well healed right mastectomy site with no evidence of recurrent disease, no axillary adenopathy bilaterally.  CARDIAC: Regular rate and rhythm without murmurs. Normal S1,S2.  ABDOMEN: Slightly distended, normal active bowel sounds,  no hepatosplenomegaly or masses.  EXTREMITIES: Areas of contusion/small lacerations anterior lower extremities healing without additional inflammation.  NEUROLOGICAL: Cranial Nerves II-XII grossly intact. No focal neurological deficits.  PSYCHIATRIC: Alert and oriented      RECENT LABS:  Lab Results   Component Value Date    WBC 5.79 02/20/2024    HGB 11.6 (L) 02/20/2024    HCT 36.8 02/20/2024    MCV 94.1 02/20/2024    PLT 59 (L) 02/20/2024    PLT 59 (L) 02/20/2024     Lab Results   Component Value Date    NEUTROABS 2.43 02/20/2024     Lab Results   Component Value Date    GLUCOSE 96 12/22/2023    BUN 13 12/22/2023    CREATININE 0.84 12/22/2023    EGFRIFNONA 70 12/20/2021    EGFRIFAFRI 81 12/20/2021    BCR 15.5 12/22/2023    K 4.0 " 12/22/2023    CO2 24.4 12/22/2023    CALCIUM 9.2 12/22/2023    PROTENTOTREF 6.9 12/22/2023    ALBUMIN 4.5 12/22/2023    LABIL2 1.9 12/22/2023    AST 20 12/22/2023    ALT 13 12/22/2023            Assessment/Plan   1. Large right breast cancer, locally advanced, stage IIIa (T3 N1/ 2 M0).  ER negative, less than 1%; IA positive, moderate in staining, 5% to 10%. HER2 IHC 2+, FISH study positive 2.1.  Physical examination showed a large mass, 7 cm x 7 cm initially which has decreased to approximately less than 4 cm ..  Patient  proceeded through 4 cycles ceptin,Perjeta and Taxol.   Her subsequent MRI examination showed complete response by imaging including right axillary lymphadenopathy.  We have continued Herceptin and that includes today October 20, 2017.  The case was discussed with Dr. Melo and plans were to proceed with mastectomy plus right axillary lymph node assessment via sentinel node evaluation. it was felt she likely require radiation therapy post procedure.  The patient was scheduled and proceeded to surgery November 7.  Her results, wonderfully, revealed no evidence of residual disease in the breast or associated sentinel lymph nodes. She was seen by radiation therapy and offered treatment did not pursue it.  Additionally, and somewhat complicating this story, she traveled to [place] and developed severe influenza, associated pneumonia and was hospitalized for several months only to return to [place] in the last several weeks now being seen back May 1.  There is no particular benefit from trying to add Herceptin back to her therapy now and is felt that she is completed Herceptin treatment.  She wishes consider reconstruction and baseline CT scans will be requested in 5 weeks with the patient being seen in 6 weeks follow-up.The patient reviewed June 13, 2018 with the scans demonstrating very modest increase in left pectoral, axillary or mediastinal nodes.  These are of uncertain significance but do  need follow-up in a PET/CT is planned in 7 weeks.  Her anemia will also be reviewed again with additional laboratory studies that visit.  She'll be seen in 8 weeks for general reassessment.    The patient's anemia workup was essentially negative and she is slowly improving when seen back August 8.  Her PET/CT, fortunately, demonstrates improvement though there is potential abnormality within the right thyroid lobe.  We discussed thyroid function testing and follow-up thyroid ultrasound.  She will be seen back in approximately 2 months as we maintain her port monthly flushes.   The patient was reviewed by ENT and ultimately was felt that a partial thyroidectomy was in order and is now scheduled October 4.  Patient's one to proceed.  We'll plan to see her back in approximately 6 weeks.      The patient is next seen November 28, 2015.  Her surgery went well with the findings as noted above.  She has follow-up with ENT in the next several weeks.  Additionally she has bilateral cataract surgery at the end of this month and into the beginning of next.  We discussed reassessment in general with follow-up scans and these were performed May 2019 which showed no evidence of recurrent malignancy.  Six-month follow-up planes with maintenance of her port every 6 weeks.  The patient is reassessed October 29, 2019 fortunately continue to do relatively well.  She has had no additional medical issues since last seen we will plan to see her back in 6 months again meeting report.  She does plan to see plastic surgery concerning reconstruction concerning her breast cancer history.  A copy this note will be sent to the plastic surgeon.     The patient is next seen July 7, 2020 without evidence of recurrence of malignancy.  We plan 6-month follow-up.  The patient is reviewed October 27, 2020 without evidence recurrent disease, repeat scans during recent hospitalization September 20-October 1 without evidence of recurrent  malignancy.  Subsequent assessments again without evidence of recurrence including reexamination 11/8/2022 and subsequently including 10/17/2023.       Next evaluated 2/20/2024 for follow-up.  No evidence of recurrent disease.  Was scheduled for Mediport removal November 2023, however this was canceled due to her having the flu.  She has not yet rescheduled this appointment.      2.  Previous fall with contusions now recovered.  Recent contusions lower extremities treated with antibiotic therapy producing nausea now discontinued                                                                                                                                                    3.  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  This has been treated with B vitamins including B12 and B6.  These were not given for her history of previous EtOH use.  Continues follow-up with her PCP, currently receiving gabapentin.    4.  Worsening reflux symptoms, now stable    5.  Hospitalization September 20-October 1, 2020 with enteropathic E. coli.  This responded to conservative therapy and colonoscopy had been planned but we will not pursue at this point with resolution of symptoms.    6.  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  As this is assessed April 13 the patient's platelet count continues to decrease and we have discussed a trial of half milligram per kilogram prednisone-40 mg daily with weekly checks and adjustment as needed including rapid taper.     Patient tapered from prednisone through mid July 2021, stable to improved when assessed 7/27/2021.  No additional steroids planned.  Patient stable seen 10/19/21 with every 3 month assessments planned.  Subsequent conclusion felt to be consistent with chronic ITP  Patient next assessed 5/2/2023 with platelet count of 55,000, IPF pending.  This would still be adequate with no intervention necessary but we will plan  follow-up.  Subsequent platelet count between 60 and 80,000 documented.  2/20/2024 platelets today stable at 59,000.    Plan:   Return in 18 weeks for CBC, MD SOLOMON.

## 2024-03-25 ENCOUNTER — TELEPHONE (OUTPATIENT)
Dept: SURGERY | Facility: CLINIC | Age: 84
End: 2024-03-25
Payer: MEDICARE

## 2024-03-25 NOTE — TELEPHONE ENCOUNTER
Pt is scheduled for surgery on 4/8. She's calling into the office to report a open wound on her left shin from shaving. There's no smell or drainage. She isn't reporting a fever. But is about the size of her thumb nail. She is currently taking Cephalexin 500 mg 3 x daily. She was wanting to let Dr Melo be aware of it.

## 2024-04-01 ENCOUNTER — PRE-ADMISSION TESTING (OUTPATIENT)
Dept: PREADMISSION TESTING | Facility: HOSPITAL | Age: 84
End: 2024-04-01
Payer: MEDICARE

## 2024-04-01 ENCOUNTER — TELEPHONE (OUTPATIENT)
Dept: SURGERY | Facility: CLINIC | Age: 84
End: 2024-04-01
Payer: MEDICARE

## 2024-04-01 VITALS
RESPIRATION RATE: 14 BRPM | BODY MASS INDEX: 26 KG/M2 | HEART RATE: 67 BPM | HEIGHT: 62 IN | WEIGHT: 141.3 LBS | SYSTOLIC BLOOD PRESSURE: 142 MMHG | DIASTOLIC BLOOD PRESSURE: 76 MMHG | OXYGEN SATURATION: 98 %

## 2024-04-01 LAB
ANION GAP SERPL CALCULATED.3IONS-SCNC: 15 MMOL/L (ref 5–15)
BUN SERPL-MCNC: 16 MG/DL (ref 8–23)
BUN/CREAT SERPL: 18 (ref 7–25)
CALCIUM SPEC-SCNC: 9.5 MG/DL (ref 8.6–10.5)
CHLORIDE SERPL-SCNC: 100 MMOL/L (ref 98–107)
CO2 SERPL-SCNC: 25 MMOL/L (ref 22–29)
CREAT SERPL-MCNC: 0.89 MG/DL (ref 0.57–1)
DEPRECATED RDW RBC AUTO: 53.7 FL (ref 37–54)
EGFRCR SERPLBLD CKD-EPI 2021: 64.4 ML/MIN/1.73
ERYTHROCYTE [DISTWIDTH] IN BLOOD BY AUTOMATED COUNT: 16.4 % (ref 12.3–15.4)
GLUCOSE SERPL-MCNC: 110 MG/DL (ref 65–99)
HCT VFR BLD AUTO: 35.4 % (ref 34–46.6)
HGB BLD-MCNC: 11.9 G/DL (ref 12–15.9)
MCH RBC QN AUTO: 30.4 PG (ref 26.6–33)
MCHC RBC AUTO-ENTMCNC: 33.6 G/DL (ref 31.5–35.7)
MCV RBC AUTO: 90.5 FL (ref 79–97)
PLATELET # BLD AUTO: 57 10*3/MM3 (ref 140–450)
PMV BLD AUTO: 12 FL (ref 6–12)
POTASSIUM SERPL-SCNC: 3.5 MMOL/L (ref 3.5–5.2)
RBC # BLD AUTO: 3.91 10*6/MM3 (ref 3.77–5.28)
SODIUM SERPL-SCNC: 140 MMOL/L (ref 136–145)
WBC NRBC COR # BLD AUTO: 6.89 10*3/MM3 (ref 3.4–10.8)

## 2024-04-01 PROCEDURE — 80048 BASIC METABOLIC PNL TOTAL CA: CPT | Performed by: SURGERY

## 2024-04-01 PROCEDURE — 36415 COLL VENOUS BLD VENIPUNCTURE: CPT

## 2024-04-01 PROCEDURE — 85027 COMPLETE CBC AUTOMATED: CPT | Performed by: SURGERY

## 2024-04-01 NOTE — DISCHARGE INSTRUCTIONS
PRE-ADMISSION TESTING INSTRUCTIONS FOR ADULTS    Take these medications the morning of surgery with a small sip of water: Albuterol, Gabapentin, Propanolol, Levothyroxine, Pantoprazole      Do not take any insulin or diabetes medications the morning of surgery.      No aspirin, advil, aleve, ibuprofen, naproxen, diet pills, decongestants, or herbal/vitamins for a week prior to surgery.       Tylenol/Acetaminophen is okay to take if needed.    General Instructions:    DO NOT EAT SOLID FOOD AFTER MIDNIGHT THE NIGHT BEFORE SURGERY. No gum, mints, or hard candy after midnight the night before surgery.  You may drink clear liquids the day of surgery up until 2 hours before your arrival time.  Clear liquids are liquids you can see through. Nothing RED in color.    Plain water    Sports drinks      Gelatin (Jell-O)  Fruit juices without pulp such as white grape juice and apple juice  Popsicles that contain no fruit or yogurt  Tea or coffee (no cream or milk added)    It is beneficial for you to have a clear drink that contains carbohydrates 2 hours before your arrival time.  We suggest a 20 ounce bottle of Gatorade or Powerade for non-diabetic patients or a 20 ounce bottle of Gatorade Zero or Powerade Zero for diabetic patients.     Patients who avoid smoking, chewing tobacco and alcohol for 4 weeks prior to surgery have a reduced risk of post-operative complications.  If at all possible, quit smoking as many days before surgery as you can.    Do not smoke, use chewing tobacco or drink alcohol the day of surgery    Bring your C-PAP/ BI-PAP machine if you use one.  Wear clean comfortable clothes.  Do not wear contact lenses, lotion, deodorant, or make-up.  Bring a case for your glasses if applicable. You may brush your teeth the morning of surgery.  You may wear dentures/partials, do not put adhesive/glue on them.  Leave all other jewelry and valuables at home.      Preventing a Surgical Site Infection:    Shower the night  before and on the morning of surgery using the chlorhexidine soap you were given.  Use a clean washcloth with the soap.  Place clean sheets on your bed after showering the night before surgery. Do not use the CHG soap on your hair, face, or private areas. Wash your body gently for five (5) minutes. Do not scrub your skin.  Dry with a clean towel and dress in clean clothing.  Do not shave the surgical area for 10 days-2 weeks prior to surgery  because the razor can irritate skin and make it easier to develop an infection.  Make sure you, your family, and all healthcare providers clean their hands with soap and water or an alcohol based hand  before caring for you or your wound.      Day of surgery:    Your surgeon’s office will advise you of your arrival time for the day of surgery.    Upon arrival, a Pre-op nurse and Anesthesia provider will review your health history, obtain vital signs, and answer questions you may have. The anesthesia provider will also discuss the type of anesthesia that will be needed for your procedure, which may include general anesthesia. The only belongings needed at this time will be your home medications and if applicable your C-PAP/BI-PAP machine.  If you are staying overnight your family can leave the rest of your belongings in the car and bring them to your room later.  A Pre-op nurse will start an IV and you may receive medication in preparation for surgery, including something to help you relax.  Your family will be able to see you in the Pre-op area.  While you are in surgery your family should notify the waiting room  if they leave the waiting room area and provide a contact phone number.    IF you have any questions, you can call the Pre-Admission Department at [phone] or your surgeon's office.  Notify your surgeon if  you become sick, have a fever, productive cough, or cannot be here the day of surgery    Please be aware that surgery does come with  discomfort.  We want to make every effort to control your discomfort so please discuss any uncontrolled symptoms with your nurse.   Your doctor will most likely have prescribed pain medications.      If you are going home after surgery, you will receive individualized written care instructions before being discharged.  A responsible adult (over the age of 18) must drive you to and from the hospital on the day of your surgery and stay with you for 24 hours after anesthesia.    If you are staying overnight following surgery, you will be transported to your hospital room following the recovery period.  [facility] has all private rooms.    You may receive a survey regarding the care you received. Your feedback is very important and will be used to collect the necessary data to help us to continue to provide excellent care.     Deductibles and co-payments are collected on the day of service. Please be prepared to pay the required co-pay, deductible or deposit on the day of service as defined by your plan.

## 2024-04-01 NOTE — TELEPHONE ENCOUNTER
Just wanted to let you know Roxana Brizuela([id number]) has abnormal platelet count. She said she sees her cancer doctor and he keeps and eye on this. Her surgery is 4-8-24.

## 2024-04-01 NOTE — PAT
"Pt here for PAT visit.  Pre-op tests completed, chg soap given, and instructions reviewed.  Instructed clears until 2 hrs prior to arrival time, voiced understanding. Pt stated she had\"knicked herself while shaving\". Left lower leg with round, quarter sized lesion that protrudes slightly. Leg is noted to be red around site and pt states is painful to touch. Pt went to Urgent Care in [place] and was placed on antibiotic therapy.Dr. Melo able to assess site during PAT visit and instructed patient to finish antibiotic and  come for surgery on 4-8.He would add an excision to her left leg to the consent. Pt verbalized understanding.  "

## 2024-04-04 ENCOUNTER — TELEPHONE (OUTPATIENT)
Dept: SURGERY | Facility: CLINIC | Age: 84
End: 2024-04-04
Payer: MEDICARE

## 2024-04-04 DIAGNOSIS — R11.0 NAUSEA: ICD-10-CM

## 2024-04-04 RX ORDER — PROMETHAZINE HYDROCHLORIDE 25 MG/1
TABLET ORAL
Qty: 28 TABLET | Refills: 0 | Status: SHIPPED | OUTPATIENT
Start: 2024-04-04

## 2024-04-04 NOTE — TELEPHONE ENCOUNTER
Hospital called regarding this patient's surgery. Patient stated that you would be excising the cyst on her leg Monday as well. The only thing I saw in her chart was regarding an open wound, but not a cyst. If patient is to have a leg excision can you modify the case request. Please advise

## 2024-04-05 ENCOUNTER — ANESTHESIA EVENT (OUTPATIENT)
Dept: PERIOP | Facility: HOSPITAL | Age: 84
End: 2024-04-05
Payer: MEDICARE

## 2024-04-05 RX ORDER — ONDANSETRON 4 MG/1
TABLET, ORALLY DISINTEGRATING ORAL
Qty: 60 TABLET | Refills: 0 | OUTPATIENT
Start: 2024-04-05

## 2024-04-08 ENCOUNTER — ANESTHESIA (OUTPATIENT)
Dept: PERIOP | Facility: HOSPITAL | Age: 84
End: 2024-04-08
Payer: MEDICARE

## 2024-04-08 VITALS
HEART RATE: 66 BPM | DIASTOLIC BLOOD PRESSURE: 86 MMHG | RESPIRATION RATE: 16 BRPM | OXYGEN SATURATION: 98 % | SYSTOLIC BLOOD PRESSURE: 135 MMHG | BODY MASS INDEX: 25.91 KG/M2 | WEIGHT: 139.4 LBS | TEMPERATURE: 98.2 F

## 2024-04-08 PROCEDURE — 45385 COLONOSCOPY W/LESION REMOVAL: CPT | Performed by: SURGERY

## 2024-04-08 PROCEDURE — 25010000002 FENTANYL CITRATE (PF) 50 MCG/ML SOLUTION: Performed by: NURSE ANESTHETIST, CERTIFIED REGISTERED

## 2024-04-08 PROCEDURE — 25010000002 ONDANSETRON PER 1 MG

## 2024-04-08 PROCEDURE — S0260 H&P FOR SURGERY: HCPCS | Performed by: SURGERY

## 2024-04-08 PROCEDURE — 25010000002 CEFAZOLIN PER 500 MG

## 2024-04-08 PROCEDURE — 36590 REMOVAL TUNNELED CV CATH: CPT | Performed by: SURGERY

## 2024-04-08 PROCEDURE — 25010000002 EPINEPHRINE PER 0.1 MG: Performed by: SURGERY

## 2024-04-08 PROCEDURE — 11602 EXC TR-EXT MAL+MARG 1.1-2 CM: CPT | Performed by: SURGERY

## 2024-04-08 PROCEDURE — 43239 EGD BIOPSY SINGLE/MULTIPLE: CPT | Performed by: SURGERY

## 2024-04-08 PROCEDURE — 45380 COLONOSCOPY AND BIOPSY: CPT | Performed by: SURGERY

## 2024-04-08 PROCEDURE — 25810000003 LACTATED RINGERS PER 1000 ML

## 2024-04-08 PROCEDURE — 25010000002 PROPOFOL 200 MG/20ML EMULSION: Performed by: NURSE ANESTHETIST, CERTIFIED REGISTERED

## 2024-04-08 PROCEDURE — 88300 SURGICAL PATH GROSS: CPT | Performed by: SURGERY

## 2024-04-08 PROCEDURE — 25010000002 DEXAMETHASONE PER 1 MG

## 2024-04-08 PROCEDURE — 88305 TISSUE EXAM BY PATHOLOGIST: CPT | Performed by: SURGERY

## 2024-04-08 PROCEDURE — 45381 COLONOSCOPY SUBMUCOUS NJX: CPT | Performed by: SURGERY

## 2024-04-08 RX ORDER — SODIUM CHLORIDE 0.9 % (FLUSH) 0.9 %
10 SYRINGE (ML) INJECTION AS NEEDED
Status: DISCONTINUED | OUTPATIENT
Start: 2024-04-08 | End: 2024-04-08 | Stop reason: HOSPADM

## 2024-04-08 RX ORDER — MAGNESIUM HYDROXIDE 1200 MG/15ML
LIQUID ORAL AS NEEDED
Status: DISCONTINUED | OUTPATIENT
Start: 2024-04-08 | End: 2024-04-08 | Stop reason: HOSPADM

## 2024-04-08 RX ORDER — SODIUM CHLORIDE 9 MG/ML
40 INJECTION, SOLUTION INTRAVENOUS AS NEEDED
Status: DISCONTINUED | OUTPATIENT
Start: 2024-04-08 | End: 2024-04-08 | Stop reason: HOSPADM

## 2024-04-08 RX ORDER — LIDOCAINE HYDROCHLORIDE 10 MG/ML
0.5 INJECTION, SOLUTION INFILTRATION; PERINEURAL ONCE AS NEEDED
Status: DISCONTINUED | OUTPATIENT
Start: 2024-04-08 | End: 2024-04-08 | Stop reason: HOSPADM

## 2024-04-08 RX ORDER — SODIUM CHLORIDE 0.9 % (FLUSH) 0.9 %
10 SYRINGE (ML) INJECTION EVERY 12 HOURS SCHEDULED
Status: DISCONTINUED | OUTPATIENT
Start: 2024-04-08 | End: 2024-04-08 | Stop reason: HOSPADM

## 2024-04-08 RX ORDER — LIDOCAINE HYDROCHLORIDE 20 MG/ML
INJECTION, SOLUTION INFILTRATION; PERINEURAL AS NEEDED
Status: DISCONTINUED | OUTPATIENT
Start: 2024-04-08 | End: 2024-04-08 | Stop reason: SURG

## 2024-04-08 RX ORDER — SODIUM CHLORIDE, SODIUM LACTATE, POTASSIUM CHLORIDE, CALCIUM CHLORIDE 600; 310; 30; 20 MG/100ML; MG/100ML; MG/100ML; MG/100ML
100 INJECTION, SOLUTION INTRAVENOUS ONCE
Status: DISCONTINUED | OUTPATIENT
Start: 2024-04-08 | End: 2024-04-08 | Stop reason: HOSPADM

## 2024-04-08 RX ORDER — TRAMADOL HYDROCHLORIDE 50 MG/1
50 TABLET ORAL EVERY 6 HOURS PRN
Qty: 8 TABLET | Refills: 0 | Status: SHIPPED | OUTPATIENT
Start: 2024-04-08

## 2024-04-08 RX ORDER — FENTANYL CITRATE 50 UG/ML
INJECTION, SOLUTION INTRAMUSCULAR; INTRAVENOUS AS NEEDED
Status: DISCONTINUED | OUTPATIENT
Start: 2024-04-08 | End: 2024-04-08 | Stop reason: SURG

## 2024-04-08 RX ORDER — ONDANSETRON 4 MG/1
4 TABLET, FILM COATED ORAL EVERY 6 HOURS PRN
Qty: 8 TABLET | Refills: 0 | Status: SHIPPED | OUTPATIENT
Start: 2024-04-08

## 2024-04-08 RX ORDER — FAMOTIDINE 10 MG/ML
20 INJECTION, SOLUTION INTRAVENOUS
Status: COMPLETED | OUTPATIENT
Start: 2024-04-08 | End: 2024-04-08

## 2024-04-08 RX ORDER — PROPOFOL 10 MG/ML
INJECTION, EMULSION INTRAVENOUS CONTINUOUS PRN
Status: DISCONTINUED | OUTPATIENT
Start: 2024-04-08 | End: 2024-04-08 | Stop reason: SURG

## 2024-04-08 RX ORDER — ONDANSETRON 2 MG/ML
4 INJECTION INTRAMUSCULAR; INTRAVENOUS ONCE AS NEEDED
Status: COMPLETED | OUTPATIENT
Start: 2024-04-08 | End: 2024-04-08

## 2024-04-08 RX ORDER — LIDOCAINE HYDROCHLORIDE AND EPINEPHRINE 10; 10 MG/ML; UG/ML
INJECTION, SOLUTION INFILTRATION; PERINEURAL AS NEEDED
Status: DISCONTINUED | OUTPATIENT
Start: 2024-04-08 | End: 2024-04-08 | Stop reason: HOSPADM

## 2024-04-08 RX ORDER — DEXAMETHASONE SODIUM PHOSPHATE 4 MG/ML
8 INJECTION, SOLUTION INTRA-ARTICULAR; INTRALESIONAL; INTRAMUSCULAR; INTRAVENOUS; SOFT TISSUE ONCE AS NEEDED
Status: COMPLETED | OUTPATIENT
Start: 2024-04-08 | End: 2024-04-08

## 2024-04-08 RX ORDER — EPINEPHRINE 1 MG/ML
INJECTION, SOLUTION, CONCENTRATE INTRAVENOUS AS NEEDED
Status: DISCONTINUED | OUTPATIENT
Start: 2024-04-08 | End: 2024-04-08 | Stop reason: HOSPADM

## 2024-04-08 RX ORDER — SODIUM CHLORIDE, SODIUM LACTATE, POTASSIUM CHLORIDE, CALCIUM CHLORIDE 600; 310; 30; 20 MG/100ML; MG/100ML; MG/100ML; MG/100ML
9 INJECTION, SOLUTION INTRAVENOUS CONTINUOUS
Status: DISCONTINUED | OUTPATIENT
Start: 2024-04-08 | End: 2024-04-08 | Stop reason: HOSPADM

## 2024-04-08 RX ORDER — ONDANSETRON 2 MG/ML
4 INJECTION INTRAMUSCULAR; INTRAVENOUS ONCE AS NEEDED
Status: DISCONTINUED | OUTPATIENT
Start: 2024-04-08 | End: 2024-04-08 | Stop reason: HOSPADM

## 2024-04-08 RX ADMIN — PROPOFOL 50 MG: 10 INJECTION, EMULSION INTRAVENOUS at 12:14

## 2024-04-08 RX ADMIN — SODIUM CHLORIDE, POTASSIUM CHLORIDE, SODIUM LACTATE AND CALCIUM CHLORIDE 9 ML/HR: 600; 310; 30; 20 INJECTION, SOLUTION INTRAVENOUS at 10:32

## 2024-04-08 RX ADMIN — PROPOFOL 50 MG: 10 INJECTION, EMULSION INTRAVENOUS at 12:10

## 2024-04-08 RX ADMIN — PROPOFOL 50 MG: 10 INJECTION, EMULSION INTRAVENOUS at 12:32

## 2024-04-08 RX ADMIN — ONDANSETRON 4 MG: 2 INJECTION INTRAMUSCULAR; INTRAVENOUS at 10:32

## 2024-04-08 RX ADMIN — LIDOCAINE HYDROCHLORIDE 50 MG: 20 INJECTION, SOLUTION INFILTRATION; PERINEURAL at 10:57

## 2024-04-08 RX ADMIN — PROPOFOL 100 MG: 10 INJECTION, EMULSION INTRAVENOUS at 11:44

## 2024-04-08 RX ADMIN — FAMOTIDINE 20 MG: 10 INJECTION, SOLUTION INTRAVENOUS at 10:32

## 2024-04-08 RX ADMIN — PROPOFOL 50 MG: 10 INJECTION, EMULSION INTRAVENOUS at 11:59

## 2024-04-08 RX ADMIN — DEXAMETHASONE SODIUM PHOSPHATE 8 MG: 4 INJECTION, SOLUTION INTRAMUSCULAR; INTRAVENOUS at 10:32

## 2024-04-08 RX ADMIN — PROPOFOL 50 MG: 10 INJECTION, EMULSION INTRAVENOUS at 12:03

## 2024-04-08 RX ADMIN — PROPOFOL 50 MG: 10 INJECTION, EMULSION INTRAVENOUS at 11:55

## 2024-04-08 RX ADMIN — PROPOFOL 75 MCG/KG/MIN: 10 INJECTION, EMULSION INTRAVENOUS at 10:57

## 2024-04-08 RX ADMIN — PROPOFOL 50 MG: 10 INJECTION, EMULSION INTRAVENOUS at 11:50

## 2024-04-08 RX ADMIN — PROPOFOL 50 MG: 10 INJECTION, EMULSION INTRAVENOUS at 12:06

## 2024-04-08 RX ADMIN — FENTANYL CITRATE 25 MCG: 50 INJECTION, SOLUTION INTRAMUSCULAR; INTRAVENOUS at 10:57

## 2024-04-08 RX ADMIN — PROPOFOL 50 MG: 10 INJECTION, EMULSION INTRAVENOUS at 12:19

## 2024-04-08 RX ADMIN — FENTANYL CITRATE 25 MCG: 50 INJECTION, SOLUTION INTRAMUSCULAR; INTRAVENOUS at 11:47

## 2024-04-08 RX ADMIN — PROPOFOL 50 MG: 10 INJECTION, EMULSION INTRAVENOUS at 12:27

## 2024-04-08 RX ADMIN — CEFAZOLIN 2 G: 2 INJECTION, POWDER, FOR SOLUTION INTRAVENOUS at 11:00

## 2024-04-08 NOTE — ANESTHESIA PREPROCEDURE EVALUATION
Anesthesia Evaluation     Patient summary reviewed and Nursing notes reviewed   no history of anesthetic complications:   NPO Solid Status: > 8 hours  NPO Liquid Status: > 2 hours           Airway   Mallampati: II  TM distance: >3 FB  Neck ROM: full  No difficulty expected  Dental - normal exam     Pulmonary - normal exam    breath sounds clear to auscultation  Cardiovascular - normal exam  Exercise tolerance: good (4-7 METS)    ECG reviewed  Patient on routine beta blocker and Beta blocker given within 24 hours of surgery  Rhythm: regular  Rate: normal    (+) hypertension well controlled 2 medications or greater, valvular problems/murmurs murmur, hyperlipidemia    ROS comment: HEART RATE= 71  bpm  RR Interval= 845  ms  UT Interval= 171  ms  P Horizontal Axis= 1  deg  P Front Axis= 41  deg  QRSD Interval= 99  ms  QT Interval= 408  ms  QTcB= 444  ms  QRS Axis= -48  deg  T Wave Axis= 52  deg  - ABNORMAL ECG -  Sinus rhythm  Left anterior fascicular block  RSR' in V1 or V2, right VCD or RVH  Probable left ventricular hypertrophy  No change from previous tracing  Electronically Signed By: Sasha Crespo ([facility]) 22-Nov-2023 16:10:22  Date and Time of Study: 2023-11-22 15:59:32        Neuro/Psych  (+) headaches, numbness, psychiatric history    ROS Comment: Neuropathy  GI/Hepatic/Renal/Endo    (+) GERD well controlled, PUD, thyroid problem hypothyroidism    Musculoskeletal     Abdominal    Substance History      OB/GYN          Other   arthritis, blood dyscrasia anemia,   history of cancer remission                  Anesthesia Plan    ASA 3     MAC     (Discussed R/B of anesthesia including but not limited to N/V, seizure, stroke, MI, and death.)  intravenous induction     Anesthetic plan, risks, benefits, and alternatives have been provided, discussed and informed consent has been obtained with: patient.    Use of blood products discussed with patient  Consented to blood products.    Plan discussed with CRNA and  resident.

## 2024-04-08 NOTE — H&P
CC: Skin lesion, port no longer in use, endoscopic evaluation    HPI: 82-year-old lady whom we saw in November 2023 for multiple reasons:  Cutaneous horn on neck.  This spontaneously fell off in the interim.  Left subclavian port no longer needed.  Colon cancer screening.  Daily nausea and acid reflux.  In the interim, she rapidly developed an exophytic cutaneous lesion on the left lower extremity anterior to the tibia measuring approximately 1.5 cm.    PMH, PSH, MEDS AND ALLERGIES reviewed and reconciled in  EPIC    PHYSICAL EXAM:  Constitutional:  awake, alert, no acute distress  VS: afebrile, VSS  Respiratory:  normal inspiratory effort  Cardiovascular: regular rate  Skin: Exophytic 1.5 cm lesion left lower extremity anterior to the tibia    ROS:  relevant systems negative other than any presenting complaints    ASSESSMENT:    Port no longer in use  Colon cancer screening  Nausea and gastroesophageal reflux disease  Suspicious exophytic cutaneous lesion left lower extremity anterior to the tibia    PLAN:  Port removal  Colonoscopy  Esophagogastroduodenoscopy  Excision of left lower extremity skin lesion    Christian Melo M.D.

## 2024-04-08 NOTE — DISCHARGE INSTRUCTIONS
Dr. Christian Melo  [address]  [place] [id number] [phone]    Discharge Instructions for Minor Surgery    Go home, rest and take it easy today; however, you should get up and move about several times today to reduce the risk of developing a clot in your legs.      You may experience some dizziness or memory loss from the anesthesia.  This may last for the next 24 hours.  Someone should plan on staying with you for the first 24 hours for your safety.    Do not make any important legal decisions or sign any legal papers for the next 24 hours.      Eat and drink lightly today.  Start off with liquids, jello, soup, crackers or other bland foods at first. You may advance your diet tomorrow as tolerated as long as you do not experience any nausea or vomiting.     If skin glue (Dermabond) was used, your incisions are protected and covered.  The invisible glue will dissolve on its own as your incision heals. If dressings were used, you may remove your outer dressings in 3-4 days.   Please leave the little white tapes known as steri-strips alone.  They usually fall off in 1-2 weeks.  Do not worry if they come off sooner.     If dressings were used, you may notice some bleeding/drainage on your outer dressings. A little bloody drainage is normal. If the bleeding/drainage is such that the bandage cannot absorb it, remove the dressing, apply clean gauze and apply firm pressure for a full 15 minutes.  If the bleeding continues, please call me.    You may shower tomorrow allowing water to run over your incisions; however, do not scrub your incisions.  No tub baths until your incisions are completely healed.    You have received a prescription for a narcotic pain medicine, as you may have some pain/discomfort following surgery.   You will not be totally pain free, but your pain medicine should make the pain tolerable.  Please take your pain medicine as prescribed and always take your pills with food to  prevent nausea. If you are having severe pain that cannot be controlled by the pain medicine, please contact me.  If the pain is such that narcotic pain medicine is not required, you may take Tylenol or Ibuprofen as directed unless indicated otherwise.      You have also received a prescription for an anti-nausea medicine.  Please take this as prescribed for any nausea or vomiting.  Nausea could be a result of the anesthesia or a result of the narcotic pain medicine.  If you experience severe nausea and vomiting that cannot be controlled by the nausea medicine, please call me.      No driving for 24 hours and for as long as you are taking your prescription pain medicine.      Please call the office at [phone] to schedule a follow-up in about 1 week.      Remember to contact me for any of the following:    Fever> 101 degrees  Severe pain that cannot be controlled by taking your pain pills  Severe nausea or vomiting that cannot be controlled by taking your nausea medicine  Significant bleeding from your incision  Drainage that has a bad smell or is yellow or green in appearance  Any other questions or concernsColonoscopy, Adult, Care After  This sheet gives you information about how to care for yourself after your procedure. Your doctor may also give you more specific instructions. If you have problems or questions, call your doctor.  What can I expect after the procedure?  After the procedure, it is common to have:  A small amount of blood in your poop for 24 hours.  Some gas.  Mild cramping or bloating in your belly.  Follow these instructions at home:  General instructions    ***************DO NOT DRIVE TODAY*******************    For the first 24 hours after the procedure:  Do not sign important documents.  Do not drink alcohol.  Do your daily activities more slowly than normal.  Eat foods that are soft and easy to digest.  Take over-the-counter or prescription medicines only as told by your doctor.  To help  cramping and bloating:       Try walking around.  Put heat on your belly (abdomen) as told by your doctor. Use a heat source that your doctor recommends, such as a moist heat pack or a heating pad.  Put a towel between your skin and the heat source.  Leave the heat on for 20-30 minutes.  Remove the heat if your skin turns bright red. This is especially important if you cannot feel pain, heat, or cold. You can get burned.  Eating and drinking  Upper Endoscopy, Adult, Care After  This sheet gives you information about how to care for yourself after your procedure. Your health care provider may also give you more specific instructions. If you have problems or questions, contact your health care provider.  What can I expect after the procedure?  After the procedure, it is common to have:  A sore throat.  Mild stomach pain or discomfort.  Bloating.  Nausea.  Follow these instructions at home:       Follow instructions from your health care provider about what to eat or drink after your procedure.  Return to your normal activities as told by your health care provider. Ask your health care provider what activities are safe for you.  Take over-the-counter and prescription medicines only as told by your health care provider.  DO NOT DRIVE FOR THE REST OF TODAY if you were given a sedative during your procedure.  Keep all follow-up visits as told by your health care provider. This is important.  Contact a health care provider if you have:  A sore throat that lasts longer than one day.  Trouble swallowing.  Get help right away if:  You vomit blood or your vomit looks like coffee grounds.  You have:  A fever.  Bloody, black, or tarry stools.  A severe sore throat or you cannot swallow.  Difficulty breathing.  Severe pain in your chest or abdomen.  Summary  After the procedure, it is common to have a sore throat, mild stomach discomfort, bloating, and nausea.  Do not drive TODAY if you were given a sedative during the  procedure.  Follow instructions from your health care provider about what to eat or drink after your procedure.  Return to your normal activities as told by your health care provider.  This information is not intended to replace advice given to you by your health care provider. Make sure you discuss any questions you have with your health care provider.  Document Released: 06/18/2013 Document Revised: 05/20/2019 Document Reviewed: 05/20/2019  [employer] Interactive Patient Education © 2019 [employer]

## 2024-04-08 NOTE — ANESTHESIA POSTPROCEDURE EVALUATION
Patient: Roxana Brizuela    Procedure Summary       Date: 04/08/24 Room / Location:  [facility] /  [facility]    Anesthesia Start: 1054 Anesthesia Stop: 1248    Procedures:       REMOVAL OF VENOUS ACCESS DEVICE      COLONOSCOPY WITH POLYPECTOMY WITH TATTOO      ESOPHAGOGASTRODUODENOSCOPY WITH BIOPSY (Esophagus)      EXCISION MASS LOWER EXTREMITY (Left: Leg Lower) Diagnosis:       Cutaneous horn      Port-A-Cath in place      Nausea      Screening for malignant neoplasm of colon      Weight loss      Decreased appetite      (Cutaneous horn [L85.8])      (Port-A-Cath in place [Z95.828])      (Nausea [R11.0])      (Screening for malignant neoplasm of colon [Z12.11])      (Weight loss [R63.4])      (Decreased appetite [R63.0])    Surgeons: Christian Melo MD Provider: Valeriano Black CRNA    Anesthesia Type: MAC ASA Status: 3            Anesthesia Type: MAC    Vitals  Vitals Value Taken Time   BP     Temp     Pulse 65 04/08/24 1313   Resp     SpO2 81 % 04/08/24 1304   Vitals shown include unfiled device data.        Post Anesthesia Care and Evaluation    Patient location during evaluation: PHASE II  Patient participation: complete - patient participated  Level of consciousness: awake and alert  Pain score: 0  Pain management: adequate    Airway patency: patent  Anesthetic complications: No anesthetic complications  PONV Status: none  Cardiovascular status: acceptable  Respiratory status: acceptable  Hydration status: acceptable

## 2024-04-08 NOTE — OP NOTE
PREOPERATIVE DIAGNOSIS:  1.  Port no longer in use  2.  Suspicious exophytic cutaneous lesion left lower extremity anterior to the tibia (1.6 cm lesion in diameter)  3.  Nausea and gastroesophageal reflux disease  4.  Screening    POSTOPERATIVE DIAGNOSIS (FINDINGS):  1.  Friable erythematous plaque-like area of inflammation in gastric body along greater curvature  2.  Small sliding hiatal hernia  3.  Small friable polyp descending colon  4.  4 cm flat friable masslike polypoid area 35 cm from anal verge    PROCEDURE:  1.  Port removal  2.  20 mm excision of suspicious exophytic cutaneous lesion left lower extremity anterior to the tibia  3.  EGD with biopsy of gastric body  4.  Colonoscopy to cecum with:  Snare removal of descending colon polyp  Cold biopsy of mass at 35 cm  Submucosal tattoo adjacent to mass at 35 cm  Submucosal epinephrine injection into mass at 35 cm    SURGEON:  Christian Melo MD    ASSISTANT:  Beatriz Law was responsible for performing the following activities: suction, irrigation, suturing, closing, retraction, and placing dressing, and their skilled assistance was necessary for the success of this case.    ANESTHESIA:  MAC    EBL:  Minimal    SPECIMEN(S):  1.  Gastric body biopsies  2.  Descending colon polyp  3.  Colonic mass at 35 cm biopsy  4.  Left lower extremity skin lesion    DESCRIPTION:  In supine position under sedation prepped and draped usual sterile manner.  1% lidocaine with epinephrine infiltrated locally and transverse incision made over the port which was then excised intact without difficulty.  Hemostasis achieved with electrocautery.  Skin was closed with 3-0 Vicryl deep dermal and 5-0 Vicryl subcuticular followed by Exofin.    1% lidocaine with epinephrine was infiltrated on the left anterior lower extremity and a 20 mm circular incision was made to completely excise the suspicious cutaneous lesion.  Excision went into subcutaneous fat.  Good hemostasis was achieved with  electrocautery.  A 4-0 Monocryl pursestring suture was placed which decreased the size of the open wound to 10 mm.  Bacitracin ointment and Band-Aid were applied    In decubitus position, gastroscope was inserted into the esophagus, stomach and duodenum.  Small sliding hiatal hernia was present.  Esophagus was otherwise normal.  Duodenal bulb and second and third portions were normal.  Gastric antrum, body, and retroflexed view of the fundus were normal with exception of a plaque-like erythematous area along the greater curvature of the gastric body.  Several biopsies of this area were taken.    Digital rectal exam was normal.  Colonoscope was introduced under direct visualization of the lumen to the cecum confirmed by visualization of the ileocecal valve and appendiceal orifice.  Colon was very tortuous and difficult to traverse.  Scope was slowly withdrawn circumferentially examining all mucosal surfaces.  Bowel preparation was good.  There was a subcentimeter polyp that was fairly friable in the descending colon and this was removed with a hot snare and retrieved, good hemostasis at the site.  At 35 cm from the anal verge there was a very friable flat approximately 4 cm polypoid area.  Multiple biopsies were taken and the tissue felt fairly firm.  I injected the lesion with epinephrine solution and good hemostasis was noted.  I injected submucosal tattoo adjacent to the lesion as well.  No other mucosal abnormalities were noted.  Tolerated well.    Christian Melo M.D.

## 2024-04-09 LAB
LAB AP CASE REPORT: NORMAL
PATH REPORT.FINAL DX SPEC: NORMAL
PATH REPORT.GROSS SPEC: NORMAL

## 2024-04-10 NOTE — PROGRESS NOTES
Please make sure she gets a follow-up appointment to see me either this Friday or my next office in [place] after that

## 2024-04-12 ENCOUNTER — OFFICE VISIT (OUTPATIENT)
Dept: SURGERY | Facility: CLINIC | Age: 84
End: 2024-04-12
Payer: MEDICARE

## 2024-04-12 VITALS
HEIGHT: 62 IN | SYSTOLIC BLOOD PRESSURE: 135 MMHG | BODY MASS INDEX: 25.95 KG/M2 | WEIGHT: 141 LBS | DIASTOLIC BLOOD PRESSURE: 82 MMHG

## 2024-04-12 DIAGNOSIS — C44.729 SQUAMOUS CELL CARCINOMA OF SKIN OF LEFT LOWER EXTREMITY: Primary | ICD-10-CM

## 2024-04-12 PROCEDURE — 3075F SYST BP GE 130 - 139MM HG: CPT | Performed by: SURGERY

## 2024-04-12 PROCEDURE — 1160F RVW MEDS BY RX/DR IN RCRD: CPT | Performed by: SURGERY

## 2024-04-12 PROCEDURE — 1159F MED LIST DOCD IN RCRD: CPT | Performed by: SURGERY

## 2024-04-12 PROCEDURE — 99213 OFFICE O/P EST LOW 20 MIN: CPT | Performed by: SURGERY

## 2024-04-12 PROCEDURE — 3079F DIAST BP 80-89 MM HG: CPT | Performed by: SURGERY

## 2024-04-12 NOTE — PROGRESS NOTES
ASSESSMENT/PLAN:    83-year-old lady who underwent the below stated procedure on 4/8/2024.  Recommendations:  She does not need further follow-up colonoscopies.  We discussed pathology report from her lower extremity lesion.  With the well-differentiated nature of the lesion and the negative margins, no further treatment or follow-up is required.    CC:     Follow-up from recent surgery    Endoscopy    INTERVAL HISTORY:    On 4/8/2024 she underwent:    1.  Port removal  2.  20 mm excision of suspicious exophytic cutaneous lesion left lower extremity anterior to the tibia  3.  EGD with biopsy of gastric body  4.  Colonoscopy to cecum with:  Snare removal of descending colon polyp  Cold biopsy of mass at 35 cm  Submucosal tattoo adjacent to mass at 35 cm  Submucosal epinephrine injection into mass at 35 cm    Pathology revealed:  1.  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  2.  Gastric biopsies unremarkable.  3.  Colon biopsies unremarkable.    PHYSICAL EXAM:   Constitutional: No acute distress  Port former port site incision is healing well.  Wound on left lower extremity is clean without evidence of infection.    CHING FRANCOIS M.D.

## 2024-04-16 RX ORDER — HYDROCHLOROTHIAZIDE 25 MG/1
TABLET ORAL
Qty: 90 TABLET | Refills: 0 | Status: SHIPPED | OUTPATIENT
Start: 2024-04-16

## 2024-04-27 DIAGNOSIS — G62.0 PERIPHERAL NEUROPATHY DUE TO CHEMOTHERAPY: ICD-10-CM

## 2024-04-27 DIAGNOSIS — T45.1X5A PERIPHERAL NEUROPATHY DUE TO CHEMOTHERAPY: ICD-10-CM

## 2024-04-30 RX ORDER — GABAPENTIN 300 MG/1
600 CAPSULE ORAL 3 TIMES DAILY
Qty: 180 CAPSULE | Refills: 0 | OUTPATIENT
Start: 2024-04-30

## 2024-05-03 DIAGNOSIS — G62.0 PERIPHERAL NEUROPATHY DUE TO CHEMOTHERAPY: ICD-10-CM

## 2024-05-03 DIAGNOSIS — T45.1X5A PERIPHERAL NEUROPATHY DUE TO CHEMOTHERAPY: ICD-10-CM

## 2024-05-06 ENCOUNTER — TELEPHONE (OUTPATIENT)
Dept: FAMILY MEDICINE CLINIC | Facility: CLINIC | Age: 84
End: 2024-05-06
Payer: MEDICARE

## 2024-05-06 DIAGNOSIS — T45.1X5A PERIPHERAL NEUROPATHY DUE TO CHEMOTHERAPY: ICD-10-CM

## 2024-05-06 DIAGNOSIS — G62.0 PERIPHERAL NEUROPATHY DUE TO CHEMOTHERAPY: ICD-10-CM

## 2024-05-06 RX ORDER — GABAPENTIN 300 MG/1
600 CAPSULE ORAL 3 TIMES DAILY
Qty: 180 CAPSULE | Refills: 0 | OUTPATIENT
Start: 2024-05-06

## 2024-05-07 RX ORDER — GABAPENTIN 300 MG/1
600 CAPSULE ORAL 3 TIMES DAILY
Qty: 180 CAPSULE | Refills: 1 | Status: SHIPPED | OUTPATIENT
Start: 2024-05-07

## 2024-05-07 RX ORDER — GABAPENTIN 300 MG/1
600 CAPSULE ORAL 3 TIMES DAILY
Qty: 180 CAPSULE | Refills: 1 | Status: CANCELLED | OUTPATIENT
Start: 2024-05-07

## 2024-05-07 RX ORDER — HYDROCHLOROTHIAZIDE 25 MG/1
25 TABLET ORAL DAILY
Qty: 90 TABLET | Refills: 1 | Status: SHIPPED | OUTPATIENT
Start: 2024-05-07

## 2024-05-20 DIAGNOSIS — G62.0 PERIPHERAL NEUROPATHY DUE TO CHEMOTHERAPY: ICD-10-CM

## 2024-05-20 DIAGNOSIS — T45.1X5A PERIPHERAL NEUROPATHY DUE TO CHEMOTHERAPY: ICD-10-CM

## 2024-05-20 DIAGNOSIS — E03.9 ACQUIRED HYPOTHYROIDISM: ICD-10-CM

## 2024-05-20 DIAGNOSIS — R63.0 DECREASED APPETITE: ICD-10-CM

## 2024-05-20 DIAGNOSIS — R11.0 NAUSEA: ICD-10-CM

## 2024-05-20 DIAGNOSIS — Z95.828 PORT-A-CATH IN PLACE: ICD-10-CM

## 2024-05-20 DIAGNOSIS — R63.4 WEIGHT LOSS: ICD-10-CM

## 2024-05-20 DIAGNOSIS — Z12.11 SCREENING FOR MALIGNANT NEOPLASM OF COLON: ICD-10-CM

## 2024-05-20 RX ORDER — ONDANSETRON 4 MG/1
4 TABLET, ORALLY DISINTEGRATING ORAL EVERY 8 HOURS PRN
Qty: 60 TABLET | Refills: 1 | Status: CANCELLED | OUTPATIENT
Start: 2024-05-20

## 2024-05-20 RX ORDER — PROMETHAZINE HYDROCHLORIDE 25 MG/1
TABLET ORAL
Qty: 28 TABLET | Refills: 0 | Status: CANCELLED | OUTPATIENT
Start: 2024-05-20

## 2024-05-20 RX ORDER — ONDANSETRON 4 MG/1
4 TABLET, FILM COATED ORAL EVERY 6 HOURS PRN
Qty: 8 TABLET | Refills: 0 | Status: CANCELLED | OUTPATIENT
Start: 2024-05-20

## 2024-05-20 NOTE — TELEPHONE ENCOUNTER
Caller: Roxana Brizuela    Relationship: Self    Best call back number: [phone]    Requested Prescriptions:   Requested Prescriptions     Pending Prescriptions Disp Refills    propranolol (INDERAL) 10 MG tablet       Sig: Take 1 tablet by mouth 3 (Three) Times a Day. TAKE 1 TABLET BY MOUTH THREE TIMES DAILY    gabapentin (NEURONTIN) 300 MG capsule 180 capsule 1     Sig: Take 2 capsules by mouth 3 (Three) Times a Day.    hydroCHLOROthiazide 25 MG tablet 90 tablet 1     Sig: Take 1 tablet by mouth Daily.    levothyroxine (Synthroid) 50 MCG tablet 90 tablet 3     Sig: Take 1 tablet by mouth Daily.    ondansetron (Zofran) 4 MG tablet 8 tablet 0     Sig: Take 1 tablet by mouth Every 6 (Six) Hours As Needed for Nausea or Vomiting for up to 8 doses.    ondansetron ODT (ZOFRAN-ODT) 4 MG disintegrating tablet 60 tablet 1     Sig: Place 1 tablet on the tongue Every 8 (Eight) Hours As Needed for Nausea.    pantoprazole (PROTONIX) 40 MG EC tablet 90 tablet 3     Sig: Take 1 tablet by mouth Daily.    potassium chloride (KLOR-CON M20) 20 MEQ CR tablet 30 tablet 11     Sig: Take 1 tablet by mouth Daily.    promethazine (PHENERGAN) 25 MG tablet 28 tablet 0    simvastatin (ZOCOR) 80 MG tablet 90 tablet 3     Sig: Take 1 tablet by mouth every night at bedtime.    traMADol (Ultram) 50 MG tablet 8 tablet 0     Sig: Take 1 tablet by mouth Every 6 (Six) Hours As Needed for Moderate Pain for up to 8 doses.       Pharmacy where request should be sent: [facility] [address] [place] [address] - [phone] [facility] [phone] FX     Last office visit with prescribing clinician: 12/22/2023   Last telemedicine visit with prescribing clinician: Visit date not found   Next office visit with prescribing clinician: Visit date not found     Additional details provided by patient: PATIENT STATED THAT SHE IS HAVING TROUBLE RECEIVING HER PRESCRIPTIONS AND NEEDS DR. AWAD TO SEND OVER PRESCRIPTION OR REWRITE  THEM SO  THAT SHE IS ABLE TO GET HER MEDICATIONS. PATIENT IS VERY CLOSE TO BEING OUT AND NEEDS THESE PUT IN ASAP. PLEASE CALL FOR MORE INFORMATION IF NEEDED. PATIENT STATES THAT IF SHE NEEDS TO MAKE AN APPOINTMENT SHE IS WILLING. PLEASE CALL TO SCHEDULE IF NEEDED.    Does the patient have less than a 3 day supply:  [x] Yes  [] No    Would you like a call back once the refill request has been completed: [x] Yes [] No    If the office needs to give you a call back, can they leave a voicemail: [x] Yes [] No    Farrah Callahan Rep   05/20/24 14:12 EDT

## 2024-05-21 RX ORDER — POTASSIUM CHLORIDE 20 MEQ/1
20 TABLET, EXTENDED RELEASE ORAL DAILY
Qty: 30 TABLET | Refills: 11 | Status: SHIPPED | OUTPATIENT
Start: 2024-05-21 | End: 2025-05-21

## 2024-05-21 RX ORDER — GABAPENTIN 300 MG/1
600 CAPSULE ORAL 3 TIMES DAILY
Qty: 180 CAPSULE | Refills: 1 | Status: SHIPPED | OUTPATIENT
Start: 2024-05-21

## 2024-05-21 RX ORDER — SIMVASTATIN 80 MG
80 TABLET ORAL
Qty: 90 TABLET | Refills: 3 | Status: SHIPPED | OUTPATIENT
Start: 2024-05-21

## 2024-05-21 RX ORDER — PANTOPRAZOLE SODIUM 40 MG/1
40 TABLET, DELAYED RELEASE ORAL DAILY
Qty: 90 TABLET | Refills: 3 | Status: SHIPPED | OUTPATIENT
Start: 2024-05-21

## 2024-05-21 RX ORDER — HYDROCHLOROTHIAZIDE 25 MG/1
25 TABLET ORAL DAILY
Qty: 90 TABLET | Refills: 1 | Status: SHIPPED | OUTPATIENT
Start: 2024-05-21

## 2024-05-21 RX ORDER — LEVOTHYROXINE SODIUM 0.05 MG/1
50 TABLET ORAL DAILY
Qty: 90 TABLET | Refills: 3 | Status: SHIPPED | OUTPATIENT
Start: 2024-05-21

## 2024-05-21 RX ORDER — PROPRANOLOL HYDROCHLORIDE 10 MG/1
10 TABLET ORAL 3 TIMES DAILY
Qty: 90 TABLET | Refills: 1 | Status: SHIPPED | OUTPATIENT
Start: 2024-05-21

## 2024-05-21 RX ORDER — TRAMADOL HYDROCHLORIDE 50 MG/1
50 TABLET ORAL EVERY 6 HOURS PRN
Qty: 8 TABLET | Refills: 0 | Status: SHIPPED | OUTPATIENT
Start: 2024-05-21

## 2024-06-04 ENCOUNTER — OFFICE VISIT (OUTPATIENT)
Dept: ONCOLOGY | Facility: CLINIC | Age: 84
End: 2024-06-04
Payer: MEDICARE

## 2024-06-04 ENCOUNTER — LAB (OUTPATIENT)
Dept: OTHER | Facility: HOSPITAL | Age: 84
End: 2024-06-04
Payer: MEDICARE

## 2024-06-04 VITALS
RESPIRATION RATE: 16 BRPM | OXYGEN SATURATION: 100 % | SYSTOLIC BLOOD PRESSURE: 149 MMHG | HEIGHT: 61 IN | TEMPERATURE: 97.9 F | HEART RATE: 65 BPM | WEIGHT: 142.8 LBS | BODY MASS INDEX: 26.96 KG/M2 | DIASTOLIC BLOOD PRESSURE: 75 MMHG

## 2024-06-04 DIAGNOSIS — D69.3 CHRONIC ITP (IDIOPATHIC THROMBOCYTOPENIA): ICD-10-CM

## 2024-06-04 DIAGNOSIS — C50.911 MALIGNANT NEOPLASM OF RIGHT FEMALE BREAST, UNSPECIFIED ESTROGEN RECEPTOR STATUS, UNSPECIFIED SITE OF BREAST: ICD-10-CM

## 2024-06-04 DIAGNOSIS — L85.8 KERATOACANTHOMA: Primary | ICD-10-CM

## 2024-06-04 LAB
BASOPHILS # BLD AUTO: 0.03 10*3/MM3 (ref 0–0.2)
BASOPHILS NFR BLD AUTO: 0.4 % (ref 0–1.5)
DEPRECATED RDW RBC AUTO: 52.6 FL (ref 37–54)
EOSINOPHIL # BLD AUTO: 0 10*3/MM3 (ref 0–0.4)
EOSINOPHIL NFR BLD AUTO: 0 % (ref 0.3–6.2)
ERYTHROCYTE [DISTWIDTH] IN BLOOD BY AUTOMATED COUNT: 16.4 % (ref 12.3–15.4)
HCT VFR BLD AUTO: 35.7 % (ref 34–46.6)
HGB BLD-MCNC: 12.1 G/DL (ref 12–15.9)
IMM GRANULOCYTES # BLD AUTO: 0.47 10*3/MM3 (ref 0–0.05)
IMM GRANULOCYTES NFR BLD AUTO: 6.6 % (ref 0–0.5)
LYMPHOCYTES # BLD AUTO: 2.29 10*3/MM3 (ref 0.7–3.1)
LYMPHOCYTES NFR BLD AUTO: 32.3 % (ref 19.6–45.3)
MCH RBC QN AUTO: 30.1 PG (ref 26.6–33)
MCHC RBC AUTO-ENTMCNC: 33.9 G/DL (ref 31.5–35.7)
MCV RBC AUTO: 88.8 FL (ref 79–97)
MONOCYTES # BLD AUTO: 2.21 10*3/MM3 (ref 0.1–0.9)
MONOCYTES NFR BLD AUTO: 31.1 % (ref 5–12)
NEUTROPHILS NFR BLD AUTO: 2.1 10*3/MM3 (ref 1.7–7)
NEUTROPHILS NFR BLD AUTO: 29.6 % (ref 42.7–76)
NRBC BLD AUTO-RTO: 0.3 /100 WBC (ref 0–0.2)
PLAT MORPH BLD: NORMAL
PLATELET # BLD AUTO: 60 10*3/MM3 (ref 140–450)
PLATELET # BLD AUTO: 60 10*3/MM3 (ref 140–450)
PLATELETS.RETICULATED NFR BLD AUTO: 9 % (ref 0.9–6.5)
PMV BLD AUTO: 12.3 FL (ref 6–12)
RBC # BLD AUTO: 4.02 10*6/MM3 (ref 3.77–5.28)
RBC MORPH BLD: NORMAL
WBC MORPH BLD: NORMAL
WBC NRBC COR # BLD AUTO: 7.1 10*3/MM3 (ref 3.4–10.8)

## 2024-06-04 PROCEDURE — 85007 BL SMEAR W/DIFF WBC COUNT: CPT | Performed by: INTERNAL MEDICINE

## 2024-06-04 PROCEDURE — 99214 OFFICE O/P EST MOD 30 MIN: CPT | Performed by: INTERNAL MEDICINE

## 2024-06-04 PROCEDURE — 85055 RETICULATED PLATELET ASSAY: CPT | Performed by: INTERNAL MEDICINE

## 2024-06-04 PROCEDURE — 36415 COLL VENOUS BLD VENIPUNCTURE: CPT

## 2024-06-04 PROCEDURE — 85025 COMPLETE CBC W/AUTO DIFF WBC: CPT | Performed by: INTERNAL MEDICINE

## 2024-06-04 PROCEDURE — 3078F DIAST BP <80 MM HG: CPT | Performed by: INTERNAL MEDICINE

## 2024-06-04 PROCEDURE — 1126F AMNT PAIN NOTED NONE PRSNT: CPT | Performed by: INTERNAL MEDICINE

## 2024-06-04 PROCEDURE — 3077F SYST BP >= 140 MM HG: CPT | Performed by: INTERNAL MEDICINE

## 2024-06-04 NOTE — PROGRESS NOTES
REASON FOR FOLLOWUP: Patient feeling well, no additional bleeding issues, normal performance status      1. Newly diagnosed large right breast cancer.  Core needle biopsy reported invasive mammary carcinoma with focal lobular feature, grade 2.  ER negative, less than 1%; MT positive, moderate in staining, 5% to 10%. HER2 IHC 2+, FISH study positive 2.1.   2.  CT scan for chest abdomen and pelvis and breast MRI examination reported right axillary lymph node suspicious for metastatic disease.  No remote metastatic lesion.  Patient has stage IIIa (T3 N2 M0) disease.   3.  Patient was started neoadjuvant chemotherapy on 5/23/2017 with Taxol weekly plus Herceptin weekly for total 12 weeks, and Perjata every 3 weeks during chemotherapy.  Herceptin will be converted to every 3 weeks after chemotherapy finished.    4.  Chronic moderate thrombocytopenia, mild anemia, and intermittent mild neutropenia etiologies are not clear.  5.  Reaction to Herceptin C1D1.  Subsequently tolerated therapy with hydrocortisone as premedication.  6.  Potential cardiac strain developing, neuropathic symptoms persist, follow-up MRI and surgical assessment will be needed post initial chemotherapeutic phase  7.  MRI August 17 with interval resolution of abnormal enhancement within breast and right axillary adenopathy, surgery scheduled November 7, Herceptin ongoing every 3 weeks  8.  Patient seen in office November 28, status post surgery with apparent complete response, Herceptin continued  9.  Herceptin given December 19, subsequent injury in [place] leading to prolonged stay, single treatment given through additional cancer Center  10.  Patient seen May 01, 2018, no further Herceptin planned, baseline scans pursued posttreatment, post influenza syndrome, physical therapy planned  11.  Patient seen June 13, 2018, excellent performance status, improving on physical therapy, equivocal CT  per thoracic adenopathy, follow-up PET/CT planned   12.   PET/CT with improvement,?  Thyroid abnormality with ultrasound planned  13.  Patient seen October 3, partial thyroidectomy anticipated October 14-    14.  Patient seen May 14, 2019, scans negative for evidence of recurrence     15.  Patient seen 3/29/2019 clinically stable  16.  Patient reviewed July 7, ongoing GERD symptoms, GI referral planned.  17.  Hospitalization September 28-October 1-acute colitis due to enteropathic E. coli-residual thrombocytopenia  18.  Subsequent testing consistent with chronic ITP                                                                                                                                                                                                                               HISTORY OF PRESENT ILLNESS:    The patient is a pleasant 83 y.o. with the above-mentioned history, who presents today in anticipation of cycle 4 of Herceptin, Perjeta and Taxol.  This is the first visit with this physician involving this patient's case.  We have reviewed her status today with her slowly developing neuropathic symptoms in her lower extremities but also involving her fingers recognized significantly and she plays the piano and keyboard.  This is becoming harder to do but she is still able to do so.  She also notices neuropathy in her feet but is able to walk and function in daily activities.  Additional to this is her continued grieving at the death of her  though she, fortunately, has an excellent support system.  She continues to experience nausea that is well relieved with Compazine. She denies oral mucositis.  No diarrhea no constipation no chest pain no dyspnea.  No lower extremity edema.    She did received 2 units of PRBC's on   7/8/2017 and remains hematologically stable.   She does have difficulty with sleep and Ambien 10 mg daily at bedtime seems help much better compared to 5 mg dose.  She does not need refills today.  In reviewing her case July 26 she  is entering the fourth cycle of her treatment and recent echocardiogram is reviewed with she and her close friend revealing EF of 66.7% though with global strain of -16%?  Suspicious for myopathic process.  Normal ventricular cavity size and wall thickness as well as contractility.  We have also discussed that she is responding to therapy and will need surgical assessment which may not as yet have been performed.  She has seen Dr. Melo for port placement and will ask him to review her likely just after she has completed his fourth cycle of therapy.  It is also apparent that she'll need a cardiac assessment as we continue subsequent Herceptin therapy perioperatively.      The patient underwent MRI of the breasts August 17 demonstrating interval resolution of abnormal enhancement within the right breast, resolution of right axillary adenopathy had also resolved.  The findings were consistent with a complete response to neoadjuvant chemotherapy.  The patient was seen in subsequent follow-up with Dr. Melo and was determined to proceed with surgery and ultimately sentinel node evaluation.  This is now scheduled November 7 and there are additional plans to consider radiation therapy postoperatively and we have also discussed the use of anti-hormonal therapy considering her mildly positive IL status.    The patient was able to proceed to surgery undergoing right breast mastectomy and sentinel lymph node biopsy November 07, 2017.  She did well postoperatively seeing Dr. Melo November 16.  Pathology revealed no residual malignancy in the breast and 3 negative sentinel lymph nodes.  A formal review of her report reveals treatment effect particularly in her largest lymph node with focal fibrosis and scar, right breast mastectomy fibrosis associated with a cavitary biopsy site and no invasive or in situ carcinoma.  The patient is now seen in our office though she went to the wrong location and the seen late in the day.   We discussed her findings and agree that she would continue Herceptin at this point but be reviewed formally again in 3 weeks.  She is also be seen by radiation therapy for their input.   The patient, evidently, was seen by radiation therapy but decided not to pursue it until her last Herceptin therapy here December 19.  Following this she visited her daughter in [place] and, unfortunately, developed influenza and pneumonia.  She had a prolonged hospitalization and was at many sites in recovery over a several month only to return to [place] in the last several weeks.  She did take 1 IV Herceptin dose while in [place] but as she is reviewed May 05, 2018 we have discussed that it makes no particular sense to continue or add on to her previous history by extending Herceptin any further 3-4 months after her last treatment.  She therefore has completed Herceptin.    The patient on to be tested post her treatment again baseline studies and CT of chest and pelvis were performed June 6 revealing interval increase in a few subcentimeter nodes in the left pectoral, axillary and mediastinal regions. These are all considerably small and of uncertain significance.  The patient's performance status remains excellent without any reduction and, in fact, has improved with physical therapy upon return.       Patient is next seen August 08, 2018, fortunately feeling well.  A follow-up PET/CT had revealed an interval decrease in the subcentimeter nodes in the left subpectoral axillary region as well as mediastinum.  There was no hypermetabolic lymphadenopathy.  There was intense focus within slightly enlarged right thyroid gland.  Patient indicates she never had any thyroid issues of which she is aware.  The patient was referred to ENT for assessment and the patient was seen by Dr. Fofana.  Ultimately a subtotal thyroidectomy is anticipated and now scheduled October 4.  The patient is willing to proceed.        The patient  proceeded to a right thyroid lobectomy performed November 04, 2018.  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  There was no definitive evidence of trans-capsular or vascular invasion.    The patient is seen in follow-up November 28 doing well and we discussed the surgical treatment of this thyroid lesion.  She feels that all this went well.  She has additional cataract surgery at the end of November  scheduled also.  The patient did complete her testing and was asked to return approximately 6 months later with a follow-up CT chest, abdomen and pelvis that demonstrate no evidence of recurrent disease.  As she is seen back May 14 she, unfortunately, fell and contused her face, left knee and left hand.  This required an ER visit.  Is elected to follow her back in 6 months maintaining her port in the interval and she is seen October 29 again without indications of recurrent disease and relatively stable clinically.  She is seeing plastic surgery about reconstruction and otherwise continue her current medication list and following with her primary care regularly.  The patient is next seen July 7, 2020 indicating that she did not proceed to any  plastic surgery and with the COVID-19 epidemic she does not wish to have any elective procedures.  She has, however, having significant GI reflux symptoms that have worsened substantially last several months despite PPI therapy.    The patient had follow-up for meningioma September 11, 2020 unchanged from previous.        The patient is next reviewed October 27, 2020 having been hospitalized September 28 through October 1, 2020.  She had presented with fever and flank pain and was found to have acute colitis due to enteropathogenic E. coli.  Antibiotics were avoided secondary to history of C. difficile colitis and fortunately she did not want to improve albeit slowly.  She had evidence of acute on chronic thrombocytopenia with a platelet count dropping  to close to 30,000 and slowly rebounding assessed October 6 at 64,000 and October 13 at 67,000.  Fortunately she is feeling considerably better with her bowel function normalizing.  We had the patient return for ongoing testing documenting continued thrombocytopenia and IPF at 9.5 818 October 2020.  She seen back April 13, 2021 she has further thrombocytopenia with peripheral smear showing enlarged platelets.  Is felt this consistent with chronic ITP.  The patient was treated with prednisone 8.5 mg/kg and able to gradually taper last been seen through July 14, 2020 having dropped to 5 mg.  She discontinued the medication tested 7/21/2021 with H&H of 10.6 and 32.1, white count of 8070 and platelet count of 67,000.  She is next seen 7/27/2021.  She has been able to taper off of steroids altogether but recently injured her lower extremities on the table and another physician's office and was treated for potential cellulitis developing.  This included antibiotic treatment-ciprofloxacin-producing nausea which has been difficult to recover from.  She is now completed antibiotic therapy altogether approximately 2 days ago.  The patient was able to maintain off of steroids and returns for follow-up with her platelet count remaining in the 50-60,000 range consistently associated with elevated IPF.  She has had no issues with additional hemorrhage fortunately.  She is seen 10/19/21 with an excellent performance status and again stable.    The patient returned for reassessment and is next evaluated 4/19/2022 with H&H of 10.9 and 32.5 with white count of 5760, platelet count of 78,000, ANC of 1820, platelet count of 78,000 and IPF of 12.0.  She is feeling well without any additional medical issues.    We had the patient return for port maintenance and reassessment in 6 months.  She is reviewed 11/8/2022 stable clinically.    The patient is next reviewed 5/2/2023.  Clinically she has done quite well and, in fact, indicates that  she has had a subsequent COVID-19 vaccination that was given (according to records) 4/26/2023.  She had no complications but is noted to have a slightly reduced platelet count today in the setting of known chronic ITP.       We asked the patient return for follow-up and she is evaluated 10/17/2023.  She has a primary care follow-up with worsening fatigue anemia profile was otherwise unremarkable 10/9/2023.  Platelet count of 60,000.  The patient has many other questions including concerning upper GI symptoms and the need for possible endoscopy, the skin tag that needs to be removed from the lower neck, upper chest and the PowerPort is no longer functional.  A general surgery consultation will be requested.    The patient is next evaluated 2/20/2024.  She was scheduled to have her Mediport removed November 2023, however had to cancel this appointment due to having the flu.  She has not yet scheduled her appointment.  She also canceled her EGD and colonoscopy, and currently does not plan to reschedule these.  She has been feeling well.  She continues to walk 4 miles daily.  She does feel that her neuropathy is slightly worsened, and continues on gabapentin managed by her primary care provider.  Eating and drinking well.  Bowels moving regularly.  Denies signs or symptoms of bleeding.    The patient is next evaluated 6/4/2024.  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  She has returned to normal activities including playing organ for her Confucianism and has been relatively active.  She is pleased with her current status.       Past Medical History:   Diagnosis Date    Anemia     Breast cancer 05/03/2017    Right breast invasive mammary carcinoma with focal lobular features, grade 2, ER negative, less than 1% RI positive, HER-2 positive, stage IIIa disease (T3, N2, M0    Breast cancer 10/20/2004     Left breast ductal carcinoma in-situ, predominately intermediate grade with focal comedonecrosis (high grade), solid and bribridform patterns involving approximately five core biopsy fragments    Colon polyps 2016    Constipation     Edema     Chronic lower extremity edema    GERD (gastroesophageal reflux disease) 09/13/2011    Dr. Paul Negron    GI (gastrointestinal bleed) 1997    H/O jaundice     Hernia     INCISONAL. AROUND LEFT TRAM LAP SITE    History of alcoholism     NONE SINCE 1978    History of chemotherapy     2017 4 MONTHS IN 2017    History of Clostridium difficile colitis     JAN 2018    History of histoplasmosis     History of infectious mononucleosis 1960    History of migraine headaches     History of pneumonia 12/27/2017    AS RESULT OF FLU. ENDED UP ON VENT IN [place]    History of thrombocytopenia     History of transfusion 1997    History of vertigo     Hx of colonic polyps 06/11/2009    Dr. Giles    Hyperlipidemia     Hypertension     Hypothyroidism     PARTIAL THYROIDECTOMY    Macular degeneration     Meningioma     Neuropathy     HANDS AND FEET    Neuropathy     on gabapentin    Osteoarthritis 09/13/2011    Dr. Jamil Miller    Peptic ulceration     Retina disorder     BLEED. FROM HISTOPLASMOSIS    SBO (small bowel obstruction)     due to hernia with surgical repair in 09/2011    SOB (shortness of breath) on exertion     Thyroid mass     RIGHT    Urgency incontinence    Normal echocardiogram on 5/18/2017, LVEF 68%.    Past Surgical History:   Procedure Laterality Date    APPENDECTOMY N/A 1960    BLADDER REPAIR N/A 1980    BREAST BIOPSY Left 10/20/2004    Mammotome incisional biopsy left breast, surgical specimen, left breast, localization clip placement, left breast, confirmatory diagnostic unilateral mammogram, left breast-Dr. Tyrese Alcala, [facility]    BREAST BIOPSY Right 05/03/2017    PATH: INVASIVE MAMMARY CARCINOMA    CATARACT EXTRACTION Bilateral     CHOLECYSTECTOMY N/A 1990     COLONOSCOPY N/A 06/11/2009    Hemorrhoids, otherwise normal, repeat in 5 years-Dr. Noemí Purcell    COLONOSCOPY  2016    [facility]    COLONOSCOPY W/ POLYPECTOMY N/A 4/8/2024    Procedure: COLONOSCOPY WITH POLYPECTOMY WITH TATTOO;  Surgeon: Christian Melo MD;  Location:  [facility] OR;  Service: General;  Laterality: N/A;  descending colon polyp - hot snare  mass @ 35cm - tattooed and EPI    ENDOSCOPY N/A 4/8/2024    Procedure: ESOPHAGOGASTRODUODENOSCOPY WITH BIOPSY;  Surgeon: Christian Melo MD;  Location:  [facility] OR;  Service: General;  Laterality: N/A;  small sliding hiatial hernia, gastritis    EXCISION MASS HEAD/NECK N/A 4/8/2024    Procedure: REMOVAL OF VENOUS ACCESS DEVICE;  Surgeon: Christian Melo MD;  Location: [facility] OR;  Service: General;  Laterality: N/A;    EXCISION MASS LEG Left 4/8/2024    Procedure: EXCISION MASS LOWER EXTREMITY;  Surgeon: Christian Melo MD;  Location: [facility] OR;  Service: General;  Laterality: Left;    EYE SURGERY Right 2017    laser surgery for blood clot    HYSTERECTOMY Bilateral 1980    INGUINAL HERNIA REPAIR Right     DR ALESIA GAXIOLA    MASTECTOMY Left 2004    Left breast mastecotmy with sentinel lymph node biospy-[facility]    MASTECTOMY W/ SENTINEL NODE BIOPSY Right 11/07/2017    Procedure: RIGHT BREAST MASTECTOMY WITH SENTINEL NODE BIOPSY;  Surgeon: Christian Melo MD;  Location: [facility] MAIN OR;  Service:     SD INSJ TUNNELED CVC W/O SUBQ PORT/ AGE 5 YR/> Left 05/22/2017    Procedure: INSERTION VENOUS ACCESS DEVICE;  Surgeon: Christian Melo MD;  Location: [facility] MAIN OR;  Service: General    REDUCTION MAMMAPLASTY Right     to match Lt. TRAM flap    SMALL INTESTINE SURGERY      INCARCRATED HERNIA    THYROIDECTOMY Right 10/04/2018    Procedure: RT THYROID LOBECTOMY;  Surgeon: Julián Fofana MD;  Location[facility];  Service: ENT    TONSILLECTOMY Bilateral     TRAM FLAP DELAYED Left     WITH MASTOPEXY    UPPER GASTROINTESTINAL ENDOSCOPY N/A 09/12/2011     LA grade A esophagitis with no bleeding, large hiatus hernia (50cm), gastric ulcer-Dr.Laszlo Lezama    US GUIDED FINE NEEDLE ASPIRATION  2018   Ilco catheter placement on 2017 by Dr. Melo.     OB/GYN history: Menarche age 16, menopause at 1985. , 1 miscarriage. No birth control pill use. She did have post menopause hormonal supplementation.      HEMATOLOGIC/ONCOLOGIC HISTORY: The patient is a 81y.o. year old female whom we are consulted for a newly self discovered right breast mass, in 2017. Patient had ultrasound-guided biopsy on 5/3/2017 and confirmed to be invasive mammary carcinoma with focal lobular features, grade 2 Elen score 6/9. She is here for initial evaluation for management.      Patient is a 76-year-old  female who was seen here previously for chronic mild-to-moderate thrombocytopenia and mild anemia. Recently the patient reports she started having firmness of the right breast that started sometime in April or maybe even in March. She noticed firmness spread from about around the 12 o'clock position, gradually towards the upper lateral side and lower part of the right breast associated mild pain. The patient thought it was related to fibrocystic changes. However, the symptom was getting worse. Patient also reported dented nipple after she started having pain in the right breast. She denies discharge from the nipple. She called her primary care physician, Dr. Martin, who ordered a mammogram study. This was done on 2017 with architectural distortion of the right breast centrally at 12 o'clock position and had scattered fibroglandular densities throughout the right breast.      The patient subsequently had right breast ultrasound examination on 2017. Discovered a large right breast mass measuring 5.8 x 3.5 x 3.9 cm. Patient subsequently had ultrasound-guided right breast biopsy on 2017. Pathology evaluation reported invasive mammary carcinoma  with focal lobular feature. Arnold score 6/9, overall grade 2. Sample was further sent to the [facility] for test. ER negative, less than 1%; LA positive, moderate in staining, 5% to 10%. HER2 IHC 2+, but FISH study positive 2.1.     She denies weight loss, she actually eats very well. No nausea vomiting. Patient does complain of insomnia, unable to sleep.      This patient has history of left breast DCIS back in 2007, had mastectomy at the [facility]. No hormonal therapy afterwards according to patient. This patient also had a small meningioma, followed by Dr. Smith in [facility], with most recent MRI of the brain in March 2017, with 18 mm meningioma, and followed on an annual basis.     Her neutrophil count on 5/16/17 is normal at 2500, however, records showed starting from 05/2015 and in 09/2016, 01/2017 and 03/2017, all 4 laboratory studies showed mild neutropenia with ANC fluctuating between 1200 and 1600. Etiology is not clear.    Normal echocardiogram on 5/18/2017, LVEF 68%.      CT scan for chest abdomen and pelvis on 5/22/2017 and breast MRI examination on 5/22/2017 reported small right axillary lymph node suspicious for metastatic disease.  No remote metastatic lesion.  Patient has stage IIIa (T3 N2 M0) disease.      Patient will start neoadjuvant chemotherapy with Taxol weekly plus Herceptin weekly for total 12 weeks, and Perjeta every 3 weeks (3-week cycle) during chemotherapy.  Herceptin will be converted to every 3 weeks after chemotherapy finished.  Cycle 1 day 1 5/23/2017.   Status post neoadjuvant chemotherapy the patient was assessed with MRI showing a complete neoadjuvant response.  The patient was reviewed for surgery and questions concerning lymph node dissection-sentinel node evaluation-and need for radiation therapy postop were considered as well as use of additional Herceptin for the balance of the year as well as additional use of  anti-hormonal therapy.  Surgery was scheduled November 07, 2017.  Patient next seen in office November 28 having undergone surgery on November 7 with results revealing no residual malignancy in either breast or associated sentinel lymph nodes.  Was elected to continue Herceptin when seen back in office November 20 having initiated Herceptin May 23, 2017.    Patient continued Herceptin with her last treatment given December 19, 2017.     Unfortunately she later experienced an accident while in [place] December 28 with prolonged hospitalization and prolonged ventilator management required.  Apparently she had at least one IV Herceptin therapy given while there and we were contacted March 20, 2018- Dr. Nuno.  Eventually the patient was able to return to [place] is seen back in office May 5 at which time we concluded that she completed Herceptin therapy as result of being off treatment for so long.  Plans were made for baseline scans.  Patient follow-up reexaminations May 8, 2019 with no evidence of recurrent malignancy.  This was again a circumstance when she was assessed October 29, 2019.  Patient seen July 7, 2020 without evidence of recurrent malignancy.  Recurrent GI symptoms noted with GI referral planned.  Patient hospitalized September 28-October 1, 2020 with enteropathic E. coli, acute on chronic thrombocytopenia.     Subsequent testing felt consistent with chronic ITP and trial of steroids initiated April 14, 2021.  She is able to taper off of prednisone altogether by mid July 2021.  Patient seen back in office 10/19/21 stable off steroids.  Her subsequent assessments were consistent with chronic ITP without intervention required.      MEDICATIONS: The current medication list was reviewed with the patient and updated in the EMR this date per the Medical Assistant. Medication dosages and frequencies were confirmed to be accurate.       ALLERGIES:    Allergies   Allergen Reactions    Codeine Nausea And  "Vomiting    Morphine Nausea And Vomiting     SOCIAL HISTORY:   Social History     Tobacco Use    Smoking status: Former     Current packs/day: 0.00     Average packs/day: 2.0 packs/day for 30.2 years (60.4 ttl pk-yrs)     Types: Cigarettes     Start date: 1957     Quit date: 4/10/1987     Years since quittin.1    Smokeless tobacco: Never    Tobacco comments:     I haven't had a cigarette in over 30 years   Vaping Use    Vaping status: Never Used   Substance Use Topics    Alcohol use: No     Comment: stopped , heavy in past    Drug use: No     FAMILY HISTORY:  Family History   Problem Relation Age of Onset    Heart disease Mother     Aortic aneurysm Mother         abdominal    Coronary artery disease Mother     Hypertension Mother     Miscarriages / Stillbirths Mother     Diverticulitis Mother     Heart disease Father     Heart attack Father         acute    Hypertension Father     Early death Father     Hearing loss Father     Kidney disease Father     Breast cancer Daughter 45    Heart disease Brother     Hypertension Brother     Malig Hyperthermia Neg Hx      I have reviewed the patient's medical history in detail and updated the computerized patient record.    REVIEW OF SYSTEMS:  Review of systems as mentioned in the HPI.     Objective:   Vitals:    24 1337   BP: 149/75   Pulse: 65   Resp: 16   Temp: 97.9 °F (36.6 °C)   TempSrc: Oral   SpO2: 100%   Weight: 64.8 kg (142 lb 12.8 oz)   Height: 156.2 cm (61.5\")   PainSc: 0-No pain         ECOG 0      PHYSICAL EXAM:   GENERAL: Well-developed, well-nourished female, in no acute distress.   SKIN: Warm, dry without rashes, purpura or petechiae.  Left upper chest Lico catheter in place, no evidence of infection.  Skin tag lower neck anteriorly.  EYES: Pupils equal, round and reactive to light. EOMs intact. Conjunctivae normal.  EARS: Hearing intact.  NOSE:  No excoriations or nasal discharge.  MOUTH: Tongue is well-papillated; no stomatitis or ulcers. " Lips normal.  THROAT: Oropharynx without lesions or exudates.  Status post partial thyroidectomy well-healing  LYMPHATICS: No cervical, supraclavicular, axillary adenopathy.  CHEST: Lungs clear to auscultation. Good airflow.   BREAST:Postop, Well healed right mastectomy site with no evidence of recurrent disease, no axillary adenopathy bilaterally.  CARDIAC: Regular rate and rhythm without murmurs. Normal S1,S2.  ABDOMEN: Slightly distended, normal active bowel sounds,  no hepatosplenomegaly or masses.  EXTREMITIES: Status post resection of skin cancer left anterior tibia midportion--?  Recurrent, consistent with well-differentiated keratinizing squamous cell carcinoma, keratoacanthoma type  NEUROLOGICAL: Cranial Nerves II-XII grossly intact. No focal neurological deficits.  PSYCHIATRIC: Alert and oriented      RECENT LABS:  Lab Results   Component Value Date    WBC 7.10 06/04/2024    HGB 12.1 06/04/2024    HCT 35.7 06/04/2024    MCV 88.8 06/04/2024    PLT 60 (L) 06/04/2024    PLT 60 (L) 06/04/2024     Lab Results   Component Value Date    NEUTROABS 2.10 06/04/2024     Lab Results   Component Value Date    GLUCOSE 110 (H) 04/01/2024    BUN 16 04/01/2024    CREATININE 0.89 04/01/2024    EGFRIFNONA 70 12/20/2021    EGFRIFAFRI 81 12/20/2021    BCR 18.0 04/01/2024    K 3.5 04/01/2024    CO2 25.0 04/01/2024    CALCIUM 9.5 04/01/2024    PROTENTOTREF 6.9 12/22/2023    ALBUMIN 4.5 12/22/2023    LABIL2 1.9 12/22/2023    AST 20 12/22/2023    ALT 13 12/22/2023            Assessment/Plan   1. Large right breast cancer, locally advanced, stage IIIa (T3 N1/ 2 M0).  ER negative, less than 1%; TN positive, moderate in staining, 5% to 10%. HER2 IHC 2+, FISH study positive 2.1.  Physical examination showed a large mass, 7 cm x 7 cm initially which has decreased to approximately less than 4 cm ..  Patient  proceeded through 4 cycles ceptin,Perjeta and Taxol.   Her subsequent MRI examination showed complete response by imaging  including right axillary lymphadenopathy.  We have continued Herceptin and that includes today October 20, 2017.  The case was discussed with Dr. Melo and plans were to proceed with mastectomy plus right axillary lymph node assessment via sentinel node evaluation. it was felt she likely require radiation therapy post procedure.  The patient was scheduled and proceeded to surgery November 7.  Her results, wonderfully, revealed no evidence of residual disease in the breast or associated sentinel lymph nodes. She was seen by radiation therapy and offered treatment did not pursue it.  Additionally, and somewhat complicating this story, she traveled to [place] and developed severe influenza, associated pneumonia and was hospitalized for several months only to return to [place] in the last several weeks now being seen back May 1.  There is no particular benefit from trying to add Herceptin back to her therapy now and is felt that she is completed Herceptin treatment.  She wishes consider reconstruction and baseline CT scans will be requested in 5 weeks with the patient being seen in 6 weeks follow-up.The patient reviewed June 13, 2018 with the scans demonstrating very modest increase in left pectoral, axillary or mediastinal nodes.  These are of uncertain significance but do need follow-up in a PET/CT is planned in 7 weeks.  Her anemia will also be reviewed again with additional laboratory studies that visit.  She'll be seen in 8 weeks for general reassessment.    The patient's anemia workup was essentially negative and she is slowly improving when seen back August 8.  Her PET/CT, fortunately, demonstrates improvement though there is potential abnormality within the right thyroid lobe.  We discussed thyroid function testing and follow-up thyroid ultrasound.  She will be seen back in approximately 2 months as we maintain her port monthly flushes.   The patient was reviewed by ENT and ultimately was felt that a  partial thyroidectomy was in order and is now scheduled October 4.  Patient's one to proceed.  We'll plan to see her back in approximately 6 weeks.      The patient is next seen November 28, 2015.  Her surgery went well with the findings as noted above.  She has follow-up with ENT in the next several weeks.  Additionally she has bilateral cataract surgery at the end of this month and into the beginning of next.  We discussed reassessment in general with follow-up scans and these were performed May 2019 which showed no evidence of recurrent malignancy.  Six-month follow-up planes with maintenance of her port every 6 weeks.  The patient is reassessed October 29, 2019 fortunately continue to do relatively well.  She has had no additional medical issues since last seen we will plan to see her back in 6 months again meeting report.  She does plan to see plastic surgery concerning reconstruction concerning her breast cancer history.  A copy this note will be sent to the plastic surgeon.     The patient is next seen July 7, 2020 without evidence of recurrence of malignancy.  We plan 6-month follow-up.  The patient is reviewed October 27, 2020 without evidence recurrent disease, repeat scans during recent hospitalization September 20-October 1 without evidence of recurrent malignancy.  Subsequent assessments again without evidence of recurrence including reexamination 11/8/2022 and subsequently including 10/17/2023.       Next evaluated 2/20/2024 for follow-up.  No evidence of recurrent disease.  Was scheduled for Mediport removal November 2023, however this was canceled due to her having the flu.  She has not yet rescheduled this appointment.      2.  Previous fall with contusions now recovered.  Recent contusions lower extremities treated with antibiotic therapy producing nausea now discontinued                                                                                                                                                     3.  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  This has been treated with B vitamins including B12 and B6.  These were not given for her history of previous EtOH use.  Continues follow-up with her PCP, currently receiving gabapentin.    4.  Worsening reflux symptoms, now stable    5.  Hospitalization September 20-October 1, 2020 with enteropathic E. coli.  This responded to conservative therapy and colonoscopy had been planned but we will not pursue at this point with resolution of symptoms.    6.  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  As this is assessed April 13 the patient's platelet count continues to decrease and we have discussed a trial of half milligram per kilogram prednisone-40 mg daily with weekly checks and adjustment as needed including rapid taper.     Patient tapered from prednisone through mid July 2021, stable to improved when assessed 7/27/2021.  No additional steroids planned.  Patient stable seen 10/19/21 with every 3 month assessments planned.  Subsequent conclusion felt to be consistent with chronic ITP  Patient next assessed 5/2/2023 with platelet count of 55,000, IPF pending.  This would still be adequate with no intervention necessary but we will plan follow-up.  Subsequent platelet count between 60 and 80,000 documented.  2/20/2024 platelets today stable at 59,000.  Assessment 6/4/2024-platelet count of 60,000, IPF 1.0    7.  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  Referral to Dr. Melo for reevaluation of left-exophytic lesion-previous site of keratoacanthoma

## 2024-06-04 NOTE — LETTER
June 4, 2024     Brandt Martin MD  [address]  [facility] [address]    Patient: Roxana Brizuela   YOB: 1940   Date of Visit: 6/4/2024     Dear Brandt Martin MD:       Thank you for referring Roxana Brizuela to me for evaluation. Below are the relevant portions of my assessment and plan of care.    If you have questions, please do not hesitate to call me. I look forward to following Roxana along with you.         Sincerely,        Oneil Coburn MD        CC: MD Irish Muñoz Michael D., MD  06/04/24 1420  Sign when Signing Visit  REASON FOR FOLLOWUP: Patient feeling well, no additional bleeding issues, normal performance status      1. Newly diagnosed large right breast cancer.  Core needle biopsy reported invasive mammary carcinoma with focal lobular feature, grade 2.  ER negative, less than 1%; NV positive, moderate in staining, 5% to 10%. HER2 IHC 2+, FISH study positive 2.1.   2.  CT scan for chest abdomen and pelvis and breast MRI examination reported right axillary lymph node suspicious for metastatic disease.  No remote metastatic lesion.  Patient has stage IIIa (T3 N2 M0) disease.   3.  Patient was started neoadjuvant chemotherapy on 5/23/2017 with Taxol weekly plus Herceptin weekly for total 12 weeks, and Perjata every 3 weeks during chemotherapy.  Herceptin will be converted to every 3 weeks after chemotherapy finished.    4.  Chronic moderate thrombocytopenia, mild anemia, and intermittent mild neutropenia etiologies are not clear.  5.  Reaction to Herceptin C1D1.  Subsequently tolerated therapy with hydrocortisone as premedication.  6.  Potential cardiac strain developing, neuropathic symptoms persist, follow-up MRI and surgical assessment will be needed post initial chemotherapeutic phase  7.  MRI August 17 with interval resolution of abnormal enhancement within breast and right axillary adenopathy, surgery scheduled November 7, Herceptin ongoing  every 3 weeks  8.  Patient seen in office November 28, status post surgery with apparent complete response, Herceptin continued  9.  Herceptin given December 19, subsequent injury in [place] leading to prolonged stay, single treatment given through additional cancer Center  10.  Patient seen May 01, 2018, no further Herceptin planned, baseline scans pursued posttreatment, post influenza syndrome, physical therapy planned  11.  Patient seen June 13, 2018, excellent performance status, improving on physical therapy, equivocal CT  per thoracic adenopathy, follow-up PET/CT planned   12.  PET/CT with improvement,?  Thyroid abnormality with ultrasound planned  13.  Patient seen October 3, partial thyroidectomy anticipated October 14-    14.  Patient seen May 14, 2019, scans negative for evidence of recurrence     15.  Patient seen 3/29/2019 clinically stable  16.  Patient reviewed July 7, ongoing GERD symptoms, GI referral planned.  17.  Hospitalization September 28-October 1-acute colitis due to enteropathic E. coli-residual thrombocytopenia  18.  Subsequent testing consistent with chronic ITP                                                                                                                                                                                                                               HISTORY OF PRESENT ILLNESS:    The patient is a pleasant 83 y.o. with the above-mentioned history, who presents today in anticipation of cycle 4 of Herceptin, Perjeta and Taxol.  This is the first visit with this physician involving this patient's case.  We have reviewed her status today with her slowly developing neuropathic symptoms in her lower extremities but also involving her fingers recognized significantly and she plays the piano and keyboard.  This is becoming harder to do but she is still able to do so.  She also notices neuropathy in her feet but is able to walk and function in daily  activities.  Additional to this is her continued grieving at the death of her  though she, fortunately, has an excellent support system.  She continues to experience nausea that is well relieved with Compazine. She denies oral mucositis.  No diarrhea no constipation no chest pain no dyspnea.  No lower extremity edema.    She did received 2 units of PRBC's on   7/8/2017 and remains hematologically stable.   She does have difficulty with sleep and Ambien 10 mg daily at bedtime seems help much better compared to 5 mg dose.  She does not need refills today.  In reviewing her case July 26 she is entering the fourth cycle of her treatment and recent echocardiogram is reviewed with she and her close friend revealing EF of 66.7% though with global strain of -16%?  Suspicious for myopathic process.  Normal ventricular cavity size and wall thickness as well as contractility.  We have also discussed that she is responding to therapy and will need surgical assessment which may not as yet have been performed.  She has seen Dr. Melo for port placement and will ask him to review her likely just after she has completed his fourth cycle of therapy.  It is also apparent that she'll need a cardiac assessment as we continue subsequent Herceptin therapy perioperatively.      The patient underwent MRI of the breasts August 17 demonstrating interval resolution of abnormal enhancement within the right breast, resolution of right axillary adenopathy had also resolved.  The findings were consistent with a complete response to neoadjuvant chemotherapy.  The patient was seen in subsequent follow-up with Dr. Melo and was determined to proceed with surgery and ultimately sentinel node evaluation.  This is now scheduled November 7 and there are additional plans to consider radiation therapy postoperatively and we have also discussed the use of anti-hormonal therapy considering her mildly positive WA status.    The patient was able to  proceed to surgery undergoing right breast mastectomy and sentinel lymph node biopsy November 07, 2017.  She did well postoperatively seeing Dr. Melo November 16.  Pathology revealed no residual malignancy in the breast and 3 negative sentinel lymph nodes.  A formal review of her report reveals treatment effect particularly in her largest lymph node with focal fibrosis and scar, right breast mastectomy fibrosis associated with a cavitary biopsy site and no invasive or in situ carcinoma.  The patient is now seen in our office though she went to the wrong location and the seen late in the day.  We discussed her findings and agree that she would continue Herceptin at this point but be reviewed formally again in 3 weeks.  She is also be seen by radiation therapy for their input.   The patient, evidently, was seen by radiation therapy but decided not to pursue it until her last Herceptin therapy here December 19.  Following this she visited her daughter in [place] and, unfortunately, developed influenza and pneumonia.  She had a prolonged hospitalization and was at many sites in recovery over a several month only to return to [place] in the last several weeks.  She did take 1 IV Herceptin dose while in [place] but as she is reviewed May 05, 2018 we have discussed that it makes no particular sense to continue or add on to her previous history by extending Herceptin any further 3-4 months after her last treatment.  She therefore has completed Herceptin.    The patient on to be tested post her treatment again baseline studies and CT of chest and pelvis were performed June 6 revealing interval increase in a few subcentimeter nodes in the left pectoral, axillary and mediastinal regions. These are all considerably small and of uncertain significance.  The patient's performance status remains excellent without any reduction and, in fact, has improved with physical therapy upon return.       Patient is next seen  August 08, 2018, fortunately feeling well.  A follow-up PET/CT had revealed an interval decrease in the subcentimeter nodes in the left subpectoral axillary region as well as mediastinum.  There was no hypermetabolic lymphadenopathy.  There was intense focus within slightly enlarged right thyroid gland.  Patient indicates she never had any thyroid issues of which she is aware.  The patient was referred to ENT for assessment and the patient was seen by Dr. Fofana.  Ultimately a subtotal thyroidectomy is anticipated and now scheduled October 4.  The patient is willing to proceed.        The patient proceeded to a right thyroid lobectomy performed November 04, 2018.  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  There was no definitive evidence of trans-capsular or vascular invasion.    The patient is seen in follow-up November 28 doing well and we discussed the surgical treatment of this thyroid lesion.  She feels that all this went well.  She has additional cataract surgery at the end of November  scheduled also.  The patient did complete her testing and was asked to return approximately 6 months later with a follow-up CT chest, abdomen and pelvis that demonstrate no evidence of recurrent disease.  As she is seen back May 14 she, unfortunately, fell and contused her face, left knee and left hand.  This required an ER visit.  Is elected to follow her back in 6 months maintaining her port in the interval and she is seen October 29 again without indications of recurrent disease and relatively stable clinically.  She is seeing plastic surgery about reconstruction and otherwise continue her current medication list and following with her primary care regularly.  The patient is next seen July 7, 2020 indicating that she did not proceed to any  plastic surgery and with the COVID-19 epidemic she does not wish to have any elective procedures.  She has, however, having significant GI reflux symptoms that  have worsened substantially last several months despite PPI therapy.    The patient had follow-up for meningioma September 11, 2020 unchanged from previous.        The patient is next reviewed October 27, 2020 having been hospitalized September 28 through October 1, 2020.  She had presented with fever and flank pain and was found to have acute colitis due to enteropathogenic E. coli.  Antibiotics were avoided secondary to history of C. difficile colitis and fortunately she did not want to improve albeit slowly.  She had evidence of acute on chronic thrombocytopenia with a platelet count dropping to close to 30,000 and slowly rebounding assessed October 6 at 64,000 and October 13 at 67,000.  Fortunately she is feeling considerably better with her bowel function normalizing.  We had the patient return for ongoing testing documenting continued thrombocytopenia and IPF at 9.5 818 October 2020.  She seen back April 13, 2021 she has further thrombocytopenia with peripheral smear showing enlarged platelets.  Is felt this consistent with chronic ITP.  The patient was treated with prednisone 8.5 mg/kg and able to gradually taper last been seen through July 14, 2020 having dropped to 5 mg.  She discontinued the medication tested 7/21/2021 with H&H of 10.6 and 32.1, white count of 8070 and platelet count of 67,000.  She is next seen 7/27/2021.  She has been able to taper off of steroids altogether but recently injured her lower extremities on the table and another physician's office and was treated for potential cellulitis developing.  This included antibiotic treatment-ciprofloxacin-producing nausea which has been difficult to recover from.  She is now completed antibiotic therapy altogether approximately 2 days ago.  The patient was able to maintain off of steroids and returns for follow-up with her platelet count remaining in the 50-60,000 range consistently associated with elevated IPF.  She has had no issues with  additional hemorrhage fortunately.  She is seen 10/19/21 with an excellent performance status and again stable.    The patient returned for reassessment and is next evaluated 4/19/2022 with H&H of 10.9 and 32.5 with white count of 5760, platelet count of 78,000, ANC of 1820, platelet count of 78,000 and IPF of 12.0.  She is feeling well without any additional medical issues.    We had the patient return for port maintenance and reassessment in 6 months.  She is reviewed 11/8/2022 stable clinically.    The patient is next reviewed 5/2/2023.  Clinically she has done quite well and, in fact, indicates that she has had a subsequent COVID-19 vaccination that was given (according to records) 4/26/2023.  She had no complications but is noted to have a slightly reduced platelet count today in the setting of known chronic ITP.       We asked the patient return for follow-up and she is evaluated 10/17/2023.  She has a primary care follow-up with worsening fatigue anemia profile was otherwise unremarkable 10/9/2023.  Platelet count of 60,000.  The patient has many other questions including concerning upper GI symptoms and the need for possible endoscopy, the skin tag that needs to be removed from the lower neck, upper chest and the PowerPort is no longer functional.  A general surgery consultation will be requested.    The patient is next evaluated 2/20/2024.  She was scheduled to have her Mediport removed November 2023, however had to cancel this appointment due to having the flu.  She has not yet scheduled her appointment.  She also canceled her EGD and colonoscopy, and currently does not plan to reschedule these.  She has been feeling well.  She continues to walk 4 miles daily.  She does feel that her neuropathy is slightly worsened, and continues on gabapentin managed by her primary care provider.  Eating and drinking well.  Bowels moving regularly.  Denies signs or symptoms of bleeding.    The patient is next evaluated  6/4/2024.  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  She is pleased with her current status.       Past Medical History:   Diagnosis Date   • Anemia    • Breast cancer 05/03/2017    Right breast invasive mammary carcinoma with focal lobular features, grade 2, ER negative, less than 1% LA positive, HER-2 positive, stage IIIa disease (T3, N2, M0   • Breast cancer 10/20/2004    Left breast ductal carcinoma in-situ, predominately intermediate grade with focal comedonecrosis (high grade), solid and bribridform patterns involving approximately five core biopsy fragments   • Colon polyps 2016   • Constipation    • Edema     Chronic lower extremity edema   • GERD (gastroesophageal reflux disease) 09/13/2011    Dr. Paul Negron   • GI (gastrointestinal bleed) 1997   • H/O jaundice    • Hernia     INCISONAL. AROUND LEFT TRAM LAP SITE   • History of alcoholism     NONE SINCE 1978   • History of chemotherapy     2017 4 MONTHS IN 2017   • History of Clostridium difficile colitis     JAN 2018   • History of histoplasmosis    • History of infectious mononucleosis 1960   • History of migraine headaches    • History of pneumonia 12/27/2017    AS RESULT OF FLU. ENDED UP ON VENT IN [place]   • History of thrombocytopenia    • History of transfusion 1997   • History of vertigo    • Hx of colonic polyps 06/11/2009    Dr. Giles   • Hyperlipidemia    • Hypertension    • Hypothyroidism     PARTIAL THYROIDECTOMY   • Macular degeneration    • Meningioma    • Neuropathy     HANDS AND FEET   • Neuropathy     on gabapentin   • Osteoarthritis 09/13/2011    Dr. Jamil Miller   • Peptic ulceration    • Retina disorder     BLEED. FROM HISTOPLASMOSIS   • SBO (small bowel obstruction)     due to hernia with  surgical repair in 09/2011   • SOB (shortness of breath) on exertion    • Thyroid mass     RIGHT   • Urgency incontinence    Normal echocardiogram on 5/18/2017, LVEF 68%.    Past Surgical History:   Procedure Laterality Date   • APPENDECTOMY N/A 1960   • BLADDER REPAIR N/A 1980   • BREAST BIOPSY Left 10/20/2004    Mammotome incisional biopsy left breast, surgical specimen, left breast, localization clip placement, left breast, confirmatory diagnostic unilateral mammogram, left breast-Dr. Tyrese Alcala, [facility]   • BREAST BIOPSY Right 05/03/2017    PATH: INVASIVE MAMMARY CARCINOMA   • CATARACT EXTRACTION Bilateral    • CHOLECYSTECTOMY N/A 1990   • COLONOSCOPY N/A 06/11/2009    Hemorrhoids, otherwise normal, repeat in 5 years-Dr. Noemí Purcell   • COLONOSCOPY  2016    [facility]   • COLONOSCOPY W/ POLYPECTOMY N/A 4/8/2024    Procedure: COLONOSCOPY WITH POLYPECTOMY WITH TATTOO;  Surgeon: Christian Melo MD;  Location:  [facility] OR;  Service: General;  Laterality: N/A;  descending colon polyp - hot snare  mass @ 35cm - tattooed and EPI   • ENDOSCOPY N/A 4/8/2024    Procedure: ESOPHAGOGASTRODUODENOSCOPY WITH BIOPSY;  Surgeon: Christian Melo MD;  Location:  [facility] OR;  Service: General;  Laterality: N/A;  small sliding hiatial hernia, gastritis   • EXCISION MASS HEAD/NECK N/A 4/8/2024    Procedure: REMOVAL OF VENOUS ACCESS DEVICE;  Surgeon: Christian Melo MD;  Location:  [facility] OR;  Service: General;  Laterality: N/A;   • EXCISION MASS LEG Left 4/8/2024    Procedure: EXCISION MASS LOWER EXTREMITY;  Surgeon: Christian Melo MD;  Location:  [facility] OR;  Service: General;  Laterality: Left;   • EYE SURGERY Right 2017    laser surgery for blood clot   • HYSTERECTOMY Bilateral 1980   • INGUINAL HERNIA REPAIR Right     DR ALESIA GAXIOLA   • MASTECTOMY Left 2004    Left breast mastecotmy with sentinel lymph node [facility]ospy[facility]   • MASTECTOMY W/ SENTINEL NODE BIOPSY Right 11/07/2017    Procedure: RIGHT BREAST MASTECTOMY WITH  SENTINEL NODE BIOPSY;  Surgeon: Christian Melo MD;  Location: [facility] MAIN OR;  Service:    • AL INSJ TUNNELED CVC W/O SUBQ PORT/ AGE 5 YR/> Left 2017    Procedure: INSERTION VENOUS ACCESS DEVICE;  Surgeon: Christian Melo MD;  Location[facility] MA[facility];  Service: General   • REDUCTION MAMMAPLASTY Right     to match Lt. TRAM flap   • SMALL INTESTINE SURGERY      INCARCRATED HERNIA   • THYROIDECTOMY Right 10/04/2018    Procedure: RT THYROID LOBECTOMY;  Surgeon: Julián Fofana MD;  [facility];  Service: ENT   • TONSILLECTOMY Bilateral    • TRAM FLAP DELAYED Left     WITH MASTOPEXY   • UPPER GASTROINTESTINAL ENDOSCOPY N/A 2011    LA grade A esophagitis with no bleeding, large hiatus hernia (50cm), gastric ulcer-Dr.Laszlo Lezama   • US GUIDED FINE NEEDLE ASPIRATION  2018   Lico catheter placement on 2017 by Dr. Melo.     OB/GYN history: Menarche age 16, menopause at 1985. , 1 miscarriage. No birth control pill use. She did have post menopause hormonal supplementation.      HEMATOLOGIC/ONCOLOGIC HISTORY: The patient is a 81y.o. year old female whom we are consulted for a newly self discovered right breast mass, in 2017. Patient had ultrasound-guided biopsy on 5/3/2017 and confirmed to be invasive mammary carcinoma with focal lobular features, grade 2 Elen score 6/9. She is here for initial evaluation for management.      Patient is a 76-year-old  female who was seen here previously for chronic mild-to-moderate thrombocytopenia and mild anemia. Recently the patient reports she started having firmness of the right breast that started sometime in April or maybe even in March. She noticed firmness spread from about around the 12 o'clock position, gradually towards the upper lateral side and lower part of the right breast associated mild pain. The patient thought it was related to fibrocystic changes. However, the symptom was getting worse. Patient also  reported dented nipple after she started having pain in the right breast. She denies discharge from the nipple. She called her primary care physician, Dr. Martin, who ordered a mammogram study. This was done on 04/12/2017 with architectural distortion of the right breast centrally at 12 o'clock position and had scattered fibroglandular densities throughout the right breast.      The patient subsequently had right breast ultrasound examination on 04/26/2017. Discovered a large right breast mass measuring 5.8 x 3.5 x 3.9 cm. Patient subsequently had ultrasound-guided right breast biopsy on 05/03/2017. Pathology evaluation reported invasive mammary carcinoma with focal lobular feature. Elen score 6/9, overall grade 2. Sample was further sent to the [facility] for test. ER negative, less than 1%; NJ positive, moderate in staining, 5% to 10%. HER2 IHC 2+, but FISH study positive 2.1.     She denies weight loss, she actually eats very well. No nausea vomiting. Patient does complain of insomnia, unable to sleep.      This patient has history of left breast DCIS back in 2007, had mastectomy at the [facility]. No hormonal therapy afterwards according to patient. This patient also had a small meningioma, followed by Dr. Smith in [facility], with most recent MRI of the brain in March 2017, with 18 mm meningioma, and followed on an annual basis.     Her neutrophil count on 5/16/17 is normal at 2500, however, records showed starting from 05/2015 and in 09/2016, 01/2017 and 03/2017, all 4 laboratory studies showed mild neutropenia with ANC fluctuating between 1200 and 1600. Etiology is not clear.    Normal echocardiogram on 5/18/2017, LVEF 68%.      CT scan for chest abdomen and pelvis on 5/22/2017 and breast MRI examination on 5/22/2017 reported small right axillary lymph node suspicious for metastatic disease.  No remote metastatic lesion.  Patient has stage IIIa  (T3 N2 M0) disease.      Patient will start neoadjuvant chemotherapy with Taxol weekly plus Herceptin weekly for total 12 weeks, and Perjeta every 3 weeks (3-week cycle) during chemotherapy.  Herceptin will be converted to every 3 weeks after chemotherapy finished.  Cycle 1 day 1 5/23/2017.   Status post neoadjuvant chemotherapy the patient was assessed with MRI showing a complete neoadjuvant response.  The patient was reviewed for surgery and questions concerning lymph node dissection-sentinel node evaluation-and need for radiation therapy postop were considered as well as use of additional Herceptin for the balance of the year as well as additional use of anti-hormonal therapy.  Surgery was scheduled November 07, 2017.  Patient next seen in office November 28 having undergone surgery on November 7 with results revealing no residual malignancy in either breast or associated sentinel lymph nodes.  Was elected to continue Herceptin when seen back in office November 20 having initiated Herceptin May 23, 2017.    Patient continued Herceptin with her last treatment given December 19, 2017.     Unfortunately she later experienced an accident while in [place] December 28 with prolonged hospitalization and prolonged ventilator management required.  Apparently she had at least one IV Herceptin therapy given while there and we were contacted March 20, 2018- Dr. Nuno.  Eventually the patient was able to return to [place] [address] is seen back in office May 5 at which time we concluded that she completed Herceptin therapy as result of being off treatment for so long.  Plans were made for baseline scans.  Patient follow-up reexaminations May 8, 2019 with no evidence of recurrent malignancy.  This was again a circumstance when she was assessed October 29, 2019.  Patient seen July 7, 2020 without evidence of recurrent malignancy.  Recurrent GI symptoms noted with GI referral planned.  Patient hospitalized September 28-October 1, 2020  with enteropathic E. coli, acute on chronic thrombocytopenia.     Subsequent testing felt consistent with chronic ITP and trial of steroids initiated 2021.  She is able to taper off of prednisone altogether by mid 2021.  Patient seen back in office 10/19/21 stable off steroids.  Her subsequent assessments were consistent with chronic ITP without intervention required.      MEDICATIONS: The current medication list was reviewed with the patient and updated in the EMR this date per the Medical Assistant. Medication dosages and frequencies were confirmed to be accurate.       ALLERGIES:    Allergies   Allergen Reactions   • Codeine Nausea And Vomiting   • Morphine Nausea And Vomiting     SOCIAL HISTORY:   Social History     Tobacco Use   • Smoking status: Former     Current packs/day: 0.00     Average packs/day: 2.0 packs/day for 30.2 years (60.4 ttl pk-yrs)     Types: Cigarettes     Start date: 1957     Quit date: 4/10/1987     Years since quittin.1   • Smokeless tobacco: Never   • Tobacco comments:     I haven't had a cigarette in over 30 years   Vaping Use   • Vaping status: Never Used   Substance Use Topics   • Alcohol use: No     Comment: stopped 1978, heavy in past   • Drug use: No     FAMILY HISTORY:  Family History   Problem Relation Age of Onset   • Heart disease Mother    • Aortic aneurysm Mother         abdominal   • Coronary artery disease Mother    • Hypertension Mother    • Miscarriages / Stillbirths Mother    • Diverticulitis Mother    • Heart disease Father    • Heart attack Father         acute   • Hypertension Father    • Early death Father    • Hearing loss Father    • Kidney disease Father    • Breast cancer Daughter 45   • Heart disease Brother    • Hypertension Brother    • Malig Hyperthermia Neg Hx      I have reviewed the patient's medical history in detail and updated the computerized patient record.    REVIEW OF SYSTEMS:  Review of systems as mentioned in the HPI.    "  Objective:   Vitals:    06/04/24 1337   BP: 149/75   Pulse: 65   Resp: 16   Temp: 97.9 °F (36.6 °C)   TempSrc: Oral   SpO2: 100%   Weight: 64.8 kg (142 lb 12.8 oz)   Height: 156.2 cm (61.5\")   PainSc: 0-No pain         ECOG 0      PHYSICAL EXAM:   GENERAL: Well-developed, well-nourished female, in no acute distress.   SKIN: Warm, dry without rashes, purpura or petechiae.  Left upper chest Lico catheter in place, no evidence of infection.  Skin tag lower neck anteriorly.  EYES: Pupils equal, round and reactive to light. EOMs intact. Conjunctivae normal.  EARS: Hearing intact.  NOSE:  No excoriations or nasal discharge.  MOUTH: Tongue is well-papillated; no stomatitis or ulcers. Lips normal.  THROAT: Oropharynx without lesions or exudates.  Status post partial thyroidectomy well-healing  LYMPHATICS: No cervical, supraclavicular, axillary adenopathy.  CHEST: Lungs clear to auscultation. Good airflow.   BREAST:Postop, Well healed right mastectomy site with no evidence of recurrent disease, no axillary adenopathy bilaterally.  CARDIAC: Regular rate and rhythm without murmurs. Normal S1,S2.  ABDOMEN: Slightly distended, normal active bowel sounds,  no hepatosplenomegaly or masses.  EXTREMITIES: Status post resection of skin cancer left anterior tibia midportion--?  Recurrent, consistent with well-differentiated keratinizing squamous cell carcinoma, keratoacanthoma type  NEUROLOGICAL: Cranial Nerves II-XII grossly intact. No focal neurological deficits.  PSYCHIATRIC: Alert and oriented      RECENT LABS:  Lab Results   Component Value Date    WBC 7.10 06/04/2024    HGB 12.1 06/04/2024    HCT 35.7 06/04/2024    MCV 88.8 06/04/2024    PLT 60 (L) 06/04/2024    PLT 60 (L) 06/04/2024     Lab Results   Component Value Date    NEUTROABS 2.10 06/04/2024     Lab Results   Component Value Date    GLUCOSE 110 (H) 04/01/2024    BUN 16 04/01/2024    CREATININE 0.89 04/01/2024    EGFRIFNONA 70 12/20/2021    EGFRIFAFRI 81 12/20/2021    " BCR 18.0 04/01/2024    K 3.5 04/01/2024    CO2 25.0 04/01/2024    CALCIUM 9.5 04/01/2024    PROTENTOTREF 6.9 12/22/2023    ALBUMIN 4.5 12/22/2023    LABIL2 1.9 12/22/2023    AST 20 12/22/2023    ALT 13 12/22/2023            Assessment/Plan   1. Large right breast cancer, locally advanced, stage IIIa (T3 N1/ 2 M0).  ER negative, less than 1%; AK positive, moderate in staining, 5% to 10%. HER2 IHC 2+, FISH study positive 2.1.  Physical examination showed a large mass, 7 cm x 7 cm initially which has decreased to approximately less than 4 cm ..  Patient  proceeded through 4 cycles ceptin,Perjeta and Taxol.   Her subsequent MRI examination showed complete response by imaging including right axillary lymphadenopathy.  We have continued Herceptin and that includes today October 20, 2017.  The case was discussed with Dr. Melo and plans were to proceed with mastectomy plus right axillary lymph node assessment via sentinel node evaluation. it was felt she likely require radiation therapy post procedure.  The patient was scheduled and proceeded to surgery November 7.  Her results, wonderfully, revealed no evidence of residual disease in the breast or associated sentinel lymph nodes. She was seen by radiation therapy and offered treatment did not pursue it.  Additionally, and somewhat complicating this story, she traveled to [place] and developed severe influenza, associated pneumonia and was hospitalized for several months only to return to [facility] in the last several weeks now being seen back May 1.  There is no particular benefit from trying to add Herceptin back to her therapy now and is felt that she is completed Herceptin treatment.  She wishes consider reconstruction and baseline CT scans will be requested in 5 weeks with the patient being seen in 6 weeks follow-up.The patient reviewed June 13, 2018 with the scans demonstrating very modest increase in left pectoral, axillary or mediastinal nodes.  These are of  uncertain significance but do need follow-up in a PET/CT is planned in 7 weeks.  Her anemia will also be reviewed again with additional laboratory studies that visit.  She'll be seen in 8 weeks for general reassessment.    The patient's anemia workup was essentially negative and she is slowly improving when seen back August 8.  Her PET/CT, fortunately, demonstrates improvement though there is potential abnormality within the right thyroid lobe.  We discussed thyroid function testing and follow-up thyroid ultrasound.  She will be seen back in approximately 2 months as we maintain her port monthly flushes.   The patient was reviewed by ENT and ultimately was felt that a partial thyroidectomy was in order and is now scheduled October 4.  Patient's one to proceed.  We'll plan to see her back in approximately 6 weeks.      The patient is next seen November 28, 2015.  Her surgery went well with the findings as noted above.  She has follow-up with ENT in the next several weeks.  Additionally she has bilateral cataract surgery at the end of this month and into the beginning of next.  We discussed reassessment in general with follow-up scans and these were performed May 2019 which showed no evidence of recurrent malignancy.  Six-month follow-up planes with maintenance of her port every 6 weeks.  The patient is reassessed October 29, 2019 fortunately continue to do relatively well.  She has had no additional medical issues since last seen we will plan to see her back in 6 months again meeting report.  She does plan to see plastic surgery concerning reconstruction concerning her breast cancer history.  A copy this note will be sent to the plastic surgeon.     The patient is next seen July 7, 2020 without evidence of recurrence of malignancy.  We plan 6-month follow-up.  The patient is reviewed October 27, 2020 without evidence recurrent disease, repeat scans during recent hospitalization September 20-October 1 without evidence  of recurrent malignancy.  Subsequent assessments again without evidence of recurrence including reexamination 11/8/2022 and subsequently including 10/17/2023.       Next evaluated 2/20/2024 for follow-up.  No evidence of recurrent disease.  Was scheduled for Mediport removal November 2023, however this was canceled due to her having the flu.  She has not yet rescheduled this appointment.      2.  Previous fall with contusions now recovered.  Recent contusions lower extremities treated with antibiotic therapy producing nausea now discontinued                                                                                                                                                    3.  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  This has been treated with B vitamins including B12 and B6.  These were not given for her history of previous EtOH use.  Continues follow-up with her PCP, currently receiving gabapentin.    4.  Worsening reflux symptoms, now stable    5.  Hospitalization September 20-October 1, 2020 with enteropathic E. coli.  This responded to conservative therapy and colonoscopy had been planned but we will not pursue at this point with resolution of symptoms.    6.  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  As this is assessed April 13 the patient's platelet count continues to decrease and we have discussed a trial of half milligram per kilogram prednisone-40 mg daily with weekly checks and adjustment as needed including rapid taper.     Patient tapered from prednisone through mid July 2021, stable to improved when assessed 7/27/2021.  No additional steroids planned.  Patient stable seen 10/19/21 with every 3 month assessments planned.  Subsequent conclusion felt to be consistent with chronic ITP  Patient next assessed 5/2/2023 with platelet count of 55,000, IPF pending.  This would still be adequate with no intervention necessary but we will  plan follow-up.  Subsequent platelet count between 60 and 80,000 documented.  2/20/2024 platelets today stable at 59,000.  Assessment 6/4/2024-platelet count of 60,000, IPF 1.0    7.  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  Referral to Dr. Melo for reevaluation of left-exophytic lesion-previous site of keratoacanthoma

## 2024-06-14 ENCOUNTER — OFFICE VISIT (OUTPATIENT)
Dept: SURGERY | Facility: CLINIC | Age: 84
End: 2024-06-14
Payer: MEDICARE

## 2024-06-14 VITALS — WEIGHT: 143 LBS | BODY MASS INDEX: 26.31 KG/M2 | HEIGHT: 62 IN

## 2024-06-14 DIAGNOSIS — L85.8 KERATOACANTHOMA: Primary | ICD-10-CM

## 2024-06-15 NOTE — PROGRESS NOTES
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  All margins were clear.  Currently has excess exophytic eschar formation  Precluding healing of the wound.    Postoperative diagnosis: Same    Procedure: Sharp debridement and cauterization of excess exophytic eschar left lower extremity wound measuring 18 mm in diameter    Surgeon: Kameron    Anesthesia: 1% lidocaine with epinephrine    Specimen: None    Blood loss: Minimal    Description: In supine position prepped and draped usual sterile manner.  1% lidocaine with epinephrine infiltrated locally.  Using the knife the exophytic excessive eschar was easily shaved from the underlying wound which consisted of relatively healthy granulation tissue.  The granulation tissue was cauterized and scraped to achieve new healthy wound base.  Good hemostasis achieved.  She was instructed to start applying bacitracin twice daily and return to see me in 3 to 4 weeks.

## 2024-06-21 ENCOUNTER — DOCUMENTATION (OUTPATIENT)
Dept: FAMILY MEDICINE CLINIC | Facility: CLINIC | Age: 84
End: 2024-06-21
Payer: MEDICARE

## 2024-07-22 RX ORDER — PROPRANOLOL HYDROCHLORIDE 10 MG/1
10 TABLET ORAL 3 TIMES DAILY
Qty: 90 TABLET | Refills: 0 | Status: SHIPPED | OUTPATIENT
Start: 2024-07-22

## 2024-08-20 RX ORDER — PROPRANOLOL HYDROCHLORIDE 10 MG/1
10 TABLET ORAL 3 TIMES DAILY
Qty: 90 TABLET | Refills: 0 | OUTPATIENT
Start: 2024-08-20

## 2024-08-21 ENCOUNTER — PATIENT MESSAGE (OUTPATIENT)
Dept: FAMILY MEDICINE CLINIC | Facility: CLINIC | Age: 84
End: 2024-08-21
Payer: MEDICARE

## 2024-08-23 ENCOUNTER — TELEPHONE (OUTPATIENT)
Dept: FAMILY MEDICINE CLINIC | Facility: CLINIC | Age: 84
End: 2024-08-23
Payer: MEDICARE

## 2024-08-23 RX ORDER — PROPRANOLOL HYDROCHLORIDE 10 MG/1
10 TABLET ORAL 3 TIMES DAILY
Qty: 90 TABLET | Refills: 0 | Status: SHIPPED | OUTPATIENT
Start: 2024-08-23

## 2024-08-23 RX ORDER — PROPRANOLOL HYDROCHLORIDE 10 MG/1
10 TABLET ORAL 3 TIMES DAILY
Qty: 90 TABLET | Refills: 0 | OUTPATIENT
Start: 2024-08-23

## 2024-08-23 NOTE — TELEPHONE ENCOUNTER
Caller: Roxana Brizuela    Relationship: Self    Best call back number: [phone]     Which medication are you concerned about: propranolol (INDERAL) 10 MG tablet     Who prescribed you this medication: DR AWAD      What are your concerns: PATIENT IS CALLING TO REQUEST AN EMERGENCY SUPPLY OF THE ABOVE MEDICATION TO LAST UNTIL HER APPOINTMENT ON MONDAY.         [facility] [address] [place] [address] - [phone]  - [phone]  [phone]     PLEASE ADVISE.

## 2024-08-26 ENCOUNTER — OFFICE VISIT (OUTPATIENT)
Dept: FAMILY MEDICINE CLINIC | Facility: CLINIC | Age: 84
End: 2024-08-26
Payer: MEDICARE

## 2024-08-26 VITALS
HEIGHT: 61 IN | OXYGEN SATURATION: 97 % | SYSTOLIC BLOOD PRESSURE: 138 MMHG | BODY MASS INDEX: 27.58 KG/M2 | DIASTOLIC BLOOD PRESSURE: 80 MMHG | HEART RATE: 61 BPM | WEIGHT: 146.1 LBS | RESPIRATION RATE: 16 BRPM | TEMPERATURE: 96 F

## 2024-08-26 DIAGNOSIS — Z00.00 ENCOUNTER FOR SUBSEQUENT ANNUAL WELLNESS VISIT IN MEDICARE PATIENT: ICD-10-CM

## 2024-08-26 DIAGNOSIS — E03.9 ACQUIRED HYPOTHYROIDISM: ICD-10-CM

## 2024-08-26 DIAGNOSIS — G62.0 PERIPHERAL NEUROPATHY DUE TO CHEMOTHERAPY: ICD-10-CM

## 2024-08-26 DIAGNOSIS — I10 PRIMARY HYPERTENSION: Primary | ICD-10-CM

## 2024-08-26 DIAGNOSIS — T45.1X5A PERIPHERAL NEUROPATHY DUE TO CHEMOTHERAPY: ICD-10-CM

## 2024-08-26 DIAGNOSIS — Z78.0 POSTMENOPAUSAL: ICD-10-CM

## 2024-08-26 PROCEDURE — 1126F AMNT PAIN NOTED NONE PRSNT: CPT | Performed by: INTERNAL MEDICINE

## 2024-08-26 PROCEDURE — G0439 PPPS, SUBSEQ VISIT: HCPCS | Performed by: INTERNAL MEDICINE

## 2024-08-26 PROCEDURE — 1170F FXNL STATUS ASSESSED: CPT | Performed by: INTERNAL MEDICINE

## 2024-08-26 PROCEDURE — 3075F SYST BP GE 130 - 139MM HG: CPT | Performed by: INTERNAL MEDICINE

## 2024-08-26 PROCEDURE — 3079F DIAST BP 80-89 MM HG: CPT | Performed by: INTERNAL MEDICINE

## 2024-08-26 PROCEDURE — 96160 PT-FOCUSED HLTH RISK ASSMT: CPT | Performed by: INTERNAL MEDICINE

## 2024-08-26 RX ORDER — GABAPENTIN 300 MG/1
600 CAPSULE ORAL 3 TIMES DAILY
Qty: 180 CAPSULE | Refills: 1 | Status: SHIPPED | OUTPATIENT
Start: 2024-08-26

## 2024-08-26 RX ORDER — LEVOTHYROXINE SODIUM 50 UG/1
50 TABLET ORAL DAILY
Qty: 90 TABLET | Refills: 3 | Status: SHIPPED | OUTPATIENT
Start: 2024-08-26

## 2024-08-26 RX ORDER — ONDANSETRON 4 MG/1
4 TABLET, FILM COATED ORAL EVERY 6 HOURS PRN
Qty: 20 TABLET | Refills: 2 | Status: SHIPPED | OUTPATIENT
Start: 2024-08-26

## 2024-08-26 NOTE — PROGRESS NOTES
Subjective   The ABCs of the Annual Wellness Visit  Medicare Wellness Visit      Roxana Brizuela is a 83 y.o. patient who presents for a Medicare Wellness Visit.    The following portions of the patient's history were reviewed and   updated as appropriate: allergies, current medications, past family history, past medical history, past social history, past surgical history, and problem list.    Compared to one year ago, the patient's physical   health is worse.  Compared to one year ago, the patient's mental   health is worse.    Recent Hospitalizations:  She was not admitted to the hospital during the last year.     Current Medical Providers:  Patient Care Team:  Brandt Martin MD as PCP - General (Internal Medicine)  Jamal Patel MD PhD as Consulting Physician (Hematology and Oncology)  Brandt Martin MD as Referring Physician (Internal Medicine)  Constance Elliott MD as Consulting Physician (Hematology and Oncology)  Oneil Coburn MD as Consulting Physician (Hematology and Oncology)  Rosalino Ahumada MD as Consulting Physician (Hematology and Oncology)    Outpatient Medications Prior to Visit   Medication Sig Dispense Refill    albuterol sulfate  (90 Base) MCG/ACT inhaler Inhale 2 puffs Every 4 (Four) Hours As Needed for Wheezing. 18 g 0    Ascorbic Acid (VITAMIN C PO) Take 1,000 mg by mouth Daily.      hydroCHLOROthiazide 25 MG tablet Take 1 tablet by mouth Daily. 90 tablet 1    Lactobacillus (PROBIOTIC ACIDOPHILUS PO) Take 1 capsule by mouth Daily.      MELATONIN PO Take 10 mg by mouth At Night As Needed.      pantoprazole (PROTONIX) 40 MG EC tablet Take 1 tablet by mouth Daily. 90 tablet 3    potassium chloride (KLOR-CON M20) 20 MEQ CR tablet Take 1 tablet by mouth Daily. 30 tablet 11    promethazine (PHENERGAN) 25 MG tablet TAKE 1 TABLET BY MOUTH EVERY 6 HOURS AS NEEDED FOR NAUSEA FOR VOMITING 28 tablet 0    propranolol (INDERAL) 10 MG tablet Take 1 tablet by mouth 3 (Three) Times a  Day. 90 tablet 0    simvastatin (ZOCOR) 80 MG tablet Take 1 tablet by mouth every night at bedtime. 90 tablet 3    vitamin B-6 (PYRIDOXINE) 50 MG tablet Take 1 tablet by mouth Daily.      VITAMIN D, CHOLECALCIFEROL, PO Take 1 tablet by mouth Daily.      gabapentin (NEURONTIN) 300 MG capsule Take 2 capsules by mouth 3 (Three) Times a Day. 180 capsule 1    levothyroxine (Synthroid) 50 MCG tablet Take 1 tablet by mouth Daily. 90 tablet 3    ondansetron (Zofran) 4 MG tablet Take 1 tablet by mouth Every 6 (Six) Hours As Needed for Nausea or Vomiting for up to 8 doses. 8 tablet 0     No facility-administered medications prior to visit.     No opioid medication identified on active medication list. I have reviewed chart for other potential  high risk medication/s and harmful drug interactions in the elderly.      Aspirin is not on active medication list.  Aspirin use is not indicated based on review of current medical condition/s. Risk of harm outweighs potential benefits.  .    Patient Active Problem List   Diagnosis    Iron deficiency anemia    Hyperlipidemia    Hypertension    Leukopenia    Dyspnea on exertion    Laceration    Meningioma    Thrombocytopenia    Malignant neoplasm of right female breast    Drug induced insomnia    Hypersensitivity reaction    Hypokalemia    Dehydration    History of cardiac arrhythmia    Dysuria    Peripheral neuropathy due to chemotherapy    Chemotherapy-induced nausea    Acute cystitis with hematuria    Nausea    Anemia    Malignant neoplasm of lower-outer quadrant of right female breast    Encounter for fitting and adjustment of vascular catheter    Right breast cancer with T3 tumor, >5 cm in greatest dimension    Cancer of right female breast    Post-influenza syndrome    Hypothyroid    Pain of left hip joint    Hurthle cell adenoma    Elevated TSH    Postoperative hypothyroidism    Fatigue    Gastroesophageal reflux disease    Other constipation    Incontinence of feces with fecal  "urgency    Acute colitis due to enteropathogenic Escherichia coli infection    Nonintractable migraine    Chronic ITP (idiopathic thrombocytopenia)    Hair loss    Osteopenia    Personal history of colonic polyps    Dependent edema    Vitamin D deficiency    History of alcohol abuse    Benign paroxysmal positional vertigo of right ear    Keratoacanthoma    Port-A-Cath in place    Screening for malignant neoplasm of colon    Weight loss    Decreased appetite    Hospital discharge follow-up    Influenza A    Encounter for subsequent annual wellness visit in Medicare patient     Advance Care Planning Advance Directive is not on file.  ACP discussion was held with the patient during this visit. Patient has an advance directive (not in EMR), copy requested.            Objective   Vitals:    24 1046   BP: 138/80   BP Location: Left arm   Patient Position: Sitting   Cuff Size: Adult   Pulse: 61   Resp: 16   Temp: 96 °F (35.6 °C)   TempSrc: Temporal   SpO2: 97%   Weight: 66.3 kg (146 lb 1.6 oz)   Height: 156.2 cm (61.5\")       Estimated body mass index is 27.16 kg/m² as calculated from the following:    Height as of this encounter: 156.2 cm (61.5\").    Weight as of this encounter: 66.3 kg (146 lb 1.6 oz).    BMI is >= 25 and <30. (Overweight) The following options were offered after discussion;: exercise counseling/recommendations and nutrition counseling/recommendations       Does the patient have evidence of cognitive impairment? No                                                                                               Health  Risk Assessment    Smoking Status:  Social History     Tobacco Use   Smoking Status Former    Current packs/day: 0.00    Average packs/day: 2.0 packs/day for 30.2 years (60.4 ttl pk-yrs)    Types: Cigarettes    Start date: 1957    Quit date: 4/10/1987    Years since quittin.4   Smokeless Tobacco Never   Tobacco Comments    I haven't had a cigarette in over 30 years     Alcohol " Consumption:  Social History     Substance and Sexual Activity   Alcohol Use No    Comment: stopped , heavy in past       Fall Risk Screen  STEADI Fall Risk Assessment was completed, and patient is at MODERATE risk for falls. Assessment completed on:2024    Depression Screenin/26/2024    12:00 PM   PHQ-2/PHQ-9 Depression Screening   Little Interest or Pleasure in Doing Things 0-->not at all   Feeling Down, Depressed or Hopeless 0-->not at all   PHQ-9: Brief Depression Severity Measure Score 0     Health Habits and Functional and Cognitive Screenin/26/2024    12:00 PM   Functional & Cognitive Status   Do you have difficulty preparing food and eating? No   Do you have difficulty bathing yourself, getting dressed or grooming yourself? No   Do you have difficulty using the toilet? No   Do you have difficulty moving around from place to place? No   Do you have trouble with steps or getting out of a bed or a chair? No   Current Diet Other   Dental Exam Up to date   Eye Exam Up to date   Exercise (times per week) 7 times per week   Current Exercises Include Walking   Do you need help using the phone?  No   Are you deaf or do you have serious difficulty hearing?  No   Do you need help to go to places out of walking distance? No   Do you need help shopping? No   Do you need help preparing meals?  No   Do you need help with housework?  No   Do you need help with laundry? No   Do you need help taking your medications? No   Do you need help managing money? No   Do you ever drive or ride in a car without wearing a seat belt? No   Have you felt unusual stress, anger or loneliness in the last month? No   Who do you live with? Alone   If you need help, do you have trouble finding someone available to you? No   Have you been bothered in the last four weeks by sexual problems? No   Do you have difficulty concentrating, remembering or making decisions? Yes           Age-appropriate Screening Schedule:  Refer  to the list below for future screening recommendations based on patient's age, sex and/or medical conditions. Orders for these recommended tests are listed in the plan section. The patient has been provided with a written plan.    Health Maintenance List  Health Maintenance   Topic Date Due    DXA SCAN  11/17/2023    COVID-19 Vaccine (9 - 2023-24 season) 05/09/2024    BMI FOLLOWUP  06/29/2024    INFLUENZA VACCINE  08/01/2024    COLORECTAL CANCER SCREENING  10/30/2024    LIPID PANEL  10/09/2024    ANNUAL WELLNESS VISIT  08/26/2025    TDAP/TD VACCINES (3 - Td or Tdap) 06/18/2033    RSV Vaccine - Adults  Completed    Pneumococcal Vaccine 65+  Completed    ZOSTER VACCINE  Completed    LUNG CANCER SCREENING  Discontinued                                                                                                                                                CMS Preventative Services Quick Reference  Risk Factors Identified During Encounter  Immunizations Discussed/Encouraged: Influenza, Prevnar 20 (Pneumococcal 20-valent conjugate), and COVID19    The above risks/problems have been discussed with the patient.  Pertinent information has been shared with the patient in the After Visit Summary.  An After Visit Summary and PPPS were made available to the patient.    Follow Up:   Next Medicare Wellness visit to be scheduled in 1 year.     Assessment & Plan  Primary hypertension    Postmenopausal    Acquired hypothyroidism    Peripheral neuropathy due to chemotherapy    Encounter for subsequent annual wellness visit in Medicare patient      Orders Placed This Encounter   Procedures    DEXA Bone Density Axial     New Medications Ordered This Visit   Medications    ondansetron (Zofran) 4 MG tablet     Sig: Take 1 tablet by mouth Every 6 (Six) Hours As Needed for Nausea or Vomiting for up to 60 doses.     Dispense:  20 tablet     Refill:  2    levothyroxine (Synthroid) 50 MCG tablet     Sig: Take 1 tablet by mouth Daily.      Dispense:  90 tablet     Refill:  3    gabapentin (NEURONTIN) 300 MG capsule     Sig: Take 2 capsules by mouth 3 (Three) Times a Day.     Dispense:  180 capsule     Refill:  1          Follow Up:   No follow-ups on file.

## 2024-09-12 ENCOUNTER — OFFICE VISIT (OUTPATIENT)
Dept: FAMILY MEDICINE CLINIC | Facility: CLINIC | Age: 84
End: 2024-09-12
Payer: MEDICARE

## 2024-09-12 VITALS
WEIGHT: 147 LBS | RESPIRATION RATE: 16 BRPM | DIASTOLIC BLOOD PRESSURE: 60 MMHG | BODY MASS INDEX: 27.75 KG/M2 | HEART RATE: 78 BPM | SYSTOLIC BLOOD PRESSURE: 124 MMHG | OXYGEN SATURATION: 97 % | HEIGHT: 61 IN

## 2024-09-12 DIAGNOSIS — R23.3 EASY BRUISING: ICD-10-CM

## 2024-09-12 DIAGNOSIS — R06.09 DYSPNEA ON EXERTION: Primary | ICD-10-CM

## 2024-09-12 DIAGNOSIS — I87.2 VENOUS STASIS DERMATITIS OF BOTH LOWER EXTREMITIES: ICD-10-CM

## 2024-09-12 DIAGNOSIS — M67.471 GANGLION CYST OF RIGHT FOOT: ICD-10-CM

## 2024-09-12 DIAGNOSIS — D69.6 THROMBOCYTOPENIA: ICD-10-CM

## 2024-09-12 PROCEDURE — 99214 OFFICE O/P EST MOD 30 MIN: CPT | Performed by: INTERNAL MEDICINE

## 2024-09-12 PROCEDURE — 3074F SYST BP LT 130 MM HG: CPT | Performed by: INTERNAL MEDICINE

## 2024-09-12 PROCEDURE — 1126F AMNT PAIN NOTED NONE PRSNT: CPT | Performed by: INTERNAL MEDICINE

## 2024-09-12 PROCEDURE — 3078F DIAST BP <80 MM HG: CPT | Performed by: INTERNAL MEDICINE

## 2024-09-12 RX ORDER — TRIAMCINOLONE ACETONIDE 1 MG/G
1 CREAM TOPICAL 2 TIMES DAILY
Qty: 80 G | Refills: 2 | Status: SHIPPED | OUTPATIENT
Start: 2024-09-12

## 2024-09-12 NOTE — PROGRESS NOTES
Subjective   Roxana Brizuela is a 83 y.o. female. Patient is here today for   Chief Complaint   Patient presents with    Bleeding/Bruising    Shortness of Breath          Vitals:    09/12/24 1410   BP: 124/60   Pulse: 78   Resp: 16   SpO2: 97%     Body mass index is 27.33 kg/m².    The following portions of the patient's history were reviewed and updated as appropriate: allergies, current medications, past family history, past medical history, past social history, past surgical history and problem list.    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 Paul Negron    GI (gastrointestinal bleed) 1997    H/O jaundice     Hernia     INCISONAL. AROUND LEFT TRAM LAP SITE    History of alcoholism     NONE SINCE 1978    History of chemotherapy     2017 4 MONTHS IN 2017    History of Clostridium difficile colitis     JAN 2018    History of histoplasmosis     History of infectious mononucleosis 1960    History of migraine headaches     History of pneumonia 12/27/2017    AS RESULT OF FLU. ENDED UP ON VENT IN [place]    History of thrombocytopenia     History of transfusion 1997    History of vertigo     Hx of colonic polyps 06/11/2009    Dr. Giles    Hyperlipidemia     Hypertension     Hypothyroidism     PARTIAL THYROIDECTOMY    Macular degeneration     Meningioma     Neuropathy     HANDS AND FEET    Neuropathy     on gabapentin    Osteoarthritis 09/13/2011     Dr. Jamil Miller    Peptic ulceration     Retina disorder     BLEED. FROM HISTOPLASMOSIS    SBO (small bowel obstruction)     due to hernia with surgical repair in 2011    SOB (shortness of breath) on exertion     Thyroid mass     RIGHT    Urgency incontinence       Allergies   Allergen Reactions    Codeine Nausea And Vomiting    Morphine Nausea And Vomiting      Social History     Socioeconomic History    Marital status:      Spouse name: Mike    Number of children: 2    Years of education: College   Tobacco Use    Smoking status: Former     Current packs/day: 0.00     Average packs/day: 2.0 packs/day for 30.2 years (60.4 ttl pk-yrs)     Types: Cigarettes     Start date: 1957     Quit date: 4/10/1987     Years since quittin.4    Smokeless tobacco: Never    Tobacco comments:     I haven't had a cigarette in over 30 years   Vaping Use    Vaping status: Never Used   Substance and Sexual Activity    Alcohol use: No     Comment: stopped , heavy in past    Drug use: No    Sexual activity: Never        Current Outpatient Medications:     albuterol sulfate  (90 Base) MCG/ACT inhaler, Inhale 2 puffs Every 4 (Four) Hours As Needed for Wheezing., Disp: 18 g, Rfl: 0    Ascorbic Acid (VITAMIN C PO), Take 1,000 mg by mouth Daily., Disp: , Rfl:     gabapentin (NEURONTIN) 300 MG capsule, Take 2 capsules by mouth 3 (Three) Times a Day., Disp: 180 capsule, Rfl: 1    hydroCHLOROthiazide 25 MG tablet, Take 1 tablet by mouth Daily., Disp: 90 tablet, Rfl: 1    Lactobacillus (PROBIOTIC ACIDOPHILUS PO), Take 1 capsule by mouth Daily., Disp: , Rfl:     levothyroxine (Synthroid) 50 MCG tablet, Take 1 tablet by mouth Daily., Disp: 90 tablet, Rfl: 3    MELATONIN PO, Take 10 mg by mouth At Night As Needed., Disp: , Rfl:     ondansetron (Zofran) 4 MG tablet, Take 1 tablet by mouth Every 6 (Six) Hours As Needed for Nausea or Vomiting for up to 60 doses., Disp: 20 tablet, Rfl: 2    pantoprazole (PROTONIX) 40 MG  EC tablet, Take 1 tablet by mouth Daily., Disp: 90 tablet, Rfl: 3    potassium chloride (KLOR-CON M20) 20 MEQ CR tablet, Take 1 tablet by mouth Daily., Disp: 30 tablet, Rfl: 11    promethazine (PHENERGAN) 25 MG tablet, TAKE 1 TABLET BY MOUTH EVERY 6 HOURS AS NEEDED FOR NAUSEA FOR VOMITING, Disp: 28 tablet, Rfl: 0    propranolol (INDERAL) 10 MG tablet, Take 1 tablet by mouth 3 (Three) Times a Day., Disp: 90 tablet, Rfl: 0    simvastatin (ZOCOR) 80 MG tablet, Take 1 tablet by mouth every night at bedtime., Disp: 90 tablet, Rfl: 3    triamcinolone (KENALOG) 0.1 % cream, Apply 1 Application topically to the appropriate area as directed 2 (Two) Times a Day., Disp: 80 g, Rfl: 2    vitamin B-6 (PYRIDOXINE) 50 MG tablet, Take 1 tablet by mouth Daily., Disp: , Rfl:     VITAMIN D, CHOLECALCIFEROL, PO, Take 1 tablet by mouth Daily., Disp: , Rfl:      Objective     History of Present Illness Roxana is here for few reasons.  She has developed large bruises on her arms over the last few weeks.  She does have thrombocytopenia and is followed by hematology oncology.  Lately counts have been running around 60,000 and last 1 was done 3 months ago.  She does not take aspirin.  She also complains of chronic dyspnea on exertion.  She walks 4 miles a day.  Chest x-ray last December was normal.  Echocardiogram in 2020 was normal.  She also has a cyst on her lateral right foot at the fifth metatarsal.  She had 1 removed on her left foot by podiatrist in the past.  The cyst is not painful.    Review of Systems   Constitutional:  Negative for activity change and unexpected weight change.   Respiratory:  Positive for shortness of breath.    Cardiovascular:  Positive for leg swelling.   Skin:  Positive for rash.   Psychiatric/Behavioral: Negative.         Physical Exam  Vitals reviewed.   Constitutional:       Appearance: Normal appearance.   Cardiovascular:      Rate and Rhythm: Normal rate and regular rhythm.      Heart sounds: Normal heart  sounds. No murmur heard.     No gallop.   Pulmonary:      Effort: Pulmonary effort is normal.      Breath sounds: No wheezing, rhonchi or rales.   Musculoskeletal:      Right lower leg: Edema present.      Left lower leg: Edema present.   Skin:     Comments: .  2 cm ganglion cyst lateral left foot.  Venous stasis dermatitis changes both lower legs.   Neurological:      Mental Status: She is alert.   Psychiatric:         Mood and Affect: Mood normal.         Behavior: Behavior normal.         Thought Content: Thought content normal.         Judgment: Judgment normal.         Assessment will check blood work today and see if platelet count is dropped.  Dyspnea on exertion is chronic and unchanged.  Cyst on the foot is a ganglion cyst.  Will refer to orthopedic foot surgery if necessary.  Apply triamcinolone cream to rash on both lower legs twice a day.  ASSESSMENT    Problems Addressed this Visit          Cardiac and Vasculature    Dyspnea on exertion - Primary    Venous stasis dermatitis of both lower extremities    Relevant Medications    triamcinolone (KENALOG) 0.1 % cream       Coag and Thromboembolic    Thrombocytopenia       Musculoskeletal and Injuries    Ganglion cyst of right foot       Skin    Easy bruising    Relevant Orders    CBC & Differential    Protime-INR    APTT     Diagnoses         Codes Comments    Dyspnea on exertion    -  Primary ICD-10-CM: R06.09  ICD-9-CM: 786.09     Easy bruising     ICD-10-CM: R23.3  ICD-9-CM: 782.7     Thrombocytopenia     ICD-10-CM: D69.6  ICD-9-CM: 287.5     Ganglion cyst of right foot     ICD-10-CM: M67.471  ICD-9-CM: 727.43     Venous stasis dermatitis of both lower extremities     ICD-10-CM: I87.2  ICD-9-CM: 454.1             PLAN  There are no Patient Instructions on file for this visit.  No follow-ups on file.

## 2024-09-13 LAB
BASOPHILS # BLD AUTO: 0 X10E3/UL (ref 0–0.2)
BASOPHILS NFR BLD AUTO: 0 %
EOSINOPHIL # BLD AUTO: 0.2 X10E3/UL (ref 0–0.4)
EOSINOPHIL NFR BLD AUTO: 3 %
ERYTHROCYTE [DISTWIDTH] IN BLOOD BY AUTOMATED COUNT: 15.8 % (ref 11.7–15.4)
HCT VFR BLD AUTO: 37.9 % (ref 34–46.6)
HGB BLD-MCNC: 12.4 G/DL (ref 11.1–15.9)
INR PPP: 1 (ref 0.9–1.2)
LYMPHOCYTES # BLD AUTO: 2.4 X10E3/UL (ref 0.7–3.1)
LYMPHOCYTES NFR BLD AUTO: 42 %
MCH RBC QN AUTO: 30.8 PG (ref 26.6–33)
MCHC RBC AUTO-ENTMCNC: 32.7 G/DL (ref 31.5–35.7)
MCV RBC AUTO: 94 FL (ref 79–97)
MONOCYTES # BLD AUTO: 1.6 X10E3/UL (ref 0.1–0.9)
MONOCYTES NFR BLD AUTO: 28 %
MORPHOLOGY BLD-IMP: ABNORMAL
NEUTROPHILS # BLD AUTO: 1.5 X10E3/UL (ref 1.4–7)
NEUTROPHILS NFR BLD AUTO: 27 %
PLATELET # BLD AUTO: 74 X10E3/UL (ref 150–450)
PROTHROMBIN TIME: 11.5 SEC (ref 9.1–12)
RBC # BLD AUTO: 4.03 X10E6/UL (ref 3.77–5.28)
WBC # BLD AUTO: 5.6 X10E3/UL (ref 3.4–10.8)

## 2024-09-14 ENCOUNTER — HOSPITAL ENCOUNTER (OUTPATIENT)
Dept: BONE DENSITY | Facility: HOSPITAL | Age: 84
Discharge: HOME | End: 2024-09-14
Payer: MEDICARE

## 2024-09-14 PROCEDURE — 77080 DXA BONE DENSITY AXIAL: CPT

## 2024-09-17 RX ORDER — PROPRANOLOL HYDROCHLORIDE 10 MG/1
10 TABLET ORAL 3 TIMES DAILY
Qty: 90 TABLET | Refills: 0 | Status: SHIPPED | OUTPATIENT
Start: 2024-09-17

## 2024-10-09 ENCOUNTER — TELEPHONE (OUTPATIENT)
Dept: FAMILY MEDICINE CLINIC | Facility: CLINIC | Age: 84
End: 2024-10-09

## 2024-10-09 NOTE — TELEPHONE ENCOUNTER
Hub staff attempted to follow warm transfer process and was unsuccessful     Caller: Roxana Brizuela    Relationship to patient: Self    Best call back number: [phone]     Patient is needing: NEEDS TO SCHEDULE HOSPITAL FOLLOW UP [facility]-10/06/24-[facility]

## 2024-10-21 RX ORDER — PROPRANOLOL HCL 10 MG
10 TABLET ORAL 3 TIMES DAILY
Qty: 90 TABLET | Refills: 0 | Status: SHIPPED | OUTPATIENT
Start: 2024-10-21

## 2024-10-29 ENCOUNTER — OFFICE VISIT (OUTPATIENT)
Dept: FAMILY MEDICINE CLINIC | Facility: CLINIC | Age: 84
End: 2024-10-29
Payer: MEDICARE

## 2024-10-29 VITALS
OXYGEN SATURATION: 99 % | TEMPERATURE: 98.2 F | SYSTOLIC BLOOD PRESSURE: 130 MMHG | RESPIRATION RATE: 16 BRPM | HEIGHT: 61 IN | WEIGHT: 145 LBS | HEART RATE: 75 BPM | BODY MASS INDEX: 27.38 KG/M2 | DIASTOLIC BLOOD PRESSURE: 64 MMHG

## 2024-10-29 DIAGNOSIS — M25.552 PAIN OF LEFT HIP JOINT: Primary | ICD-10-CM

## 2024-10-29 DIAGNOSIS — R42 DIZZINESS: ICD-10-CM

## 2024-10-29 NOTE — PROGRESS NOTES
Subjective   Roxana Brizuela is a 83 y.o. female. Patient is here today for   Chief Complaint   Patient presents with   • Hip Pain     [facility] [place]    • Fatigue   • Dizziness       (Not on file)-  Risk for Readmission (LACE) No data recorded         Vitals:    10/29/24 0939   BP: 130/64   Pulse: 75   Resp: 16   Temp: 98.2 °F (36.8 °C)   SpO2: 99%     The following portions of the patient's history were reviewed and updated as appropriate: allergies, current medications, past family history, past medical history, past social history, past surgical history and problem list.    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 Paul Negron   • GI (gastrointestinal bleed) 1997   • H/O jaundice    • Hernia     INCISONAL. AROUND LEFT TRAM LAP SITE   • History of alcoholism     NONE SINCE 1978   • History of chemotherapy     2017 4 MONTHS IN 2017   • History of Clostridium difficile colitis     JAN 2018   • History of histoplasmosis    • History of infectious mononucleosis 1960   • History of migraine headaches    • History of pneumonia 12/27/2017    AS RESULT OF FLU. ENDED UP ON VENT IN [place]   • History of thrombocytopenia    • History of transfusion 1997   • History of vertigo    • Hx of colonic polyps 06/11/2009    Dr. Giles   • Hyperlipidemia    • Hypertension    • Hypothyroidism     PARTIAL THYROIDECTOMY   • Macular degeneration    •  Meningioma    • Neuropathy     HANDS AND FEET   • Neuropathy     on gabapentin   • Osteoarthritis 2011    Dr. Jamil Miller   • Peptic ulceration    • Retina disorder     BLEED. FROM HISTOPLASMOSIS   • SBO (small bowel obstruction)     due to hernia with surgical repair in 2011   • SOB (shortness of breath) on exertion    • Thyroid mass     RIGHT   • Urgency incontinence       Allergies   Allergen Reactions   • Codeine Nausea And Vomiting   • Morphine Nausea And Vomiting      Social History     Socioeconomic History   • Marital status:      Spouse name: Mike   • Number of children: 2   • Years of education: College   Tobacco Use   • Smoking status: Former     Current packs/day: 0.00     Average packs/day: 2.0 packs/day for 30.2 years (60.4 ttl pk-yrs)     Types: Cigarettes     Start date: 1957     Quit date: 4/10/1987     Years since quittin.5     Passive exposure: Past   • Smokeless tobacco: Never   • Tobacco comments:     I haven't had a cigarette in over 30 years   Vaping Use   • Vaping status: Never Used   Substance and Sexual Activity   • Alcohol use: No     Comment: stopped , heavy in past   • Drug use: No   • Sexual activity: Never        Current Outpatient Medications:   •  albuterol sulfate  (90 Base) MCG/ACT inhaler, Inhale 2 puffs Every 4 (Four) Hours As Needed for Wheezing., Disp: 18 g, Rfl: 0  •  Ascorbic Acid (VITAMIN C PO), Take 1,000 mg by mouth Daily., Disp: , Rfl:   •  gabapentin (NEURONTIN) 300 MG capsule, Take 2 capsules by mouth 3 (Three) Times a Day., Disp: 180 capsule, Rfl: 1  •  hydroCHLOROthiazide 25 MG tablet, Take 1 tablet by mouth Daily., Disp: 90 tablet, Rfl: 1  •  Lactobacillus (PROBIOTIC ACIDOPHILUS PO), Take 1 capsule by mouth Daily., Disp: , Rfl:   •  levothyroxine (Synthroid) 50 MCG tablet, Take 1 tablet by mouth Daily., Disp: 90 tablet, Rfl: 3  •  MELATONIN PO, Take 10 mg by mouth At Night As Needed., Disp: , Rfl:   •  ondansetron (Zofran) 4  MG tablet, Take 1 tablet by mouth Every 6 (Six) Hours As Needed for Nausea or Vomiting for up to 60 doses., Disp: 20 tablet, Rfl: 2  •  pantoprazole (PROTONIX) 40 MG EC tablet, Take 1 tablet by mouth Daily., Disp: 90 tablet, Rfl: 3  •  potassium chloride (KLOR-CON M20) 20 MEQ CR tablet, Take 1 tablet by mouth Daily., Disp: 30 tablet, Rfl: 11  •  promethazine (PHENERGAN) 25 MG tablet, TAKE 1 TABLET BY MOUTH EVERY 6 HOURS AS NEEDED FOR NAUSEA FOR VOMITING, Disp: 28 tablet, Rfl: 0  •  propranolol (INDERAL) 10 MG tablet, TAKE 1 TABLET BY MOUTH THREE TIMES DAILY, Disp: 90 tablet, Rfl: 0  •  simvastatin (ZOCOR) 80 MG tablet, Take 1 tablet by mouth every night at bedtime., Disp: 90 tablet, Rfl: 3  •  triamcinolone (KENALOG) 0.1 % cream, Apply 1 Application topically to the appropriate area as directed 2 (Two) Times a Day., Disp: 80 g, Rfl: 2  •  vitamin B-6 (PYRIDOXINE) 50 MG tablet, Take 1 tablet by mouth Daily., Disp: , Rfl:   •  VITAMIN D, CHOLECALCIFEROL, PO, Take 1 tablet by mouth Daily., Disp: , Rfl:      Objective     History of Present Illness Roxana is here for an emergency room follow-up.  She went to [facility] in [place] recently with complaints of left hip pain.  She denies injury.  X-rays showed some mild osteoarthritis.  She was also treated for a UTI.  Her discharge diagnosis was hip contusion although she adamantly denies falling.  She only has pain now when she first wakes up and it is not severe.  She has not taking any analgesics.  She also complains of dizziness and imbalance.  She has had vestibular therapy in the past at [facility].  She walks 4 miles daily with a friend.  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    Review of Systems   Constitutional:  Negative for activity change and unexpected weight change.   Eyes: Negative.    Respiratory: Negative.     Cardiovascular: Negative.    Neurological:  Positive for dizziness.       Physical Exam  Vitals reviewed.   Constitutional:        Appearance: Normal appearance.   Cardiovascular:      Rate and Rhythm: Normal rate and regular rhythm.      Heart sounds: Normal heart sounds.   Pulmonary:      Effort: Pulmonary effort is normal.      Breath sounds: Normal breath sounds.   Musculoskeletal:      Comments: Left hip has normal range of motion.  There is no point tenderness.   Neurological:      Mental Status: She is alert.   Psychiatric:         Mood and Affect: Mood normal.         Behavior: Behavior normal.         Thought Content: Thought content normal.         Judgment: Judgment normal.       ASSESSMENT reviewed and discussed hospital records.  Discussed hip stretching exercises as instructed.  Also discussed fall precautions as well as exercises for the quadriceps and hip flexors.  Also discussed doing vestibular exercises on a daily basis.     Problems Addressed this Visit          Musculoskeletal and Injuries    Pain of left hip joint - Primary       Symptoms and Signs    Dizziness     Diagnoses         Codes Comments    Pain of left hip joint    -  Primary ICD-10-CM: M25.552  ICD-9-CM: 719.45     Dizziness     ICD-10-CM: R42  ICD-9-CM: 780.4             Current outpatient and discharge medications have been reconciled for the patient.  Reviewed by: Brandt Martin MD      PLAN    There are no Patient Instructions on file for this visit.  Return for Next scheduled follow up.

## 2024-10-31 DIAGNOSIS — G62.0 PERIPHERAL NEUROPATHY DUE TO CHEMOTHERAPY: ICD-10-CM

## 2024-10-31 DIAGNOSIS — T45.1X5A PERIPHERAL NEUROPATHY DUE TO CHEMOTHERAPY: ICD-10-CM

## 2024-11-04 RX ORDER — GABAPENTIN 300 MG/1
600 CAPSULE ORAL 3 TIMES DAILY
Qty: 180 CAPSULE | Refills: 0 | Status: SHIPPED | OUTPATIENT
Start: 2024-11-04

## 2024-11-04 NOTE — TELEPHONE ENCOUNTER
Caller: Roxana Brizuela    Relationship: Self    Best call back number: [phone]    Requested Prescriptions:   Requested Prescriptions     Pending Prescriptions Disp Refills    gabapentin (NEURONTIN) 300 MG capsule [Pharmacy Med Name: Gabapentin 300 MG Oral Capsule] 180 capsule 0     Sig: TAKE 2 CAPSULES BY MOUTH THREE TIMES DAILY        Pharmacy where request should be sent: [facility] [address] [place] [address] - [phone]  - [phone] FX     Last office visit with prescribing clinician: 10/29/2024   Last telemedicine visit with prescribing clinician: Visit date not found   Next office visit with prescribing clinician: 11/4/2024     Additional details provided by patient: PATIENT HAS A 1 DAY SUPPLY LEFT OF THIS MEDICATION.     Does the patient have less than a 3 day supply:  [x] Yes  [] No    Would you like a call back once the refill request has been completed: [] Yes [x] No    If the office needs to give you a call back, can they leave a voicemail: [] Yes [x] No    Farrah Allison Rep   11/04/24 10:14 EST

## 2024-11-19 RX ORDER — PROPRANOLOL HYDROCHLORIDE 10 MG/1
10 TABLET ORAL 3 TIMES DAILY
Qty: 90 TABLET | Refills: 0 | Status: SHIPPED | OUTPATIENT
Start: 2024-11-19

## 2024-11-29 DIAGNOSIS — G62.0 PERIPHERAL NEUROPATHY DUE TO CHEMOTHERAPY: ICD-10-CM

## 2024-11-29 DIAGNOSIS — T45.1X5A PERIPHERAL NEUROPATHY DUE TO CHEMOTHERAPY: ICD-10-CM

## 2024-12-02 RX ORDER — GABAPENTIN 300 MG/1
600 CAPSULE ORAL 3 TIMES DAILY
Qty: 180 CAPSULE | Refills: 3 | Status: SHIPPED | OUTPATIENT
Start: 2024-12-02 | End: 2024-12-03 | Stop reason: SDUPTHER

## 2024-12-02 NOTE — PROGRESS NOTES
REASON FOR FOLLOWUP: Patient feeling well, no additional bleeding issues, normal performance status      1. Newly diagnosed large right breast cancer.  Core needle biopsy reported invasive mammary carcinoma with focal lobular feature, grade 2.  ER negative, less than 1%; WA positive, moderate in staining, 5% to 10%. HER2 IHC 2+, FISH study positive 2.1.   2.  CT scan for chest abdomen and pelvis and breast MRI examination reported right axillary lymph node suspicious for metastatic disease.  No remote metastatic lesion.  Patient has stage IIIa (T3 N2 M0) disease.   3.  Patient was started neoadjuvant chemotherapy on 5/23/2017 with Taxol weekly plus Herceptin weekly for total 12 weeks, and Perjata every 3 weeks during chemotherapy.  Herceptin will be converted to every 3 weeks after chemotherapy finished.    4.  Chronic moderate thrombocytopenia, mild anemia, and intermittent mild neutropenia etiologies are not clear.  5.  Reaction to Herceptin C1D1.  Subsequently tolerated therapy with hydrocortisone as premedication.  6.  Potential cardiac strain developing, neuropathic symptoms persist, follow-up MRI and surgical assessment will be needed post initial chemotherapeutic phase  7.  MRI August 17 with interval resolution of abnormal enhancement within breast and right axillary adenopathy, surgery scheduled November 7, Herceptin ongoing every 3 weeks  8.  Patient seen in office November 28, status post surgery with apparent complete response, Herceptin continued  9.  Herceptin given December 19, subsequent injury in [place] leading to prolonged stay, single treatment given through additional cancer Center  10.  Patient seen May 01, 2018, no further Herceptin planned, baseline scans pursued posttreatment, post influenza syndrome, physical therapy planned  11.  Patient seen June 13, 2018, excellent performance status, improving on physical therapy, equivocal CT  per thoracic adenopathy, follow-up PET/CT planned   12.   PET/CT with improvement,?  Thyroid abnormality with ultrasound planned  13.  Patient seen October 3, partial thyroidectomy anticipated October 14-    14.  Patient seen May 14, 2019, scans negative for evidence of recurrence     15.  Patient seen 3/29/2019 clinically stable  16.  Patient reviewed July 7, ongoing GERD symptoms, GI referral planned.  17.  Hospitalization September 28-October 1-acute colitis due to enteropathic E. coli-residual thrombocytopenia  18.  Subsequent testing consistent with chronic ITP                                                                                                                                                                                                                               HISTORY OF PRESENT ILLNESS:    The patient is a pleasant 83 y.o. with the above-mentioned history, who presents today in anticipation of cycle 4 of Herceptin, Perjeta and Taxol.  This is the first visit with this physician involving this patient's case.  We have reviewed her status today with her slowly developing neuropathic symptoms in her lower extremities but also involving her fingers recognized significantly and she plays the piano and keyboard.  This is becoming harder to do but she is still able to do so.  She also notices neuropathy in her feet but is able to walk and function in daily activities.  Additional to this is her continued grieving at the death of her  though she, fortunately, has an excellent support system.  She continues to experience nausea that is well relieved with Compazine. She denies oral mucositis.  No diarrhea no constipation no chest pain no dyspnea.  No lower extremity edema.    She did received 2 units of PRBC's on   7/8/2017 and remains hematologically stable.   She does have difficulty with sleep and Ambien 10 mg daily at bedtime seems help much better compared to 5 mg dose.  She does not need refills today.  In reviewing her case July 26 she  is entering the fourth cycle of her treatment and recent echocardiogram is reviewed with she and her close friend revealing EF of 66.7% though with global strain of -16%?  Suspicious for myopathic process.  Normal ventricular cavity size and wall thickness as well as contractility.  We have also discussed that she is responding to therapy and will need surgical assessment which may not as yet have been performed.  She has seen Dr. Melo for port placement and will ask him to review her likely just after she has completed his fourth cycle of therapy.  It is also apparent that she'll need a cardiac assessment as we continue subsequent Herceptin therapy perioperatively.      The patient underwent MRI of the breasts August 17 demonstrating interval resolution of abnormal enhancement within the right breast, resolution of right axillary adenopathy had also resolved.  The findings were consistent with a complete response to neoadjuvant chemotherapy.  The patient was seen in subsequent follow-up with Dr. Melo and was determined to proceed with surgery and ultimately sentinel node evaluation.  This is now scheduled November 7 and there are additional plans to consider radiation therapy postoperatively and we have also discussed the use of anti-hormonal therapy considering her mildly positive OR status.    The patient was able to proceed to surgery undergoing right breast mastectomy and sentinel lymph node biopsy November 07, 2017.  She did well postoperatively seeing Dr. Melo November 16.  Pathology revealed no residual malignancy in the breast and 3 negative sentinel lymph nodes.  A formal review of her report reveals treatment effect particularly in her largest lymph node with focal fibrosis and scar, right breast mastectomy fibrosis associated with a cavitary biopsy site and no invasive or in situ carcinoma.  The patient is now seen in our office though she went to the wrong location and the seen late in the day.   We discussed her findings and agree that she would continue Herceptin at this point but be reviewed formally again in 3 weeks.  She is also be seen by radiation therapy for their input.   The patient, evidently, was seen by radiation therapy but decided not to pursue it until her last Herceptin therapy here December 19.  Following this she visited her daughter in [place] and, unfortunately, developed influenza and pneumonia.  She had a prolonged hospitalization and was at many sites in recovery over a several month only to return to [place] in the last several weeks.  She did take 1 IV Herceptin dose while in [place] but as she is reviewed May 05, 2018 we have discussed that it makes no particular sense to continue or add on to her previous history by extending Herceptin any further 3-4 months after her last treatment.  She therefore has completed Herceptin.    The patient on to be tested post her treatment again baseline studies and CT of chest and pelvis were performed June 6 revealing interval increase in a few subcentimeter nodes in the left pectoral, axillary and mediastinal regions. These are all considerably small and of uncertain significance.  The patient's performance status remains excellent without any reduction and, in fact, has improved with physical therapy upon return.       Patient is next seen August 08, 2018, fortunately feeling well.  A follow-up PET/CT had revealed an interval decrease in the subcentimeter nodes in the left subpectoral axillary region as well as mediastinum.  There was no hypermetabolic lymphadenopathy.  There was intense focus within slightly enlarged right thyroid gland.  Patient indicates she never had any thyroid issues of which she is aware.  The patient was referred to ENT for assessment and the patient was seen by Dr. Fofana.  Ultimately a subtotal thyroidectomy is anticipated and now scheduled October 4.  The patient is willing to proceed.        The patient  proceeded to a right thyroid lobectomy performed November 04, 2018.  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  There was no definitive evidence of trans-capsular or vascular invasion.    The patient is seen in follow-up November 28 doing well and we discussed the surgical treatment of this thyroid lesion.  She feels that all this went well.  She has additional cataract surgery at the end of November  scheduled also.  The patient did complete her testing and was asked to return approximately 6 months later with a follow-up CT chest, abdomen and pelvis that demonstrate no evidence of recurrent disease.  As she is seen back May 14 she, unfortunately, fell and contused her face, left knee and left hand.  This required an ER visit.  Is elected to follow her back in 6 months maintaining her port in the interval and she is seen October 29 again without indications of recurrent disease and relatively stable clinically.  She is seeing plastic surgery about reconstruction and otherwise continue her current medication list and following with her primary care regularly.  The patient is next seen July 7, 2020 indicating that she did not proceed to any  plastic surgery and with the COVID-19 epidemic she does not wish to have any elective procedures.  She has, however, having significant GI reflux symptoms that have worsened substantially last several months despite PPI therapy.    The patient had follow-up for meningioma September 11, 2020 unchanged from previous.        The patient is next reviewed October 27, 2020 having been hospitalized September 28 through October 1, 2020.  She had presented with fever and flank pain and was found to have acute colitis due to enteropathogenic E. coli.  Antibiotics were avoided secondary to history of C. difficile colitis and fortunately she did not want to improve albeit slowly.  She had evidence of acute on chronic thrombocytopenia with a platelet count dropping  to close to 30,000 and slowly rebounding assessed October 6 at 64,000 and October 13 at 67,000.  Fortunately she is feeling considerably better with her bowel function normalizing.  We had the patient return for ongoing testing documenting continued thrombocytopenia and IPF at 9.5 818 October 2020.  She seen back April 13, 2021 she has further thrombocytopenia with peripheral smear showing enlarged platelets.  Is felt this consistent with chronic ITP.  The patient was treated with prednisone 8.5 mg/kg and able to gradually taper last been seen through July 14, 2020 having dropped to 5 mg.  She discontinued the medication tested 7/21/2021 with H&H of 10.6 and 32.1, white count of 8070 and platelet count of 67,000.  She is next seen 7/27/2021.  She has been able to taper off of steroids altogether but recently injured her lower extremities on the table and another physician's office and was treated for potential cellulitis developing.  This included antibiotic treatment-ciprofloxacin-producing nausea which has been difficult to recover from.  She is now completed antibiotic therapy altogether approximately 2 days ago.  The patient was able to maintain off of steroids and returns for follow-up with her platelet count remaining in the 50-60,000 range consistently associated with elevated IPF.  She has had no issues with additional hemorrhage fortunately.  She is seen 10/19/21 with an excellent performance status and again stable.    The patient returned for reassessment and is next evaluated 4/19/2022 with H&H of 10.9 and 32.5 with white count of 5760, platelet count of 78,000, ANC of 1820, platelet count of 78,000 and IPF of 12.0.  She is feeling well without any additional medical issues.    We had the patient return for port maintenance and reassessment in 6 months.  She is reviewed 11/8/2022 stable clinically.    The patient is next reviewed 5/2/2023.  Clinically she has done quite well and, in fact, indicates that  she has had a subsequent COVID-19 vaccination that was given (according to records) 4/26/2023.  She had no complications but is noted to have a slightly reduced platelet count today in the setting of known chronic ITP.       We asked the patient return for follow-up and she is evaluated 10/17/2023.  She has a primary care follow-up with worsening fatigue anemia profile was otherwise unremarkable 10/9/2023.  Platelet count of 60,000.  The patient has many other questions including concerning upper GI symptoms and the need for possible endoscopy, the skin tag that needs to be removed from the lower neck, upper chest and the PowerPort is no longer functional.  A general surgery consultation will be requested.    The patient is next evaluated 2/20/2024.  She was scheduled to have her Mediport removed November 2023, however had to cancel this appointment due to having the flu.  She has not yet scheduled her appointment.  She also canceled her EGD and colonoscopy, and currently does not plan to reschedule these.  She has been feeling well.  She continues to walk 4 miles daily.  She does feel that her neuropathy is slightly worsened, and continues on gabapentin managed by her primary care provider.  Eating and drinking well.  Bowels moving regularly.  Denies signs or symptoms of bleeding.    The patient is next evaluated 6/4/2024.  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  She is pleased with her current status.       Patient is next evaluated 12/3/2024 still remaining quite active but clearly struggling with vertigo.  This appears to be consistent with benign positional vertigo and we discussed a trial of Epley maneuvers with diagrams provided to her.    Past Medical History:   Diagnosis  Date    Anemia     Breast cancer 05/03/2017    Right breast invasive mammary carcinoma with focal lobular features, grade 2, ER negative, less than 1% IL positive, HER-2 positive, stage IIIa disease (T3, N2, M0    Breast cancer 10/20/2004    Left breast ductal carcinoma in-situ, predominately intermediate grade with focal comedonecrosis (high grade), solid and bribridform patterns involving approximately five core biopsy fragments    Colon polyps 2016    Constipation     Edema     Chronic lower extremity edema    GERD (gastroesophageal reflux disease) 09/13/2011    Dr. Paul Negron    GI (gastrointestinal bleed) 1997    H/O jaundice     Hernia     INCISONAL. AROUND LEFT TRAM LAP SITE    History of alcoholism     NONE SINCE 1978    History of chemotherapy     2017 4 MONTHS IN 2017    History of Clostridium difficile colitis     JAN 2018    History of histoplasmosis     History of infectious mononucleosis 1960    History of migraine headaches     History of pneumonia 12/27/2017    AS RESULT OF FLU. ENDED UP ON VENT IN [place]    History of thrombocytopenia     History of transfusion 1997    History of vertigo     Hx of colonic polyps 06/11/2009    Dr. Giles    Hyperlipidemia     Hypertension     Hypothyroidism     PARTIAL THYROIDECTOMY    Macular degeneration     Meningioma     Neuropathy     HANDS AND FEET    Neuropathy     on gabapentin    Osteoarthritis 09/13/2011    Dr. Jamil Miller    Peptic ulceration     Retina disorder     BLEED. FROM HISTOPLASMOSIS    SBO (small bowel obstruction)     due to hernia with surgical repair in 09/2011    SOB (shortness of breath) on exertion     Thyroid mass     RIGHT    Urgency incontinence    Normal echocardiogram on 5/18/2017, LVEF 68%.    Past Surgical History:   Procedure Laterality Date    APPENDECTOMY N/A 1960    BLADDER REPAIR N/A 1980    BREAST BIOPSY Left 10/20/2004    Mammotome incisional biopsy left breast, surgical specimen, left breast, localization clip  placement, left breast, confirmatory diagnostic unilateral mammogram, left breast-Dr. Tyrese Alcala, [facility]    BREAST BIOPSY Right 05/03/2017    PATH: INVASIVE MAMMARY CARCINOMA    CATARACT EXTRACTION Bilateral     CHOLECYSTECTOMY N/A 1990    COLONOSCOPY N/A 06/11/2009    Hemorrhoids, otherwise normal, repeat in 5 years-Dr. Noemí Purcell    COLONOSCOPY  2016    [facility]    COLONOSCOPY W/ POLYPECTOMY N/A 4/8/2024    Procedure: COLONOSCOPY WITH POLYPECTOMY WITH TATTOO;  Surgeon: Christian Melo MD;  Location: [facility] OR;  Service: General;  Laterality: N/A;  descending colon polyp - hot snare  mass @ 35cm - tattooed and EPI    ENDOSCOPY N/A 4/8/2024    Procedure: ESOPHAGOGASTRODUODENOSCOPY WITH BIOPSY;  Surgeon: Christian Melo MD;  Location: [facility] OR;  Service: General;  Laterality: N/A;  small sliding hiatial hernia, gastritis    EXCISION MASS HEAD/NECK N/A 4/8/2024    Procedure: REMOVAL OF VENOUS ACCESS DEVICE;  Surgeon: Christian Melo MD;  Location: [facility] OR;  Service: General;  Laterality: N/A;    EXCISION MASS LEG Left 4/8/2024    Procedure: EXCISION MASS LOWER EXTREMITY;  Surgeon: Christian Melo MD;  Location: [facility] OR;  Service: General;  Laterality: Left;    EYE SURGERY Right 2017    laser surgery for blood clot    HYSTERECTOMY Bilateral 1980    INGUINAL HERNIA REPAIR Right     DR ALESIA GAXIOLA    MASTECTOMY Left 2004    Left breast mastecotmy with sentinel lymph node biospy-[facility]    MASTECTOMY W/ SENTINEL NODE BIOPSY Right 11/07/2017    Procedure: RIGHT BREAST MASTECTOMY WITH SENTINEL NODE BIOPSY;  Surgeon: Christian Melo MD;  Location: [facility] MAIN OR;  Service:     VT INSJ TUNNELED CVC W/O SUBQ PORT/ AGE 5 YR/> Left 05/22/2017    Procedure: INSERTION VENOUS ACCESS DEVICE;  Surgeon: Christian Melo MD;  Location: [facility] MAIN OR;  Service: General    REDUCTION MAMMAPLASTY Right     to match Lt. TRAM flap    SMALL INTESTINE SURGERY      INCARCRATED HERNIA    THYROIDECTOMY Right  10/04/2018    Procedure: RT THYROID LOBECTOMY;  Surgeon: Julián Fofana MD;  Location: [facility] OR [facility];  Service: ENT    TONSILLECTOMY Bilateral     TRAM FLAP DELAYED Left     WITH MASTOPEXY    UPPER GASTROINTESTINAL ENDOSCOPY N/A 2011    LA grade A esophagitis with no bleeding, large hiatus hernia (50cm), gastric ulcer-Dr.Laszlo Lezama    US GUIDED FINE NEEDLE ASPIRATION  2018   Lico catheter placement on 2017 by Dr. Melo.     OB/GYN history: Menarche age 16, menopause at 1985. , 1 miscarriage. No birth control pill use. She did have post menopause hormonal supplementation.      HEMATOLOGIC/ONCOLOGIC HISTORY: The patient is a 81y.o. year old female whom we are consulted for a newly self discovered right breast mass, in 2017. Patient had ultrasound-guided biopsy on 5/3/2017 and confirmed to be invasive mammary carcinoma with focal lobular features, grade 2 Kenilworth score 6/9. She is here for initial evaluation for management.      Patient is a 76-year-old  female who was seen here previously for chronic mild-to-moderate thrombocytopenia and mild anemia. Recently the patient reports she started having firmness of the right breast that started sometime in April or maybe even in March. She noticed firmness spread from about around the 12 o'clock position, gradually towards the upper lateral side and lower part of the right breast associated mild pain. The patient thought it was related to fibrocystic changes. However, the symptom was getting worse. Patient also reported dented nipple after she started having pain in the right breast. She denies discharge from the nipple. She called her primary care physician, Dr. Martin, who ordered a mammogram study. This was done on 2017 with architectural distortion of the right breast centrally at 12 o'clock position and had scattered fibroglandular densities throughout the right breast.      The patient subsequently had right  breast ultrasound examination on 04/26/2017. Discovered a large right breast mass measuring 5.8 x 3.5 x 3.9 cm. Patient subsequently had ultrasound-guided right breast biopsy on 05/03/2017. Pathology evaluation reported invasive mammary carcinoma with focal lobular feature. Elen score 6/9, overall grade 2. Sample was further sent to the [facility] for test. ER negative, less than 1%; DE positive, moderate in staining, 5% to 10%. HER2 IHC 2+, but FISH study positive 2.1.     She denies weight loss, she actually eats very well. No nausea vomiting. Patient does complain of insomnia, unable to sleep.      This patient has history of left breast DCIS back in 2007, had mastectomy at the [facility]. No hormonal therapy afterwards according to patient. This patient also had a small meningioma, followed by Dr. Smith in [facility], with most recent MRI of the brain in March 2017, with 18 mm meningioma, and followed on an annual basis.     Her neutrophil count on 5/16/17 is normal at 2500, however, records showed starting from 05/2015 and in 09/2016, 01/2017 and 03/2017, all 4 laboratory studies showed mild neutropenia with ANC fluctuating between 1200 and 1600. Etiology is not clear.    Normal echocardiogram on 5/18/2017, LVEF 68%.      CT scan for chest abdomen and pelvis on 5/22/2017 and breast MRI examination on 5/22/2017 reported small right axillary lymph node suspicious for metastatic disease.  No remote metastatic lesion.  Patient has stage IIIa (T3 N2 M0) disease.      Patient will start neoadjuvant chemotherapy with Taxol weekly plus Herceptin weekly for total 12 weeks, and Perjeta every 3 weeks (3-week cycle) during chemotherapy.  Herceptin will be converted to every 3 weeks after chemotherapy finished.  Cycle 1 day 1 5/23/2017.   Status post neoadjuvant chemotherapy the patient was assessed with MRI showing a complete neoadjuvant response.  The patient  was reviewed for surgery and questions concerning lymph node dissection-sentinel node evaluation-and need for radiation therapy postop were considered as well as use of additional Herceptin for the balance of the year as well as additional use of anti-hormonal therapy.  Surgery was scheduled November 07, 2017.  Patient next seen in office November 28 having undergone surgery on November 7 with results revealing no residual malignancy in either breast or associated sentinel lymph nodes.  Was elected to continue Herceptin when seen back in office November 20 having initiated Herceptin May 23, 2017.    Patient continued Herceptin with her last treatment given December 19, 2017.     Unfortunately she later experienced an accident while in [place] December 28 with prolonged hospitalization and prolonged ventilator management required.  Apparently she had at least one IV Herceptin therapy given while there and we were contacted March 20, 2018- Dr. Nuno.  Eventually the patient was able to return to [place] is seen back in office May 5 at which time we concluded that she completed Herceptin therapy as result of being off treatment for so long.  Plans were made for baseline scans.  Patient follow-up reexaminations May 8, 2019 with no evidence of recurrent malignancy.  This was again a circumstance when she was assessed October 29, 2019.  Patient seen July 7, 2020 without evidence of recurrent malignancy.  Recurrent GI symptoms noted with GI referral planned.  Patient hospitalized September 28-October 1, 2020 with enteropathic E. coli, acute on chronic thrombocytopenia.     Subsequent testing felt consistent with chronic ITP and trial of steroids initiated April 14, 2021.  She is able to taper off of prednisone altogether by mid July 2021.  Patient seen back in office 10/19/21 stable off steroids.  Her subsequent assessments were consistent with chronic ITP without intervention required.      MEDICATIONS: The current  "medication list was reviewed with the patient and updated in the EMR this date per the Medical Assistant. Medication dosages and frequencies were confirmed to be accurate.       ALLERGIES:    Allergies   Allergen Reactions    Codeine Nausea And Vomiting    Morphine Nausea And Vomiting     SOCIAL HISTORY:   Social History     Tobacco Use    Smoking status: Former     Current packs/day: 0.00     Average packs/day: 2.0 packs/day for 30.2 years (60.4 ttl pk-yrs)     Types: Cigarettes     Start date: 1957     Quit date: 4/10/1987     Years since quittin.6     Passive exposure: Past    Smokeless tobacco: Never    Tobacco comments:     I haven't had a cigarette in over 30 years   Vaping Use    Vaping status: Never Used   Substance Use Topics    Alcohol use: No     Comment: stopped , heavy in past    Drug use: No     FAMILY HISTORY:  Family History   Problem Relation Age of Onset    Heart disease Mother     Aortic aneurysm Mother         abdominal    Coronary artery disease Mother     Hypertension Mother     Miscarriages / Stillbirths Mother     Diverticulitis Mother     Heart disease Father     Heart attack Father         acute    Hypertension Father     Early death Father     Hearing loss Father     Kidney disease Father     Breast cancer Daughter 45    Heart disease Brother     Hypertension Brother     Malig Hyperthermia Neg Hx      I have reviewed the patient's medical history in detail and updated the computerized patient record.    REVIEW OF SYSTEMS:  Review of systems as mentioned in the HPI.     Objective:   Vitals:    24 1242   BP: 142/83   Pulse: 63   Resp: 16   Temp: 98.2 °F (36.8 °C)   TempSrc: Oral   SpO2: 97%   Weight: 64.7 kg (142 lb 9.6 oz)   Height: 156.2 cm (61.5\")   PainSc: 0-No pain           ECOG 0      PHYSICAL EXAM:   GENERAL: Well-developed, well-nourished female, in no acute distress.   SKIN: Warm, dry without rashes, purpura or petechiae.  Left upper chest Lico catheter in " place, no evidence of infection.  Skin tag lower neck anteriorly.  EYES: Pupils equal, round and reactive to light. EOMs intact. Conjunctivae normal.  EARS: Hearing intact.  NOSE:  No excoriations or nasal discharge.  MOUTH: Tongue is well-papillated; no stomatitis or ulcers. Lips normal.  THROAT: Oropharynx without lesions or exudates.  Status post partial thyroidectomy well-healing  LYMPHATICS: No cervical, supraclavicular, axillary adenopathy.  CHEST: Lungs clear to auscultation. Good airflow.   BREAST:Postop, Well healed right mastectomy site with no evidence of recurrent disease, no axillary adenopathy bilaterally.  CARDIAC: Regular rate and rhythm without murmurs. Normal S1,S2.  ABDOMEN: Slightly distended, normal active bowel sounds,  no hepatosplenomegaly or masses.  EXTREMITIES: Status post resection of skin cancer left anterior tibia midportion--?  Recurrent, consistent with well-differentiated keratinizing squamous cell carcinoma, keratoacanthoma type  NEUROLOGICAL: Cranial Nerves II-XII grossly intact. No focal neurological deficits.  She is demonstrating clear vertigo that is positional and origin associated with mild nystagmus.  PSYCHIATRIC: Alert and oriented      RECENT LABS:  Lab Results   Component Value Date    WBC 5.16 12/03/2024    HGB 11.9 (L) 12/03/2024    HCT 36.4 12/03/2024    MCV 90.5 12/03/2024    PLT 70 (L) 12/03/2024    PLT 70 (L) 12/03/2024     Lab Results   Component Value Date    NEUTROABS 1.43 (L) 12/03/2024     Lab Results   Component Value Date    GLUCOSE 110 (H) 04/01/2024    BUN 16 04/01/2024    CREATININE 0.89 04/01/2024    EGFRIFNONA 70 12/20/2021    EGFRIFAFRI 81 12/20/2021    BCR 18.0 04/01/2024    K 3.5 04/01/2024    CO2 25.0 04/01/2024    CALCIUM 9.5 04/01/2024    PROTENTOTREF 6.9 12/22/2023    ALBUMIN 4.5 12/22/2023    LABIL2 1.9 12/22/2023    AST 20 12/22/2023    ALT 13 12/22/2023            Assessment/Plan   1. Large right breast cancer, locally advanced, stage IIIa (T3  N1/ 2 M0).  ER negative, less than 1%; CA positive, moderate in staining, 5% to 10%. HER2 IHC 2+, FISH study positive 2.1.  Physical examination showed a large mass, 7 cm x 7 cm initially which has decreased to approximately less than 4 cm ..  Patient  proceeded through 4 cycles ceptin,Perjeta and Taxol.   Her subsequent MRI examination showed complete response by imaging including right axillary lymphadenopathy.  We have continued Herceptin and that includes today October 20, 2017.  The case was discussed with Dr. Melo and plans were to proceed with mastectomy plus right axillary lymph node assessment via sentinel node evaluation. it was felt she likely require radiation therapy post procedure.  The patient was scheduled and proceeded to surgery November 7.  Her results, wonderfully, revealed no evidence of residual disease in the breast or associated sentinel lymph nodes. She was seen by radiation therapy and offered treatment did not pursue it.  Additionally, and somewhat complicating this story, she traveled to [place] and developed severe influenza, associated pneumonia and was hospitalized for several months only to return to [place] in the last several weeks now being seen back May 1.  There is no particular benefit from trying to add Herceptin back to her therapy now and is felt that she is completed Herceptin treatment.  She wishes consider reconstruction and baseline CT scans will be requested in 5 weeks with the patient being seen in 6 weeks follow-up.The patient reviewed June 13, 2018 with the scans demonstrating very modest increase in left pectoral, axillary or mediastinal nodes.  These are of uncertain significance but do need follow-up in a PET/CT is planned in 7 weeks.  Her anemia will also be reviewed again with additional laboratory studies that visit.  She'll be seen in 8 weeks for general reassessment.    The patient's anemia workup was essentially negative and she is slowly improving  when seen back August 8.  Her PET/CT, fortunately, demonstrates improvement though there is potential abnormality within the right thyroid lobe.  We discussed thyroid function testing and follow-up thyroid ultrasound.  She will be seen back in approximately 2 months as we maintain her port monthly flushes.   The patient was reviewed by ENT and ultimately was felt that a partial thyroidectomy was in order and is now scheduled October 4.  Patient's one to proceed.  We'll plan to see her back in approximately 6 weeks.      The patient is next seen November 28, 2015.  Her surgery went well with the findings as noted above.  She has follow-up with ENT in the next several weeks.  Additionally she has bilateral cataract surgery at the end of this month and into the beginning of next.  We discussed reassessment in general with follow-up scans and these were performed May 2019 which showed no evidence of recurrent malignancy.  Six-month follow-up planes with maintenance of her port every 6 weeks.  The patient is reassessed October 29, 2019 fortunately continue to do relatively well.  She has had no additional medical issues since last seen we will plan to see her back in 6 months again meeting report.  She does plan to see plastic surgery concerning reconstruction concerning her breast cancer history.  A copy this note will be sent to the plastic surgeon.     The patient is next seen July 7, 2020 without evidence of recurrence of malignancy.  We plan 6-month follow-up.  The patient is reviewed October 27, 2020 without evidence recurrent disease, repeat scans during recent hospitalization September 20-October 1 without evidence of recurrent malignancy.  Subsequent assessments again without evidence of recurrence including reexamination 11/8/2022 and subsequently including 10/17/2023.       Next evaluated 2/20/2024 for follow-up.  No evidence of recurrent disease.      2.  Previous fall with contusions now recovered.  Recent  contusions lower extremities treated with antibiotic therapy producing nausea now discontinued                                                                                                                                                    3.  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  This has been treated with B vitamins including B12 and B6.  These were not given for her history of previous EtOH use.  Continues follow-up with her PCP, currently receiving gabapentin.    4.  Worsening reflux symptoms, now stable    5.  Hospitalization September 20-October 1, 2020 with enteropathic E. coli.  This responded to conservative therapy and colonoscopy had been planned but we will not pursue at this point with resolution of symptoms.    6.  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  As this is assessed April 13 the patient's platelet count continues to decrease and we have discussed a trial of half milligram per kilogram prednisone-40 mg daily with weekly checks and adjustment as needed including rapid taper.     Patient tapered from prednisone through mid July 2021, stable to improved when assessed 7/27/2021.  No additional steroids planned.  Patient stable seen 10/19/21 with every 3 month assessments planned.  Subsequent conclusion felt to be consistent with chronic ITP  Patient next assessed 5/2/2023 with platelet count of 55,000, IPF pending.  This would still be adequate with no intervention necessary but we will plan follow-up.  Subsequent platelet count between 60 and 80,000 documented.  2/20/2024 platelets today stable at 59,000.  Assessment 6/4/2024-platelet count of 60,000, IPF 1.0    7.  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  Epley maneuver trial

## 2024-12-02 NOTE — TELEPHONE ENCOUNTER
Rx Refill Note  Requested Prescriptions     Pending Prescriptions Disp Refills    gabapentin (NEURONTIN) 300 MG capsule [Pharmacy Med Name: Gabapentin 300 MG Oral Capsule] 180 capsule 0     Sig: TAKE 2 CAPSULES BY MOUTH THREE TIMES DAILY      Last office visit with prescribing clinician: 10/29/2024   Last telemedicine visit with prescribing clinician: Visit date not found   Next office visit with prescribing clinician: 3/6/2025                         Would you like a call back once the refill request has been completed: [] Yes [] No    If the office needs to give you a call back, can they leave a voicemail: [] Yes [] No    Faith Guerra MA  12/02/24, 09:09 EST

## 2024-12-03 ENCOUNTER — OFFICE VISIT (OUTPATIENT)
Dept: ONCOLOGY | Facility: CLINIC | Age: 84
End: 2024-12-03
Payer: MEDICARE

## 2024-12-03 ENCOUNTER — TELEPHONE (OUTPATIENT)
Dept: ONCOLOGY | Facility: CLINIC | Age: 84
End: 2024-12-03

## 2024-12-03 ENCOUNTER — LAB (OUTPATIENT)
Dept: OTHER | Facility: HOSPITAL | Age: 84
End: 2024-12-03
Payer: MEDICARE

## 2024-12-03 VITALS
RESPIRATION RATE: 16 BRPM | WEIGHT: 142.6 LBS | SYSTOLIC BLOOD PRESSURE: 142 MMHG | DIASTOLIC BLOOD PRESSURE: 83 MMHG | HEART RATE: 63 BPM | BODY MASS INDEX: 26.92 KG/M2 | TEMPERATURE: 98.2 F | HEIGHT: 61 IN | OXYGEN SATURATION: 97 %

## 2024-12-03 DIAGNOSIS — D69.3 CHRONIC ITP (IDIOPATHIC THROMBOCYTOPENIA): ICD-10-CM

## 2024-12-03 DIAGNOSIS — D69.6 THROMBOCYTOPENIA: ICD-10-CM

## 2024-12-03 DIAGNOSIS — T45.1X5A PERIPHERAL NEUROPATHY DUE TO CHEMOTHERAPY: ICD-10-CM

## 2024-12-03 DIAGNOSIS — G62.0 PERIPHERAL NEUROPATHY DUE TO CHEMOTHERAPY: ICD-10-CM

## 2024-12-03 DIAGNOSIS — D50.9 IRON DEFICIENCY ANEMIA, UNSPECIFIED IRON DEFICIENCY ANEMIA TYPE: ICD-10-CM

## 2024-12-03 DIAGNOSIS — C50.911 MALIGNANT NEOPLASM OF RIGHT FEMALE BREAST, UNSPECIFIED ESTROGEN RECEPTOR STATUS, UNSPECIFIED SITE OF BREAST: Primary | ICD-10-CM

## 2024-12-03 DIAGNOSIS — L85.8 KERATOACANTHOMA: ICD-10-CM

## 2024-12-03 LAB
BASOPHILS # BLD AUTO: 0.01 10*3/MM3 (ref 0–0.2)
BASOPHILS NFR BLD AUTO: 0.2 % (ref 0–1.5)
DEPRECATED RDW RBC AUTO: 53.1 FL (ref 37–54)
EOSINOPHIL # BLD AUTO: 0 10*3/MM3 (ref 0–0.4)
EOSINOPHIL NFR BLD AUTO: 0 % (ref 0.3–6.2)
ERYTHROCYTE [DISTWIDTH] IN BLOOD BY AUTOMATED COUNT: 16.1 % (ref 12.3–15.4)
HCT VFR BLD AUTO: 36.4 % (ref 34–46.6)
HGB BLD-MCNC: 11.9 G/DL (ref 12–15.9)
IMM GRANULOCYTES # BLD AUTO: 0.2 10*3/MM3 (ref 0–0.05)
IMM GRANULOCYTES NFR BLD AUTO: 3.9 % (ref 0–0.5)
LYMPHOCYTES # BLD AUTO: 1.85 10*3/MM3 (ref 0.7–3.1)
LYMPHOCYTES NFR BLD AUTO: 35.9 % (ref 19.6–45.3)
MCH RBC QN AUTO: 29.6 PG (ref 26.6–33)
MCHC RBC AUTO-ENTMCNC: 32.7 G/DL (ref 31.5–35.7)
MCV RBC AUTO: 90.5 FL (ref 79–97)
MONOCYTES # BLD AUTO: 1.67 10*3/MM3 (ref 0.1–0.9)
MONOCYTES NFR BLD AUTO: 32.4 % (ref 5–12)
NEUTROPHILS NFR BLD AUTO: 1.43 10*3/MM3 (ref 1.7–7)
NEUTROPHILS NFR BLD AUTO: 27.6 % (ref 42.7–76)
NRBC BLD AUTO-RTO: 0 /100 WBC (ref 0–0.2)
PLAT MORPH BLD: NORMAL
PLATELET # BLD AUTO: 70 10*3/MM3 (ref 140–450)
PLATELET # BLD AUTO: 70 10*3/MM3 (ref 140–450)
PLATELETS.RETICULATED NFR BLD AUTO: 9.4 % (ref 0.9–6.5)
PMV BLD AUTO: 11.5 FL (ref 6–12)
RBC # BLD AUTO: 4.02 10*6/MM3 (ref 3.77–5.28)
RBC MORPH BLD: NORMAL
WBC MORPH BLD: NORMAL
WBC NRBC COR # BLD AUTO: 5.16 10*3/MM3 (ref 3.4–10.8)

## 2024-12-03 PROCEDURE — 36415 COLL VENOUS BLD VENIPUNCTURE: CPT

## 2024-12-03 PROCEDURE — 85055 RETICULATED PLATELET ASSAY: CPT | Performed by: INTERNAL MEDICINE

## 2024-12-03 PROCEDURE — 85007 BL SMEAR W/DIFF WBC COUNT: CPT | Performed by: INTERNAL MEDICINE

## 2024-12-03 PROCEDURE — 85025 COMPLETE CBC W/AUTO DIFF WBC: CPT | Performed by: INTERNAL MEDICINE

## 2024-12-03 RX ORDER — GABAPENTIN 300 MG/1
600 CAPSULE ORAL 3 TIMES DAILY
Qty: 180 CAPSULE | Refills: 3 | Status: SHIPPED | OUTPATIENT
Start: 2024-12-03

## 2024-12-03 NOTE — TELEPHONE ENCOUNTER
Pharmacy Name:  [facility]    Pharmacy representative name: JASSON    Pharmacy representative phone number: [phone]    What medication are you calling in regards to: GABAPENTIN    What question does the pharmacy have: VERIFY DOSAGE DIRECTIONS     Who is the provider that prescribed the medication: DR REED

## 2024-12-03 NOTE — TELEPHONE ENCOUNTER
"Spoke to [facility] pharmacist, she states the patient told her that she is wanting a 30 day supply of gabapentin because she is starting to take 9 capsules a day. I told the pharmacist that the directions from the prescription Dr. Coburn signed are \"take 2 capsules by mouth 3 (Three) Times a Day. May increase to 3 at bedtime\" so the most the patient should be taking is 7 a day. The pharmacist v/u and said she would tell the patient.   "

## 2024-12-17 RX ORDER — PROPRANOLOL HYDROCHLORIDE 10 MG/1
10 TABLET ORAL 3 TIMES DAILY
Qty: 90 TABLET | Refills: 0 | Status: SHIPPED | OUTPATIENT
Start: 2024-12-17

## 2025-01-13 RX ORDER — PROPRANOLOL HYDROCHLORIDE 10 MG/1
10 TABLET ORAL 3 TIMES DAILY
Qty: 90 TABLET | Refills: 0 | Status: SHIPPED | OUTPATIENT
Start: 2025-01-13

## 2025-02-17 RX ORDER — PROPRANOLOL HYDROCHLORIDE 10 MG/1
10 TABLET ORAL 3 TIMES DAILY
Qty: 90 TABLET | Refills: 0 | Status: SHIPPED | OUTPATIENT
Start: 2025-02-17

## 2025-02-24 RX ORDER — ONDANSETRON 4 MG/1
4 TABLET, FILM COATED ORAL EVERY 6 HOURS PRN
Qty: 20 TABLET | Refills: 0 | Status: SHIPPED | OUTPATIENT
Start: 2025-02-24

## 2025-03-06 ENCOUNTER — OFFICE VISIT (OUTPATIENT)
Dept: FAMILY MEDICINE CLINIC | Facility: CLINIC | Age: 85
End: 2025-03-06
Payer: MEDICARE

## 2025-03-06 VITALS
SYSTOLIC BLOOD PRESSURE: 120 MMHG | DIASTOLIC BLOOD PRESSURE: 64 MMHG | RESPIRATION RATE: 16 BRPM | WEIGHT: 144 LBS | HEART RATE: 80 BPM | OXYGEN SATURATION: 99 % | HEIGHT: 61 IN | BODY MASS INDEX: 27.19 KG/M2

## 2025-03-06 DIAGNOSIS — T45.1X5A PERIPHERAL NEUROPATHY DUE TO CHEMOTHERAPY: ICD-10-CM

## 2025-03-06 DIAGNOSIS — E03.9 ACQUIRED HYPOTHYROIDISM: ICD-10-CM

## 2025-03-06 DIAGNOSIS — G47.09 OTHER INSOMNIA: ICD-10-CM

## 2025-03-06 DIAGNOSIS — G62.0 PERIPHERAL NEUROPATHY DUE TO CHEMOTHERAPY: ICD-10-CM

## 2025-03-06 DIAGNOSIS — I10 PRIMARY HYPERTENSION: Primary | ICD-10-CM

## 2025-03-06 DIAGNOSIS — E78.5 HYPERLIPIDEMIA, UNSPECIFIED HYPERLIPIDEMIA TYPE: ICD-10-CM

## 2025-03-06 PROBLEM — G62.9 NEUROPATHY: Status: RESOLVED | Noted: 2025-03-06 | Resolved: 2025-03-06

## 2025-03-06 PROBLEM — G62.9 NEUROPATHY: Status: ACTIVE | Noted: 2025-03-06

## 2025-03-06 RX ORDER — ESZOPICLONE 1 MG/1
1 TABLET, FILM COATED ORAL NIGHTLY
Qty: 30 TABLET | Refills: 5 | Status: SHIPPED | OUTPATIENT
Start: 2025-03-06

## 2025-03-06 NOTE — PROGRESS NOTES
EMERGENCY DEPARTMENT HISTORY AND PHYSICAL EXAM    Date: 2022  Patient Name: Adams Warren    History of Presenting Illness     Chief Complaint   Patient presents with    Tick Removal         History Provided By: Patient    Chief Complaint: tick removal   Duration: 1 day  Timing: acute  Location: b/w L 3rd and 4th toes   Quality:burning   Severity:moderate  Modifying Factors: none  Associated Symptoms: none       Additional History (Context): Adams Warren is a 44 y.o. female with PMH depression and anxiety who presents with c/o an embedded tick between the left third and fourth toes. Patient states she felt some discomfort between the toes earlier today. She states she noticed a small tick and attempted to remove it at home. Patient states she was able to remove the body of the tick but believes the head is still embedded. No other complaints are reported at this time. PCP: None        Past History     Past Medical History:  Past Medical History:   Diagnosis Date    Anxiety     Chronic kidney disease     chronic uti    Depression        Past Surgical History:  Past Surgical History:   Procedure Laterality Date    HX  SECTION      HX CHOLECYSTECTOMY      HX OVARIAN CYST REMOVAL      HX TUBAL LIGATION         Family History:  Family History   Family history unknown: Yes       Social History:  Social History     Tobacco Use    Smoking status: Current Every Day Smoker     Packs/day: 1.50     Years: 12.00     Pack years: 18.00    Smokeless tobacco: Never Used   Substance Use Topics    Alcohol use: No    Drug use: No       Allergies: Allergies   Allergen Reactions    Sulfa (Sulfonamide Antibiotics) Unknown (comments)         Review of Systems   Review of Systems   Constitutional: Negative. Negative for chills and fever. HENT: Negative. Negative for congestion, ear pain and rhinorrhea. Eyes: Negative. Negative for pain and redness. Respiratory: Negative.   Negative Subjective   Roxana Brizuela is a 84 y.o. female. Patient is here today for   Chief Complaint   Patient presents with    Follow-up    Hypertension    Hyperlipidemia          Vitals:    03/06/25 1430   BP: 120/64   Pulse: 80   Resp: 16   SpO2: 99%     Body mass index is 26.77 kg/m².      The following portions of the patient's history were reviewed and updated as appropriate: allergies, current medications, past family history, past medical history, past social history, past surgical history and problem list.    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 Paul Negron    GI (gastrointestinal bleed) 1997    H/O jaundice     Hernia     INCISONAL. AROUND LEFT TRAM LAP SITE    History of alcoholism     NONE SINCE 1978    History of chemotherapy     2017 4 MONTHS IN 2017    History of Clostridium difficile colitis     JAN 2018    History of histoplasmosis     History of infectious mononucleosis 1960    History of migraine headaches     History of pneumonia 12/27/2017    AS RESULT OF FLU. ENDED UP ON VENT IN [place]    History of thrombocytopenia     History of transfusion 1997    History of vertigo     Hx of colonic polyps 06/11/2009    Dr. Giles    Hyperlipidemia     Hypertension     Hypothyroidism     PARTIAL THYROIDECTOMY    Macular degeneration     Meningioma     Neuropathy     HANDS AND FEET    Neuropathy     on gabapentin    Osteoarthritis 09/13/2011     Dr. Jamil Miller    Peptic ulceration     Retina disorder     BLEED. FROM HISTOPLASMOSIS    SBO (small bowel obstruction)     due to hernia with surgical repair in 2011    SOB (shortness of breath) on exertion     Thyroid mass     RIGHT    Urgency incontinence       Allergies   Allergen Reactions    Codeine Nausea And Vomiting    Morphine Nausea And Vomiting      Social History     Socioeconomic History    Marital status:      Spouse name: Mike    Number of children: 2    Years of education: College   Tobacco Use    Smoking status: Former     Current packs/day: 0.00     Average packs/day: 2.0 packs/day for 30.2 years (60.4 ttl pk-yrs)     Types: Cigarettes     Start date: 1957     Quit date: 4/10/1987     Years since quittin.9     Passive exposure: Past    Smokeless tobacco: Never    Tobacco comments:     I haven't had a cigarette in over 30 years   Vaping Use    Vaping status: Never Used   Substance and Sexual Activity    Alcohol use: No     Comment: stopped , heavy in past    Drug use: No    Sexual activity: Never        Current Outpatient Medications:     albuterol sulfate  (90 Base) MCG/ACT inhaler, Inhale 2 puffs Every 4 (Four) Hours As Needed for Wheezing., Disp: 18 g, Rfl: 0    Ascorbic Acid (VITAMIN C PO), Take 1,000 mg by mouth Daily., Disp: , Rfl:     eszopiclone (LUNESTA) 1 MG tablet, Take 1 tablet by mouth Every Night. Take immediately before bedtime, Disp: 30 tablet, Rfl: 5    gabapentin (NEURONTIN) 300 MG capsule, Take 2 capsules by mouth 3 (Three) Times a Day. May increase to 3 at bedtime, Disp: 180 capsule, Rfl: 3    hydroCHLOROthiazide 25 MG tablet, Take 1 tablet by mouth Daily., Disp: 90 tablet, Rfl: 1    Lactobacillus (PROBIOTIC ACIDOPHILUS PO), Take 1 capsule by mouth Daily., Disp: , Rfl:     levothyroxine (Synthroid) 50 MCG tablet, Take 1 tablet by mouth Daily., Disp: 90 tablet, Rfl: 3    MELATONIN PO, Take 10 mg by mouth At Night As Needed., Disp: , Rfl:      for cough, shortness of breath, wheezing and stridor. Cardiovascular: Negative. Negative for chest pain and leg swelling. Gastrointestinal: Negative. Negative for abdominal pain, constipation, diarrhea, nausea and vomiting. Genitourinary: Negative. Negative for dysuria and frequency. Musculoskeletal: Negative. Negative for back pain and neck pain. Skin: Negative for rash and wound. Embedded tick    Neurological: Negative. Negative for dizziness, seizures, syncope and headaches. All other systems reviewed and are negative. All Other Systems Negative  Physical Exam     Vitals:    06/09/22 2049   BP: 121/81   Pulse: 84   Resp: 17   Temp: 98.6 °F (37 °C)   SpO2: 99%   Weight: 99.8 kg (220 lb)     Physical Exam  Vitals and nursing note reviewed. Constitutional:       General: She is not in acute distress. Appearance: She is well-developed. She is not diaphoretic. HENT:      Head: Normocephalic and atraumatic. Eyes:      General: No scleral icterus. Right eye: No discharge. Left eye: No discharge. Conjunctiva/sclera: Conjunctivae normal.   Cardiovascular:      Rate and Rhythm: Normal rate. Heart sounds: No gallop. Pulmonary:      Effort: Pulmonary effort is normal. No respiratory distress. Breath sounds: No stridor. Musculoskeletal:         General: Normal range of motion. Cervical back: Normal range of motion and neck supple. Skin:     General: Skin is warm and dry. Comments: Small embedded tick noted to the webspace between the left third and fourth toes. Neurological:      General: No focal deficit present. Mental Status: She is alert and oriented to person, place, and time. Mental status is at baseline. Coordination: Coordination normal.      Comments: Gait is steady and patient exhibits no evidence of ataxia. Patient is able to ambulate without difficulty. No focal neurological deficit noted.  No facial droop, slurred ondansetron (ZOFRAN) 4 MG tablet, TAKE 1 TABLET BY MOUTH EVERY 6 HOURS AS NEEDED FOR NAUSEA AND VOMITING, Disp: 20 tablet, Rfl: 0    pantoprazole (PROTONIX) 40 MG EC tablet, Take 1 tablet by mouth Daily., Disp: 90 tablet, Rfl: 3    potassium chloride (KLOR-CON M20) 20 MEQ CR tablet, Take 1 tablet by mouth Daily., Disp: 30 tablet, Rfl: 11    promethazine (PHENERGAN) 25 MG tablet, TAKE 1 TABLET BY MOUTH EVERY 6 HOURS AS NEEDED FOR NAUSEA FOR VOMITING, Disp: 28 tablet, Rfl: 0    propranolol (INDERAL) 10 MG tablet, TAKE 1 TABLET BY MOUTH THREE TIMES DAILY, Disp: 90 tablet, Rfl: 0    simvastatin (ZOCOR) 80 MG tablet, Take 1 tablet by mouth every night at bedtime., Disp: 90 tablet, Rfl: 3    triamcinolone (KENALOG) 0.1 % cream, Apply 1 Application topically to the appropriate area as directed 2 (Two) Times a Day., Disp: 80 g, Rfl: 2    vitamin B-6 (PYRIDOXINE) 50 MG tablet, Take 1 tablet by mouth Daily., Disp: , Rfl:     VITAMIN D, CHOLECALCIFEROL, PO, Take 1 tablet by mouth Daily., Disp: , Rfl:      Objective   History of Present Illness   History of Present Illness  The patient is an 84-year-old female here for a blood pressure check and lab follow-up.    She reports a general sense of well-being but expresses concern over the worsening of her neuropathy. She describes her gait as unsteady, akin to swinging wooden legs, and experiences discomfort at night and while walking. She also reports leg aches and itching, which occasionally disrupt her sleep. She has discontinued playing the organ due to her inability to feel her hands and feet. She is currently on a regimen of gabapentin, taking 2 pills 3 times daily, totaling 1800 mg per day.    Her home blood pressure readings have been consistently around 120/87.    She typically goes to bed around 9:00 PM, falls asleep by 10:00 PM, and wakes up between 2:00 AM and 4:00 AM. Despite these sleep disturbances, she does not feel excessively tired upon waking and is able to carry  speech, or evidence of altered mentation noted on exam.     Psychiatric:         Behavior: Behavior normal.         Thought Content: Thought content normal.                Diagnostic Study Results     Labs -   No results found for this or any previous visit (from the past 12 hour(s)). Radiologic Studies -   No orders to display     CT Results  (Last 48 hours)    None        CXR Results  (Last 48 hours)    None            Medical Decision Making   I am the first provider for this patient. I reviewed the vital signs, available nursing notes, past medical history, past surgical history, family history and social history. Vital Signs-Reviewed the patient's vital signs. Records Reviewed: Brittni Sherwood PA-C     Procedures:  Procedures    Provider Notes (Medical Decision Making): Impression:  Embedded tick     Attempted to remove the embedded tick at bedside, I believe this was successful however a small amount of scabbing and bleeding noted. I have discussed the signs and symptoms of Lyme disease with the patient. Patient requested a prescription for doxycycline. This was sent to her pharmacy. We will plan to discharge patient with recommendation for close PCP follow-up. Return precautions advised. Patient agrees. Brittni Sherwood PA-C     MED RECONCILIATION:  No current facility-administered medications for this encounter. Current Outpatient Medications   Medication Sig    doxycycline (VIBRA-TABS) 100 mg tablet Take 1 Tablet by mouth two (2) times a day for 7 days. Disposition:  D/c    DISCHARGE NOTE:   Patient is stable for discharge at this time. I have discussed all the findings from today's work up with the patient, including lab results and imaging. I have answered all questions. Rx for doxy given. Rest and close follow-up with the PCP recommended this week. Return to the ED immediately for any new or worsening symptoms.   Brittni Sherwood PA-C     Follow-up Information     Follow up With Specialties Details Why [address]  In 1 week  [address]  [address]  [address] [address]  [phone]    [facility] EMERGENCY DEPT Emergency Medicine  As needed, If symptoms worsen [address] [address]  [phone]          Current Discharge Medication List      START taking these medications    Details   doxycycline (VIBRA-TABS) 100 mg tablet Take 1 Tablet by mouth two (2) times a day for 7 days.   Qty: 14 Tablet, Refills: 0  Start date: 6/9/2022, End date: 6/16/2022               Diagnosis     Clinical Impression:   1. Embedded tick of left lower leg, initial encounter out her daily activities, including walking her dog. She has not considered other sleep aids due to previous adverse reactions to Ambien. She takes melatonin to aid sleep but is uncertain of its effectiveness.    She is on simvastatin for cholesterol management.    She is on levothyroxine for thyroid management.    She takes potassium supplements.    Supplemental Information  She had a colonoscopy and endoscopy and had a cancer removed from her foot. It still hurts. She had a fall where she hit her knee and hip but did not go to the emergency room.    MEDICATIONS  gabapentin, simvastatin, levothyroxine, potassium, melatonin       Review of Systems   Respiratory: Negative.     Cardiovascular: Negative.    Genitourinary: Negative.    Neurological: Negative.    Psychiatric/Behavioral:  Positive for sleep disturbance.        Physical Exam  Vitals reviewed.   Constitutional:       Appearance: Normal appearance.   Cardiovascular:      Rate and Rhythm: Normal rate and regular rhythm.      Pulses: Normal pulses.      Heart sounds: Normal heart sounds.   Pulmonary:      Effort: Pulmonary effort is normal.      Breath sounds: Normal breath sounds.   Musculoskeletal:      Right lower leg: No edema.      Left lower leg: No edema.   Neurological:      Mental Status: She is alert.   Psychiatric:         Mood and Affect: Mood normal.         Behavior: Behavior normal.         Thought Content: Thought content normal.         Judgment: Judgment normal.       Physical Exam  Lungs were auscultated.  Heart was examined.  Feet were examined.    Vital Signs  Blood pressure is 120/64. Weight is stable.       Results  Laboratory Studies  Cholesterol was 140. Triglycerides were 207. HDL was low. LDL was 74. Blood sugar was 120. Kidney functions were normal. Liver functions were normal. Potassium was slightly low. TSH was 3.71.       Assessment   ASSESSMENT    Problems Addressed this Visit          Cardiac and Vasculature    Hyperlipidemia     Hypertension - Primary       Endocrine and Metabolic    Hypothyroid       Neuro    Peripheral neuropathy due to chemotherapy       Sleep    Other insomnia     Diagnoses         Codes Comments    Primary hypertension    -  Primary ICD-10-CM: I10  ICD-9-CM: 401.9     Hyperlipidemia, unspecified hyperlipidemia type     ICD-10-CM: E78.5  ICD-9-CM: 272.4     Peripheral neuropathy due to chemotherapy     ICD-10-CM: G62.0, T45.1X5A  ICD-9-CM: 357.7, E933.1     Acquired hypothyroidism     ICD-10-CM: E03.9  ICD-9-CM: 244.9     Other insomnia     ICD-10-CM: G47.09  ICD-9-CM: 780.52           Assessment & Plan  1. Neuropathy.  She reports worsening neuropathy symptoms, including pain at night and difficulty walking. She is currently taking gabapentin 1200 mg three times a day. She was advised to avoid unproven treatments for neuropathy.    2. Hypertension.  Her blood pressure is well-controlled at 120/64 mmHg. She monitors her blood pressure at home, with readings around 120/87 mmHg.    3. Insomnia.  She experiences difficulty staying asleep, often waking up between 2:00 and 4:00 AM. A prescription for Lunesta 3 mg was provided, with instructions to start with the lowest dose and increase if necessary. She was also advised to consider using [facility] for potential discounts.    4. Hyperlipidemia.  Her cholesterol levels are generally good, with a total cholesterol of 140 mg/dL and LDL of 74 mg/dL. However, her triglycerides are elevated at 207 mg/dL, and her HDL is low. She is currently taking simvastatin.    5. Elevated blood glucose.  Her blood glucose level was 120 mg/dL, which is elevated. This was not a fasting measurement.    6. Hypokalemia.  Her potassium level is slightly low. She is currently taking potassium 20 mEq.    7. Hypothyroidism.  Her thyroid function is within the normal range, with a TSH level of 3.71. She is currently taking levothyroxine.       PLAN  There are no Patient Instructions on file for this  visit.    Return in about 6 months (around 9/6/2025), or Wellness visit, for a1c, cmp, lipid, tsh, cbc.    Patient or patient representative verbalized consent for the use of Ambient Listening during the visit with  Brandt Martin MD for chart documentation. 3/6/2025  15:13 EST

## 2025-03-19 RX ORDER — PROPRANOLOL HYDROCHLORIDE 10 MG/1
10 TABLET ORAL 3 TIMES DAILY
Qty: 90 TABLET | Refills: 0 | Status: SHIPPED | OUTPATIENT
Start: 2025-03-19

## 2025-04-21 RX ORDER — HYDROCHLOROTHIAZIDE 25 MG/1
25 TABLET ORAL DAILY
Qty: 90 TABLET | Refills: 0 | Status: SHIPPED | OUTPATIENT
Start: 2025-04-21

## 2025-04-21 RX ORDER — PROPRANOLOL HYDROCHLORIDE 10 MG/1
10 TABLET ORAL 3 TIMES DAILY
Qty: 90 TABLET | Refills: 0 | Status: SHIPPED | OUTPATIENT
Start: 2025-04-21

## 2025-05-12 RX ORDER — PROPRANOLOL HYDROCHLORIDE 10 MG/1
10 TABLET ORAL 3 TIMES DAILY
Qty: 90 TABLET | Refills: 0 | Status: SHIPPED | OUTPATIENT
Start: 2025-05-12

## 2025-06-04 ENCOUNTER — TELEPHONE (OUTPATIENT)
Dept: ONCOLOGY | Facility: CLINIC | Age: 85
End: 2025-06-04

## 2025-06-04 NOTE — TELEPHONE ENCOUNTER
Caller: Roxana Brizuela    Relationship to patient: Self    Best call back number:   Telephone Information:   Mobile [phone]         Chief complaint: NEEDING TO RESCHEDULE MISSED APPT 06/03    Type of visit: LAB AND FOLLOW UP 1    Requested date: ANYTIME IN JUNE EXCEPT 06/06    If rescheduling, when is the original appointment: 06/03

## 2025-06-09 ENCOUNTER — OFFICE VISIT (OUTPATIENT)
Dept: ONCOLOGY | Facility: CLINIC | Age: 85
End: 2025-06-09
Payer: MEDICARE

## 2025-06-09 ENCOUNTER — LAB (OUTPATIENT)
Dept: OTHER | Facility: HOSPITAL | Age: 85
End: 2025-06-09
Payer: MEDICARE

## 2025-06-09 VITALS
TEMPERATURE: 98 F | RESPIRATION RATE: 17 BRPM | SYSTOLIC BLOOD PRESSURE: 138 MMHG | OXYGEN SATURATION: 100 % | WEIGHT: 145.5 LBS | DIASTOLIC BLOOD PRESSURE: 81 MMHG | BODY MASS INDEX: 27.47 KG/M2 | HEIGHT: 61 IN | HEART RATE: 74 BPM

## 2025-06-09 DIAGNOSIS — T45.1X5A PERIPHERAL NEUROPATHY DUE TO CHEMOTHERAPY: ICD-10-CM

## 2025-06-09 DIAGNOSIS — R41.3 SHORT-TERM MEMORY LOSS: ICD-10-CM

## 2025-06-09 DIAGNOSIS — C50.911 MALIGNANT NEOPLASM OF RIGHT FEMALE BREAST, UNSPECIFIED ESTROGEN RECEPTOR STATUS, UNSPECIFIED SITE OF BREAST: ICD-10-CM

## 2025-06-09 DIAGNOSIS — R47.01 APHASIA: Primary | ICD-10-CM

## 2025-06-09 DIAGNOSIS — D50.9 IRON DEFICIENCY ANEMIA, UNSPECIFIED IRON DEFICIENCY ANEMIA TYPE: ICD-10-CM

## 2025-06-09 DIAGNOSIS — D69.6 THROMBOCYTOPENIA: ICD-10-CM

## 2025-06-09 DIAGNOSIS — G62.0 PERIPHERAL NEUROPATHY DUE TO CHEMOTHERAPY: ICD-10-CM

## 2025-06-09 LAB
BASOPHILS # BLD AUTO: 0.01 10*3/MM3 (ref 0–0.2)
BASOPHILS NFR BLD AUTO: 0.3 % (ref 0–1.5)
DEPRECATED RDW RBC AUTO: 51.7 FL (ref 37–54)
EOSINOPHIL # BLD AUTO: 0.01 10*3/MM3 (ref 0–0.4)
EOSINOPHIL NFR BLD AUTO: 0.3 % (ref 0.3–6.2)
ERYTHROCYTE [DISTWIDTH] IN BLOOD BY AUTOMATED COUNT: 16.6 % (ref 12.3–15.4)
HCT VFR BLD AUTO: 35.1 % (ref 34–46.6)
HGB BLD-MCNC: 12 G/DL (ref 12–15.9)
IMM GRANULOCYTES # BLD AUTO: 0.14 10*3/MM3 (ref 0–0.05)
IMM GRANULOCYTES NFR BLD AUTO: 3.7 % (ref 0–0.5)
LYMPHOCYTES # BLD AUTO: 1.5 10*3/MM3 (ref 0.7–3.1)
LYMPHOCYTES NFR BLD AUTO: 39.5 % (ref 19.6–45.3)
MCH RBC QN AUTO: 29.6 PG (ref 26.6–33)
MCHC RBC AUTO-ENTMCNC: 34.2 G/DL (ref 31.5–35.7)
MCV RBC AUTO: 86.7 FL (ref 79–97)
MONOCYTES # BLD AUTO: 1.21 10*3/MM3 (ref 0.1–0.9)
MONOCYTES NFR BLD AUTO: 31.8 % (ref 5–12)
NEUTROPHILS NFR BLD AUTO: 0.93 10*3/MM3 (ref 1.7–7)
NEUTROPHILS NFR BLD AUTO: 24.4 % (ref 42.7–76)
NRBC BLD AUTO-RTO: 0 /100 WBC (ref 0–0.2)
PLAT MORPH BLD: NORMAL
PLATELET # BLD AUTO: 68 10*3/MM3 (ref 140–450)
PLATELET # BLD AUTO: 68 10*3/MM3 (ref 140–450)
PLATELETS.RETICULATED NFR BLD AUTO: 7.8 % (ref 0.9–6.5)
PMV BLD AUTO: 11.3 FL (ref 6–12)
RBC # BLD AUTO: 4.05 10*6/MM3 (ref 3.77–5.28)
RBC MORPH BLD: NORMAL
WBC MORPH BLD: NORMAL
WBC NRBC COR # BLD AUTO: 3.8 10*3/MM3 (ref 3.4–10.8)

## 2025-06-09 PROCEDURE — 85007 BL SMEAR W/DIFF WBC COUNT: CPT | Performed by: INTERNAL MEDICINE

## 2025-06-09 PROCEDURE — 3079F DIAST BP 80-89 MM HG: CPT | Performed by: NURSE PRACTITIONER

## 2025-06-09 PROCEDURE — 1126F AMNT PAIN NOTED NONE PRSNT: CPT | Performed by: NURSE PRACTITIONER

## 2025-06-09 PROCEDURE — 3075F SYST BP GE 130 - 139MM HG: CPT | Performed by: NURSE PRACTITIONER

## 2025-06-09 PROCEDURE — 99214 OFFICE O/P EST MOD 30 MIN: CPT | Performed by: NURSE PRACTITIONER

## 2025-06-09 PROCEDURE — 36415 COLL VENOUS BLD VENIPUNCTURE: CPT

## 2025-06-09 PROCEDURE — 85055 RETICULATED PLATELET ASSAY: CPT | Performed by: INTERNAL MEDICINE

## 2025-06-09 PROCEDURE — G2211 COMPLEX E/M VISIT ADD ON: HCPCS | Performed by: NURSE PRACTITIONER

## 2025-06-09 PROCEDURE — 85025 COMPLETE CBC W/AUTO DIFF WBC: CPT | Performed by: INTERNAL MEDICINE

## 2025-06-09 NOTE — PROGRESS NOTES
REASON FOR FOLLOW-UP:    1. Large right breast cancer.  Core needle biopsy reported invasive mammary carcinoma with focal lobular feature, grade 2.  ER negative, less than 1%; CA positive, moderate in staining, 5% to 10%. HER2 IHC 2+, FISH study positive 2.1.  Completed neoadjuvant therapy with Taxol Herceptin Perjeta.  Complete response, postmastectomy.  She is since remained in observation with no evidence of disease.    2.  Hospitalization 9/28/2021 through 10/1/2021 for acute colitis secondary to enteropathic E. coli    3.  Chronic ITP.  Currently on steroid taper                                                                                                           HISTORY OF PRESENT ILLNESS:     The patient is a 84 y.o. female with the above-mentioned history who returns to the office today for 6-month follow-up.  Her chronic ITP remains stable today with platelet count of 68,000 which is at her baseline.  She denies any new significant bleeding difficulty.    She does note concern that she feels her short-term memory is worsening and at times notices aphasia.  She currently lives alone and worries about this being safe for her in the future.  She has 1 daughter who lives in [place] the other lives in [place].  We discussed potential neurology referral for formal evaluation and she would like to do that.    She denies other concerns at this time.           Past Medical History:   Diagnosis Date    Anemia     Breast cancer 05/03/2017    Right breast invasive mammary carcinoma with focal lobular features, grade 2, ER negative, less than 1% CA positive, HER-2 positive, stage IIIa disease (T3, N2, M0    Breast cancer 10/20/2004    Left breast ductal carcinoma in-situ, predominately intermediate grade with focal comedonecrosis (high grade), solid and bribridform patterns involving approximately five core biopsy fragments    Colon polyps 2016    Constipation     Edema     Chronic lower extremity edema    GERD  (gastroesophageal reflux disease) 09/13/2011    Dr. Paul Negron    GI (gastrointestinal bleed) 1997    H/O jaundice     Hernia     INCISONAL. AROUND LEFT TRAM LAP SITE    History of alcoholism     NONE SINCE 1978    History of chemotherapy     2017 4 MONTHS IN 2017    History of Clostridium difficile colitis     JAN 2018    History of histoplasmosis     History of infectious mononucleosis 1960    History of migraine headaches     History of pneumonia 12/27/2017    AS RESULT OF FLU. ENDED UP ON VENT IN [place]    History of thrombocytopenia     History of transfusion 1997    History of vertigo     Hx of colonic polyps 06/11/2009    Dr. Giles    Hyperlipidemia     Hypertension     Hypothyroidism     PARTIAL THYROIDECTOMY    Macular degeneration     Meningioma     Neuropathy     HANDS AND FEET    Neuropathy     on gabapentin    Osteoarthritis 09/13/2011    Dr. Jamil Miller    Peptic ulceration     Retina disorder     BLEED. FROM HISTOPLASMOSIS    SBO (small bowel obstruction)     due to hernia with surgical repair in 09/2011    SOB (shortness of breath) on exertion     Thyroid mass     RIGHT    Urgency incontinence    Normal echocardiogram on 5/18/2017, LVEF 68%.    Past Surgical History:   Procedure Laterality Date    APPENDECTOMY N/A 1960    BLADDER REPAIR N/A 1980    BREAST BIOPSY Left 10/20/2004    Mammotome incisional biopsy left breast, surgical specimen, left breast, localization clip placement, left breast, confirmatory diagnostic unilateral mammogram, left breast-Dr. Tyrese Alcala, [facility]    BREAST BIOPSY Right 05/03/2017    PATH: INVASIVE MAMMARY CARCINOMA    CATARACT EXTRACTION Bilateral     CHOLECYSTECTOMY N/A 1990    COLONOSCOPY N/A 06/11/2009    Hemorrhoids, otherwise normal, repeat in 5 years-Dr. Noemí Purcell    COLONOSCOPY  2016    [facility]    COLONOSCOPY W/ POLYPECTOMY N/A 4/8/2024    Procedure: COLONOSCOPY WITH POLYPECTOMY WITH TATTOO;  Surgeon: Chrsitian Melo MD;  Location: [facility]  OR;  Service: General;  Laterality: N/A;  descending colon polyp - hot snare  mass @ 35cm - tattooed and EPI    ENDOSCOPY N/A 2024    Procedure: ESOPHAGOGASTRODUODENOSCOPY WITH BIOPSY;  Surgeon: Christian Melo MD;  Location: [facility] OR;  Service: General;  Laterality: N/A;  small sliding hiatial hernia, gastritis    EXCISION MASS HEAD/NECK N/A 2024    Procedure: REMOVAL OF VENOUS ACCESS DEVICE;  Surgeon: Christian Melo MD;  Location: [facility] OR;  Service: General;  Laterality: N/A;    EXCISION MASS LEG Left 2024    Procedure: EXCISION MASS LOWER EXTREMITY;  Surgeon: Christian Melo MD;  Location: [facility] OR;  Service: General;  Laterality: Left;    EYE SURGERY Right     laser surgery for blood clot    HYSTERECTOMY Bilateral     INGUINAL HERNIA REPAIR Right     DR ALESIA GAXIOLA    MASTECTOMY Left     Left breast mastecotmy with sentinel lymph node biospy-[facility]    MASTECTOMY W/ SENTINEL NODE BIOPSY Right 2017    Procedure: RIGHT BREAST MASTECTOMY WITH SENTINEL NODE BIOPSY;  Surgeon: Christian Melo MD;  Location: [facility] OR;  Service:     SD INSJ TUNNELED CVC W/O SUBQ PORT/ AGE 5 YR/> Left 2017    Procedure: INSERTION VENOUS ACCESS DEVICE;  Surgeon: Christian Melo MD;  Location: [facility] OR;  Service: General    REDUCTION MAMMAPLASTY Right     to match Lt. TRAM flap    SMALL INTESTINE SURGERY      INCARCRATED HERNIA    THYROIDECTOMY Right 10/04/2018    Procedure: RT THYROID LOBECTOMY;  Surgeon: Julián Fofana MD;  Location: [facility] OR [facility];  Service: ENT    TONSILLECTOMY Bilateral     TRAM FLAP DELAYED Left     WITH MASTOPEXY    UPPER GASTROINTESTINAL ENDOSCOPY N/A 2011    LA grade A esophagitis with no bleeding, large hiatus hernia (50cm), gastric ulcer-Dr.Laszlo Lezama    US GUIDED FINE NEEDLE ASPIRATION  2018   Lico catheter placement on 2017 by Dr. Melo.     OB/GYN history: Menarche age 16, menopause at 1985. , 1 miscarriage. No  birth control pill use. She did have post menopause hormonal supplementation.      HEMATOLOGIC/ONCOLOGIC HISTORY: The patient is a 80y.o. year old female whom we are consulted for a newly self discovered right breast mass, in April 2017. Patient had ultrasound-guided biopsy on 5/3/2017 and confirmed to be invasive mammary carcinoma with focal lobular features, grade 2 Elen score 6/9. She is here for initial evaluation for management.      Patient is a 76-year-old  female who was seen here previously for chronic mild-to-moderate thrombocytopenia and mild anemia. Recently the patient reports she started having firmness of the right breast that started sometime in April or maybe even in March. She noticed firmness spread from about around the 12 o'clock position, gradually towards the upper lateral side and lower part of the right breast associated mild pain. The patient thought it was related to fibrocystic changes. However, the symptom was getting worse. Patient also reported dented nipple after she started having pain in the right breast. She denies discharge from the nipple. She called her primary care physician, Dr. Martin, who ordered a mammogram study. This was done on 04/12/2017 with architectural distortion of the right breast centrally at 12 o'clock position and had scattered fibroglandular densities throughout the right breast.      The patient subsequently had right breast ultrasound examination on 04/26/2017. Discovered a large right breast mass measuring 5.8 x 3.5 x 3.9 cm. Patient subsequently had ultrasound-guided right breast biopsy on 05/03/2017. Pathology evaluation reported invasive mammary carcinoma with focal lobular feature. Sedan score 6/9, overall grade 2. Sample was further sent to [facility] for test. ER negative, less than 1%; LA positive, moderate in staining, 5% to 10%. HER2 IHC 2+, but FISH study positive 2.1.     She denies weight loss, she  actually eats very well. No nausea vomiting. Patient does complain of insomnia, unable to sleep.      This patient has history of left breast DCIS back in 2007, had mastectomy at the [facility]. No hormonal therapy afterwards according to patient. This patient also had a small meningioma, followed by Dr. Smith in [facility], with most recent MRI of the brain in March 2017, with 18 mm meningioma, and followed on an annual basis.     Her neutrophil count on 5/16/17 is normal at 2500, however, records showed starting from 05/2015 and in 09/2016, 01/2017 and 03/2017, all 4 laboratory studies showed mild neutropenia with ANC fluctuating between 1200 and 1600. Etiology is not clear.    Normal echocardiogram on 5/18/2017, LVEF 68%.      CT scan for chest abdomen and pelvis on 5/22/2017 and breast MRI examination on 5/22/2017 reported small right axillary lymph node suspicious for metastatic disease.  No remote metastatic lesion.  Patient has stage IIIa (T3 N2 M0) disease.      Patient will start neoadjuvant chemotherapy with Taxol weekly plus Herceptin weekly for total 12 weeks, and Perjeta every 3 weeks (3-week cycle) during chemotherapy.  Herceptin will be converted to every 3 weeks after chemotherapy finished.  Cycle 1 day 1 5/23/2017.   Status post neoadjuvant chemotherapy the patient was assessed with MRI showing a complete neoadjuvant response.  The patient was reviewed for surgery and questions concerning lymph node dissection-sentinel node evaluation-and need for radiation therapy postop were considered as well as use of additional Herceptin for the balance of the year as well as additional use of anti-hormonal therapy.  Surgery was scheduled November 07, 2017.  Patient next seen in office November 28 having undergone surgery on November 7 with results revealing no residual malignancy in either breast or associated sentinel lymph nodes.  Was elected to continue Herceptin when  seen back in office  having initiated Herceptin May 23, 2017.    Patient continued Herceptin with her last treatment given 2017.     Unfortunately she later experienced an accident while in [place]  with prolonged hospitalization and prolonged ventilator management required.  Apparently she had at least one IV Herceptin therapy given while there and we were contacted 2018- Dr. Nuno.  Eventually the patient was able to return to [place] is seen back in office May 5 at which time we concluded that she completed Herceptin therapy as result of being off treatment for so long.  Plans were made for baseline scans.  Patient follow-up reexaminations May 8, 2019 with no evidence of recurrent malignancy.  This was again a circumstance when she was assessed 2019.  Patient seen 2020 without evidence of recurrent malignancy.  Recurrent GI symptoms noted with GI referral planned.  Patient hospitalized -2020 with enteropathic E. coli, acute on chronic thrombocytopenia.     Subsequent testing felt consistent with chronic ITP and trial of steroids initiated 2021.     Patient on various prednisone doses with improvement of her platelet count over 100,000 with 6/15/2021.  MEDICATIONS: The current medication list was reviewed with the patient and updated in the EMR this date per the Medical Assistant. Medication dosages and frequencies were confirmed to be accurate.       ALLERGIES:    Allergies   Allergen Reactions    Codeine Nausea And Vomiting    Morphine Nausea And Vomiting     SOCIAL HISTORY:   Social History     Tobacco Use    Smoking status: Former     Current packs/day: 0.00     Average packs/day: 2.0 packs/day for 30.2 years (60.4 ttl pk-yrs)     Types: Cigarettes     Start date: 1957     Quit date: 4/10/1987     Years since quittin.1     Passive exposure: Past    Smokeless tobacco: Never    Tobacco comments:     I  "haven't had a cigarette in over 30 years   Vaping Use    Vaping status: Never Used   Substance Use Topics    Alcohol use: No     Comment: stopped 1978, heavy in past    Drug use: No     FAMILY HISTORY:  Family History   Problem Relation Age of Onset    Heart disease Mother     Aortic aneurysm Mother         abdominal    Coronary artery disease Mother     Hypertension Mother     Miscarriages / Stillbirths Mother     Diverticulitis Mother     Heart disease Father     Heart attack Father         acute    Hypertension Father     Early death Father     Hearing loss Father     Kidney disease Father     Breast cancer Daughter 45    Heart disease Brother     Hypertension Brother     Malig Hyperthermia Neg Hx       Objective:   Vitals:    06/09/25 1400   BP: 138/81   Pulse: 74   Resp: 17   Temp: 98 °F (36.7 °C)   SpO2: 100%   Weight: 66 kg (145 lb 8 oz)   Height: 156 cm (61.42\")   PainSc: 0-No pain   ECOG 0      PHYSICAL EXAM:   GENERAL: Well-developed, well-nourished female, in no acute distress.   SKIN: Warm, dry without rashes, purpura or petechiae.  Left upper chest Lico catheter in place, no evidence of infection.    EYES: Pupils equal, round and reactive to light. EOMs intact. Conjunctivae normal.  EARS: Hearing intact.  NOSE:  No excoriations or nasal discharge.  CHEST: Lungs clear to auscultation. Good airflow.   BREAST: Status post left mastectomy with TRAM flap reconstruction.  No new palpable areas of concern.  Status post right mastectomy, right chest wall benign.    CARDIAC: Regular rate and rhythm without murmurs. Normal S1,S2.  ABDOMEN: No distention, normal active bowel sounds,  no hepatosplenomegaly or masses.  EXTREMITIES: No clubbing, cyanosis or edema.  Bruising of the right leg from fall, healing well  NEUROLOGICAL: Cranial Nerves II-XII grossly intact. No focal neurological deficits.  PSYCHIATRIC: Alert and oriented, pleased about her results      RECENT LABS:  Results from last 7 days   Lab Units " 06/09/25  1350   WBC 10*3/mm3 3.80   NEUTROS ABS 10*3/mm3 0.93*   HEMOGLOBIN g/dL 12.0   HEMATOCRIT % 35.1   PLATELETS 10*3/mm3 68*  68*                  Assessment/Plan     1. Large right breast cancer.  Core needle biopsy reported invasive mammary carcinoma with focal lobular feature, grade 2.  ER negative, less than 1%; AZ positive, moderate in staining, 5% to 10%. HER2 IHC 2+, FISH study positive 2.1.   CT scan for chest abdomen and pelvis and breast MRI examination reported right axillary lymph node suspicious for metastatic disease.  No remote metastatic lesion.   Patient has stage IIIa (T3 N2 M0) disease.   neoadjuvant chemotherapy on 5/23/2017 with Taxol weekly plus Herceptin weekly for total 12 weeks, and Perjata every 3 weeks during chemotherapy.  Herceptin will be converted to every 3 weeks after chemotherapy finished.    Reaction to Herceptin C1D1.  Subsequently tolerated therapy with hydrocortisone as premedication.  Potential cardiac strain developing, neuropathic symptoms persist, follow-up MRI and surgical assessment will be needed post initial chemotherapeutic phase  MRI August 17 with interval resolution of abnormal enhancement within breast and right axillary adenopathy, surgery scheduled November 7, Herceptin ongoing every 3 weeks  Patient seen in office November 28, 2017 status post surgery with apparent complete response, Herceptin continued  Herceptin given December 19, 2017 subsequent injury in [place] leading to prolonged stay, single treatment given through additional cancer Center  May 01, 2018, no further Herceptin planned, baseline scans pursued posttreatment, post influenza syndrome, physical therapy planned  She has since remained in observation with no evidence of recurrent disease  6/9/2025 PARKER on exam today.    2.  Chronic ITP  Previously struggled with pancytopenia during chemotherapy  Baseline platelets 80,000 and 90,000  Acute worsening of thrombocytopenia April 2021 with  platelet count of 44,000  Placed on prednisone 20 mg twice daily with improvement in her platelet count  She is struggled to tolerate prednisone with insomnia, agitation, tremors and feeling of jitteriness.  We therefore have rapidly tapered prednisone with stability/improvement of platelet count  Completed prednisone steroid taper in 2021.  Stable to improved when assessed 7/27/2021.  No additional steroids planned.  Patient stable seen 10/19/21 with every 3 month assessments planned.  Subsequent conclusion felt to be consistent with chronic ITP.  Platelet count remains stable at 68,000.    3.  Thyroid nodule  Previous PET scan demonstrated abnormality within the right thyroid lobe  Status post partial thyroidectomy                                                                          4.  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  Worsening reflux symptoms, GI referral planned.  She did not complain of this today.    6.  Hospitalization September 20-October 1 with enteropathic E. coli.  This responded to conservative therapy with plans for reassessment by Dr. Carlin with outpatient colonoscopy.    7.  Worsening short-term memory and trouble with occasional aphasia per report 6/9/2025.  Per discussion today we will refer to neurology for formal evaluation.    Plan:  Platelet count stable, no signs of worsening ITP.  PARKER on exam for history of breast cancer.  Patient requesting and will be referred to neurology for evaluation of worsening memory and occasional aphasia.  Otherwise return in 6 months for follow-up with Dr. Coburn with CBC, IPF.      Cindy Carlson, APRN  06/09/2025

## 2025-06-16 RX ORDER — PROPRANOLOL HYDROCHLORIDE 10 MG/1
10 TABLET ORAL 3 TIMES DAILY
Qty: 90 TABLET | Refills: 0 | Status: SHIPPED | OUTPATIENT
Start: 2025-06-16

## 2025-06-16 RX ORDER — SIMVASTATIN 80 MG
80 TABLET ORAL
Qty: 90 TABLET | Refills: 0 | Status: SHIPPED | OUTPATIENT
Start: 2025-06-16

## 2025-06-28 DIAGNOSIS — T45.1X5A PERIPHERAL NEUROPATHY DUE TO CHEMOTHERAPY: ICD-10-CM

## 2025-06-28 DIAGNOSIS — G62.0 PERIPHERAL NEUROPATHY DUE TO CHEMOTHERAPY: ICD-10-CM

## 2025-06-30 RX ORDER — GABAPENTIN 300 MG/1
CAPSULE ORAL
Qty: 180 CAPSULE | Refills: 0 | Status: SHIPPED | OUTPATIENT
Start: 2025-06-30

## 2025-07-08 RX ORDER — HYDROCHLOROTHIAZIDE 25 MG/1
25 TABLET ORAL DAILY
Qty: 90 TABLET | Refills: 0 | Status: SHIPPED | OUTPATIENT
Start: 2025-07-08

## 2025-07-10 ENCOUNTER — OFFICE VISIT (OUTPATIENT)
Dept: FAMILY MEDICINE CLINIC | Facility: CLINIC | Age: 85
End: 2025-07-10
Payer: MEDICARE

## 2025-07-10 VITALS
BODY MASS INDEX: 27.56 KG/M2 | HEART RATE: 88 BPM | RESPIRATION RATE: 16 BRPM | WEIGHT: 146 LBS | OXYGEN SATURATION: 99 % | DIASTOLIC BLOOD PRESSURE: 70 MMHG | HEIGHT: 61 IN | SYSTOLIC BLOOD PRESSURE: 132 MMHG

## 2025-07-10 DIAGNOSIS — L98.9 FACIAL LESION: Primary | ICD-10-CM

## 2025-07-10 DIAGNOSIS — F51.3 SLEEPWALKING: ICD-10-CM

## 2025-07-10 PROCEDURE — 3075F SYST BP GE 130 - 139MM HG: CPT | Performed by: INTERNAL MEDICINE

## 2025-07-10 PROCEDURE — 1126F AMNT PAIN NOTED NONE PRSNT: CPT | Performed by: INTERNAL MEDICINE

## 2025-07-10 PROCEDURE — 99213 OFFICE O/P EST LOW 20 MIN: CPT | Performed by: INTERNAL MEDICINE

## 2025-07-10 PROCEDURE — 3078F DIAST BP <80 MM HG: CPT | Performed by: INTERNAL MEDICINE

## 2025-07-10 RX ORDER — CLONAZEPAM 0.5 MG/1
0.5 TABLET ORAL NIGHTLY
Qty: 30 TABLET | Refills: 1 | Status: SHIPPED | OUTPATIENT
Start: 2025-07-10

## 2025-07-10 NOTE — PROGRESS NOTES
Subjective   Roxana Brizuela is a 84 y.o. female. Patient is here today for   Chief Complaint   Patient presents with    Rash    sleep walking           Vitals:    07/10/25 1503   BP: 132/70   Pulse: 88   Resp: 16   SpO2: 99%     Body mass index is 27.21 kg/m².    The following portions of the patient's history were reviewed and updated as appropriate: allergies, current medications, past family history, past medical history, past social history, past surgical history and problem list.    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 Paul Negron    GI (gastrointestinal bleed) 1997    H/O jaundice     Hernia     INCISONAL. AROUND LEFT TRAM LAP SITE    History of alcoholism     NONE SINCE 1978    History of chemotherapy     2017 4 MONTHS IN 2017    History of Clostridium difficile colitis     JAN 2018    History of histoplasmosis     History of infectious mononucleosis 1960    History of migraine headaches     History of pneumonia 12/27/2017    AS RESULT OF FLU. ENDED UP ON VENT IN [place]    History of thrombocytopenia     History of transfusion 1997    History of vertigo     Hx of colonic polyps 06/11/2009    Dr. Giles    Hyperlipidemia     Hypertension     Hypothyroidism     PARTIAL THYROIDECTOMY    Macular degeneration     Meningioma     Neuropathy     HANDS AND FEET    Neuropathy     on gabapentin    Osteoarthritis 09/13/2011    Dr. Jamil Miller     Peptic ulceration     Retina disorder     BLEED. FROM HISTOPLASMOSIS    SBO (small bowel obstruction)     due to hernia with surgical repair in 2011    SOB (shortness of breath) on exertion     Thyroid mass     RIGHT    Urgency incontinence       Allergies   Allergen Reactions    Codeine Nausea And Vomiting    Morphine Nausea And Vomiting      Social History     Socioeconomic History    Marital status:      Spouse name: Mike    Number of children: 2    Years of education: College   Tobacco Use    Smoking status: Former     Current packs/day: 0.00     Average packs/day: 2.0 packs/day for 30.2 years (60.4 ttl pk-yrs)     Types: Cigarettes     Start date: 1957     Quit date: 4/10/1987     Years since quittin.2     Passive exposure: Past    Smokeless tobacco: Never    Tobacco comments:     I haven't had a cigarette in over 30 years   Vaping Use    Vaping status: Never Used   Substance and Sexual Activity    Alcohol use: No     Comment: stopped , heavy in past    Drug use: No    Sexual activity: Never        Current Outpatient Medications:     albuterol sulfate  (90 Base) MCG/ACT inhaler, Inhale 2 puffs Every 4 (Four) Hours As Needed for Wheezing., Disp: 18 g, Rfl: 0    Ascorbic Acid (VITAMIN C PO), Take 1,000 mg by mouth Daily., Disp: , Rfl:     clonazePAM (KlonoPIN) 0.5 MG tablet, Take 1 tablet by mouth Every Night., Disp: 30 tablet, Rfl: 1    eszopiclone (LUNESTA) 1 MG tablet, Take 1 tablet by mouth Every Night. Take immediately before bedtime, Disp: 30 tablet, Rfl: 5    gabapentin (NEURONTIN) 300 MG capsule, TAKE 2 CAPSULES BY MOUTH TWICE DAILY WITH  AN  OPTION  TO  INCREASE  TO  3  AT  BEDTIME  BUT  ONLY  6  CAPSULES  A  DAY, Disp: 180 capsule, Rfl: 0    hydroCHLOROthiazide 25 MG tablet, Take 1 tablet by mouth once daily, Disp: 90 tablet, Rfl: 0    Lactobacillus (PROBIOTIC ACIDOPHILUS PO), Take 1 capsule by mouth Daily., Disp: , Rfl:     levothyroxine (Synthroid) 50 MCG tablet,  Take 1 tablet by mouth Daily., Disp: 90 tablet, Rfl: 3    MELATONIN PO, Take 10 mg by mouth At Night As Needed., Disp: , Rfl:     meloxicam (MOBIC) 15 MG tablet, Take 1 tablet by mouth Daily., Disp: 30 tablet, Rfl: 0    ondansetron (ZOFRAN) 4 MG tablet, TAKE 1 TABLET BY MOUTH EVERY 6 HOURS AS NEEDED FOR NAUSEA AND VOMITING, Disp: 20 tablet, Rfl: 0    pantoprazole (PROTONIX) 40 MG EC tablet, Take 1 tablet by mouth Daily., Disp: 90 tablet, Rfl: 3    promethazine (PHENERGAN) 25 MG tablet, TAKE 1 TABLET BY MOUTH EVERY 6 HOURS AS NEEDED FOR NAUSEA FOR VOMITING, Disp: 28 tablet, Rfl: 0    propranolol (INDERAL) 10 MG tablet, TAKE 1 TABLET BY MOUTH THREE TIMES DAILY, Disp: 90 tablet, Rfl: 0    simvastatin (ZOCOR) 80 MG tablet, TAKE 1 TABLET BY MOUTH EVERY DAY AT BEDTIME, Disp: 90 tablet, Rfl: 0    triamcinolone (KENALOG) 0.1 % cream, Apply 1 Application topically to the appropriate area as directed 2 (Two) Times a Day., Disp: 80 g, Rfl: 2    vitamin B-6 (PYRIDOXINE) 50 MG tablet, Take 1 tablet by mouth Daily., Disp: , Rfl:     VITAMIN D, CHOLECALCIFEROL, PO, Take 1 tablet by mouth Daily., Disp: , Rfl:      Objective     Rash     History of Present Illness  The patient is an 84-year-old female who presents with complaints of a rash on her eyelids and sleepwalking.    She reports a rash on her eyelids that has been present for approximately one month. The rash bleeds frequently. She mentions a history of cancer, which was previously removed by a general surgeon. Additionally, she has macular degeneration and receives monthly injections for this condition.    She also reports experiencing sleepwalking episodes at least once a week for the past month, which have resulted in injuries such as a black eye from walking into a fireplace about 1.5 weeks ago. She has been taking melatonin 10 mg nightly, but it has not been effective in preventing these episodes. She does not have difficulty falling asleep and feels rested upon waking,  except when she has had a sleepwalking episode. She is seeking a medication to help prevent further sleepwalking incidents. She does not eat before bed and takes her thyroid medication at night.    She also mentions having neuropathy that has progressed up to her thigh. Despite this, she continues to walk but describes it as feeling like she is walking with artificial legs.    PAST SURGICAL HISTORY:  - Cancer removal by a general surgeon  - Mastectomy with a procedure involving fat transfer from the stomach    Review of Systems   Constitutional: Negative.    Respiratory: Negative.     Cardiovascular: Negative.    Neurological:  Positive for numbness.       Physical Exam  Vitals reviewed.   Constitutional:       Appearance: Normal appearance.   Eyes:      Comments: Left Periorbital eccymosis    Skin:     Comments: 2 cm raised round lesion left medial eyebrow   Neurological:      Mental Status: She is alert.   Psychiatric:         Mood and Affect: Mood normal.         Behavior: Behavior normal.         Thought Content: Thought content normal.         Judgment: Judgment normal.       Physical Exam  Head: Ecchymosis present around the right eye.  Eyes: Rash on the eyelids. Ecchymosis present around the right eye.  Skin: Basal cell carcinoma on the eyelid.    Results         Assessment    ASSESSMENT    Problems Addressed this Visit          Skin    Facial lesion - Primary    Relevant Orders    Ambulatory Referral to Plastic Surgery       Sleep    Sleepwalking    Relevant Medications    clonazePAM (KlonoPIN) 0.5 MG tablet     Diagnoses         Codes Comments      Facial lesion    -  Primary ICD-10-CM: L98.9  ICD-9-CM: 709.9       Sleepwalking     ICD-10-CM: F51.3  ICD-9-CM: 307.46           Assessment & Plan  1. Basal cell carcinoma.  - The lesion on her eyelid is identified as basal cell carcinoma, which is rapidly growing.  - Physical examination confirms the diagnosis; a fresh Band-Aid was applied to the lesion during  the visit.  - Discussed the need for further evaluation and treatment by a plastic surgeon.  - A referral to plastic surgery will be made for further evaluation and treatment.    2. Sleepwalking.  - Reports sleepwalking episodes occurring at least once a week for the past month, resulting in a black eye from walking into a fireplace.  - Melatonin 10 mg nightly has been ineffective in managing the sleepwalking.  - Discussed the condition and the potential benefits of medication to prevent sleepwalking.  - A prescription for clonazepam 0.5 mg, to be taken nightly, will be provided to help manage her sleepwalking. She will need to sign a controlled substance contract as part of the prescription process.    PLAN  There are no Patient Instructions on file for this visit.  No follow-ups on file.    Patient or patient representative verbalized consent for the use of Ambient Listening during the visit with  Brandt Martin MD for chart documentation. 7/10/2025  15:32 EDT

## 2025-07-16 RX ORDER — PROPRANOLOL HYDROCHLORIDE 10 MG/1
10 TABLET ORAL 3 TIMES DAILY
Qty: 90 TABLET | Refills: 0 | Status: SHIPPED | OUTPATIENT
Start: 2025-07-16

## 2025-07-16 RX ORDER — PANTOPRAZOLE SODIUM 40 MG/1
40 TABLET, DELAYED RELEASE ORAL DAILY
Qty: 90 TABLET | Refills: 0 | Status: SHIPPED | OUTPATIENT
Start: 2025-07-16

## 2025-07-21 ENCOUNTER — TELEPHONE (OUTPATIENT)
Dept: FAMILY MEDICINE CLINIC | Facility: CLINIC | Age: 85
End: 2025-07-21
Payer: MEDICARE

## 2025-07-21 DIAGNOSIS — L98.9 FACIAL LESION: Primary | ICD-10-CM

## 2025-07-21 NOTE — TELEPHONE ENCOUNTER
Caller: Roxana Brziuela    Relationship: Self    Best call back number: [phone]      What was the call regarding: PATIENT CALLING STATES SHE WOULD LIKE TO SEE A DERMATOLOGIST IN [place]     SHE DID USE DR SALINAS OR DR BAILEY    OR DERMATOLOGY LOCATED ON [facility] CLOSE TO HER DENTIST       PATIENT JUST WANTING SOMETHING CLOSER DUE TO HER DRIVING     PLEASE GIVE CALLBACK

## 2025-07-22 NOTE — TELEPHONE ENCOUNTER
SPOKE WITH PATIENT. SHE'S NOT ABLE TO DRIVE TO [facility]. REFERRAL PLACED FOR DERMATOLOGY

## 2025-07-23 ENCOUNTER — TELEPHONE (OUTPATIENT)
Dept: FAMILY MEDICINE CLINIC | Facility: CLINIC | Age: 85
End: 2025-07-23
Payer: MEDICARE

## 2025-07-23 NOTE — TELEPHONE ENCOUNTER
Caller: Roxana Brizuela    Relationship: Self    Best call back number:   Telephone Information:   Mobile [phone]         What is the medical concern/diagnosis:     What specialty or service is being requested: DERMATOLOGY    What is the provider, practice or medical service name: DR. BAILEY OR [facility]    What is the office location:     What is the office phone number:     Any additional details: PATIENT IS REQUESTING REFERRAL FOR DERMATOLOGY BE SENT TO ONE OF THESE PROVIDERS, IT IS CLOSER TO HER HOME

## 2025-07-25 RX ORDER — ONDANSETRON 4 MG/1
4 TABLET, FILM COATED ORAL EVERY 6 HOURS PRN
Qty: 20 TABLET | Refills: 0 | Status: SHIPPED | OUTPATIENT
Start: 2025-07-25

## 2025-07-26 DIAGNOSIS — G62.0 PERIPHERAL NEUROPATHY DUE TO CHEMOTHERAPY: ICD-10-CM

## 2025-07-26 DIAGNOSIS — T45.1X5A PERIPHERAL NEUROPATHY DUE TO CHEMOTHERAPY: ICD-10-CM

## 2025-07-28 RX ORDER — GABAPENTIN 300 MG/1
600 CAPSULE ORAL 3 TIMES DAILY
Qty: 180 CAPSULE | Refills: 0 | Status: SHIPPED | OUTPATIENT
Start: 2025-07-28

## 2025-08-04 ENCOUNTER — OFFICE VISIT (OUTPATIENT)
Dept: FAMILY MEDICINE CLINIC | Facility: CLINIC | Age: 85
End: 2025-08-04
Payer: MEDICARE

## 2025-08-04 VITALS
BODY MASS INDEX: 27.56 KG/M2 | WEIGHT: 146 LBS | OXYGEN SATURATION: 97 % | DIASTOLIC BLOOD PRESSURE: 80 MMHG | SYSTOLIC BLOOD PRESSURE: 128 MMHG | TEMPERATURE: 97.6 F | HEART RATE: 72 BPM | HEIGHT: 61 IN | RESPIRATION RATE: 16 BRPM

## 2025-08-04 DIAGNOSIS — R11.0 NAUSEA: ICD-10-CM

## 2025-08-04 DIAGNOSIS — R05.9 COUGH, UNSPECIFIED TYPE: ICD-10-CM

## 2025-08-04 DIAGNOSIS — J06.9 ACUTE URI: Primary | ICD-10-CM

## 2025-08-04 DIAGNOSIS — R53.83 OTHER FATIGUE: ICD-10-CM

## 2025-08-04 LAB
EXPIRATION DATE: NORMAL
FLUAV AG UPPER RESP QL IA.RAPID: NOT DETECTED
FLUBV AG UPPER RESP QL IA.RAPID: NOT DETECTED
INTERNAL CONTROL: NORMAL
Lab: NORMAL
SARS-COV-2 AG UPPER RESP QL IA.RAPID: NOT DETECTED

## 2025-08-04 PROCEDURE — 3074F SYST BP LT 130 MM HG: CPT | Performed by: INTERNAL MEDICINE

## 2025-08-04 PROCEDURE — 3079F DIAST BP 80-89 MM HG: CPT | Performed by: INTERNAL MEDICINE

## 2025-08-04 PROCEDURE — 99213 OFFICE O/P EST LOW 20 MIN: CPT | Performed by: INTERNAL MEDICINE

## 2025-08-04 PROCEDURE — 87428 SARSCOV & INF VIR A&B AG IA: CPT | Performed by: INTERNAL MEDICINE

## 2025-08-04 PROCEDURE — 1126F AMNT PAIN NOTED NONE PRSNT: CPT | Performed by: INTERNAL MEDICINE

## 2025-08-04 RX ORDER — ONDANSETRON 4 MG/1
4 TABLET, FILM COATED ORAL EVERY 6 HOURS PRN
Qty: 20 TABLET | Refills: 0 | Status: ON HOLD | OUTPATIENT
Start: 2025-08-04

## 2025-08-08 ENCOUNTER — APPOINTMENT (OUTPATIENT)
Dept: GENERAL RADIOLOGY | Facility: HOSPITAL | Age: 85
End: 2025-08-08
Payer: MEDICARE

## 2025-08-08 ENCOUNTER — TELEPHONE (OUTPATIENT)
Dept: FAMILY MEDICINE CLINIC | Facility: CLINIC | Age: 85
End: 2025-08-08
Payer: MEDICARE

## 2025-08-08 ENCOUNTER — TELEPHONE (OUTPATIENT)
Dept: ONCOLOGY | Facility: CLINIC | Age: 85
End: 2025-08-08
Payer: MEDICARE

## 2025-08-08 ENCOUNTER — HOSPITAL ENCOUNTER (INPATIENT)
Facility: HOSPITAL | Age: 85
LOS: 16 days | Discharge: HOME | End: 2025-08-25
Attending: EMERGENCY MEDICINE | Admitting: INTERNAL MEDICINE
Payer: MEDICARE

## 2025-08-08 DIAGNOSIS — D50.9 IRON DEFICIENCY ANEMIA, UNSPECIFIED IRON DEFICIENCY ANEMIA TYPE: ICD-10-CM

## 2025-08-08 DIAGNOSIS — C92.00 ACUTE MYELOID LEUKEMIA NOT HAVING ACHIEVED REMISSION: ICD-10-CM

## 2025-08-08 DIAGNOSIS — D72.818 OTHER DECREASED WHITE BLOOD CELL (WBC) COUNT: ICD-10-CM

## 2025-08-08 DIAGNOSIS — C50.211 MALIGNANT NEOPLASM OF UPPER-INNER QUADRANT OF RIGHT BREAST IN FEMALE, ESTROGEN RECEPTOR NEGATIVE: ICD-10-CM

## 2025-08-08 DIAGNOSIS — R42 DIZZINESS: ICD-10-CM

## 2025-08-08 DIAGNOSIS — C95.90 LEUKEMIA NOT HAVING ACHIEVED REMISSION, UNSPECIFIED LEUKEMIA TYPE: ICD-10-CM

## 2025-08-08 DIAGNOSIS — R53.83 OTHER FATIGUE: ICD-10-CM

## 2025-08-08 DIAGNOSIS — Z17.1 MALIGNANT NEOPLASM OF UPPER-INNER QUADRANT OF RIGHT BREAST IN FEMALE, ESTROGEN RECEPTOR NEGATIVE: ICD-10-CM

## 2025-08-08 DIAGNOSIS — D69.6 THROMBOCYTOPENIA: Primary | ICD-10-CM

## 2025-08-08 LAB
ABO GROUP BLD: NORMAL
ALBUMIN SERPL-MCNC: 4.6 G/DL (ref 3.5–5.2)
ALBUMIN/GLOB SERPL: 1.5 G/DL
ALP SERPL-CCNC: 63 U/L (ref 39–117)
ALT SERPL W P-5'-P-CCNC: 13 U/L (ref 1–33)
ANION GAP SERPL CALCULATED.3IONS-SCNC: 12.4 MMOL/L (ref 5–15)
AST SERPL-CCNC: 24 U/L (ref 1–32)
BILIRUB SERPL-MCNC: 0.9 MG/DL (ref 0–1.2)
BLASTS NFR BLD MANUAL: 4 % (ref 0–0)
BLD GP AB SCN SERPL QL: NEGATIVE
BUN SERPL-MCNC: 11 MG/DL (ref 8–23)
BUN/CREAT SERPL: 11.8 (ref 7–25)
CALCIUM SPEC-SCNC: 9.7 MG/DL (ref 8.6–10.5)
CHLORIDE SERPL-SCNC: 100 MMOL/L (ref 98–107)
CO2 SERPL-SCNC: 27.6 MMOL/L (ref 22–29)
CREAT SERPL-MCNC: 0.93 MG/DL (ref 0.57–1)
DEPRECATED RDW RBC AUTO: 57.4 FL (ref 37–54)
EGFRCR SERPLBLD CKD-EPI 2021: 60.7 ML/MIN/1.73
ERYTHROCYTE [DISTWIDTH] IN BLOOD BY AUTOMATED COUNT: 17.5 % (ref 12.3–15.4)
FERRITIN SERPL-MCNC: 103 NG/ML (ref 13–150)
FOLATE SERPL-MCNC: >20 NG/ML (ref 4.78–24.2)
GEN 5 1HR TROPONIN T REFLEX: 9 NG/L
GLOBULIN UR ELPH-MCNC: 3.1 GM/DL
GLUCOSE SERPL-MCNC: 107 MG/DL (ref 65–99)
HCT VFR BLD AUTO: 31 % (ref 34–46.6)
HGB BLD-MCNC: 10.7 G/DL (ref 12–15.9)
HOLD SPECIMEN: NORMAL
HOLD SPECIMEN: NORMAL
IRON 24H UR-MRATE: 136 MCG/DL (ref 37–145)
IRON SATN MFR SERPL: 40 % (ref 20–50)
LDH SERPL-CCNC: 515 U/L (ref 135–214)
LYMPHOCYTES # BLD MANUAL: 6.73 10*3/MM3 (ref 0.7–3.1)
LYMPHOCYTES NFR BLD MANUAL: 38 % (ref 5–12)
MCH RBC QN AUTO: 32 PG (ref 26.6–33)
MCHC RBC AUTO-ENTMCNC: 34.5 G/DL (ref 31.5–35.7)
MCV RBC AUTO: 92.8 FL (ref 79–97)
MICROCYTES BLD QL: ABNORMAL
MONOCYTES # BLD: 4.92 10*3/MM3 (ref 0.1–0.9)
NEUTROPHILS # BLD AUTO: 0.78 10*3/MM3 (ref 1.7–7)
NEUTROPHILS NFR BLD MANUAL: 6 % (ref 42.7–76)
NT-PROBNP SERPL-MCNC: 266 PG/ML (ref 0–1800)
OVALOCYTES BLD QL SMEAR: ABNORMAL
PATHOLOGY REVIEW: YES
PLAT MORPH BLD: NORMAL
PLATELET # BLD AUTO: 20 10*3/MM3 (ref 140–450)
PLATELET # BLD AUTO: 20 10*3/MM3 (ref 140–450)
PLATELETS.RETICULATED NFR BLD AUTO: 16.5 % (ref 0.9–6.5)
PMV BLD AUTO: 10.8 FL (ref 6–12)
POTASSIUM SERPL-SCNC: 3.8 MMOL/L (ref 3.5–5.2)
PROT SERPL-MCNC: 7.7 G/DL (ref 6–8.5)
QT INTERVAL: 390 MS
QTC INTERVAL: 445 MS
RBC # BLD AUTO: 3.34 10*6/MM3 (ref 3.77–5.28)
RH BLD: POSITIVE
SODIUM SERPL-SCNC: 140 MMOL/L (ref 136–145)
T&S EXPIRATION DATE: NORMAL
TIBC SERPL-MCNC: 338 MCG/DL (ref 298–536)
TRANSFERRIN SERPL-MCNC: 227 MG/DL (ref 200–360)
TROPONIN T NUMERIC DELTA: -1 NG/L
TROPONIN T SERPL HS-MCNC: 10 NG/L
VARIANT LYMPHS NFR BLD MANUAL: 52 % (ref 19.6–45.3)
VIT B12 BLD-MCNC: 876 PG/ML (ref 211–946)
WBC NRBC COR # BLD AUTO: 12.94 10*3/MM3 (ref 3.4–10.8)
WHOLE BLOOD HOLD COAG: NORMAL
WHOLE BLOOD HOLD SPECIMEN: NORMAL

## 2025-08-08 PROCEDURE — 83540 ASSAY OF IRON: CPT | Performed by: INTERNAL MEDICINE

## 2025-08-08 PROCEDURE — 85025 COMPLETE CBC W/AUTO DIFF WBC: CPT

## 2025-08-08 PROCEDURE — G0378 HOSPITAL OBSERVATION PER HR: HCPCS

## 2025-08-08 PROCEDURE — 84466 ASSAY OF TRANSFERRIN: CPT | Performed by: INTERNAL MEDICINE

## 2025-08-08 PROCEDURE — 86850 RBC ANTIBODY SCREEN: CPT | Performed by: EMERGENCY MEDICINE

## 2025-08-08 PROCEDURE — 83615 LACTATE (LD) (LDH) ENZYME: CPT | Performed by: INTERNAL MEDICINE

## 2025-08-08 PROCEDURE — 93005 ELECTROCARDIOGRAM TRACING: CPT | Performed by: EMERGENCY MEDICINE

## 2025-08-08 PROCEDURE — 80053 COMPREHEN METABOLIC PANEL: CPT | Performed by: EMERGENCY MEDICINE

## 2025-08-08 PROCEDURE — 99285 EMERGENCY DEPT VISIT HI MDM: CPT

## 2025-08-08 PROCEDURE — 86900 BLOOD TYPING SEROLOGIC ABO: CPT | Performed by: EMERGENCY MEDICINE

## 2025-08-08 PROCEDURE — 36415 COLL VENOUS BLD VENIPUNCTURE: CPT

## 2025-08-08 PROCEDURE — 99223 1ST HOSP IP/OBS HIGH 75: CPT | Performed by: INTERNAL MEDICINE

## 2025-08-08 PROCEDURE — 82607 VITAMIN B-12: CPT | Performed by: INTERNAL MEDICINE

## 2025-08-08 PROCEDURE — 82728 ASSAY OF FERRITIN: CPT | Performed by: INTERNAL MEDICINE

## 2025-08-08 PROCEDURE — 84484 ASSAY OF TROPONIN QUANT: CPT | Performed by: EMERGENCY MEDICINE

## 2025-08-08 PROCEDURE — 71045 X-RAY EXAM CHEST 1 VIEW: CPT

## 2025-08-08 PROCEDURE — 63710000001 PREDNISONE PER 1 MG: Performed by: EMERGENCY MEDICINE

## 2025-08-08 PROCEDURE — 83880 ASSAY OF NATRIURETIC PEPTIDE: CPT | Performed by: EMERGENCY MEDICINE

## 2025-08-08 PROCEDURE — 82746 ASSAY OF FOLIC ACID SERUM: CPT | Performed by: INTERNAL MEDICINE

## 2025-08-08 PROCEDURE — 85055 RETICULATED PLATELET ASSAY: CPT | Performed by: INTERNAL MEDICINE

## 2025-08-08 PROCEDURE — 93010 ELECTROCARDIOGRAM REPORT: CPT | Performed by: INTERNAL MEDICINE

## 2025-08-08 PROCEDURE — 85007 BL SMEAR W/DIFF WBC COUNT: CPT | Performed by: EMERGENCY MEDICINE

## 2025-08-08 PROCEDURE — 93005 ELECTROCARDIOGRAM TRACING: CPT

## 2025-08-08 PROCEDURE — 86901 BLOOD TYPING SEROLOGIC RH(D): CPT | Performed by: EMERGENCY MEDICINE

## 2025-08-08 RX ORDER — SODIUM CHLORIDE 0.9 % (FLUSH) 0.9 %
10 SYRINGE (ML) INJECTION EVERY 12 HOURS SCHEDULED
Status: DISCONTINUED | OUTPATIENT
Start: 2025-08-08 | End: 2025-08-25 | Stop reason: HOSPADM

## 2025-08-08 RX ORDER — SODIUM CHLORIDE 9 MG/ML
40 INJECTION, SOLUTION INTRAVENOUS AS NEEDED
Status: DISCONTINUED | OUTPATIENT
Start: 2025-08-08 | End: 2025-08-25 | Stop reason: HOSPADM

## 2025-08-08 RX ORDER — AMOXICILLIN 250 MG
2 CAPSULE ORAL 2 TIMES DAILY PRN
Status: DISCONTINUED | OUTPATIENT
Start: 2025-08-08 | End: 2025-08-25 | Stop reason: HOSPADM

## 2025-08-08 RX ORDER — SODIUM CHLORIDE 0.9 % (FLUSH) 0.9 %
10 SYRINGE (ML) INJECTION AS NEEDED
Status: DISCONTINUED | OUTPATIENT
Start: 2025-08-08 | End: 2025-08-25 | Stop reason: HOSPADM

## 2025-08-08 RX ORDER — ATORVASTATIN CALCIUM 20 MG/1
40 TABLET, FILM COATED ORAL DAILY
Status: DISCONTINUED | OUTPATIENT
Start: 2025-08-08 | End: 2025-08-25 | Stop reason: HOSPADM

## 2025-08-08 RX ORDER — ONDANSETRON 4 MG/1
4 TABLET, ORALLY DISINTEGRATING ORAL EVERY 8 HOURS PRN
Status: DISCONTINUED | OUTPATIENT
Start: 2025-08-08 | End: 2025-08-25 | Stop reason: HOSPADM

## 2025-08-08 RX ORDER — BISACODYL 10 MG
10 SUPPOSITORY, RECTAL RECTAL DAILY PRN
Status: DISCONTINUED | OUTPATIENT
Start: 2025-08-08 | End: 2025-08-25 | Stop reason: HOSPADM

## 2025-08-08 RX ORDER — CLONAZEPAM 0.5 MG/1
0.5 TABLET ORAL NIGHTLY
Status: COMPLETED | OUTPATIENT
Start: 2025-08-08 | End: 2025-08-12

## 2025-08-08 RX ORDER — L.ACID,PARA/B.BIFIDUM/S.THERM 8B CELL
1 CAPSULE ORAL DAILY
Status: DISCONTINUED | OUTPATIENT
Start: 2025-08-08 | End: 2025-08-25 | Stop reason: HOSPADM

## 2025-08-08 RX ORDER — BISACODYL 5 MG/1
5 TABLET, DELAYED RELEASE ORAL DAILY PRN
Status: DISCONTINUED | OUTPATIENT
Start: 2025-08-08 | End: 2025-08-25 | Stop reason: HOSPADM

## 2025-08-08 RX ORDER — PREDNISONE 20 MG/1
60 TABLET ORAL ONCE
Status: COMPLETED | OUTPATIENT
Start: 2025-08-08 | End: 2025-08-08

## 2025-08-08 RX ORDER — PROPRANOLOL HYDROCHLORIDE 10 MG/1
10 TABLET ORAL 3 TIMES DAILY
Status: DISCONTINUED | OUTPATIENT
Start: 2025-08-08 | End: 2025-08-25 | Stop reason: HOSPADM

## 2025-08-08 RX ORDER — ASCORBIC ACID 500 MG
1000 TABLET ORAL DAILY
Status: DISCONTINUED | OUTPATIENT
Start: 2025-08-08 | End: 2025-08-25 | Stop reason: HOSPADM

## 2025-08-08 RX ORDER — HYDROCHLOROTHIAZIDE 25 MG/1
25 TABLET ORAL DAILY
Status: DISCONTINUED | OUTPATIENT
Start: 2025-08-08 | End: 2025-08-25 | Stop reason: HOSPADM

## 2025-08-08 RX ORDER — ONDANSETRON 2 MG/ML
4 INJECTION INTRAMUSCULAR; INTRAVENOUS EVERY 6 HOURS PRN
Status: DISCONTINUED | OUTPATIENT
Start: 2025-08-08 | End: 2025-08-25 | Stop reason: HOSPADM

## 2025-08-08 RX ORDER — GABAPENTIN 300 MG/1
600 CAPSULE ORAL 3 TIMES DAILY
Status: DISCONTINUED | OUTPATIENT
Start: 2025-08-08 | End: 2025-08-25 | Stop reason: HOSPADM

## 2025-08-08 RX ORDER — ZOLPIDEM TARTRATE 5 MG/1
5 TABLET ORAL NIGHTLY PRN
Status: DISCONTINUED | OUTPATIENT
Start: 2025-08-08 | End: 2025-08-25 | Stop reason: HOSPADM

## 2025-08-08 RX ORDER — LEVOTHYROXINE SODIUM 50 UG/1
50 TABLET ORAL DAILY
Status: DISCONTINUED | OUTPATIENT
Start: 2025-08-08 | End: 2025-08-25 | Stop reason: HOSPADM

## 2025-08-08 RX ORDER — ONDANSETRON 4 MG/1
4 TABLET, ORALLY DISINTEGRATING ORAL EVERY 6 HOURS PRN
Status: DISCONTINUED | OUTPATIENT
Start: 2025-08-08 | End: 2025-08-08

## 2025-08-08 RX ORDER — ALBUTEROL SULFATE 0.83 MG/ML
2.5 SOLUTION RESPIRATORY (INHALATION) EVERY 6 HOURS PRN
Status: DISCONTINUED | OUTPATIENT
Start: 2025-08-08 | End: 2025-08-25 | Stop reason: HOSPADM

## 2025-08-08 RX ORDER — PANTOPRAZOLE SODIUM 40 MG/1
40 TABLET, DELAYED RELEASE ORAL DAILY
Status: DISCONTINUED | OUTPATIENT
Start: 2025-08-08 | End: 2025-08-14

## 2025-08-08 RX ORDER — NITROGLYCERIN 0.4 MG/1
0.4 TABLET SUBLINGUAL
Status: DISCONTINUED | OUTPATIENT
Start: 2025-08-08 | End: 2025-08-23

## 2025-08-08 RX ORDER — POLYETHYLENE GLYCOL 3350 17 G/17G
17 POWDER, FOR SOLUTION ORAL DAILY PRN
Status: DISCONTINUED | OUTPATIENT
Start: 2025-08-08 | End: 2025-08-25 | Stop reason: HOSPADM

## 2025-08-08 RX ORDER — DIPHENHYDRAMINE HYDROCHLORIDE 25 MG/1
50 CAPSULE ORAL DAILY
Status: DISCONTINUED | OUTPATIENT
Start: 2025-08-08 | End: 2025-08-25 | Stop reason: HOSPADM

## 2025-08-08 RX ADMIN — PREDNISONE 60 MG: 20 TABLET ORAL at 14:38

## 2025-08-08 RX ADMIN — Medication 10 ML: at 14:37

## 2025-08-08 RX ADMIN — Medication 10 ML: at 20:41

## 2025-08-08 RX ADMIN — Medication 10 MG: at 20:40

## 2025-08-08 RX ADMIN — PROPRANOLOL HYDROCHLORIDE 10 MG: 20 TABLET ORAL at 20:40

## 2025-08-08 RX ADMIN — CLONAZEPAM 0.5 MG: 0.5 TABLET ORAL at 20:40

## 2025-08-08 RX ADMIN — ATORVASTATIN CALCIUM 40 MG: 20 TABLET, FILM COATED ORAL at 18:00

## 2025-08-08 RX ADMIN — GABAPENTIN 600 MG: 300 CAPSULE ORAL at 20:40

## 2025-08-09 PROBLEM — R50.81 NEUTROPENIA WITH FEVER: Status: ACTIVE | Noted: 2025-08-09

## 2025-08-09 PROBLEM — C95.90 LEUKEMIA: Status: ACTIVE | Noted: 2025-08-09

## 2025-08-09 PROBLEM — D70.9 NEUTROPENIA WITH FEVER: Status: ACTIVE | Noted: 2025-08-09

## 2025-08-09 LAB
ANION GAP SERPL CALCULATED.3IONS-SCNC: 12.6 MMOL/L (ref 5–15)
BACTERIA UR QL AUTO: ABNORMAL /HPF
BILIRUB UR QL STRIP: NEGATIVE
BUN SERPL-MCNC: 12 MG/DL (ref 8–23)
BUN/CREAT SERPL: 13.3 (ref 7–25)
CALCIUM SPEC-SCNC: 9 MG/DL (ref 8.6–10.5)
CHLORIDE SERPL-SCNC: 99 MMOL/L (ref 98–107)
CLARITY UR: CLEAR
CO2 SERPL-SCNC: 22.4 MMOL/L (ref 22–29)
COLOR UR: YELLOW
CREAT SERPL-MCNC: 0.9 MG/DL (ref 0.57–1)
DEPRECATED RDW RBC AUTO: 59 FL (ref 37–54)
EGFRCR SERPLBLD CKD-EPI 2021: 63.2 ML/MIN/1.73
ERYTHROCYTE [DISTWIDTH] IN BLOOD BY AUTOMATED COUNT: 17.4 % (ref 12.3–15.4)
GLUCOSE SERPL-MCNC: 165 MG/DL (ref 65–99)
GLUCOSE UR STRIP-MCNC: NEGATIVE MG/DL
HCT VFR BLD AUTO: 30.8 % (ref 34–46.6)
HGB BLD-MCNC: 10.4 G/DL (ref 12–15.9)
HGB UR QL STRIP.AUTO: ABNORMAL
HYALINE CASTS UR QL AUTO: ABNORMAL /LPF
KETONES UR QL STRIP: NEGATIVE
LAB AP CASE REPORT: NORMAL
LEUKOCYTE ESTERASE UR QL STRIP.AUTO: ABNORMAL
LYMPHOCYTES # BLD MANUAL: 3.12 10*3/MM3 (ref 0.7–3.1)
LYMPHOCYTES NFR BLD MANUAL: 3 % (ref 5–12)
MCH RBC QN AUTO: 32.2 PG (ref 26.6–33)
MCHC RBC AUTO-ENTMCNC: 33.8 G/DL (ref 31.5–35.7)
MCV RBC AUTO: 95.4 FL (ref 79–97)
MONOCYTES # BLD: 0.17 10*3/MM3 (ref 0.1–0.9)
MYELOCYTES NFR BLD MANUAL: 4 % (ref 0–0)
NEUTROPHILS # BLD AUTO: 2.25 10*3/MM3 (ref 1.7–7)
NEUTROPHILS NFR BLD MANUAL: 39 % (ref 42.7–76)
NITRITE UR QL STRIP: NEGATIVE
NRBC SPEC MANUAL: 7 /100 WBC (ref 0–0.2)
PATH REPORT.FINAL DX SPEC: NORMAL
PH UR STRIP.AUTO: 7 [PH] (ref 5–8)
PLAT MORPH BLD: NORMAL
PLATELET # BLD AUTO: 15 10*3/MM3 (ref 140–450)
POTASSIUM SERPL-SCNC: 3.8 MMOL/L (ref 3.5–5.2)
PROT UR QL STRIP: NEGATIVE
RBC # BLD AUTO: 3.23 10*6/MM3 (ref 3.77–5.28)
RBC # UR STRIP: ABNORMAL /HPF
RBC MORPH BLD: NORMAL
REF LAB TEST METHOD: ABNORMAL
SODIUM SERPL-SCNC: 134 MMOL/L (ref 136–145)
SP GR UR STRIP: 1.02 (ref 1–1.03)
SQUAMOUS #/AREA URNS HPF: ABNORMAL /HPF
UROBILINOGEN UR QL STRIP: ABNORMAL
VARIANT LYMPHS NFR BLD MANUAL: 54 % (ref 19.6–45.3)
WBC # UR STRIP: ABNORMAL /HPF
WBC MORPH BLD: NORMAL
WBC NRBC COR # BLD AUTO: 5.77 10*3/MM3 (ref 3.4–10.8)

## 2025-08-09 PROCEDURE — 88184 FLOWCYTOMETRY/ TC 1 MARKER: CPT | Performed by: INTERNAL MEDICINE

## 2025-08-09 PROCEDURE — 88185 FLOWCYTOMETRY/TC ADD-ON: CPT

## 2025-08-09 PROCEDURE — 85007 BL SMEAR W/DIFF WBC COUNT: CPT | Performed by: INTERNAL MEDICINE

## 2025-08-09 PROCEDURE — 99233 SBSQ HOSP IP/OBS HIGH 50: CPT | Performed by: INTERNAL MEDICINE

## 2025-08-09 PROCEDURE — 63710000001 PREDNISONE PER 1 MG: Performed by: INTERNAL MEDICINE

## 2025-08-09 PROCEDURE — 88184 FLOWCYTOMETRY/ TC 1 MARKER: CPT

## 2025-08-09 PROCEDURE — 81001 URINALYSIS AUTO W/SCOPE: CPT | Performed by: NURSE PRACTITIONER

## 2025-08-09 PROCEDURE — 85025 COMPLETE CBC W/AUTO DIFF WBC: CPT | Performed by: INTERNAL MEDICINE

## 2025-08-09 PROCEDURE — 80048 BASIC METABOLIC PNL TOTAL CA: CPT | Performed by: NURSE PRACTITIONER

## 2025-08-09 RX ORDER — PREDNISONE 20 MG/1
60 TABLET ORAL DAILY
Status: COMPLETED | OUTPATIENT
Start: 2025-08-09 | End: 2025-08-12

## 2025-08-09 RX ADMIN — PANTOPRAZOLE SODIUM 40 MG: 40 TABLET, DELAYED RELEASE ORAL at 09:42

## 2025-08-09 RX ADMIN — GABAPENTIN 600 MG: 300 CAPSULE ORAL at 20:43

## 2025-08-09 RX ADMIN — Medication 50 MG: at 09:41

## 2025-08-09 RX ADMIN — GABAPENTIN 600 MG: 300 CAPSULE ORAL at 15:49

## 2025-08-09 RX ADMIN — PROPRANOLOL HYDROCHLORIDE 10 MG: 20 TABLET ORAL at 20:43

## 2025-08-09 RX ADMIN — Medication 10 MG: at 20:43

## 2025-08-09 RX ADMIN — PROPRANOLOL HYDROCHLORIDE 10 MG: 20 TABLET ORAL at 15:49

## 2025-08-09 RX ADMIN — Medication 10 ML: at 20:52

## 2025-08-09 RX ADMIN — HYDROCHLOROTHIAZIDE 25 MG: 25 TABLET ORAL at 09:42

## 2025-08-09 RX ADMIN — PREDNISONE 60 MG: 20 TABLET ORAL at 13:52

## 2025-08-09 RX ADMIN — GABAPENTIN 600 MG: 300 CAPSULE ORAL at 09:42

## 2025-08-09 RX ADMIN — OXYCODONE HYDROCHLORIDE AND ACETAMINOPHEN 1000 MG: 500 TABLET ORAL at 09:41

## 2025-08-09 RX ADMIN — CLONAZEPAM 0.5 MG: 0.5 TABLET ORAL at 20:43

## 2025-08-09 RX ADMIN — PROPRANOLOL HYDROCHLORIDE 10 MG: 20 TABLET ORAL at 09:41

## 2025-08-09 RX ADMIN — ATORVASTATIN CALCIUM 40 MG: 20 TABLET, FILM COATED ORAL at 20:43

## 2025-08-10 DIAGNOSIS — E03.9 ACQUIRED HYPOTHYROIDISM: ICD-10-CM

## 2025-08-10 LAB
ALBUMIN SERPL-MCNC: 4.3 G/DL (ref 3.5–5.2)
ALBUMIN/GLOB SERPL: 1.7 G/DL
ALP SERPL-CCNC: 55 U/L (ref 39–117)
ALT SERPL W P-5'-P-CCNC: 11 U/L (ref 1–33)
ANION GAP SERPL CALCULATED.3IONS-SCNC: 13.2 MMOL/L (ref 5–15)
AST SERPL-CCNC: 21 U/L (ref 1–32)
BILIRUB SERPL-MCNC: 0.5 MG/DL (ref 0–1.2)
BLASTS NFR BLD MANUAL: 7 % (ref 0–0)
BUN SERPL-MCNC: 13 MG/DL (ref 8–23)
BUN/CREAT SERPL: 18.1 (ref 7–25)
CALCIUM SPEC-SCNC: 9.4 MG/DL (ref 8.6–10.5)
CHLORIDE SERPL-SCNC: 100 MMOL/L (ref 98–107)
CO2 SERPL-SCNC: 23.8 MMOL/L (ref 22–29)
CREAT SERPL-MCNC: 0.72 MG/DL (ref 0.57–1)
DEPRECATED RDW RBC AUTO: 61.1 FL (ref 37–54)
EGFRCR SERPLBLD CKD-EPI 2021: 82.6 ML/MIN/1.73
ERYTHROCYTE [DISTWIDTH] IN BLOOD BY AUTOMATED COUNT: 18.2 % (ref 12.3–15.4)
GLOBULIN UR ELPH-MCNC: 2.6 GM/DL
GLUCOSE SERPL-MCNC: 158 MG/DL (ref 65–99)
HCT VFR BLD AUTO: 31.9 % (ref 34–46.6)
HGB BLD-MCNC: 11.1 G/DL (ref 12–15.9)
LDH SERPL-CCNC: 522 U/L (ref 135–214)
LYMPHOCYTES # BLD MANUAL: 1.26 10*3/MM3 (ref 0.7–3.1)
LYMPHOCYTES NFR BLD MANUAL: 15 % (ref 5–12)
MCH RBC QN AUTO: 32.6 PG (ref 26.6–33)
MCHC RBC AUTO-ENTMCNC: 34.8 G/DL (ref 31.5–35.7)
MCV RBC AUTO: 93.5 FL (ref 79–97)
METAMYELOCYTES NFR BLD MANUAL: 5 % (ref 0–0)
MONOCYTES # BLD: 0.99 10*3/MM3 (ref 0.1–0.9)
MYELOCYTES NFR BLD MANUAL: 6 % (ref 0–0)
NEUTROPHILS # BLD AUTO: 3.04 10*3/MM3 (ref 1.7–7)
NEUTROPHILS NFR BLD MANUAL: 46 % (ref 42.7–76)
NRBC SPEC MANUAL: 11 /100 WBC (ref 0–0.2)
PLAT MORPH BLD: NORMAL
PLATELET # BLD AUTO: 19 10*3/MM3 (ref 140–450)
PLATELET # BLD AUTO: 19 10*3/MM3 (ref 140–450)
PLATELETS.RETICULATED NFR BLD AUTO: 18.7 % (ref 0.9–6.5)
POTASSIUM SERPL-SCNC: 3.7 MMOL/L (ref 3.5–5.2)
PROMYELOCYTES NFR BLD MANUAL: 2 % (ref 0–0)
PROT SERPL-MCNC: 6.9 G/DL (ref 6–8.5)
RBC # BLD AUTO: 3.41 10*6/MM3 (ref 3.77–5.28)
RBC MORPH BLD: NORMAL
SODIUM SERPL-SCNC: 137 MMOL/L (ref 136–145)
VARIANT LYMPHS NFR BLD MANUAL: 19 % (ref 19.6–45.3)
WBC NRBC COR # BLD AUTO: 6.61 10*3/MM3 (ref 3.4–10.8)

## 2025-08-10 PROCEDURE — 83615 LACTATE (LD) (LDH) ENZYME: CPT | Performed by: INTERNAL MEDICINE

## 2025-08-10 PROCEDURE — 85025 COMPLETE CBC W/AUTO DIFF WBC: CPT | Performed by: INTERNAL MEDICINE

## 2025-08-10 PROCEDURE — 99232 SBSQ HOSP IP/OBS MODERATE 35: CPT | Performed by: INTERNAL MEDICINE

## 2025-08-10 PROCEDURE — 85055 RETICULATED PLATELET ASSAY: CPT | Performed by: INTERNAL MEDICINE

## 2025-08-10 PROCEDURE — 85007 BL SMEAR W/DIFF WBC COUNT: CPT | Performed by: INTERNAL MEDICINE

## 2025-08-10 PROCEDURE — 63710000001 PREDNISONE PER 1 MG: Performed by: INTERNAL MEDICINE

## 2025-08-10 PROCEDURE — 80053 COMPREHEN METABOLIC PANEL: CPT | Performed by: INTERNAL MEDICINE

## 2025-08-10 RX ADMIN — Medication 10 ML: at 21:06

## 2025-08-10 RX ADMIN — ATORVASTATIN CALCIUM 40 MG: 20 TABLET, FILM COATED ORAL at 21:10

## 2025-08-10 RX ADMIN — PROPRANOLOL HYDROCHLORIDE 10 MG: 20 TABLET ORAL at 16:51

## 2025-08-10 RX ADMIN — CLONAZEPAM 0.5 MG: 0.5 TABLET ORAL at 21:05

## 2025-08-10 RX ADMIN — GABAPENTIN 600 MG: 300 CAPSULE ORAL at 21:05

## 2025-08-10 RX ADMIN — Medication 10 MG: at 21:05

## 2025-08-10 RX ADMIN — LEVOTHYROXINE SODIUM 50 MCG: 50 TABLET ORAL at 10:45

## 2025-08-10 RX ADMIN — Medication 1 CAPSULE: at 09:30

## 2025-08-10 RX ADMIN — PREDNISONE 60 MG: 20 TABLET ORAL at 09:29

## 2025-08-10 RX ADMIN — PANTOPRAZOLE SODIUM 40 MG: 40 TABLET, DELAYED RELEASE ORAL at 09:29

## 2025-08-10 RX ADMIN — GABAPENTIN 600 MG: 300 CAPSULE ORAL at 16:50

## 2025-08-10 RX ADMIN — Medication 10 ML: at 15:06

## 2025-08-10 RX ADMIN — Medication 50 MG: at 09:30

## 2025-08-10 RX ADMIN — GABAPENTIN 600 MG: 300 CAPSULE ORAL at 09:28

## 2025-08-10 RX ADMIN — HYDROCHLOROTHIAZIDE 25 MG: 25 TABLET ORAL at 09:29

## 2025-08-10 RX ADMIN — PROPRANOLOL HYDROCHLORIDE 10 MG: 20 TABLET ORAL at 21:05

## 2025-08-10 RX ADMIN — PROPRANOLOL HYDROCHLORIDE 10 MG: 20 TABLET ORAL at 09:29

## 2025-08-10 RX ADMIN — OXYCODONE HYDROCHLORIDE AND ACETAMINOPHEN 1000 MG: 500 TABLET ORAL at 09:30

## 2025-08-11 ENCOUNTER — APPOINTMENT (OUTPATIENT)
Dept: CT IMAGING | Facility: HOSPITAL | Age: 85
End: 2025-08-11
Payer: MEDICARE

## 2025-08-11 LAB
ALBUMIN SERPL-MCNC: 4.1 G/DL (ref 3.5–5.2)
ALBUMIN/GLOB SERPL: 1.5 G/DL
ALP SERPL-CCNC: 52 U/L (ref 39–117)
ALT SERPL W P-5'-P-CCNC: 13 U/L (ref 1–33)
ANION GAP SERPL CALCULATED.3IONS-SCNC: 11.3 MMOL/L (ref 5–15)
ANISOCYTOSIS BLD QL: ABNORMAL
AST SERPL-CCNC: 20 U/L (ref 1–32)
BASOPHILS # BLD MANUAL: 0 10*3/MM3 (ref 0–0.2)
BASOPHILS NFR BLD MANUAL: 0 % (ref 0–1.5)
BILIRUB SERPL-MCNC: 0.5 MG/DL (ref 0–1.2)
BLASTS NFR BLD MANUAL: 1.1 % (ref 0–0)
BUN SERPL-MCNC: 16 MG/DL (ref 8–23)
BUN/CREAT SERPL: 22.5 (ref 7–25)
BURR CELLS BLD QL SMEAR: ABNORMAL
CALCIUM SPEC-SCNC: 9.3 MG/DL (ref 8.6–10.5)
CHLORIDE SERPL-SCNC: 100 MMOL/L (ref 98–107)
CO2 SERPL-SCNC: 27.7 MMOL/L (ref 22–29)
CREAT SERPL-MCNC: 0.71 MG/DL (ref 0.57–1)
DEPRECATED RDW RBC AUTO: 60.8 FL (ref 37–54)
EGFRCR SERPLBLD CKD-EPI 2021: 84 ML/MIN/1.73
EOSINOPHIL # BLD MANUAL: 0 10*3/MM3 (ref 0–0.4)
EOSINOPHIL NFR BLD MANUAL: 0 % (ref 0.3–6.2)
ERYTHROCYTE [DISTWIDTH] IN BLOOD BY AUTOMATED COUNT: 17.8 % (ref 12.3–15.4)
GLOBULIN UR ELPH-MCNC: 2.8 GM/DL
GLUCOSE SERPL-MCNC: 92 MG/DL (ref 65–99)
HCT VFR BLD AUTO: 32.5 % (ref 34–46.6)
HGB BLD-MCNC: 10.9 G/DL (ref 12–15.9)
LDH SERPL-CCNC: 500 U/L (ref 135–214)
LYMPHOCYTES # BLD MANUAL: 4.15 10*3/MM3 (ref 0.7–3.1)
LYMPHOCYTES NFR BLD MANUAL: 5.4 % (ref 5–12)
MCH RBC QN AUTO: 32.1 PG (ref 26.6–33)
MCHC RBC AUTO-ENTMCNC: 33.5 G/DL (ref 31.5–35.7)
MCV RBC AUTO: 95.6 FL (ref 79–97)
MICROCYTES BLD QL: ABNORMAL
MONOCYTES # BLD: 0.39 10*3/MM3 (ref 0.1–0.9)
MYELOCYTES NFR BLD MANUAL: 1.1 % (ref 0–0)
NEUTROPHILS # BLD AUTO: 2.58 10*3/MM3 (ref 1.7–7)
NEUTROPHILS NFR BLD MANUAL: 35.5 % (ref 42.7–76)
NRBC SPEC MANUAL: 15.1 /100 WBC (ref 0–0.2)
OVALOCYTES BLD QL SMEAR: ABNORMAL
PLAT MORPH BLD: NORMAL
PLATELET # BLD AUTO: 24 10*3/MM3 (ref 140–450)
PMV BLD AUTO: 10.3 FL (ref 6–12)
POTASSIUM SERPL-SCNC: 3.7 MMOL/L (ref 3.5–5.2)
PROT SERPL-MCNC: 6.9 G/DL (ref 6–8.5)
RBC # BLD AUTO: 3.4 10*6/MM3 (ref 3.77–5.28)
SODIUM SERPL-SCNC: 139 MMOL/L (ref 136–145)
VARIANT LYMPHS NFR BLD MANUAL: 57 % (ref 19.6–45.3)
WBC NRBC COR # BLD AUTO: 7.28 10*3/MM3 (ref 3.4–10.8)

## 2025-08-11 PROCEDURE — 25010000002 MIDAZOLAM PER 1 MG: Performed by: RADIOLOGY

## 2025-08-11 PROCEDURE — 25010000002 FENTANYL CITRATE (PF) 50 MCG/ML SOLUTION: Performed by: RADIOLOGY

## 2025-08-11 PROCEDURE — 99152 MOD SED SAME PHYS/QHP 5/>YRS: CPT

## 2025-08-11 PROCEDURE — 77012 CT SCAN FOR NEEDLE BIOPSY: CPT

## 2025-08-11 PROCEDURE — 25010000002 LIDOCAINE 1 % SOLUTION: Performed by: RADIOLOGY

## 2025-08-11 PROCEDURE — 99232 SBSQ HOSP IP/OBS MODERATE 35: CPT | Performed by: INTERNAL MEDICINE

## 2025-08-11 PROCEDURE — 63710000001 PREDNISONE PER 1 MG: Performed by: INTERNAL MEDICINE

## 2025-08-11 PROCEDURE — 83615 LACTATE (LD) (LDH) ENZYME: CPT | Performed by: INTERNAL MEDICINE

## 2025-08-11 PROCEDURE — 80053 COMPREHEN METABOLIC PANEL: CPT | Performed by: INTERNAL MEDICINE

## 2025-08-11 PROCEDURE — 85025 COMPLETE CBC W/AUTO DIFF WBC: CPT | Performed by: INTERNAL MEDICINE

## 2025-08-11 PROCEDURE — 85007 BL SMEAR W/DIFF WBC COUNT: CPT | Performed by: INTERNAL MEDICINE

## 2025-08-11 RX ORDER — LIDOCAINE HYDROCHLORIDE 10 MG/ML
20 INJECTION, SOLUTION INFILTRATION; PERINEURAL ONCE
Status: COMPLETED | OUTPATIENT
Start: 2025-08-11 | End: 2025-08-11

## 2025-08-11 RX ORDER — MIDAZOLAM HYDROCHLORIDE 1 MG/ML
INJECTION, SOLUTION INTRAMUSCULAR; INTRAVENOUS AS NEEDED
Status: COMPLETED | OUTPATIENT
Start: 2025-08-11 | End: 2025-08-11

## 2025-08-11 RX ORDER — FENTANYL CITRATE 50 UG/ML
INJECTION, SOLUTION INTRAMUSCULAR; INTRAVENOUS AS NEEDED
Status: COMPLETED | OUTPATIENT
Start: 2025-08-11 | End: 2025-08-11

## 2025-08-11 RX ADMIN — GABAPENTIN 600 MG: 300 CAPSULE ORAL at 17:14

## 2025-08-11 RX ADMIN — Medication 10 ML: at 08:00

## 2025-08-11 RX ADMIN — HYDROCHLOROTHIAZIDE 25 MG: 25 TABLET ORAL at 11:52

## 2025-08-11 RX ADMIN — OXYCODONE HYDROCHLORIDE AND ACETAMINOPHEN 1000 MG: 500 TABLET ORAL at 11:50

## 2025-08-11 RX ADMIN — Medication 10 ML: at 21:20

## 2025-08-11 RX ADMIN — Medication 50 MG: at 11:50

## 2025-08-11 RX ADMIN — PROPRANOLOL HYDROCHLORIDE 10 MG: 20 TABLET ORAL at 17:14

## 2025-08-11 RX ADMIN — PREDNISONE 60 MG: 20 TABLET ORAL at 11:56

## 2025-08-11 RX ADMIN — FENTANYL CITRATE 25 MCG: 50 INJECTION, SOLUTION INTRAMUSCULAR; INTRAVENOUS at 09:17

## 2025-08-11 RX ADMIN — Medication 1 CAPSULE: at 11:50

## 2025-08-11 RX ADMIN — PANTOPRAZOLE SODIUM 40 MG: 40 TABLET, DELAYED RELEASE ORAL at 11:50

## 2025-08-11 RX ADMIN — PROPRANOLOL HYDROCHLORIDE 10 MG: 20 TABLET ORAL at 21:19

## 2025-08-11 RX ADMIN — CLONAZEPAM 0.5 MG: 0.5 TABLET ORAL at 21:18

## 2025-08-11 RX ADMIN — MIDAZOLAM 1 MG: 1 INJECTION INTRAMUSCULAR; INTRAVENOUS at 09:15

## 2025-08-11 RX ADMIN — GABAPENTIN 600 MG: 300 CAPSULE ORAL at 11:50

## 2025-08-11 RX ADMIN — FENTANYL CITRATE 25 MCG: 50 INJECTION, SOLUTION INTRAMUSCULAR; INTRAVENOUS at 09:15

## 2025-08-11 RX ADMIN — ATORVASTATIN CALCIUM 40 MG: 20 TABLET, FILM COATED ORAL at 11:50

## 2025-08-11 RX ADMIN — Medication 10 MG: at 21:19

## 2025-08-11 RX ADMIN — LEVOTHYROXINE SODIUM 50 MCG: 50 TABLET ORAL at 11:50

## 2025-08-11 RX ADMIN — GABAPENTIN 600 MG: 300 CAPSULE ORAL at 21:19

## 2025-08-11 RX ADMIN — LIDOCAINE HYDROCHLORIDE 20 ML: 10 INJECTION, SOLUTION INFILTRATION; PERINEURAL at 09:45

## 2025-08-12 LAB
ALBUMIN SERPL-MCNC: 4 G/DL (ref 3.5–5.2)
ALBUMIN/GLOB SERPL: 1.4 G/DL
ALP SERPL-CCNC: 51 U/L (ref 39–117)
ALT SERPL W P-5'-P-CCNC: 15 U/L (ref 1–33)
ANION GAP SERPL CALCULATED.3IONS-SCNC: 11.5 MMOL/L (ref 5–15)
APTT PPP: 29.5 SECONDS (ref 22.7–35.4)
AST SERPL-CCNC: 20 U/L (ref 1–32)
BILIRUB SERPL-MCNC: 0.5 MG/DL (ref 0–1.2)
BUN SERPL-MCNC: 17 MG/DL (ref 8–23)
BUN/CREAT SERPL: 23.3 (ref 7–25)
CALCIUM SPEC-SCNC: 9.1 MG/DL (ref 8.6–10.5)
CHLORIDE SERPL-SCNC: 99 MMOL/L (ref 98–107)
CO2 SERPL-SCNC: 24.5 MMOL/L (ref 22–29)
CREAT SERPL-MCNC: 0.73 MG/DL (ref 0.57–1)
DEPRECATED RDW RBC AUTO: 59.6 FL (ref 37–54)
EGFRCR SERPLBLD CKD-EPI 2021: 81.2 ML/MIN/1.73
ERYTHROCYTE [DISTWIDTH] IN BLOOD BY AUTOMATED COUNT: 17.8 % (ref 12.3–15.4)
FIBRINOGEN PPP-MCNC: 224 MG/DL (ref 219–464)
GLOBULIN UR ELPH-MCNC: 2.8 GM/DL
GLUCOSE SERPL-MCNC: 158 MG/DL (ref 65–99)
HCT VFR BLD AUTO: 31.5 % (ref 34–46.6)
HGB BLD-MCNC: 10.7 G/DL (ref 12–15.9)
INR PPP: 1.17 (ref 0.9–1.1)
LDH SERPL-CCNC: 487 U/L (ref 135–214)
LYMPHOCYTES # BLD MANUAL: 0.63 10*3/MM3 (ref 0.7–3.1)
LYMPHOCYTES NFR BLD MANUAL: 33 % (ref 5–12)
MCH RBC QN AUTO: 31.7 PG (ref 26.6–33)
MCHC RBC AUTO-ENTMCNC: 34 G/DL (ref 31.5–35.7)
MCV RBC AUTO: 93.2 FL (ref 79–97)
MONOCYTES # BLD: 1.29 10*3/MM3 (ref 0.1–0.9)
NEUTROPHILS # BLD AUTO: 2 10*3/MM3 (ref 1.7–7)
NEUTROPHILS NFR BLD MANUAL: 51 % (ref 42.7–76)
NRBC SPEC MANUAL: 6 /100 WBC (ref 0–0.2)
PLAT MORPH BLD: NORMAL
PLATELET # BLD AUTO: 22 10*3/MM3 (ref 140–450)
PLATELET # BLD AUTO: 22 10*3/MM3 (ref 140–450)
PLATELETS.RETICULATED NFR BLD AUTO: 13.9 % (ref 0.9–6.5)
PMV BLD AUTO: 11.4 FL (ref 6–12)
POTASSIUM SERPL-SCNC: 3.3 MMOL/L (ref 3.5–5.2)
POTASSIUM SERPL-SCNC: 4.1 MMOL/L (ref 3.5–5.2)
PROT SERPL-MCNC: 6.8 G/DL (ref 6–8.5)
PROTHROMBIN TIME: 14.9 SECONDS (ref 11.7–14.2)
RBC # BLD AUTO: 3.38 10*6/MM3 (ref 3.77–5.28)
RBC MORPH BLD: NORMAL
SODIUM SERPL-SCNC: 135 MMOL/L (ref 136–145)
URATE SERPL-MCNC: 4.7 MG/DL (ref 2.4–5.7)
VARIANT LYMPHS NFR BLD MANUAL: 16 % (ref 19.6–45.3)
WBC MORPH BLD: NORMAL
WBC NRBC COR # BLD AUTO: 3.92 10*3/MM3 (ref 3.4–10.8)

## 2025-08-12 PROCEDURE — 85055 RETICULATED PLATELET ASSAY: CPT | Performed by: INTERNAL MEDICINE

## 2025-08-12 PROCEDURE — 99232 SBSQ HOSP IP/OBS MODERATE 35: CPT | Performed by: INTERNAL MEDICINE

## 2025-08-12 PROCEDURE — 85025 COMPLETE CBC W/AUTO DIFF WBC: CPT | Performed by: INTERNAL MEDICINE

## 2025-08-12 PROCEDURE — 85007 BL SMEAR W/DIFF WBC COUNT: CPT | Performed by: INTERNAL MEDICINE

## 2025-08-12 PROCEDURE — 63710000001 PREDNISONE PER 1 MG: Performed by: INTERNAL MEDICINE

## 2025-08-12 PROCEDURE — 84550 ASSAY OF BLOOD/URIC ACID: CPT | Performed by: INTERNAL MEDICINE

## 2025-08-12 PROCEDURE — 83615 LACTATE (LD) (LDH) ENZYME: CPT | Performed by: INTERNAL MEDICINE

## 2025-08-12 PROCEDURE — 84132 ASSAY OF SERUM POTASSIUM: CPT | Performed by: INTERNAL MEDICINE

## 2025-08-12 PROCEDURE — 85730 THROMBOPLASTIN TIME PARTIAL: CPT | Performed by: INTERNAL MEDICINE

## 2025-08-12 PROCEDURE — 85610 PROTHROMBIN TIME: CPT | Performed by: INTERNAL MEDICINE

## 2025-08-12 PROCEDURE — 85384 FIBRINOGEN ACTIVITY: CPT | Performed by: INTERNAL MEDICINE

## 2025-08-12 PROCEDURE — 80053 COMPREHEN METABOLIC PANEL: CPT | Performed by: INTERNAL MEDICINE

## 2025-08-12 RX ORDER — LEVOTHYROXINE SODIUM 50 UG/1
50 TABLET ORAL DAILY
Qty: 90 TABLET | Refills: 0 | Status: SHIPPED | OUTPATIENT
Start: 2025-08-12

## 2025-08-12 RX ORDER — POTASSIUM CHLORIDE 1500 MG/1
40 TABLET, EXTENDED RELEASE ORAL EVERY 4 HOURS
Status: COMPLETED | OUTPATIENT
Start: 2025-08-12 | End: 2025-08-12

## 2025-08-12 RX ADMIN — PANTOPRAZOLE SODIUM 40 MG: 40 TABLET, DELAYED RELEASE ORAL at 08:30

## 2025-08-12 RX ADMIN — PROPRANOLOL HYDROCHLORIDE 10 MG: 20 TABLET ORAL at 16:28

## 2025-08-12 RX ADMIN — Medication 10 ML: at 08:32

## 2025-08-12 RX ADMIN — ATORVASTATIN CALCIUM 40 MG: 20 TABLET, FILM COATED ORAL at 08:30

## 2025-08-12 RX ADMIN — PREDNISONE 60 MG: 20 TABLET ORAL at 08:30

## 2025-08-12 RX ADMIN — Medication 10 MG: at 21:23

## 2025-08-12 RX ADMIN — CLONAZEPAM 0.5 MG: 0.5 TABLET ORAL at 21:23

## 2025-08-12 RX ADMIN — LEVOTHYROXINE SODIUM 50 MCG: 50 TABLET ORAL at 08:30

## 2025-08-12 RX ADMIN — OXYCODONE HYDROCHLORIDE AND ACETAMINOPHEN 1000 MG: 500 TABLET ORAL at 08:30

## 2025-08-12 RX ADMIN — Medication 1 CAPSULE: at 08:30

## 2025-08-12 RX ADMIN — POTASSIUM CHLORIDE 40 MEQ: 1500 TABLET, EXTENDED RELEASE ORAL at 13:59

## 2025-08-12 RX ADMIN — Medication 50 MG: at 08:30

## 2025-08-12 RX ADMIN — GABAPENTIN 600 MG: 300 CAPSULE ORAL at 21:23

## 2025-08-12 RX ADMIN — SENNOSIDES AND DOCUSATE SODIUM 2 TABLET: 50; 8.6 TABLET ORAL at 08:33

## 2025-08-12 RX ADMIN — PROPRANOLOL HYDROCHLORIDE 10 MG: 20 TABLET ORAL at 21:23

## 2025-08-12 RX ADMIN — PROPRANOLOL HYDROCHLORIDE 10 MG: 20 TABLET ORAL at 08:30

## 2025-08-12 RX ADMIN — GABAPENTIN 600 MG: 300 CAPSULE ORAL at 08:30

## 2025-08-12 RX ADMIN — GABAPENTIN 600 MG: 300 CAPSULE ORAL at 16:28

## 2025-08-12 RX ADMIN — HYDROCHLOROTHIAZIDE 25 MG: 25 TABLET ORAL at 08:30

## 2025-08-12 RX ADMIN — Medication 10 ML: at 21:24

## 2025-08-12 RX ADMIN — POTASSIUM CHLORIDE 40 MEQ: 1500 TABLET, EXTENDED RELEASE ORAL at 11:07

## 2025-08-13 LAB
ALBUMIN SERPL-MCNC: 4.3 G/DL (ref 3.5–5.2)
ALBUMIN/GLOB SERPL: 1.7 G/DL
ALP SERPL-CCNC: 53 U/L (ref 39–117)
ALT SERPL W P-5'-P-CCNC: 17 U/L (ref 1–33)
ANION GAP SERPL CALCULATED.3IONS-SCNC: 13 MMOL/L (ref 5–15)
AST SERPL-CCNC: 22 U/L (ref 1–32)
BILIRUB SERPL-MCNC: 0.8 MG/DL (ref 0–1.2)
BLASTS NFR BLD MANUAL: 7 % (ref 0–0)
BLOOD OR BONE MARROW RESULT: NORMAL
BLOOD OR BONE MARROW RESULT: NORMAL
BUN SERPL-MCNC: 20 MG/DL (ref 8–23)
BUN/CREAT SERPL: 27.4 (ref 7–25)
CALCIUM SPEC-SCNC: 9.2 MG/DL (ref 8.6–10.5)
CCV RESULT: NORMAL
CHLORIDE SERPL-SCNC: 99 MMOL/L (ref 98–107)
CO2 SERPL-SCNC: 23 MMOL/L (ref 22–29)
CREAT SERPL-MCNC: 0.73 MG/DL (ref 0.57–1)
DEPRECATED RDW RBC AUTO: 58.7 FL (ref 37–54)
EGFRCR SERPLBLD CKD-EPI 2021: 81.2 ML/MIN/1.73
ERYTHROCYTE [DISTWIDTH] IN BLOOD BY AUTOMATED COUNT: 18.3 % (ref 12.3–15.4)
GLOBULIN UR ELPH-MCNC: 2.6 GM/DL
GLUCOSE SERPL-MCNC: 112 MG/DL (ref 65–99)
HCT VFR BLD AUTO: 33.3 % (ref 34–46.6)
HGB BLD-MCNC: 11.5 G/DL (ref 12–15.9)
HYPOCHROMIA BLD QL: ABNORMAL
LDH SERPL-CCNC: 507 U/L (ref 135–214)
LYMPHOCYTES # BLD MANUAL: 1.94 10*3/MM3 (ref 0.7–3.1)
LYMPHOCYTES NFR BLD MANUAL: 19 % (ref 5–12)
Lab: NORMAL
Lab: NORMAL
MCH RBC QN AUTO: 31.5 PG (ref 26.6–33)
MCHC RBC AUTO-ENTMCNC: 34.5 G/DL (ref 31.5–35.7)
MCV RBC AUTO: 91.2 FL (ref 79–97)
METAMYELOCYTES NFR BLD MANUAL: 1 % (ref 0–0)
MONOCYTES # BLD: 1.19 10*3/MM3 (ref 0.1–0.9)
MYELOCYTES NFR BLD MANUAL: 5 % (ref 0–0)
NEUTROPHILS # BLD AUTO: 2.25 10*3/MM3 (ref 1.7–7)
NEUTROPHILS NFR BLD MANUAL: 36 % (ref 42.7–76)
NRBC SPEC MANUAL: 27 /100 WBC (ref 0–0.2)
PLAT MORPH BLD: NORMAL
PLATELET # BLD AUTO: 22 10*3/MM3 (ref 140–450)
PLATELET # BLD AUTO: 22 10*3/MM3 (ref 140–450)
PLATELETS.RETICULATED NFR BLD AUTO: 15 % (ref 0.9–6.5)
POTASSIUM SERPL-SCNC: 4.1 MMOL/L (ref 3.5–5.2)
PROMYELOCYTES NFR BLD MANUAL: 1 % (ref 0–0)
PROT SERPL-MCNC: 6.9 G/DL (ref 6–8.5)
RBC # BLD AUTO: 3.65 10*6/MM3 (ref 3.77–5.28)
SODIUM SERPL-SCNC: 135 MMOL/L (ref 136–145)
URATE SERPL-MCNC: 4.4 MG/DL (ref 2.4–5.7)
VARIANT LYMPHS NFR BLD MANUAL: 1 % (ref 0–5)
VARIANT LYMPHS NFR BLD MANUAL: 30 % (ref 19.6–45.3)
WBC NRBC COR # BLD AUTO: 6.26 10*3/MM3 (ref 3.4–10.8)

## 2025-08-13 PROCEDURE — 99232 SBSQ HOSP IP/OBS MODERATE 35: CPT | Performed by: INTERNAL MEDICINE

## 2025-08-13 PROCEDURE — 80053 COMPREHEN METABOLIC PANEL: CPT | Performed by: INTERNAL MEDICINE

## 2025-08-13 PROCEDURE — 83615 LACTATE (LD) (LDH) ENZYME: CPT | Performed by: INTERNAL MEDICINE

## 2025-08-13 PROCEDURE — 84550 ASSAY OF BLOOD/URIC ACID: CPT | Performed by: INTERNAL MEDICINE

## 2025-08-13 PROCEDURE — 85055 RETICULATED PLATELET ASSAY: CPT | Performed by: INTERNAL MEDICINE

## 2025-08-13 PROCEDURE — 85025 COMPLETE CBC W/AUTO DIFF WBC: CPT | Performed by: INTERNAL MEDICINE

## 2025-08-13 PROCEDURE — 85007 BL SMEAR W/DIFF WBC COUNT: CPT | Performed by: INTERNAL MEDICINE

## 2025-08-13 RX ORDER — ACETAMINOPHEN 325 MG/1
650 TABLET ORAL EVERY 6 HOURS PRN
Status: DISCONTINUED | OUTPATIENT
Start: 2025-08-13 | End: 2025-08-25 | Stop reason: HOSPADM

## 2025-08-13 RX ADMIN — HYDROCHLOROTHIAZIDE 25 MG: 25 TABLET ORAL at 08:46

## 2025-08-13 RX ADMIN — SENNOSIDES AND DOCUSATE SODIUM 2 TABLET: 50; 8.6 TABLET ORAL at 08:47

## 2025-08-13 RX ADMIN — PROPRANOLOL HYDROCHLORIDE 10 MG: 20 TABLET ORAL at 20:27

## 2025-08-13 RX ADMIN — Medication 50 MG: at 08:46

## 2025-08-13 RX ADMIN — Medication 10 ML: at 08:48

## 2025-08-13 RX ADMIN — Medication 1 CAPSULE: at 08:46

## 2025-08-13 RX ADMIN — GABAPENTIN 600 MG: 300 CAPSULE ORAL at 08:47

## 2025-08-13 RX ADMIN — GABAPENTIN 600 MG: 300 CAPSULE ORAL at 20:27

## 2025-08-13 RX ADMIN — LEVOTHYROXINE SODIUM 50 MCG: 50 TABLET ORAL at 08:46

## 2025-08-13 RX ADMIN — ATORVASTATIN CALCIUM 40 MG: 20 TABLET, FILM COATED ORAL at 08:47

## 2025-08-13 RX ADMIN — Medication 10 MG: at 20:27

## 2025-08-13 RX ADMIN — PROPRANOLOL HYDROCHLORIDE 10 MG: 20 TABLET ORAL at 16:18

## 2025-08-13 RX ADMIN — PANTOPRAZOLE SODIUM 40 MG: 40 TABLET, DELAYED RELEASE ORAL at 08:47

## 2025-08-13 RX ADMIN — PROPRANOLOL HYDROCHLORIDE 10 MG: 20 TABLET ORAL at 08:47

## 2025-08-13 RX ADMIN — ACETAMINOPHEN 650 MG: 325 TABLET ORAL at 16:43

## 2025-08-13 RX ADMIN — Medication 10 ML: at 20:30

## 2025-08-13 RX ADMIN — GABAPENTIN 600 MG: 300 CAPSULE ORAL at 16:18

## 2025-08-13 RX ADMIN — OXYCODONE HYDROCHLORIDE AND ACETAMINOPHEN 1000 MG: 500 TABLET ORAL at 08:46

## 2025-08-14 PROBLEM — C92.00 ACUTE MYELOID LEUKEMIA NOT HAVING ACHIEVED REMISSION: Status: ACTIVE | Noted: 2025-08-14

## 2025-08-14 LAB
ALBUMIN SERPL-MCNC: 4.1 G/DL (ref 3.5–5.2)
ALBUMIN/GLOB SERPL: 1.5 G/DL
ALP SERPL-CCNC: 54 U/L (ref 39–117)
ALT SERPL W P-5'-P-CCNC: 40 U/L (ref 1–33)
ANION GAP SERPL CALCULATED.3IONS-SCNC: 16 MMOL/L (ref 5–15)
ANISOCYTOSIS BLD QL: ABNORMAL
AST SERPL-CCNC: 54 U/L (ref 1–32)
BILIRUB SERPL-MCNC: 0.7 MG/DL (ref 0–1.2)
BUN SERPL-MCNC: 23 MG/DL (ref 8–23)
BUN/CREAT SERPL: 30.7 (ref 7–25)
CALCIUM SPEC-SCNC: 8.6 MG/DL (ref 8.6–10.5)
CHLORIDE SERPL-SCNC: 98 MMOL/L (ref 98–107)
CO2 SERPL-SCNC: 20 MMOL/L (ref 22–29)
CREAT SERPL-MCNC: 0.75 MG/DL (ref 0.57–1)
DEPRECATED RDW RBC AUTO: 61.9 FL (ref 37–54)
EGFRCR SERPLBLD CKD-EPI 2021: 78.6 ML/MIN/1.73
ERYTHROCYTE [DISTWIDTH] IN BLOOD BY AUTOMATED COUNT: 19.1 % (ref 12.3–15.4)
GLOBULIN UR ELPH-MCNC: 2.8 GM/DL
GLUCOSE SERPL-MCNC: 105 MG/DL (ref 65–99)
HCT VFR BLD AUTO: 37 % (ref 34–46.6)
HGB BLD-MCNC: 12.7 G/DL (ref 12–15.9)
LDH SERPL-CCNC: 614 U/L (ref 135–214)
LYMPHOCYTES # BLD MANUAL: 6.54 10*3/MM3 (ref 0.7–3.1)
LYMPHOCYTES NFR BLD MANUAL: 7 % (ref 5–12)
MCH RBC QN AUTO: 31.5 PG (ref 26.6–33)
MCHC RBC AUTO-ENTMCNC: 34.3 G/DL (ref 31.5–35.7)
MCV RBC AUTO: 91.8 FL (ref 79–97)
METAMYELOCYTES NFR BLD MANUAL: 5 % (ref 0–0)
MONOCYTES # BLD: 0.59 10*3/MM3 (ref 0.1–0.9)
NEUTROPHILS # BLD AUTO: 0.84 10*3/MM3 (ref 1.7–7)
NEUTROPHILS NFR BLD MANUAL: 10 % (ref 42.7–76)
NRBC SPEC MANUAL: 4 /100 WBC (ref 0–0.2)
PLAT MORPH BLD: NORMAL
PLATELET # BLD AUTO: 37 10*3/MM3 (ref 140–450)
PLATELET # BLD AUTO: 37 10*3/MM3 (ref 140–450)
PLATELETS.RETICULATED NFR BLD AUTO: 7.3 % (ref 0.9–6.5)
PMV BLD AUTO: 9.4 FL (ref 6–12)
POTASSIUM SERPL-SCNC: 3.6 MMOL/L (ref 3.5–5.2)
POTASSIUM SERPL-SCNC: 4.2 MMOL/L (ref 3.5–5.2)
PROT SERPL-MCNC: 6.9 G/DL (ref 6–8.5)
RBC # BLD AUTO: 4.03 10*6/MM3 (ref 3.77–5.28)
SODIUM SERPL-SCNC: 134 MMOL/L (ref 136–145)
TARGETS BLD QL SMEAR: ABNORMAL
URATE SERPL-MCNC: 6 MG/DL (ref 2.4–5.7)
VARIANT LYMPHS NFR BLD MANUAL: 78 % (ref 19.6–45.3)
WBC MORPH BLD: NORMAL
WBC NRBC COR # BLD AUTO: 8.39 10*3/MM3 (ref 3.4–10.8)

## 2025-08-14 PROCEDURE — 85055 RETICULATED PLATELET ASSAY: CPT | Performed by: INTERNAL MEDICINE

## 2025-08-14 PROCEDURE — 84550 ASSAY OF BLOOD/URIC ACID: CPT | Performed by: INTERNAL MEDICINE

## 2025-08-14 PROCEDURE — 80053 COMPREHEN METABOLIC PANEL: CPT | Performed by: INTERNAL MEDICINE

## 2025-08-14 PROCEDURE — 84132 ASSAY OF SERUM POTASSIUM: CPT | Performed by: HOSPITALIST

## 2025-08-14 PROCEDURE — 85007 BL SMEAR W/DIFF WBC COUNT: CPT | Performed by: INTERNAL MEDICINE

## 2025-08-14 PROCEDURE — 25010000002 ONDANSETRON PER 1 MG: Performed by: NURSE PRACTITIONER

## 2025-08-14 PROCEDURE — 85025 COMPLETE CBC W/AUTO DIFF WBC: CPT | Performed by: INTERNAL MEDICINE

## 2025-08-14 PROCEDURE — 25810000003 SODIUM CHLORIDE 0.9 % SOLUTION: Performed by: HOSPITALIST

## 2025-08-14 PROCEDURE — 83615 LACTATE (LD) (LDH) ENZYME: CPT | Performed by: INTERNAL MEDICINE

## 2025-08-14 PROCEDURE — 99233 SBSQ HOSP IP/OBS HIGH 50: CPT | Performed by: INTERNAL MEDICINE

## 2025-08-14 PROCEDURE — 25010000002 PROCHLORPERAZINE 10 MG/2ML SOLUTION: Performed by: INTERNAL MEDICINE

## 2025-08-14 RX ORDER — ALLOPURINOL 300 MG/1
300 TABLET ORAL DAILY
Status: DISCONTINUED | OUTPATIENT
Start: 2025-08-14 | End: 2025-08-25 | Stop reason: HOSPADM

## 2025-08-14 RX ORDER — PROCHLORPERAZINE EDISYLATE 5 MG/ML
10 INJECTION INTRAMUSCULAR; INTRAVENOUS EVERY 6 HOURS PRN
Status: DISCONTINUED | OUTPATIENT
Start: 2025-08-14 | End: 2025-08-25 | Stop reason: HOSPADM

## 2025-08-14 RX ORDER — POTASSIUM CHLORIDE 1500 MG/1
40 TABLET, EXTENDED RELEASE ORAL EVERY 4 HOURS
Status: COMPLETED | OUTPATIENT
Start: 2025-08-14 | End: 2025-08-14

## 2025-08-14 RX ORDER — SODIUM CHLORIDE 9 MG/ML
75 INJECTION, SOLUTION INTRAVENOUS CONTINUOUS
Status: DISCONTINUED | OUTPATIENT
Start: 2025-08-14 | End: 2025-08-16

## 2025-08-14 RX ORDER — PANTOPRAZOLE SODIUM 40 MG/1
40 TABLET, DELAYED RELEASE ORAL
Status: DISCONTINUED | OUTPATIENT
Start: 2025-08-14 | End: 2025-08-25 | Stop reason: HOSPADM

## 2025-08-14 RX ADMIN — POTASSIUM CHLORIDE 40 MEQ: 1500 TABLET, EXTENDED RELEASE ORAL at 08:11

## 2025-08-14 RX ADMIN — OXYCODONE HYDROCHLORIDE AND ACETAMINOPHEN 1000 MG: 500 TABLET ORAL at 08:11

## 2025-08-14 RX ADMIN — PANTOPRAZOLE SODIUM 40 MG: 40 TABLET, DELAYED RELEASE ORAL at 17:45

## 2025-08-14 RX ADMIN — Medication 10 ML: at 19:51

## 2025-08-14 RX ADMIN — Medication 10 MG: at 19:51

## 2025-08-14 RX ADMIN — Medication 10 ML: at 08:13

## 2025-08-14 RX ADMIN — PROPRANOLOL HYDROCHLORIDE 10 MG: 20 TABLET ORAL at 19:51

## 2025-08-14 RX ADMIN — LEVOTHYROXINE SODIUM 50 MCG: 50 TABLET ORAL at 08:11

## 2025-08-14 RX ADMIN — PANTOPRAZOLE SODIUM 40 MG: 40 TABLET, DELAYED RELEASE ORAL at 08:11

## 2025-08-14 RX ADMIN — GABAPENTIN 600 MG: 300 CAPSULE ORAL at 08:11

## 2025-08-14 RX ADMIN — ONDANSETRON 4 MG: 2 INJECTION, SOLUTION INTRAMUSCULAR; INTRAVENOUS at 07:15

## 2025-08-14 RX ADMIN — Medication 1 CAPSULE: at 08:12

## 2025-08-14 RX ADMIN — POTASSIUM CHLORIDE 40 MEQ: 1500 TABLET, EXTENDED RELEASE ORAL at 13:22

## 2025-08-14 RX ADMIN — ALLOPURINOL 300 MG: 300 TABLET ORAL at 20:03

## 2025-08-14 RX ADMIN — SODIUM CHLORIDE 75 ML/HR: 9 INJECTION, SOLUTION INTRAVENOUS at 13:23

## 2025-08-14 RX ADMIN — PROCHLORPERAZINE EDISYLATE 10 MG: 5 INJECTION INTRAMUSCULAR; INTRAVENOUS at 19:50

## 2025-08-14 RX ADMIN — ATORVASTATIN CALCIUM 40 MG: 20 TABLET, FILM COATED ORAL at 08:11

## 2025-08-14 RX ADMIN — GABAPENTIN 600 MG: 300 CAPSULE ORAL at 19:51

## 2025-08-14 RX ADMIN — PROPRANOLOL HYDROCHLORIDE 10 MG: 20 TABLET ORAL at 15:18

## 2025-08-14 RX ADMIN — GABAPENTIN 600 MG: 300 CAPSULE ORAL at 15:18

## 2025-08-14 RX ADMIN — HYDROCHLOROTHIAZIDE 25 MG: 25 TABLET ORAL at 08:12

## 2025-08-14 RX ADMIN — Medication 50 MG: at 08:11

## 2025-08-15 LAB
ALBUMIN SERPL-MCNC: 3.9 G/DL (ref 3.5–5.2)
ALBUMIN/GLOB SERPL: 1.5 G/DL
ALP SERPL-CCNC: 53 U/L (ref 39–117)
ALT SERPL W P-5'-P-CCNC: 35 U/L (ref 1–33)
ANION GAP SERPL CALCULATED.3IONS-SCNC: 10.8 MMOL/L (ref 5–15)
ANISOCYTOSIS BLD QL: ABNORMAL
AST SERPL-CCNC: 30 U/L (ref 1–32)
BILIRUB SERPL-MCNC: 1.1 MG/DL (ref 0–1.2)
BUN SERPL-MCNC: 17 MG/DL (ref 8–23)
BUN/CREAT SERPL: 22.7 (ref 7–25)
BURR CELLS BLD QL SMEAR: ABNORMAL
CALCIUM SPEC-SCNC: 8.9 MG/DL (ref 8.6–10.5)
CHLORIDE SERPL-SCNC: 104 MMOL/L (ref 98–107)
CO2 SERPL-SCNC: 23.2 MMOL/L (ref 22–29)
CREAT SERPL-MCNC: 0.75 MG/DL (ref 0.57–1)
DEPRECATED RDW RBC AUTO: 59.6 FL (ref 37–54)
EGFRCR SERPLBLD CKD-EPI 2021: 78.6 ML/MIN/1.73
ERYTHROCYTE [DISTWIDTH] IN BLOOD BY AUTOMATED COUNT: 18.2 % (ref 12.3–15.4)
GLOBULIN UR ELPH-MCNC: 2.6 GM/DL
GLUCOSE SERPL-MCNC: 100 MG/DL (ref 65–99)
HCT VFR BLD AUTO: 33.5 % (ref 34–46.6)
HGB BLD-MCNC: 11.6 G/DL (ref 12–15.9)
LDH SERPL-CCNC: 462 U/L (ref 135–214)
LYMPHOCYTES # BLD MANUAL: 3.85 10*3/MM3 (ref 0.7–3.1)
LYMPHOCYTES NFR BLD MANUAL: 20 % (ref 5–12)
MCH RBC QN AUTO: 32.1 PG (ref 26.6–33)
MCHC RBC AUTO-ENTMCNC: 34.6 G/DL (ref 31.5–35.7)
MCV RBC AUTO: 92.8 FL (ref 79–97)
METAMYELOCYTES NFR BLD MANUAL: 4 % (ref 0–0)
MICROCYTES BLD QL: ABNORMAL
MONOCYTES # BLD: 1.33 10*3/MM3 (ref 0.1–0.9)
MYELOCYTES NFR BLD MANUAL: 4 % (ref 0–0)
NEUTROPHILS # BLD AUTO: 0.86 10*3/MM3 (ref 1.7–7)
NEUTROPHILS NFR BLD MANUAL: 13 % (ref 42.7–76)
PLAT MORPH BLD: NORMAL
PLATELET # BLD AUTO: 20 10*3/MM3 (ref 140–450)
PLATELET # BLD AUTO: 20 10*3/MM3 (ref 140–450)
PLATELETS.RETICULATED NFR BLD AUTO: 13.6 % (ref 0.9–6.5)
PMV BLD AUTO: 10.7 FL (ref 6–12)
POTASSIUM SERPL-SCNC: 4.2 MMOL/L (ref 3.5–5.2)
PROMYELOCYTES NFR BLD MANUAL: 1 % (ref 0–0)
PROT SERPL-MCNC: 6.5 G/DL (ref 6–8.5)
RBC # BLD AUTO: 3.61 10*6/MM3 (ref 3.77–5.28)
SODIUM SERPL-SCNC: 138 MMOL/L (ref 136–145)
TARGETS BLD QL SMEAR: ABNORMAL
URATE SERPL-MCNC: 3.9 MG/DL (ref 2.4–5.7)
VARIANT LYMPHS NFR BLD MANUAL: 58 % (ref 19.6–45.3)
WBC NRBC COR # BLD AUTO: 6.63 10*3/MM3 (ref 3.4–10.8)

## 2025-08-15 PROCEDURE — 83615 LACTATE (LD) (LDH) ENZYME: CPT | Performed by: INTERNAL MEDICINE

## 2025-08-15 PROCEDURE — 85055 RETICULATED PLATELET ASSAY: CPT | Performed by: INTERNAL MEDICINE

## 2025-08-15 PROCEDURE — 80053 COMPREHEN METABOLIC PANEL: CPT | Performed by: INTERNAL MEDICINE

## 2025-08-15 PROCEDURE — 99232 SBSQ HOSP IP/OBS MODERATE 35: CPT | Performed by: INTERNAL MEDICINE

## 2025-08-15 PROCEDURE — 85025 COMPLETE CBC W/AUTO DIFF WBC: CPT | Performed by: INTERNAL MEDICINE

## 2025-08-15 PROCEDURE — 85007 BL SMEAR W/DIFF WBC COUNT: CPT | Performed by: INTERNAL MEDICINE

## 2025-08-15 PROCEDURE — 84550 ASSAY OF BLOOD/URIC ACID: CPT | Performed by: INTERNAL MEDICINE

## 2025-08-15 PROCEDURE — 25810000003 SODIUM CHLORIDE 0.9 % SOLUTION: Performed by: HOSPITALIST

## 2025-08-15 RX ADMIN — OXYCODONE HYDROCHLORIDE AND ACETAMINOPHEN 1000 MG: 500 TABLET ORAL at 08:32

## 2025-08-15 RX ADMIN — Medication 10 ML: at 22:33

## 2025-08-15 RX ADMIN — PROPRANOLOL HYDROCHLORIDE 10 MG: 20 TABLET ORAL at 08:33

## 2025-08-15 RX ADMIN — Medication 1 CAPSULE: at 08:33

## 2025-08-15 RX ADMIN — LEVOTHYROXINE SODIUM 50 MCG: 50 TABLET ORAL at 08:32

## 2025-08-15 RX ADMIN — ALLOPURINOL 300 MG: 300 TABLET ORAL at 08:32

## 2025-08-15 RX ADMIN — PANTOPRAZOLE SODIUM 40 MG: 40 TABLET, DELAYED RELEASE ORAL at 08:33

## 2025-08-15 RX ADMIN — ATORVASTATIN CALCIUM 40 MG: 20 TABLET, FILM COATED ORAL at 08:33

## 2025-08-15 RX ADMIN — GABAPENTIN 600 MG: 300 CAPSULE ORAL at 08:33

## 2025-08-15 RX ADMIN — GABAPENTIN 600 MG: 300 CAPSULE ORAL at 22:33

## 2025-08-15 RX ADMIN — Medication 50 MG: at 08:33

## 2025-08-15 RX ADMIN — PANTOPRAZOLE SODIUM 40 MG: 40 TABLET, DELAYED RELEASE ORAL at 16:56

## 2025-08-15 RX ADMIN — PROPRANOLOL HYDROCHLORIDE 10 MG: 20 TABLET ORAL at 22:33

## 2025-08-15 RX ADMIN — SODIUM CHLORIDE 75 ML/HR: 9 INJECTION, SOLUTION INTRAVENOUS at 15:02

## 2025-08-15 RX ADMIN — SODIUM CHLORIDE 75 ML/HR: 9 INJECTION, SOLUTION INTRAVENOUS at 02:15

## 2025-08-15 RX ADMIN — GABAPENTIN 600 MG: 300 CAPSULE ORAL at 16:56

## 2025-08-15 RX ADMIN — Medication 10 MG: at 22:33

## 2025-08-15 RX ADMIN — PROPRANOLOL HYDROCHLORIDE 10 MG: 20 TABLET ORAL at 16:56

## 2025-08-15 RX ADMIN — HYDROCHLOROTHIAZIDE 25 MG: 25 TABLET ORAL at 08:32

## 2025-08-16 LAB
ALBUMIN SERPL-MCNC: 3.6 G/DL (ref 3.5–5.2)
ALBUMIN/GLOB SERPL: 1.4 G/DL
ALP SERPL-CCNC: 49 U/L (ref 39–117)
ALT SERPL W P-5'-P-CCNC: 28 U/L (ref 1–33)
ANION GAP SERPL CALCULATED.3IONS-SCNC: 8.5 MMOL/L (ref 5–15)
AST SERPL-CCNC: 24 U/L (ref 1–32)
BILIRUB SERPL-MCNC: 1.1 MG/DL (ref 0–1.2)
BLASTS NFR BLD MANUAL: 4 % (ref 0–0)
BUN SERPL-MCNC: 14 MG/DL (ref 8–23)
BUN/CREAT SERPL: 19.4 (ref 7–25)
CALCIUM SPEC-SCNC: 8.6 MG/DL (ref 8.6–10.5)
CHLORIDE SERPL-SCNC: 104 MMOL/L (ref 98–107)
CO2 SERPL-SCNC: 24.5 MMOL/L (ref 22–29)
CREAT SERPL-MCNC: 0.72 MG/DL (ref 0.57–1)
DEPRECATED RDW RBC AUTO: 56.4 FL (ref 37–54)
EGFRCR SERPLBLD CKD-EPI 2021: 82.6 ML/MIN/1.73
EOSINOPHIL # BLD MANUAL: 0.07 10*3/MM3 (ref 0–0.4)
EOSINOPHIL NFR BLD MANUAL: 1 % (ref 0.3–6.2)
ERYTHROCYTE [DISTWIDTH] IN BLOOD BY AUTOMATED COUNT: 18.3 % (ref 12.3–15.4)
GLOBULIN UR ELPH-MCNC: 2.5 GM/DL
GLUCOSE SERPL-MCNC: 102 MG/DL (ref 65–99)
HCT VFR BLD AUTO: 30.5 % (ref 34–46.6)
HGB BLD-MCNC: 10.8 G/DL (ref 12–15.9)
LDH SERPL-CCNC: 393 U/L (ref 135–214)
LYMPHOCYTES # BLD MANUAL: 5.49 10*3/MM3 (ref 0.7–3.1)
LYMPHOCYTES NFR BLD MANUAL: 5 % (ref 5–12)
MCH RBC QN AUTO: 31.8 PG (ref 26.6–33)
MCHC RBC AUTO-ENTMCNC: 35.4 G/DL (ref 31.5–35.7)
MCV RBC AUTO: 89.7 FL (ref 79–97)
MONOCYTES # BLD: 0.33 10*3/MM3 (ref 0.1–0.9)
NEUTROPHILS # BLD AUTO: 0.39 10*3/MM3 (ref 1.7–7)
NEUTROPHILS NFR BLD MANUAL: 6 % (ref 42.7–76)
PLAT MORPH BLD: NORMAL
PLATELET # BLD AUTO: 20 10*3/MM3 (ref 140–450)
PLATELET # BLD AUTO: 20 10*3/MM3 (ref 140–450)
PLATELETS.RETICULATED NFR BLD AUTO: 10.3 % (ref 0.9–6.5)
PMV BLD AUTO: 10.3 FL (ref 6–12)
POTASSIUM SERPL-SCNC: 3.6 MMOL/L (ref 3.5–5.2)
PROT SERPL-MCNC: 6.1 G/DL (ref 6–8.5)
RBC # BLD AUTO: 3.4 10*6/MM3 (ref 3.77–5.28)
RBC MORPH BLD: NORMAL
SODIUM SERPL-SCNC: 137 MMOL/L (ref 136–145)
URATE SERPL-MCNC: 2.8 MG/DL (ref 2.4–5.7)
VARIANT LYMPHS NFR BLD MANUAL: 11 % (ref 0–5)
VARIANT LYMPHS NFR BLD MANUAL: 73 % (ref 19.6–45.3)
WBC NRBC COR # BLD AUTO: 6.54 10*3/MM3 (ref 3.4–10.8)

## 2025-08-16 PROCEDURE — 25810000003 SODIUM CHLORIDE 0.9 % SOLUTION: Performed by: HOSPITALIST

## 2025-08-16 PROCEDURE — 84550 ASSAY OF BLOOD/URIC ACID: CPT | Performed by: INTERNAL MEDICINE

## 2025-08-16 PROCEDURE — 99232 SBSQ HOSP IP/OBS MODERATE 35: CPT | Performed by: INTERNAL MEDICINE

## 2025-08-16 PROCEDURE — 80053 COMPREHEN METABOLIC PANEL: CPT | Performed by: INTERNAL MEDICINE

## 2025-08-16 PROCEDURE — 85007 BL SMEAR W/DIFF WBC COUNT: CPT | Performed by: INTERNAL MEDICINE

## 2025-08-16 PROCEDURE — 85055 RETICULATED PLATELET ASSAY: CPT | Performed by: INTERNAL MEDICINE

## 2025-08-16 PROCEDURE — 85025 COMPLETE CBC W/AUTO DIFF WBC: CPT | Performed by: INTERNAL MEDICINE

## 2025-08-16 PROCEDURE — 83615 LACTATE (LD) (LDH) ENZYME: CPT | Performed by: INTERNAL MEDICINE

## 2025-08-16 RX ORDER — POTASSIUM CHLORIDE 1500 MG/1
40 TABLET, EXTENDED RELEASE ORAL EVERY 4 HOURS
Status: COMPLETED | OUTPATIENT
Start: 2025-08-16 | End: 2025-08-16

## 2025-08-16 RX ADMIN — GABAPENTIN 600 MG: 300 CAPSULE ORAL at 17:41

## 2025-08-16 RX ADMIN — POTASSIUM CHLORIDE 40 MEQ: 1500 TABLET, EXTENDED RELEASE ORAL at 11:41

## 2025-08-16 RX ADMIN — GABAPENTIN 600 MG: 300 CAPSULE ORAL at 22:24

## 2025-08-16 RX ADMIN — PROPRANOLOL HYDROCHLORIDE 10 MG: 20 TABLET ORAL at 17:41

## 2025-08-16 RX ADMIN — POTASSIUM CHLORIDE 40 MEQ: 1500 TABLET, EXTENDED RELEASE ORAL at 17:41

## 2025-08-16 RX ADMIN — SENNOSIDES AND DOCUSATE SODIUM 2 TABLET: 50; 8.6 TABLET ORAL at 17:55

## 2025-08-16 RX ADMIN — PANTOPRAZOLE SODIUM 40 MG: 40 TABLET, DELAYED RELEASE ORAL at 17:41

## 2025-08-16 RX ADMIN — Medication 10 ML: at 22:24

## 2025-08-16 RX ADMIN — ATORVASTATIN CALCIUM 40 MG: 20 TABLET, FILM COATED ORAL at 09:36

## 2025-08-16 RX ADMIN — SODIUM CHLORIDE 75 ML/HR: 9 INJECTION, SOLUTION INTRAVENOUS at 04:02

## 2025-08-16 RX ADMIN — Medication 10 ML: at 09:36

## 2025-08-16 RX ADMIN — PROPRANOLOL HYDROCHLORIDE 10 MG: 20 TABLET ORAL at 22:24

## 2025-08-16 RX ADMIN — OXYCODONE HYDROCHLORIDE AND ACETAMINOPHEN 1000 MG: 500 TABLET ORAL at 09:36

## 2025-08-16 RX ADMIN — PROPRANOLOL HYDROCHLORIDE 10 MG: 20 TABLET ORAL at 09:36

## 2025-08-16 RX ADMIN — HYDROCHLOROTHIAZIDE 25 MG: 25 TABLET ORAL at 09:36

## 2025-08-16 RX ADMIN — LEVOTHYROXINE SODIUM 50 MCG: 50 TABLET ORAL at 09:35

## 2025-08-16 RX ADMIN — PANTOPRAZOLE SODIUM 40 MG: 40 TABLET, DELAYED RELEASE ORAL at 06:50

## 2025-08-16 RX ADMIN — ALLOPURINOL 300 MG: 300 TABLET ORAL at 09:36

## 2025-08-16 RX ADMIN — Medication 1 CAPSULE: at 09:35

## 2025-08-16 RX ADMIN — GABAPENTIN 600 MG: 300 CAPSULE ORAL at 09:36

## 2025-08-16 RX ADMIN — Medication 50 MG: at 09:35

## 2025-08-16 RX ADMIN — Medication 10 MG: at 22:23

## 2025-08-17 LAB
ALBUMIN SERPL-MCNC: 3.7 G/DL (ref 3.5–5.2)
ALBUMIN/GLOB SERPL: 1.4 G/DL
ALP SERPL-CCNC: 51 U/L (ref 39–117)
ALT SERPL W P-5'-P-CCNC: 25 U/L (ref 1–33)
ANION GAP SERPL CALCULATED.3IONS-SCNC: 12 MMOL/L (ref 5–15)
AST SERPL-CCNC: 30 U/L (ref 1–32)
BILIRUB SERPL-MCNC: 1 MG/DL (ref 0–1.2)
BLASTS NFR BLD MANUAL: 3 % (ref 0–0)
BUN SERPL-MCNC: 13 MG/DL (ref 8–23)
BUN/CREAT SERPL: 16.5 (ref 7–25)
CALCIUM SPEC-SCNC: 8.7 MG/DL (ref 8.6–10.5)
CHLORIDE SERPL-SCNC: 105 MMOL/L (ref 98–107)
CO2 SERPL-SCNC: 18 MMOL/L (ref 22–29)
CREAT SERPL-MCNC: 0.79 MG/DL (ref 0.57–1)
DEPRECATED RDW RBC AUTO: 59.3 FL (ref 37–54)
EGFRCR SERPLBLD CKD-EPI 2021: 73.9 ML/MIN/1.73
EOSINOPHIL # BLD MANUAL: 0.16 10*3/MM3 (ref 0–0.4)
EOSINOPHIL NFR BLD MANUAL: 2 % (ref 0.3–6.2)
ERYTHROCYTE [DISTWIDTH] IN BLOOD BY AUTOMATED COUNT: 18.8 % (ref 12.3–15.4)
GLOBULIN UR ELPH-MCNC: 2.7 GM/DL
GLUCOSE BLDC GLUCOMTR-MCNC: 115 MG/DL (ref 65–99)
GLUCOSE SERPL-MCNC: 101 MG/DL (ref 65–99)
HCT VFR BLD AUTO: 32.2 % (ref 34–46.6)
HGB BLD-MCNC: 11.2 G/DL (ref 12–15.9)
LDH SERPL-CCNC: 485 U/L (ref 135–214)
LYMPHOCYTES # BLD MANUAL: 6.24 10*3/MM3 (ref 0.7–3.1)
LYMPHOCYTES NFR BLD MANUAL: 4 % (ref 5–12)
MCH RBC QN AUTO: 31.4 PG (ref 26.6–33)
MCHC RBC AUTO-ENTMCNC: 34.8 G/DL (ref 31.5–35.7)
MCV RBC AUTO: 90.2 FL (ref 79–97)
MONOCYTES # BLD: 0.33 10*3/MM3 (ref 0.1–0.9)
NEUTROPHILS # BLD AUTO: 1.23 10*3/MM3 (ref 1.7–7)
NEUTROPHILS NFR BLD MANUAL: 15 % (ref 42.7–76)
NRBC SPEC MANUAL: 1 /100 WBC (ref 0–0.2)
PLAT MORPH BLD: NORMAL
PLATELET # BLD AUTO: 33 10*3/MM3 (ref 140–450)
PLATELET # BLD AUTO: 33 10*3/MM3 (ref 140–450)
PLATELETS.RETICULATED NFR BLD AUTO: 7.4 % (ref 0.9–6.5)
PMV BLD AUTO: 10.1 FL (ref 6–12)
POTASSIUM SERPL-SCNC: 4.2 MMOL/L (ref 3.5–5.2)
PROT SERPL-MCNC: 6.4 G/DL (ref 6–8.5)
RBC # BLD AUTO: 3.57 10*6/MM3 (ref 3.77–5.28)
RBC MORPH BLD: NORMAL
SODIUM SERPL-SCNC: 135 MMOL/L (ref 136–145)
URATE SERPL-MCNC: 2.4 MG/DL (ref 2.4–5.7)
VARIANT LYMPHS NFR BLD MANUAL: 11 % (ref 0–5)
VARIANT LYMPHS NFR BLD MANUAL: 65 % (ref 19.6–45.3)
WBC NRBC COR # BLD AUTO: 8.21 10*3/MM3 (ref 3.4–10.8)

## 2025-08-17 PROCEDURE — 82948 REAGENT STRIP/BLOOD GLUCOSE: CPT

## 2025-08-17 PROCEDURE — 85055 RETICULATED PLATELET ASSAY: CPT | Performed by: INTERNAL MEDICINE

## 2025-08-17 PROCEDURE — 99232 SBSQ HOSP IP/OBS MODERATE 35: CPT | Performed by: INTERNAL MEDICINE

## 2025-08-17 PROCEDURE — 83615 LACTATE (LD) (LDH) ENZYME: CPT | Performed by: INTERNAL MEDICINE

## 2025-08-17 PROCEDURE — 84550 ASSAY OF BLOOD/URIC ACID: CPT | Performed by: INTERNAL MEDICINE

## 2025-08-17 PROCEDURE — 85025 COMPLETE CBC W/AUTO DIFF WBC: CPT | Performed by: INTERNAL MEDICINE

## 2025-08-17 PROCEDURE — 80053 COMPREHEN METABOLIC PANEL: CPT | Performed by: INTERNAL MEDICINE

## 2025-08-17 PROCEDURE — 85007 BL SMEAR W/DIFF WBC COUNT: CPT | Performed by: INTERNAL MEDICINE

## 2025-08-17 RX ADMIN — Medication 10 MG: at 22:23

## 2025-08-17 RX ADMIN — GABAPENTIN 600 MG: 300 CAPSULE ORAL at 22:23

## 2025-08-17 RX ADMIN — ALLOPURINOL 300 MG: 300 TABLET ORAL at 08:38

## 2025-08-17 RX ADMIN — HYDROCHLOROTHIAZIDE 25 MG: 25 TABLET ORAL at 08:40

## 2025-08-17 RX ADMIN — PROPRANOLOL HYDROCHLORIDE 10 MG: 20 TABLET ORAL at 22:23

## 2025-08-17 RX ADMIN — POLYETHYLENE GLYCOL 3350 17 G: 17 POWDER, FOR SOLUTION ORAL at 12:25

## 2025-08-17 RX ADMIN — Medication 1 CAPSULE: at 08:38

## 2025-08-17 RX ADMIN — LEVOTHYROXINE SODIUM 50 MCG: 50 TABLET ORAL at 08:37

## 2025-08-17 RX ADMIN — Medication 50 MG: at 08:41

## 2025-08-17 RX ADMIN — PANTOPRAZOLE SODIUM 40 MG: 40 TABLET, DELAYED RELEASE ORAL at 16:29

## 2025-08-17 RX ADMIN — GABAPENTIN 600 MG: 300 CAPSULE ORAL at 16:29

## 2025-08-17 RX ADMIN — SENNOSIDES AND DOCUSATE SODIUM 2 TABLET: 50; 8.6 TABLET ORAL at 08:38

## 2025-08-17 RX ADMIN — GABAPENTIN 600 MG: 300 CAPSULE ORAL at 08:37

## 2025-08-17 RX ADMIN — OXYCODONE HYDROCHLORIDE AND ACETAMINOPHEN 1000 MG: 500 TABLET ORAL at 08:38

## 2025-08-17 RX ADMIN — ATORVASTATIN CALCIUM 40 MG: 20 TABLET, FILM COATED ORAL at 08:38

## 2025-08-17 RX ADMIN — Medication 10 ML: at 22:23

## 2025-08-17 RX ADMIN — PANTOPRAZOLE SODIUM 40 MG: 40 TABLET, DELAYED RELEASE ORAL at 08:38

## 2025-08-17 RX ADMIN — PROPRANOLOL HYDROCHLORIDE 10 MG: 20 TABLET ORAL at 16:29

## 2025-08-17 RX ADMIN — PROPRANOLOL HYDROCHLORIDE 10 MG: 20 TABLET ORAL at 08:37

## 2025-08-17 RX ADMIN — Medication 10 ML: at 08:54

## 2025-08-18 LAB
ALBUMIN SERPL-MCNC: 4.1 G/DL (ref 3.5–5.2)
ALBUMIN/GLOB SERPL: 1.5 G/DL
ALP SERPL-CCNC: 57 U/L (ref 39–117)
ALT SERPL W P-5'-P-CCNC: 21 U/L (ref 1–33)
AML TARGETGENE PANEL RESULT: NORMAL
ANION GAP SERPL CALCULATED.3IONS-SCNC: 11 MMOL/L (ref 5–15)
AST SERPL-CCNC: 27 U/L (ref 1–32)
BILIRUB SERPL-MCNC: 1 MG/DL (ref 0–1.2)
BLASTS NFR BLD MANUAL: 9 % (ref 0–0)
BUN SERPL-MCNC: 10 MG/DL (ref 8–23)
BUN/CREAT SERPL: 11.8 (ref 7–25)
CALCIUM SPEC-SCNC: 9.3 MG/DL (ref 8.6–10.5)
CHLORIDE SERPL-SCNC: 101 MMOL/L (ref 98–107)
CO2 SERPL-SCNC: 26 MMOL/L (ref 22–29)
CREAT SERPL-MCNC: 0.85 MG/DL (ref 0.57–1)
DEPRECATED RDW RBC AUTO: 59.4 FL (ref 37–54)
EGFRCR SERPLBLD CKD-EPI 2021: 67.7 ML/MIN/1.73
ERYTHROCYTE [DISTWIDTH] IN BLOOD BY AUTOMATED COUNT: 18.5 % (ref 12.3–15.4)
GLOBULIN UR ELPH-MCNC: 2.7 GM/DL
GLUCOSE SERPL-MCNC: 107 MG/DL (ref 65–99)
HCT VFR BLD AUTO: 33.9 % (ref 34–46.6)
HGB BLD-MCNC: 11.4 G/DL (ref 12–15.9)
LDH SERPL-CCNC: 493 U/L (ref 135–214)
LYMPHOCYTES # BLD MANUAL: 6.65 10*3/MM3 (ref 0.7–3.1)
LYMPHOCYTES NFR BLD MANUAL: 9 % (ref 5–12)
MCH RBC QN AUTO: 31.1 PG (ref 26.6–33)
MCHC RBC AUTO-ENTMCNC: 33.6 G/DL (ref 31.5–35.7)
MCV RBC AUTO: 92.6 FL (ref 79–97)
METAMYELOCYTES NFR BLD MANUAL: 1 % (ref 0–0)
MONOCYTES # BLD: 0.81 10*3/MM3 (ref 0.1–0.9)
NEUTROPHILS # BLD AUTO: 0.63 10*3/MM3 (ref 1.7–7)
NEUTROPHILS NFR BLD MANUAL: 7 % (ref 42.7–76)
PLAT MORPH BLD: NORMAL
PLATELET # BLD AUTO: 19 10*3/MM3 (ref 140–450)
PLATELET # BLD AUTO: 19 10*3/MM3 (ref 140–450)
PLATELETS.RETICULATED NFR BLD AUTO: 12.3 % (ref 0.9–6.5)
PMV BLD AUTO: 10.1 FL (ref 6–12)
POTASSIUM SERPL-SCNC: 4 MMOL/L (ref 3.5–5.2)
PROT SERPL-MCNC: 6.8 G/DL (ref 6–8.5)
RBC # BLD AUTO: 3.66 10*6/MM3 (ref 3.77–5.28)
RBC MORPH BLD: NORMAL
SODIUM SERPL-SCNC: 138 MMOL/L (ref 136–145)
URATE SERPL-MCNC: 2.5 MG/DL (ref 2.4–5.7)
VARIANT LYMPHS NFR BLD MANUAL: 67 % (ref 19.6–45.3)
VARIANT LYMPHS NFR BLD MANUAL: 7 % (ref 0–5)
WBC NRBC COR # BLD AUTO: 8.98 10*3/MM3 (ref 3.4–10.8)

## 2025-08-18 PROCEDURE — 85007 BL SMEAR W/DIFF WBC COUNT: CPT | Performed by: INTERNAL MEDICINE

## 2025-08-18 PROCEDURE — 99233 SBSQ HOSP IP/OBS HIGH 50: CPT | Performed by: INTERNAL MEDICINE

## 2025-08-18 PROCEDURE — 80053 COMPREHEN METABOLIC PANEL: CPT | Performed by: INTERNAL MEDICINE

## 2025-08-18 PROCEDURE — 85055 RETICULATED PLATELET ASSAY: CPT | Performed by: INTERNAL MEDICINE

## 2025-08-18 PROCEDURE — 84550 ASSAY OF BLOOD/URIC ACID: CPT | Performed by: INTERNAL MEDICINE

## 2025-08-18 PROCEDURE — 63710000001 ONDANSETRON PER 8 MG: Performed by: INTERNAL MEDICINE

## 2025-08-18 PROCEDURE — 25010000002 AZACITIDINE 100 MG RECONSTITUTED SUSPENSION 1 EACH VIAL: Performed by: INTERNAL MEDICINE

## 2025-08-18 PROCEDURE — 83615 LACTATE (LD) (LDH) ENZYME: CPT | Performed by: INTERNAL MEDICINE

## 2025-08-18 PROCEDURE — 85025 COMPLETE CBC W/AUTO DIFF WBC: CPT | Performed by: INTERNAL MEDICINE

## 2025-08-18 RX ORDER — ONDANSETRON 8 MG/1
16 TABLET, FILM COATED ORAL ONCE
Status: COMPLETED | OUTPATIENT
Start: 2025-08-18 | End: 2025-08-18

## 2025-08-18 RX ORDER — ACYCLOVIR 400 MG/1
400 TABLET ORAL 2 TIMES DAILY
Qty: 60 TABLET | Refills: 5 | Status: SHIPPED | OUTPATIENT
Start: 2025-08-18

## 2025-08-18 RX ORDER — VORICONAZOLE 200 MG/1
200 TABLET, FILM COATED ORAL EVERY 12 HOURS SCHEDULED
Status: DISCONTINUED | OUTPATIENT
Start: 2025-08-18 | End: 2025-08-25 | Stop reason: HOSPADM

## 2025-08-18 RX ORDER — VORICONAZOLE 200 MG/1
200 TABLET, FILM COATED ORAL 2 TIMES DAILY
Qty: 60 TABLET | Refills: 5 | Status: SHIPPED | OUTPATIENT
Start: 2025-08-18

## 2025-08-18 RX ORDER — ACYCLOVIR 400 MG/1
400 TABLET ORAL 2 TIMES DAILY
Status: DISCONTINUED | OUTPATIENT
Start: 2025-08-18 | End: 2025-08-25 | Stop reason: HOSPADM

## 2025-08-18 RX ADMIN — ATORVASTATIN CALCIUM 40 MG: 20 TABLET, FILM COATED ORAL at 08:50

## 2025-08-18 RX ADMIN — Medication 1 CAPSULE: at 08:50

## 2025-08-18 RX ADMIN — HYDROCHLOROTHIAZIDE 25 MG: 25 TABLET ORAL at 08:50

## 2025-08-18 RX ADMIN — VENETOCLAX 10 MG: 10 TABLET, FILM COATED ORAL at 17:14

## 2025-08-18 RX ADMIN — AZACITIDINE 60 MG: 100 INJECTION, POWDER, LYOPHILIZED, FOR SOLUTION INTRAVENOUS; SUBCUTANEOUS at 16:40

## 2025-08-18 RX ADMIN — GABAPENTIN 600 MG: 300 CAPSULE ORAL at 20:50

## 2025-08-18 RX ADMIN — Medication 10 ML: at 20:50

## 2025-08-18 RX ADMIN — ACYCLOVIR 400 MG: 400 TABLET ORAL at 20:50

## 2025-08-18 RX ADMIN — Medication 10 ML: at 08:52

## 2025-08-18 RX ADMIN — PROPRANOLOL HYDROCHLORIDE 10 MG: 20 TABLET ORAL at 08:50

## 2025-08-18 RX ADMIN — OXYCODONE HYDROCHLORIDE AND ACETAMINOPHEN 1000 MG: 500 TABLET ORAL at 08:50

## 2025-08-18 RX ADMIN — ONDANSETRON HYDROCHLORIDE 16 MG: 8 TABLET, FILM COATED ORAL at 16:38

## 2025-08-18 RX ADMIN — ALLOPURINOL 300 MG: 300 TABLET ORAL at 08:50

## 2025-08-18 RX ADMIN — Medication 50 MG: at 08:50

## 2025-08-18 RX ADMIN — VORICONAZOLE 200 MG: 200 TABLET ORAL at 20:50

## 2025-08-18 RX ADMIN — PROPRANOLOL HYDROCHLORIDE 10 MG: 20 TABLET ORAL at 20:50

## 2025-08-18 RX ADMIN — GABAPENTIN 600 MG: 300 CAPSULE ORAL at 16:26

## 2025-08-18 RX ADMIN — GABAPENTIN 600 MG: 300 CAPSULE ORAL at 08:50

## 2025-08-18 RX ADMIN — PROPRANOLOL HYDROCHLORIDE 10 MG: 20 TABLET ORAL at 16:26

## 2025-08-18 RX ADMIN — LEVOTHYROXINE SODIUM 50 MCG: 50 TABLET ORAL at 08:50

## 2025-08-18 RX ADMIN — PANTOPRAZOLE SODIUM 40 MG: 40 TABLET, DELAYED RELEASE ORAL at 16:36

## 2025-08-18 RX ADMIN — Medication 10 MG: at 20:50

## 2025-08-18 RX ADMIN — PANTOPRAZOLE SODIUM 40 MG: 40 TABLET, DELAYED RELEASE ORAL at 07:57

## 2025-08-19 LAB
ALBUMIN SERPL-MCNC: 4 G/DL (ref 3.5–5.2)
ALBUMIN/GLOB SERPL: 1.4 G/DL
ALP SERPL-CCNC: 59 U/L (ref 39–117)
ALT SERPL W P-5'-P-CCNC: 20 U/L (ref 1–33)
ANION GAP SERPL CALCULATED.3IONS-SCNC: 13 MMOL/L (ref 5–15)
AST SERPL-CCNC: 23 U/L (ref 1–32)
BILIRUB SERPL-MCNC: 0.7 MG/DL (ref 0–1.2)
BLASTS NFR BLD MANUAL: 24 % (ref 0–0)
BUN SERPL-MCNC: 14 MG/DL (ref 8–23)
BUN/CREAT SERPL: 21.2 (ref 7–25)
CALCIUM SPEC-SCNC: 9.1 MG/DL (ref 8.6–10.5)
CHLORIDE SERPL-SCNC: 100 MMOL/L (ref 98–107)
CO2 SERPL-SCNC: 24 MMOL/L (ref 22–29)
CREAT SERPL-MCNC: 0.66 MG/DL (ref 0.57–1)
DEPRECATED RDW RBC AUTO: 60.8 FL (ref 37–54)
EGFRCR SERPLBLD CKD-EPI 2021: 86.6 ML/MIN/1.73
ERYTHROCYTE [DISTWIDTH] IN BLOOD BY AUTOMATED COUNT: 18.6 % (ref 12.3–15.4)
GLOBULIN UR ELPH-MCNC: 2.8 GM/DL
GLUCOSE SERPL-MCNC: 118 MG/DL (ref 65–99)
HCT VFR BLD AUTO: 36.1 % (ref 34–46.6)
HGB BLD-MCNC: 12.4 G/DL (ref 12–15.9)
HYPOCHROMIA BLD QL: ABNORMAL
LDH SERPL-CCNC: 493 U/L (ref 135–214)
LYMPHOCYTES # BLD MANUAL: 3.14 10*3/MM3 (ref 0.7–3.1)
LYMPHOCYTES NFR BLD MANUAL: 7 % (ref 5–12)
Lab: NORMAL
MCH RBC QN AUTO: 32 PG (ref 26.6–33)
MCHC RBC AUTO-ENTMCNC: 34.3 G/DL (ref 31.5–35.7)
MCV RBC AUTO: 93 FL (ref 79–97)
MONOCYTES # BLD: 0.4 10*3/MM3 (ref 0.1–0.9)
MYELOCYTES NFR BLD MANUAL: 2 % (ref 0–0)
NEUTROPHILS # BLD AUTO: 0.68 10*3/MM3 (ref 1.7–7)
NEUTROPHILS NFR BLD MANUAL: 12 % (ref 42.7–76)
NRBC SPEC MANUAL: 6 /100 WBC (ref 0–0.2)
PLAT MORPH BLD: NORMAL
PLATELET # BLD AUTO: 20 10*3/MM3 (ref 140–450)
PLATELET # BLD AUTO: 20 10*3/MM3 (ref 140–450)
PLATELETS.RETICULATED NFR BLD AUTO: 13.8 % (ref 0.9–6.5)
PMV BLD AUTO: 10.7 FL (ref 6–12)
POTASSIUM SERPL-SCNC: 4.1 MMOL/L (ref 3.5–5.2)
PROT SERPL-MCNC: 6.8 G/DL (ref 6–8.5)
RBC # BLD AUTO: 3.88 10*6/MM3 (ref 3.77–5.28)
SODIUM SERPL-SCNC: 137 MMOL/L (ref 136–145)
URATE SERPL-MCNC: 2.6 MG/DL (ref 2.4–5.7)
VARIANT LYMPHS NFR BLD MANUAL: 3 % (ref 0–5)
VARIANT LYMPHS NFR BLD MANUAL: 52 % (ref 19.6–45.3)
WBC NRBC COR # BLD AUTO: 5.7 10*3/MM3 (ref 3.4–10.8)

## 2025-08-19 PROCEDURE — 25010000002 PROCHLORPERAZINE 10 MG/2ML SOLUTION: Performed by: INTERNAL MEDICINE

## 2025-08-19 PROCEDURE — 25010000002 ONDANSETRON PER 1 MG: Performed by: NURSE PRACTITIONER

## 2025-08-19 PROCEDURE — 80053 COMPREHEN METABOLIC PANEL: CPT | Performed by: INTERNAL MEDICINE

## 2025-08-19 PROCEDURE — 25010000002 AZACITIDINE 100 MG RECONSTITUTED SUSPENSION 1 EACH VIAL: Performed by: INTERNAL MEDICINE

## 2025-08-19 PROCEDURE — 83615 LACTATE (LD) (LDH) ENZYME: CPT | Performed by: INTERNAL MEDICINE

## 2025-08-19 PROCEDURE — 97116 GAIT TRAINING THERAPY: CPT

## 2025-08-19 PROCEDURE — 85055 RETICULATED PLATELET ASSAY: CPT | Performed by: INTERNAL MEDICINE

## 2025-08-19 PROCEDURE — 85025 COMPLETE CBC W/AUTO DIFF WBC: CPT | Performed by: INTERNAL MEDICINE

## 2025-08-19 PROCEDURE — 84550 ASSAY OF BLOOD/URIC ACID: CPT | Performed by: INTERNAL MEDICINE

## 2025-08-19 PROCEDURE — 99233 SBSQ HOSP IP/OBS HIGH 50: CPT | Performed by: INTERNAL MEDICINE

## 2025-08-19 PROCEDURE — 97162 PT EVAL MOD COMPLEX 30 MIN: CPT

## 2025-08-19 PROCEDURE — 63710000001 ONDANSETRON PER 8 MG: Performed by: INTERNAL MEDICINE

## 2025-08-19 PROCEDURE — 85007 BL SMEAR W/DIFF WBC COUNT: CPT | Performed by: INTERNAL MEDICINE

## 2025-08-19 RX ORDER — ONDANSETRON 8 MG/1
16 TABLET, FILM COATED ORAL ONCE
Status: COMPLETED | OUTPATIENT
Start: 2025-08-19 | End: 2025-08-19

## 2025-08-19 RX ADMIN — VENETOCLAX 20 MG: 10 TABLET, FILM COATED ORAL at 08:22

## 2025-08-19 RX ADMIN — ACYCLOVIR 400 MG: 400 TABLET ORAL at 21:02

## 2025-08-19 RX ADMIN — Medication 1 CAPSULE: at 08:21

## 2025-08-19 RX ADMIN — ONDANSETRON HYDROCHLORIDE 16 MG: 8 TABLET, FILM COATED ORAL at 17:09

## 2025-08-19 RX ADMIN — HYDROCHLOROTHIAZIDE 25 MG: 25 TABLET ORAL at 08:21

## 2025-08-19 RX ADMIN — Medication 10 MG: at 21:01

## 2025-08-19 RX ADMIN — PROCHLORPERAZINE EDISYLATE 10 MG: 5 INJECTION INTRAMUSCULAR; INTRAVENOUS at 18:59

## 2025-08-19 RX ADMIN — GABAPENTIN 600 MG: 300 CAPSULE ORAL at 21:01

## 2025-08-19 RX ADMIN — OXYCODONE HYDROCHLORIDE AND ACETAMINOPHEN 1000 MG: 500 TABLET ORAL at 08:21

## 2025-08-19 RX ADMIN — LEVOTHYROXINE SODIUM 50 MCG: 50 TABLET ORAL at 08:21

## 2025-08-19 RX ADMIN — PROPRANOLOL HYDROCHLORIDE 10 MG: 20 TABLET ORAL at 21:02

## 2025-08-19 RX ADMIN — ONDANSETRON 4 MG: 2 INJECTION, SOLUTION INTRAMUSCULAR; INTRAVENOUS at 10:21

## 2025-08-19 RX ADMIN — VORICONAZOLE 200 MG: 200 TABLET ORAL at 21:02

## 2025-08-19 RX ADMIN — Medication 10 ML: at 08:22

## 2025-08-19 RX ADMIN — Medication 10 ML: at 21:02

## 2025-08-19 RX ADMIN — GABAPENTIN 600 MG: 300 CAPSULE ORAL at 08:22

## 2025-08-19 RX ADMIN — ALLOPURINOL 300 MG: 300 TABLET ORAL at 08:21

## 2025-08-19 RX ADMIN — PROPRANOLOL HYDROCHLORIDE 10 MG: 20 TABLET ORAL at 08:22

## 2025-08-19 RX ADMIN — PROCHLORPERAZINE EDISYLATE 10 MG: 5 INJECTION INTRAMUSCULAR; INTRAVENOUS at 13:27

## 2025-08-19 RX ADMIN — AZACITIDINE 60 MG: 100 INJECTION, POWDER, LYOPHILIZED, FOR SOLUTION INTRAVENOUS; SUBCUTANEOUS at 17:15

## 2025-08-19 RX ADMIN — ACYCLOVIR 400 MG: 400 TABLET ORAL at 08:22

## 2025-08-19 RX ADMIN — GABAPENTIN 600 MG: 300 CAPSULE ORAL at 16:09

## 2025-08-19 RX ADMIN — ACETAMINOPHEN 650 MG: 325 TABLET ORAL at 10:19

## 2025-08-19 RX ADMIN — ATORVASTATIN CALCIUM 40 MG: 20 TABLET, FILM COATED ORAL at 08:22

## 2025-08-19 RX ADMIN — VORICONAZOLE 200 MG: 200 TABLET ORAL at 08:21

## 2025-08-19 RX ADMIN — Medication 50 MG: at 08:21

## 2025-08-19 RX ADMIN — PROPRANOLOL HYDROCHLORIDE 10 MG: 20 TABLET ORAL at 16:09

## 2025-08-19 RX ADMIN — PANTOPRAZOLE SODIUM 40 MG: 40 TABLET, DELAYED RELEASE ORAL at 17:21

## 2025-08-19 RX ADMIN — PANTOPRAZOLE SODIUM 40 MG: 40 TABLET, DELAYED RELEASE ORAL at 06:12

## 2025-08-20 LAB
ABO GROUP BLD: NORMAL
ALBUMIN SERPL-MCNC: 3.9 G/DL (ref 3.5–5.2)
ALBUMIN/GLOB SERPL: 1.3 G/DL
ALP SERPL-CCNC: 56 U/L (ref 39–117)
ALT SERPL W P-5'-P-CCNC: 17 U/L (ref 1–33)
ANION GAP SERPL CALCULATED.3IONS-SCNC: 14 MMOL/L (ref 5–15)
AST SERPL-CCNC: 32 U/L (ref 1–32)
BILIRUB SERPL-MCNC: 1.3 MG/DL (ref 0–1.2)
BLASTS NFR BLD MANUAL: 5 % (ref 0–0)
BLD GP AB SCN SERPL QL: NEGATIVE
BUN SERPL-MCNC: 15 MG/DL (ref 8–23)
BUN/CREAT SERPL: 16.5 (ref 7–25)
CALCIUM SPEC-SCNC: 9.2 MG/DL (ref 8.6–10.5)
CHLORIDE SERPL-SCNC: 97 MMOL/L (ref 98–107)
CO2 SERPL-SCNC: 24 MMOL/L (ref 22–29)
CREAT SERPL-MCNC: 0.91 MG/DL (ref 0.57–1)
DEPRECATED RDW RBC AUTO: 63.9 FL (ref 37–54)
EGFRCR SERPLBLD CKD-EPI 2021: 62.3 ML/MIN/1.73
ERYTHROCYTE [DISTWIDTH] IN BLOOD BY AUTOMATED COUNT: 19.1 % (ref 12.3–15.4)
GLOBULIN UR ELPH-MCNC: 3 GM/DL
GLUCOSE SERPL-MCNC: 132 MG/DL (ref 65–99)
HCT VFR BLD AUTO: 32.9 % (ref 34–46.6)
HGB BLD-MCNC: 11.2 G/DL (ref 12–15.9)
LDH SERPL-CCNC: 970 U/L (ref 135–214)
LYMPHOCYTES # BLD MANUAL: 0.8 10*3/MM3 (ref 0.7–3.1)
LYMPHOCYTES NFR BLD MANUAL: 27 % (ref 5–12)
MCH RBC QN AUTO: 31.6 PG (ref 26.6–33)
MCHC RBC AUTO-ENTMCNC: 34 G/DL (ref 31.5–35.7)
MCV RBC AUTO: 92.9 FL (ref 79–97)
METAMYELOCYTES NFR BLD MANUAL: 2 % (ref 0–0)
MONOCYTES # BLD: 0.5 10*3/MM3 (ref 0.1–0.9)
NEUTROPHILS # BLD AUTO: 0.43 10*3/MM3 (ref 1.7–7)
NEUTROPHILS NFR BLD MANUAL: 23 % (ref 42.7–76)
NRBC BLD AUTO-RTO: 2.7 /100 WBC (ref 0–0.2)
NRBC SPEC MANUAL: 3 /100 WBC (ref 0–0.2)
PLAT MORPH BLD: NORMAL
PLATELET # BLD AUTO: 13 10*3/MM3 (ref 140–450)
PLATELET # BLD AUTO: 13 10*3/MM3 (ref 140–450)
PLATELETS.RETICULATED NFR BLD AUTO: 16.5 % (ref 0.9–6.5)
POTASSIUM SERPL-SCNC: 3.8 MMOL/L (ref 3.5–5.2)
PROT SERPL-MCNC: 6.9 G/DL (ref 6–8.5)
RBC # BLD AUTO: 3.54 10*6/MM3 (ref 3.77–5.28)
RBC MORPH BLD: NORMAL
RH BLD: POSITIVE
SODIUM SERPL-SCNC: 135 MMOL/L (ref 136–145)
T&S EXPIRATION DATE: NORMAL
URATE SERPL-MCNC: 3.9 MG/DL (ref 2.4–5.7)
VARIANT LYMPHS NFR BLD MANUAL: 2 % (ref 0–5)
VARIANT LYMPHS NFR BLD MANUAL: 41 % (ref 19.6–45.3)
WBC NRBC COR # BLD AUTO: 1.85 10*3/MM3 (ref 3.4–10.8)

## 2025-08-20 PROCEDURE — 86850 RBC ANTIBODY SCREEN: CPT | Performed by: INTERNAL MEDICINE

## 2025-08-20 PROCEDURE — 83615 LACTATE (LD) (LDH) ENZYME: CPT | Performed by: INTERNAL MEDICINE

## 2025-08-20 PROCEDURE — 25010000002 PROCHLORPERAZINE 10 MG/2ML SOLUTION: Performed by: INTERNAL MEDICINE

## 2025-08-20 PROCEDURE — P9035 PLATELET PHERES LEUKOREDUCED: HCPCS

## 2025-08-20 PROCEDURE — P9100 PATHOGEN TEST FOR PLATELETS: HCPCS

## 2025-08-20 PROCEDURE — 25010000002 AZACITIDINE 100 MG RECONSTITUTED SUSPENSION 1 EACH VIAL: Performed by: INTERNAL MEDICINE

## 2025-08-20 PROCEDURE — 84550 ASSAY OF BLOOD/URIC ACID: CPT | Performed by: INTERNAL MEDICINE

## 2025-08-20 PROCEDURE — 99233 SBSQ HOSP IP/OBS HIGH 50: CPT | Performed by: INTERNAL MEDICINE

## 2025-08-20 PROCEDURE — 85055 RETICULATED PLATELET ASSAY: CPT | Performed by: INTERNAL MEDICINE

## 2025-08-20 PROCEDURE — 63710000001 ONDANSETRON PER 8 MG: Performed by: INTERNAL MEDICINE

## 2025-08-20 PROCEDURE — 80053 COMPREHEN METABOLIC PANEL: CPT | Performed by: INTERNAL MEDICINE

## 2025-08-20 PROCEDURE — 85025 COMPLETE CBC W/AUTO DIFF WBC: CPT | Performed by: INTERNAL MEDICINE

## 2025-08-20 PROCEDURE — 36430 TRANSFUSION BLD/BLD COMPNT: CPT

## 2025-08-20 PROCEDURE — 86901 BLOOD TYPING SEROLOGIC RH(D): CPT | Performed by: INTERNAL MEDICINE

## 2025-08-20 PROCEDURE — 85007 BL SMEAR W/DIFF WBC COUNT: CPT | Performed by: INTERNAL MEDICINE

## 2025-08-20 PROCEDURE — 86900 BLOOD TYPING SEROLOGIC ABO: CPT | Performed by: INTERNAL MEDICINE

## 2025-08-20 RX ORDER — ONDANSETRON 8 MG/1
16 TABLET, FILM COATED ORAL ONCE
Status: COMPLETED | OUTPATIENT
Start: 2025-08-20 | End: 2025-08-20

## 2025-08-20 RX ADMIN — ACYCLOVIR 400 MG: 400 TABLET ORAL at 21:07

## 2025-08-20 RX ADMIN — GABAPENTIN 600 MG: 300 CAPSULE ORAL at 08:49

## 2025-08-20 RX ADMIN — PROPRANOLOL HYDROCHLORIDE 10 MG: 20 TABLET ORAL at 21:07

## 2025-08-20 RX ADMIN — PROPRANOLOL HYDROCHLORIDE 10 MG: 20 TABLET ORAL at 15:23

## 2025-08-20 RX ADMIN — Medication 10 ML: at 08:50

## 2025-08-20 RX ADMIN — GABAPENTIN 600 MG: 300 CAPSULE ORAL at 21:07

## 2025-08-20 RX ADMIN — Medication 10 MG: at 21:07

## 2025-08-20 RX ADMIN — ACETAMINOPHEN 650 MG: 325 TABLET ORAL at 17:26

## 2025-08-20 RX ADMIN — Medication 10 ML: at 21:07

## 2025-08-20 RX ADMIN — ACYCLOVIR 400 MG: 400 TABLET ORAL at 08:49

## 2025-08-20 RX ADMIN — VORICONAZOLE 200 MG: 200 TABLET ORAL at 21:07

## 2025-08-20 RX ADMIN — GABAPENTIN 600 MG: 300 CAPSULE ORAL at 15:20

## 2025-08-20 RX ADMIN — PANTOPRAZOLE SODIUM 40 MG: 40 TABLET, DELAYED RELEASE ORAL at 17:26

## 2025-08-20 RX ADMIN — ONDANSETRON HYDROCHLORIDE 16 MG: 8 TABLET, FILM COATED ORAL at 15:57

## 2025-08-20 RX ADMIN — ALLOPURINOL 300 MG: 300 TABLET ORAL at 08:49

## 2025-08-20 RX ADMIN — VENETOCLAX 50 MG: 50 TABLET, FILM COATED ORAL at 08:53

## 2025-08-20 RX ADMIN — PROCHLORPERAZINE EDISYLATE 10 MG: 5 INJECTION INTRAMUSCULAR; INTRAVENOUS at 21:41

## 2025-08-20 RX ADMIN — ATORVASTATIN CALCIUM 40 MG: 20 TABLET, FILM COATED ORAL at 08:49

## 2025-08-20 RX ADMIN — AZACITIDINE 60 MG: 100 INJECTION, POWDER, LYOPHILIZED, FOR SOLUTION INTRAVENOUS; SUBCUTANEOUS at 16:23

## 2025-08-20 RX ADMIN — Medication 1 CAPSULE: at 08:50

## 2025-08-20 RX ADMIN — VORICONAZOLE 200 MG: 200 TABLET ORAL at 10:15

## 2025-08-20 RX ADMIN — OXYCODONE HYDROCHLORIDE AND ACETAMINOPHEN 1000 MG: 500 TABLET ORAL at 08:49

## 2025-08-20 RX ADMIN — PROCHLORPERAZINE EDISYLATE 10 MG: 5 INJECTION INTRAMUSCULAR; INTRAVENOUS at 09:02

## 2025-08-20 RX ADMIN — Medication 50 MG: at 08:49

## 2025-08-20 RX ADMIN — LEVOTHYROXINE SODIUM 50 MCG: 50 TABLET ORAL at 08:50

## 2025-08-20 RX ADMIN — PANTOPRAZOLE SODIUM 40 MG: 40 TABLET, DELAYED RELEASE ORAL at 06:22

## 2025-08-21 LAB
ALBUMIN SERPL-MCNC: 4 G/DL (ref 3.5–5.2)
ALBUMIN/GLOB SERPL: 1.5 G/DL
ALP SERPL-CCNC: 55 U/L (ref 39–117)
ALT SERPL W P-5'-P-CCNC: 14 U/L (ref 1–33)
ANION GAP SERPL CALCULATED.3IONS-SCNC: 12 MMOL/L (ref 5–15)
ANISOCYTOSIS BLD QL: ABNORMAL
AST SERPL-CCNC: 24 U/L (ref 1–32)
BASOPHILS # BLD MANUAL: 0 10*3/MM3 (ref 0–0.2)
BASOPHILS NFR BLD MANUAL: 0 % (ref 0–1.5)
BH BB BLOOD EXPIRATION DATE: NORMAL
BH BB BLOOD TYPE BARCODE: 6200
BH BB DISPENSE STATUS: NORMAL
BH BB PRODUCT CODE: NORMAL
BH BB UNIT NUMBER: NORMAL
BILIRUB SERPL-MCNC: 1.4 MG/DL (ref 0–1.2)
BUN SERPL-MCNC: 14 MG/DL (ref 8–23)
BUN/CREAT SERPL: 17.9 (ref 7–25)
CALCIUM SPEC-SCNC: 9 MG/DL (ref 8.6–10.5)
CHLORIDE SERPL-SCNC: 101 MMOL/L (ref 98–107)
CO2 SERPL-SCNC: 26 MMOL/L (ref 22–29)
CREAT SERPL-MCNC: 0.78 MG/DL (ref 0.57–1)
CYTOGENETICS RESULT: NORMAL
DEPRECATED RDW RBC AUTO: 60.8 FL (ref 37–54)
EGFRCR SERPLBLD CKD-EPI 2021: 75 ML/MIN/1.73
EOSINOPHIL # BLD MANUAL: 0 10*3/MM3 (ref 0–0.4)
EOSINOPHIL NFR BLD MANUAL: 0 % (ref 0.3–6.2)
ERYTHROCYTE [DISTWIDTH] IN BLOOD BY AUTOMATED COUNT: 18.5 % (ref 12.3–15.4)
GLOBULIN UR ELPH-MCNC: 2.7 GM/DL
GLUCOSE SERPL-MCNC: 138 MG/DL (ref 65–99)
HCT VFR BLD AUTO: 27.8 % (ref 34–46.6)
HGB BLD-MCNC: 9.6 G/DL (ref 12–15.9)
LDH SERPL-CCNC: 637 U/L (ref 135–214)
LYMPHOCYTES # BLD MANUAL: 0.78 10*3/MM3 (ref 0.7–3.1)
LYMPHOCYTES NFR BLD MANUAL: 21.4 % (ref 5–12)
MCH RBC QN AUTO: 31.8 PG (ref 26.6–33)
MCHC RBC AUTO-ENTMCNC: 34.5 G/DL (ref 31.5–35.7)
MCV RBC AUTO: 92.1 FL (ref 79–97)
MICROCYTES BLD QL: ABNORMAL
MONOCYTES # BLD: 0.33 10*3/MM3 (ref 0.1–0.9)
NEUTROPHILS # BLD AUTO: 0.42 10*3/MM3 (ref 1.7–7)
NEUTROPHILS NFR BLD MANUAL: 27.6 % (ref 42.7–76)
NRBC SPEC MANUAL: 1 /100 WBC (ref 0–0.2)
PLAT MORPH BLD: NORMAL
PLATELET # BLD AUTO: 25 10*3/MM3 (ref 140–450)
PLATELET # BLD AUTO: 25 10*3/MM3 (ref 140–450)
PLATELETS.RETICULATED NFR BLD AUTO: 7.9 % (ref 0.9–6.5)
PMV BLD AUTO: 11.2 FL (ref 6–12)
POTASSIUM SERPL-SCNC: 4 MMOL/L (ref 3.5–5.2)
PROT SERPL-MCNC: 6.7 G/DL (ref 6–8.5)
RBC # BLD AUTO: 3.02 10*6/MM3 (ref 3.77–5.28)
SODIUM SERPL-SCNC: 139 MMOL/L (ref 136–145)
UNIT  ABO: NORMAL
UNIT  RH: NORMAL
URATE SERPL-MCNC: 3.5 MG/DL (ref 2.4–5.7)
VARIANT LYMPHS NFR BLD MANUAL: 51 % (ref 19.6–45.3)
WBC MORPH BLD: NORMAL
WBC NRBC COR # BLD AUTO: 1.52 10*3/MM3 (ref 3.4–10.8)

## 2025-08-21 PROCEDURE — 85007 BL SMEAR W/DIFF WBC COUNT: CPT | Performed by: INTERNAL MEDICINE

## 2025-08-21 PROCEDURE — 25010000002 AZACITIDINE 100 MG RECONSTITUTED SUSPENSION 1 EACH VIAL: Performed by: INTERNAL MEDICINE

## 2025-08-21 PROCEDURE — 99233 SBSQ HOSP IP/OBS HIGH 50: CPT | Performed by: INTERNAL MEDICINE

## 2025-08-21 PROCEDURE — 83615 LACTATE (LD) (LDH) ENZYME: CPT | Performed by: INTERNAL MEDICINE

## 2025-08-21 PROCEDURE — 25010000002 PROCHLORPERAZINE 10 MG/2ML SOLUTION: Performed by: INTERNAL MEDICINE

## 2025-08-21 PROCEDURE — 84550 ASSAY OF BLOOD/URIC ACID: CPT | Performed by: INTERNAL MEDICINE

## 2025-08-21 PROCEDURE — 97116 GAIT TRAINING THERAPY: CPT

## 2025-08-21 PROCEDURE — 85055 RETICULATED PLATELET ASSAY: CPT | Performed by: INTERNAL MEDICINE

## 2025-08-21 PROCEDURE — 85025 COMPLETE CBC W/AUTO DIFF WBC: CPT | Performed by: INTERNAL MEDICINE

## 2025-08-21 PROCEDURE — 80053 COMPREHEN METABOLIC PANEL: CPT | Performed by: INTERNAL MEDICINE

## 2025-08-21 PROCEDURE — 63710000001 ONDANSETRON PER 8 MG: Performed by: INTERNAL MEDICINE

## 2025-08-21 RX ORDER — ONDANSETRON 8 MG/1
16 TABLET, FILM COATED ORAL ONCE
Status: COMPLETED | OUTPATIENT
Start: 2025-08-21 | End: 2025-08-21

## 2025-08-21 RX ORDER — DIPHENHYDRAMINE HYDROCHLORIDE, ZINC ACETATE 2; .1 G/100G; G/100G
1 CREAM TOPICAL 3 TIMES DAILY PRN
Status: DISCONTINUED | OUTPATIENT
Start: 2025-08-21 | End: 2025-08-25 | Stop reason: HOSPADM

## 2025-08-21 RX ORDER — VORICONAZOLE 200 MG/1
200 TABLET, FILM COATED ORAL EVERY 12 HOURS SCHEDULED
Qty: 60 TABLET | Refills: 0 | Status: SHIPPED | OUTPATIENT
Start: 2025-08-21 | End: 2025-09-20

## 2025-08-21 RX ADMIN — POLYETHYLENE GLYCOL 3350 17 G: 17 POWDER, FOR SOLUTION ORAL at 14:11

## 2025-08-21 RX ADMIN — GABAPENTIN 600 MG: 300 CAPSULE ORAL at 08:51

## 2025-08-21 RX ADMIN — HYDROCHLOROTHIAZIDE 25 MG: 25 TABLET ORAL at 08:51

## 2025-08-21 RX ADMIN — VORICONAZOLE 200 MG: 200 TABLET ORAL at 08:51

## 2025-08-21 RX ADMIN — PROPRANOLOL HYDROCHLORIDE 10 MG: 20 TABLET ORAL at 08:51

## 2025-08-21 RX ADMIN — SENNOSIDES AND DOCUSATE SODIUM 2 TABLET: 50; 8.6 TABLET ORAL at 08:51

## 2025-08-21 RX ADMIN — Medication 50 MG: at 08:51

## 2025-08-21 RX ADMIN — PANTOPRAZOLE SODIUM 40 MG: 40 TABLET, DELAYED RELEASE ORAL at 16:32

## 2025-08-21 RX ADMIN — ACYCLOVIR 400 MG: 400 TABLET ORAL at 08:50

## 2025-08-21 RX ADMIN — LEVOTHYROXINE SODIUM 50 MCG: 50 TABLET ORAL at 08:51

## 2025-08-21 RX ADMIN — PROPRANOLOL HYDROCHLORIDE 10 MG: 20 TABLET ORAL at 16:32

## 2025-08-21 RX ADMIN — Medication 1 CAPSULE: at 08:51

## 2025-08-21 RX ADMIN — Medication 10 ML: at 21:48

## 2025-08-21 RX ADMIN — Medication 10 MG: at 21:42

## 2025-08-21 RX ADMIN — ALLOPURINOL 300 MG: 300 TABLET ORAL at 08:51

## 2025-08-21 RX ADMIN — ACYCLOVIR 400 MG: 400 TABLET ORAL at 21:42

## 2025-08-21 RX ADMIN — VENETOCLAX 100 MG: 100 TABLET, FILM COATED ORAL at 08:54

## 2025-08-21 RX ADMIN — OXYCODONE HYDROCHLORIDE AND ACETAMINOPHEN 1000 MG: 500 TABLET ORAL at 08:51

## 2025-08-21 RX ADMIN — GABAPENTIN 600 MG: 300 CAPSULE ORAL at 16:32

## 2025-08-21 RX ADMIN — ATORVASTATIN CALCIUM 40 MG: 20 TABLET, FILM COATED ORAL at 08:50

## 2025-08-21 RX ADMIN — PANTOPRAZOLE SODIUM 40 MG: 40 TABLET, DELAYED RELEASE ORAL at 06:34

## 2025-08-21 RX ADMIN — PROCHLORPERAZINE EDISYLATE 10 MG: 5 INJECTION INTRAMUSCULAR; INTRAVENOUS at 08:50

## 2025-08-21 RX ADMIN — VORICONAZOLE 200 MG: 200 TABLET ORAL at 21:42

## 2025-08-21 RX ADMIN — GABAPENTIN 600 MG: 300 CAPSULE ORAL at 21:43

## 2025-08-21 RX ADMIN — AZACITIDINE 60 MG: 100 INJECTION, POWDER, LYOPHILIZED, FOR SOLUTION INTRAVENOUS; SUBCUTANEOUS at 17:43

## 2025-08-21 RX ADMIN — PROPRANOLOL HYDROCHLORIDE 10 MG: 20 TABLET ORAL at 21:42

## 2025-08-21 RX ADMIN — Medication 10 ML: at 08:56

## 2025-08-21 RX ADMIN — ONDANSETRON HYDROCHLORIDE 16 MG: 8 TABLET, FILM COATED ORAL at 17:22

## 2025-08-22 LAB
ALBUMIN SERPL-MCNC: 3.8 G/DL (ref 3.5–5.2)
ALBUMIN/GLOB SERPL: 1.2 G/DL
ALP SERPL-CCNC: 57 U/L (ref 39–117)
ALT SERPL W P-5'-P-CCNC: 17 U/L (ref 1–33)
ANION GAP SERPL CALCULATED.3IONS-SCNC: 13.7 MMOL/L (ref 5–15)
AST SERPL-CCNC: 27 U/L (ref 1–32)
BASOPHILS # BLD AUTO: 0 10*3/MM3 (ref 0–0.2)
BASOPHILS NFR BLD AUTO: 0 % (ref 0–1.5)
BILIRUB SERPL-MCNC: 1.5 MG/DL (ref 0–1.2)
BUN SERPL-MCNC: 15 MG/DL (ref 8–23)
BUN/CREAT SERPL: 17.6 (ref 7–25)
CALCIUM SPEC-SCNC: 9.1 MG/DL (ref 8.6–10.5)
CHLORIDE SERPL-SCNC: 98 MMOL/L (ref 98–107)
CO2 SERPL-SCNC: 24.3 MMOL/L (ref 22–29)
CREAT SERPL-MCNC: 0.85 MG/DL (ref 0.57–1)
DEPRECATED RDW RBC AUTO: 62 FL (ref 37–54)
EGFRCR SERPLBLD CKD-EPI 2021: 67.7 ML/MIN/1.73
EOSINOPHIL # BLD AUTO: 0 10*3/MM3 (ref 0–0.4)
EOSINOPHIL NFR BLD AUTO: 0 % (ref 0.3–6.2)
ERYTHROCYTE [DISTWIDTH] IN BLOOD BY AUTOMATED COUNT: 18.5 % (ref 12.3–15.4)
GLOBULIN UR ELPH-MCNC: 3.3 GM/DL
GLUCOSE SERPL-MCNC: 133 MG/DL (ref 65–99)
HCT VFR BLD AUTO: 26.6 % (ref 34–46.6)
HGB BLD-MCNC: 9.2 G/DL (ref 12–15.9)
IMM GRANULOCYTES # BLD AUTO: 0.02 10*3/MM3 (ref 0–0.05)
IMM GRANULOCYTES NFR BLD AUTO: 1.2 % (ref 0–0.5)
LDH SERPL-CCNC: 546 U/L (ref 135–214)
LYMPHOCYTES # BLD AUTO: 0.75 10*3/MM3 (ref 0.7–3.1)
LYMPHOCYTES NFR BLD AUTO: 43.9 % (ref 19.6–45.3)
MCH RBC QN AUTO: 31.9 PG (ref 26.6–33)
MCHC RBC AUTO-ENTMCNC: 34.6 G/DL (ref 31.5–35.7)
MCV RBC AUTO: 92.4 FL (ref 79–97)
MONOCYTES # BLD AUTO: 0.54 10*3/MM3 (ref 0.1–0.9)
MONOCYTES NFR BLD AUTO: 31.6 % (ref 5–12)
NEUTROPHILS NFR BLD AUTO: 0.4 10*3/MM3 (ref 1.7–7)
NEUTROPHILS NFR BLD AUTO: 23.3 % (ref 42.7–76)
PLATELET # BLD AUTO: 17 10*3/MM3 (ref 140–450)
PLATELET # BLD AUTO: 17 10*3/MM3 (ref 140–450)
PLATELETS.RETICULATED NFR BLD AUTO: 9.2 % (ref 0.9–6.5)
PMV BLD AUTO: 10.1 FL (ref 6–12)
POTASSIUM SERPL-SCNC: 3.8 MMOL/L (ref 3.5–5.2)
PROT SERPL-MCNC: 7.1 G/DL (ref 6–8.5)
RBC # BLD AUTO: 2.88 10*6/MM3 (ref 3.77–5.28)
SODIUM SERPL-SCNC: 136 MMOL/L (ref 136–145)
URATE SERPL-MCNC: 3.2 MG/DL (ref 2.4–5.7)
WBC NRBC COR # BLD AUTO: 1.71 10*3/MM3 (ref 3.4–10.8)

## 2025-08-22 PROCEDURE — 63710000001 ONDANSETRON PER 8 MG: Performed by: INTERNAL MEDICINE

## 2025-08-22 PROCEDURE — 25010000002 PROCHLORPERAZINE 10 MG/2ML SOLUTION: Performed by: INTERNAL MEDICINE

## 2025-08-22 PROCEDURE — 85025 COMPLETE CBC W/AUTO DIFF WBC: CPT | Performed by: INTERNAL MEDICINE

## 2025-08-22 PROCEDURE — 99233 SBSQ HOSP IP/OBS HIGH 50: CPT | Performed by: INTERNAL MEDICINE

## 2025-08-22 PROCEDURE — 83615 LACTATE (LD) (LDH) ENZYME: CPT | Performed by: INTERNAL MEDICINE

## 2025-08-22 PROCEDURE — 84550 ASSAY OF BLOOD/URIC ACID: CPT | Performed by: INTERNAL MEDICINE

## 2025-08-22 PROCEDURE — 85055 RETICULATED PLATELET ASSAY: CPT | Performed by: INTERNAL MEDICINE

## 2025-08-22 PROCEDURE — 80053 COMPREHEN METABOLIC PANEL: CPT | Performed by: INTERNAL MEDICINE

## 2025-08-22 PROCEDURE — 25010000002 AZACITIDINE 100 MG RECONSTITUTED SUSPENSION 1 EACH VIAL: Performed by: INTERNAL MEDICINE

## 2025-08-22 RX ORDER — ONDANSETRON 8 MG/1
16 TABLET, FILM COATED ORAL ONCE
Status: COMPLETED | OUTPATIENT
Start: 2025-08-22 | End: 2025-08-22

## 2025-08-22 RX ADMIN — Medication 10 MG: at 21:09

## 2025-08-22 RX ADMIN — LEVOTHYROXINE SODIUM 50 MCG: 50 TABLET ORAL at 08:47

## 2025-08-22 RX ADMIN — GABAPENTIN 600 MG: 300 CAPSULE ORAL at 15:03

## 2025-08-22 RX ADMIN — DIPHENHYDRAMINE HYDROCHLORIDE, ZINC ACETATE 1 APPLICATION: 2; .1 CREAM TOPICAL at 08:49

## 2025-08-22 RX ADMIN — ALLOPURINOL 300 MG: 300 TABLET ORAL at 08:47

## 2025-08-22 RX ADMIN — VORICONAZOLE 200 MG: 200 TABLET ORAL at 21:10

## 2025-08-22 RX ADMIN — VORICONAZOLE 200 MG: 200 TABLET ORAL at 08:46

## 2025-08-22 RX ADMIN — ACYCLOVIR 400 MG: 400 TABLET ORAL at 21:10

## 2025-08-22 RX ADMIN — GABAPENTIN 600 MG: 300 CAPSULE ORAL at 21:09

## 2025-08-22 RX ADMIN — OXYCODONE HYDROCHLORIDE AND ACETAMINOPHEN 1000 MG: 500 TABLET ORAL at 08:47

## 2025-08-22 RX ADMIN — Medication 10 ML: at 08:47

## 2025-08-22 RX ADMIN — Medication 50 MG: at 08:47

## 2025-08-22 RX ADMIN — PANTOPRAZOLE SODIUM 40 MG: 40 TABLET, DELAYED RELEASE ORAL at 17:07

## 2025-08-22 RX ADMIN — HYDROCHLOROTHIAZIDE 25 MG: 25 TABLET ORAL at 08:47

## 2025-08-22 RX ADMIN — PROCHLORPERAZINE EDISYLATE 10 MG: 5 INJECTION INTRAMUSCULAR; INTRAVENOUS at 08:47

## 2025-08-22 RX ADMIN — Medication 1 CAPSULE: at 08:47

## 2025-08-22 RX ADMIN — BISACODYL 5 MG: 5 TABLET, COATED ORAL at 08:47

## 2025-08-22 RX ADMIN — ATORVASTATIN CALCIUM 40 MG: 20 TABLET, FILM COATED ORAL at 08:47

## 2025-08-22 RX ADMIN — PROPRANOLOL HYDROCHLORIDE 10 MG: 20 TABLET ORAL at 08:47

## 2025-08-22 RX ADMIN — Medication 10 ML: at 21:33

## 2025-08-22 RX ADMIN — PANTOPRAZOLE SODIUM 40 MG: 40 TABLET, DELAYED RELEASE ORAL at 06:47

## 2025-08-22 RX ADMIN — ONDANSETRON HYDROCHLORIDE 16 MG: 8 TABLET, FILM COATED ORAL at 15:04

## 2025-08-22 RX ADMIN — ACYCLOVIR 400 MG: 400 TABLET ORAL at 08:47

## 2025-08-22 RX ADMIN — AZACITIDINE 60 MG: 100 INJECTION, POWDER, LYOPHILIZED, FOR SOLUTION INTRAVENOUS; SUBCUTANEOUS at 15:31

## 2025-08-22 RX ADMIN — PROPRANOLOL HYDROCHLORIDE 10 MG: 20 TABLET ORAL at 21:09

## 2025-08-22 RX ADMIN — GABAPENTIN 600 MG: 300 CAPSULE ORAL at 08:47

## 2025-08-23 LAB
ALBUMIN SERPL-MCNC: 4 G/DL (ref 3.5–5.2)
ALBUMIN/GLOB SERPL: 1.1 G/DL
ALP SERPL-CCNC: 67 U/L (ref 39–117)
ALT SERPL W P-5'-P-CCNC: 16 U/L (ref 1–33)
ANION GAP SERPL CALCULATED.3IONS-SCNC: 15.2 MMOL/L (ref 5–15)
AST SERPL-CCNC: 25 U/L (ref 1–32)
BASOPHILS # BLD MANUAL: 0 10*3/MM3 (ref 0–0.2)
BASOPHILS NFR BLD MANUAL: 0 % (ref 0–1.5)
BILIRUB SERPL-MCNC: 1.6 MG/DL (ref 0–1.2)
BUN SERPL-MCNC: 14 MG/DL (ref 8–23)
BUN/CREAT SERPL: 16.7 (ref 7–25)
CALCIUM SPEC-SCNC: 9.6 MG/DL (ref 8.6–10.5)
CHLORIDE SERPL-SCNC: 96 MMOL/L (ref 98–107)
CO2 SERPL-SCNC: 22.8 MMOL/L (ref 22–29)
CREAT SERPL-MCNC: 0.84 MG/DL (ref 0.57–1)
DEPRECATED RDW RBC AUTO: 62.9 FL (ref 37–54)
EGFRCR SERPLBLD CKD-EPI 2021: 68.6 ML/MIN/1.73
EOSINOPHIL # BLD MANUAL: 0.02 10*3/MM3 (ref 0–0.4)
EOSINOPHIL NFR BLD MANUAL: 1 % (ref 0.3–6.2)
ERYTHROCYTE [DISTWIDTH] IN BLOOD BY AUTOMATED COUNT: 18.5 % (ref 12.3–15.4)
GLOBULIN UR ELPH-MCNC: 3.5 GM/DL
GLUCOSE SERPL-MCNC: 137 MG/DL (ref 65–99)
HCT VFR BLD AUTO: 28.2 % (ref 34–46.6)
HGB BLD-MCNC: 9.8 G/DL (ref 12–15.9)
IDH1/IDH2 MUTATION ANALYSIS RESULT: NORMAL
LDH SERPL-CCNC: 453 U/L (ref 135–214)
LYMPHOCYTES # BLD MANUAL: 0.77 10*3/MM3 (ref 0.7–3.1)
LYMPHOCYTES NFR BLD MANUAL: 15.2 % (ref 5–12)
MCH RBC QN AUTO: 32.5 PG (ref 26.6–33)
MCHC RBC AUTO-ENTMCNC: 34.8 G/DL (ref 31.5–35.7)
MCV RBC AUTO: 93.4 FL (ref 79–97)
MONOCYTES # BLD: 0.3 10*3/MM3 (ref 0.1–0.9)
NEUTROPHILS # BLD AUTO: 0.91 10*3/MM3 (ref 1.7–7)
NEUTROPHILS NFR BLD MANUAL: 45.5 % (ref 42.7–76)
PLAT MORPH BLD: NORMAL
PLATELET # BLD AUTO: 18 10*3/MM3 (ref 140–450)
PLATELET # BLD AUTO: 18 10*3/MM3 (ref 140–450)
PLATELETS.RETICULATED NFR BLD AUTO: 10.3 % (ref 0.9–6.5)
PMV BLD AUTO: 10.7 FL (ref 6–12)
POTASSIUM SERPL-SCNC: 3.7 MMOL/L (ref 3.5–5.2)
PROT SERPL-MCNC: 7.5 G/DL (ref 6–8.5)
RBC # BLD AUTO: 3.02 10*6/MM3 (ref 3.77–5.28)
RBC MORPH BLD: NORMAL
SODIUM SERPL-SCNC: 134 MMOL/L (ref 136–145)
URATE SERPL-MCNC: 3.1 MG/DL (ref 2.4–5.7)
VARIANT LYMPHS NFR BLD MANUAL: 38.4 % (ref 19.6–45.3)
WBC MORPH BLD: NORMAL
WBC NRBC COR # BLD AUTO: 2 10*3/MM3 (ref 3.4–10.8)

## 2025-08-23 PROCEDURE — 85025 COMPLETE CBC W/AUTO DIFF WBC: CPT | Performed by: INTERNAL MEDICINE

## 2025-08-23 PROCEDURE — 25010000002 AZACITIDINE 100 MG RECONSTITUTED SUSPENSION 1 EACH VIAL: Performed by: INTERNAL MEDICINE

## 2025-08-23 PROCEDURE — 36430 TRANSFUSION BLD/BLD COMPNT: CPT

## 2025-08-23 PROCEDURE — P9100 PATHOGEN TEST FOR PLATELETS: HCPCS

## 2025-08-23 PROCEDURE — 99233 SBSQ HOSP IP/OBS HIGH 50: CPT | Performed by: INTERNAL MEDICINE

## 2025-08-23 PROCEDURE — 63710000001 ONDANSETRON PER 8 MG: Performed by: INTERNAL MEDICINE

## 2025-08-23 PROCEDURE — 63710000001 ONDANSETRON ODT 4 MG TABLET DISPERSIBLE: Performed by: NURSE PRACTITIONER

## 2025-08-23 PROCEDURE — 80053 COMPREHEN METABOLIC PANEL: CPT | Performed by: INTERNAL MEDICINE

## 2025-08-23 PROCEDURE — 84550 ASSAY OF BLOOD/URIC ACID: CPT | Performed by: INTERNAL MEDICINE

## 2025-08-23 PROCEDURE — P9035 PLATELET PHERES LEUKOREDUCED: HCPCS

## 2025-08-23 PROCEDURE — 85055 RETICULATED PLATELET ASSAY: CPT | Performed by: INTERNAL MEDICINE

## 2025-08-23 PROCEDURE — 85007 BL SMEAR W/DIFF WBC COUNT: CPT | Performed by: INTERNAL MEDICINE

## 2025-08-23 PROCEDURE — 83615 LACTATE (LD) (LDH) ENZYME: CPT | Performed by: INTERNAL MEDICINE

## 2025-08-23 RX ORDER — ONDANSETRON 8 MG/1
16 TABLET, FILM COATED ORAL ONCE
Status: COMPLETED | OUTPATIENT
Start: 2025-08-23 | End: 2025-08-23

## 2025-08-23 RX ADMIN — ATORVASTATIN CALCIUM 40 MG: 20 TABLET, FILM COATED ORAL at 09:19

## 2025-08-23 RX ADMIN — Medication 1 CAPSULE: at 09:19

## 2025-08-23 RX ADMIN — OXYCODONE HYDROCHLORIDE AND ACETAMINOPHEN 1000 MG: 500 TABLET ORAL at 09:20

## 2025-08-23 RX ADMIN — ALLOPURINOL 300 MG: 300 TABLET ORAL at 09:20

## 2025-08-23 RX ADMIN — PANTOPRAZOLE SODIUM 40 MG: 40 TABLET, DELAYED RELEASE ORAL at 07:45

## 2025-08-23 RX ADMIN — VORICONAZOLE 200 MG: 200 TABLET ORAL at 21:16

## 2025-08-23 RX ADMIN — LEVOTHYROXINE SODIUM 50 MCG: 50 TABLET ORAL at 09:20

## 2025-08-23 RX ADMIN — HYDROCHLOROTHIAZIDE 25 MG: 25 TABLET ORAL at 09:20

## 2025-08-23 RX ADMIN — PROPRANOLOL HYDROCHLORIDE 10 MG: 20 TABLET ORAL at 21:17

## 2025-08-23 RX ADMIN — GABAPENTIN 600 MG: 300 CAPSULE ORAL at 09:20

## 2025-08-23 RX ADMIN — ONDANSETRON 4 MG: 4 TABLET, ORALLY DISINTEGRATING ORAL at 09:34

## 2025-08-23 RX ADMIN — ACYCLOVIR 400 MG: 400 TABLET ORAL at 09:20

## 2025-08-23 RX ADMIN — Medication 10 MG: at 21:16

## 2025-08-23 RX ADMIN — PROPRANOLOL HYDROCHLORIDE 10 MG: 20 TABLET ORAL at 15:07

## 2025-08-23 RX ADMIN — Medication 10 ML: at 21:22

## 2025-08-23 RX ADMIN — GABAPENTIN 600 MG: 300 CAPSULE ORAL at 21:17

## 2025-08-23 RX ADMIN — GABAPENTIN 600 MG: 300 CAPSULE ORAL at 15:07

## 2025-08-23 RX ADMIN — ACYCLOVIR 400 MG: 400 TABLET ORAL at 21:16

## 2025-08-23 RX ADMIN — PANTOPRAZOLE SODIUM 40 MG: 40 TABLET, DELAYED RELEASE ORAL at 17:17

## 2025-08-23 RX ADMIN — AZACITIDINE 60 MG: 100 INJECTION, POWDER, LYOPHILIZED, FOR SOLUTION INTRAVENOUS; SUBCUTANEOUS at 15:09

## 2025-08-23 RX ADMIN — Medication 10 ML: at 09:21

## 2025-08-23 RX ADMIN — Medication 50 MG: at 09:20

## 2025-08-23 RX ADMIN — VORICONAZOLE 200 MG: 200 TABLET ORAL at 09:21

## 2025-08-23 RX ADMIN — PROPRANOLOL HYDROCHLORIDE 10 MG: 20 TABLET ORAL at 09:20

## 2025-08-23 RX ADMIN — ONDANSETRON HYDROCHLORIDE 16 MG: 8 TABLET, FILM COATED ORAL at 14:38

## 2025-08-24 LAB
ALBUMIN SERPL-MCNC: 3.8 G/DL (ref 3.5–5.2)
ALBUMIN/GLOB SERPL: 1.1 G/DL
ALP SERPL-CCNC: 67 U/L (ref 39–117)
ALT SERPL W P-5'-P-CCNC: 24 U/L (ref 1–33)
ANION GAP SERPL CALCULATED.3IONS-SCNC: 15.3 MMOL/L (ref 5–15)
ANISOCYTOSIS BLD QL: ABNORMAL
AST SERPL-CCNC: 29 U/L (ref 1–32)
BASOPHILS # BLD MANUAL: 0 10*3/MM3 (ref 0–0.2)
BASOPHILS NFR BLD MANUAL: 0 % (ref 0–1.5)
BH BB BLOOD EXPIRATION DATE: NORMAL
BH BB BLOOD TYPE BARCODE: 6200
BH BB DISPENSE STATUS: NORMAL
BH BB PRODUCT CODE: NORMAL
BH BB UNIT NUMBER: NORMAL
BILIRUB SERPL-MCNC: 1.3 MG/DL (ref 0–1.2)
BUN SERPL-MCNC: 17 MG/DL (ref 8–23)
BUN/CREAT SERPL: 22.1 (ref 7–25)
CALCIUM SPEC-SCNC: 9.3 MG/DL (ref 8.6–10.5)
CHLORIDE SERPL-SCNC: 94 MMOL/L (ref 98–107)
CO2 SERPL-SCNC: 22.7 MMOL/L (ref 22–29)
CREAT SERPL-MCNC: 0.77 MG/DL (ref 0.57–1)
DEPRECATED RDW RBC AUTO: 63.3 FL (ref 37–54)
EGFRCR SERPLBLD CKD-EPI 2021: 76.2 ML/MIN/1.73
EOSINOPHIL # BLD MANUAL: 0 10*3/MM3 (ref 0–0.4)
EOSINOPHIL NFR BLD MANUAL: 0 % (ref 0.3–6.2)
ERYTHROCYTE [DISTWIDTH] IN BLOOD BY AUTOMATED COUNT: 18.8 % (ref 12.3–15.4)
GLOBULIN UR ELPH-MCNC: 3.5 GM/DL
GLUCOSE SERPL-MCNC: 119 MG/DL (ref 65–99)
HCT VFR BLD AUTO: 23.6 % (ref 34–46.6)
HGB BLD-MCNC: 7.8 G/DL (ref 12–15.9)
LDH SERPL-CCNC: 386 U/L (ref 135–214)
LYMPHOCYTES # BLD MANUAL: 0.64 10*3/MM3 (ref 0.7–3.1)
LYMPHOCYTES NFR BLD MANUAL: 11 % (ref 5–12)
MCH RBC QN AUTO: 31 PG (ref 26.6–33)
MCHC RBC AUTO-ENTMCNC: 33.1 G/DL (ref 31.5–35.7)
MCV RBC AUTO: 93.7 FL (ref 79–97)
MONOCYTES # BLD: 0.16 10*3/MM3 (ref 0.1–0.9)
NEUTROPHILS # BLD AUTO: 0.66 10*3/MM3 (ref 1.7–7)
NEUTROPHILS NFR BLD MANUAL: 45 % (ref 42.7–76)
PLAT MORPH BLD: NORMAL
PLATELET # BLD AUTO: 36 10*3/MM3 (ref 140–450)
PLATELET # BLD AUTO: 36 10*3/MM3 (ref 140–450)
PLATELETS.RETICULATED NFR BLD AUTO: 5.2 % (ref 0.9–6.5)
PMV BLD AUTO: 9.8 FL (ref 6–12)
POTASSIUM SERPL-SCNC: 3.3 MMOL/L (ref 3.5–5.2)
POTASSIUM SERPL-SCNC: 4 MMOL/L (ref 3.5–5.2)
PROT SERPL-MCNC: 7.3 G/DL (ref 6–8.5)
RBC # BLD AUTO: 2.52 10*6/MM3 (ref 3.77–5.28)
SODIUM SERPL-SCNC: 132 MMOL/L (ref 136–145)
UNIT  ABO: NORMAL
UNIT  RH: NORMAL
URATE SERPL-MCNC: 2.6 MG/DL (ref 2.4–5.7)
VARIANT LYMPHS NFR BLD MANUAL: 44 % (ref 19.6–45.3)
WBC MORPH BLD: NORMAL
WBC NRBC COR # BLD AUTO: 1.46 10*3/MM3 (ref 3.4–10.8)

## 2025-08-24 PROCEDURE — 25010000002 AZACITIDINE 100 MG RECONSTITUTED SUSPENSION 1 EACH VIAL: Performed by: INTERNAL MEDICINE

## 2025-08-24 PROCEDURE — 84132 ASSAY OF SERUM POTASSIUM: CPT | Performed by: STUDENT IN AN ORGANIZED HEALTH CARE EDUCATION/TRAINING PROGRAM

## 2025-08-24 PROCEDURE — 85025 COMPLETE CBC W/AUTO DIFF WBC: CPT | Performed by: INTERNAL MEDICINE

## 2025-08-24 PROCEDURE — 85055 RETICULATED PLATELET ASSAY: CPT | Performed by: INTERNAL MEDICINE

## 2025-08-24 PROCEDURE — 63710000001 ONDANSETRON PER 8 MG: Performed by: INTERNAL MEDICINE

## 2025-08-24 PROCEDURE — 63710000001 ONDANSETRON ODT 4 MG TABLET DISPERSIBLE: Performed by: NURSE PRACTITIONER

## 2025-08-24 PROCEDURE — 84550 ASSAY OF BLOOD/URIC ACID: CPT | Performed by: INTERNAL MEDICINE

## 2025-08-24 PROCEDURE — 85007 BL SMEAR W/DIFF WBC COUNT: CPT | Performed by: INTERNAL MEDICINE

## 2025-08-24 PROCEDURE — 99233 SBSQ HOSP IP/OBS HIGH 50: CPT | Performed by: INTERNAL MEDICINE

## 2025-08-24 PROCEDURE — 80053 COMPREHEN METABOLIC PANEL: CPT | Performed by: INTERNAL MEDICINE

## 2025-08-24 PROCEDURE — 83615 LACTATE (LD) (LDH) ENZYME: CPT | Performed by: INTERNAL MEDICINE

## 2025-08-24 RX ORDER — POTASSIUM CHLORIDE 1500 MG/1
40 TABLET, EXTENDED RELEASE ORAL EVERY 4 HOURS
Status: DISPENSED | OUTPATIENT
Start: 2025-08-24 | End: 2025-08-24

## 2025-08-24 RX ORDER — ONDANSETRON 8 MG/1
16 TABLET, FILM COATED ORAL ONCE
Status: COMPLETED | OUTPATIENT
Start: 2025-08-24 | End: 2025-08-24

## 2025-08-24 RX ORDER — POTASSIUM CHLORIDE 1500 MG/1
40 TABLET, EXTENDED RELEASE ORAL EVERY 4 HOURS
Status: DISCONTINUED | OUTPATIENT
Start: 2025-08-24 | End: 2025-08-24 | Stop reason: SDUPTHER

## 2025-08-24 RX ADMIN — ALLOPURINOL 300 MG: 300 TABLET ORAL at 08:03

## 2025-08-24 RX ADMIN — GABAPENTIN 600 MG: 300 CAPSULE ORAL at 20:45

## 2025-08-24 RX ADMIN — Medication 10 MG: at 20:45

## 2025-08-24 RX ADMIN — POTASSIUM CHLORIDE 40 MEQ: 1500 TABLET, EXTENDED RELEASE ORAL at 15:06

## 2025-08-24 RX ADMIN — Medication 10 ML: at 21:43

## 2025-08-24 RX ADMIN — Medication 1 CAPSULE: at 08:03

## 2025-08-24 RX ADMIN — ONDANSETRON 4 MG: 4 TABLET, ORALLY DISINTEGRATING ORAL at 08:13

## 2025-08-24 RX ADMIN — OXYCODONE HYDROCHLORIDE AND ACETAMINOPHEN 1000 MG: 500 TABLET ORAL at 08:03

## 2025-08-24 RX ADMIN — HYDROCHLOROTHIAZIDE 25 MG: 25 TABLET ORAL at 08:03

## 2025-08-24 RX ADMIN — PROPRANOLOL HYDROCHLORIDE 10 MG: 20 TABLET ORAL at 20:45

## 2025-08-24 RX ADMIN — ACYCLOVIR 400 MG: 400 TABLET ORAL at 20:45

## 2025-08-24 RX ADMIN — PANTOPRAZOLE SODIUM 40 MG: 40 TABLET, DELAYED RELEASE ORAL at 06:46

## 2025-08-24 RX ADMIN — LEVOTHYROXINE SODIUM 50 MCG: 50 TABLET ORAL at 08:03

## 2025-08-24 RX ADMIN — Medication 50 MG: at 08:04

## 2025-08-24 RX ADMIN — ACYCLOVIR 400 MG: 400 TABLET ORAL at 08:04

## 2025-08-24 RX ADMIN — PANTOPRAZOLE SODIUM 40 MG: 40 TABLET, DELAYED RELEASE ORAL at 17:37

## 2025-08-24 RX ADMIN — ONDANSETRON HYDROCHLORIDE 16 MG: 8 TABLET, FILM COATED ORAL at 15:06

## 2025-08-24 RX ADMIN — AZACITIDINE 60 MG: 100 INJECTION, POWDER, LYOPHILIZED, FOR SOLUTION INTRAVENOUS; SUBCUTANEOUS at 15:32

## 2025-08-24 RX ADMIN — VORICONAZOLE 200 MG: 200 TABLET ORAL at 08:04

## 2025-08-24 RX ADMIN — Medication 10 ML: at 08:04

## 2025-08-24 RX ADMIN — VORICONAZOLE 200 MG: 200 TABLET ORAL at 20:45

## 2025-08-24 RX ADMIN — ATORVASTATIN CALCIUM 40 MG: 20 TABLET, FILM COATED ORAL at 08:03

## 2025-08-24 RX ADMIN — POTASSIUM CHLORIDE 40 MEQ: 1500 TABLET, EXTENDED RELEASE ORAL at 06:49

## 2025-08-24 RX ADMIN — GABAPENTIN 600 MG: 300 CAPSULE ORAL at 15:06

## 2025-08-24 RX ADMIN — PROPRANOLOL HYDROCHLORIDE 10 MG: 20 TABLET ORAL at 15:06

## 2025-08-24 RX ADMIN — GABAPENTIN 600 MG: 300 CAPSULE ORAL at 08:04

## 2025-08-24 RX ADMIN — PROPRANOLOL HYDROCHLORIDE 10 MG: 20 TABLET ORAL at 08:03

## 2025-08-25 ENCOUNTER — READMISSION MANAGEMENT (OUTPATIENT)
Dept: CALL CENTER | Facility: HOSPITAL | Age: 85
End: 2025-08-25
Payer: MEDICARE

## 2025-08-25 ENCOUNTER — TELEPHONE (OUTPATIENT)
Dept: ONCOLOGY | Facility: CLINIC | Age: 85
End: 2025-08-25
Payer: MEDICARE

## 2025-08-25 VITALS
TEMPERATURE: 98.1 F | SYSTOLIC BLOOD PRESSURE: 133 MMHG | DIASTOLIC BLOOD PRESSURE: 53 MMHG | HEART RATE: 99 BPM | BODY MASS INDEX: 27.96 KG/M2 | OXYGEN SATURATION: 100 % | WEIGHT: 142.42 LBS | HEIGHT: 60 IN | RESPIRATION RATE: 18 BRPM

## 2025-08-25 DIAGNOSIS — D69.6 THROMBOCYTOPENIA: Primary | ICD-10-CM

## 2025-08-25 DIAGNOSIS — D50.9 IRON DEFICIENCY ANEMIA, UNSPECIFIED IRON DEFICIENCY ANEMIA TYPE: ICD-10-CM

## 2025-08-25 LAB
ALBUMIN SERPL-MCNC: 3.9 G/DL (ref 3.5–5.2)
ALBUMIN/GLOB SERPL: 1.1 G/DL
ALP SERPL-CCNC: 70 U/L (ref 39–117)
ALT SERPL W P-5'-P-CCNC: 24 U/L (ref 1–33)
ANION GAP SERPL CALCULATED.3IONS-SCNC: 13.8 MMOL/L (ref 5–15)
ANISOCYTOSIS BLD QL: ABNORMAL
AST SERPL-CCNC: 27 U/L (ref 1–32)
BASOPHILS # BLD MANUAL: 0 10*3/MM3 (ref 0–0.2)
BASOPHILS NFR BLD MANUAL: 0 % (ref 0–1.5)
BILIRUB SERPL-MCNC: 1.2 MG/DL (ref 0–1.2)
BUN SERPL-MCNC: 19 MG/DL (ref 8–23)
BUN/CREAT SERPL: 20.9 (ref 7–25)
CALCIUM SPEC-SCNC: 9.5 MG/DL (ref 8.6–10.5)
CHLORIDE SERPL-SCNC: 99 MMOL/L (ref 98–107)
CO2 SERPL-SCNC: 21.2 MMOL/L (ref 22–29)
CREAT SERPL-MCNC: 0.91 MG/DL (ref 0.57–1)
DEPRECATED RDW RBC AUTO: 62.3 FL (ref 37–54)
EGFRCR SERPLBLD CKD-EPI 2021: 62.3 ML/MIN/1.73
EOSINOPHIL # BLD MANUAL: 0 10*3/MM3 (ref 0–0.4)
EOSINOPHIL NFR BLD MANUAL: 0 % (ref 0.3–6.2)
ERYTHROCYTE [DISTWIDTH] IN BLOOD BY AUTOMATED COUNT: 18.9 % (ref 12.3–15.4)
GLOBULIN UR ELPH-MCNC: 3.4 GM/DL
GLUCOSE SERPL-MCNC: 125 MG/DL (ref 65–99)
HCT VFR BLD AUTO: 24.6 % (ref 34–46.6)
HGB BLD-MCNC: 8.5 G/DL (ref 12–15.9)
LDH SERPL-CCNC: 335 U/L (ref 135–214)
LYMPHOCYTES # BLD MANUAL: 0.75 10*3/MM3 (ref 0.7–3.1)
LYMPHOCYTES NFR BLD MANUAL: 1 % (ref 5–12)
MCH RBC QN AUTO: 31.8 PG (ref 26.6–33)
MCHC RBC AUTO-ENTMCNC: 34.6 G/DL (ref 31.5–35.7)
MCV RBC AUTO: 92.1 FL (ref 79–97)
MONOCYTES # BLD: 0.02 10*3/MM3 (ref 0.1–0.9)
NEUTROPHILS # BLD AUTO: 0.9 10*3/MM3 (ref 1.7–7)
NEUTROPHILS NFR BLD MANUAL: 54.1 % (ref 42.7–76)
PLAT MORPH BLD: NORMAL
PLATELET # BLD AUTO: 26 10*3/MM3 (ref 140–450)
PLATELET # BLD AUTO: 26 10*3/MM3 (ref 140–450)
PLATELETS.RETICULATED NFR BLD AUTO: 9 % (ref 0.9–6.5)
PMV BLD AUTO: 9.6 FL (ref 6–12)
POTASSIUM SERPL-SCNC: 4.2 MMOL/L (ref 3.5–5.2)
PROT SERPL-MCNC: 7.3 G/DL (ref 6–8.5)
RBC # BLD AUTO: 2.67 10*6/MM3 (ref 3.77–5.28)
SODIUM SERPL-SCNC: 134 MMOL/L (ref 136–145)
URATE SERPL-MCNC: 2.6 MG/DL (ref 2.4–5.7)
VARIANT LYMPHS NFR BLD MANUAL: 44.9 % (ref 19.6–45.3)
WBC MORPH BLD: NORMAL
WBC NRBC COR # BLD AUTO: 1.67 10*3/MM3 (ref 3.4–10.8)

## 2025-08-25 PROCEDURE — 84550 ASSAY OF BLOOD/URIC ACID: CPT | Performed by: INTERNAL MEDICINE

## 2025-08-25 PROCEDURE — 80053 COMPREHEN METABOLIC PANEL: CPT | Performed by: INTERNAL MEDICINE

## 2025-08-25 PROCEDURE — 85025 COMPLETE CBC W/AUTO DIFF WBC: CPT | Performed by: INTERNAL MEDICINE

## 2025-08-25 PROCEDURE — 85055 RETICULATED PLATELET ASSAY: CPT | Performed by: INTERNAL MEDICINE

## 2025-08-25 PROCEDURE — 83615 LACTATE (LD) (LDH) ENZYME: CPT | Performed by: INTERNAL MEDICINE

## 2025-08-25 PROCEDURE — 99232 SBSQ HOSP IP/OBS MODERATE 35: CPT | Performed by: INTERNAL MEDICINE

## 2025-08-25 PROCEDURE — 85007 BL SMEAR W/DIFF WBC COUNT: CPT | Performed by: INTERNAL MEDICINE

## 2025-08-25 RX ORDER — PANTOPRAZOLE SODIUM 40 MG/1
40 TABLET, DELAYED RELEASE ORAL EVERY 12 HOURS
Qty: 90 TABLET | Refills: 0 | Status: SHIPPED | OUTPATIENT
Start: 2025-08-25

## 2025-08-25 RX ORDER — ALLOPURINOL 300 MG/1
300 TABLET ORAL DAILY
Qty: 90 TABLET | Refills: 0 | Status: SHIPPED | OUTPATIENT
Start: 2025-08-26

## 2025-08-25 RX ADMIN — Medication 10 ML: at 08:00

## 2025-08-25 RX ADMIN — GABAPENTIN 600 MG: 300 CAPSULE ORAL at 08:15

## 2025-08-25 RX ADMIN — ALLOPURINOL 300 MG: 300 TABLET ORAL at 08:14

## 2025-08-25 RX ADMIN — ACYCLOVIR 400 MG: 400 TABLET ORAL at 08:15

## 2025-08-25 RX ADMIN — PROPRANOLOL HYDROCHLORIDE 10 MG: 20 TABLET ORAL at 08:15

## 2025-08-25 RX ADMIN — PANTOPRAZOLE SODIUM 40 MG: 40 TABLET, DELAYED RELEASE ORAL at 06:42

## 2025-08-25 RX ADMIN — Medication 1 CAPSULE: at 08:15

## 2025-08-25 RX ADMIN — OXYCODONE HYDROCHLORIDE AND ACETAMINOPHEN 1000 MG: 500 TABLET ORAL at 08:14

## 2025-08-25 RX ADMIN — VORICONAZOLE 200 MG: 200 TABLET ORAL at 08:14

## 2025-08-25 RX ADMIN — ATORVASTATIN CALCIUM 40 MG: 20 TABLET, FILM COATED ORAL at 08:15

## 2025-08-25 RX ADMIN — Medication 50 MG: at 08:15

## 2025-08-25 RX ADMIN — HYDROCHLOROTHIAZIDE 25 MG: 25 TABLET ORAL at 08:14

## 2025-08-25 RX ADMIN — LEVOTHYROXINE SODIUM 50 MCG: 50 TABLET ORAL at 08:15

## 2025-08-26 ENCOUNTER — TRANSITIONAL CARE MANAGEMENT TELEPHONE ENCOUNTER (OUTPATIENT)
Dept: CALL CENTER | Facility: HOSPITAL | Age: 85
End: 2025-08-26
Payer: MEDICARE

## 2025-08-27 LAB — Lab: NORMAL

## 2025-08-29 ENCOUNTER — TELEPHONE (OUTPATIENT)
Dept: ONCOLOGY | Facility: CLINIC | Age: 85
End: 2025-08-29
Payer: MEDICARE

## (undated) DEVICE — PROXIMATE RH ROTATING HEAD SKIN STAPLERS (35 REGULAR) CONTAINS 35 STAINLESS STEEL STAPLES: Brand: PROXIMATE

## (undated) DEVICE — BW-412T DISP COMBO CLEANING BRUSH: Brand: SINGLE USE COMBINATION CLEANING BRUSH

## (undated) DEVICE — GLV SURG BIOGEL LTX PF 7 1/2

## (undated) DEVICE — TOWEL,OR,DSP,ST,BLUE,STD,4/PK,20PK/CS: Brand: MEDLINE

## (undated) DEVICE — DRSNG SURESITE WNDW 2.38X2.75

## (undated) DEVICE — PATIENT RETURN ELECTRODE, SINGLE-USE, CONTACT QUALITY MONITORING, ADULT, WITH 9FT CORD, FOR PATIENTS WEIGING OVER 33LBS. (15KG): Brand: MEGADYNE

## (undated) DEVICE — ANTIBACTERIAL UNDYED BRAIDED (POLYGLACTIN 910), SYNTHETIC ABSORBABLE SUTURE: Brand: COATED VICRYL

## (undated) DEVICE — ELECTRD BLD EDGE/INSUL1P 2.4X5.1MM STRL

## (undated) DEVICE — DRP C/ARM 41X74IN

## (undated) DEVICE — SUT MNCRYL 4/0 SH 27IN Y415H

## (undated) DEVICE — SUT SILK 2/0 FS BLK 18IN 685G

## (undated) DEVICE — IRRIGATOR BULB ASEPTO 60CC STRL

## (undated) DEVICE — THE BITE BLOCK MAXI, LATEX FREE STRAP IS USED TO PROTECT THE ENDOSCOPE INSERTION TUBE FROM BEING BITTEN BY THE PATIENT.

## (undated) DEVICE — UNDYED BRAIDED (POLYGLACTIN 910), SYNTHETIC ABSORBABLE SUTURE: Brand: COATED VICRYL

## (undated) DEVICE — DISPOSABLE BIPOLAR CABLE 12FT. (3.6M): Brand: KIRWAN

## (undated) DEVICE — LOU MINOR PROCEDURE: Brand: MEDLINE INDUSTRIES, INC.

## (undated) DEVICE — GLV SURG BIOGEL LTX PF 6

## (undated) DEVICE — SUT SILK 3/0 FS1 18IN 684H

## (undated) DEVICE — TRAP FLD MINIVAC MEGADYNE 100ML

## (undated) DEVICE — DRSNG SURESITE123 4X10IN

## (undated) DEVICE — SUT ETHLN 5/0 PS2 18IN 1666H

## (undated) DEVICE — CVR TRANSD CIV FLX TPR 11.9 TO 3.8X61CM

## (undated) DEVICE — NDL HYPO REG/BVL 30G 1IN LF

## (undated) DEVICE — ENCORE® LATEX ORTHO SIZE 7.5, STERILE LATEX POWDER-FREE SURGICAL GLOVE: Brand: ENCORE

## (undated) DEVICE — THE CARR-LOCKE INJECTION NEEDLE IS A SINGLE USE, DISPOSABLE, FLEXIBLE SHEATH INJECTION NEEDLE USED FOR THE INJECTION OF VARIOUS TYPES OF MEDIA THROUGH FLEXIBLE ENDOSCOPES.

## (undated) DEVICE — NDL HYPO PRECISIONGLIDE REG 25G 1 1/2

## (undated) DEVICE — Device

## (undated) DEVICE — HS FOCUS 17 CM PLUS ADAPTIVE: Brand: HARMONIC

## (undated) DEVICE — ADHS SKIN DERMABOND TOP ADVANCED

## (undated) DEVICE — SUT ETHLN 3/0 PS1 18IN 1663H

## (undated) DEVICE — SUT VIC 5/0 PS2 18IN J495H

## (undated) DEVICE — Device: Brand: SPOT EX ENDOSCOPIC TATTOO

## (undated) DEVICE — GAUZE,SPONGE,4"X4",16PLY,XRAY,STRL,LF: Brand: MEDLINE

## (undated) DEVICE — SNAR POLYP CAPTIFLX MICRO OVL 13MM 240CM

## (undated) DEVICE — DRN JP RND NO TROC SIL 10F 1/8IN

## (undated) DEVICE — FRCP BX RADJAW4 NDL 2.8 240CM LG OG BX40

## (undated) DEVICE — GOWN ,SIRUS,NONREINFORCED SMALL: Brand: MEDLINE

## (undated) DEVICE — SOL IRR H2O BTL 1000ML STRL

## (undated) DEVICE — SPNG GZ WOVN 4X4IN 12PLY 10/BX STRL

## (undated) DEVICE — 3M™ STERI-DRAPE™ INSTRUMENT POUCH 1018: Brand: STERI-DRAPE™

## (undated) DEVICE — SUT VIC 3/0 SH 27IN J416H

## (undated) DEVICE — VIAL FORMALIN CAP 10P 40ML

## (undated) DEVICE — MAGNETIC DRAPE: Brand: DEVON

## (undated) DEVICE — PK ENT MAJ 40

## (undated) DEVICE — ELECTRD BLD EZ CLN MOD XLNG 2.75IN

## (undated) DEVICE — TBG PENCL TELESCP MEGADYNE SMOKE EVAC 10FT

## (undated) DEVICE — THE SINGLE USE ETRAP – POLYP TRAP IS USED FOR SUCTION RETRIEVAL OF ENDOSCOPICALLY REMOVED POLYPS.: Brand: ETRAP

## (undated) DEVICE — STANDARD HYPODERMIC NEEDLE,POLYPROPYLENE HUB: Brand: MONOJECT

## (undated) DEVICE — DRSNG SURESITE WNDW 4X4.5

## (undated) DEVICE — KT ORCA ORCAPOD DISP STRL

## (undated) DEVICE — INTENDED FOR TISSUE SEPARATION, AND OTHER PROCEDURES THAT REQUIRE A SHARP SURGICAL BLADE TO PUNCTURE OR CUT.: Brand: BARD-PARKER ® CARBON RIB-BACK BLADES

## (undated) DEVICE — PK ENT 40

## (undated) DEVICE — ADAPT CLN BIOGUARD AIR/H2O DISP

## (undated) DEVICE — JACKSON-PRATT 100CC BULB RESERVOIR: Brand: CARDINAL HEALTH

## (undated) DEVICE — 3M™ IOBAN™ 2 ANTIMICROBIAL INCISE DRAPE 6650EZ: Brand: IOBAN™ 2

## (undated) DEVICE — SKIN PREP TRAY W/CHG: Brand: MEDLINE INDUSTRIES, INC.

## (undated) DEVICE — 3M™ STERI-STRIP™ REINFORCED ADHESIVE SKIN CLOSURES, R1547, 1/2 IN X 4 IN (12 MM X 100 MM), 6 STRIPS/ENVELOPE: Brand: 3M™ STERI-STRIP™

## (undated) DEVICE — GLV SURG PREMIERPRO ORTHO LTX PF SZ7.5 BRN

## (undated) DEVICE — LINER SURG CANSTR SXN S/RIGD 1500CC

## (undated) DEVICE — SUT VIC 3/0 TIES 18IN J110T

## (undated) DEVICE — SYR LUERLOK 5CC

## (undated) DEVICE — APPL CHLORAPREP W/TINT 26ML ORNG

## (undated) DEVICE — PK CHST BRST 40